# Patient Record
Sex: FEMALE | Race: WHITE
[De-identification: names, ages, dates, MRNs, and addresses within clinical notes are randomized per-mention and may not be internally consistent; named-entity substitution may affect disease eponyms.]

---

## 2017-01-23 ENCOUNTER — OTHER (OUTPATIENT)
Age: 57
End: 2017-01-23

## 2017-01-25 ENCOUNTER — OTHER (OUTPATIENT)
Age: 57
End: 2017-01-25

## 2017-01-26 ENCOUNTER — APPOINTMENT (OUTPATIENT)
Dept: ORTHOPEDIC SURGERY | Facility: CLINIC | Age: 57
End: 2017-01-26

## 2017-01-26 VITALS
BODY MASS INDEX: 27 KG/M2 | HEART RATE: 70 BPM | SYSTOLIC BLOOD PRESSURE: 132 MMHG | WEIGHT: 168 LBS | HEIGHT: 66 IN | DIASTOLIC BLOOD PRESSURE: 78 MMHG

## 2017-01-26 VITALS
HEIGHT: 66 IN | HEART RATE: 70 BPM | WEIGHT: 168 LBS | BODY MASS INDEX: 27 KG/M2 | DIASTOLIC BLOOD PRESSURE: 78 MMHG | SYSTOLIC BLOOD PRESSURE: 132 MMHG

## 2017-01-30 ENCOUNTER — OTHER (OUTPATIENT)
Age: 57
End: 2017-01-30

## 2017-01-31 ENCOUNTER — APPOINTMENT (OUTPATIENT)
Dept: FAMILY MEDICINE | Facility: CLINIC | Age: 57
End: 2017-01-31

## 2017-01-31 VITALS
RESPIRATION RATE: 10 BRPM | HEIGHT: 66 IN | BODY MASS INDEX: 28.45 KG/M2 | WEIGHT: 177 LBS | SYSTOLIC BLOOD PRESSURE: 148 MMHG | OXYGEN SATURATION: 98 % | DIASTOLIC BLOOD PRESSURE: 88 MMHG | TEMPERATURE: 98.6 F | HEART RATE: 67 BPM

## 2017-02-06 ENCOUNTER — APPOINTMENT (OUTPATIENT)
Dept: ORTHOPEDIC SURGERY | Facility: CLINIC | Age: 57
End: 2017-02-06

## 2017-02-14 ENCOUNTER — RX RENEWAL (OUTPATIENT)
Age: 57
End: 2017-02-14

## 2017-02-21 ENCOUNTER — RX RENEWAL (OUTPATIENT)
Age: 57
End: 2017-02-21

## 2017-03-02 ENCOUNTER — APPOINTMENT (OUTPATIENT)
Dept: FAMILY MEDICINE | Facility: CLINIC | Age: 57
End: 2017-03-02

## 2017-03-02 VITALS
TEMPERATURE: 98.2 F | HEIGHT: 66 IN | BODY MASS INDEX: 28.45 KG/M2 | DIASTOLIC BLOOD PRESSURE: 78 MMHG | SYSTOLIC BLOOD PRESSURE: 120 MMHG | OXYGEN SATURATION: 99 % | WEIGHT: 177 LBS | RESPIRATION RATE: 12 BRPM | HEART RATE: 60 BPM

## 2017-03-02 DIAGNOSIS — M19.90 UNSPECIFIED OSTEOARTHRITIS, UNSPECIFIED SITE: ICD-10-CM

## 2017-03-21 ENCOUNTER — RX RENEWAL (OUTPATIENT)
Age: 57
End: 2017-03-21

## 2017-04-04 ENCOUNTER — APPOINTMENT (OUTPATIENT)
Dept: FAMILY MEDICINE | Facility: CLINIC | Age: 57
End: 2017-04-04

## 2017-04-04 VITALS
WEIGHT: 177 LBS | OXYGEN SATURATION: 98 % | BODY MASS INDEX: 28.45 KG/M2 | DIASTOLIC BLOOD PRESSURE: 74 MMHG | SYSTOLIC BLOOD PRESSURE: 118 MMHG | RESPIRATION RATE: 12 BRPM | HEART RATE: 86 BPM | TEMPERATURE: 98.8 F | HEIGHT: 66 IN

## 2017-04-04 DIAGNOSIS — R60.9 EDEMA, UNSPECIFIED: ICD-10-CM

## 2017-04-04 DIAGNOSIS — M70.71 OTHER BURSITIS OF HIP, RIGHT HIP: ICD-10-CM

## 2017-04-06 ENCOUNTER — EMERGENCY (EMERGENCY)
Facility: HOSPITAL | Age: 57
LOS: 1 days | Discharge: HOME | End: 2017-04-06
Attending: EMERGENCY MEDICINE
Payer: COMMERCIAL

## 2017-04-06 VITALS
TEMPERATURE: 99 F | RESPIRATION RATE: 18 BRPM | DIASTOLIC BLOOD PRESSURE: 81 MMHG | HEART RATE: 84 BPM | SYSTOLIC BLOOD PRESSURE: 144 MMHG | OXYGEN SATURATION: 98 %

## 2017-04-06 VITALS
HEIGHT: 66 IN | DIASTOLIC BLOOD PRESSURE: 118 MMHG | SYSTOLIC BLOOD PRESSURE: 180 MMHG | RESPIRATION RATE: 22 BRPM | OXYGEN SATURATION: 97 % | WEIGHT: 164.91 LBS | HEART RATE: 87 BPM

## 2017-04-06 DIAGNOSIS — J45.909 UNSPECIFIED ASTHMA, UNCOMPLICATED: ICD-10-CM

## 2017-04-06 DIAGNOSIS — T40.601A POISONING BY UNSPECIFIED NARCOTICS, ACCIDENTAL (UNINTENTIONAL), INITIAL ENCOUNTER: ICD-10-CM

## 2017-04-06 DIAGNOSIS — Z98.51 TUBAL LIGATION STATUS: ICD-10-CM

## 2017-04-06 DIAGNOSIS — Z98.51 TUBAL LIGATION STATUS: Chronic | ICD-10-CM

## 2017-04-06 DIAGNOSIS — Z79.82 LONG TERM (CURRENT) USE OF ASPIRIN: ICD-10-CM

## 2017-04-06 DIAGNOSIS — F41.8 OTHER SPECIFIED ANXIETY DISORDERS: ICD-10-CM

## 2017-04-06 DIAGNOSIS — Z96.60 PRESENCE OF UNSPECIFIED ORTHOPEDIC JOINT IMPLANT: Chronic | ICD-10-CM

## 2017-04-06 DIAGNOSIS — Z96.642 PRESENCE OF LEFT ARTIFICIAL HIP JOINT: ICD-10-CM

## 2017-04-06 DIAGNOSIS — Z91.09 OTHER ALLERGY STATUS, OTHER THAN TO DRUGS AND BIOLOGICAL SUBSTANCES: ICD-10-CM

## 2017-04-06 DIAGNOSIS — Z90.49 ACQUIRED ABSENCE OF OTHER SPECIFIED PARTS OF DIGESTIVE TRACT: Chronic | ICD-10-CM

## 2017-04-06 DIAGNOSIS — Z90.49 ACQUIRED ABSENCE OF OTHER SPECIFIED PARTS OF DIGESTIVE TRACT: ICD-10-CM

## 2017-04-06 DIAGNOSIS — R07.89 OTHER CHEST PAIN: ICD-10-CM

## 2017-04-06 LAB
ALBUMIN SERPL ELPH-MCNC: 4 G/DL — SIGNIFICANT CHANGE UP (ref 3.3–5.2)
ALP SERPL-CCNC: 67 U/L — SIGNIFICANT CHANGE UP (ref 40–120)
ALT FLD-CCNC: 32 U/L — SIGNIFICANT CHANGE UP
ANION GAP SERPL CALC-SCNC: 11 MMOL/L — SIGNIFICANT CHANGE UP (ref 5–17)
APTT BLD: 29.7 SEC — SIGNIFICANT CHANGE UP (ref 27.5–37.4)
AST SERPL-CCNC: 36 U/L — HIGH
BILIRUB SERPL-MCNC: 0.1 MG/DL — LOW (ref 0.4–2)
BUN SERPL-MCNC: 17 MG/DL — SIGNIFICANT CHANGE UP (ref 8–20)
CALCIUM SERPL-MCNC: 9.2 MG/DL — SIGNIFICANT CHANGE UP (ref 8.6–10.2)
CHLORIDE SERPL-SCNC: 99 MMOL/L — SIGNIFICANT CHANGE UP (ref 98–107)
CK SERPL-CCNC: 73 U/L — SIGNIFICANT CHANGE UP (ref 25–170)
CO2 SERPL-SCNC: 27 MMOL/L — SIGNIFICANT CHANGE UP (ref 22–29)
CREAT SERPL-MCNC: 0.86 MG/DL — SIGNIFICANT CHANGE UP (ref 0.5–1.3)
GLUCOSE SERPL-MCNC: 146 MG/DL — HIGH (ref 70–115)
HCT VFR BLD CALC: 35.2 % — LOW (ref 37–47)
HGB BLD-MCNC: 11.6 G/DL — LOW (ref 12–16)
INR BLD: 0.96 RATIO — SIGNIFICANT CHANGE UP (ref 0.88–1.16)
MCHC RBC-ENTMCNC: 29.4 PG — SIGNIFICANT CHANGE UP (ref 27–31)
MCHC RBC-ENTMCNC: 33 G/DL — SIGNIFICANT CHANGE UP (ref 32–36)
MCV RBC AUTO: 89.3 FL — SIGNIFICANT CHANGE UP (ref 81–99)
PLATELET # BLD AUTO: 266 K/UL — SIGNIFICANT CHANGE UP (ref 150–400)
POTASSIUM SERPL-MCNC: 4.2 MMOL/L — SIGNIFICANT CHANGE UP (ref 3.5–5.3)
POTASSIUM SERPL-SCNC: 4.2 MMOL/L — SIGNIFICANT CHANGE UP (ref 3.5–5.3)
PROT SERPL-MCNC: 7.2 G/DL — SIGNIFICANT CHANGE UP (ref 6.6–8.7)
PROTHROM AB SERPL-ACNC: 10.6 SEC — SIGNIFICANT CHANGE UP (ref 9.8–12.7)
RBC # BLD: 3.94 M/UL — LOW (ref 4.4–5.2)
RBC # FLD: 14 % — SIGNIFICANT CHANGE UP (ref 11–15.6)
SODIUM SERPL-SCNC: 137 MMOL/L — SIGNIFICANT CHANGE UP (ref 135–145)
TROPONIN T SERPL-MCNC: <0.01 NG/ML — SIGNIFICANT CHANGE UP (ref 0–0.06)
WBC # BLD: 10.4 K/UL — SIGNIFICANT CHANGE UP (ref 4.8–10.8)
WBC # FLD AUTO: 10.4 K/UL — SIGNIFICANT CHANGE UP (ref 4.8–10.8)

## 2017-04-06 PROCEDURE — 99284 EMERGENCY DEPT VISIT MOD MDM: CPT | Mod: 25

## 2017-04-06 PROCEDURE — 84484 ASSAY OF TROPONIN QUANT: CPT

## 2017-04-06 PROCEDURE — 71020: CPT | Mod: 26

## 2017-04-06 PROCEDURE — 85610 PROTHROMBIN TIME: CPT

## 2017-04-06 PROCEDURE — 93010 ELECTROCARDIOGRAM REPORT: CPT

## 2017-04-06 PROCEDURE — 71046 X-RAY EXAM CHEST 2 VIEWS: CPT

## 2017-04-06 PROCEDURE — 85730 THROMBOPLASTIN TIME PARTIAL: CPT

## 2017-04-06 PROCEDURE — 93005 ELECTROCARDIOGRAM TRACING: CPT

## 2017-04-06 PROCEDURE — 85027 COMPLETE CBC AUTOMATED: CPT

## 2017-04-06 PROCEDURE — 99285 EMERGENCY DEPT VISIT HI MDM: CPT

## 2017-04-06 PROCEDURE — 96374 THER/PROPH/DIAG INJ IV PUSH: CPT

## 2017-04-06 PROCEDURE — 80053 COMPREHEN METABOLIC PANEL: CPT

## 2017-04-06 PROCEDURE — 84443 ASSAY THYROID STIM HORMONE: CPT

## 2017-04-06 PROCEDURE — 82550 ASSAY OF CK (CPK): CPT

## 2017-04-06 RX ORDER — KETOROLAC TROMETHAMINE 30 MG/ML
15 SYRINGE (ML) INJECTION ONCE
Qty: 0 | Refills: 0 | Status: DISCONTINUED | OUTPATIENT
Start: 2017-04-06 | End: 2017-04-06

## 2017-04-06 RX ADMIN — Medication 15 MILLIGRAM(S): at 15:25

## 2017-04-06 NOTE — ED ADULT TRIAGE NOTE - CHIEF COMPLAINT QUOTE
pt alert and awake x3, BIBA s/p unresponsive, as per ems, pt just recently had hip surgery, took 5mg of oxy, ems states that patient took another 5mg of oxy, and also took 0.5 xanax and became unresponsive, ems states that MCAT started CPR for 4-5 minutes and administered 2mg narcan IV, pt woke up, pt denies pain, denies SI/HI

## 2017-04-06 NOTE — ED PROVIDER NOTE - NS ED ROS FT
CONST: no fevers, no chills, no trauma  EYES: no pain, no visual disturbances  ENT: no sore throat, no epistaxis, no rhinorhea, no hearing changes  CV: no chest pain, no palpitations, no orthopnea, no extremity pain or swelling  RESP: no shortness of breath, no cough, no sputum, no pleurisy, no wheezing  ABD: no abdominal pain, no nausea, no vomiting, no diarrhea, no black or bloody stool  : no dysuria, no hematuria, no frequency, no urgency  MSK: see HPI, R-lateral back pain that is her usual pain not different from usual, no midline pain, no radiation, no neck pain, no extremity pain  NEURO: no headache, no sensory disturbances, no focal weakness, no dizziness  HEME: no easy bleeding or bruising  SKIN: no diaphoresis, no rash  ENDO: known goiter, was scheduled for outpatient thyroid function

## 2017-04-06 NOTE — ED PROVIDER NOTE - PROGRESS NOTE DETAILS
Pt seen by social work and they do not think she is a risk. Pt was provided a number for outpatient counselling. Pt stable, all workup reassuring. Do not suspect true cardiac arrest. Now >4h s/p narcan without signs of mental status depression. Will DC for PCP f/u.

## 2017-04-06 NOTE — ED PROVIDER NOTE - MEDICAL DECISION MAKING DETAILS
Unresponsive episode likely from mixing opiates and benzos. Pt well appaering with minimal chest wall pain, her usual sciatica, but no other complaints. Will get labs, imaging, give torradol for mild pain.

## 2017-04-06 NOTE — ED PROVIDER NOTE - OBJECTIVE STATEMENT
This is a 56F w/Hx of anxiety, sciatica who presents for unresponsiveness. Pt has hx of anxiety and took her usual xanax today. Her sciatica was acting up and she took an old oxycodone she had in the house. She has never mixed it with xanax before. She took both at the same time. Around an hour later she was feeling woozy. Next thing she knew she awoke on the ground with EMS. Per report she was seen to collapse. Bystander CPR was started, unclear if she had a pulse. EMS arrived and found a pulse, administered narcan with immediate revival. The patient denies cardiac hx, CP or SOB or palpitations before or after the episode. She has a mild ache in her R ribs that she thinks may be from CPR. She does not abuse any drugs and denies prior episodes. She denies johan depression, SI or HI although she has been under stress lately. She has an appointment with a psychiatrist coming up next week.

## 2017-04-06 NOTE — ED ADULT NURSE NOTE - OBJECTIVE STATEMENT
Patient BIBA, s/p cardiac arrest and given narcan, patient states had hip surgery last april, states when weather is bad she takes an extra pain pill and also has hx of siatica, today took 10mg of oxycotin in total, was at work and found unresponsive. about 5 min of CPR done on patient unknown cardiac rhythm, was given 2mg IV narcan and patient became responsive.  At this time patient awake, alert, orientedx3, in no apparent distress, appears upset, denies any suicidal ideations, denies intentional overdose. even and unlabored respirations, lung sounds clear bilateral, color good, skin warm and dry, moving all extremities well.

## 2017-04-06 NOTE — ED PROVIDER NOTE - CARE PLAN
Principal Discharge DX:	Opiate overdose, accidental or unintentional, initial encounter  Instructions for follow-up, activity and diet:	You were seen and evaluated in the emergency department for an unresponsive episode. This was likely from mixing oxycodone with your xanax. Do not take these medications within 4 hours of each other. You may take acetaminophen and ibuprofen as needed for pain according to the 's instructions. Please return for any chest pain, trouble breathing, weakness, if you pass our or faint, or have other concerning new symptoms. Please follow up with your psychiatrist as scheduled.

## 2017-04-06 NOTE — ED PROVIDER NOTE - PHYSICAL EXAMINATION
VITALS: reviewed  GEN: NAD, A & O x 4  HEAD/EYES: NCAT, PERRL, EOMI, anicteric sclerae, no conjunctival pallor  ENT: mucus membranes moist, oropharynx WNL, trachea midline, no JVD, mild goiter, negative warren sign  CHEST: lungs CTA with equal breath sounds bilaterally, chest wall with minimal R-chest wall tenderness, no crepitance or stepoff, able to breathe against AP and lateral resistance  CV: heart with reg rhythm S1, S2, no murmur; distal pulses intact and symmetric bilaterally  ABDOMEN: normoactive bowel sounds, soft, ND, nontender, no masses  : no CVAT, no suprapubic tenderness or fullness  MSK: extremities atraumatic and nontender, no edema. back is without midline tenderness, deformity or stepoff and is ranged painlessly. mild R-lateral muscular back pain, negative SLR and XLR. the neck has no midline tenderness, deformity, or stepoff, and is ranged painlessly.  SKIN: no rash, no bruising, no cyanosis. color appropriate for ethnicity  NEURO: alert, mentating appropriately, no facial asymmetry. gross sensation, motor, coordination are intact  PSYCH: Affect appropriate

## 2017-04-06 NOTE — ED PROVIDER NOTE - PLAN OF CARE
You were seen and evaluated in the emergency department for an unresponsive episode. This was likely from mixing oxycodone with your xanax. Do not take these medications within 4 hours of each other. You may take acetaminophen and ibuprofen as needed for pain according to the 's instructions. Please return for any chest pain, trouble breathing, weakness, if you pass our or faint, or have other concerning new symptoms. Please follow up with your psychiatrist as scheduled.

## 2017-04-18 ENCOUNTER — RX RENEWAL (OUTPATIENT)
Age: 57
End: 2017-04-18

## 2017-05-02 ENCOUNTER — APPOINTMENT (OUTPATIENT)
Dept: FAMILY MEDICINE | Facility: CLINIC | Age: 57
End: 2017-05-02

## 2017-05-02 VITALS
HEIGHT: 66 IN | HEART RATE: 63 BPM | SYSTOLIC BLOOD PRESSURE: 120 MMHG | TEMPERATURE: 98.8 F | RESPIRATION RATE: 12 BRPM | DIASTOLIC BLOOD PRESSURE: 72 MMHG | BODY MASS INDEX: 28.45 KG/M2 | WEIGHT: 177 LBS | OXYGEN SATURATION: 98 %

## 2017-05-02 DIAGNOSIS — R13.10 DYSPHAGIA, UNSPECIFIED: ICD-10-CM

## 2017-05-02 DIAGNOSIS — Z86.19 PERSONAL HISTORY OF OTHER INFECTIOUS AND PARASITIC DISEASES: ICD-10-CM

## 2017-05-02 DIAGNOSIS — R53.83 OTHER FATIGUE: ICD-10-CM

## 2017-05-02 DIAGNOSIS — Z79.899 OTHER LONG TERM (CURRENT) DRUG THERAPY: ICD-10-CM

## 2017-05-02 DIAGNOSIS — L60.9 NAIL DISORDER, UNSPECIFIED: ICD-10-CM

## 2017-05-03 LAB
ALBUMIN SERPL ELPH-MCNC: 4.3 G/DL
ALP BLD-CCNC: 70 U/L
ALT SERPL-CCNC: 39 U/L
ANION GAP SERPL CALC-SCNC: 11 MMOL/L
AST SERPL-CCNC: 21 U/L
BASOPHILS # BLD AUTO: 0.02 K/UL
BASOPHILS NFR BLD AUTO: 0.3 %
BILIRUB SERPL-MCNC: <0.2 MG/DL
BUN SERPL-MCNC: 14 MG/DL
CALCIUM SERPL-MCNC: 9.3 MG/DL
CHLORIDE SERPL-SCNC: 104 MMOL/L
CO2 SERPL-SCNC: 27 MMOL/L
CREAT SERPL-MCNC: 0.84 MG/DL
EOSINOPHIL # BLD AUTO: 0.12 K/UL
EOSINOPHIL NFR BLD AUTO: 2 %
GLUCOSE SERPL-MCNC: 107 MG/DL
HCT VFR BLD CALC: 33.6 %
HGB BLD-MCNC: 10.4 G/DL
IMM GRANULOCYTES NFR BLD AUTO: 0 %
LYMPHOCYTES # BLD AUTO: 1.35 K/UL
LYMPHOCYTES NFR BLD AUTO: 22.8 %
MAN DIFF?: NORMAL
MCHC RBC-ENTMCNC: 28.6 PG
MCHC RBC-ENTMCNC: 31 GM/DL
MCV RBC AUTO: 92.3 FL
MONOCYTES # BLD AUTO: 0.3 K/UL
MONOCYTES NFR BLD AUTO: 5.1 %
NEUTROPHILS # BLD AUTO: 4.14 K/UL
NEUTROPHILS NFR BLD AUTO: 69.8 %
PLATELET # BLD AUTO: 331 K/UL
POTASSIUM SERPL-SCNC: 4.6 MMOL/L
PROT SERPL-MCNC: 6.5 G/DL
RBC # BLD: 3.64 M/UL
RBC # FLD: 14 %
SODIUM SERPL-SCNC: 142 MMOL/L
TSH SERPL-ACNC: 1.13 UIU/ML
WBC # FLD AUTO: 5.93 K/UL

## 2017-05-09 LAB
CHOLEST SERPL-MCNC: 226 MG/DL
CHOLEST/HDLC SERPL: 2.7 RATIO
HDLC SERPL-MCNC: 83 MG/DL
LDLC SERPL CALC-MCNC: 133 MG/DL
TRIGL SERPL-MCNC: 49 MG/DL

## 2017-05-18 ENCOUNTER — RX RENEWAL (OUTPATIENT)
Age: 57
End: 2017-05-18

## 2017-06-22 ENCOUNTER — OTHER (OUTPATIENT)
Age: 57
End: 2017-06-22

## 2017-07-25 ENCOUNTER — APPOINTMENT (OUTPATIENT)
Dept: ORTHOPEDIC SURGERY | Facility: CLINIC | Age: 57
End: 2017-07-25

## 2017-07-25 VITALS
WEIGHT: 175 LBS | SYSTOLIC BLOOD PRESSURE: 176 MMHG | DIASTOLIC BLOOD PRESSURE: 96 MMHG | HEART RATE: 69 BPM | HEIGHT: 66 IN | BODY MASS INDEX: 28.12 KG/M2

## 2017-07-25 DIAGNOSIS — M43.16 SPONDYLOLISTHESIS, LUMBAR REGION: ICD-10-CM

## 2017-08-13 ENCOUNTER — FORM ENCOUNTER (OUTPATIENT)
Age: 57
End: 2017-08-13

## 2017-08-14 ENCOUNTER — OUTPATIENT (OUTPATIENT)
Dept: OUTPATIENT SERVICES | Facility: HOSPITAL | Age: 57
LOS: 1 days | End: 2017-08-14

## 2017-08-14 ENCOUNTER — APPOINTMENT (OUTPATIENT)
Dept: MRI IMAGING | Facility: CLINIC | Age: 57
End: 2017-08-14
Payer: COMMERCIAL

## 2017-08-14 DIAGNOSIS — Z90.49 ACQUIRED ABSENCE OF OTHER SPECIFIED PARTS OF DIGESTIVE TRACT: Chronic | ICD-10-CM

## 2017-08-14 DIAGNOSIS — Z96.60 PRESENCE OF UNSPECIFIED ORTHOPEDIC JOINT IMPLANT: Chronic | ICD-10-CM

## 2017-08-14 DIAGNOSIS — Z98.51 TUBAL LIGATION STATUS: Chronic | ICD-10-CM

## 2017-08-14 PROCEDURE — 72148 MRI LUMBAR SPINE W/O DYE: CPT | Mod: 26

## 2017-08-16 ENCOUNTER — RX RENEWAL (OUTPATIENT)
Age: 57
End: 2017-08-16

## 2017-08-18 ENCOUNTER — EMERGENCY (EMERGENCY)
Facility: HOSPITAL | Age: 57
LOS: 1 days | Discharge: HOME | End: 2017-08-18
Attending: EMERGENCY MEDICINE
Payer: COMMERCIAL

## 2017-08-18 VITALS
RESPIRATION RATE: 20 BRPM | WEIGHT: 175.05 LBS | HEART RATE: 126 BPM | HEIGHT: 66 IN | DIASTOLIC BLOOD PRESSURE: 121 MMHG | TEMPERATURE: 99 F | OXYGEN SATURATION: 97 % | SYSTOLIC BLOOD PRESSURE: 150 MMHG

## 2017-08-18 VITALS
SYSTOLIC BLOOD PRESSURE: 135 MMHG | RESPIRATION RATE: 20 BRPM | HEART RATE: 98 BPM | OXYGEN SATURATION: 98 % | DIASTOLIC BLOOD PRESSURE: 90 MMHG

## 2017-08-18 DIAGNOSIS — Z90.49 ACQUIRED ABSENCE OF OTHER SPECIFIED PARTS OF DIGESTIVE TRACT: Chronic | ICD-10-CM

## 2017-08-18 DIAGNOSIS — Z98.51 TUBAL LIGATION STATUS: Chronic | ICD-10-CM

## 2017-08-18 DIAGNOSIS — Z96.60 PRESENCE OF UNSPECIFIED ORTHOPEDIC JOINT IMPLANT: Chronic | ICD-10-CM

## 2017-08-18 LAB
ALBUMIN SERPL ELPH-MCNC: 4.7 G/DL — SIGNIFICANT CHANGE UP (ref 3.3–5.2)
ALP SERPL-CCNC: 95 U/L — SIGNIFICANT CHANGE UP (ref 40–120)
ALT FLD-CCNC: 61 U/L — HIGH
ANION GAP SERPL CALC-SCNC: 21 MMOL/L — HIGH (ref 5–17)
AST SERPL-CCNC: 31 U/L — SIGNIFICANT CHANGE UP
BASOPHILS # BLD AUTO: 0 K/UL — SIGNIFICANT CHANGE UP (ref 0–0.2)
BASOPHILS NFR BLD AUTO: 0.1 % — SIGNIFICANT CHANGE UP (ref 0–2)
BILIRUB SERPL-MCNC: 0.3 MG/DL — LOW (ref 0.4–2)
BUN SERPL-MCNC: 13 MG/DL — SIGNIFICANT CHANGE UP (ref 8–20)
CALCIUM SERPL-MCNC: 10 MG/DL — SIGNIFICANT CHANGE UP (ref 8.6–10.2)
CHLORIDE SERPL-SCNC: 101 MMOL/L — SIGNIFICANT CHANGE UP (ref 98–107)
CO2 SERPL-SCNC: 22 MMOL/L — SIGNIFICANT CHANGE UP (ref 22–29)
CREAT SERPL-MCNC: 0.98 MG/DL — SIGNIFICANT CHANGE UP (ref 0.5–1.3)
EOSINOPHIL # BLD AUTO: 0 K/UL — SIGNIFICANT CHANGE UP (ref 0–0.5)
EOSINOPHIL NFR BLD AUTO: 0.5 % — SIGNIFICANT CHANGE UP (ref 0–6)
GLUCOSE SERPL-MCNC: 144 MG/DL — HIGH (ref 70–115)
HCT VFR BLD CALC: 38.2 % — SIGNIFICANT CHANGE UP (ref 37–47)
HGB BLD-MCNC: 12.5 G/DL — SIGNIFICANT CHANGE UP (ref 12–16)
INR BLD: 1.12 RATIO — SIGNIFICANT CHANGE UP (ref 0.88–1.16)
LIDOCAIN IGE QN: 48 U/L — SIGNIFICANT CHANGE UP (ref 22–51)
LYMPHOCYTES # BLD AUTO: 1.2 K/UL — SIGNIFICANT CHANGE UP (ref 1–4.8)
LYMPHOCYTES # BLD AUTO: 14.4 % — LOW (ref 20–55)
MCHC RBC-ENTMCNC: 26.7 PG — LOW (ref 27–31)
MCHC RBC-ENTMCNC: 32.7 G/DL — SIGNIFICANT CHANGE UP (ref 32–36)
MCV RBC AUTO: 81.6 FL — SIGNIFICANT CHANGE UP (ref 81–99)
MONOCYTES # BLD AUTO: 0.6 K/UL — SIGNIFICANT CHANGE UP (ref 0–0.8)
MONOCYTES NFR BLD AUTO: 7.3 % — SIGNIFICANT CHANGE UP (ref 3–10)
NEUTROPHILS # BLD AUTO: 6.4 K/UL — SIGNIFICANT CHANGE UP (ref 1.8–8)
NEUTROPHILS NFR BLD AUTO: 77.6 % — HIGH (ref 37–73)
PLATELET # BLD AUTO: 377 K/UL — SIGNIFICANT CHANGE UP (ref 150–400)
POTASSIUM SERPL-MCNC: 3.7 MMOL/L — SIGNIFICANT CHANGE UP (ref 3.5–5.3)
POTASSIUM SERPL-SCNC: 3.7 MMOL/L — SIGNIFICANT CHANGE UP (ref 3.5–5.3)
PROT SERPL-MCNC: 8.3 G/DL — SIGNIFICANT CHANGE UP (ref 6.6–8.7)
PROTHROM AB SERPL-ACNC: 12.3 SEC — SIGNIFICANT CHANGE UP (ref 9.8–12.7)
RBC # BLD: 4.68 M/UL — SIGNIFICANT CHANGE UP (ref 4.4–5.2)
RBC # FLD: 14.6 % — SIGNIFICANT CHANGE UP (ref 11–15.6)
SODIUM SERPL-SCNC: 144 MMOL/L — SIGNIFICANT CHANGE UP (ref 135–145)
TROPONIN T SERPL-MCNC: <0.01 NG/ML — SIGNIFICANT CHANGE UP (ref 0–0.06)
WBC # BLD: 8.2 K/UL — SIGNIFICANT CHANGE UP (ref 4.8–10.8)
WBC # FLD AUTO: 8.2 K/UL — SIGNIFICANT CHANGE UP (ref 4.8–10.8)

## 2017-08-18 PROCEDURE — 99283 EMERGENCY DEPT VISIT LOW MDM: CPT | Mod: 25

## 2017-08-18 PROCEDURE — 36415 COLL VENOUS BLD VENIPUNCTURE: CPT

## 2017-08-18 PROCEDURE — 80053 COMPREHEN METABOLIC PANEL: CPT

## 2017-08-18 PROCEDURE — 85027 COMPLETE CBC AUTOMATED: CPT

## 2017-08-18 PROCEDURE — 93010 ELECTROCARDIOGRAM REPORT: CPT

## 2017-08-18 PROCEDURE — 71046 X-RAY EXAM CHEST 2 VIEWS: CPT

## 2017-08-18 PROCEDURE — 99285 EMERGENCY DEPT VISIT HI MDM: CPT | Mod: 25

## 2017-08-18 PROCEDURE — 85610 PROTHROMBIN TIME: CPT

## 2017-08-18 PROCEDURE — 93005 ELECTROCARDIOGRAM TRACING: CPT

## 2017-08-18 PROCEDURE — 83690 ASSAY OF LIPASE: CPT

## 2017-08-18 PROCEDURE — 84484 ASSAY OF TROPONIN QUANT: CPT

## 2017-08-18 PROCEDURE — 71020: CPT | Mod: 26

## 2017-08-18 RX ORDER — BUPRENORPHINE AND NALOXONE 2; .5 MG/1; MG/1
1 TABLET SUBLINGUAL ONCE
Qty: 0 | Refills: 0 | Status: DISCONTINUED | OUTPATIENT
Start: 2017-08-18 | End: 2017-08-18

## 2017-08-18 RX ORDER — SODIUM CHLORIDE 9 MG/ML
3 INJECTION INTRAMUSCULAR; INTRAVENOUS; SUBCUTANEOUS ONCE
Qty: 0 | Refills: 0 | Status: COMPLETED | OUTPATIENT
Start: 2017-08-18 | End: 2017-08-18

## 2017-08-18 RX ORDER — ASPIRIN/CALCIUM CARB/MAGNESIUM 324 MG
162 TABLET ORAL DAILY
Qty: 0 | Refills: 0 | Status: DISCONTINUED | OUTPATIENT
Start: 2017-08-18 | End: 2017-08-22

## 2017-08-18 RX ORDER — NITROGLYCERIN 6.5 MG
0.4 CAPSULE, EXTENDED RELEASE ORAL ONCE
Qty: 0 | Refills: 0 | Status: COMPLETED | OUTPATIENT
Start: 2017-08-18 | End: 2017-08-18

## 2017-08-18 RX ADMIN — BUPRENORPHINE AND NALOXONE 1 TABLET(S): 2; .5 TABLET SUBLINGUAL at 06:45

## 2017-08-18 RX ADMIN — Medication 162 MILLIGRAM(S): at 06:45

## 2017-08-18 RX ADMIN — Medication 0.4 MILLIGRAM(S): at 06:51

## 2017-08-18 RX ADMIN — SODIUM CHLORIDE 3 MILLILITER(S): 9 INJECTION INTRAMUSCULAR; INTRAVENOUS; SUBCUTANEOUS at 07:09

## 2017-08-18 RX ADMIN — Medication 50 MILLIGRAM(S): at 08:28

## 2017-08-18 NOTE — ED PROVIDER NOTE - CARE PLAN
Principal Discharge DX:	Chest pain, unspecified type  Secondary Diagnosis:	Adverse effect of opioid antagonist, initial encounter

## 2017-08-18 NOTE — ED PROVIDER NOTE - MEDICAL DECISION MAKING DETAILS
labs, cxr, Ekg, trop, sl nitro, asa, suboxone, reassess pt in nad hr 95 c/o withdrawal symptoms greater than 56 hrs chest pain started 4 pm yesterday no chest pain at dispo librium etoh resources provided no si or hi DDX considered: stable angina, aortic dissection, pericarditis, pneumonia, pe, pts, chest wall pain, esophageal spasm and abdominal emergencies. I have discussed with patient with negative troponin and normal ekg their 28 day cardiac risk and they have agreed to outpt cardio f/ut his week without fail. asa per day until then. return to the ed immediately for any intractable chest pain or sob. pt agrees to plan of care   return to ed for intractable HA, persistent vomiting, or new onset motor/sensory deficits

## 2017-08-18 NOTE — ED PROVIDER NOTE - OBJECTIVE STATEMENT
chest pain. pt has been stopped taking percocet 2 days ago and now p/w with substernal cp. worse with movement and palpation, + nausea, increased anxiety

## 2017-08-18 NOTE — ED PROVIDER NOTE - PHYSICAL EXAMINATION
VS: reviewed in triage note...  HEENT: NC/AT   CV:s1,s2 no m/r/g  Lungs: cta b/l, no r/r/w  Abd: soft, nt/nd, +bs  EXT: no c/c/e  Neuro:no focal defecits  skin: no rash  Pulses: dpp, pt 2+ b/l

## 2017-08-18 NOTE — ED ADULT NURSE REASSESSMENT NOTE - NS ED NURSE REASSESS COMMENT FT1
pt. received from night RN. pt. awake, alert, oriented x3. pt. states she came in because she stopped taking perocets x2 days ago without any help. pt. states she is very anxious. pt. denies chest pain at this time. pt. on cm. vs. as charted. will continue to monitor.

## 2017-08-18 NOTE — ED ADULT NURSE NOTE - OBJECTIVE STATEMENT
patient states that she has had CP started yesterday with N/V shaking/chills back pain and mild SOB, patient also admits to using percocet and requesting help to stop, patient has been off medication for 2 days, she has HX of back pain, yasmine hip replacement was here 1 month ago for same

## 2017-11-14 ENCOUNTER — RX RENEWAL (OUTPATIENT)
Age: 57
End: 2017-11-14

## 2017-12-26 RX ORDER — MELOXICAM 7.5 MG/1
7.5 TABLET ORAL DAILY
Qty: 60 | Refills: 0 | Status: COMPLETED | COMMUNITY
Start: 2017-07-25 | End: 2017-12-26

## 2018-02-05 ENCOUNTER — EMERGENCY (EMERGENCY)
Facility: HOSPITAL | Age: 58
LOS: 1 days | Discharge: OTHER INSTITUTION | End: 2018-02-05
Attending: EMERGENCY MEDICINE
Payer: SELF-PAY

## 2018-02-05 DIAGNOSIS — Z90.49 ACQUIRED ABSENCE OF OTHER SPECIFIED PARTS OF DIGESTIVE TRACT: Chronic | ICD-10-CM

## 2018-02-05 DIAGNOSIS — Z96.60 PRESENCE OF UNSPECIFIED ORTHOPEDIC JOINT IMPLANT: Chronic | ICD-10-CM

## 2018-02-05 DIAGNOSIS — Z98.51 TUBAL LIGATION STATUS: Chronic | ICD-10-CM

## 2018-02-05 PROCEDURE — 99285 EMERGENCY DEPT VISIT HI MDM: CPT | Mod: 25

## 2018-02-05 PROCEDURE — 99053 MED SERV 10PM-8AM 24 HR FAC: CPT

## 2018-02-06 VITALS
TEMPERATURE: 98 F | DIASTOLIC BLOOD PRESSURE: 67 MMHG | WEIGHT: 175.05 LBS | HEIGHT: 66 IN | SYSTOLIC BLOOD PRESSURE: 99 MMHG | RESPIRATION RATE: 20 BRPM | HEART RATE: 65 BPM | OXYGEN SATURATION: 96 %

## 2018-02-06 VITALS
DIASTOLIC BLOOD PRESSURE: 82 MMHG | SYSTOLIC BLOOD PRESSURE: 153 MMHG | RESPIRATION RATE: 18 BRPM | TEMPERATURE: 99 F | HEART RATE: 69 BPM | OXYGEN SATURATION: 98 %

## 2018-02-06 DIAGNOSIS — R69 ILLNESS, UNSPECIFIED: ICD-10-CM

## 2018-02-06 DIAGNOSIS — F19.90 OTHER PSYCHOACTIVE SUBSTANCE USE, UNSPECIFIED, UNCOMPLICATED: ICD-10-CM

## 2018-02-06 DIAGNOSIS — F39 UNSPECIFIED MOOD [AFFECTIVE] DISORDER: ICD-10-CM

## 2018-02-06 LAB
ALBUMIN SERPL ELPH-MCNC: 3.8 G/DL — SIGNIFICANT CHANGE UP (ref 3.3–5.2)
ALP SERPL-CCNC: 73 U/L — SIGNIFICANT CHANGE UP (ref 40–120)
ALT FLD-CCNC: 19 U/L — SIGNIFICANT CHANGE UP
AMPHET UR-MCNC: NEGATIVE — SIGNIFICANT CHANGE UP
ANION GAP SERPL CALC-SCNC: 8 MMOL/L — SIGNIFICANT CHANGE UP (ref 5–17)
APAP SERPL-MCNC: <7.5 UG/ML — LOW (ref 10–26)
APPEARANCE UR: CLEAR — SIGNIFICANT CHANGE UP
AST SERPL-CCNC: 35 U/L — HIGH
BARBITURATES UR SCN-MCNC: NEGATIVE — SIGNIFICANT CHANGE UP
BENZODIAZ UR-MCNC: POSITIVE
BILIRUB SERPL-MCNC: <0.2 MG/DL — LOW (ref 0.4–2)
BILIRUB UR-MCNC: NEGATIVE — SIGNIFICANT CHANGE UP
BUN SERPL-MCNC: 12 MG/DL — SIGNIFICANT CHANGE UP (ref 8–20)
CALCIUM SERPL-MCNC: 9.2 MG/DL — SIGNIFICANT CHANGE UP (ref 8.6–10.2)
CHLORIDE SERPL-SCNC: 99 MMOL/L — SIGNIFICANT CHANGE UP (ref 98–107)
CO2 SERPL-SCNC: 34 MMOL/L — HIGH (ref 22–29)
COCAINE METAB.OTHER UR-MCNC: NEGATIVE — SIGNIFICANT CHANGE UP
COLOR SPEC: YELLOW — SIGNIFICANT CHANGE UP
CREAT SERPL-MCNC: 0.82 MG/DL — SIGNIFICANT CHANGE UP (ref 0.5–1.3)
DIFF PNL FLD: NEGATIVE — SIGNIFICANT CHANGE UP
ETHANOL SERPL-MCNC: <10 MG/DL — SIGNIFICANT CHANGE UP
GLUCOSE SERPL-MCNC: 118 MG/DL — HIGH (ref 70–115)
GLUCOSE UR QL: NEGATIVE MG/DL — SIGNIFICANT CHANGE UP
HCT VFR BLD CALC: 31.2 % — LOW (ref 37–47)
HGB BLD-MCNC: 9.6 G/DL — LOW (ref 12–16)
KETONES UR-MCNC: NEGATIVE — SIGNIFICANT CHANGE UP
LEUKOCYTE ESTERASE UR-ACNC: NEGATIVE — SIGNIFICANT CHANGE UP
MCHC RBC-ENTMCNC: 26.5 PG — LOW (ref 27–31)
MCHC RBC-ENTMCNC: 30.8 G/DL — LOW (ref 32–36)
MCV RBC AUTO: 86.2 FL — SIGNIFICANT CHANGE UP (ref 81–99)
METHADONE UR-MCNC: NEGATIVE — SIGNIFICANT CHANGE UP
NITRITE UR-MCNC: NEGATIVE — SIGNIFICANT CHANGE UP
OPIATES UR-MCNC: NEGATIVE — SIGNIFICANT CHANGE UP
PCP SPEC-MCNC: SIGNIFICANT CHANGE UP
PCP UR-MCNC: NEGATIVE — SIGNIFICANT CHANGE UP
PH UR: 6 — SIGNIFICANT CHANGE UP (ref 5–8)
PLATELET # BLD AUTO: 295 K/UL — SIGNIFICANT CHANGE UP (ref 150–400)
POTASSIUM SERPL-MCNC: 4.1 MMOL/L — SIGNIFICANT CHANGE UP (ref 3.5–5.3)
POTASSIUM SERPL-SCNC: 4.1 MMOL/L — SIGNIFICANT CHANGE UP (ref 3.5–5.3)
PROT SERPL-MCNC: 6.4 G/DL — LOW (ref 6.6–8.7)
PROT UR-MCNC: NEGATIVE MG/DL — SIGNIFICANT CHANGE UP
RBC # BLD: 3.62 M/UL — LOW (ref 4.4–5.2)
RBC # FLD: 14.5 % — SIGNIFICANT CHANGE UP (ref 11–15.6)
SALICYLATES SERPL-MCNC: <0.6 MG/DL — LOW (ref 10–20)
SODIUM SERPL-SCNC: 141 MMOL/L — SIGNIFICANT CHANGE UP (ref 135–145)
SP GR SPEC: 1 — LOW (ref 1.01–1.02)
THC UR QL: NEGATIVE — SIGNIFICANT CHANGE UP
UROBILINOGEN FLD QL: NEGATIVE MG/DL — SIGNIFICANT CHANGE UP
WBC # BLD: 8.7 K/UL — SIGNIFICANT CHANGE UP (ref 4.8–10.8)
WBC # FLD AUTO: 8.7 K/UL — SIGNIFICANT CHANGE UP (ref 4.8–10.8)

## 2018-02-06 PROCEDURE — 99285 EMERGENCY DEPT VISIT HI MDM: CPT | Mod: 25

## 2018-02-06 PROCEDURE — 93005 ELECTROCARDIOGRAM TRACING: CPT

## 2018-02-06 PROCEDURE — 93010 ELECTROCARDIOGRAM REPORT: CPT

## 2018-02-06 PROCEDURE — 99285 EMERGENCY DEPT VISIT HI MDM: CPT

## 2018-02-06 RX ORDER — SODIUM CHLORIDE 9 MG/ML
1000 INJECTION INTRAMUSCULAR; INTRAVENOUS; SUBCUTANEOUS ONCE
Qty: 0 | Refills: 0 | Status: COMPLETED | OUTPATIENT
Start: 2018-02-06 | End: 2018-02-06

## 2018-02-06 RX ADMIN — SODIUM CHLORIDE 1000 MILLILITER(S): 9 INJECTION INTRAMUSCULAR; INTRAVENOUS; SUBCUTANEOUS at 00:26
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
## 2018-02-06 NOTE — ED ADULT NURSE REASSESSMENT NOTE - NS ED NURSE REASSESS COMMENT FT1
received pt cleared by ED medically by attending, pt was initially lethargic and unable to stay awake to bi interviewed, pt now more alert and eating breakfast, pt states the last thing she remembers is the super bowl being on television, pt denies suicidal ideations but states she can't go on this way. pt has flat affect, and is a limited historian, pt states she last used alcohol 5months ago and takes suboxone for opioid addiction, pt denies any benzo use prior to coming to the hospital. pt denies AVH, and HI.

## 2018-02-06 NOTE — ED BEHAVIORAL HEALTH ASSESSMENT NOTE - SUICIDE RISK FACTORS
Mood episode/History of abuse/trauma/Substance abuse/dependence/Access to means (pills, firearms, etc.)

## 2018-02-06 NOTE — ED BEHAVIORAL HEALTH ASSESSMENT NOTE - CURRENT MEDICATION
Currently on Paxil (unsure of dose)  Suboxone 8-2mg SL tablet  clonidine- HTN Paxil 20 mg  Suboxone 8-2mg SL tablet daily  clonidine 0.1 mg PO TID  verified with [phone]

## 2018-02-06 NOTE — ED BEHAVIORAL HEALTH ASSESSMENT NOTE - SUMMARY
Pt is a 56 yo female with PMHx of substance use disorder (Xanax and alcohol), anxiety, and depression with history of suicide attempt by overdose 8 years ago (Ambien). BIBA for concern for overdose on Xanax and Ambien by daughter who found her lethargic and confused. Pt told daughter she tried to kill herself after she relapsed on Xanax after being clean for 4 months, however pt is now denying that it was a SA. Pt is lethargic and confused, unable to give clear information, most of the collateral was obtained from son. Ran I STOP on patient and saw that patient has long history of Librium taper since August 2017 and was recently started on Suboxone on 1/9/18. Last prescribed Ambien and Xanax in May 2017. Pt is a 56 yo female with PMHx of substance use disorder (Xanax and alcohol), anxiety, and depression with history of suicide attempt by overdose 8 years ago (Ambien). BIBA for concern for overdose on Xanax and Ambien by daughter who found her lethargic and confused. Pt told daughter she tried to kill herself after she relapsed on Xanax after being clean for 4 months, however pt is now admitting that it was a SA wanting to end her pain.. Pt is lethargic and confused, unable to give clear information, most of the collateral was obtained from son. Ran I STOP on patient and saw that patient has long history of Librium taper since August 2017 and was recently started on Suboxone on 1/9/18. Last prescribed Ambien and Xanax in May 2017.

## 2018-02-06 NOTE — ED PROVIDER NOTE - OBJECTIVE STATEMENT
58yo F hx of anxiety depression osteoarthritis p/w 58yo F hx of anxiety depression osteoarthritis p/w feeling sleepy sp taking xanax and advil pm. dneies si/hi notes that was worried about her  and was just trying to sleep. Denies f/c/n/v/cp/sob/palpitations/ cough/rash/headache/dizziness/abd.pain/d/c/dysuria/hematuria. no sick contacts no recent travel. daughter came to pts house had diffiult time arousing the pt so called 911. notes that took 2 pills of xanax and 2 pills of advil pm

## 2018-02-06 NOTE — ED PROVIDER NOTE - CARE PLAN
Principal Discharge DX:	Drug overdose, undetermined intent, initial encounter Principal Discharge DX:	Drug overdose, undetermined intent, initial encounter  Secondary Diagnosis:	Anxiety and depression

## 2018-02-06 NOTE — ED ADULT NURSE REASSESSMENT NOTE - NS ED NURSE REASSESS COMMENT FT1
Pt resting peacefully, resp even unlabored, no complaints at this time. yellow gown in place for safety.

## 2018-02-06 NOTE — ED BEHAVIORAL HEALTH ASSESSMENT NOTE - DESCRIPTION
HTN Lives with  and son. Unemployed. Lethargic. Cooperative. Calm.  T(C): 36.6 (06 Feb 2018 07:55), Max: 37.1 (06 Feb 2018 07:36)  T(F): 97.9 (06 Feb 2018 07:55), Max: 98.8 (06 Feb 2018 07:36)  HR: 76 (06 Feb 2018 07:55) (65 - 76)  BP: 137/81 (06 Feb 2018 07:55) (99/67 - 148/71)  RR: 18 (06 Feb 2018 07:55) (18 - 20)  SpO2: 97% (06 Feb 2018 07:55) (96% - 98%)

## 2018-02-06 NOTE — ED ADULT TRIAGE NOTE - CHIEF COMPLAINT QUOTE
pt BIBA with noted decrease responsiveness as by ems. pt noted with empty sleeping pills also reports taking xanax and symboxin. as per family she took the pills to not wake up. pt BIBA with noted decrease responsiveness as by ems. pt noted with empty sleeping pills also reports taking xanax and symboxin. as per family she took the pills to not wake up. MD called to bedside. pt BIBA with noted decrease responsiveness as by ems. pt noted with empty sleeping pills also reports taking xanax and symboxin. as per family she took the pills to not wake up. pt placed in yellow gown. MD called to bedside.

## 2018-02-06 NOTE — ED ADULT NURSE REASSESSMENT NOTE - NS ED NURSE REASSESS COMMENT FT1
Pt received in the stretcher resting comfortably, no distress noted, pt still appears lethargic , opt assisted to the bathroom , awaiting psych eval in am , will continue to monitor

## 2018-02-06 NOTE — ED PROVIDER NOTE - PROGRESS NOTE DETAILS
pt medical stable , awaiting psych recs PT WITH MILD ANEMIA. NOT REQUIRING TRANSFUSION. HAS HAD CHANGES IN H/H IN PAST. CLEARED MEDICALLY

## 2018-02-06 NOTE — ED BEHAVIORAL HEALTH NOTE - BEHAVIORAL HEALTH NOTE
SWNote: worker called pt's insurance [employer] [id number] spoke with  (Rosana MUNOZ) who informed worker that pt's policy  has been terminated since Oct.25, 2017. Pt already transferred, unable to speak with her about insurance status. Email will be sent.

## 2018-02-06 NOTE — ED BEHAVIORAL HEALTH ASSESSMENT NOTE - RISK ASSESSMENT
High risk: cannot contract for safety, possible recent SA, history of SA by overdose, family is concerned about wellbeing, abuses substances

## 2018-02-06 NOTE — ED ADULT NURSE NOTE - OBJECTIVE STATEMENT
56y/o female drowsy, responds to verbal commands. As per family at bedside pt states she felt lonely and hopeless. Pt denies SI/HI at this time. Pt placed in yellow gown for safety, O2Sat 97% on room air.

## 2018-02-06 NOTE — ED ADULT NURSE NOTE - CHIEF COMPLAINT QUOTE
pt BIBA with noted decrease responsiveness as by ems. pt noted with empty sleeping pills also reports taking xanax and symboxin. as per family she took the pills to not wake up. pt placed in yellow gown. MD called to bedside.

## 2018-02-06 NOTE — ED BEHAVIORAL HEALTH NOTE - BEHAVIORAL HEALTH NOTE
SWNote: pt seen by psych MD(Dr Moses) found to benefit from inpatient psych . [facility] called([facility]) bed available, case will be reviewed,worker will fax legal docs(9.14). Worker will be called for review outcome. SW to follow.

## 2018-02-06 NOTE — ED ADULT NURSE REASSESSMENT NOTE - COMFORT CARE
warm blanket provided/po fluids offered/repositioned/wait time explained/plan of care explained/side rails up

## 2018-02-06 NOTE — ED PROVIDER NOTE - MEDICAL DECISION MAKING DETAILS
?si as per lashay althought pt denies will fu labs; ua urine tox; when medically cleared BH--will need to speak to daughter to collaborate story

## 2018-03-16 ENCOUNTER — APPOINTMENT (OUTPATIENT)
Dept: FAMILY MEDICINE | Facility: CLINIC | Age: 58
End: 2018-03-16

## 2018-06-07 ENCOUNTER — RESULT REVIEW (OUTPATIENT)
Age: 58
End: 2018-06-07

## 2018-07-18 ENCOUNTER — RESULT REVIEW (OUTPATIENT)
Age: 58
End: 2018-07-18

## 2018-10-14 NOTE — ED ADULT NURSE NOTE - THOUGHTS OF SUICIDE/SELF-HARM YN, MLM
[address] [facility] October 14, 2018       RE: Akash Romano      To Whom It May Concern,    This is to certify that Akash Romano was hospitalized from 10/12/2018-10/14/2018. Please excuse from work until 10/22/2018 or until patient's symptoms improve, whichever comes first.     Thank you for your assistance in this matter.       Sincerely,  Denia Estrella, DO No

## 2018-11-05 NOTE — ED ADULT TRIAGE NOTE - CHIEF COMPLAINT QUOTE
ANNUAL PHYSICAL EXAMINATION    History of Present Illness  Rhoda Chahal is a 25 y.o. female who presents today for management of    Chief Complaint   Patient presents with    Physical       Patient here for routine exam.       Gynecologic History  Patient's last menstrual period was Patient's last menstrual period was 2018. Mickeal Rink Contraception: condoms  Last Pap: none  Results: n/a    Obstetric History  : 0  Para: 0  AB: 0    Health Maintenance  Colon cancer: due at age 50  Dyslipidemia: due  Diabetes mellitus: due  Influenza vaccine: due  Pneumococcal vaccine: not indicated  Tdap: uptodate  Herpes Zoster vaccine: due at age 63  Hep B vaccine: not indicated    Weight:  Body mass index is Estimated body mass index is Body mass index is 28.46 kg/m². .   Diet: no  Exercise: yes  Seatbelt: yes  Sunscreen: no  Dentist: yes      Past Medical History  History reviewed. No pertinent past medical history. Surgical History  History reviewed. No pertinent surgical history. Current Medications  Current Outpatient Medications   Medication Sig    ibuprofen (MOTRIN) 800 mg tablet Take 1 Tab by mouth every eight (8) hours as needed for Pain. No current facility-administered medications for this visit.         Allergies/Drug Reactions  No Known Allergies     Family History  Family History   Problem Relation Age of Onset    No Known Problems Mother     Stroke Father 36    Hypertension Father     Diabetes Father     No Known Problems Sister     No Known Problems Maternal Grandmother     No Known Problems Paternal Grandmother     Stroke Paternal Grandfather         Social History  Social History     Socioeconomic History    Marital status: SINGLE     Spouse name: Not on file    Number of children: Not on file    Years of education: Not on file    Highest education level: Not on file   Social Needs    Financial resource strain: Not on file    Food insecurity - worry: Not on file    Food insecurity - inability: Not on file    Transportation needs - medical: Not on file   [place] needs - non-medical: Not on file   Occupational History    Not on file   Tobacco Use    Smoking status: Never Smoker    Smokeless tobacco: Never Used   Substance and Sexual Activity    Alcohol use: Yes     Comment: socially    Drug use: No    Sexual activity: Yes     Partners: Male     Birth control/protection: Condom   Other Topics Concern    Not on file   Social History Narrative    Not on file       Health Maintenance   Topic Date Due    DTaP/Tdap/Td series (1 - Tdap) 04/27/2006    HPV Age 9Y-34Y (3 - Female 3-dose series) 04/27/2010    Influenza Age 5 to Adult  04/19/2019 (Originally 8/1/2018)    Hepatitis A Peds Age 1-18  Aged Out     Immunization History   Administered Date(s) Administered    Influenza Vaccine (Quad) PF 11/28/2017       Review of Systems  General ROS: negative for - chills, fatigue, fever, malaise, night sweats or weight loss  Psychological ROS: negative for - anxiety or depression  Ophthalmic ROS: negative for - blurry vision, decreased vision, double vision or dry eyes  ENT ROS: negative for - epistaxis or headaches  Allergy and Immunology ROS: negative  Hematological and Lymphatic ROS: negative  Endocrine ROS: negative for - galactorrhea, hair pattern changes, hot flashes or temperature intolerance  Breast ROS: negative for breast lumps  Respiratory ROS: no cough, shortness of breath, or wheezing  Cardiovascular ROS: no chest pain or dyspnea on exertion  Gastrointestinal ROS: no abdominal pain, change in bowel habits, or black or bloody stools  Genito-Urinary ROS: positive for menstrual cramps, no dysuria, trouble voiding, or hematuria  Musculoskeletal ROS: negative  Neurological ROS: negative for - confusion, dizziness or headaches  Dermatological ROS: negative       Hearing Screening    125Hz 250Hz 500Hz 1000Hz 2000Hz 3000Hz 4000Hz 6000Hz 8000Hz   Right ear:   Pass Pass Pass  Pass Left ear:   Pass Pass Pass  Pass        Visual Acuity Screening    Right eye Left eye Both eyes   Without correction: 20/13 20/13 20/10   With correction:            Physical Exam  Vital signs:   Vitals:    11/05/18 0807   BP: 103/73   Pulse: 77   Resp: 20   Temp: 98.4 °F (36.9 °C)   TempSrc: Oral   SpO2: 98%   Weight: 150 lb 9.6 oz (68.3 kg)   Height: 5' 1\" (1.549 m)       General: alert, oriented, not in distress  Head: scalp normal, atraumatic  Eyes: pupils are equal and reactive, full and intact EOM's  Ears: patent ear canal, intact tympanic membrane  Nose: normal turbinates, no congestion or discharge  Lips/Mouth: moist lips and buccal mucosa, non-enlarged tonsils, pink throat  Neck: supple, no JVD, no lymphadenopathy, non-palpable thyroid  Chest/Lungs: clear breath sounds, no wheezing or crackles  Breasts: right breast normal without mass, skin or nipple changes or axillary nodes, left breast normal without mass, skin or nipple changes or axillary nodes  Heart: normal rate, regular rhythm, no murmur  Abdomen: soft, non-distended, non-tender, normal bowel sounds, no organomegaly, no masses  Extremities: no focal deformities, no edema  Skin: no active skin lesions    Laboratory/Tests:    Component      Latest Ref Rng & Units 6/5/2018 6/5/2018 6/5/2018           8:56 AM  8:56 AM  8:56 AM   Testosterone      ng/dL 61     Free testosterone (Direct)      Not Estab. pg/mL 1.3     Prolactin      ng/mL  11.4    TSH      0.36 - 3.74 uIU/mL   1.20     Component      Latest Ref Rng & Units 6/5/2018 6/5/2018           8:56 AM  8:56 AM   Estradiol      pg/mL  345.0   FSH      mIU/mL 1.7      Component      Latest Ref Rng & Units 3/16/2018 3/16/2018 3/16/2018           8:30 PM  8:30 PM  8:16 PM   WBC      4.6 - 13.2 K/uL  7.2    RBC      4.20 - 5.30 M/uL  5.05    HGB      12.0 - 16.0 g/dL  10.7 (L)    HCT      35.0 - 45.0 %  33.7 (L)    MCV      74.0 - 97.0 FL  66.7 (L)    MCH      24.0 - 34.0 PG  21.2 (L)    MCHC 31.0 - 37.0 g/dL  31.8    RDW      11.6 - 14.5 %  15.7 (H)    PLATELET      193 - 860 K/uL  269    MPV      9.2 - 11.8 FL  9.4    NEUTROPHILS      40 - 73 %  48    LYMPHOCYTES      21 - 52 %  44    MONOCYTES      3 - 10 %  7    EOSINOPHILS      0 - 5 %  1    BASOPHILS      0 - 2 %  0    ABS. NEUTROPHILS      1.8 - 8.0 K/UL  3.4    ABS. LYMPHOCYTES      0.9 - 3.6 K/UL  3.2    ABS. MONOCYTES      0.05 - 1.2 K/UL  0.5    ABS. EOSINOPHILS      0.0 - 0.4 K/UL  0.1    ABS. BASOPHILS      0.0 - 0.06 K/UL  0.0    DF        AUTOMATED    Sodium      136 - 145 mmol/L 140     Potassium      3.5 - 5.5 mmol/L 3.9     Chloride      100 - 108 mmol/L 104     CO2      21 - 32 mmol/L 27     Anion gap      3.0 - 18 mmol/L 9     Glucose      74 - 99 mg/dL 93     BUN      7.0 - 18 MG/DL 12     Creatinine      0.6 - 1.3 MG/DL 0.96     BUN/Creatinine ratio      12 - 20   13     GFR est AA      >60 ml/min/1.73m2 >60     GFR est non-AA      >60 ml/min/1.73m2 >60     Calcium      8.5 - 10.1 MG/DL 9.0     Bilirubin, total      0.2 - 1.0 MG/DL 0.4     ALT (SGPT)      13 - 56 U/L 26     AST      15 - 37 U/L 20     Alk. phosphatase      45 - 117 U/L 60     Protein, total      6.4 - 8.2 g/dL 8.1     Albumin      3.4 - 5.0 g/dL 4.2     Globulin      2.0 - 4.0 g/dL 3.9     A-G Ratio      0.8 - 1.7   1.1     Color         YELLOW   Appearance         CLEAR   Specific gravity      1.005 - 1.030     1.022   pH (UA)      5.0 - 8.0     7.5   Protein      NEG mg/dL   NEGATIVE   Glucose      NEG mg/dL   NEGATIVE   Ketone      NEG mg/dL   NEGATIVE   Bilirubin      NEG     NEGATIVE   Blood      NEG     NEGATIVE   Urobilinogen      0.2 - 1.0 EU/dL   1.0   Nitrites      NEG     NEGATIVE   Leukocyte Esterase      NEG     NEGATIVE       Assessment/Plan:      ICD-10-CM ICD-9-CM    1.  Routine general medical examination at a health care facility Z00.00 V70.0 CBC WITH AUTOMATED DIFF      HEMOGLOBIN A1C WITH EAG      LIPID PANEL      METABOLIC PANEL, COMPREHENSIVE URINALYSIS W/ RFLX MICROSCOPIC   2. Encounter for screening for HIV Z11.4 V73.89 HIV 1/2 AG/AB, 4TH GENERATION,W RFLX CONFIRM   3. Screening examination for STD (sexually transmitted disease) Z11.3 V74.5 HEPATITIS B SURFACE ANTIGEN REFLEX TO CONFIRMATION      HEPATITIS B CORE AB, IGM      HEPATITIS C AB      HSV 1 AND 2 ABS, IGM      T PALLIDUM SCREEN W/REFLEX      CT/NG/T.VAGINALIS AMPLIFICATION   4. Dysmenorrhea N94.6 625.3 ibuprofen (MOTRIN) 800 mg tablet       counseled on breast self exam and STD prevention    Follow-up Disposition:  Return in about 1 year (around 11/5/2019), or as needed, for CPE. I have discussed the diagnosis with the patient and the intended plan as seen in the above orders. The patient has received an after-visit summary and questions were answered concerning future plans. I have discussed medication side effects and warnings with the patient as well. I have reviewed the plan of care with the patient, accepted their input and they are in agreement with the treatment goals.        Janis Cruz MD  November 5, 2018 pt c/o CP for 2 days, "trying to get off opioids and alcohol"

## 2018-12-19 ENCOUNTER — APPOINTMENT (OUTPATIENT)
Dept: FAMILY MEDICINE | Facility: CLINIC | Age: 58
End: 2018-12-19

## 2018-12-31 ENCOUNTER — INPATIENT (INPATIENT)
Facility: HOSPITAL | Age: 58
LOS: 6 days | Discharge: HOME | DRG: 444 | End: 2019-01-07
Attending: HOSPITALIST | Admitting: HOSPITALIST
Payer: MEDICAID

## 2018-12-31 VITALS
DIASTOLIC BLOOD PRESSURE: 125 MMHG | OXYGEN SATURATION: 94 % | TEMPERATURE: 99 F | HEIGHT: 66 IN | HEART RATE: 99 BPM | RESPIRATION RATE: 18 BRPM | SYSTOLIC BLOOD PRESSURE: 199 MMHG | WEIGHT: 175.05 LBS

## 2018-12-31 DIAGNOSIS — Z98.51 TUBAL LIGATION STATUS: Chronic | ICD-10-CM

## 2018-12-31 DIAGNOSIS — Z96.60 PRESENCE OF UNSPECIFIED ORTHOPEDIC JOINT IMPLANT: Chronic | ICD-10-CM

## 2018-12-31 DIAGNOSIS — Z90.49 ACQUIRED ABSENCE OF OTHER SPECIFIED PARTS OF DIGESTIVE TRACT: Chronic | ICD-10-CM

## 2018-12-31 DIAGNOSIS — K80.50 CALCULUS OF BILE DUCT WITHOUT CHOLANGITIS OR CHOLECYSTITIS WITHOUT OBSTRUCTION: ICD-10-CM

## 2018-12-31 LAB
ALBUMIN SERPL ELPH-MCNC: 5.5 G/DL — HIGH (ref 3.3–5.2)
ALP SERPL-CCNC: 118 U/L — SIGNIFICANT CHANGE UP (ref 40–120)
ALT FLD-CCNC: 15 U/L — SIGNIFICANT CHANGE UP
ANION GAP SERPL CALC-SCNC: 22 MMOL/L — HIGH (ref 5–17)
AST SERPL-CCNC: 20 U/L — SIGNIFICANT CHANGE UP
BASOPHILS # BLD AUTO: 0 K/UL — SIGNIFICANT CHANGE UP (ref 0–0.2)
BASOPHILS NFR BLD AUTO: 0.1 % — SIGNIFICANT CHANGE UP (ref 0–2)
BILIRUB SERPL-MCNC: 0.3 MG/DL — LOW (ref 0.4–2)
BUN SERPL-MCNC: 27 MG/DL — HIGH (ref 8–20)
CALCIUM SERPL-MCNC: 10.7 MG/DL — HIGH (ref 8.6–10.2)
CHLORIDE SERPL-SCNC: 99 MMOL/L — SIGNIFICANT CHANGE UP (ref 98–107)
CO2 SERPL-SCNC: 22 MMOL/L — SIGNIFICANT CHANGE UP (ref 22–29)
CREAT SERPL-MCNC: 1.31 MG/DL — HIGH (ref 0.5–1.3)
EOSINOPHIL # BLD AUTO: 0 K/UL — SIGNIFICANT CHANGE UP (ref 0–0.5)
EOSINOPHIL NFR BLD AUTO: 0 % — SIGNIFICANT CHANGE UP (ref 0–6)
GLUCOSE SERPL-MCNC: 181 MG/DL — HIGH (ref 70–115)
HCT VFR BLD CALC: 39.7 % — SIGNIFICANT CHANGE UP (ref 37–47)
HGB BLD-MCNC: 13.4 G/DL — SIGNIFICANT CHANGE UP (ref 12–16)
INR BLD: 1.13 RATIO — SIGNIFICANT CHANGE UP (ref 0.88–1.16)
LIDOCAIN IGE QN: 42 U/L — SIGNIFICANT CHANGE UP (ref 22–51)
LYMPHOCYTES # BLD AUTO: 1.6 K/UL — SIGNIFICANT CHANGE UP (ref 1–4.8)
LYMPHOCYTES # BLD AUTO: 10.7 % — LOW (ref 20–55)
MCHC RBC-ENTMCNC: 28.8 PG — SIGNIFICANT CHANGE UP (ref 27–31)
MCHC RBC-ENTMCNC: 33.8 G/DL — SIGNIFICANT CHANGE UP (ref 32–36)
MCV RBC AUTO: 85.4 FL — SIGNIFICANT CHANGE UP (ref 81–99)
MONOCYTES # BLD AUTO: 0.5 K/UL — SIGNIFICANT CHANGE UP (ref 0–0.8)
MONOCYTES NFR BLD AUTO: 3.7 % — SIGNIFICANT CHANGE UP (ref 3–10)
NEUTROPHILS # BLD AUTO: 12.5 K/UL — HIGH (ref 1.8–8)
NEUTROPHILS NFR BLD AUTO: 85.2 % — HIGH (ref 37–73)
PLATELET # BLD AUTO: 398 K/UL — SIGNIFICANT CHANGE UP (ref 150–400)
POTASSIUM SERPL-MCNC: 4 MMOL/L — SIGNIFICANT CHANGE UP (ref 3.5–5.3)
POTASSIUM SERPL-SCNC: 4 MMOL/L — SIGNIFICANT CHANGE UP (ref 3.5–5.3)
PROT SERPL-MCNC: 9.5 G/DL — HIGH (ref 6.6–8.7)
PROTHROM AB SERPL-ACNC: 13.1 SEC — HIGH (ref 10–12.9)
RBC # BLD: 4.65 M/UL — SIGNIFICANT CHANGE UP (ref 4.4–5.2)
RBC # FLD: 14.5 % — SIGNIFICANT CHANGE UP (ref 11–15.6)
SODIUM SERPL-SCNC: 143 MMOL/L — SIGNIFICANT CHANGE UP (ref 135–145)
TROPONIN T SERPL-MCNC: <0.01 NG/ML — SIGNIFICANT CHANGE UP (ref 0–0.06)
WBC # BLD: 14.6 K/UL — HIGH (ref 4.8–10.8)
WBC # FLD AUTO: 14.6 K/UL — HIGH (ref 4.8–10.8)

## 2018-12-31 PROCEDURE — 43277 ERCP EA DUCT/AMPULLA DILATE: CPT

## 2018-12-31 PROCEDURE — 99222 1ST HOSP IP/OBS MODERATE 55: CPT

## 2018-12-31 PROCEDURE — 99223 1ST HOSP IP/OBS HIGH 75: CPT | Mod: 25

## 2018-12-31 PROCEDURE — 99285 EMERGENCY DEPT VISIT HI MDM: CPT

## 2018-12-31 PROCEDURE — 76705 ECHO EXAM OF ABDOMEN: CPT | Mod: 26

## 2018-12-31 RX ORDER — HYDRALAZINE HCL 50 MG
10 TABLET ORAL ONCE
Qty: 0 | Refills: 0 | Status: COMPLETED | OUTPATIENT
Start: 2018-12-31 | End: 2018-12-31

## 2018-12-31 RX ORDER — ALPRAZOLAM 0.25 MG
1 TABLET ORAL
Qty: 0 | Refills: 0 | COMMUNITY

## 2018-12-31 RX ORDER — PIPERACILLIN AND TAZOBACTAM 4; .5 G/20ML; G/20ML
3.38 INJECTION, POWDER, LYOPHILIZED, FOR SOLUTION INTRAVENOUS ONCE
Qty: 0 | Refills: 0 | Status: DISCONTINUED | OUTPATIENT
Start: 2018-12-31 | End: 2018-12-31

## 2018-12-31 RX ORDER — ZOLPIDEM TARTRATE 10 MG/1
1 TABLET ORAL
Qty: 0 | Refills: 0 | COMMUNITY

## 2018-12-31 RX ORDER — MORPHINE SULFATE 50 MG/1
4 CAPSULE, EXTENDED RELEASE ORAL ONCE
Qty: 0 | Refills: 0 | Status: DISCONTINUED | OUTPATIENT
Start: 2018-12-31 | End: 2018-12-31

## 2018-12-31 RX ORDER — ACETAMINOPHEN 500 MG
1000 TABLET ORAL ONCE
Qty: 0 | Refills: 0 | Status: COMPLETED | OUTPATIENT
Start: 2018-12-31 | End: 2018-12-31

## 2018-12-31 RX ORDER — METOCLOPRAMIDE HCL 10 MG
10 TABLET ORAL ONCE
Qty: 0 | Refills: 0 | Status: COMPLETED | OUTPATIENT
Start: 2018-12-31 | End: 2018-12-31

## 2018-12-31 RX ORDER — ONDANSETRON 8 MG/1
4 TABLET, FILM COATED ORAL ONCE
Qty: 0 | Refills: 0 | Status: COMPLETED | OUTPATIENT
Start: 2018-12-31 | End: 2018-12-31

## 2018-12-31 RX ORDER — HYDRALAZINE HCL 50 MG
10 TABLET ORAL EVERY 6 HOURS
Qty: 0 | Refills: 0 | Status: DISCONTINUED | OUTPATIENT
Start: 2018-12-31 | End: 2019-01-07

## 2018-12-31 RX ORDER — INDOMETHACIN 50 MG
100 CAPSULE ORAL ONCE
Qty: 0 | Refills: 0 | Status: COMPLETED | OUTPATIENT
Start: 2018-12-31 | End: 2018-12-31

## 2018-12-31 RX ORDER — HYDROMORPHONE HYDROCHLORIDE 2 MG/ML
0.5 INJECTION INTRAMUSCULAR; INTRAVENOUS; SUBCUTANEOUS
Qty: 0 | Refills: 0 | Status: DISCONTINUED | OUTPATIENT
Start: 2018-12-31 | End: 2019-01-02

## 2018-12-31 RX ORDER — HYDRALAZINE HCL 50 MG
5 TABLET ORAL ONCE
Qty: 0 | Refills: 0 | Status: COMPLETED | OUTPATIENT
Start: 2018-12-31 | End: 2018-12-31

## 2018-12-31 RX ORDER — ONDANSETRON 8 MG/1
4 TABLET, FILM COATED ORAL EVERY 6 HOURS
Qty: 0 | Refills: 0 | Status: DISCONTINUED | OUTPATIENT
Start: 2018-12-31 | End: 2019-01-05

## 2018-12-31 RX ORDER — ALPRAZOLAM 0.25 MG
0.5 TABLET ORAL EVERY 8 HOURS
Qty: 0 | Refills: 0 | Status: DISCONTINUED | OUTPATIENT
Start: 2018-12-31 | End: 2019-01-01

## 2018-12-31 RX ORDER — ASPIRIN/CALCIUM CARB/MAGNESIUM 324 MG
1 TABLET ORAL
Qty: 0 | Refills: 0 | COMMUNITY

## 2018-12-31 RX ADMIN — MORPHINE SULFATE 4 MILLIGRAM(S): 50 CAPSULE, EXTENDED RELEASE ORAL at 10:10

## 2018-12-31 RX ADMIN — Medication 0.5 MILLIGRAM(S): at 18:53

## 2018-12-31 RX ADMIN — MORPHINE SULFATE 4 MILLIGRAM(S): 50 CAPSULE, EXTENDED RELEASE ORAL at 09:55

## 2018-12-31 RX ADMIN — HYDROMORPHONE HYDROCHLORIDE 0.5 MILLIGRAM(S): 2 INJECTION INTRAMUSCULAR; INTRAVENOUS; SUBCUTANEOUS at 23:00

## 2018-12-31 RX ADMIN — Medication 1000 MILLIGRAM(S): at 18:43

## 2018-12-31 RX ADMIN — Medication 400 MILLIGRAM(S): at 17:19

## 2018-12-31 RX ADMIN — MORPHINE SULFATE 4 MILLIGRAM(S): 50 CAPSULE, EXTENDED RELEASE ORAL at 13:52

## 2018-12-31 RX ADMIN — MORPHINE SULFATE 4 MILLIGRAM(S): 50 CAPSULE, EXTENDED RELEASE ORAL at 14:25

## 2018-12-31 RX ADMIN — HYDROMORPHONE HYDROCHLORIDE 0.5 MILLIGRAM(S): 2 INJECTION INTRAMUSCULAR; INTRAVENOUS; SUBCUTANEOUS at 22:23

## 2018-12-31 RX ADMIN — Medication 5 MILLIGRAM(S): at 22:18

## 2018-12-31 RX ADMIN — Medication 10 MILLIGRAM(S): at 13:52

## 2018-12-31 RX ADMIN — ONDANSETRON 4 MILLIGRAM(S): 8 TABLET, FILM COATED ORAL at 10:32

## 2018-12-31 RX ADMIN — ONDANSETRON 4 MILLIGRAM(S): 8 TABLET, FILM COATED ORAL at 09:56

## 2018-12-31 RX ADMIN — Medication 10 MILLIGRAM(S): at 18:53

## 2018-12-31 RX ADMIN — ONDANSETRON 4 MILLIGRAM(S): 8 TABLET, FILM COATED ORAL at 23:42

## 2018-12-31 RX ADMIN — Medication 100 MILLIGRAM(S): at 17:24

## 2018-12-31 RX ADMIN — ONDANSETRON 4 MILLIGRAM(S): 8 TABLET, FILM COATED ORAL at 17:21

## 2018-12-31 RX ADMIN — HYDROMORPHONE HYDROCHLORIDE 0.5 MILLIGRAM(S): 2 INJECTION INTRAMUSCULAR; INTRAVENOUS; SUBCUTANEOUS at 17:21

## 2018-12-31 RX ADMIN — Medication 100 MILLIGRAM(S): at 15:30

## 2018-12-31 RX ADMIN — Medication 10 MILLIGRAM(S): at 10:32

## 2018-12-31 RX ADMIN — HYDROMORPHONE HYDROCHLORIDE 0.5 MILLIGRAM(S): 2 INJECTION INTRAMUSCULAR; INTRAVENOUS; SUBCUTANEOUS at 18:43

## 2018-12-31 NOTE — ED ADULT TRIAGE NOTE - CHIEF COMPLAINT QUOTE
Patient BIBA, vomiting since thursday, states last BM last night, not diarrhea however with mucous. States pain to under right breast, right upper quadrant

## 2018-12-31 NOTE — H&P ADULT - FAMILY HISTORY
Father  Still living? Unknown  Family history of CVA, Age at diagnosis: Age Unknown  Family history of COPD (chronic obstructive pulmonary disease), Age at diagnosis: Age Unknown

## 2018-12-31 NOTE — ED PROVIDER NOTE - OBJECTIVE STATEMENT
58 year old female with PMH HTN, cholecystectomy with subsequent retained stone and ERCP presents with RUQ abd pain and vomiting. Pt states Sx started 3 days ago as a cramping abd pain, constant, no alleviating/exacerbating factors. She states that the pain is consistent with her prior retained stone. She denies chest pain, SOB, fever, chills, but reports severe nausea and multiple episodes of non-bloody emesis.

## 2018-12-31 NOTE — CONSULT NOTE ADULT - ASSESSMENT
57 y/o woman with hx of cholecystectomy and prior ERCP pw abdominal pain and found to have choledocholithiasis.    - NPO  - ERCP today  - monitor LFTs      Thanks

## 2018-12-31 NOTE — H&P ADULT - NSHPPHYSICALEXAM_GEN_ALL_CORE
PHYSICAL EXAM:    GENERAL: anxious  HEAD:  Atraumatic, Normocephalic  EYES: EOMI, PERRLA, conjunctiva and sclera clear  ENMT: No tonsillar erythema, exudates, or enlargement; Moist mucous membranes, Good dentition, No lesions  NECK: Supple, No JVD, Normal thyroid  NERVOUS SYSTEM:  Alert & Oriented X3, Good concentration; Motor Strength 5/5 B/L upper and lower extremities; DTRs 2+ intact and symmetric  CHEST/LUNG: Clear to percussion bilaterally; No rales, rhonchi, wheezing, or rubs  HEART: Regular rate and rhythm; No murmurs, rubs, or gallops  ABDOMEN: tender  EXTREMITIES:  2+ Peripheral Pulses, No clubbing, cyanosis, or edema  LYMPH: No lymphadenopathy noted  SKIN: No rashes or lesions

## 2018-12-31 NOTE — H&P ADULT - NSHPREVIEWOFSYSTEMS_GEN_ALL_CORE
REVIEW OF SYSTEMS:    CONSTITUTIONAL: No fever, weight loss, or fatigue  EYES: No eye pain, visual disturbances, or discharge  ENMT:  No difficulty hearing, tinnitus, vertigo; No sinus or throat pain  NECK: No pain or stiffness  BREASTS: No pain, masses, or nipple discharge  RESPIRATORY: No cough, wheezing, chills or hemoptysis; No shortness of breath  CARDIOVASCULAR: No chest pain, palpitations, dizziness, or leg swelling  GASTROINTESTINAL: pain, vomiting   GENITOURINARY: No dysuria, frequency, hematuria, or incontinence  NEUROLOGICAL: No headaches, memory loss, loss of strength, numbness, or tremors  SKIN: No itching, burning, rashes, or lesions   LYMPH NODES: No enlarged glands  ENDOCRINE: No heat or cold intolerance; No hair loss  MUSCULOSKELETAL: No joint pain or swelling; No muscle, back, or extremity pain  PSYCHIATRIC: No depression, anxiety, mood swings, or difficulty sleeping  HEME/LYMPH: No easy bruising, or bleeding gums  ALLERY AND IMMUNOLOGIC: No hives or eczema

## 2018-12-31 NOTE — H&P ADULT - HISTORY OF PRESENT ILLNESS
Patient is a 59 yo female with PMHx of substance use disorder (Xanax and alcohol), anxiety, and depression with history of suicide attempt in 2/2018 and cholecystectomy secondary to stones presents to the er with 2-3 day history of 10 out of 10 ruq abd pain similar to the pain in the past. Patient states the RUQ abd pain is associated with vomiting. Pt was worked up in the er and found to have choledocholithiasis and is planned for urgent ercp. Patient denies any fever or chills.

## 2018-12-31 NOTE — ED ADULT NURSE NOTE - OBJECTIVE STATEMENT
assumed care of pt @1030. pt a&ox3. pt states that vomiting began thursday and abdominal pain began yesterday. pt c/o abdominal pain to R side radiating to epigastric and L side. pain with palpation. abdomen soft, nondistended.  pt states pain is worse on R side. pt denies diarrhea.

## 2018-12-31 NOTE — H&P ADULT - NSHPLABSRESULTS_GEN_ALL_CORE
13.4   14.6   )----------(  398       ( 31 Dec 2018 10:15 )               39.7      143    |  99     |  27.0   ----------------------------<  181        ( 31 Dec 2018 10:15 )  4.0     |  22.0   |  1.31     Ca    10.7       ( 31 Dec 2018 10:15 )    TPro  9.5    /  Alb  5.5    /  TBili  0.3    /  DBili  x      /  AST  20     /  ALT  15     /  AlkPhos  118    ( 31 Dec 2018 10:15 )    LIVER FUNCTIONS - ( 31 Dec 2018 10:15 )  Alb: 5.5 g/dL / Pro: 9.5 g/dL / ALK PHOS: 118 U/L / ALT: 15 U/L / AST: 20 U/L / GGT: x           PT/INR -  13.1 sec / 1.13 ratio   ( 31 Dec 2018 13:32 )         CAPILLARY BLOOD GLUCOSE    CARDIAC MARKERS ( 31 Dec 2018 10:15 )  x     / <0.01 ng/mL / x     / x     / x            us abd - stones

## 2018-12-31 NOTE — BRIEF OPERATIVE NOTE - OPERATION/FINDINGS
dilated cbd  small filling defect   s/p balloon sweeps 9-12 mm balloon   s/p sphincteroplasty 10 -11 mm CRE   removal of sludge  sprayed diluted epi

## 2018-12-31 NOTE — ED ADULT NURSE REASSESSMENT NOTE - NS ED NURSE REASSESS COMMENT FT1
Pt to be admitted for ERCP tonight, advised to remain NPO at this time, pt verbalized understanding and agreeable. Pt medicated as directed by MD. Pending admission orders, will continue to monitor and reassess.

## 2018-12-31 NOTE — ED PROVIDER NOTE - TEMPLATE, MLM
[Initial Consultation] : an initial consultation for [Mother] : mother Abdominal Pain, N/V/D [Nose Bleeds] : nose bleeds [FreeTextEntry2] : referred by Dr. Cecilia Madden, pediatrician, for epistaxis

## 2018-12-31 NOTE — ED ADULT NURSE NOTE - NSIMPLEMENTINTERV_GEN_ALL_ED
Implemented All Universal Safety Interventions:  [place] to call system. Call bell, personal items and telephone within reach. Instruct patient to call for assistance. Room bathroom lighting operational. Non-slip footwear when patient is off stretcher. Physically safe environment: no spills, clutter or unnecessary equipment. Stretcher in lowest position, wheels locked, appropriate side rails in place.

## 2018-12-31 NOTE — ED PROVIDER NOTE - FAMILY HISTORY
Father  Still living? Yes, Estimated age: Age Unknown  Family history of CVA, Age at diagnosis: Age Unknown     Mother  Still living? Yes, Estimated age: Age Unknown  Family history of COPD (chronic obstructive pulmonary disease), Age at diagnosis: Age Unknown

## 2018-12-31 NOTE — H&P ADULT - ASSESSMENT
1) Choledocholithiasis. - for urgent ercp   --> GI following  --> iv pain meds prn   --> fluids    2) radha --> seocndary to dehydration   --> fluids  --> avoid nephrotoxic agents    3) pain --> iv dialudid prn     4) depression /anxiety --> history of suicide attempt in the past  --> hold oral home meds    5) dvt prop--> amnutlation    6) nausea --> zofran prn

## 2019-01-01 ENCOUNTER — TRANSCRIPTION ENCOUNTER (OUTPATIENT)
Age: 59
End: 2019-01-01

## 2019-01-01 LAB
ALBUMIN SERPL ELPH-MCNC: 4.7 G/DL — SIGNIFICANT CHANGE UP (ref 3.3–5.2)
ALP SERPL-CCNC: 95 U/L — SIGNIFICANT CHANGE UP (ref 40–120)
ALT FLD-CCNC: 13 U/L — SIGNIFICANT CHANGE UP
ANION GAP SERPL CALC-SCNC: 15 MMOL/L — SIGNIFICANT CHANGE UP (ref 5–17)
AST SERPL-CCNC: 20 U/L — SIGNIFICANT CHANGE UP
BILIRUB SERPL-MCNC: 0.3 MG/DL — LOW (ref 0.4–2)
BUN SERPL-MCNC: 19 MG/DL — SIGNIFICANT CHANGE UP (ref 8–20)
CALCIUM SERPL-MCNC: 9.4 MG/DL — SIGNIFICANT CHANGE UP (ref 8.6–10.2)
CHLORIDE SERPL-SCNC: 102 MMOL/L — SIGNIFICANT CHANGE UP (ref 98–107)
CO2 SERPL-SCNC: 26 MMOL/L — SIGNIFICANT CHANGE UP (ref 22–29)
CREAT SERPL-MCNC: 0.71 MG/DL — SIGNIFICANT CHANGE UP (ref 0.5–1.3)
GLUCOSE SERPL-MCNC: 124 MG/DL — HIGH (ref 70–115)
HCT VFR BLD CALC: 38.9 % — SIGNIFICANT CHANGE UP (ref 37–47)
HGB BLD-MCNC: 12.8 G/DL — SIGNIFICANT CHANGE UP (ref 12–16)
LIDOCAIN IGE QN: >3000 U/L — HIGH (ref 22–51)
MAGNESIUM SERPL-MCNC: 2.3 MG/DL — SIGNIFICANT CHANGE UP (ref 1.6–2.6)
MCHC RBC-ENTMCNC: 28.8 PG — SIGNIFICANT CHANGE UP (ref 27–31)
MCHC RBC-ENTMCNC: 32.9 G/DL — SIGNIFICANT CHANGE UP (ref 32–36)
MCV RBC AUTO: 87.6 FL — SIGNIFICANT CHANGE UP (ref 81–99)
PLATELET # BLD AUTO: 357 K/UL — SIGNIFICANT CHANGE UP (ref 150–400)
POTASSIUM SERPL-MCNC: 3.6 MMOL/L — SIGNIFICANT CHANGE UP (ref 3.5–5.3)
POTASSIUM SERPL-SCNC: 3.6 MMOL/L — SIGNIFICANT CHANGE UP (ref 3.5–5.3)
PROT SERPL-MCNC: 7.9 G/DL — SIGNIFICANT CHANGE UP (ref 6.6–8.7)
RBC # BLD: 4.44 M/UL — SIGNIFICANT CHANGE UP (ref 4.4–5.2)
RBC # FLD: 14.8 % — SIGNIFICANT CHANGE UP (ref 11–15.6)
SODIUM SERPL-SCNC: 143 MMOL/L — SIGNIFICANT CHANGE UP (ref 135–145)
WBC # BLD: 20.2 K/UL — HIGH (ref 4.8–10.8)
WBC # FLD AUTO: 20.2 K/UL — HIGH (ref 4.8–10.8)

## 2019-01-01 PROCEDURE — 99232 SBSQ HOSP IP/OBS MODERATE 35: CPT

## 2019-01-01 RX ORDER — ACETAMINOPHEN 500 MG
650 TABLET ORAL EVERY 6 HOURS
Qty: 0 | Refills: 0 | Status: DISCONTINUED | OUTPATIENT
Start: 2019-01-01 | End: 2019-01-07

## 2019-01-01 RX ORDER — HYDROMORPHONE HYDROCHLORIDE 2 MG/ML
1 INJECTION INTRAMUSCULAR; INTRAVENOUS; SUBCUTANEOUS
Qty: 0 | Refills: 0 | Status: DISCONTINUED | OUTPATIENT
Start: 2019-01-01 | End: 2019-01-02

## 2019-01-01 RX ORDER — LABETALOL HCL 100 MG
10 TABLET ORAL EVERY 6 HOURS
Qty: 0 | Refills: 0 | Status: DISCONTINUED | OUTPATIENT
Start: 2019-01-01 | End: 2019-01-06

## 2019-01-01 RX ORDER — SODIUM CHLORIDE 9 MG/ML
1000 INJECTION, SOLUTION INTRAVENOUS
Qty: 0 | Refills: 0 | Status: DISCONTINUED | OUTPATIENT
Start: 2019-01-01 | End: 2019-01-04

## 2019-01-01 RX ORDER — SODIUM CHLORIDE 9 MG/ML
1000 INJECTION INTRAMUSCULAR; INTRAVENOUS; SUBCUTANEOUS
Qty: 0 | Refills: 0 | Status: DISCONTINUED | OUTPATIENT
Start: 2019-01-01 | End: 2019-01-01

## 2019-01-01 RX ADMIN — Medication 0.1 MILLIGRAM(S): at 02:55

## 2019-01-01 RX ADMIN — HYDROMORPHONE HYDROCHLORIDE 1 MILLIGRAM(S): 2 INJECTION INTRAMUSCULAR; INTRAVENOUS; SUBCUTANEOUS at 13:13

## 2019-01-01 RX ADMIN — Medication 0.5 MILLIGRAM(S): at 02:55

## 2019-01-01 RX ADMIN — HYDROMORPHONE HYDROCHLORIDE 0.5 MILLIGRAM(S): 2 INJECTION INTRAMUSCULAR; INTRAVENOUS; SUBCUTANEOUS at 06:00

## 2019-01-01 RX ADMIN — HYDROMORPHONE HYDROCHLORIDE 1 MILLIGRAM(S): 2 INJECTION INTRAMUSCULAR; INTRAVENOUS; SUBCUTANEOUS at 15:06

## 2019-01-01 RX ADMIN — SODIUM CHLORIDE 200 MILLILITER(S): 9 INJECTION, SOLUTION INTRAVENOUS at 12:06

## 2019-01-01 RX ADMIN — HYDROMORPHONE HYDROCHLORIDE 1 MILLIGRAM(S): 2 INJECTION INTRAMUSCULAR; INTRAVENOUS; SUBCUTANEOUS at 21:38

## 2019-01-01 RX ADMIN — HYDROMORPHONE HYDROCHLORIDE 0.5 MILLIGRAM(S): 2 INJECTION INTRAMUSCULAR; INTRAVENOUS; SUBCUTANEOUS at 09:55

## 2019-01-01 RX ADMIN — Medication 0.5 MILLIGRAM(S): at 08:53

## 2019-01-01 RX ADMIN — HYDROMORPHONE HYDROCHLORIDE 1 MILLIGRAM(S): 2 INJECTION INTRAMUSCULAR; INTRAVENOUS; SUBCUTANEOUS at 16:37

## 2019-01-01 RX ADMIN — ONDANSETRON 4 MILLIGRAM(S): 8 TABLET, FILM COATED ORAL at 08:52

## 2019-01-01 RX ADMIN — Medication 10 MILLIGRAM(S): at 06:00

## 2019-01-01 RX ADMIN — HYDROMORPHONE HYDROCHLORIDE 1 MILLIGRAM(S): 2 INJECTION INTRAMUSCULAR; INTRAVENOUS; SUBCUTANEOUS at 12:06

## 2019-01-01 RX ADMIN — SODIUM CHLORIDE 200 MILLILITER(S): 9 INJECTION, SOLUTION INTRAVENOUS at 18:01

## 2019-01-01 RX ADMIN — Medication 1 MILLIGRAM(S): at 15:06

## 2019-01-01 RX ADMIN — HYDROMORPHONE HYDROCHLORIDE 0.5 MILLIGRAM(S): 2 INJECTION INTRAMUSCULAR; INTRAVENOUS; SUBCUTANEOUS at 07:56

## 2019-01-01 RX ADMIN — Medication 10 MILLIGRAM(S): at 12:11

## 2019-01-01 RX ADMIN — HYDROMORPHONE HYDROCHLORIDE 1 MILLIGRAM(S): 2 INJECTION INTRAMUSCULAR; INTRAVENOUS; SUBCUTANEOUS at 18:01

## 2019-01-01 RX ADMIN — ONDANSETRON 4 MILLIGRAM(S): 8 TABLET, FILM COATED ORAL at 15:06

## 2019-01-01 RX ADMIN — HYDROMORPHONE HYDROCHLORIDE 1 MILLIGRAM(S): 2 INJECTION INTRAMUSCULAR; INTRAVENOUS; SUBCUTANEOUS at 21:50

## 2019-01-01 RX ADMIN — Medication 0.1 MILLIGRAM(S): at 07:58

## 2019-01-01 RX ADMIN — HYDROMORPHONE HYDROCHLORIDE 0.5 MILLIGRAM(S): 2 INJECTION INTRAMUSCULAR; INTRAVENOUS; SUBCUTANEOUS at 08:52

## 2019-01-01 RX ADMIN — HYDROMORPHONE HYDROCHLORIDE 1 MILLIGRAM(S): 2 INJECTION INTRAMUSCULAR; INTRAVENOUS; SUBCUTANEOUS at 18:46

## 2019-01-01 NOTE — DISCHARGE NOTE ADULT - MEDICATION SUMMARY - MEDICATIONS TO TAKE
I will START or STAY ON the medications listed below when I get home from the hospital:    cloNIDine 0.1 mg oral tablet  -- 1 tab(s) by mouth 3 times a day  -- Indication: For Htn    gabapentin 100 mg oral capsule  -- 1 cap(s) by mouth 3 times a day  -- Indication: For pain    Paxil 20 mg oral tablet  -- 1 tab(s) by mouth once a day  -- Indication: For depresion    Norvasc 5 mg oral tablet  -- 1 tab(s) by mouth once a day  -- Indication: For Htn    Prevacid  -- Indication: For gerd I will START or STAY ON the medications listed below when I get home from the hospital:    cloNIDine 0.1 mg oral tablet  -- 1 tab(s) by mouth 3 times a day  -- Indication: For Htn    gabapentin 100 mg oral capsule  -- 1 cap(s) by mouth 3 times a day  -- Indication: For pain    Paxil 20 mg oral tablet  -- 1 tab(s) by mouth once a day  -- Indication: For depresion    metoclopramide 5 mg oral tablet  -- 1 tab(s) by mouth 3 times a day (before meals) as needed for nausea   -- Indication: For nausea    amLODIPine 5 mg oral tablet  -- 1 tab(s) by mouth once a day  -- Indication: For Hypertension     pantoprazole 40 mg oral delayed release tablet  -- 1 tab(s) by mouth once a day  -- Indication: For GERD

## 2019-01-01 NOTE — DISCHARGE NOTE ADULT - MEDICATION SUMMARY - MEDICATIONS TO STOP TAKING
I will STOP taking the medications listed below when I get home from the hospital:    aspirin 325 mg oral tablet  -- 1 tab(s) by mouth once a day    Xanax 0.5 mg oral tablet  -- 1 tab(s) by mouth 3 times a day, As Needed    Ambien 10 mg oral tablet  -- 1 tab(s) by mouth once a day (at bedtime), As Needed I will STOP taking the medications listed below when I get home from the hospital:  None

## 2019-01-01 NOTE — DISCHARGE NOTE ADULT - PLAN OF CARE
s/p ercp follow up with GI  low fat diet home meds follow up with pcp and p follow up with gastroenterologist cont home meds , follow up with psychiatrist cont amlodipine and clonidine , monitor BP at home

## 2019-01-01 NOTE — DISCHARGE NOTE ADULT - PATIENT PORTAL LINK FT
You can access the [facility] Portal, offered by [facility], by registering with the following website: [email or web address]

## 2019-01-01 NOTE — DISCHARGE NOTE ADULT - CARE PLAN
Principal Discharge DX:	Choledocholithiasis  Goal:	s/p ercp  Assessment and plan of treatment:	follow up with GI  low fat diet  Secondary Diagnosis:	Anxiety and depression  Goal:	home meds  Assessment and plan of treatment:	follow up with pcp and p  Secondary Diagnosis:	Hypertension  Goal:	home meds Principal Discharge DX:	Choledocholithiasis  Goal:	s/p ercp  Assessment and plan of treatment:	follow up with GI  low fat diet  Secondary Diagnosis:	Acute biliary pancreatitis without infection or necrosis  Goal:	home meds  Assessment and plan of treatment:	follow up with gastroenterologist  Secondary Diagnosis:	Anxiety and depression  Goal:	home meds  Assessment and plan of treatment:	cont home meds , follow up with psychiatrist  Secondary Diagnosis:	Essential hypertension  Assessment and plan of treatment:	cont amlodipine and clonidine , monitor BP at home

## 2019-01-01 NOTE — DISCHARGE NOTE ADULT - SECONDARY DIAGNOSIS.
Anxiety and depression Hypertension Acute biliary pancreatitis without infection or necrosis Essential hypertension

## 2019-01-01 NOTE — DISCHARGE NOTE ADULT - HOSPITAL COURSE
Patient is a 59 yo female with PMHx of substance use disorder (Xanax and alcohol), anxiety, and depression with history of suicide attempt in 2/2018 and cholecystectomy secondary to stones presents to the er with 2-3 day history of 10 out of 10 ruq abd pain similar to the pain in the past. Patient states the RUQ abd pain is associated with vomiting. Pt was worked up in the er and found to have choledocholithiasis and is planned for urgent ercp.    patient had ercp on 12/31:    · Operative Findings	dilated cbd small filling defect  s/p balloon sweeps 9-12 mm balloon  s/p sphincteroplasty 10 -11 mm CRE  removal of sludge sprayed diluted epi	      patient started on clear liquid diet and observed overnight. patient is stable for dc home    time spent on dc 34 minutes Patient is a 59 yo female with PMHx of substance use disorder (Xanax and alcohol), anxiety, and depression with history of suicide attempt in 2/2018 and cholecystectomy secondary to stones presents to the er with 2-3 day history of 10 out of 10 ruq abd pain similar to the pain in the past. Patient states the RUQ abd pain is associated with vomiting. Pt was worked up in the er and found to have choledocholithiasis and is planned for urgent ercp.    patient had ercp on 12/31:    · Operative Findings	dilated cbd small filling defect  s/p balloon sweeps 9-12 mm balloon  s/p sphincteroplasty 10 -11 mm CRE  removal of sludge sprayed diluted epi	      patient started on clear liquid diet , cont to have pain post ERCP dx ed with pancreatitis NPO, pain meds given , slowly improving , diet advanced , tolerated . Pt is with atypical chest pain < CTA of chest performed negative for PE , resolved,. blood pressure elevated at times, home meds restarted  controlled now, hypokalemia replaced     time spent on dc 34 minutes

## 2019-01-01 NOTE — DISCHARGE NOTE ADULT - CARE PROVIDER_API CALL
Collin Bennett), Gastroenterology; Internal Medicine  [address] [place] [address]  Phone: [phone]  Fax: [phone]    primary care,   Phone: (   )    -  Fax: (   )    -

## 2019-01-02 LAB
ALBUMIN SERPL ELPH-MCNC: 3.8 G/DL — SIGNIFICANT CHANGE UP (ref 3.3–5.2)
ALBUMIN SERPL ELPH-MCNC: 3.9 G/DL — SIGNIFICANT CHANGE UP (ref 3.3–5.2)
ALBUMIN SERPL ELPH-MCNC: 4.2 G/DL — SIGNIFICANT CHANGE UP (ref 3.3–5.2)
ALP SERPL-CCNC: 84 U/L — SIGNIFICANT CHANGE UP (ref 40–120)
ALP SERPL-CCNC: 90 U/L — SIGNIFICANT CHANGE UP (ref 40–120)
ALP SERPL-CCNC: 93 U/L — SIGNIFICANT CHANGE UP (ref 40–120)
ALT FLD-CCNC: 12 U/L — SIGNIFICANT CHANGE UP
ALT FLD-CCNC: 13 U/L — SIGNIFICANT CHANGE UP
ALT FLD-CCNC: 25 U/L — SIGNIFICANT CHANGE UP
AMYLASE P1 CFR SERPL: 641 U/L — HIGH (ref 36–128)
ANION GAP SERPL CALC-SCNC: 10 MMOL/L — SIGNIFICANT CHANGE UP (ref 5–17)
ANION GAP SERPL CALC-SCNC: 14 MMOL/L — SIGNIFICANT CHANGE UP (ref 5–17)
ANISOCYTOSIS BLD QL: SLIGHT — SIGNIFICANT CHANGE UP
APTT BLD: 27 SEC — LOW (ref 27.5–36.3)
AST SERPL-CCNC: 17 U/L — SIGNIFICANT CHANGE UP
AST SERPL-CCNC: 17 U/L — SIGNIFICANT CHANGE UP
AST SERPL-CCNC: 31 U/L — SIGNIFICANT CHANGE UP
BILIRUB DIRECT SERPL-MCNC: 0.1 MG/DL — SIGNIFICANT CHANGE UP (ref 0–0.3)
BILIRUB INDIRECT FLD-MCNC: 0.2 MG/DL — SIGNIFICANT CHANGE UP (ref 0.2–1)
BILIRUB SERPL-MCNC: 0.3 MG/DL — LOW (ref 0.4–2)
BILIRUB SERPL-MCNC: 0.3 MG/DL — LOW (ref 0.4–2)
BILIRUB SERPL-MCNC: 0.5 MG/DL — SIGNIFICANT CHANGE UP (ref 0.4–2)
BUN SERPL-MCNC: 13 MG/DL — SIGNIFICANT CHANGE UP (ref 8–20)
BUN SERPL-MCNC: 14 MG/DL — SIGNIFICANT CHANGE UP (ref 8–20)
CALCIUM SERPL-MCNC: 8.9 MG/DL — SIGNIFICANT CHANGE UP (ref 8.6–10.2)
CALCIUM SERPL-MCNC: 9.1 MG/DL — SIGNIFICANT CHANGE UP (ref 8.6–10.2)
CHLORIDE SERPL-SCNC: 101 MMOL/L — SIGNIFICANT CHANGE UP (ref 98–107)
CHLORIDE SERPL-SCNC: 102 MMOL/L — SIGNIFICANT CHANGE UP (ref 98–107)
CK SERPL-CCNC: 111 U/L — SIGNIFICANT CHANGE UP (ref 25–170)
CO2 SERPL-SCNC: 28 MMOL/L — SIGNIFICANT CHANGE UP (ref 22–29)
CO2 SERPL-SCNC: 31 MMOL/L — HIGH (ref 22–29)
CREAT SERPL-MCNC: 0.47 MG/DL — LOW (ref 0.5–1.3)
CREAT SERPL-MCNC: 0.56 MG/DL — SIGNIFICANT CHANGE UP (ref 0.5–1.3)
DACRYOCYTES BLD QL SMEAR: SLIGHT — SIGNIFICANT CHANGE UP
EOSINOPHIL # BLD AUTO: 0 K/UL — SIGNIFICANT CHANGE UP (ref 0–0.5)
EOSINOPHIL NFR BLD AUTO: 0.1 % — SIGNIFICANT CHANGE UP (ref 0–6)
GLUCOSE BLDC GLUCOMTR-MCNC: 96 MG/DL — SIGNIFICANT CHANGE UP (ref 70–99)
GLUCOSE SERPL-MCNC: 111 MG/DL — SIGNIFICANT CHANGE UP (ref 70–115)
GLUCOSE SERPL-MCNC: 98 MG/DL — SIGNIFICANT CHANGE UP (ref 70–115)
HCT VFR BLD CALC: 36.8 % — LOW (ref 37–47)
HCT VFR BLD CALC: 36.8 % — LOW (ref 37–47)
HGB BLD-MCNC: 11.7 G/DL — LOW (ref 12–16)
HGB BLD-MCNC: 11.8 G/DL — LOW (ref 12–16)
INR BLD: 1.22 RATIO — HIGH (ref 0.88–1.16)
LACTATE BLDV-MCNC: 1.3 MMOL/L — SIGNIFICANT CHANGE UP (ref 0.5–2)
LIDOCAIN IGE QN: 1229 U/L — HIGH (ref 22–51)
LYMPHOCYTES # BLD AUTO: 1.1 K/UL — SIGNIFICANT CHANGE UP (ref 1–4.8)
LYMPHOCYTES # BLD AUTO: 5 % — LOW (ref 20–55)
LYMPHOCYTES # BLD AUTO: 7.2 % — LOW (ref 20–55)
MACROCYTES BLD QL: SLIGHT — SIGNIFICANT CHANGE UP
MCHC RBC-ENTMCNC: 28.3 PG — SIGNIFICANT CHANGE UP (ref 27–31)
MCHC RBC-ENTMCNC: 28.7 PG — SIGNIFICANT CHANGE UP (ref 27–31)
MCHC RBC-ENTMCNC: 31.8 G/DL — LOW (ref 32–36)
MCHC RBC-ENTMCNC: 32.1 G/DL — SIGNIFICANT CHANGE UP (ref 32–36)
MCV RBC AUTO: 88.9 FL — SIGNIFICANT CHANGE UP (ref 81–99)
MCV RBC AUTO: 89.5 FL — SIGNIFICANT CHANGE UP (ref 81–99)
MONOCYTES # BLD AUTO: 1 K/UL — HIGH (ref 0–0.8)
MONOCYTES NFR BLD AUTO: 10 % — SIGNIFICANT CHANGE UP (ref 3–10)
MONOCYTES NFR BLD AUTO: 6.7 % — SIGNIFICANT CHANGE UP (ref 3–10)
NEUTROPHILS # BLD AUTO: 13.5 K/UL — HIGH (ref 1.8–8)
NEUTROPHILS NFR BLD AUTO: 85 % — HIGH (ref 37–73)
NEUTROPHILS NFR BLD AUTO: 85.4 % — HIGH (ref 37–73)
PLAT MORPH BLD: NORMAL — SIGNIFICANT CHANGE UP
PLATELET # BLD AUTO: 273 K/UL — SIGNIFICANT CHANGE UP (ref 150–400)
PLATELET # BLD AUTO: 304 K/UL — SIGNIFICANT CHANGE UP (ref 150–400)
POTASSIUM SERPL-MCNC: 3.5 MMOL/L — SIGNIFICANT CHANGE UP (ref 3.5–5.3)
POTASSIUM SERPL-MCNC: 3.7 MMOL/L — SIGNIFICANT CHANGE UP (ref 3.5–5.3)
POTASSIUM SERPL-SCNC: 3.5 MMOL/L — SIGNIFICANT CHANGE UP (ref 3.5–5.3)
POTASSIUM SERPL-SCNC: 3.7 MMOL/L — SIGNIFICANT CHANGE UP (ref 3.5–5.3)
PROCALCITONIN SERPL-MCNC: 0.11 NG/ML — HIGH (ref 0–0.04)
PROT SERPL-MCNC: 6.6 G/DL — SIGNIFICANT CHANGE UP (ref 6.6–8.7)
PROT SERPL-MCNC: 6.8 G/DL — SIGNIFICANT CHANGE UP (ref 6.6–8.7)
PROT SERPL-MCNC: 7 G/DL — SIGNIFICANT CHANGE UP (ref 6.6–8.7)
PROTHROM AB SERPL-ACNC: 14.1 SEC — HIGH (ref 10–12.9)
RBC # BLD: 4.11 M/UL — LOW (ref 4.4–5.2)
RBC # BLD: 4.14 M/UL — LOW (ref 4.4–5.2)
RBC # FLD: 14.8 % — SIGNIFICANT CHANGE UP (ref 11–15.6)
RBC # FLD: 15 % — SIGNIFICANT CHANGE UP (ref 11–15.6)
RBC BLD AUTO: ABNORMAL
SODIUM SERPL-SCNC: 143 MMOL/L — SIGNIFICANT CHANGE UP (ref 135–145)
SODIUM SERPL-SCNC: 143 MMOL/L — SIGNIFICANT CHANGE UP (ref 135–145)
TROPONIN T SERPL-MCNC: <0.01 NG/ML — SIGNIFICANT CHANGE UP (ref 0–0.06)
WBC # BLD: 15.8 K/UL — HIGH (ref 4.8–10.8)
WBC # BLD: 18.6 K/UL — HIGH (ref 4.8–10.8)
WBC # FLD AUTO: 15.8 K/UL — HIGH (ref 4.8–10.8)
WBC # FLD AUTO: 18.6 K/UL — HIGH (ref 4.8–10.8)

## 2019-01-02 PROCEDURE — 93010 ELECTROCARDIOGRAM REPORT: CPT

## 2019-01-02 PROCEDURE — 99233 SBSQ HOSP IP/OBS HIGH 50: CPT | Mod: GC

## 2019-01-02 PROCEDURE — 99233 SBSQ HOSP IP/OBS HIGH 50: CPT

## 2019-01-02 PROCEDURE — 36000 PLACE NEEDLE IN VEIN: CPT

## 2019-01-02 PROCEDURE — 71045 X-RAY EXAM CHEST 1 VIEW: CPT | Mod: 26

## 2019-01-02 RX ORDER — PIPERACILLIN AND TAZOBACTAM 4; .5 G/20ML; G/20ML
3.38 INJECTION, POWDER, LYOPHILIZED, FOR SOLUTION INTRAVENOUS EVERY 8 HOURS
Qty: 0 | Refills: 0 | Status: DISCONTINUED | OUTPATIENT
Start: 2019-01-02 | End: 2019-01-02

## 2019-01-02 RX ORDER — HYDROMORPHONE HYDROCHLORIDE 2 MG/ML
2 INJECTION INTRAMUSCULAR; INTRAVENOUS; SUBCUTANEOUS
Qty: 0 | Refills: 0 | Status: DISCONTINUED | OUTPATIENT
Start: 2019-01-02 | End: 2019-01-04

## 2019-01-02 RX ORDER — HYDROMORPHONE HYDROCHLORIDE 2 MG/ML
1 INJECTION INTRAMUSCULAR; INTRAVENOUS; SUBCUTANEOUS
Qty: 0 | Refills: 0 | Status: DISCONTINUED | OUTPATIENT
Start: 2019-01-02 | End: 2019-01-06

## 2019-01-02 RX ORDER — PIPERACILLIN AND TAZOBACTAM 4; .5 G/20ML; G/20ML
3.38 INJECTION, POWDER, LYOPHILIZED, FOR SOLUTION INTRAVENOUS EVERY 8 HOURS
Qty: 0 | Refills: 0 | Status: DISCONTINUED | OUTPATIENT
Start: 2019-01-02 | End: 2019-01-04

## 2019-01-02 RX ADMIN — HYDROMORPHONE HYDROCHLORIDE 1 MILLIGRAM(S): 2 INJECTION INTRAMUSCULAR; INTRAVENOUS; SUBCUTANEOUS at 03:52

## 2019-01-02 RX ADMIN — PIPERACILLIN AND TAZOBACTAM 25 GRAM(S): 4; .5 INJECTION, POWDER, LYOPHILIZED, FOR SOLUTION INTRAVENOUS at 16:50

## 2019-01-02 RX ADMIN — Medication 1 MILLIGRAM(S): at 00:39

## 2019-01-02 RX ADMIN — SODIUM CHLORIDE 200 MILLILITER(S): 9 INJECTION, SOLUTION INTRAVENOUS at 21:40

## 2019-01-02 RX ADMIN — SODIUM CHLORIDE 200 MILLILITER(S): 9 INJECTION, SOLUTION INTRAVENOUS at 00:32

## 2019-01-02 RX ADMIN — HYDROMORPHONE HYDROCHLORIDE 2 MILLIGRAM(S): 2 INJECTION INTRAMUSCULAR; INTRAVENOUS; SUBCUTANEOUS at 20:04

## 2019-01-02 RX ADMIN — HYDROMORPHONE HYDROCHLORIDE 2 MILLIGRAM(S): 2 INJECTION INTRAMUSCULAR; INTRAVENOUS; SUBCUTANEOUS at 21:05

## 2019-01-02 RX ADMIN — HYDROMORPHONE HYDROCHLORIDE 2 MILLIGRAM(S): 2 INJECTION INTRAMUSCULAR; INTRAVENOUS; SUBCUTANEOUS at 16:24

## 2019-01-02 RX ADMIN — HYDROMORPHONE HYDROCHLORIDE 2 MILLIGRAM(S): 2 INJECTION INTRAMUSCULAR; INTRAVENOUS; SUBCUTANEOUS at 16:55

## 2019-01-02 RX ADMIN — HYDROMORPHONE HYDROCHLORIDE 1 MILLIGRAM(S): 2 INJECTION INTRAMUSCULAR; INTRAVENOUS; SUBCUTANEOUS at 07:14

## 2019-01-02 RX ADMIN — HYDROMORPHONE HYDROCHLORIDE 2 MILLIGRAM(S): 2 INJECTION INTRAMUSCULAR; INTRAVENOUS; SUBCUTANEOUS at 09:35

## 2019-01-02 RX ADMIN — HYDROMORPHONE HYDROCHLORIDE 2 MILLIGRAM(S): 2 INJECTION INTRAMUSCULAR; INTRAVENOUS; SUBCUTANEOUS at 10:00

## 2019-01-02 RX ADMIN — PIPERACILLIN AND TAZOBACTAM 25 GRAM(S): 4; .5 INJECTION, POWDER, LYOPHILIZED, FOR SOLUTION INTRAVENOUS at 21:39

## 2019-01-02 RX ADMIN — ONDANSETRON 4 MILLIGRAM(S): 8 TABLET, FILM COATED ORAL at 03:59

## 2019-01-02 RX ADMIN — HYDROMORPHONE HYDROCHLORIDE 1 MILLIGRAM(S): 2 INJECTION INTRAMUSCULAR; INTRAVENOUS; SUBCUTANEOUS at 04:15

## 2019-01-02 RX ADMIN — SODIUM CHLORIDE 200 MILLILITER(S): 9 INJECTION, SOLUTION INTRAVENOUS at 09:36

## 2019-01-02 RX ADMIN — HYDROMORPHONE HYDROCHLORIDE 1 MILLIGRAM(S): 2 INJECTION INTRAMUSCULAR; INTRAVENOUS; SUBCUTANEOUS at 07:01

## 2019-01-02 RX ADMIN — Medication 10 MILLIGRAM(S): at 01:16

## 2019-01-02 NOTE — CHART NOTE - NSCHARTNOTEFT_GEN_A_CORE
Rapid Response PGY 1 Note  Patient is a 58y old  Female PMHx of substance use disorder (Xanax and alcohol), Anxiety, Depression with history of suicide attempt in 2/2018 and cholelithiasis s/p cholecystectomy.   Pt presented with RUQ pain on admission and underwent ERCP resulting in acute pancreatitis.  Code sepsis called because was found to have a WBC of 15.8 and a HR of 110.    Patient was seen and examined at the bedside by the rapid response team.    Vitals at beginning of rapid (3:58PM)165/99, Hr 110, Temp 99.8, RR 18, SPO2 94% on room air.    Repeat vitals /88, , RR18, Temp 99.6, O2 sat 92% on room air.    Allergies    No Known Allergies    Intolerances        PAST MEDICAL & SURGICAL HISTORY:  Hypertension  Anxiety and depression  Seasonal allergies  Asthma  Osteoarthritis  S/P hip replacement: left  S/P tubal ligation  S/P cholecystectomy          GENERAL: The patient is awake and alert in no apparent distress.   HEENT: Head is normocephalic and atraumatic. Extraocular muscles are intact. Mucous membranes are moist. No throat erythema/exudates no lymphadenopathy, no JVD,   NECK: Supple.  LUNGS: Clear to auscultation BL without wheezing, rales or rhonchi; respirations unlabored  HEART: Regular rate and rhythm ,+S1/+S2, no murmurs, rubs, gallops  ABDOMEN: Soft, nontender, and nondistended, no rebound, guarding rigidity, bowel sounds in all 4 quadrants  EXTREMITIES: Without any cyanosis, clubbing, rash, lesions or edema.  SKIN: No new rashes or lesions.  MSK: strength equal BL  VASCULAR: Radial and Dorsal pedal pulses palpable BL  NEUROLOGIC: Grossly intact.  PSYCH: No new changes.                            11.8   15.8  )-----------( 273      ( 02 Jan 2019 08:05 )             36.8     01-02    143  |  102  |  14.0  ----------------------------<  111  3.7   |  31.0<H>  |  0.56    Ca    8.9      02 Jan 2019 08:05  Mg     2.3     01-01    TPro  6.6  /  Alb  3.8  /  TBili  0.3<L>  /  DBili  0.1  /  AST  17  /  ALT  13  /  AlkPhos  90  01-02         LIVER FUNCTIONS - ( 02 Jan 2019 08:05 )  Alb: 3.8 g/dL / Pro: 6.6 g/dL / ALK PHOS: 90 U/L / ALT: 13 U/L / AST: 17 U/L / GGT: x                  MEDICATIONS  (STANDING):  lactated ringers. 1000 milliLiter(s) (200 mL/Hr) IV Continuous <Continuous>  piperacillin/tazobactam IVPB. 3.375 Gram(s) IV Intermittent every 8 hours    MEDICATIONS  (PRN):  acetaminophen   Tablet .. 650 milliGRAM(s) Oral every 6 hours PRN Mild Pain (1 - 3)  hydrALAZINE Injectable 10 milliGRAM(s) IV Push every 6 hours PRN SBP > 170  HYDROmorphone  Injectable 1 milliGRAM(s) IV Push every 2 hours PRN Moderate Pain (4 - 6)  HYDROmorphone  Injectable 2 milliGRAM(s) IV Push every 3 hours PRN Severe Pain (7 - 10)  labetalol Injectable 10 milliGRAM(s) IV Push every 6 hours PRN for sbp above 160 mmhg  LORazepam   Injectable 1 milliGRAM(s) IV Push every 6 hours PRN Anxiety  ondansetron Injectable 4 milliGRAM(s) IV Push every 6 hours PRN Nausea and/or Vomiting      Assessment- Rapid Response called for 58y year old Female with a past medical history of substance use disorder (Xanax and alcohol), Anxiety, Depression with history of suicide attempt in 2/2018 and cholelithiasis s/p cholecystectomy.   Pt presented with RUQ pain on admission and underwent ERCP resulting in acute pancreatitis. Dr. Hughes instructed to call code sepsis after being notified by nurse of elevated HR and WBC count. Senior resident Dr. Alvarez present at bedside.    1. Sepsis (Unknown Source)    -WBC 15.8 and   -Attending, Dr. Hughes notified by nurse.  recommended calling code sepsis to expedite labs such as blood cultures as well as starting antibiotics.  -F/u CBC, CMP, Blood cultures x2, Lactate, Procalcitonin, PT/PTT/INR, UA/Urine culture.   -CXR and EKG ordered stat.  -STAT 1L Bolus of lactate ringer.   -2mg of Dilaudid for RUQ pain.  -Stat Zosyn IVPB 3.375 grams (As per Dr. Hughes)  -F/u Troponins and CK (pt was unsure if RUQ pain was radiating to chest.) Rapid Response PGY 1 Note  Patient is a 58y old  Female PMHx of substance use disorder (Xanax and alcohol), Anxiety, Depression with history of suicide attempt in 2/2018 and cholelithiasis s/p cholecystectomy.   Pt presented with RUQ pain on admission and underwent ERCP resulting in acute pancreatitis. Pt says she has RUQ pain since last Thursday, sharp 8/10, dilaudid helps relieve radiates "from" chest to RUQ.  Code sepsis called because was found to have a WBC of 15.8 and a HR of 110.    Patient was seen and examined at the bedside by the rapid response team.    Vitals at beginning of rapid (3:58PM)165/99, Hr 110, Temp 99.8, RR 18, SPO2 94% on room air.    Repeat vitals /88, , RR18, Temp 99.6, O2 sat 92% on room air.    Allergies    No Known Allergies    Intolerances    PAST MEDICAL & SURGICAL HISTORY:  Hypertension  Anxiety and depression  Seasonal allergies  Asthma  Osteoarthritis  S/P hip replacement: left  S/P tubal ligation  S/P cholecystectomy    GENERAL: The patient is awake and alert in distress from pain.   HEENT: Head is normocephalic and atraumatic. Extraocular muscles are intact. Mucous membranes are moist. No throat erythema/exudates no lymphadenopathy, no JVD,   NECK: Supple.  LUNGS: Clear to auscultation BL without wheezing, rales or rhonchi; respirations unlabored  HEART: Chest pain reproducible upon palpation of L chest at level of 3rd rib. Regular rate and rhythm ,+S1/+S2, no murmurs, rubs, gallops  ABDOMEN: Tender throughout greatest in the URQ. Soft, and nondistended, no rebound, guarding rigidity, bowel sounds in all 4 quadrants  EXTREMITIES: Without any cyanosis, clubbing, rash, lesions or edema.  SKIN: No new rashes or lesions.  MSK: strength equal BL  VASCULAR: Radial and Dorsal pedal pulses palpable BL  NEUROLOGIC: Grossly intact.  PSYCH: Pt at baseline, speaks about "going to the air."                            11.8   15.8  )-----------( 273      ( 02 Jan 2019 08:05 )             36.8     01-02    143  |  102  |  14.0  ----------------------------<  111  3.7   |  31.0<H>  |  0.56    Ca    8.9      02 Jan 2019 08:05  Mg     2.3     01-01    TPro  6.6  /  Alb  3.8  /  TBili  0.3<L>  /  DBili  0.1  /  AST  17  /  ALT  13  /  AlkPhos  90  01-02         LIVER FUNCTIONS - ( 02 Jan 2019 08:05 )  Alb: 3.8 g/dL / Pro: 6.6 g/dL / ALK PHOS: 90 U/L / ALT: 13 U/L / AST: 17 U/L / GGT: x            MEDICATIONS  (STANDING):  lactated ringers. 1000 milliLiter(s) (200 mL/Hr) IV Continuous <Continuous>  piperacillin/tazobactam IVPB. 3.375 Gram(s) IV Intermittent every 8 hours    MEDICATIONS  (PRN):  acetaminophen   Tablet .. 650 milliGRAM(s) Oral every 6 hours PRN Mild Pain (1 - 3)  hydrALAZINE Injectable 10 milliGRAM(s) IV Push every 6 hours PRN SBP > 170  HYDROmorphone  Injectable 1 milliGRAM(s) IV Push every 2 hours PRN Moderate Pain (4 - 6)  HYDROmorphone  Injectable 2 milliGRAM(s) IV Push every 3 hours PRN Severe Pain (7 - 10)  labetalol Injectable 10 milliGRAM(s) IV Push every 6 hours PRN for sbp above 160 mmhg  LORazepam   Injectable 1 milliGRAM(s) IV Push every 6 hours PRN Anxiety  ondansetron Injectable 4 milliGRAM(s) IV Push every 6 hours PRN Nausea and/or Vomiting      Assessment- Rapid Response called for 58y year old Female with a past medical history of substance use disorder (Xanax and alcohol), Anxiety, Depression with history of suicide attempt in 2/2018 and cholelithiasis s/p cholecystectomy.   Pt presented with RUQ pain on admission and underwent ERCP resulting in acute pancreatitis. Dr. Hughes instructed nursing staff to call code sepsis after being notified by nurse of elevated HR and WBC count. Senior resident Dr. Alvarez present at bedside.    1. Sepsis (Unknown Source)    -WBC 15.8 and   -Attending, Dr. Hughes notified by nurse.  recommended calling code sepsis to expedite labs such as blood cultures as well as starting antibiotics.  -F/u CBC, CMP, Blood cultures x2, Lactate, Procalcitonin, PT/PTT/INR, UA/Urine culture.   -CXR and EKG ordered stat.  -STAT 1L Bolus of lactate ringer.   -2mg of Dilaudid for RUQ pain.  -Stat Zosyn IVPB 3.375 grams (As per Dr. Hughes)  -F/u Troponins and CK (pt was unsure if RUQ pain was radiating to chest.)  - Called Dr Hughes and left message on answering system Rapid Response PGY 1 Note  Patient is a 58y old  Female PMHx of substance use disorder (Xanax and alcohol), Anxiety, Depression with history of suicide attempt in 2/2018 and cholelithiasis s/p cholecystectomy.   Pt presented with RUQ pain on admission and underwent ERCP resulting in acute pancreatitis. Pt says she has RUQ pain since last Thursday, sharp 8/10, dilaudid helps relieve radiates "from" chest to RUQ.  Code sepsis called because was found to have a WBC of 15.8 and a HR of 110.    Patient was seen and examined at the bedside by the rapid response team.    Vitals at beginning of rapid (3:58PM)165/99, Hr 110, Temp 99.8, RR 18, SPO2 94% on room air.    Repeat vitals /88, , RR18, Temp 99.6, O2 sat 92% on room air.    Allergies    No Known Allergies    Intolerances    PAST MEDICAL & SURGICAL HISTORY:  Hypertension  Anxiety and depression  Seasonal allergies  Asthma  Osteoarthritis  S/P hip replacement: left  S/P tubal ligation  S/P cholecystectomy    GENERAL: The patient is awake and alert in distress from pain.   HEENT: Head is normocephalic and atraumatic. Extraocular muscles are intact. Mucous membranes are moist. No throat erythema/exudates no lymphadenopathy, no JVD,   NECK: Supple.  LUNGS: Clear to auscultation BL without wheezing, rales or rhonchi; respirations unlabored  HEART: Chest pain reproducible upon palpation of L chest at level of 3rd rib. Regular rate and rhythm ,+S1/+S2, no murmurs, rubs, gallops  ABDOMEN: Tender throughout greatest in the URQ. Soft, and nondistended, no rebound, guarding rigidity, bowel sounds in all 4 quadrants  EXTREMITIES: Without any cyanosis, clubbing, rash, lesions or edema.  SKIN: No new rashes or lesions.  MSK: strength equal BL  VASCULAR: Radial and Dorsal pedal pulses palpable BL  NEUROLOGIC: Grossly intact.  PSYCH: Pt at baseline, speaks about "going to the air."                            11.8   15.8  )-----------( 273      ( 02 Jan 2019 08:05 )             36.8     01-02    143  |  102  |  14.0  ----------------------------<  111  3.7   |  31.0<H>  |  0.56    Ca    8.9      02 Jan 2019 08:05  Mg     2.3     01-01    TPro  6.6  /  Alb  3.8  /  TBili  0.3<L>  /  DBili  0.1  /  AST  17  /  ALT  13  /  AlkPhos  90  01-02         LIVER FUNCTIONS - ( 02 Jan 2019 08:05 )  Alb: 3.8 g/dL / Pro: 6.6 g/dL / ALK PHOS: 90 U/L / ALT: 13 U/L / AST: 17 U/L / GGT: x            MEDICATIONS  (STANDING):  lactated ringers. 1000 milliLiter(s) (200 mL/Hr) IV Continuous <Continuous>  piperacillin/tazobactam IVPB. 3.375 Gram(s) IV Intermittent every 8 hours    MEDICATIONS  (PRN):  acetaminophen   Tablet .. 650 milliGRAM(s) Oral every 6 hours PRN Mild Pain (1 - 3)  hydrALAZINE Injectable 10 milliGRAM(s) IV Push every 6 hours PRN SBP > 170  HYDROmorphone  Injectable 1 milliGRAM(s) IV Push every 2 hours PRN Moderate Pain (4 - 6)  HYDROmorphone  Injectable 2 milliGRAM(s) IV Push every 3 hours PRN Severe Pain (7 - 10)  labetalol Injectable 10 milliGRAM(s) IV Push every 6 hours PRN for sbp above 160 mmhg  LORazepam   Injectable 1 milliGRAM(s) IV Push every 6 hours PRN Anxiety  ondansetron Injectable 4 milliGRAM(s) IV Push every 6 hours PRN Nausea and/or Vomiting      Assessment- Rapid Response called for 58y year old Female with a past medical history of substance use disorder (Xanax and alcohol), Anxiety, Depression with history of suicide attempt in 2/2018 and cholelithiasis s/p cholecystectomy.   Pt presented with RUQ pain on admission and underwent ERCP resulting in acute pancreatitis. Dr. Hughes instructed nursing staff to call code sepsis after being notified by nurse of elevated HR and WBC count. Senior resident Dr. Alvarez present at bedside.    1. Sepsis (Unknown Source)    -WBC 15.8 and   -Attending, Dr. Hughes notified by nurse.  recommended calling code sepsis to expedite labs such as blood cultures as well as starting antibiotics.  -F/u CBC, CMP, Blood cultures x2, Lactate, Procalcitonin, PT/PTT/INR, UA/Urine culture.   -CXR and EKG ordered stat.  -STAT 1L Bolus of lactate ringer.   -2mg of Dilaudid for RUQ pain.  -Stat Zosyn IVPB 3.375 grams (As per Dr. Hughes)  -F/u Troponins and CK (pt was unsure if RUQ pain was radiating to chest.)  - Called Dr Hughes and left message on answering system    Pt seen and examined - possible infectious etiology, but less likely - will f/u BCx and infectious w/u - if neg will stop abx.

## 2019-01-03 DIAGNOSIS — K80.50 CALCULUS OF BILE DUCT WITHOUT CHOLANGITIS OR CHOLECYSTITIS WITHOUT OBSTRUCTION: ICD-10-CM

## 2019-01-03 LAB
ALBUMIN SERPL ELPH-MCNC: 3.6 G/DL — SIGNIFICANT CHANGE UP (ref 3.3–5.2)
ALP SERPL-CCNC: 98 U/L — SIGNIFICANT CHANGE UP (ref 40–120)
ALT FLD-CCNC: 29 U/L — SIGNIFICANT CHANGE UP
AMPHET UR-MCNC: NEGATIVE — SIGNIFICANT CHANGE UP
AMYLASE P1 CFR SERPL: 110 U/L — SIGNIFICANT CHANGE UP (ref 36–128)
ANION GAP SERPL CALC-SCNC: 11 MMOL/L — SIGNIFICANT CHANGE UP (ref 5–17)
APPEARANCE UR: CLEAR — SIGNIFICANT CHANGE UP
AST SERPL-CCNC: 29 U/L — SIGNIFICANT CHANGE UP
BACTERIA # UR AUTO: ABNORMAL
BARBITURATES UR SCN-MCNC: NEGATIVE — SIGNIFICANT CHANGE UP
BENZODIAZ UR-MCNC: POSITIVE
BILIRUB DIRECT SERPL-MCNC: 0.2 MG/DL — SIGNIFICANT CHANGE UP (ref 0–0.3)
BILIRUB INDIRECT FLD-MCNC: 0.3 MG/DL — SIGNIFICANT CHANGE UP (ref 0.2–1)
BILIRUB SERPL-MCNC: 0.5 MG/DL — SIGNIFICANT CHANGE UP (ref 0.4–2)
BILIRUB UR-MCNC: NEGATIVE — SIGNIFICANT CHANGE UP
BUN SERPL-MCNC: 8 MG/DL — SIGNIFICANT CHANGE UP (ref 8–20)
CALCIUM SERPL-MCNC: 8.9 MG/DL — SIGNIFICANT CHANGE UP (ref 8.6–10.2)
CHLORIDE SERPL-SCNC: 100 MMOL/L — SIGNIFICANT CHANGE UP (ref 98–107)
CO2 SERPL-SCNC: 32 MMOL/L — HIGH (ref 22–29)
COCAINE METAB.OTHER UR-MCNC: NEGATIVE — SIGNIFICANT CHANGE UP
COLOR SPEC: YELLOW — SIGNIFICANT CHANGE UP
CREAT SERPL-MCNC: 0.5 MG/DL — SIGNIFICANT CHANGE UP (ref 0.5–1.3)
CRP SERPL-MCNC: 14.4 MG/DL — HIGH (ref 0–0.4)
DIFF PNL FLD: ABNORMAL
EPI CELLS # UR: SIGNIFICANT CHANGE UP
GLUCOSE SERPL-MCNC: 103 MG/DL — SIGNIFICANT CHANGE UP (ref 70–115)
GLUCOSE UR QL: NEGATIVE MG/DL — SIGNIFICANT CHANGE UP
HCT VFR BLD CALC: 35.3 % — LOW (ref 37–47)
HGB BLD-MCNC: 10.9 G/DL — LOW (ref 12–16)
KETONES UR-MCNC: NEGATIVE — SIGNIFICANT CHANGE UP
LEUKOCYTE ESTERASE UR-ACNC: NEGATIVE — SIGNIFICANT CHANGE UP
LIDOCAIN IGE QN: 87 U/L — HIGH (ref 22–51)
MCHC RBC-ENTMCNC: 27.7 PG — SIGNIFICANT CHANGE UP (ref 27–31)
MCHC RBC-ENTMCNC: 30.9 G/DL — LOW (ref 32–36)
MCV RBC AUTO: 89.8 FL — SIGNIFICANT CHANGE UP (ref 81–99)
METHADONE UR-MCNC: NEGATIVE — SIGNIFICANT CHANGE UP
MRSA PCR RESULT.: SIGNIFICANT CHANGE UP
NITRITE UR-MCNC: NEGATIVE — SIGNIFICANT CHANGE UP
OPIATES UR-MCNC: POSITIVE
PCP SPEC-MCNC: SIGNIFICANT CHANGE UP
PCP UR-MCNC: NEGATIVE — SIGNIFICANT CHANGE UP
PH UR: 7 — SIGNIFICANT CHANGE UP (ref 5–8)
PLATELET # BLD AUTO: 271 K/UL — SIGNIFICANT CHANGE UP (ref 150–400)
POTASSIUM SERPL-MCNC: 3.3 MMOL/L — LOW (ref 3.5–5.3)
POTASSIUM SERPL-SCNC: 3.3 MMOL/L — LOW (ref 3.5–5.3)
PROT SERPL-MCNC: 6.4 G/DL — LOW (ref 6.6–8.7)
PROT UR-MCNC: 15 MG/DL
RBC # BLD: 3.93 M/UL — LOW (ref 4.4–5.2)
RBC # FLD: 14.7 % — SIGNIFICANT CHANGE UP (ref 11–15.6)
RBC CASTS # UR COMP ASSIST: ABNORMAL /HPF (ref 0–4)
S AUREUS DNA NOSE QL NAA+PROBE: SIGNIFICANT CHANGE UP
SODIUM SERPL-SCNC: 143 MMOL/L — SIGNIFICANT CHANGE UP (ref 135–145)
SP GR SPEC: 1.01 — SIGNIFICANT CHANGE UP (ref 1.01–1.02)
THC UR QL: NEGATIVE — SIGNIFICANT CHANGE UP
UROBILINOGEN FLD QL: NEGATIVE MG/DL — SIGNIFICANT CHANGE UP
WBC # BLD: 15.3 K/UL — HIGH (ref 4.8–10.8)
WBC # FLD AUTO: 15.3 K/UL — HIGH (ref 4.8–10.8)
WBC UR QL: SIGNIFICANT CHANGE UP

## 2019-01-03 PROCEDURE — 99233 SBSQ HOSP IP/OBS HIGH 50: CPT

## 2019-01-03 PROCEDURE — 99233 SBSQ HOSP IP/OBS HIGH 50: CPT | Mod: GC

## 2019-01-03 PROCEDURE — 74177 CT ABD & PELVIS W/CONTRAST: CPT | Mod: 26

## 2019-01-03 RX ADMIN — HYDROMORPHONE HYDROCHLORIDE 2 MILLIGRAM(S): 2 INJECTION INTRAMUSCULAR; INTRAVENOUS; SUBCUTANEOUS at 13:04

## 2019-01-03 RX ADMIN — HYDROMORPHONE HYDROCHLORIDE 2 MILLIGRAM(S): 2 INJECTION INTRAMUSCULAR; INTRAVENOUS; SUBCUTANEOUS at 04:46

## 2019-01-03 RX ADMIN — SODIUM CHLORIDE 200 MILLILITER(S): 9 INJECTION, SOLUTION INTRAVENOUS at 08:26

## 2019-01-03 RX ADMIN — HYDROMORPHONE HYDROCHLORIDE 2 MILLIGRAM(S): 2 INJECTION INTRAMUSCULAR; INTRAVENOUS; SUBCUTANEOUS at 12:34

## 2019-01-03 RX ADMIN — HYDROMORPHONE HYDROCHLORIDE 2 MILLIGRAM(S): 2 INJECTION INTRAMUSCULAR; INTRAVENOUS; SUBCUTANEOUS at 03:16

## 2019-01-03 RX ADMIN — ONDANSETRON 4 MILLIGRAM(S): 8 TABLET, FILM COATED ORAL at 03:14

## 2019-01-03 RX ADMIN — ONDANSETRON 4 MILLIGRAM(S): 8 TABLET, FILM COATED ORAL at 08:07

## 2019-01-03 RX ADMIN — Medication 10 MILLIGRAM(S): at 12:25

## 2019-01-03 RX ADMIN — SODIUM CHLORIDE 200 MILLILITER(S): 9 INJECTION, SOLUTION INTRAVENOUS at 12:59

## 2019-01-03 RX ADMIN — Medication 1 MILLIGRAM(S): at 16:21

## 2019-01-03 RX ADMIN — HYDROMORPHONE HYDROCHLORIDE 2 MILLIGRAM(S): 2 INJECTION INTRAMUSCULAR; INTRAVENOUS; SUBCUTANEOUS at 18:20

## 2019-01-03 RX ADMIN — PIPERACILLIN AND TAZOBACTAM 25 GRAM(S): 4; .5 INJECTION, POWDER, LYOPHILIZED, FOR SOLUTION INTRAVENOUS at 05:21

## 2019-01-03 RX ADMIN — PIPERACILLIN AND TAZOBACTAM 25 GRAM(S): 4; .5 INJECTION, POWDER, LYOPHILIZED, FOR SOLUTION INTRAVENOUS at 15:51

## 2019-01-03 RX ADMIN — HYDROMORPHONE HYDROCHLORIDE 2 MILLIGRAM(S): 2 INJECTION INTRAMUSCULAR; INTRAVENOUS; SUBCUTANEOUS at 08:26

## 2019-01-03 RX ADMIN — SODIUM CHLORIDE 200 MILLILITER(S): 9 INJECTION, SOLUTION INTRAVENOUS at 03:18

## 2019-01-03 RX ADMIN — PIPERACILLIN AND TAZOBACTAM 25 GRAM(S): 4; .5 INJECTION, POWDER, LYOPHILIZED, FOR SOLUTION INTRAVENOUS at 21:58

## 2019-01-03 RX ADMIN — ONDANSETRON 4 MILLIGRAM(S): 8 TABLET, FILM COATED ORAL at 18:17

## 2019-01-03 RX ADMIN — HYDROMORPHONE HYDROCHLORIDE 2 MILLIGRAM(S): 2 INJECTION INTRAMUSCULAR; INTRAVENOUS; SUBCUTANEOUS at 19:00

## 2019-01-03 RX ADMIN — HYDROMORPHONE HYDROCHLORIDE 2 MILLIGRAM(S): 2 INJECTION INTRAMUSCULAR; INTRAVENOUS; SUBCUTANEOUS at 09:00

## 2019-01-04 DIAGNOSIS — F19.921 OTHER PSYCHOACTIVE SUBSTANCE USE, UNSPECIFIED WITH INTOXICATION WITH DELIRIUM: ICD-10-CM

## 2019-01-04 DIAGNOSIS — K85.90 ACUTE PANCREATITIS WITHOUT NECROSIS OR INFECTION, UNSPECIFIED: ICD-10-CM

## 2019-01-04 DIAGNOSIS — F41.9 ANXIETY DISORDER, UNSPECIFIED: ICD-10-CM

## 2019-01-04 DIAGNOSIS — F33.9 MAJOR DEPRESSIVE DISORDER, RECURRENT, UNSPECIFIED: ICD-10-CM

## 2019-01-04 LAB
ALBUMIN SERPL ELPH-MCNC: 3.4 G/DL — SIGNIFICANT CHANGE UP (ref 3.3–5.2)
ALP SERPL-CCNC: 110 U/L — SIGNIFICANT CHANGE UP (ref 40–120)
ALT FLD-CCNC: 19 U/L — SIGNIFICANT CHANGE UP
AMYLASE P1 CFR SERPL: 31 U/L — LOW (ref 36–128)
ANION GAP SERPL CALC-SCNC: 11 MMOL/L — SIGNIFICANT CHANGE UP (ref 5–17)
AST SERPL-CCNC: 16 U/L — SIGNIFICANT CHANGE UP
BILIRUB DIRECT SERPL-MCNC: 0.1 MG/DL — SIGNIFICANT CHANGE UP (ref 0–0.3)
BILIRUB INDIRECT FLD-MCNC: 0.2 MG/DL — SIGNIFICANT CHANGE UP (ref 0.2–1)
BILIRUB SERPL-MCNC: 0.3 MG/DL — LOW (ref 0.4–2)
BUN SERPL-MCNC: 5 MG/DL — LOW (ref 8–20)
CALCIUM SERPL-MCNC: 8.4 MG/DL — LOW (ref 8.6–10.2)
CHLORIDE SERPL-SCNC: 97 MMOL/L — LOW (ref 98–107)
CO2 SERPL-SCNC: 31 MMOL/L — HIGH (ref 22–29)
CREAT SERPL-MCNC: 0.55 MG/DL — SIGNIFICANT CHANGE UP (ref 0.5–1.3)
CULTURE RESULTS: SIGNIFICANT CHANGE UP
GLUCOSE SERPL-MCNC: 167 MG/DL — HIGH (ref 70–115)
HCT VFR BLD CALC: 30.5 % — LOW (ref 37–47)
HGB BLD-MCNC: 9.5 G/DL — LOW (ref 12–16)
LIDOCAIN IGE QN: 40 U/L — SIGNIFICANT CHANGE UP (ref 22–51)
MCHC RBC-ENTMCNC: 27.7 PG — SIGNIFICANT CHANGE UP (ref 27–31)
MCHC RBC-ENTMCNC: 31.1 G/DL — LOW (ref 32–36)
MCV RBC AUTO: 88.9 FL — SIGNIFICANT CHANGE UP (ref 81–99)
PLATELET # BLD AUTO: 263 K/UL — SIGNIFICANT CHANGE UP (ref 150–400)
POTASSIUM SERPL-MCNC: 2.6 MMOL/L — CRITICAL LOW (ref 3.5–5.3)
POTASSIUM SERPL-SCNC: 2.6 MMOL/L — CRITICAL LOW (ref 3.5–5.3)
PROT SERPL-MCNC: 6 G/DL — LOW (ref 6.6–8.7)
RBC # BLD: 3.43 M/UL — LOW (ref 4.4–5.2)
RBC # FLD: 14.4 % — SIGNIFICANT CHANGE UP (ref 11–15.6)
SODIUM SERPL-SCNC: 139 MMOL/L — SIGNIFICANT CHANGE UP (ref 135–145)
SPECIMEN SOURCE: SIGNIFICANT CHANGE UP
WBC # BLD: 11.8 K/UL — HIGH (ref 4.8–10.8)
WBC # FLD AUTO: 11.8 K/UL — HIGH (ref 4.8–10.8)

## 2019-01-04 PROCEDURE — 99233 SBSQ HOSP IP/OBS HIGH 50: CPT

## 2019-01-04 PROCEDURE — 99223 1ST HOSP IP/OBS HIGH 75: CPT

## 2019-01-04 RX ORDER — LAMOTRIGINE 25 MG/1
25 TABLET, ORALLY DISINTEGRATING ORAL DAILY
Qty: 0 | Refills: 0 | Status: DISCONTINUED | OUTPATIENT
Start: 2019-01-04 | End: 2019-01-07

## 2019-01-04 RX ORDER — SODIUM CHLORIDE 9 MG/ML
500 INJECTION, SOLUTION INTRAVENOUS
Qty: 0 | Refills: 0 | Status: DISCONTINUED | OUTPATIENT
Start: 2019-01-04 | End: 2019-01-07

## 2019-01-04 RX ORDER — POTASSIUM CHLORIDE 20 MEQ
10 PACKET (EA) ORAL
Qty: 0 | Refills: 0 | Status: COMPLETED | OUTPATIENT
Start: 2019-01-04 | End: 2019-01-04

## 2019-01-04 RX ORDER — ACETAMINOPHEN 500 MG
1000 TABLET ORAL ONCE
Qty: 0 | Refills: 0 | Status: COMPLETED | OUTPATIENT
Start: 2019-01-04 | End: 2019-01-04

## 2019-01-04 RX ORDER — POTASSIUM CHLORIDE 20 MEQ
40 PACKET (EA) ORAL ONCE
Qty: 0 | Refills: 0 | Status: COMPLETED | OUTPATIENT
Start: 2019-01-04 | End: 2019-01-04

## 2019-01-04 RX ORDER — BUPROPION HYDROCHLORIDE 150 MG/1
150 TABLET, EXTENDED RELEASE ORAL DAILY
Qty: 0 | Refills: 0 | Status: DISCONTINUED | OUTPATIENT
Start: 2019-01-04 | End: 2019-01-07

## 2019-01-04 RX ADMIN — Medication 10 MILLIGRAM(S): at 00:32

## 2019-01-04 RX ADMIN — Medication 400 MILLIGRAM(S): at 22:24

## 2019-01-04 RX ADMIN — HYDROMORPHONE HYDROCHLORIDE 1 MILLIGRAM(S): 2 INJECTION INTRAMUSCULAR; INTRAVENOUS; SUBCUTANEOUS at 20:02

## 2019-01-04 RX ADMIN — Medication 100 MILLIEQUIVALENT(S): at 17:19

## 2019-01-04 RX ADMIN — HYDROMORPHONE HYDROCHLORIDE 1 MILLIGRAM(S): 2 INJECTION INTRAMUSCULAR; INTRAVENOUS; SUBCUTANEOUS at 19:25

## 2019-01-04 RX ADMIN — HYDROMORPHONE HYDROCHLORIDE 1 MILLIGRAM(S): 2 INJECTION INTRAMUSCULAR; INTRAVENOUS; SUBCUTANEOUS at 17:17

## 2019-01-04 RX ADMIN — HYDROMORPHONE HYDROCHLORIDE 1 MILLIGRAM(S): 2 INJECTION INTRAMUSCULAR; INTRAVENOUS; SUBCUTANEOUS at 08:24

## 2019-01-04 RX ADMIN — Medication 40 MILLIEQUIVALENT(S): at 17:18

## 2019-01-04 RX ADMIN — SODIUM CHLORIDE 200 MILLILITER(S): 9 INJECTION, SOLUTION INTRAVENOUS at 05:05

## 2019-01-04 RX ADMIN — PIPERACILLIN AND TAZOBACTAM 25 GRAM(S): 4; .5 INJECTION, POWDER, LYOPHILIZED, FOR SOLUTION INTRAVENOUS at 05:24

## 2019-01-04 RX ADMIN — HYDROMORPHONE HYDROCHLORIDE 1 MILLIGRAM(S): 2 INJECTION INTRAMUSCULAR; INTRAVENOUS; SUBCUTANEOUS at 21:59

## 2019-01-04 RX ADMIN — BUPROPION HYDROCHLORIDE 150 MILLIGRAM(S): 150 TABLET, EXTENDED RELEASE ORAL at 17:20

## 2019-01-04 RX ADMIN — HYDROMORPHONE HYDROCHLORIDE 2 MILLIGRAM(S): 2 INJECTION INTRAMUSCULAR; INTRAVENOUS; SUBCUTANEOUS at 00:23

## 2019-01-04 RX ADMIN — Medication 1 MILLIGRAM(S): at 22:24

## 2019-01-04 RX ADMIN — HYDROMORPHONE HYDROCHLORIDE 1 MILLIGRAM(S): 2 INJECTION INTRAMUSCULAR; INTRAVENOUS; SUBCUTANEOUS at 15:55

## 2019-01-04 RX ADMIN — HYDROMORPHONE HYDROCHLORIDE 1 MILLIGRAM(S): 2 INJECTION INTRAMUSCULAR; INTRAVENOUS; SUBCUTANEOUS at 15:16

## 2019-01-04 RX ADMIN — HYDROMORPHONE HYDROCHLORIDE 2 MILLIGRAM(S): 2 INJECTION INTRAMUSCULAR; INTRAVENOUS; SUBCUTANEOUS at 01:05

## 2019-01-04 RX ADMIN — Medication 100 MILLIEQUIVALENT(S): at 18:28

## 2019-01-04 RX ADMIN — HYDROMORPHONE HYDROCHLORIDE 2 MILLIGRAM(S): 2 INJECTION INTRAMUSCULAR; INTRAVENOUS; SUBCUTANEOUS at 06:44

## 2019-01-04 RX ADMIN — Medication 100 MILLIEQUIVALENT(S): at 20:02

## 2019-01-04 RX ADMIN — HYDROMORPHONE HYDROCHLORIDE 1 MILLIGRAM(S): 2 INJECTION INTRAMUSCULAR; INTRAVENOUS; SUBCUTANEOUS at 13:04

## 2019-01-04 RX ADMIN — HYDROMORPHONE HYDROCHLORIDE 1 MILLIGRAM(S): 2 INJECTION INTRAMUSCULAR; INTRAVENOUS; SUBCUTANEOUS at 12:26

## 2019-01-04 RX ADMIN — Medication 30 MILLIGRAM(S): at 17:19

## 2019-01-04 RX ADMIN — Medication 1000 MILLIGRAM(S): at 23:24

## 2019-01-04 RX ADMIN — HYDROMORPHONE HYDROCHLORIDE 2 MILLIGRAM(S): 2 INJECTION INTRAMUSCULAR; INTRAVENOUS; SUBCUTANEOUS at 05:25

## 2019-01-04 RX ADMIN — HYDROMORPHONE HYDROCHLORIDE 1 MILLIGRAM(S): 2 INJECTION INTRAMUSCULAR; INTRAVENOUS; SUBCUTANEOUS at 08:52

## 2019-01-04 NOTE — BEHAVIORAL HEALTH ASSESSMENT NOTE - RISK ASSESSMENT
moderate - delirium appears to be clearing, no si/hi/avh, mood stable, but still has lingering confusion

## 2019-01-04 NOTE — BEHAVIORAL HEALTH ASSESSMENT NOTE - HPI (INCLUDE ILLNESS QUALITY, SEVERITY, DURATION, TIMING, CONTEXT, MODIFYING FACTORS, ASSOCIATED SIGNS AND SYMPTOMS)
58 y.o. female, domiciled with  with PPHx of substance use disorder (Xanax and alcohol), anxiety, and depression with history of 2 past suicide attempts, last in 2/2018 - sent to inpatient psych, now follows up outpatient with Lisa Maynard, pmhx of cholecystectomy secondary to stones presents to the er with 2-3 day history of 10 out of 10 ruq abd pain similar to the pain in the past. Pt was worked up in the er and found to have choledocholithiasis and is planned for urgent ercp. Pt was given IV dilaudid and lorazepam 1mg, then had a period of confusion, hallucinations and made statement about killing self if she was given something to eat/drink. Also concern for benzo withdrawal given hx of abuse.  Pt now a&ox3, mood "ok", speech slowed, responses latent; with noted difficulty in recollection. She doesn't remember making the suicidal statement. She reports that her mood in recent weeks has been well. Appetite and sleep are good. She denies benzo abuse; however, she is prescribed valium 10mg tid. She denies anhedonia, guilt, feelings of helplessness or guilt, current active S/H I/I/P, excess worry, A/V hallucinations, tremor, sweating, or N/V.     COLLATERAL: Spoke with pts , who is present. He reports that he monitors her benzo administration due to hx of abuse. He denies concern for abuse. He reports that she has been well in recent weeks, He reports that her mentation has improved greatly over the past 24hrs. He has no longer witnesses AVH or confused speech; he was present when she called her sister earlier and reports that conversation was appropriate. No other bizarre behavior noted.

## 2019-01-05 LAB
ANION GAP SERPL CALC-SCNC: 11 MMOL/L — SIGNIFICANT CHANGE UP (ref 5–17)
BUN SERPL-MCNC: <3 MG/DL — LOW (ref 8–20)
CALCIUM SERPL-MCNC: 8.4 MG/DL — LOW (ref 8.6–10.2)
CHLORIDE SERPL-SCNC: 102 MMOL/L — SIGNIFICANT CHANGE UP (ref 98–107)
CO2 SERPL-SCNC: 32 MMOL/L — HIGH (ref 22–29)
CREAT SERPL-MCNC: 0.41 MG/DL — LOW (ref 0.5–1.3)
GLUCOSE SERPL-MCNC: 100 MG/DL — SIGNIFICANT CHANGE UP (ref 70–115)
HCT VFR BLD CALC: 30.7 % — LOW (ref 37–47)
HGB BLD-MCNC: 9.6 G/DL — LOW (ref 12–16)
MCHC RBC-ENTMCNC: 28 PG — SIGNIFICANT CHANGE UP (ref 27–31)
MCHC RBC-ENTMCNC: 31.3 G/DL — LOW (ref 32–36)
MCV RBC AUTO: 89.5 FL — SIGNIFICANT CHANGE UP (ref 81–99)
PLATELET # BLD AUTO: 245 K/UL — SIGNIFICANT CHANGE UP (ref 150–400)
POTASSIUM SERPL-MCNC: 3.1 MMOL/L — LOW (ref 3.5–5.3)
POTASSIUM SERPL-SCNC: 3.1 MMOL/L — LOW (ref 3.5–5.3)
RBC # BLD: 3.43 M/UL — LOW (ref 4.4–5.2)
RBC # FLD: 14.2 % — SIGNIFICANT CHANGE UP (ref 11–15.6)
SODIUM SERPL-SCNC: 145 MMOL/L — SIGNIFICANT CHANGE UP (ref 135–145)
WBC # BLD: 7.7 K/UL — SIGNIFICANT CHANGE UP (ref 4.8–10.8)
WBC # FLD AUTO: 7.7 K/UL — SIGNIFICANT CHANGE UP (ref 4.8–10.8)

## 2019-01-05 PROCEDURE — 99233 SBSQ HOSP IP/OBS HIGH 50: CPT

## 2019-01-05 RX ORDER — PANTOPRAZOLE SODIUM 20 MG/1
40 TABLET, DELAYED RELEASE ORAL
Qty: 0 | Refills: 0 | Status: DISCONTINUED | OUTPATIENT
Start: 2019-01-05 | End: 2019-01-07

## 2019-01-05 RX ORDER — METOCLOPRAMIDE HCL 10 MG
5 TABLET ORAL ONCE
Qty: 0 | Refills: 0 | Status: COMPLETED | OUTPATIENT
Start: 2019-01-05 | End: 2019-01-05

## 2019-01-05 RX ORDER — ONDANSETRON 8 MG/1
4 TABLET, FILM COATED ORAL EVERY 6 HOURS
Qty: 0 | Refills: 0 | Status: DISCONTINUED | OUTPATIENT
Start: 2019-01-05 | End: 2019-01-06

## 2019-01-05 RX ORDER — ACETAMINOPHEN 500 MG
1000 TABLET ORAL ONCE
Qty: 0 | Refills: 0 | Status: COMPLETED | OUTPATIENT
Start: 2019-01-05 | End: 2019-01-05

## 2019-01-05 RX ADMIN — HYDROMORPHONE HYDROCHLORIDE 1 MILLIGRAM(S): 2 INJECTION INTRAMUSCULAR; INTRAVENOUS; SUBCUTANEOUS at 01:12

## 2019-01-05 RX ADMIN — HYDROMORPHONE HYDROCHLORIDE 1 MILLIGRAM(S): 2 INJECTION INTRAMUSCULAR; INTRAVENOUS; SUBCUTANEOUS at 18:54

## 2019-01-05 RX ADMIN — ONDANSETRON 4 MILLIGRAM(S): 8 TABLET, FILM COATED ORAL at 16:50

## 2019-01-05 RX ADMIN — ONDANSETRON 4 MILLIGRAM(S): 8 TABLET, FILM COATED ORAL at 08:23

## 2019-01-05 RX ADMIN — HYDROMORPHONE HYDROCHLORIDE 1 MILLIGRAM(S): 2 INJECTION INTRAMUSCULAR; INTRAVENOUS; SUBCUTANEOUS at 17:15

## 2019-01-05 RX ADMIN — Medication 30 MILLIGRAM(S): at 11:10

## 2019-01-05 RX ADMIN — HYDROMORPHONE HYDROCHLORIDE 1 MILLIGRAM(S): 2 INJECTION INTRAMUSCULAR; INTRAVENOUS; SUBCUTANEOUS at 08:23

## 2019-01-05 RX ADMIN — HYDROMORPHONE HYDROCHLORIDE 1 MILLIGRAM(S): 2 INJECTION INTRAMUSCULAR; INTRAVENOUS; SUBCUTANEOUS at 21:54

## 2019-01-05 RX ADMIN — HYDROMORPHONE HYDROCHLORIDE 1 MILLIGRAM(S): 2 INJECTION INTRAMUSCULAR; INTRAVENOUS; SUBCUTANEOUS at 15:32

## 2019-01-05 RX ADMIN — HYDROMORPHONE HYDROCHLORIDE 1 MILLIGRAM(S): 2 INJECTION INTRAMUSCULAR; INTRAVENOUS; SUBCUTANEOUS at 23:06

## 2019-01-05 RX ADMIN — Medication 1000 MILLIGRAM(S): at 17:15

## 2019-01-05 RX ADMIN — HYDROMORPHONE HYDROCHLORIDE 1 MILLIGRAM(S): 2 INJECTION INTRAMUSCULAR; INTRAVENOUS; SUBCUTANEOUS at 12:23

## 2019-01-05 RX ADMIN — HYDROMORPHONE HYDROCHLORIDE 1 MILLIGRAM(S): 2 INJECTION INTRAMUSCULAR; INTRAVENOUS; SUBCUTANEOUS at 00:05

## 2019-01-05 RX ADMIN — HYDROMORPHONE HYDROCHLORIDE 1 MILLIGRAM(S): 2 INJECTION INTRAMUSCULAR; INTRAVENOUS; SUBCUTANEOUS at 02:55

## 2019-01-05 RX ADMIN — PANTOPRAZOLE SODIUM 40 MILLIGRAM(S): 20 TABLET, DELAYED RELEASE ORAL at 16:49

## 2019-01-05 RX ADMIN — HYDROMORPHONE HYDROCHLORIDE 1 MILLIGRAM(S): 2 INJECTION INTRAMUSCULAR; INTRAVENOUS; SUBCUTANEOUS at 20:54

## 2019-01-05 RX ADMIN — Medication 5 MILLIGRAM(S): at 11:11

## 2019-01-05 RX ADMIN — HYDROMORPHONE HYDROCHLORIDE 1 MILLIGRAM(S): 2 INJECTION INTRAMUSCULAR; INTRAVENOUS; SUBCUTANEOUS at 19:30

## 2019-01-05 RX ADMIN — HYDROMORPHONE HYDROCHLORIDE 1 MILLIGRAM(S): 2 INJECTION INTRAMUSCULAR; INTRAVENOUS; SUBCUTANEOUS at 16:50

## 2019-01-05 RX ADMIN — HYDROMORPHONE HYDROCHLORIDE 1 MILLIGRAM(S): 2 INJECTION INTRAMUSCULAR; INTRAVENOUS; SUBCUTANEOUS at 11:10

## 2019-01-05 RX ADMIN — HYDROMORPHONE HYDROCHLORIDE 1 MILLIGRAM(S): 2 INJECTION INTRAMUSCULAR; INTRAVENOUS; SUBCUTANEOUS at 14:18

## 2019-01-05 RX ADMIN — BUPROPION HYDROCHLORIDE 150 MILLIGRAM(S): 150 TABLET, EXTENDED RELEASE ORAL at 11:10

## 2019-01-05 RX ADMIN — Medication 400 MILLIGRAM(S): at 16:50

## 2019-01-05 RX ADMIN — HYDROMORPHONE HYDROCHLORIDE 1 MILLIGRAM(S): 2 INJECTION INTRAMUSCULAR; INTRAVENOUS; SUBCUTANEOUS at 23:36

## 2019-01-05 RX ADMIN — HYDROMORPHONE HYDROCHLORIDE 1 MILLIGRAM(S): 2 INJECTION INTRAMUSCULAR; INTRAVENOUS; SUBCUTANEOUS at 09:21

## 2019-01-05 RX ADMIN — SODIUM CHLORIDE 100 MILLILITER(S): 9 INJECTION, SOLUTION INTRAVENOUS at 02:29

## 2019-01-05 RX ADMIN — LAMOTRIGINE 25 MILLIGRAM(S): 25 TABLET, ORALLY DISINTEGRATING ORAL at 11:02

## 2019-01-05 RX ADMIN — HYDROMORPHONE HYDROCHLORIDE 1 MILLIGRAM(S): 2 INJECTION INTRAMUSCULAR; INTRAVENOUS; SUBCUTANEOUS at 03:57

## 2019-01-05 NOTE — DIETITIAN INITIAL EVALUATION ADULT. - SIGNS/SYMPTOMS
as evidenced by NPO/clear diet meeting <50% of estimated needs x 5 days as evidenced by NPO/clear diet meeting <50% of estimated needs x 5 days, mild temple wasting

## 2019-01-05 NOTE — DIETITIAN INITIAL EVALUATION ADULT. - OTHER INFO
Pt admit with acute pancreatitis, s/p cholecystectomy, delirium.  Pt states feeling hungry, and requesting diet advancement but states that she has been vomiting while on clear liquid diet. Pt states UBW 175lbs, question accuracy of this statement as Pt weight appears more consistent with bedscale weight 211lbs. Pt reported that she has gained weight in the last few years due to inactivity.

## 2019-01-05 NOTE — CHART NOTE - NSCHARTNOTEFT_GEN_A_CORE
Upon Nutritional Assessment by the Registered Dietitian your patient was determined to meet criteria / has evidence of the following diagnosis/diagnoses:          [X]  Mild Acute Protein Calorie Malnutrition    Findings as based on:  •  Comprehensive nutrition assessment and consultation  •  Calorie counts (nutrient intake analysis)  •  Food acceptance and intake status from observations by staff  •  Follow up  •  Patient education  •  Intervention secondary to interdisciplinary rounds  •   concerns    Pt at high nutrition risk secondary to Malnutrition (mild acute) related to inadequate protein-energy intake in setting of acute pancreatitis with poor tolerance of diet as evidenced by NPO/clear diet meeting <50% of estimated needs x 5 days.       Treatment:    The following diet has been recommended:  1) Advance diet as tolerated to low fat  2) MVI daily     PROVIDER Section:     By signing this assessment you are acknowledging and agree with the diagnosis/diagnoses assigned by the Registered Dietitian    Comments: Upon Nutritional Assessment by the Registered Dietitian your patient was determined to meet criteria / has evidence of the following diagnosis/diagnoses:          [X]  Mild Acute Protein Calorie Malnutrition    Findings as based on:  •  Comprehensive nutrition assessment and consultation  •  Calorie counts (nutrient intake analysis)  •  Food acceptance and intake status from observations by staff  •  Follow up  •  Patient education  •  Intervention secondary to interdisciplinary rounds  •   concerns    Pt at high nutrition risk secondary to Malnutrition (mild acute) related to inadequate protein-energy intake in setting of acute pancreatitis with poor tolerance of diet as evidenced by NPO/clear diet meeting <50% of estimated needs x 5 days, mild temple wasting observed.      Treatment:    The following diet has been recommended:  1) Advance diet as tolerated to low fat  2) MVI daily     PROVIDER Section:     By signing this assessment you are acknowledging and agree with the diagnosis/diagnoses assigned by the Registered Dietitian    Comments:

## 2019-01-05 NOTE — DIETITIAN INITIAL EVALUATION ADULT. - PERTINENT LABORATORY DATA
01-05 Na145 mmol/L Glu 100 mg/dL K+ 3.1 mmol/L<L> Cr  0.41 mg/dL<L> BUN <3.0 mg/dL<L> Phos n/a   Alb n/a   PAB n/a

## 2019-01-06 DIAGNOSIS — F41.9 ANXIETY DISORDER, UNSPECIFIED: ICD-10-CM

## 2019-01-06 DIAGNOSIS — R07.89 OTHER CHEST PAIN: ICD-10-CM

## 2019-01-06 DIAGNOSIS — K85.10 BILIARY ACUTE PANCREATITIS WITHOUT NECROSIS OR INFECTION: ICD-10-CM

## 2019-01-06 DIAGNOSIS — I10 ESSENTIAL (PRIMARY) HYPERTENSION: ICD-10-CM

## 2019-01-06 LAB
GLUCOSE BLDC GLUCOMTR-MCNC: 137 MG/DL — HIGH (ref 70–99)
TROPONIN T SERPL-MCNC: <0.01 NG/ML — SIGNIFICANT CHANGE UP (ref 0–0.06)

## 2019-01-06 PROCEDURE — 71275 CT ANGIOGRAPHY CHEST: CPT | Mod: 26

## 2019-01-06 PROCEDURE — 99232 SBSQ HOSP IP/OBS MODERATE 35: CPT

## 2019-01-06 PROCEDURE — 99233 SBSQ HOSP IP/OBS HIGH 50: CPT

## 2019-01-06 RX ORDER — ENOXAPARIN SODIUM 100 MG/ML
80 INJECTION SUBCUTANEOUS ONCE
Qty: 0 | Refills: 0 | Status: COMPLETED | OUTPATIENT
Start: 2019-01-06 | End: 2019-01-06

## 2019-01-06 RX ORDER — ACETAMINOPHEN 500 MG
1000 TABLET ORAL ONCE
Qty: 0 | Refills: 0 | Status: COMPLETED | OUTPATIENT
Start: 2019-01-06 | End: 2019-01-06

## 2019-01-06 RX ORDER — AMLODIPINE BESYLATE 2.5 MG/1
5 TABLET ORAL DAILY
Qty: 0 | Refills: 0 | Status: DISCONTINUED | OUTPATIENT
Start: 2019-01-06 | End: 2019-01-07

## 2019-01-06 RX ORDER — ENOXAPARIN SODIUM 100 MG/ML
40 INJECTION SUBCUTANEOUS DAILY
Qty: 0 | Refills: 0 | Status: DISCONTINUED | OUTPATIENT
Start: 2019-01-06 | End: 2019-01-06

## 2019-01-06 RX ORDER — DIAZEPAM 5 MG
5 TABLET ORAL THREE TIMES A DAY
Qty: 0 | Refills: 0 | Status: DISCONTINUED | OUTPATIENT
Start: 2019-01-06 | End: 2019-01-07

## 2019-01-06 RX ORDER — ASPIRIN/CALCIUM CARB/MAGNESIUM 324 MG
81 TABLET ORAL DAILY
Qty: 0 | Refills: 0 | Status: DISCONTINUED | OUTPATIENT
Start: 2019-01-06 | End: 2019-01-07

## 2019-01-06 RX ORDER — KETOROLAC TROMETHAMINE 30 MG/ML
15 SYRINGE (ML) INJECTION ONCE
Qty: 0 | Refills: 0 | Status: DISCONTINUED | OUTPATIENT
Start: 2019-01-06 | End: 2019-01-06

## 2019-01-06 RX ORDER — MORPHINE SULFATE 50 MG/1
2 CAPSULE, EXTENDED RELEASE ORAL ONCE
Qty: 0 | Refills: 0 | Status: DISCONTINUED | OUTPATIENT
Start: 2019-01-06 | End: 2019-01-06

## 2019-01-06 RX ORDER — ONDANSETRON 8 MG/1
4 TABLET, FILM COATED ORAL ONCE
Qty: 0 | Refills: 0 | Status: COMPLETED | OUTPATIENT
Start: 2019-01-06 | End: 2019-01-06

## 2019-01-06 RX ORDER — NITROGLYCERIN 6.5 MG
0.4 CAPSULE, EXTENDED RELEASE ORAL ONCE
Qty: 0 | Refills: 0 | Status: COMPLETED | OUTPATIENT
Start: 2019-01-06 | End: 2019-01-06

## 2019-01-06 RX ORDER — METOCLOPRAMIDE HCL 10 MG
5 TABLET ORAL EVERY 6 HOURS
Qty: 0 | Refills: 0 | Status: DISCONTINUED | OUTPATIENT
Start: 2019-01-06 | End: 2019-01-07

## 2019-01-06 RX ADMIN — Medication 30 MILLIGRAM(S): at 11:48

## 2019-01-06 RX ADMIN — HYDROMORPHONE HYDROCHLORIDE 1 MILLIGRAM(S): 2 INJECTION INTRAMUSCULAR; INTRAVENOUS; SUBCUTANEOUS at 01:06

## 2019-01-06 RX ADMIN — Medication 1 MILLIGRAM(S): at 03:56

## 2019-01-06 RX ADMIN — Medication 1 MILLIGRAM(S): at 23:43

## 2019-01-06 RX ADMIN — Medication 650 MILLIGRAM(S): at 02:50

## 2019-01-06 RX ADMIN — PANTOPRAZOLE SODIUM 40 MILLIGRAM(S): 20 TABLET, DELAYED RELEASE ORAL at 05:59

## 2019-01-06 RX ADMIN — Medication 30 MILLILITER(S): at 20:14

## 2019-01-06 RX ADMIN — HYDROMORPHONE HYDROCHLORIDE 1 MILLIGRAM(S): 2 INJECTION INTRAMUSCULAR; INTRAVENOUS; SUBCUTANEOUS at 08:05

## 2019-01-06 RX ADMIN — ENOXAPARIN SODIUM 80 MILLIGRAM(S): 100 INJECTION SUBCUTANEOUS at 15:12

## 2019-01-06 RX ADMIN — Medication 0.1 MILLIGRAM(S): at 17:17

## 2019-01-06 RX ADMIN — HYDROMORPHONE HYDROCHLORIDE 1 MILLIGRAM(S): 2 INJECTION INTRAMUSCULAR; INTRAVENOUS; SUBCUTANEOUS at 13:10

## 2019-01-06 RX ADMIN — Medication 400 MILLIGRAM(S): at 13:53

## 2019-01-06 RX ADMIN — MORPHINE SULFATE 2 MILLIGRAM(S): 50 CAPSULE, EXTENDED RELEASE ORAL at 17:45

## 2019-01-06 RX ADMIN — MORPHINE SULFATE 2 MILLIGRAM(S): 50 CAPSULE, EXTENDED RELEASE ORAL at 17:11

## 2019-01-06 RX ADMIN — ONDANSETRON 4 MILLIGRAM(S): 8 TABLET, FILM COATED ORAL at 08:12

## 2019-01-06 RX ADMIN — Medication 15 MILLIGRAM(S): at 10:40

## 2019-01-06 RX ADMIN — Medication 0.4 MILLIGRAM(S): at 13:10

## 2019-01-06 RX ADMIN — AMLODIPINE BESYLATE 5 MILLIGRAM(S): 2.5 TABLET ORAL at 17:17

## 2019-01-06 RX ADMIN — PANTOPRAZOLE SODIUM 40 MILLIGRAM(S): 20 TABLET, DELAYED RELEASE ORAL at 18:15

## 2019-01-06 RX ADMIN — HYDROMORPHONE HYDROCHLORIDE 1 MILLIGRAM(S): 2 INJECTION INTRAMUSCULAR; INTRAVENOUS; SUBCUTANEOUS at 12:50

## 2019-01-06 RX ADMIN — HYDROMORPHONE HYDROCHLORIDE 1 MILLIGRAM(S): 2 INJECTION INTRAMUSCULAR; INTRAVENOUS; SUBCUTANEOUS at 08:30

## 2019-01-06 RX ADMIN — LAMOTRIGINE 25 MILLIGRAM(S): 25 TABLET, ORALLY DISINTEGRATING ORAL at 11:48

## 2019-01-06 RX ADMIN — Medication 10 MILLIGRAM(S): at 22:59

## 2019-01-06 RX ADMIN — SODIUM CHLORIDE 100 MILLILITER(S): 9 INJECTION, SOLUTION INTRAVENOUS at 23:47

## 2019-01-06 RX ADMIN — Medication 10 MILLIGRAM(S): at 13:02

## 2019-01-06 RX ADMIN — ONDANSETRON 4 MILLIGRAM(S): 8 TABLET, FILM COATED ORAL at 13:52

## 2019-01-06 RX ADMIN — Medication 30 MILLILITER(S): at 11:48

## 2019-01-06 RX ADMIN — Medication 15 MILLIGRAM(S): at 11:15

## 2019-01-06 RX ADMIN — SODIUM CHLORIDE 100 MILLILITER(S): 9 INJECTION, SOLUTION INTRAVENOUS at 10:44

## 2019-01-06 RX ADMIN — Medication 1000 MILLIGRAM(S): at 14:20

## 2019-01-06 RX ADMIN — HYDROMORPHONE HYDROCHLORIDE 1 MILLIGRAM(S): 2 INJECTION INTRAMUSCULAR; INTRAVENOUS; SUBCUTANEOUS at 04:27

## 2019-01-06 RX ADMIN — BUPROPION HYDROCHLORIDE 150 MILLIGRAM(S): 150 TABLET, EXTENDED RELEASE ORAL at 12:53

## 2019-01-06 RX ADMIN — Medication 650 MILLIGRAM(S): at 20:39

## 2019-01-06 RX ADMIN — HYDROMORPHONE HYDROCHLORIDE 1 MILLIGRAM(S): 2 INJECTION INTRAMUSCULAR; INTRAVENOUS; SUBCUTANEOUS at 03:57

## 2019-01-06 RX ADMIN — HYDROMORPHONE HYDROCHLORIDE 1 MILLIGRAM(S): 2 INJECTION INTRAMUSCULAR; INTRAVENOUS; SUBCUTANEOUS at 01:36

## 2019-01-06 RX ADMIN — Medication 5 MILLIGRAM(S): at 17:11

## 2019-01-06 RX ADMIN — Medication 650 MILLIGRAM(S): at 02:20

## 2019-01-06 RX ADMIN — Medication 5 MILLIGRAM(S): at 18:15

## 2019-01-06 RX ADMIN — Medication 650 MILLIGRAM(S): at 20:09

## 2019-01-06 NOTE — CHART NOTE - NSCHARTNOTEFT_GEN_A_CORE
Rapid response called on 57 yo Female. Rapid response team including Dr. Victoria, Dr. Germain, Dr. Foster and Dr. Rincon arrived and were dismissed by Dr. Bae.   -management per primary team.     Lou Rincon PGY2

## 2019-01-06 NOTE — CHART NOTE - NSCHARTNOTEFT_GEN_A_CORE
Pt is with new onset chest discomfort on the left side , sitting up waiting for food to eat started with stomach pain going up to her chest , associated with dyspnea , given iv dilaudid per nurse at 1250 ,   rapid response called I am at the bedside   ECG performed , pt is anxious   when rapid called /101 HR 96   manual requested 1754/98 iv hydralizine 10 mg administered     /88 after iv hydralizine given , still with pain , headache reported  and feels very anxiety at present   General : mild distress due to discomfort   lungs : clear to auscultation , reproducible chest wall tenderness on palpation   Heart : regular rate rhythm S1 /S2   Gastro : soft + BS , RUQ , mid epigastric tenderness   Ext : no pretibial edema     ECG : NSR no acute ST t changes     1- Chest pain atypical , will order morphine iv , BP control  troponin x3   anxiety add valium   2- Uncontrolled HTN ; iv hydralazine given   monitor closely resume home meds amlodipine and clonidinea s needed   3- Abdominal pain s/p ERCP for pancreatitis   pain meds as needed , cont protonix   advance diet as tolerate     will follow closely

## 2019-01-07 VITALS
SYSTOLIC BLOOD PRESSURE: 140 MMHG | TEMPERATURE: 98 F | RESPIRATION RATE: 16 BRPM | HEART RATE: 100 BPM | DIASTOLIC BLOOD PRESSURE: 87 MMHG

## 2019-01-07 LAB
ANION GAP SERPL CALC-SCNC: 13 MMOL/L — SIGNIFICANT CHANGE UP (ref 5–17)
ANION GAP SERPL CALC-SCNC: 13 MMOL/L — SIGNIFICANT CHANGE UP (ref 5–17)
BUN SERPL-MCNC: 3 MG/DL — LOW (ref 8–20)
BUN SERPL-MCNC: 6 MG/DL — LOW (ref 8–20)
CALCIUM SERPL-MCNC: 9.1 MG/DL — SIGNIFICANT CHANGE UP (ref 8.6–10.2)
CALCIUM SERPL-MCNC: 9.7 MG/DL — SIGNIFICANT CHANGE UP (ref 8.6–10.2)
CHLORIDE SERPL-SCNC: 92 MMOL/L — LOW (ref 98–107)
CHLORIDE SERPL-SCNC: 96 MMOL/L — LOW (ref 98–107)
CO2 SERPL-SCNC: 30 MMOL/L — HIGH (ref 22–29)
CO2 SERPL-SCNC: 30 MMOL/L — HIGH (ref 22–29)
CREAT SERPL-MCNC: 0.42 MG/DL — LOW (ref 0.5–1.3)
CREAT SERPL-MCNC: 0.58 MG/DL — SIGNIFICANT CHANGE UP (ref 0.5–1.3)
CRP SERPL-MCNC: 5.9 MG/DL — HIGH (ref 0–0.4)
CULTURE RESULTS: SIGNIFICANT CHANGE UP
CULTURE RESULTS: SIGNIFICANT CHANGE UP
GLUCOSE SERPL-MCNC: 135 MG/DL — HIGH (ref 70–115)
GLUCOSE SERPL-MCNC: 136 MG/DL — HIGH (ref 70–115)
HCT VFR BLD CALC: 36.4 % — LOW (ref 37–47)
HGB BLD-MCNC: 12.2 G/DL — SIGNIFICANT CHANGE UP (ref 12–16)
MCHC RBC-ENTMCNC: 28.5 PG — SIGNIFICANT CHANGE UP (ref 27–31)
MCHC RBC-ENTMCNC: 33.5 G/DL — SIGNIFICANT CHANGE UP (ref 32–36)
MCV RBC AUTO: 85 FL — SIGNIFICANT CHANGE UP (ref 81–99)
PLATELET # BLD AUTO: 326 K/UL — SIGNIFICANT CHANGE UP (ref 150–400)
POTASSIUM SERPL-MCNC: 2.8 MMOL/L — CRITICAL LOW (ref 3.5–5.3)
POTASSIUM SERPL-MCNC: 3.4 MMOL/L — LOW (ref 3.5–5.3)
POTASSIUM SERPL-SCNC: 2.8 MMOL/L — CRITICAL LOW (ref 3.5–5.3)
POTASSIUM SERPL-SCNC: 3.4 MMOL/L — LOW (ref 3.5–5.3)
RBC # BLD: 4.28 M/UL — LOW (ref 4.4–5.2)
RBC # FLD: 14.2 % — SIGNIFICANT CHANGE UP (ref 11–15.6)
SODIUM SERPL-SCNC: 135 MMOL/L — SIGNIFICANT CHANGE UP (ref 135–145)
SODIUM SERPL-SCNC: 139 MMOL/L — SIGNIFICANT CHANGE UP (ref 135–145)
SPECIMEN SOURCE: SIGNIFICANT CHANGE UP
SPECIMEN SOURCE: SIGNIFICANT CHANGE UP
TROPONIN T SERPL-MCNC: <0.01 NG/ML — SIGNIFICANT CHANGE UP (ref 0–0.06)
WBC # BLD: 10.4 K/UL — SIGNIFICANT CHANGE UP (ref 4.8–10.8)
WBC # FLD AUTO: 10.4 K/UL — SIGNIFICANT CHANGE UP (ref 4.8–10.8)

## 2019-01-07 PROCEDURE — 80076 HEPATIC FUNCTION PANEL: CPT

## 2019-01-07 PROCEDURE — 83605 ASSAY OF LACTIC ACID: CPT

## 2019-01-07 PROCEDURE — 87086 URINE CULTURE/COLONY COUNT: CPT

## 2019-01-07 PROCEDURE — 99285 EMERGENCY DEPT VISIT HI MDM: CPT | Mod: 25

## 2019-01-07 PROCEDURE — 87640 STAPH A DNA AMP PROBE: CPT

## 2019-01-07 PROCEDURE — 82962 GLUCOSE BLOOD TEST: CPT

## 2019-01-07 PROCEDURE — 83690 ASSAY OF LIPASE: CPT

## 2019-01-07 PROCEDURE — 83735 ASSAY OF MAGNESIUM: CPT

## 2019-01-07 PROCEDURE — 74330 X-RAY BILE/PANC ENDOSCOPY: CPT

## 2019-01-07 PROCEDURE — 80307 DRUG TEST PRSMV CHEM ANLYZR: CPT

## 2019-01-07 PROCEDURE — 94760 N-INVAS EAR/PLS OXIMETRY 1: CPT

## 2019-01-07 PROCEDURE — 85610 PROTHROMBIN TIME: CPT

## 2019-01-07 PROCEDURE — 93005 ELECTROCARDIOGRAM TRACING: CPT

## 2019-01-07 PROCEDURE — 96375 TX/PRO/DX INJ NEW DRUG ADDON: CPT

## 2019-01-07 PROCEDURE — 74177 CT ABD & PELVIS W/CONTRAST: CPT

## 2019-01-07 PROCEDURE — 99239 HOSP IP/OBS DSCHRG MGMT >30: CPT

## 2019-01-07 PROCEDURE — 80048 BASIC METABOLIC PNL TOTAL CA: CPT

## 2019-01-07 PROCEDURE — 36415 COLL VENOUS BLD VENIPUNCTURE: CPT

## 2019-01-07 PROCEDURE — 84484 ASSAY OF TROPONIN QUANT: CPT

## 2019-01-07 PROCEDURE — C1889: CPT

## 2019-01-07 PROCEDURE — 87641 MR-STAPH DNA AMP PROBE: CPT

## 2019-01-07 PROCEDURE — C1726: CPT

## 2019-01-07 PROCEDURE — 71045 X-RAY EXAM CHEST 1 VIEW: CPT

## 2019-01-07 PROCEDURE — 99232 SBSQ HOSP IP/OBS MODERATE 35: CPT

## 2019-01-07 PROCEDURE — 81001 URINALYSIS AUTO W/SCOPE: CPT

## 2019-01-07 PROCEDURE — 82550 ASSAY OF CK (CPK): CPT

## 2019-01-07 PROCEDURE — 86140 C-REACTIVE PROTEIN: CPT

## 2019-01-07 PROCEDURE — C1769: CPT

## 2019-01-07 PROCEDURE — 76705 ECHO EXAM OF ABDOMEN: CPT

## 2019-01-07 PROCEDURE — 96374 THER/PROPH/DIAG INJ IV PUSH: CPT

## 2019-01-07 PROCEDURE — 82150 ASSAY OF AMYLASE: CPT

## 2019-01-07 PROCEDURE — 80053 COMPREHEN METABOLIC PANEL: CPT

## 2019-01-07 PROCEDURE — 96376 TX/PRO/DX INJ SAME DRUG ADON: CPT

## 2019-01-07 PROCEDURE — 71275 CT ANGIOGRAPHY CHEST: CPT

## 2019-01-07 PROCEDURE — 87040 BLOOD CULTURE FOR BACTERIA: CPT

## 2019-01-07 PROCEDURE — 84145 PROCALCITONIN (PCT): CPT

## 2019-01-07 PROCEDURE — 85027 COMPLETE CBC AUTOMATED: CPT

## 2019-01-07 PROCEDURE — 84702 CHORIONIC GONADOTROPIN TEST: CPT

## 2019-01-07 PROCEDURE — 85730 THROMBOPLASTIN TIME PARTIAL: CPT

## 2019-01-07 RX ORDER — AMLODIPINE BESYLATE 2.5 MG/1
1 TABLET ORAL
Qty: 0 | Refills: 0 | COMMUNITY
Start: 2019-01-07

## 2019-01-07 RX ORDER — POTASSIUM CHLORIDE 20 MEQ
20 PACKET (EA) ORAL
Qty: 0 | Refills: 0 | Status: COMPLETED | OUTPATIENT
Start: 2019-01-07 | End: 2019-01-07

## 2019-01-07 RX ORDER — POTASSIUM CHLORIDE 20 MEQ
20 PACKET (EA) ORAL ONCE
Qty: 0 | Refills: 0 | Status: COMPLETED | OUTPATIENT
Start: 2019-01-07 | End: 2019-01-07

## 2019-01-07 RX ORDER — LANSOPRAZOLE 15 MG/1
0 CAPSULE, DELAYED RELEASE ORAL
Qty: 0 | Refills: 0 | COMMUNITY

## 2019-01-07 RX ORDER — AMLODIPINE BESYLATE 2.5 MG/1
1 TABLET ORAL
Qty: 0 | Refills: 0 | COMMUNITY

## 2019-01-07 RX ORDER — ENOXAPARIN SODIUM 100 MG/ML
40 INJECTION SUBCUTANEOUS DAILY
Qty: 0 | Refills: 0 | Status: DISCONTINUED | OUTPATIENT
Start: 2019-01-07 | End: 2019-01-07

## 2019-01-07 RX ORDER — PANTOPRAZOLE SODIUM 20 MG/1
1 TABLET, DELAYED RELEASE ORAL
Qty: 30 | Refills: 0
Start: 2019-01-07 | End: 2019-02-05

## 2019-01-07 RX ORDER — AMLODIPINE BESYLATE 2.5 MG/1
5 TABLET ORAL ONCE
Qty: 0 | Refills: 0 | Status: COMPLETED | OUTPATIENT
Start: 2019-01-07 | End: 2019-01-07

## 2019-01-07 RX ORDER — POTASSIUM CHLORIDE 20 MEQ
10 PACKET (EA) ORAL
Qty: 0 | Refills: 0 | Status: COMPLETED | OUTPATIENT
Start: 2019-01-07 | End: 2019-01-07

## 2019-01-07 RX ORDER — METOPROLOL TARTRATE 50 MG
5 TABLET ORAL ONCE
Qty: 0 | Refills: 0 | Status: COMPLETED | OUTPATIENT
Start: 2019-01-07 | End: 2019-01-07

## 2019-01-07 RX ORDER — METOCLOPRAMIDE HCL 10 MG
1 TABLET ORAL
Qty: 15 | Refills: 0 | OUTPATIENT
Start: 2019-01-07 | End: 2019-01-11

## 2019-01-07 RX ADMIN — Medication 5 MILLIGRAM(S): at 03:10

## 2019-01-07 RX ADMIN — Medication 100 MILLIEQUIVALENT(S): at 11:28

## 2019-01-07 RX ADMIN — Medication 5 MILLIGRAM(S): at 12:31

## 2019-01-07 RX ADMIN — Medication 0.1 MILLIGRAM(S): at 05:23

## 2019-01-07 RX ADMIN — ENOXAPARIN SODIUM 40 MILLIGRAM(S): 100 INJECTION SUBCUTANEOUS at 12:32

## 2019-01-07 RX ADMIN — Medication 20 MILLIEQUIVALENT(S): at 11:31

## 2019-01-07 RX ADMIN — PANTOPRAZOLE SODIUM 40 MILLIGRAM(S): 20 TABLET, DELAYED RELEASE ORAL at 05:27

## 2019-01-07 RX ADMIN — LAMOTRIGINE 25 MILLIGRAM(S): 25 TABLET, ORALLY DISINTEGRATING ORAL at 11:32

## 2019-01-07 RX ADMIN — Medication 100 MILLIEQUIVALENT(S): at 10:22

## 2019-01-07 RX ADMIN — Medication 81 MILLIGRAM(S): at 11:34

## 2019-01-07 RX ADMIN — AMLODIPINE BESYLATE 5 MILLIGRAM(S): 2.5 TABLET ORAL at 05:23

## 2019-01-07 RX ADMIN — AMLODIPINE BESYLATE 5 MILLIGRAM(S): 2.5 TABLET ORAL at 11:32

## 2019-01-07 RX ADMIN — Medication 100 MILLIEQUIVALENT(S): at 12:45

## 2019-01-07 RX ADMIN — Medication 20 MILLIEQUIVALENT(S): at 10:17

## 2019-01-07 RX ADMIN — Medication 5 MILLIGRAM(S): at 05:19

## 2019-01-07 RX ADMIN — BUPROPION HYDROCHLORIDE 150 MILLIGRAM(S): 150 TABLET, EXTENDED RELEASE ORAL at 11:32

## 2019-01-07 RX ADMIN — Medication 30 MILLIGRAM(S): at 11:32

## 2019-01-07 RX ADMIN — Medication 10 MILLIGRAM(S): at 03:03

## 2019-01-07 NOTE — PROGRESS NOTE ADULT - PROBLEM SELECTOR PLAN 5
buspar , ativan as needed  psychiatry follow up still on 1:1   if no indication for psychiatry inpatient admission discharge home

## 2019-01-07 NOTE — PROGRESS NOTE ADULT - ASSESSMENT
Patient presenting with choledocholithiasis, underwent ERCP, now with post-ERCP pancreatitis.  CRP and lipase better. LFTs normal.      - Continue regular diet   - continue   - decrease analgesia  - Continue IVF if not taking enough PO    Will continue to follow. Thanks    Collin Bennett MD  [facility]  Gastroenterology at [place]  [phone]
1) Pancreatitis --> secondary to ercp from yesterday  --> make npo  --> fluids  --> iv pain meds    2) htn urgency --> iv hydralazine prn  --> iv labetalol prn     3) anxiety --> iv ativan prn     4) dvt prop --> ambulation
58 y.o. female with PMHx of substance use disorder (Xanax and alcohol), Anxiety, Depression with history of suicide attempt in 2/2018 and cholelithiasis s/p cholecystectomy p/w RUQ pain - underwent ERCP resulting in acute pancreatitis
58 y.o. female with PMHx of substance use disorder (Xanax and alcohol), Anxiety, Depression with history of suicide attempt in 2/2018 and cholelithiasis s/p cholecystectomy p/w RUQ pain - underwent ERCP resulting in acute pancreatitis
58 y.o. female with PMHx of substance use disorder (Xanax and alcohol), Anxiety, Depression with history of suicide attempt in 2/2018 and cholelithiasis s/p cholecystectomy p/w RUQ pain - underwent ERCP resulting in acute pancreatitis    1. Acute post ERCP pancreatitis -  improved based on numbers - cont IVF, NPO, pain control optimized   + leukocytosis, fever, pain out of proportion - CT abd to reevaluate and r/o cholangitis - if evidence of improvement will stop abx    2. HTN - due to uncontrolled pain - will reevaluate with better pain control    3. Anxiety - ativan PRN    4. AMS - ? due to opioids + benzos - psych eval, no evidence of ETOH withdrawal
58 y.o. female with PMHx of substance use disorder (Xanax and alcohol), Anxiety, Depression with history of suicide attempt in 2/2018 and cholelithiasis s/p cholecystectomy p/w RUQ pain - underwent ERCP resulting in acute pancreatitis    1. Acute post ERCP pancreatitis -  improved based on numbers - cont IVF, NPO, pain control optimized   + leukocytosis, fever, pain out of proportion - CT abd to reevaluate and r/o cholangitis - pos for pancreatitis with edema and minimal ascites - no evidence of infection - stop abx    2. HTN - due to uncontrolled pain - will reevaluate with better pain control    3. Anxiety - ativan PRN    4. AMS - ? due to opioids + benzos - psych eval, no evidence of ETOH withdrawal
58 y.o. female with PMHx of substance use disorder (Xanax and alcohol), Anxiety, Depression with history of suicide attempt in 2/2018 and cholelithiasis s/p cholecystectomy p/w RUQ pain - underwent ERCP resulting in acute pancreatitis    1. Acute post ERCP pancreatitis - slowly improving - cont IVF, NPO, pain control optimized    2. HTN - due to uncontrolled pain - will reevaluate with better pain control    3. Anxiety - ativan PRN
58 y.o. female with PMHx of substance use disorder (Xanax and alcohol), Anxiety, Depression with history of suicide attempt in 2/2018 and cholelithiasis s/p cholecystectomy p/w RUQ pain - underwent ERCP resulting in acute pancreatitis, pt is with persistent pain , diet advanced slowly pain improving , BP is high home meds restarted with iv prn hydralazine given, Pt developed chest pain and shortness of breath CTA negative for PE
Patient with abdominal pain thought to be secondary to choledocholithiasis s/p ERCP now with Post ERCP pancreatitis. Continue IV fluids and pain control.     - CRP  - NPO  - cont LR   - pain control  - labs pending     Thanks
Patient with abdominal pain thought to be secondary to choledocholithiasis s/p ERCP now with Post ERCP pancreatitis. Continue IV fluids and pain control.   CT a/p with edema around the head of the pancreas c/w pancreatitis       - trial of clear liquids   - cont IVF   - pain control  - labs pending     Thanks
acute delirium with perceptual disturbance- resolved  anxiety state- stable  No acute psychiatric issues
Patient presenting with choledocholithiasis, underwent ERCP, now with post-ERCP pancreatitis.  Ongoing abdominal pain despite fairly benign appearing CT (showing HOP edema) and normalization of her labs.      - Continue clear liquid diet, time ondansetron to be given 30 minutes prior to meal  - Analgesia  - Continue IVF if not taking enough PO    Will continue to follow.  Thank you for the consult.  Please do not hesitate to call with any questions or concerns.    BILL Ruffin MD  [facility]  Gastroenterology at [place]  [phone]
Patient presenting with choledocholithiasis, underwent ERCP, now with post-ERCP pancreatitis.  Ongoing abdominal pain despite fairly benign appearing CT (showing HOP edema) and normalization of her labs.      - Continue clear liquid diet, time ondansetron to be given 30 minutes prior to meal  - Analgesia  - Continue IVF if not taking enough PO    Will continue to follow.  Thank you for the consult.  Please do not hesitate to call with any questions or concerns.    BILL Ruffin MD  [facility]  Division of Gastroenterology  Tel [phone]  Fax [phone]  01-05-19 @ 21:36
Patient with abdominal pain and now with elevated Lipase-Post ERCP pancreatitis. Continue IV fluids and pain control.   If abdominal pain is persisted, can consider CT abdomen with contrast tomorrow

## 2019-01-07 NOTE — PROGRESS NOTE ADULT - PROBLEM SELECTOR PLAN 1
- S/P ERCP- post ERCP pancreatitis  - IV fluids, npo, pain meds  - check amylase , lipase, cbc in am  - will get Ct today
doubt cardiac , ECG performed   CTA of chest r/o PE   pain meds as needed   BP control, valium for anxiety ordered   NTG given
non cardiac , resolved   probable GI in origin
resolved
s/p ERCP , still with pain GI follow up appreciated   cont dilaudid for pain

## 2019-01-07 NOTE — PROGRESS NOTE ADULT - PROBLEM SELECTOR PROBLEM 2
Pancreatitis
Acute biliary pancreatitis without infection or necrosis
Acute biliary pancreatitis without infection or necrosis
Anxiety and depression
Choledocholithiasis

## 2019-01-07 NOTE — CHART NOTE - NSCHARTNOTEFT_GEN_A_CORE
Medicine PA-Cd. @ 8150 for pt. continue w/ elevated BP- 165/123, tachy 132, pt. was anxious and got valium, no c/o CP. Lopressor 5mg IVP given, repeat BP-168/113- HR 83-18 94% RA Gave order to give 6 a.m BP meds., continue to monitor closely.

## 2019-01-07 NOTE — PROGRESS NOTE ADULT - PROBLEM SELECTOR PROBLEM 1
Choledocholithiasis
Acute biliary pancreatitis without infection or necrosis
Chest pain, atypical
Chest pain, atypical
Choledocholithiasis
Delirium due to substance or medication

## 2019-01-07 NOTE — CHART NOTE - NSCHARTNOTEFT_GEN_A_CORE
Medicine PA- Dagoberto. @ 0779 for pt. w/ elevated BP- 170/112. Pt. hx. pancreatitis, suicidal ideations and on 1:1. Pt. has had hypertensive episodes and has PRN meds. ordered. Currently on full liquid diet and tolerating well, no new c/o, sinus tachy on monitor. D/Cd IVF, BMP in a.m. and ordered stat hydralazine 10mg IVP, recheck BP and continue to closely monitor.

## 2019-01-07 NOTE — PROGRESS NOTE ADULT - SUBJECTIVE AND OBJECTIVE BOX
CC : chest pain , epigastric pain   elevated blood pressure see rapid response note done  by me earlier     Vital Signs Last 24 Hrs  T(C): 36.9 (06 Jan 2019 12:58), Max: 37.1 (05 Jan 2019 20:39)  T(F): 98.4 (06 Jan 2019 12:58), Max: 98.8 (05 Jan 2019 20:39)  HR: 96 (06 Jan 2019 12:58) (74 - 96)  BP: 148/90 (06 Jan 2019 13:20) (126/71 - 174/65)  BP(mean): --  RR: 14 (06 Jan 2019 12:58) (14 - 18)  SpO2: 95% (06 Jan 2019 12:58) (95% - 95%)PHYSICAL EXAM:  General : anxious , mild pain distress   Lungs CTA bilateral   Heart : regular rate S1/S2   Gastro : soft RUQ and epigastric tenderness , BS positive   Ext : no pretibial edema                           9.6    7.7   )-----------( 245      ( 05 Jan 2019 08:14 )             30.7   01-05    145  |  102  |  <3.0<L>  ----------------------------<  100  3.1<L>   |  32.0<H>  |  0.41<L>    Ca    8.4<L>      05 Jan 2019 08:14
CC : epigastric RUq pain   s/p ERCP pancreatitis   pt is seen in am 1/05   + nausea and pain requesting pain meds frequently     /93   HR : 86   RR : 18       PHYSICAL EXAM:  General : anxious , no distress   Lungs CTA bilateral   Heart : regular rate S1/S2   Gastro : soft RUQ and epigastric tenderness   Ext : no pretibial edema                           9.6    7.7   )-----------( 245      ( 05 Jan 2019 08:14 )             30.7   01-05    145  |  102  |  <3.0<L>  ----------------------------<  100  3.1<L>   |  32.0<H>  |  0.41<L>    Ca    8.4<L>      05 Jan 2019 08:14
CC: abd pain (02 Jan 2019 07:40)    HPI: 58 y.o. female with PMHx of substance use disorder (Xanax and alcohol), anxiety, and depression with history of suicide attempt in 2/2018 and cholecystectomy secondary to stones presents to the er with 2-3 day history of 10 out of 10 ruq abd pain similar to the pain in the past. Patient states the RUQ abd pain is associated with vomiting. Pt was worked up in the er and found to have choledocholithiasis and is planned for urgent ercp. Patient denies any fever or chills. (31 Dec 2018 14:50)    INTERVAL HPI/OVERNIGHT EVENTS: c/o severe diffuse abdominal pain, crying in pain, tachycardic, denies flatus, anxious, wants to drink fluids  Other ROS reviewed and neg     Vital Signs Last 24 Hrs  T(C): 37 (02 Jan 2019 11:29), Max: 37 (02 Jan 2019 11:29)  T(F): 98.6 (02 Jan 2019 11:29), Max: 98.6 (02 Jan 2019 11:29)  HR: 102 (02 Jan 2019 11:29) (90 - 108)  BP: 141/97 (02 Jan 2019 11:29) (140/80 - 174/119)  RR: 20 (02 Jan 2019 11:29) (15 - 20)  SpO2: 92% (02 Jan 2019 04:00) (92% - 96%)                          11.8   15.8  )-----------( 273      ( 02 Jan 2019 08:05 )             36.8     02 Jan 2019 08:05    143    |  102    |  14.0   ----------------------------<  111    3.7     |  31.0   |  0.56     Ca    8.9        02 Jan 2019 08:05  Mg     2.3       01 Jan 2019 07:55    TPro  6.6    /  Alb  3.8    /  TBili  0.3    /  DBili  0.1    /  AST  17     /  ALT  13     /  AlkPhos  90     02 Jan 2019 08:05    PT/INR - ( 31 Dec 2018 13:32 )   PT: 13.1 sec;   INR: 1.13 ratio      LIVER FUNCTIONS - ( 02 Jan 2019 08:05 )  Alb: 3.8 g/dL / Pro: 6.6 g/dL / ALK PHOS: 90 U/L / ALT: 13 U/L / AST: 17 U/L / GGT: x           MEDICATIONS  (STANDING):  lactated ringers. 1000 milliLiter(s) (200 mL/Hr) IV Continuous <Continuous>    MEDICATIONS  (PRN):  acetaminophen   Tablet .. 650 milliGRAM(s) Oral every 6 hours PRN Mild Pain (1 - 3)  hydrALAZINE Injectable 10 milliGRAM(s) IV Push every 6 hours PRN SBP > 170  HYDROmorphone  Injectable 1 milliGRAM(s) IV Push every 2 hours PRN Moderate Pain (4 - 6)  HYDROmorphone  Injectable 2 milliGRAM(s) IV Push every 3 hours PRN Severe Pain (7 - 10)  labetalol Injectable 10 milliGRAM(s) IV Push every 6 hours PRN for sbp above 160 mmhg  LORazepam   Injectable 1 milliGRAM(s) IV Push every 6 hours PRN Anxiety  ondansetron Injectable 4 milliGRAM(s) IV Push every 6 hours PRN Nausea and/or Vomiting    RADIOLOGY & ADDITIONAL TESTS: personally visualized    PHYSICAL EXAM:    General: Well developed; well nourished; in moderate distress due to pain  Eyes: PERRLA, EOMI; conjunctiva and sclera clear  Head: Normocephalic; atraumatic  ENMT: No nasal discharge; airway clear  Neck: Supple; non tender; no masses  Respiratory: No wheezes, rales or rhonchi  Cardiovascular: Regular rate S1 and S2, tachycardic  Gastrointestinal: Soft distended abdomen, tender to palpation, decreased BS diffusely  Genitourinary: No costovertebral angle tenderness  Extremities: Normal range of motion, No clubbing, cyanosis or edema  Vascular: Peripheral pulses palpable 2+ bilaterally  Neurological: Alert and oriented x4  Skin: Warm and dry.   Musculoskeletal: Normal gait, tone, without deformities  Psychiatric: Anxious, tearful
CC: abd pain (2019 07:40)    HPI: 58 y.o. female with PMHx of substance use disorder (Xanax and alcohol), anxiety, and depression with history of suicide attempt in 2018 and cholecystectomy secondary to stones presents to the er with 2-3 day history of 10 out of 10 ruq abd pain similar to the pain in the past. Patient states the RUQ abd pain is associated with vomiting. Pt was worked up in the er and found to have choledocholithiasis and is planned for urgent ercp. Patient denies any fever or chills. (31 Dec 2018 14:50)    INTERVAL HPI/OVERNIGHT EVENTS: pain is better controlled, noted overnight events, pt is alert and oriented now, denies SI or hallucinations, denies flatus, anxious, wants to drink fluids, noted fever last night and abx started  Other ROS reviewed and neg     Vital Signs Last 24 Hrs  T(C): 37.3 (2019 05:17), Max: 37.7 (2019 15:44)  T(F): 99.2 (2019 05:17), Max: 99.8 (2019 15:44)  HR: 111 (2019 13:23) (80 - 111)  BP: 147/95 (2019 13:23) (147/95 - 177/101)  RR: 18 (2019 05:17) (18 - 20)  SpO2: 92% (2019 05:17) (92% - 92%)    CARDIAC MARKERS ( 2019 16:40 )  x     / <0.01 ng/mL / 111 U/L / x     / x                      10.9   15.3  )-----------( 271      ( 2019 08:14 )             35.3     2019 08:14    143    |  100    |  8.0    ----------------------------<  103    3.3     |  32.0   |  0.50     Ca    8.9        2019 08:14    TPro  6.4    /  Alb  3.6    /  TBili  0.5    /  DBili  0.2    /  AST  29     /  ALT  29     /  AlkPhos  98     2019 08:14    PT/INR - ( 2019 16:40 )   PT: 14.1 sec;   INR: 1.22 ratio       PTT - ( 2019 16:40 )  PTT:27.0 sec    POCT Blood Glucose.: 96 mg/dL (2019 16:03)    LIVER FUNCTIONS - ( 2019 08:14 )  Alb: 3.6 g/dL / Pro: 6.4 g/dL / ALK PHOS: 98 U/L / ALT: 29 U/L / AST: 29 U/L / GGT: x           Urinalysis Basic - ( 2019 07:32 )    Color: Yellow / Appearance: Clear / S.010 / pH: x  Gluc: x / Ketone: Negative  / Bili: Negative / Urobili: Negative mg/dL   Blood: x / Protein: 15 mg/dL / Nitrite: Negative   Leuk Esterase: Negative / RBC: 6-10 /HPF / WBC 3-5   Sq Epi: x / Non Sq Epi: Occasional / Bacteria: Occasional    MEDICATIONS  (STANDING):  lactated ringers. 1000 milliLiter(s) (200 mL/Hr) IV Continuous <Continuous>  piperacillin/tazobactam IVPB. 3.375 Gram(s) IV Intermittent every 8 hours    MEDICATIONS  (PRN):  acetaminophen   Tablet .. 650 milliGRAM(s) Oral every 6 hours PRN Mild Pain (1 - 3)  hydrALAZINE Injectable 10 milliGRAM(s) IV Push every 6 hours PRN SBP > 170  HYDROmorphone  Injectable 1 milliGRAM(s) IV Push every 2 hours PRN Moderate Pain (4 - 6)  HYDROmorphone  Injectable 2 milliGRAM(s) IV Push every 3 hours PRN Severe Pain (7 - 10)  labetalol Injectable 10 milliGRAM(s) IV Push every 6 hours PRN for sbp above 160 mmhg  LORazepam   Injectable 1 milliGRAM(s) IV Push every 6 hours PRN Anxiety  ondansetron Injectable 4 milliGRAM(s) IV Push every 6 hours PRN Nausea and/or Vomiting    RADIOLOGY & ADDITIONAL TESTS: personally visualized    PHYSICAL EXAM:    General: Well developed; well nourished; in no acute distress  Eyes: PERRLA, EOMI; conjunctiva and sclera clear  Head: Normocephalic; atraumatic  ENMT: No nasal discharge; airway clear  Neck: Supple; non tender; no masses  Respiratory: No wheezes, rales or rhonchi  Cardiovascular: Regular rate S1 and S2, tachycardic  Gastrointestinal: Soft distended abdomen, mildly tender to palpation, decreased BS diffusely  Genitourinary: No costovertebral angle tenderness  Extremities: Normal range of motion, No clubbing, cyanosis or edema  Vascular: Peripheral pulses palpable 2+ bilaterally  Neurological: Alert and oriented x4  Skin: Warm and dry.   Musculoskeletal: Normal gait, tone, without deformities  Psychiatric: Anxious
CC: abd pain (2019 07:40)    HPI: 58 y.o. female with PMHx of substance use disorder (Xanax and alcohol), anxiety, and depression with history of suicide attempt in 2018 and cholecystectomy secondary to stones presents to the er with 2-3 day history of 10 out of 10 ruq abd pain similar to the pain in the past. Patient states the RUQ abd pain is associated with vomiting. Pt was worked up in the er and found to have choledocholithiasis and is planned for urgent ercp. Patient denies any fever or chills. (31 Dec 2018 14:50)    INTERVAL HPI/OVERNIGHT EVENTS: pain is better controlled, pt is alert and oriented now, denies SI or hallucinations, but as per RN was confused overnight  Other ROS reviewed and neg     Vital Signs Last 24 Hrs  T(C): 37.2 (2019 08:15), Max: 37.7 (2019 18:45)  T(F): 99 (2019 08:15), Max: 99.8 (2019 18:45)  HR: 86 (2019 08:15) (80 - 111)  BP: 145/93 (2019 08:15) (141/88 - 177/101)  RR: 18 (2019 08:15) (18 - 20)  SpO2: 94% (2019 08:15) (93% - 98%)    CARDIAC MARKERS ( 2019 16:40 )  x     / <0.01 ng/mL / 111 U/L / x     / x                           10.9   15.3  )-----------( 271      ( 2019 08:14 )             35.3     2019 08:14    143    |  100    |  8.0    ----------------------------<  103    3.3     |  32.0   |  0.50     Ca    8.9        2019 08:14    TPro  6.4    /  Alb  3.6    /  TBili  0.5    /  DBili  0.2    /  AST  29     /  ALT  29     /  AlkPhos  98     2019 08:14    PT/INR - ( 2019 16:40 )   PT: 14.1 sec;   INR: 1.22 ratio         PTT - ( 2019 16:40 )  PTT:27.0 sec    LIVER FUNCTIONS - ( 2019 08:14 )  Alb: 3.6 g/dL / Pro: 6.4 g/dL / ALK PHOS: 98 U/L / ALT: 29 U/L / AST: 29 U/L / GGT: x           Urinalysis Basic - ( 2019 07:32 )    Color: Yellow / Appearance: Clear / S.010 / pH: x  Gluc: x / Ketone: Negative  / Bili: Negative / Urobili: Negative mg/dL   Blood: x / Protein: 15 mg/dL / Nitrite: Negative   Leuk Esterase: Negative / RBC: 6-10 /HPF / WBC 3-5   Sq Epi: x / Non Sq Epi: Occasional / Bacteria: Occasional      AM LABS Pending    MEDICATIONS  (STANDING):  lactated ringers. 500 milliLiter(s) (100 mL/Hr) IV Continuous <Continuous>    MEDICATIONS  (PRN):  acetaminophen   Tablet .. 650 milliGRAM(s) Oral every 6 hours PRN Mild Pain (1 - 3)  hydrALAZINE Injectable 10 milliGRAM(s) IV Push every 6 hours PRN SBP > 170  HYDROmorphone  Injectable 1 milliGRAM(s) IV Push every 2 hours PRN Moderate Pain (4 - 6)  HYDROmorphone  Injectable 2 milliGRAM(s) IV Push every 3 hours PRN Severe Pain (7 - 10)  labetalol Injectable 10 milliGRAM(s) IV Push every 6 hours PRN for sbp above 160 mmhg  LORazepam   Injectable 1 milliGRAM(s) IV Push every 6 hours PRN Anxiety  ondansetron Injectable 4 milliGRAM(s) IV Push every 6 hours PRN Nausea and/or Vomiting    RADIOLOGY & ADDITIONAL TESTS: personally visualized    PHYSICAL EXAM:    General: Well developed; well nourished; in no acute distress  Eyes: PERRLA, EOMI; conjunctiva and sclera clear  Head: Normocephalic; atraumatic  ENMT: No nasal discharge; airway clear  Neck: Supple; non tender; no masses  Respiratory: No wheezes, rales or rhonchi  Cardiovascular: Regular rate S1 and S2, tachycardic  Gastrointestinal: Soft distended abdomen, mildly tender to palpation, normal BS  Genitourinary: No costovertebral angle tenderness  Extremities: Normal range of motion, No clubbing, cyanosis or edema  Vascular: Peripheral pulses palpable 2+ bilaterally  Neurological: Alert and oriented x4  Skin: Warm and dry.   Musculoskeletal: Normal gait, tone, without deformities  Psychiatric: Anxious
CHERYL MIR    [id number]    58y      Female    INTERVAL HPI/OVERNIGHT EVENTS:    patient being seen for choledocholithiasis s.p ercp yesterday with removal of stones. Patient now has pancreatitis. Patient seen at bedside with family and is in 8 out of 10 abd pain.       REVIEW OF SYSTEMS:    CONSTITUTIONAL: pain   RESPIRATORY: No cough, wheezing, hemoptysis; No shortness of breath  CARDIOVASCULAR: No chest pain, palpitations  GASTROINTESTINAL: No abdominal or epigastric pain. No nausea, vomiting  NEUROLOGICAL: No headaches, memory loss, loss of strength.  MISCELLANEOUS:      Vital Signs Last 24 Hrs  T(C): 36.5 (01 Jan 2019 07:58), Max: 37.1 (01 Jan 2019 04:01)  T(F): 97.7 (01 Jan 2019 07:58), Max: 98.8 (01 Jan 2019 04:01)  HR: 107 (01 Jan 2019 07:58) (53 - 108)  BP: 161/105 (01 Jan 2019 07:58) (161/105 - 179/93)  BP(mean): --  RR: 18 (01 Jan 2019 07:58) (18 - 18)  SpO2: 96% (01 Jan 2019 07:58) (92% - 98%)    PHYSICAL EXAM:    GENERAL: anxious   HEENT: PERRL, +EOMI  NECK: soft, Supple, No JVD,   CHEST/LUNG: Clear to auscultation bilaterally; No wheezing  HEART: S1S2+, Regular rate and rhythm; No murmurs, rubs, or gallops  ABDOMEN: tender  EXTREMITIES:  2+ Peripheral Pulses, No clubbing, cyanosis, or edema  SKIN: No rashes or lesions  NEURO: AAOX3, no focal deficits, no motor r sensory loss  PSYCH: normal mood      LABS:                        12.8   20.2  )-----------( 357      ( 01 Jan 2019 07:55 )             38.9     01-01    143  |  102  |  19.0  ----------------------------<  124<H>  3.6   |  26.0  |  0.71    Ca    9.4      01 Jan 2019 07:55  Mg     2.3     01-01    TPro  7.9  /  Alb  4.7  /  TBili  0.3<L>  /  DBili  x   /  AST  20  /  ALT  13  /  AlkPhos  95  01-01    PT/INR - ( 31 Dec 2018 13:32 )   PT: 13.1 sec;   INR: 1.13 ratio                 MEDICATIONS  (STANDING):  lactated ringers. 1000 milliLiter(s) (200 mL/Hr) IV Continuous <Continuous>    MEDICATIONS  (PRN):  acetaminophen   Tablet .. 650 milliGRAM(s) Oral every 6 hours PRN Mild Pain (1 - 3)  hydrALAZINE Injectable 10 milliGRAM(s) IV Push every 6 hours PRN SBP > 170  HYDROmorphone  Injectable 0.5 milliGRAM(s) IV Push every 3 hours PRN Moderate Pain (4 - 6)  HYDROmorphone  Injectable 1 milliGRAM(s) IV Push every 3 hours PRN Severe Pain (7 - 10)  ondansetron Injectable 4 milliGRAM(s) IV Push every 6 hours PRN Nausea and/or Vomiting      RADIOLOGY & ADDITIONAL TESTS:
INTERVAL HPI/OVERNIGHT EVENTS: Pain is a 4/10 today but she vomited yesterday. CT a/p shows edema around the head of the pancreas. She feels hungry and would like to try clear liquids again.     ROS wnl     PAST MEDICAL & SURGICAL HISTORY:  Hypertension  Anxiety and depression  Seasonal allergies  Asthma  Osteoarthritis  S/P hip replacement: left  S/P tubal ligation  S/P cholecystectomy      Home Medications:  cloNIDine 0.1 mg oral tablet: 1 tab(s) orally 3 times a day (31 Dec 2018 14:41)  gabapentin 100 mg oral capsule: 1 cap(s) orally 3 times a day (31 Dec 2018 14:54)  Norvasc 5 mg oral tablet: 1 tab(s) orally once a day (31 Dec 2018 14:54)  Paxil 20 mg oral tablet: 1 tab(s) orally once a day (31 Dec 2018 14:53)  Prevacid:  (31 Dec 2018 14:41)      MEDICATIONS  (STANDING):  lactated ringers. 1000 milliLiter(s) (200 mL/Hr) IV Continuous <Continuous>  piperacillin/tazobactam IVPB. 3.375 Gram(s) IV Intermittent every 8 hours    MEDICATIONS  (PRN):  acetaminophen   Tablet .. 650 milliGRAM(s) Oral every 6 hours PRN Mild Pain (1 - 3)  hydrALAZINE Injectable 10 milliGRAM(s) IV Push every 6 hours PRN SBP > 170  HYDROmorphone  Injectable 1 milliGRAM(s) IV Push every 2 hours PRN Moderate Pain (4 - 6)  HYDROmorphone  Injectable 2 milliGRAM(s) IV Push every 3 hours PRN Severe Pain (7 - 10)  labetalol Injectable 10 milliGRAM(s) IV Push every 6 hours PRN for sbp above 160 mmhg  LORazepam   Injectable 1 milliGRAM(s) IV Push every 6 hours PRN Anxiety  ondansetron Injectable 4 milliGRAM(s) IV Push every 6 hours PRN Nausea and/or Vomiting      Allergies    No Known Allergies    Intolerances      PHYSICAL EXAM:   Vital Signs:  Vital Signs Last 24 Hrs  T(C): 37.1 (2019 05:30), Max: 37.7 (2019 18:45)  T(F): 98.8 (2019 05:30), Max: 99.8 (2019 18:45)  HR: 92 (2019 05:30) (80 - 111)  BP: 154/85 (2019 05:30) (141/88 - 177/101)  BP(mean): --  RR: 20 (2019 05:30) (18 - 20)  SpO2: 94% (2019 05:30) (93% - 98%)  Daily     Daily     GENERAL:  no distress  HEENT:  NC/AT,  anicteric  CHEST:   no increased effort, breath sounds clear  HEART:  Regular rhythm  ABDOMEN:  Soft, generalized tenderness no rebound or guarding non-distended, normoactive bowel sounds,  no masses ,no hepato-splenomegaly, no signs of chronic liver disease  EXTEREMITIES:  no cyanosis      LABS:                        10.9   15.3  )-----------( 271      ( 2019 08:14 )             35.3       Hemoglobin: 10.9 g/dL (19 @ 08:14)  Hemoglobin: 11.7 g/dL (19 @ 16:40)  Hemoglobin: 11.8 g/dL (19 @ 08:05)          143  |  100  |  8.0  ----------------------------<  103  3.3<L>   |  32.0<H>  |  0.50    Ca    8.9      2019 08:14    TPro  6.4<L>  /  Alb  3.6  /  TBili  0.5  /  DBili  0.2  /  AST  29  /  ALT  29  /  AlkPhos  98        INR: 1.22 ratio (19 @ 16:40)          Alanine Aminotransferase (ALT/SGPT): 29 U/L (19 @ 08:14)  Alkaline Phosphatase, Serum: 98 U/L (19 @ 08:14)  Aspartate Aminotransferase (AST/SGOT): 29 U/L (19 @ 08:14)  Bilirubin Direct, Serum: 0.2 mg/dL (19 @ 08:14)  Alkaline Phosphatase, Serum: 93 U/L (19 @ 16:40)  Alanine Aminotransferase (ALT/SGPT): 25 U/L (19 @ 16:40)  Aspartate Aminotransferase (AST/SGOT): 31 U/L (19 @ 16:40)  Aspartate Aminotransferase (AST/SGOT): 17 U/L (19 @ 08:05)  Alkaline Phosphatase, Serum: 90 U/L (19 @ 08:05)  Alanine Aminotransferase (ALT/SGPT): 13 U/L (19 @ 08:05)  Alanine Aminotransferase (ALT/SGPT): 12 U/L (19 @ 08:05)  Alkaline Phosphatase, Serum: 84 U/L (19 @ 08:05)  Aspartate Aminotransferase (AST/SGOT): 17 U/L (19 @ 08:05)  Bilirubin Direct, Serum: 0.1 mg/dL (19 @ 08:05)      Urinalysis Basic - ( 2019 07:32 )    Color: Yellow / Appearance: Clear / S.010 / pH: x  Gluc: x / Ketone: Negative  / Bili: Negative / Urobili: Negative mg/dL   Blood: x / Protein: 15 mg/dL / Nitrite: Negative   Leuk Esterase: Negative / RBC: 6-10 /HPF / WBC 3-5   Sq Epi: x / Non Sq Epi: Occasional / Bacteria: Occasional          RADIOLOGY & ADDITIONAL TESTS:      < from: CT Abdomen and Pelvis w/ IV Cont (19 @ 17:00) >  FINDINGS:    Artifacts:  Hip hardware produces artifact that obscures some detail.    Lower thorax:  Tiny amount of pleural fluid.. .     ABDOMEN:    Liver: Unremarkable.    Gallbladder and bile ducts:  Cholecystectomy. No biliary dilatation.    Pancreas:  No pancreatic duct dilatation. No pancreatic mass. Edema   predominating around head of pancreas may indicate pancreatitis or be   secondary   to primary duodenal inflammation.    Spleen:  Incidental small splenule..    Adrenals: No mass.    Kidneys and ureters: No hydronephrosis or significant abnormalities.    Stomach and bowel:  Abdominal, mesenteric and retroperitoneal edema   predominating around head of pancreas, pylorus and duodenum. Duodenal   mucosal   enhancement suggesting inflammation.. Differential includes peptic ulcer  disease. No extraluminal gas to indicate perforation. No colon   inflammation. No   bowel obstruction..    Appendix: No appendicitis.     PELVIS:    Bladder: Unremarkable.     Reproductive:  Unremarkable uterus and ovaries.     ABDOMEN and PELVIS:    Intraperitoneal space:  About 100 cc abdominal and pelvic ascites.. .    Bones/joints: No acute fractures.    Soft tissues: Unremarkable.    Vasculature: No aortic aneurysm.    Lymph nodes:  Increased number of nodes in celiac axis region. No   pathologic   enlargement. A few cardiophrenic angle lymph nodes very.     IMPRESSION:   1. Abdominal, mesenteric and retroperitoneal edema predominating around   head of   pancreas, pylorus and duodenum.  2. Duodenal mucosal enhancement indicating inflammation. Differential   includes   peptic ulcer disease and pancreatitis..    3. Tiny amount of pleural fluid.. .   4. About 100 cc abdominal and pelvic ascites.. .    < end of copied text >
INTERVAL HPI/OVERNIGHT EVENTS: Still has abdominal pain this am but feels hungry and wants to eat.     ROS wnl     PAST MEDICAL & SURGICAL HISTORY:  Hypertension  Anxiety and depression  Seasonal allergies  Asthma  Osteoarthritis  S/P hip replacement: left  S/P tubal ligation  S/P cholecystectomy      Home Medications:  cloNIDine 0.1 mg oral tablet: 1 tab(s) orally 3 times a day (31 Dec 2018 14:41)  gabapentin 100 mg oral capsule: 1 cap(s) orally 3 times a day (31 Dec 2018 14:54)  Norvasc 5 mg oral tablet: 1 tab(s) orally once a day (31 Dec 2018 14:54)  Paxil 20 mg oral tablet: 1 tab(s) orally once a day (31 Dec 2018 14:53)  Prevacid:  (31 Dec 2018 14:41)      MEDICATIONS  (STANDING):  lactated ringers. 1000 milliLiter(s) (200 mL/Hr) IV Continuous <Continuous>    MEDICATIONS  (PRN):  acetaminophen   Tablet .. 650 milliGRAM(s) Oral every 6 hours PRN Mild Pain (1 - 3)  hydrALAZINE Injectable 10 milliGRAM(s) IV Push every 6 hours PRN SBP > 170  HYDROmorphone  Injectable 0.5 milliGRAM(s) IV Push every 3 hours PRN Moderate Pain (4 - 6)  HYDROmorphone  Injectable 1 milliGRAM(s) IV Push every 3 hours PRN Severe Pain (7 - 10)  labetalol Injectable 10 milliGRAM(s) IV Push every 6 hours PRN for sbp above 160 mmhg  LORazepam   Injectable 1 milliGRAM(s) IV Push every 6 hours PRN Anxiety  ondansetron Injectable 4 milliGRAM(s) IV Push every 6 hours PRN Nausea and/or Vomiting      Allergies    No Known Allergies    Intolerances          PHYSICAL EXAM:   Vital Signs:  Vital Signs Last 24 Hrs  T(C): 36.8 (2019 04:45), Max: 36.9 (2019 12:12)  T(F): 98.3 (2019 04:45), Max: 98.4 (2019 12:12)  HR: 108 (2019 04:45) (90 - 108)  BP: 174/119 (2019 04:45) (146/89 - 174/119)  BP(mean): --  RR: 20 (2019 04:45) (15 - 20)  SpO2: 92% (2019 04:00) (92% - 96%)  Daily     Daily Weight in k.7 (2019 04:45)    GENERAL:  no distress  HEENT:  NC/AT,  anicteric  CHEST:   no increased effort, breath sounds clear  HEART:  Regular rhythm  ABDOMEN:  Soft, non-tender, non-distended, normoactive bowel sounds,  no masses ,no hepato-splenomegaly, no signs of chronic liver disease  EXTEREMITIES:  no cyanosis no edema      LABS:                        12.8   20.2  )-----------( 357      ( 2019 07:55 )             38.9       Hemoglobin: 12.8 g/dL (19 @ 07:55)  Hemoglobin: 13.4 g/dL (18 @ 10:15)          143  |  102  |  19.0  ----------------------------<  124<H>  3.6   |  26.0  |  0.71    Ca    9.4      2019 07:55  Mg     2.3         TPro  7.9  /  Alb  4.7  /  TBili  0.3<L>  /  DBili  x   /  AST  20  /  ALT  13  /  AlkPhos  95        INR: 1.13 ratio (18 @ 13:32)      Aspartate Aminotransferase (AST/SGOT): 20 U/L (19 @ 07:55)  Alanine Aminotransferase (ALT/SGPT): 13 U/L (19 @ 07:55)  Alkaline Phosphatase, Serum: 95 U/L (19 @ 07:55)  Alanine Aminotransferase (ALT/SGPT): 15 U/L (18 @ 10:15)  Aspartate Aminotransferase (AST/SGOT): 20 U/L (18 @ 10:15)  Alkaline Phosphatase, Serum: 118 U/L (18 @ 10:15)      RADIOLOGY & ADDITIONAL TESTS:  < from: US Abdomen Limited (18 @ 13:16) >  FINDINGS:     The liver shows normal parenchymal echogenicity.    There are no liver masses.    There is no intrahepatic biliary ductal dilatation.  The portal vein shows hepatopedal flow.    Status post cholecystectomy..   The common duct measures 9.7 mm in maximum diameter.Choledocholithiasis   seen within the distal common bile duct,two small stones identified.      The pancreatic head, neck and body are within normal limits. Pancreatic   tail is obscured by bowel gas. .     The right kidney measures 10.1 cm in lengthand shows normal parenchymal   echogenicity, without stones or hydronephrosis.    Visualized segments of abdominal aorta are within normal limits by   sonographic criteria. IVC is unremarkable. No retroperitoneal mass seen.    IMPRESSION:     Status post cholecystectomy. Choledocholithiasis. .    < end of copied text >
INTERVAL HPI/OVERNIGHT EVENTS:Patient FU after ERCP. Complains of abdominal pain. Lipase elevated. No pancreatic duct manipulation was performed during ERCP. ERCP with sphincteroplasty was performed. Post procedure indomethacin was given. Start IV fluids-LR at 200 mL by Dr Mcneil.     MEDICATIONS  (STANDING):  lactated ringers. 1000 milliLiter(s) (200 mL/Hr) IV Continuous <Continuous>    MEDICATIONS  (PRN):  acetaminophen   Tablet .. 650 milliGRAM(s) Oral every 6 hours PRN Mild Pain (1 - 3)  hydrALAZINE Injectable 10 milliGRAM(s) IV Push every 6 hours PRN SBP > 170  HYDROmorphone  Injectable 0.5 milliGRAM(s) IV Push every 3 hours PRN Moderate Pain (4 - 6)  HYDROmorphone  Injectable 1 milliGRAM(s) IV Push every 3 hours PRN Severe Pain (7 - 10)  labetalol Injectable 10 milliGRAM(s) IV Push every 6 hours PRN for sbp above 160 mmhg  LORazepam   Injectable 1 milliGRAM(s) IV Push every 6 hours PRN Anxiety  ondansetron Injectable 4 milliGRAM(s) IV Push every 6 hours PRN Nausea and/or Vomiting      Allergies    No Known Allergies    Intolerances        Vital Signs Last 24 Hrs  T(C): 36.9 (01 Jan 2019 12:12), Max: 37.1 (01 Jan 2019 04:01)  T(F): 98.4 (01 Jan 2019 12:12), Max: 98.8 (01 Jan 2019 04:01)  HR: 102 (01 Jan 2019 13:41) (53 - 108)  BP: 146/89 (01 Jan 2019 13:41) (146/89 - 179/93)  BP(mean): --  RR: 18 (01 Jan 2019 12:12) (18 - 18)  SpO2: 95% (01 Jan 2019 12:12) (92% - 98%)    LABS:                        12.8   20.2  )-----------( 357      ( 01 Jan 2019 07:55 )             38.9     01-01    143  |  102  |  19.0  ----------------------------<  124<H>  3.6   |  26.0  |  0.71    Ca    9.4      01 Jan 2019 07:55  Mg     2.3     01-01    TPro  7.9  /  Alb  4.7  /  TBili  0.3<L>  /  DBili  x   /  AST  20  /  ALT  13  /  AlkPhos  95  01-01    PT/INR - ( 31 Dec 2018 13:32 )   PT: 13.1 sec;   INR: 1.13 ratio               RADIOLOGY & ADDITIONAL TESTS:
Patient is a 58y old  Female who presents with a chief complaint of abd pain (02 Jan 2019 12:02)      HPI:  Patient is a 59 yo female with PMHx of substance use disorder (Xanax and alcohol), anxiety, and depression with history of suicide attempt in 2/2018 and cholecystectomy secondary to stones presents to the er with 2-3 day history of 10 out of 10 ruq abd pain similar to the pain in the past.     Patient post ERCp pancreatitis.   Had sphincteroplasty with removal of sludge and stones. Lipase normalizing today. Patient had wbc of 18 and tachycardia yesterday. Prolactin level and lactate were normal. CXR normal. Patient with abdominal pain 8/10 yesterday and again this am. No [place] this am, but had nausea yesterday. NO fevers. Continues NPO and IV fluids        REVIEW OF SYSTEMS:  Constitutional: No fever, weight loss or fatigue  ENMT:  No difficulty hearing, tinnitus, vertigo; No sinus or throat pain  Respiratory: No cough, wheezing, chills or hemoptysis  Cardiovascular: No chest pain, palpitations, dizziness or leg swelling  Gastrointestinal: +epigastric pain. No nausea, vomiting or hematemesis; No diarrhea or constipation. No melena or hematochezia.  Skin: No itching, burning, rashes or lesions   Musculoskeletal: No joint pain or swelling; No muscle, back or extremity pain    PAST MEDICAL & SURGICAL HISTORY:  Hypertension  Anxiety and depression  Seasonal allergies  Asthma  Osteoarthritis  S/P hip replacement: left  S/P tubal ligation  S/P cholecystectomy      FAMILY HISTORY:  Family history of COPD (chronic obstructive pulmonary disease) (Father)  Family history of CVA (Father)      SOCIAL HISTORY:  Smoking Status: [ ] Current, [ ] Former, [ ] Never  Pack Years:  [  ] EtOH-hx of ETOH- stopped  [  ] IVDA    MEDICATIONS:  MEDICATIONS  (STANDING):  lactated ringers. 1000 milliLiter(s) (200 mL/Hr) IV Continuous <Continuous>  piperacillin/tazobactam IVPB. 3.375 Gram(s) IV Intermittent every 8 hours    MEDICATIONS  (PRN):  acetaminophen   Tablet .. 650 milliGRAM(s) Oral every 6 hours PRN Mild Pain (1 - 3)  hydrALAZINE Injectable 10 milliGRAM(s) IV Push every 6 hours PRN SBP > 170  HYDROmorphone  Injectable 1 milliGRAM(s) IV Push every 2 hours PRN Moderate Pain (4 - 6)  HYDROmorphone  Injectable 2 milliGRAM(s) IV Push every 3 hours PRN Severe Pain (7 - 10)  labetalol Injectable 10 milliGRAM(s) IV Push every 6 hours PRN for sbp above 160 mmhg  LORazepam   Injectable 1 milliGRAM(s) IV Push every 6 hours PRN Anxiety  ondansetron Injectable 4 milliGRAM(s) IV Push every 6 hours PRN Nausea and/or Vomiting      Allergies    No Known Allergies    Intolerances        Vital Signs Last 24 Hrs  T(C): 37.3 (03 Jan 2019 05:17), Max: 37.7 (02 Jan 2019 15:44)  T(F): 99.2 (03 Jan 2019 05:17), Max: 99.8 (02 Jan 2019 15:44)  HR: 98 (03 Jan 2019 05:17) (98 - 110)  BP: 158/92 (03 Jan 2019 05:17) (140/80 - 165/99)  BP(mean): --  RR: 18 (03 Jan 2019 05:17) (18 - 20)  SpO2: 92% (03 Jan 2019 05:17) (92% - 92%)    01-02 @ 07:01 - 01-03 @ 07:00  --------------------------------------------------------  IN: 6000 mL / OUT: 0 mL / NET: 6000 mL    01-03 @ 07:01 - 01-03 @ 09:50  --------------------------------------------------------  IN: 0 mL / OUT: 1 mL / NET: -1 mL          PHYSICAL EXAM:    General: Well developed; well nourished; in no acute distress  HEENT: MMM, conjunctiva and sclera clear  H- RRR  l- CTA  Gastrointestinal: Soft, + epigastric tenderness non-distended; Normal bowel sounds; No rebound or guarding  Extremities: Normal range of motion, No clubbing, cyanosis or edema  Neurological: Alert and oriented x3  Skin: Warm and dry. No obvious rash      LABS:                        10.9   15.3  )-----------( 271      ( 03 Jan 2019 08:14 )             35.3     03 Jan 2019 08:14    143    |  100    |  8.0    ----------------------------<  103    3.3     |  32.0   |  0.50     Ca    8.9        03 Jan 2019 08:14    TPro  6.4    /  Alb  3.6    /  TBili  0.5    /  DBili  0.2    /  AST  29     /  ALT  29     /  AlkPhos  98     / Amylase 110    /Lipase 87     03 Jan 2019 08:14          C-Reactive Protein, Serum: 14.40 mg/dL (01-03-19 @ 08:14)      RADIOLOGY & ADDITIONAL STUDIES:     < from: Xray Chest 1 View-PORTABLE IMMEDIATE (01.02.19 @ 17:06) >  MPRESSION:   No evidence of active chest disease.            < end of copied text >
follow up visit for abdominal pain , pancreatitis , high BP   seen in am , pain better today, no recurrent chest pain  , diet advanced , overnight events reported high BP       Vital Signs Last 24 Hrs  T(C): 36.9 (06 Jan 2019 12:58), Max: 37.1 (05 Jan 2019 20:39)  T(F): 98.4 (06 Jan 2019 12:58), Max: 98.8 (05 Jan 2019 20:39)  HR: 96 (06 Jan 2019 12:58) (74 - 96)  BP: 148/90 (06 Jan 2019 13:20) (126/71 - 174/65)  BP(mean): --  RR: 14 (06 Jan 2019 12:58) (14 - 18)  SpO2: 95% (06 Jan 2019 12:58) (95% - 95%  )PHYSICAL EXAM:  General : anxious , mild pain distress   Lungs CTA bilateral   Heart : regular rate S1/S2   Gastro : soft, no tenderness  , BS positive   Ext : no pretibial edema                                               12.2   10.4  )-----------( 326      ( 07 Jan 2019 05:52 )             36.4   01-07    135  |  92<L>  |  3.0<L>  ----------------------------<  135<H>  2.8<LL>   |  30.0<H>  |  0.42<L>    Ca    9.1      07 Jan 2019 05:52    < from: CT Angio Chest w/ IV Cont (01.06.19 @ 17:51) >      The bones are normal.    IMPRESSION:    No evidence of pulmonary embolism.  Small bilateral pleural effusions.  Findings concerning for acute pancreatitis .  Pneumobilia.                JOSE VARNER M.D., ATTENDING RADIOLOGIST  This document has been electronically signed. Jan 6 2019  6:03PM              < end of copied text >
patient seen  at bedside Both report return to baseline mental state No confusion or perceptual disturbances  ROS : negative  discussed her treatment with benzodiazepines and desire to discontinue that medication  hospital course and past medical history reviewed  PAST MEDICAL & SURGICAL HISTORY:  Hypertension  Anxiety and depression  Seasonal allergies  Asthma  Osteoarthritis  S/P hip replacement: left  S/P tubal ligation  S/P cholecystectomy  Home Medications:  clonidine 0.1 mg oral tablet: 1 tab(s) orally 3 times a day (31 Dec 2018 14:41)  gabapentin 100 mg oral capsule: 1 cap(s) orally 3 times a day (31 Dec 2018 14:54)  Norvasc 5 mg oral tablet: 1 tab(s) orally once a day (31 Dec 2018 14:54)  Paxil 20 mg oral tablet: 1 tab(s) orally once a day (31 Dec 2018 14:53)  Prevacid:  (31 Dec 2018 14:41)  Vital Signs Last 24 Hrs  T(C): 36.7 (07 Jan 2019 07:33), Max: 37.4 (06 Jan 2019 17:18)  T(F): 98.1 (07 Jan 2019 07:33), Max: 99.3 (06 Jan 2019 17:18)  HR: 100 (07 Jan 2019 11:24) (70 - 132)  BP: 127/73 (07 Jan 2019 11:24) (127/73 - 174/114)  RR: 18 (07 Jan 2019 11:24) (16 - 19)  SpO2: 96% (07 Jan 2019 06:45) (94% - 96%)  Opiate, Urine: Positive (01.03.19 @ 13:17)  Benzodiazepine, Urine: Positive (01.03.19 @ 13:17)
Chief Complaint: This is a 58y old Female patient being seen for follow-up consultation for post-ERCP pancreatitis.    HPI:  Patient reports that her pain is worse today.  She reports that she has been unable to eat anything due to the timing of her antiemetic.    ROS: A 14-point review of systems was reviewed and was otherwise negative save what was reported in the HPI.    PAST MEDICAL/SURGICAL HISTORY:  Hypertension  Anxiety and depression  Seasonal allergies  Asthma  Osteoarthritis  S/P hip replacement: left  S/P tubal ligation  S/P cholecystectomy    MEDICATIONS  (STANDING):  buPROPion XL . 150 milliGRAM(s) Oral daily  lactated ringers. 500 milliLiter(s) (100 mL/Hr) IV Continuous <Continuous>  lamoTRIgine 25 milliGRAM(s) Oral daily  pantoprazole  Injectable 40 milliGRAM(s) IV Push two times a day  PARoxetine 30 milliGRAM(s) Oral daily    MEDICATIONS  (PRN):  acetaminophen   Tablet .. 650 milliGRAM(s) Oral every 6 hours PRN Mild Pain (1 - 3)  aluminum hydroxide/magnesium hydroxide/simethicone Suspension 30 milliLiter(s) Oral every 4 hours PRN Dyspepsia  hydrALAZINE Injectable 10 milliGRAM(s) IV Push every 6 hours PRN SBP > 170  HYDROmorphone  Injectable 1 milliGRAM(s) IV Push every 2 hours PRN Moderate Pain (4 - 6)  labetalol Injectable 10 milliGRAM(s) IV Push every 6 hours PRN for sbp above 160 mmhg  LORazepam   Injectable 1 milliGRAM(s) IV Push every 6 hours PRN Anxiety  ondansetron Injectable 4 milliGRAM(s) IV Push every 6 hours PRN Nausea and/or Vomiting    VITAL SIGNS LAST 24 HOURS:  T(C): 37.1 (05 Jan 2019 20:39), Max: 37.1 (05 Jan 2019 20:39)  T(F): 98.8 (05 Jan 2019 20:39), Max: 98.8 (05 Jan 2019 20:39)  HR: 91 (05 Jan 2019 20:39) (76 - 91)  BP: 147/94 (05 Jan 2019 20:39) (147/94 - 157/90)  BP(mean): --  RR: 18 (05 Jan 2019 20:39) (18 - 20)  SpO2: 95% (05 Jan 2019 20:39) (90% - 95%)      01-04-19 @ 07:01  -  01-05-19 @ 07:00  --------------------------------------------------------  IN: 3740 mL / OUT: 0 mL / NET: 3740 mL    01-05-19 @ 07:01  -  01-05-19 @ 21:30  --------------------------------------------------------  IN: 1100 mL / OUT: 0 mL / NET: 1100 mL      PHYSICAL EXAM:  Constitutional: Well-developed, well-nourished, in mild distress  Eyes: Sclerae anicteric, conjunctivae normal  ENMT: Mucus membranes moist, no oropharyngeal thrush noted  Neck: No thyroid nodules appreciated, no significant cervical or supraclavicular lymphadenopathy  Respiratory: Breathing nonlabored; clear to auscultation  Cardiovascular: Regular rate and rhythm  Gastrointestinal: Soft, epigastric tenderness to light palpation, nondistended, normoactive bowel sounds; no hepatosplenomegaly appreciated; no rebound tenderness or involuntary guarding  Extremities: No clubbing, cyanosis or edema  Neurological: Alert and oriented to person, place and time; no asterixis  Skin: No jaundice  Lymph Nodes: No significant lymphadenopathy  Musculoskeletal: No significant peripheral atrophy  Psychiatric: Affect and mood appropriate                          9.6    7.7   )-----------( 245      ( 05 Jan 2019 08:14 )             30.7     01-05    145  |  102  |  <3.0<L>  ----------------------------<  100  3.1<L>   |  32.0<H>  |  0.41<L>    Ca    8.4<L>      05 Jan 2019 08:14    TPro  6.0<L>  /  Alb  3.4  /  TBili  0.3<L>  /  DBili  0.1  /  AST  16  /  ALT  19  /  AlkPhos  110  01-04    LIVER FUNCTIONS - ( 04 Jan 2019 11:04 )  Alb: 3.4 g/dL / Pro: 6.0 g/dL / ALK PHOS: 110 U/L / ALT: 19 U/L / AST: 16 U/L / GGT: x               Culture - Blood (collected 03 Jan 2019 08:15)  Source: .Blood  Preliminary Report (05 Jan 2019 09:01):    No growth at 48 hours    Culture - Urine (collected 03 Jan 2019 07:31)  Source: .Urine  Final Report (04 Jan 2019 09:31):    10,000 - 49,000 CFU/mL Corynebacterium species    <10,000 CFU/ml Coag Negative Staphylococcus      IMAGING: I personally reviewed the CT A/P, and I agree with the radiologist's interpretation.
Chief Complaint: This is a 58y old Female patient being seen for follow-up consultation for post-ERCP pancreatitis.    HPI:  Two rapid responses called today for chest pain.  Underwent CTPA that was notable only for acute pancreatitis.  Patient reports ongoing abdominal pain, nausea and emesis.      ROS: A 14-point review of systems was reviewed and was otherwise negative save what was reported in the HPI.    PAST MEDICAL/SURGICAL HISTORY:  Hypertension  Anxiety and depression  Seasonal allergies  Asthma  Osteoarthritis  S/P hip replacement: left  S/P tubal ligation  S/P cholecystectomy    MEDICATIONS  (STANDING):  amLODIPine   Tablet 5 milliGRAM(s) Oral daily  aspirin enteric coated 81 milliGRAM(s) Oral daily  buPROPion XL . 150 milliGRAM(s) Oral daily  cloNIDine 0.1 milliGRAM(s) Oral two times a day  lactated ringers. 500 milliLiter(s) (100 mL/Hr) IV Continuous <Continuous>  lamoTRIgine 25 milliGRAM(s) Oral daily  pantoprazole  Injectable 40 milliGRAM(s) IV Push two times a day  PARoxetine 30 milliGRAM(s) Oral daily    MEDICATIONS  (PRN):  acetaminophen   Tablet .. 650 milliGRAM(s) Oral every 6 hours PRN Mild Pain (1 - 3)  aluminum hydroxide/magnesium hydroxide/simethicone Suspension 30 milliLiter(s) Oral every 4 hours PRN Dyspepsia  diazepam    Tablet 5 milliGRAM(s) Oral three times a day PRN severe anxiety  hydrALAZINE Injectable 10 milliGRAM(s) IV Push every 6 hours PRN SBP > 170  LORazepam   Injectable 1 milliGRAM(s) IV Push every 6 hours PRN Anxiety  metoclopramide Injectable 5 milliGRAM(s) IV Push every 6 hours PRN before meals for nausea    VITAL SIGNS LAST 24 HOURS:  T(C): 37.4 (06 Jan 2019 17:18), Max: 37.4 (06 Jan 2019 17:18)  T(F): 99.3 (06 Jan 2019 17:18), Max: 99.3 (06 Jan 2019 17:18)  HR: 76 (06 Jan 2019 17:18) (70 - 96)  BP: 160/98 (06 Jan 2019 17:15) (126/71 - 174/65)  BP(mean): --  RR: 16 (06 Jan 2019 17:18) (14 - 18)  SpO2: 94% (06 Jan 2019 17:18) (94% - 95%)      01-05-19 @ 07:01  -  01-06-19 @ 07:00  --------------------------------------------------------  IN: 2200 mL / OUT: 0 mL / NET: 2200 mL    01-06-19 @ 07:01  - 01-06-19 @ 19:32  --------------------------------------------------------  IN: 1300 mL / OUT: 0 mL / NET: 1300 mL      PHYSICAL EXAM:  Constitutional: Well-developed, well-nourished, in no distress  Eyes: Sclerae anicteric, conjunctivae normal  ENMT: Mucus membranes moist, no oropharyngeal thrush noted  Neck: No thyroid nodules appreciated, no significant cervical or supraclavicular lymphadenopathy  Respiratory: Breathing nonlabored; clear to auscultation  Cardiovascular: Regular rate and rhythm  Gastrointestinal: Soft, epigastric tenderness to light palpation, nondistended, normoactive bowel sounds; no hepatosplenomegaly appreciated; no rebound tenderness or involuntary guarding  Extremities: No clubbing, cyanosis or edema  Neurological: Alert and oriented to person, place and time; no asterixis  Skin: No jaundice  Lymph Nodes: No significant lymphadenopathy  Musculoskeletal: No significant peripheral atrophy  Psychiatric: Affect and mood appropriate                            9.6    7.7   )-----------( 245      ( 05 Jan 2019 08:14 )             30.7     01-05    145  |  102  |  <3.0<L>  ----------------------------<  100  3.1<L>   |  32.0<H>  |  0.41<L>    Ca    8.4<L>      05 Jan 2019 08:14    IMAGING: I personally reviewed the CTPA, and I agree with the radiologist's interpretation.
INTERVAL HPI/OVERNIGHT EVENTS: Patient had chest pain. CTA yesterday was unremarkable except for pancreatitis. Abdominal pain is better now 3/10. Tolerated regular diet today.  ROS wnl     PAST MEDICAL & SURGICAL HISTORY:  Hypertension  Anxiety and depression  Seasonal allergies  Asthma  Osteoarthritis  S/P hip replacement: left  S/P tubal ligation  S/P cholecystectomy      Home Medications:  cloNIDine 0.1 mg oral tablet: 1 tab(s) orally 3 times a day (31 Dec 2018 14:41)  gabapentin 100 mg oral capsule: 1 cap(s) orally 3 times a day (31 Dec 2018 14:54)  Norvasc 5 mg oral tablet: 1 tab(s) orally once a day (31 Dec 2018 14:54)  Paxil 20 mg oral tablet: 1 tab(s) orally once a day (31 Dec 2018 14:53)  Prevacid:  (31 Dec 2018 14:41)      MEDICATIONS  (STANDING):  amLODIPine   Tablet 5 milliGRAM(s) Oral daily  aspirin enteric coated 81 milliGRAM(s) Oral daily  buPROPion XL . 150 milliGRAM(s) Oral daily  cloNIDine 0.1 milliGRAM(s) Oral two times a day  enoxaparin Injectable 40 milliGRAM(s) SubCutaneous daily  lamoTRIgine 25 milliGRAM(s) Oral daily  pantoprazole  Injectable 40 milliGRAM(s) IV Push two times a day  PARoxetine 30 milliGRAM(s) Oral daily  potassium chloride  10 mEq/100 mL IVPB 10 milliEquivalent(s) IV Intermittent every 1 hour    MEDICATIONS  (PRN):  acetaminophen   Tablet .. 650 milliGRAM(s) Oral every 6 hours PRN Mild Pain (1 - 3)  aluminum hydroxide/magnesium hydroxide/simethicone Suspension 30 milliLiter(s) Oral every 4 hours PRN Dyspepsia  diazepam    Tablet 5 milliGRAM(s) Oral three times a day PRN severe anxiety  hydrALAZINE Injectable 10 milliGRAM(s) IV Push every 6 hours PRN SBP > 170  LORazepam   Injectable 1 milliGRAM(s) IV Push every 6 hours PRN Anxiety  metoclopramide Injectable 5 milliGRAM(s) IV Push every 6 hours PRN before meals for nausea      Allergies    No Known Allergies    Intolerances          PHYSICAL EXAM:   Vital Signs:  Vital Signs Last 24 Hrs  T(C): 36.7 (07 Jan 2019 07:33), Max: 37.4 (06 Jan 2019 17:18)  T(F): 98.1 (07 Jan 2019 07:33), Max: 99.3 (06 Jan 2019 17:18)  HR: 100 (07 Jan 2019 11:24) (70 - 132)  BP: 127/73 (07 Jan 2019 11:24) (127/73 - 174/114)  BP(mean): --  RR: 18 (07 Jan 2019 11:24) (14 - 19)  SpO2: 96% (07 Jan 2019 06:45) (94% - 96%)  Daily     Daily     GENERAL:  no distress  HEENT:  NC/AT,  anicteric  CHEST:   no increased effort, breath sounds clear  HEART:  Regular rhythm  ABDOMEN:  Soft, minimal epigastric tenderness, non-distended, normoactive bowel sounds,  no masses ,no hepato-splenomegaly, no signs of chronic liver disease  EXTEREMITIES:  no cyanosis      LABS:                        12.2   10.4  )-----------( 326      ( 07 Jan 2019 05:52 )             36.4       Hemoglobin: 12.2 g/dL (01-07-19 @ 05:52)  Hemoglobin: 9.6 g/dL (01-05-19 @ 08:14)      01-07    135  |  92<L>  |  3.0<L>  ----------------------------<  135<H>  2.8<LL>   |  30.0<H>  |  0.42<L>    Ca    9.1      07 Jan 2019 05:52                      RADIOLOGY & ADDITIONAL TESTS:

## 2019-01-07 NOTE — PROVIDER CONTACT NOTE (CRITICAL VALUE NOTIFICATION) - ACTION/TREATMENT ORDERED:
MD notified. MD advised this writer that she will come and evaluate pt. and orders will be followed.

## 2019-01-07 NOTE — PROGRESS NOTE ADULT - PROVIDER SPECIALTY LIST ADULT
Gastroenterology
Hospitalist
Internal Medicine
Psychiatry
Gastroenterology

## 2019-01-07 NOTE — PROGRESS NOTE ADULT - REASON FOR ADMISSION
abd pain

## 2019-01-08 LAB
CULTURE RESULTS: SIGNIFICANT CHANGE UP
SPECIMEN SOURCE: SIGNIFICANT CHANGE UP

## 2019-01-15 ENCOUNTER — APPOINTMENT (OUTPATIENT)
Dept: FAMILY MEDICINE | Facility: CLINIC | Age: 59
End: 2019-01-15
Payer: MEDICAID

## 2019-01-15 VITALS
SYSTOLIC BLOOD PRESSURE: 160 MMHG | BODY MASS INDEX: 30.37 KG/M2 | HEART RATE: 88 BPM | DIASTOLIC BLOOD PRESSURE: 100 MMHG | HEIGHT: 66 IN | WEIGHT: 189 LBS

## 2019-01-15 PROCEDURE — 99204 OFFICE O/P NEW MOD 45 MIN: CPT

## 2019-01-18 ENCOUNTER — APPOINTMENT (OUTPATIENT)
Dept: FAMILY MEDICINE | Facility: CLINIC | Age: 59
End: 2019-01-18

## 2019-01-22 NOTE — PLAN
[FreeTextEntry1] : uncontrolled HTN- renew meds\par anxious - need to see psych\par advise to have low salt diet /\par return for Physical

## 2019-01-22 NOTE — HISTORY OF PRESENT ILLNESS
[FreeTextEntry8] : as per ortho- 57 year old female presents with lower back and right leg pain x 6 months, getting worse. She complains of pain across her lower back radiating to the posterior thigh and right heel with occasional numbness and tingling of both legs. She experiences "shock waves" of pain radiating to both legs. She has difficulty turning in bed. She has tried stretching exercises, Motrin, and Tylenol with no improvement. She reports history of left THR April 2014 and right THR in October 2015 with Dr. Lyon.\par chart reviewed - looking for new MD\par needs BP meds and need to find new Psych as her insurance has changed \par Came late to office as her car broke down

## 2019-01-25 ENCOUNTER — APPOINTMENT (OUTPATIENT)
Dept: FAMILY MEDICINE | Facility: CLINIC | Age: 59
End: 2019-01-25
Payer: MEDICAID

## 2019-01-25 VITALS
HEART RATE: 86 BPM | WEIGHT: 189 LBS | HEIGHT: 66 IN | BODY MASS INDEX: 30.37 KG/M2 | DIASTOLIC BLOOD PRESSURE: 78 MMHG | SYSTOLIC BLOOD PRESSURE: 148 MMHG

## 2019-01-25 DIAGNOSIS — Z87.09 PERSONAL HISTORY OF OTHER DISEASES OF THE RESPIRATORY SYSTEM: ICD-10-CM

## 2019-01-25 PROCEDURE — 99214 OFFICE O/P EST MOD 30 MIN: CPT

## 2019-01-25 RX ORDER — CLONAZEPAM 1 MG/1
1 TABLET ORAL
Qty: 60 | Refills: 0 | Status: DISCONTINUED | COMMUNITY
Start: 2018-10-16

## 2019-01-25 RX ORDER — PAROXETINE HYDROCHLORIDE 20 MG/1
20 TABLET, FILM COATED ORAL
Qty: 30 | Refills: 0 | Status: DISCONTINUED | COMMUNITY
Start: 2018-09-14

## 2019-01-25 RX ORDER — DIAZEPAM 10 MG/1
10 TABLET ORAL
Qty: 90 | Refills: 0 | Status: DISCONTINUED | COMMUNITY
Start: 2018-12-04

## 2019-01-25 RX ORDER — ALPRAZOLAM 1 MG/1
1 TABLET ORAL
Qty: 90 | Refills: 0 | Status: DISCONTINUED | COMMUNITY
Start: 2018-11-13

## 2019-01-25 RX ORDER — LAMOTRIGINE 25 MG/1
25 TABLET ORAL
Qty: 30 | Refills: 0 | Status: DISCONTINUED | COMMUNITY
Start: 2018-12-04

## 2019-01-25 RX ORDER — CLONAZEPAM 0.5 MG/1
0.5 TABLET ORAL
Qty: 90 | Refills: 0 | Status: DISCONTINUED | COMMUNITY
Start: 2018-09-30

## 2019-01-25 RX ORDER — GABAPENTIN 100 MG/1
100 CAPSULE ORAL
Qty: 90 | Refills: 0 | Status: DISCONTINUED | COMMUNITY
Start: 2018-08-18

## 2019-01-25 RX ORDER — METOCLOPRAMIDE 5 MG/1
5 TABLET ORAL
Qty: 15 | Refills: 0 | Status: DISCONTINUED | COMMUNITY
Start: 2019-01-07

## 2019-01-25 RX ORDER — PAROXETINE HYDROCHLORIDE 40 MG/1
40 TABLET, FILM COATED ORAL
Qty: 30 | Refills: 0 | Status: DISCONTINUED | COMMUNITY
Start: 2017-12-09

## 2019-01-25 RX ORDER — BUPROPION HYDROCHLORIDE 150 MG/1
150 TABLET, EXTENDED RELEASE ORAL
Qty: 30 | Refills: 0 | Status: DISCONTINUED | COMMUNITY
Start: 2018-10-16

## 2019-01-25 RX ORDER — PAROXETINE HYDROCHLORIDE 30 MG/1
30 TABLET, FILM COATED ORAL
Qty: 30 | Refills: 0 | Status: ACTIVE | COMMUNITY
Start: 2018-12-04

## 2019-01-25 RX ORDER — MELOXICAM 7.5 MG/1
7.5 TABLET ORAL TWICE DAILY
Qty: 60 | Refills: 0 | Status: DISCONTINUED | COMMUNITY
Start: 2017-01-26 | End: 2019-01-25

## 2019-01-27 NOTE — HISTORY OF PRESENT ILLNESS
[Moderate] : moderate [___ Weeks ago] :  [unfilled] weeks ago [Paroxysmal] : paroxysmal [Congestion] : congestion [Cough] : cough [Fatigue] : fatigue [Rest] : rest [Activity] : with activity [At Night] : at night [Stable] : stable [Sore Throat] : no sore throat [Wheezing] : no wheezing [Chills] : no chills [Anorexia] : no anorexia [Shortness Of Breath] : no shortness of breath [Earache] : no earache [Headache] : no headache [Fever] : no fever [FreeTextEntry8] : NEEDS MEDS RENEWED FOR htn/ INSOMNIA AND DEPRESSION

## 2019-01-27 NOTE — PHYSICAL EXAM

## 2019-01-27 NOTE — PLAN
[FreeTextEntry1] : sinusitis- ceftin/ Nasacort\par .PND- Supportive and conservative therapies reviewed and advised: to consider:\par Increase fluids  and rest.\par Consider saline nasal spray/ irrigation  3-4 times daily\par Salt water gargles 3-4 times daily\par Cool mist humidifier. \par HAND WASHING/PURELL\par Acetaminophen/Advil for fever,  headache, myalgias\par \par

## 2019-01-29 ENCOUNTER — NON-APPOINTMENT (OUTPATIENT)
Age: 59
End: 2019-01-29

## 2019-01-29 ENCOUNTER — APPOINTMENT (OUTPATIENT)
Dept: FAMILY MEDICINE | Facility: CLINIC | Age: 59
End: 2019-01-29
Payer: MEDICAID

## 2019-01-29 VITALS
HEART RATE: 82 BPM | DIASTOLIC BLOOD PRESSURE: 78 MMHG | BODY MASS INDEX: 29.41 KG/M2 | SYSTOLIC BLOOD PRESSURE: 122 MMHG | WEIGHT: 183 LBS | HEIGHT: 66 IN

## 2019-01-29 PROCEDURE — 93000 ELECTROCARDIOGRAM COMPLETE: CPT

## 2019-01-29 PROCEDURE — 36415 COLL VENOUS BLD VENIPUNCTURE: CPT

## 2019-01-29 PROCEDURE — 99396 PREV VISIT EST AGE 40-64: CPT | Mod: 25

## 2019-01-29 PROCEDURE — 99214 OFFICE O/P EST MOD 30 MIN: CPT | Mod: 25

## 2019-01-30 NOTE — HISTORY OF PRESENT ILLNESS
[FreeTextEntry1] : CPE/ cough follow up [FreeTextEntry8] : as per ortho- 57 year old female presents with lower back and right leg pain x 6 months, getting worse. She complains of pain across her lower back radiating to the posterior thigh and right heel with occasional numbness and tingling of both legs. She experiences "shock waves" of pain radiating to both legs. She has difficulty turning in bed. She has tried stretching exercises, Motrin, and Tylenol with no improvement. She reports history of left THR April 2014 and right THR in October 2015 with Dr. Lyon.\par chart reviewed - looking for new MD\par needs BP meds and need to find new Psych as her insurance has changed \par Came late to office as her car broke down\par 01/2019- doing well with cough and congestion.\par lot of social and economic issues .\par needs meds ,need to see psych. wants to stay on clonidine 0.1 mg felt better overall mood wise.\par has dome mammo and colonoscopy but doesnot remember name of the hospital

## 2019-01-30 NOTE — COUNSELING
[Weight management counseling provided] : Weight management [Healthy eating counseling provided] : healthy eating [Activity counseling provided] : activity home

## 2019-01-30 NOTE — HEALTH RISK ASSESSMENT
[Fair] :  ~his/her~ mood as fair [] : No [No falls in past year] : Patient reported no falls in the past year [1] : 2) Feeling down, depressed, or hopeless for several days (1) [HIV test declined] : HIV test declined [Hepatitis C test offered] : Hepatitis C test offered [Change in mental status noted] : No change in mental status noted [Language] : denies difficulty with language [Handling Complex Tasks] : denies difficulty handling complex tasks [None] : None [With Family] : lives with family [Unemployed] : unemployed [] :  [Sexually Active] : sexually active [Feels Safe at Home] : Feels safe at home [Fully functional (bathing, dressing, toileting, transferring, walking, feeding)] : Fully functional (bathing, dressing, toileting, transferring, walking, feeding) [Fully functional (using the telephone, shopping, preparing meals, housekeeping, doing laundry, using] : Fully functional and needs no help or supervision to perform IADLs (using the telephone, shopping, preparing meals, housekeeping, doing laundry, using transportation, managing medications and managing finances) [Reports changes in hearing] : Reports no changes in hearing [Reports changes in vision] : Reports no changes in vision [Reports changes in dental health] : Reports no changes in dental health [Smoke Detector] : smoke detector [Safety elements used in home] : safety elements used in home [Seat Belt] :  uses seat belt [TB Exposure] : is not being exposed to tuberculosis [MammogramDate] : need new [ColonoscopyDate] : has done donot remember name of [facility] [Discussed at today's visit] : Advance Directives Discussed at today's visit

## 2019-01-30 NOTE — PLAN
[FreeTextEntry1] : HM- lab/mammo/ get colonoscopy report once patient tells us name of the hospital\par FIT testing\par controlled HTN- renew meds/ restart clonidine 0.1 mg TID and hold amlodipine/ she will let me know how she feels\par anxious - need to see psych/ advise trying at [facility] unable to find psych since her insurance has changed\par advise to have low salt diet /\par \par Follow up 1 month

## 2019-02-11 ENCOUNTER — RX RENEWAL (OUTPATIENT)
Age: 59
End: 2019-02-11

## 2019-02-26 ENCOUNTER — APPOINTMENT (OUTPATIENT)
Dept: FAMILY MEDICINE | Facility: CLINIC | Age: 59
End: 2019-02-26
Payer: MEDICAID

## 2019-02-26 VITALS
BODY MASS INDEX: 29.41 KG/M2 | DIASTOLIC BLOOD PRESSURE: 76 MMHG | SYSTOLIC BLOOD PRESSURE: 108 MMHG | HEART RATE: 76 BPM | WEIGHT: 183 LBS | HEIGHT: 66 IN

## 2019-02-26 LAB
25(OH)D3 SERPL-MCNC: 36.6 NG/ML
ALBUMIN SERPL ELPH-MCNC: 4.3 G/DL
ALP BLD-CCNC: 86 U/L
ALT SERPL-CCNC: 16 U/L
ANION GAP SERPL CALC-SCNC: 14 MMOL/L
AST SERPL-CCNC: 18 U/L
BASOPHILS # BLD AUTO: 0.01 K/UL
BASOPHILS NFR BLD AUTO: 0.2 %
BILIRUB SERPL-MCNC: <0.2 MG/DL
BUN SERPL-MCNC: 14 MG/DL
CALCIUM SERPL-MCNC: 10 MG/DL
CHLORIDE SERPL-SCNC: 99 MMOL/L
CHOLEST SERPL-MCNC: 237 MG/DL
CHOLEST/HDLC SERPL: 5.2 RATIO
CO2 SERPL-SCNC: 28 MMOL/L
CREAT SERPL-MCNC: 1.02 MG/DL
EOSINOPHIL # BLD AUTO: 0.1 K/UL
EOSINOPHIL NFR BLD AUTO: 1.9 %
FERRITIN SERPL-MCNC: 45 NG/ML
FOLATE SERPL-MCNC: 16.9 NG/ML
GLUCOSE SERPL-MCNC: 106 MG/DL
HBA1C MFR BLD HPLC: 5.7 %
HCT VFR BLD CALC: 36.9 %
HCV AB SER QL: NONREACTIVE
HCV S/CO RATIO: 0.12 S/CO
HDLC SERPL-MCNC: 46 MG/DL
HGB BLD-MCNC: 11.2 G/DL
IMM GRANULOCYTES NFR BLD AUTO: 0.2 %
IRON SATN MFR SERPL: 17 %
IRON SERPL-MCNC: 56 UG/DL
LDLC SERPL CALC-MCNC: 168 MG/DL
LYMPHOCYTES # BLD AUTO: 2.14 K/UL
LYMPHOCYTES NFR BLD AUTO: 41 %
MAN DIFF?: NORMAL
MCHC RBC-ENTMCNC: 28.4 PG
MCHC RBC-ENTMCNC: 30.4 GM/DL
MCV RBC AUTO: 93.4 FL
MONOCYTES # BLD AUTO: 0.42 K/UL
MONOCYTES NFR BLD AUTO: 8 %
NEUTROPHILS # BLD AUTO: 2.54 K/UL
NEUTROPHILS NFR BLD AUTO: 48.7 %
PLATELET # BLD AUTO: 314 K/UL
POTASSIUM SERPL-SCNC: 4.6 MMOL/L
PROT SERPL-MCNC: 6.9 G/DL
RBC # BLD: 3.95 M/UL
RBC # FLD: 15.4 %
SODIUM SERPL-SCNC: 141 MMOL/L
T4 FREE SERPL-MCNC: 1.1 NG/DL
TIBC SERPL-MCNC: 339 UG/DL
TRIGL SERPL-MCNC: 113 MG/DL
TSH SERPL-ACNC: 2.48 UIU/ML
UIBC SERPL-MCNC: 283 UG/DL
VIT B12 SERPL-MCNC: 1794 PG/ML
WBC # FLD AUTO: 5.22 K/UL

## 2019-02-26 PROCEDURE — 99214 OFFICE O/P EST MOD 30 MIN: CPT

## 2019-02-26 NOTE — HISTORY OF PRESENT ILLNESS
[FreeTextEntry1] : CPE/ cough follow up [FreeTextEntry8] : as per ortho- 57 year old female presents with lower back and right leg pain x 6 months, getting worse. She complains of pain across her lower back radiating to the posterior thigh and right heel with occasional numbness and tingling of both legs. She experiences "shock waves" of pain radiating to both legs. She has difficulty turning in bed. She has tried stretching exercises, Motrin, and Tylenol with no improvement. She reports history of left THR April 2014 and right THR in October 2015 with Dr. Lyon.\par chart reviewed - looking for new MD\par needs BP meds and need to find new Psych as her insurance has changed \par Came late to office as her car broke down\par 01/2019- doing well with cough and congestion.\par lot of social and economic issues .\par needs meds ,need to see psych. wants to stay on clonidine 0.1 mg felt better overall mood wise.\par has dome mammo and colonoscopy but doesnot remember name of the hospital\par 02/2019- patient is requesting Clonodine and gabapentin along with diazepam states she gets panic attacks few times per day she is  packing sec to Foreclosure and has no place to go.\par she has found a psychiatrist has appt next week at [facility].\par states she has dome mammo at [facility] recently but there is no report.\par also states has done colonoscopy in [facility]- unable to give exact address\par not working as has lot of Body ache

## 2019-02-26 NOTE — PLAN
[FreeTextEntry1] : Anxious/ drug seeking behavior- I STOP shows patient has gotten her Diazepam iN 02/2019- and not suppose to get it until 03/2019/ also last month got 1 week early. Patient advised to follow up with psych.\par Mammo now here to be found suspect did not do it.\par renew clonodine and gabapentin.\par follow up as needed/ labs are normal\par \par HM- lab/mammo/ get colonoscopy report once patient tells us name of the hospital\par FIT testing\par controlled HTN- renew meds/ restart clonidine 0.1 mg TID and hold amlodipine/ she will let me know how she feels\par anxious - need to see psych/ advise trying at [facility] unable to find psych since her insurance has changed\par advise to have low salt diet /\par \par Follow up 1 month

## 2019-02-26 NOTE — HEALTH RISK ASSESSMENT
[] : No [No falls in past year] : Patient reported no falls in the past year [1] : 2) Feeling down, depressed, or hopeless for several days (1) [Fair] :  ~his/her~ mood as fair [HIV test declined] : HIV test declined [Hepatitis C test offered] : Hepatitis C test offered [Change in mental status noted] : No change in mental status noted [Language] : denies difficulty with language [Handling Complex Tasks] : denies difficulty handling complex tasks [None] : None [With Family] : lives with family [Unemployed] : unemployed [] :  [Sexually Active] : sexually active [Feels Safe at Home] : Feels safe at home [Fully functional (bathing, dressing, toileting, transferring, walking, feeding)] : Fully functional (bathing, dressing, toileting, transferring, walking, feeding) [Fully functional (using the telephone, shopping, preparing meals, housekeeping, doing laundry, using] : Fully functional and needs no help or supervision to perform IADLs (using the telephone, shopping, preparing meals, housekeeping, doing laundry, using transportation, managing medications and managing finances) [Reports changes in hearing] : Reports no changes in hearing [Reports changes in vision] : Reports no changes in vision [Reports changes in dental health] : Reports no changes in dental health [Smoke Detector] : smoke detector [Safety elements used in home] : safety elements used in home [Seat Belt] :  uses seat belt [TB Exposure] : is not being exposed to tuberculosis [MammogramDate] : need new [ColonoscopyDate] : has done donot remember name of [facility] [Discussed at today's visit] : Advance Directives Discussed at today's visit

## 2019-03-02 NOTE — BEHAVIORAL HEALTH ASSESSMENT NOTE - GROOMING
SHAE MACIEL  : 1941 09:53:19  ACCOUNT:  [id number]  HOME PHONE:  [phone]  WORK PHONE:  [phone]  fax received from chemo RN with vitals  temp 97.8  HR 71  RR 18  /76    pt called and advised to continue with current meds and home bp monitoring.lc      
Good

## 2019-04-03 ENCOUNTER — APPOINTMENT (OUTPATIENT)
Dept: FAMILY MEDICINE | Facility: CLINIC | Age: 59
End: 2019-04-03
Payer: MEDICAID

## 2019-04-03 VITALS
HEIGHT: 66 IN | WEIGHT: 183 LBS | SYSTOLIC BLOOD PRESSURE: 110 MMHG | HEART RATE: 74 BPM | BODY MASS INDEX: 29.41 KG/M2 | DIASTOLIC BLOOD PRESSURE: 72 MMHG

## 2019-04-03 PROCEDURE — 99213 OFFICE O/P EST LOW 20 MIN: CPT

## 2019-04-05 ENCOUNTER — APPOINTMENT (OUTPATIENT)
Dept: DERMATOLOGY | Facility: CLINIC | Age: 59
End: 2019-04-05
Payer: MEDICAID

## 2019-04-05 PROCEDURE — 99202 OFFICE O/P NEW SF 15 MIN: CPT

## 2019-04-21 NOTE — PLAN
[FreeTextEntry1] : HTN- continue meds/ follow up 3 months\par Anxious/ drug seeking behavior- I STOP shows patient has gotten her Diazepam iN 02/2019- and not suppose to get it until 03/2019/ also last month got 1 week early. Patient advised to follow up with psych.\par Mammo now here to be found suspect did not do it.\par renew clonodine and gabapentin.\par follow up as needed/ labs are normal\par \par HM- lab/mammo/ get colonoscopy report once patient tells us name of the hospital\par FIT testing\par controlled HTN- renew meds/ restart clonidine 0.1 mg TID and hold amlodipine/ she will let me know how she feels\par anxious - need to see psych/ advise trying at [facility] unable to find psych since her insurance has changed\par advise to have low salt diet /\par \par Follow up 1 month

## 2019-04-21 NOTE — HEALTH RISK ASSESSMENT
[No falls in past year] : Patient reported no falls in the past year [1] : 2) Feeling down, depressed, or hopeless for several days (1) [Fair] :  ~his/her~ mood as fair [HIV test declined] : HIV test declined [Hepatitis C test offered] : Hepatitis C test offered [None] : None [With Family] : lives with family [Unemployed] : unemployed [] :  [Sexually Active] : sexually active [Feels Safe at Home] : Feels safe at home [Fully functional (bathing, dressing, toileting, transferring, walking, feeding)] : Fully functional (bathing, dressing, toileting, transferring, walking, feeding) [Fully functional (using the telephone, shopping, preparing meals, housekeeping, doing laundry, using] : Fully functional and needs no help or supervision to perform IADLs (using the telephone, shopping, preparing meals, housekeeping, doing laundry, using transportation, managing medications and managing finances) [Smoke Detector] : smoke detector [Safety elements used in home] : safety elements used in home [Seat Belt] :  uses seat belt [Discussed at today's visit] : Advance Directives Discussed at today's visit [] : No [Language] : denies difficulty with language [Change in mental status noted] : No change in mental status noted [Handling Complex Tasks] : denies difficulty handling complex tasks [Reports changes in hearing] : Reports no changes in hearing [Reports changes in dental health] : Reports no changes in dental health [Reports changes in vision] : Reports no changes in vision [TB Exposure] : is not being exposed to tuberculosis [MammogramDate] : need new [ColonoscopyDate] : has done donot remember name of [facility]

## 2019-04-21 NOTE — HISTORY OF PRESENT ILLNESS
[FreeTextEntry1] : HTN/ fatigue [FreeTextEntry8] : as per ortho- 57 year old female presents with lower back and right leg pain x 6 months, getting worse. She complains of pain across her lower back radiating to the posterior thigh and right heel with occasional numbness and tingling of both legs. She experiences "shock waves" of pain radiating to both legs. She has difficulty turning in bed. She has tried stretching exercises, Motrin, and Tylenol with no improvement. She reports history of left THR April 2014 and right THR in October 2015 with Dr. Lyon.\par chart reviewed - looking for new MD\par needs BP meds and need to find new Psych as her insurance has changed \par Came late to office as her car broke down\par 01/2019- doing well with cough and congestion.\par lot of social and economic issues .\par needs meds ,need to see psych. wants to stay on clonidine 0.1 mg felt better overall mood wise.\par has dome mammo and colonoscopy but doesnot remember name of the hospital\par 02/2019- patient is requesting Clonodine and gabapentin along with diazepam states she gets panic attacks few times per day she is  packing sec to Foreclosure and has no place to go.\par she has found a psychiatrist has appt next week at [facility].\par states she has dome mammo at [facility] recently but there is no report.\par also states has done colonoscopy in [facility]- unable to give exact address\par not working as has lot of Body ache\par \par 03/2019- fatigue/ taking meds\par - Hospital Course	 \par Patient is a 57 yo female with PMHx of substance use disorder (Xanax and \par alcohol), anxiety, and depression with history of suicide attempt in 2/2018 and \par cholecystectomy secondary to stones presents to the er with 2-3 day history of \par 10 out of 10 ruq abd pain similar to the pain in the past. Patient states the \par RUQ abd pain is associated with vomiting. Pt was worked up in the er and found \par to have choledocholithiasis and is planned for urgent ercp. \par \par patient had ercp on 12/31: \par \par - Operative Findings	dilated cbd \par small filling defect \par s/p balloon sweeps 9-12 mm balloon \par s/p sphincteroplasty 10 -11 mm CRE \par removal of sludge \par sprayed diluted epi \par \par \par patient started on clear liquid diet , cont to have pain post ERCP dx ed with \par pancreatitis NPO, pain meds given , slowly improving , diet advanced , \par tolerated . Pt is with atypical chest pain < CTA of chest performed negative \par for PE , resolved,. blood pressure elevated at times, home meds restarted \par controlled now, hypokalemia replaced \par \par 04/2019- Needs meds renewal\par feels about same\par

## 2019-05-01 ENCOUNTER — OUTPATIENT (OUTPATIENT)
Dept: OUTPATIENT SERVICES | Facility: HOSPITAL | Age: 59
LOS: 1 days | End: 2019-05-01
Payer: MEDICAID

## 2019-05-01 DIAGNOSIS — Z96.60 PRESENCE OF UNSPECIFIED ORTHOPEDIC JOINT IMPLANT: Chronic | ICD-10-CM

## 2019-05-01 DIAGNOSIS — Z98.51 TUBAL LIGATION STATUS: Chronic | ICD-10-CM

## 2019-05-01 DIAGNOSIS — Z90.49 ACQUIRED ABSENCE OF OTHER SPECIFIED PARTS OF DIGESTIVE TRACT: Chronic | ICD-10-CM

## 2019-05-05 ENCOUNTER — INPATIENT (INPATIENT)
Facility: HOSPITAL | Age: 59
LOS: 3 days | Discharge: HOME | DRG: 440 | End: 2019-05-09
Attending: INTERNAL MEDICINE | Admitting: STUDENT IN AN ORGANIZED HEALTH CARE EDUCATION/TRAINING PROGRAM
Payer: MEDICAID

## 2019-05-05 VITALS
DIASTOLIC BLOOD PRESSURE: 117 MMHG | SYSTOLIC BLOOD PRESSURE: 167 MMHG | HEART RATE: 94 BPM | OXYGEN SATURATION: 99 % | RESPIRATION RATE: 18 BRPM | HEIGHT: 66 IN | WEIGHT: 175.05 LBS | TEMPERATURE: 99 F

## 2019-05-05 DIAGNOSIS — Z96.60 PRESENCE OF UNSPECIFIED ORTHOPEDIC JOINT IMPLANT: Chronic | ICD-10-CM

## 2019-05-05 DIAGNOSIS — K86.1 OTHER CHRONIC PANCREATITIS: ICD-10-CM

## 2019-05-05 DIAGNOSIS — Z90.49 ACQUIRED ABSENCE OF OTHER SPECIFIED PARTS OF DIGESTIVE TRACT: Chronic | ICD-10-CM

## 2019-05-05 DIAGNOSIS — Z98.51 TUBAL LIGATION STATUS: Chronic | ICD-10-CM

## 2019-05-05 LAB
ALBUMIN SERPL ELPH-MCNC: 4.9 G/DL — SIGNIFICANT CHANGE UP (ref 3.3–5.2)
ALP SERPL-CCNC: 104 U/L — SIGNIFICANT CHANGE UP (ref 40–120)
ALT FLD-CCNC: 18 U/L — SIGNIFICANT CHANGE UP
ANION GAP SERPL CALC-SCNC: 14 MMOL/L — SIGNIFICANT CHANGE UP (ref 5–17)
ANION GAP SERPL CALC-SCNC: 18 MMOL/L — HIGH (ref 5–17)
APPEARANCE UR: CLEAR — SIGNIFICANT CHANGE UP
AST SERPL-CCNC: 22 U/L — SIGNIFICANT CHANGE UP
BACTERIA # UR AUTO: ABNORMAL
BASE EXCESS BLDV CALC-SCNC: 2.1 MMOL/L — HIGH (ref -2–2)
BASOPHILS # BLD AUTO: 0 K/UL — SIGNIFICANT CHANGE UP (ref 0–0.2)
BASOPHILS NFR BLD AUTO: 0.1 % — SIGNIFICANT CHANGE UP (ref 0–2)
BILIRUB SERPL-MCNC: 0.3 MG/DL — LOW (ref 0.4–2)
BILIRUB UR-MCNC: NEGATIVE — SIGNIFICANT CHANGE UP
BUN SERPL-MCNC: 19 MG/DL — SIGNIFICANT CHANGE UP (ref 8–20)
BUN SERPL-MCNC: 26 MG/DL — HIGH (ref 8–20)
CA-I SERPL-SCNC: 1.24 MMOL/L — SIGNIFICANT CHANGE UP (ref 1.15–1.33)
CALCIUM SERPL-MCNC: 10.8 MG/DL — HIGH (ref 8.6–10.2)
CALCIUM SERPL-MCNC: 9.9 MG/DL — SIGNIFICANT CHANGE UP (ref 8.6–10.2)
CHLORIDE BLDV-SCNC: 106 MMOL/L — SIGNIFICANT CHANGE UP (ref 98–107)
CHLORIDE SERPL-SCNC: 102 MMOL/L — SIGNIFICANT CHANGE UP (ref 98–107)
CHLORIDE SERPL-SCNC: 102 MMOL/L — SIGNIFICANT CHANGE UP (ref 98–107)
CO2 SERPL-SCNC: 21 MMOL/L — LOW (ref 22–29)
CO2 SERPL-SCNC: 27 MMOL/L — SIGNIFICANT CHANGE UP (ref 22–29)
COLOR SPEC: YELLOW — SIGNIFICANT CHANGE UP
CREAT SERPL-MCNC: 0.88 MG/DL — SIGNIFICANT CHANGE UP (ref 0.5–1.3)
CREAT SERPL-MCNC: 1.21 MG/DL — SIGNIFICANT CHANGE UP (ref 0.5–1.3)
DIFF PNL FLD: ABNORMAL
EOSINOPHIL # BLD AUTO: 0 K/UL — SIGNIFICANT CHANGE UP (ref 0–0.5)
EOSINOPHIL NFR BLD AUTO: 0 % — SIGNIFICANT CHANGE UP (ref 0–6)
EPI CELLS # UR: SIGNIFICANT CHANGE UP
GAS PNL BLDV: 147 MMOL/L — HIGH (ref 135–145)
GAS PNL BLDV: SIGNIFICANT CHANGE UP
GAS PNL BLDV: SIGNIFICANT CHANGE UP
GLUCOSE BLDV-MCNC: 161 MG/DL — HIGH (ref 70–99)
GLUCOSE SERPL-MCNC: 124 MG/DL — HIGH (ref 70–115)
GLUCOSE SERPL-MCNC: 164 MG/DL — HIGH (ref 70–115)
GLUCOSE UR QL: 50 MG/DL
HCO3 BLDV-SCNC: 26 MMOL/L — SIGNIFICANT CHANGE UP (ref 21–29)
HCT VFR BLD CALC: 39.4 % — SIGNIFICANT CHANGE UP (ref 37–47)
HCT VFR BLDA CALC: 40 — SIGNIFICANT CHANGE UP (ref 39–50)
HGB BLD CALC-MCNC: 13.2 G/DL — SIGNIFICANT CHANGE UP (ref 11.5–15.5)
HGB BLD-MCNC: 12.7 G/DL — SIGNIFICANT CHANGE UP (ref 12–16)
KETONES UR-MCNC: NEGATIVE — SIGNIFICANT CHANGE UP
LACTATE BLDV-MCNC: 1.7 MMOL/L — SIGNIFICANT CHANGE UP (ref 0.5–2)
LEUKOCYTE ESTERASE UR-ACNC: NEGATIVE — SIGNIFICANT CHANGE UP
LIDOCAIN IGE QN: 59 U/L — HIGH (ref 22–51)
LYMPHOCYTES # BLD AUTO: 1.5 K/UL — SIGNIFICANT CHANGE UP (ref 1–4.8)
LYMPHOCYTES # BLD AUTO: 13.4 % — LOW (ref 20–55)
MCHC RBC-ENTMCNC: 28.5 PG — SIGNIFICANT CHANGE UP (ref 27–31)
MCHC RBC-ENTMCNC: 32.2 G/DL — SIGNIFICANT CHANGE UP (ref 32–36)
MCV RBC AUTO: 88.3 FL — SIGNIFICANT CHANGE UP (ref 81–99)
MONOCYTES # BLD AUTO: 0.7 K/UL — SIGNIFICANT CHANGE UP (ref 0–0.8)
MONOCYTES NFR BLD AUTO: 6 % — SIGNIFICANT CHANGE UP (ref 3–10)
NEUTROPHILS # BLD AUTO: 9.1 K/UL — HIGH (ref 1.8–8)
NEUTROPHILS NFR BLD AUTO: 80.3 % — HIGH (ref 37–73)
NITRITE UR-MCNC: NEGATIVE — SIGNIFICANT CHANGE UP
OTHER CELLS CSF MANUAL: 16 ML/DL — LOW (ref 18–22)
PCO2 BLDV: 39 MMHG — SIGNIFICANT CHANGE UP (ref 35–50)
PH BLDV: 7.44 — HIGH (ref 7.32–7.43)
PH UR: 6.5 — SIGNIFICANT CHANGE UP (ref 5–8)
PLATELET # BLD AUTO: 374 K/UL — SIGNIFICANT CHANGE UP (ref 150–400)
PO2 BLDV: 56 MMHG — HIGH (ref 25–45)
POTASSIUM BLDV-SCNC: 4.3 MMOL/L — SIGNIFICANT CHANGE UP (ref 3.4–4.5)
POTASSIUM SERPL-MCNC: 4 MMOL/L — SIGNIFICANT CHANGE UP (ref 3.5–5.3)
POTASSIUM SERPL-MCNC: 4.4 MMOL/L — SIGNIFICANT CHANGE UP (ref 3.5–5.3)
POTASSIUM SERPL-SCNC: 4 MMOL/L — SIGNIFICANT CHANGE UP (ref 3.5–5.3)
POTASSIUM SERPL-SCNC: 4.4 MMOL/L — SIGNIFICANT CHANGE UP (ref 3.5–5.3)
PROT SERPL-MCNC: 8.8 G/DL — HIGH (ref 6.6–8.7)
PROT UR-MCNC: 100 MG/DL
RBC # BLD: 4.46 M/UL — SIGNIFICANT CHANGE UP (ref 4.4–5.2)
RBC # FLD: 15.4 % — SIGNIFICANT CHANGE UP (ref 11–15.6)
RBC CASTS # UR COMP ASSIST: ABNORMAL /HPF (ref 0–4)
SAO2 % BLDV: 90 % — SIGNIFICANT CHANGE UP
SODIUM SERPL-SCNC: 141 MMOL/L — SIGNIFICANT CHANGE UP (ref 135–145)
SODIUM SERPL-SCNC: 143 MMOL/L — SIGNIFICANT CHANGE UP (ref 135–145)
SP GR SPEC: 1 — LOW (ref 1.01–1.02)
UROBILINOGEN FLD QL: NEGATIVE MG/DL — SIGNIFICANT CHANGE UP
WBC # BLD: 11.3 K/UL — HIGH (ref 4.8–10.8)
WBC # FLD AUTO: 11.3 K/UL — HIGH (ref 4.8–10.8)
WBC UR QL: SIGNIFICANT CHANGE UP

## 2019-05-05 PROCEDURE — 99223 1ST HOSP IP/OBS HIGH 75: CPT

## 2019-05-05 PROCEDURE — 99234 HOSP IP/OBS SM DT SF/LOW 45: CPT

## 2019-05-05 PROCEDURE — 74177 CT ABD & PELVIS W/CONTRAST: CPT | Mod: 26

## 2019-05-05 RX ORDER — SODIUM CHLORIDE 9 MG/ML
1000 INJECTION, SOLUTION INTRAVENOUS
Qty: 0 | Refills: 0 | Status: DISCONTINUED | OUTPATIENT
Start: 2019-05-05 | End: 2019-05-08

## 2019-05-05 RX ORDER — ONDANSETRON 8 MG/1
4 TABLET, FILM COATED ORAL ONCE
Qty: 0 | Refills: 0 | Status: COMPLETED | OUTPATIENT
Start: 2019-05-05 | End: 2019-05-05

## 2019-05-05 RX ORDER — DIAZEPAM 5 MG
5 TABLET ORAL
Qty: 0 | Refills: 0 | Status: DISCONTINUED | OUTPATIENT
Start: 2019-05-05 | End: 2019-05-09

## 2019-05-05 RX ORDER — HYDROMORPHONE HYDROCHLORIDE 2 MG/ML
1 INJECTION INTRAMUSCULAR; INTRAVENOUS; SUBCUTANEOUS ONCE
Qty: 0 | Refills: 0 | Status: DISCONTINUED | OUTPATIENT
Start: 2019-05-05 | End: 2019-05-05

## 2019-05-05 RX ORDER — HYDROMORPHONE HYDROCHLORIDE 2 MG/ML
0.5 INJECTION INTRAMUSCULAR; INTRAVENOUS; SUBCUTANEOUS ONCE
Qty: 0 | Refills: 0 | Status: DISCONTINUED | OUTPATIENT
Start: 2019-05-05 | End: 2019-05-05

## 2019-05-05 RX ORDER — BUPROPION HYDROCHLORIDE 150 MG/1
1 TABLET, EXTENDED RELEASE ORAL
Qty: 7 | Refills: 0
Start: 2019-05-05 | End: 2019-05-11

## 2019-05-05 RX ORDER — BUPROPION HYDROCHLORIDE 150 MG/1
150 TABLET, EXTENDED RELEASE ORAL DAILY
Qty: 0 | Refills: 0 | Status: DISCONTINUED | OUTPATIENT
Start: 2019-05-05 | End: 2019-05-09

## 2019-05-05 RX ORDER — VENLAFAXINE HCL 75 MG
150 CAPSULE, EXT RELEASE 24 HR ORAL DAILY
Qty: 0 | Refills: 0 | Status: DISCONTINUED | OUTPATIENT
Start: 2019-05-05 | End: 2019-05-09

## 2019-05-05 RX ORDER — GABAPENTIN 400 MG/1
300 CAPSULE ORAL DAILY
Qty: 0 | Refills: 0 | Status: DISCONTINUED | OUTPATIENT
Start: 2019-05-05 | End: 2019-05-09

## 2019-05-05 RX ORDER — TRAZODONE HCL 50 MG
50 TABLET ORAL AT BEDTIME
Qty: 0 | Refills: 0 | Status: DISCONTINUED | OUTPATIENT
Start: 2019-05-05 | End: 2019-05-09

## 2019-05-05 RX ORDER — SODIUM CHLORIDE 9 MG/ML
1000 INJECTION, SOLUTION INTRAVENOUS ONCE
Qty: 0 | Refills: 0 | Status: COMPLETED | OUTPATIENT
Start: 2019-05-05 | End: 2019-05-05

## 2019-05-05 RX ORDER — MORPHINE SULFATE 50 MG/1
4 CAPSULE, EXTENDED RELEASE ORAL ONCE
Qty: 0 | Refills: 0 | Status: DISCONTINUED | OUTPATIENT
Start: 2019-05-05 | End: 2019-05-05

## 2019-05-05 RX ORDER — METOCLOPRAMIDE HCL 10 MG
10 TABLET ORAL ONCE
Qty: 0 | Refills: 0 | Status: COMPLETED | OUTPATIENT
Start: 2019-05-05 | End: 2019-05-05

## 2019-05-05 RX ADMIN — GABAPENTIN 300 MILLIGRAM(S): 400 CAPSULE ORAL at 17:20

## 2019-05-05 RX ADMIN — Medication 5 MILLIGRAM(S): at 17:20

## 2019-05-05 RX ADMIN — MORPHINE SULFATE 4 MILLIGRAM(S): 50 CAPSULE, EXTENDED RELEASE ORAL at 12:00

## 2019-05-05 RX ADMIN — Medication 10 MILLIGRAM(S): at 11:44

## 2019-05-05 RX ADMIN — ONDANSETRON 4 MILLIGRAM(S): 8 TABLET, FILM COATED ORAL at 22:30

## 2019-05-05 RX ADMIN — HYDROMORPHONE HYDROCHLORIDE 1 MILLIGRAM(S): 2 INJECTION INTRAMUSCULAR; INTRAVENOUS; SUBCUTANEOUS at 19:08

## 2019-05-05 RX ADMIN — ONDANSETRON 4 MILLIGRAM(S): 8 TABLET, FILM COATED ORAL at 09:13

## 2019-05-05 RX ADMIN — MORPHINE SULFATE 4 MILLIGRAM(S): 50 CAPSULE, EXTENDED RELEASE ORAL at 11:43

## 2019-05-05 RX ADMIN — Medication 10 MILLIGRAM(S): at 17:20

## 2019-05-05 RX ADMIN — HYDROMORPHONE HYDROCHLORIDE 0.5 MILLIGRAM(S): 2 INJECTION INTRAMUSCULAR; INTRAVENOUS; SUBCUTANEOUS at 23:31

## 2019-05-05 RX ADMIN — ONDANSETRON 4 MILLIGRAM(S): 8 TABLET, FILM COATED ORAL at 19:04

## 2019-05-05 RX ADMIN — Medication 0.1 MILLIGRAM(S): at 23:34

## 2019-05-05 RX ADMIN — SODIUM CHLORIDE 3000 MILLILITER(S): 9 INJECTION, SOLUTION INTRAVENOUS at 09:25

## 2019-05-05 RX ADMIN — SODIUM CHLORIDE 125 MILLILITER(S): 9 INJECTION, SOLUTION INTRAVENOUS at 23:31

## 2019-05-05 RX ADMIN — ONDANSETRON 4 MILLIGRAM(S): 8 TABLET, FILM COATED ORAL at 16:16

## 2019-05-05 RX ADMIN — SODIUM CHLORIDE 125 MILLILITER(S): 9 INJECTION, SOLUTION INTRAVENOUS at 18:12

## 2019-05-05 RX ADMIN — HYDROMORPHONE HYDROCHLORIDE 1 MILLIGRAM(S): 2 INJECTION INTRAMUSCULAR; INTRAVENOUS; SUBCUTANEOUS at 17:02

## 2019-05-05 RX ADMIN — SODIUM CHLORIDE 1000 MILLILITER(S): 9 INJECTION, SOLUTION INTRAVENOUS at 17:02

## 2019-05-05 RX ADMIN — HYDROMORPHONE HYDROCHLORIDE 1 MILLIGRAM(S): 2 INJECTION INTRAMUSCULAR; INTRAVENOUS; SUBCUTANEOUS at 19:03

## 2019-05-05 RX ADMIN — HYDROMORPHONE HYDROCHLORIDE 1 MILLIGRAM(S): 2 INJECTION INTRAMUSCULAR; INTRAVENOUS; SUBCUTANEOUS at 16:16

## 2019-05-05 NOTE — ED ADULT NURSE REASSESSMENT NOTE - NS ED NURSE REASSESS COMMENT FT1
Pt presents to ED with multiple episodes of nausea and vomiting worsening since Thursday, last vomited approximately two hours ago, pt reports partial pain relief now 5/10 constant pressure especially right side of abdomen. Pt verbalized improvement in nausea and vomiting since arrival in ED. Pt stated history of cholecystectomy at [facility] 2-4 years ago, stated history of HTN. Pt denies CP/SOB. Breathing spontaneous, unlabored. Breath sounds clear and equal B/L. Skin warm, dry, intact, color WNL. Pt afebrile, VSS, in NAD, plan of care explained, pt verbalized understanding. Will continue to monitor. Pt presents to ED with multiple episodes of nausea and vomiting worsening since Thursday, last vomited approximately two hours ago, pt reports partial pain relief now 5/10 constant pressure especially right side of abdomen, soft, nondistended, tender. Pt stated she has solid bowel movements on Friday and Saturday. Pt verbalized some improvement in nausea and vomiting since arrival in ED. Pt stated history of cholecystectomy at [facility] 2-4 years ago, stated history of HTN. Pt denies CP/SOB. Breathing spontaneous, unlabored. Breath sounds clear and equal B/L. Skin warm, dry, intact, color WNL. Pt afebrile, VSS, in NAD, plan of care explained, pt verbalized understanding. Will continue to monitor.

## 2019-05-05 NOTE — ED CDU PROVIDER DISPOSITION NOTE - CLINICAL COURSE
Patient 57 y/o female placed in observation for pancreatitis, despite anti-emetics, pain medication unable to tolerate PO, intractable vomiting, anorexia admit to medicine

## 2019-05-05 NOTE — ED ADULT NURSE NOTE - NSIMPLEMENTINTERV_GEN_ALL_ED
Implemented All Universal Safety Interventions:  [place] to call system. Call bell, personal items and telephone within reach. Instruct patient to call for assistance. Room bathroom lighting operational. Non-slip footwear when patient is off stretcher. Physically safe environment: no spills, clutter or unnecessary equipment. Stretcher in lowest position, wheels locked, appropriate side rails in place.

## 2019-05-05 NOTE — ED ADULT NURSE REASSESSMENT NOTE - NS ED NURSE REASSESS COMMENT FT1
patient rec'd at 1930 patient alert and cooertaive iv to RAC intact and infusing well. no redness or swelling noted v/s taken and charted patient resting nad noted will monitor

## 2019-05-05 NOTE — ED ADULT NURSE REASSESSMENT NOTE - NS ED NURSE REASSESS COMMENT FT1
patient failed po challenge patient medciated with Zofran and c/o of more pain PA made aware and will monitor for further orders

## 2019-05-05 NOTE — ED CDU PROVIDER INITIAL DAY NOTE - OBJECTIVE STATEMENT
59 y/o F with PMH of chronic pancreatitis - (pt had cholecystectomy 2 years ago at [facility] and 2 subsequent procedures for removal of retained stones) c/o abdominal pain, nausea/vomiting x 1 day.  Denies chest pain, difficulty breathing, fever or any other complaints.

## 2019-05-05 NOTE — ED CDU PROVIDER INITIAL DAY NOTE - NS ED ROS FT
General: no fever/chills  HEENT: no difficulty swallowing  Respiratory: no shortness of breath or cough  Cardiac: no chest pain or palpitations  Abdomen: + abdominal pain + vomiting  Musculoskeletal: no neck pain or back pain  Neuro: no LOC, no seizures, no weaknesses  Skin: no lesions or rashes

## 2019-05-05 NOTE — ED ADULT NURSE NOTE - OBJECTIVE STATEMENT
Patient is a 58 year old female, A&Ox4, c/o nausea and vomiting. Patient states she has been vomiting since Friday. Complaining of abdominal pain that is on the right side of her abdomen that radiates to the left side. Patient describes the pain as a cramping. Pain is 9/10. Patient states, "It feels like I have a stone again or an ulcer." Patient has had a cholecystectomy 2 years ago. Patient states, "they removed a gallstone in January". Patient denies diarrhea and constipation.

## 2019-05-05 NOTE — ED ADULT NURSE REASSESSMENT NOTE - NS ED NURSE REASSESS COMMENT FT1
Pt c/o increased abd pain, nausea; pt vomited x 1; pt hypertensive. PAKO Davis notified and acknowledge. Will administer medications as ordered.

## 2019-05-05 NOTE — ED ADULT NURSE NOTE - CHIEF COMPLAINT QUOTE
Pt brought in by ambulance for eval of persistent nausea and vomiting since Friday evening. Pt c/o intermittent waves of  abd pain, c/o po intolerance. Pt unable to take BP meds secondary to vomiting. +vomiting at triage.

## 2019-05-05 NOTE — ED PROVIDER NOTE - PROGRESS NOTE DETAILS
pancreatitis on ct spoke to pt she is still in pain will give analgesia zofran; obs for pain control; pneumobilia on ct? suggestive of sphincterectomy/procedure--possibly from stone retreval in the past --spke to cdu PAKO arellano will reach out to surgery to see if anything to be done about it--cdu for fluids. pain control repeat bmp po challenge

## 2019-05-05 NOTE — CONSULT NOTE ADULT - ASSESSMENT
58 year old female presenting with 3 day history of abdominal pain associated with nausea/vomiting. surgery consulted for CT scan read of mild pneumobilia. In setting of recent ERCP 12/2018 for retained stone, pneumobilia is likely due to instrumentation in recent period of time. No peritoneal signs or hemodynamica instability. Abdominal pain is likely not related to biliary system.

## 2019-05-05 NOTE — ED CDU PROVIDER INITIAL DAY NOTE - ATTENDING CONTRIBUTION TO CARE
I, Dolly Almonte, performed the initial face to face bedside interview with this patient regarding history of present illness, review of symptoms and relevant past medical, social and family history.  I completed an independent physical examination.  I was the initial provider who evaluated this patient. I have signed out the follow up of any pending tests (i.e. labs, radiological studies) to the ACP.  I have communicated the patient’s plan of care and disposition with the ACP.

## 2019-05-05 NOTE — ED CDU PROVIDER DISPOSITION NOTE - ATTENDING CONTRIBUTION TO CARE
57 yo female with pmh pancreatitis; with continued abdominal pain and vomitting after multiple doses of pain medcations  pe abd mod tenderness epigastric region; ; pt not tolerating po intake; will admit to medicine for intractable vomiting  pancreatitis; and abdominal pain

## 2019-05-05 NOTE — ED PROVIDER NOTE - OBJECTIVE STATEMENT
57yo F asthma, htn, pancreatitis hx retained cbd stone requiring retreval pw epigastric pain n/v since friday. notes feels similar to pancreatitis in the past. Denies f/c/cp/sob/palpitations/ cough/rash/headache/dizziness/d/c/dysuria/hematuria. no sick contacts /recent travel. notes that unable to tolerate po since friday v>10.

## 2019-05-05 NOTE — ED ADULT NURSE REASSESSMENT NOTE - NS ED NURSE REASSESS COMMENT FT1
Care endorse to Shauna NICOLE nightshift. VSS, BP improved. Patient states pain and nausea improved after dilaudid IV and Zofran IV.

## 2019-05-05 NOTE — ED CDU PROVIDER INITIAL DAY NOTE - PHYSICAL EXAMINATION
General: no fever, A&O x 3  HEENT: airway patent  Respiratory: lungs CTA bilaterally  Cardiac: S1S2, regular rate and rhythm  Abdomen: + abdominal tenderness  Musculoskeletal: no midline back tenderness, no C-spine tenderness, full ROM, no deformity  Neuro: 5/5 motor strength in all extremities, CN II-XII intact  Skin: no lesions or rashes

## 2019-05-05 NOTE — ED ADULT TRIAGE NOTE - CHIEF COMPLAINT QUOTE
Pt brought in by ambulance for eval of persistent nausea and vomiting since Friday evening. Pt denies abd pain, c/o po intolerance. Pt unable to take BP meds secondary to vomiting. +vomiting at triage. Pt brought in by ambulance for eval of persistent nausea and vomiting since Friday evening. Pt c/o intermittent waves of  abd pain, c/o po intolerance. Pt unable to take BP meds secondary to vomiting. +vomiting at triage.

## 2019-05-06 DIAGNOSIS — K85.80 OTHER ACUTE PANCREATITIS WITHOUT NECROSIS OR INFECTION: ICD-10-CM

## 2019-05-06 DIAGNOSIS — K85.90 ACUTE PANCREATITIS WITHOUT NECROSIS OR INFECTION, UNSPECIFIED: ICD-10-CM

## 2019-05-06 DIAGNOSIS — F41.9 ANXIETY DISORDER, UNSPECIFIED: ICD-10-CM

## 2019-05-06 DIAGNOSIS — I10 ESSENTIAL (PRIMARY) HYPERTENSION: ICD-10-CM

## 2019-05-06 LAB
CHOLEST SERPL-MCNC: 199 MG/DL — SIGNIFICANT CHANGE UP (ref 110–199)
CULTURE RESULTS: SIGNIFICANT CHANGE UP
HDLC SERPL-MCNC: 57 MG/DL — SIGNIFICANT CHANGE UP
LIPID PNL WITH DIRECT LDL SERPL: 128 MG/DL — SIGNIFICANT CHANGE UP
SPECIMEN SOURCE: SIGNIFICANT CHANGE UP
TOTAL CHOLESTEROL/HDL RATIO MEASUREMENT: 3 RATIO — LOW (ref 3.3–7.1)
TRIGL SERPL-MCNC: 70 MG/DL — SIGNIFICANT CHANGE UP (ref 10–200)

## 2019-05-06 PROCEDURE — 12345: CPT | Mod: NC

## 2019-05-06 PROCEDURE — 99223 1ST HOSP IP/OBS HIGH 75: CPT

## 2019-05-06 PROCEDURE — 99222 1ST HOSP IP/OBS MODERATE 55: CPT

## 2019-05-06 RX ORDER — METOCLOPRAMIDE HCL 10 MG
5 TABLET ORAL EVERY 6 HOURS
Qty: 0 | Refills: 0 | Status: DISCONTINUED | OUTPATIENT
Start: 2019-05-06 | End: 2019-05-06

## 2019-05-06 RX ORDER — HYDROMORPHONE HYDROCHLORIDE 2 MG/ML
0.5 INJECTION INTRAMUSCULAR; INTRAVENOUS; SUBCUTANEOUS EVERY 4 HOURS
Qty: 0 | Refills: 0 | Status: DISCONTINUED | OUTPATIENT
Start: 2019-05-06 | End: 2019-05-09

## 2019-05-06 RX ORDER — KETOROLAC TROMETHAMINE 30 MG/ML
15 SYRINGE (ML) INJECTION EVERY 6 HOURS
Qty: 0 | Refills: 0 | Status: DISCONTINUED | OUTPATIENT
Start: 2019-05-06 | End: 2019-05-09

## 2019-05-06 RX ORDER — ONDANSETRON 8 MG/1
4 TABLET, FILM COATED ORAL EVERY 4 HOURS
Qty: 0 | Refills: 0 | Status: DISCONTINUED | OUTPATIENT
Start: 2019-05-06 | End: 2019-05-06

## 2019-05-06 RX ORDER — ONDANSETRON 8 MG/1
4 TABLET, FILM COATED ORAL EVERY 6 HOURS
Qty: 0 | Refills: 0 | Status: DISCONTINUED | OUTPATIENT
Start: 2019-05-06 | End: 2019-05-06

## 2019-05-06 RX ORDER — SODIUM CHLORIDE 9 MG/ML
3 INJECTION INTRAMUSCULAR; INTRAVENOUS; SUBCUTANEOUS EVERY 8 HOURS
Qty: 0 | Refills: 0 | Status: DISCONTINUED | OUTPATIENT
Start: 2019-05-06 | End: 2019-05-09

## 2019-05-06 RX ORDER — METOCLOPRAMIDE HCL 10 MG
5 TABLET ORAL EVERY 6 HOURS
Qty: 0 | Refills: 0 | Status: DISCONTINUED | OUTPATIENT
Start: 2019-05-06 | End: 2019-05-09

## 2019-05-06 RX ORDER — INFLUENZA VIRUS VACCINE 15; 15; 15; 15 UG/.5ML; UG/.5ML; UG/.5ML; UG/.5ML
0.5 SUSPENSION INTRAMUSCULAR ONCE
Qty: 0 | Refills: 0 | Status: DISCONTINUED | OUTPATIENT
Start: 2019-05-06 | End: 2019-05-09

## 2019-05-06 RX ADMIN — ONDANSETRON 4 MILLIGRAM(S): 8 TABLET, FILM COATED ORAL at 07:41

## 2019-05-06 RX ADMIN — HYDROMORPHONE HYDROCHLORIDE 0.5 MILLIGRAM(S): 2 INJECTION INTRAMUSCULAR; INTRAVENOUS; SUBCUTANEOUS at 11:37

## 2019-05-06 RX ADMIN — Medication 15 MILLIGRAM(S): at 22:40

## 2019-05-06 RX ADMIN — Medication 50 MILLIGRAM(S): at 23:44

## 2019-05-06 RX ADMIN — HYDROMORPHONE HYDROCHLORIDE 0.5 MILLIGRAM(S): 2 INJECTION INTRAMUSCULAR; INTRAVENOUS; SUBCUTANEOUS at 03:02

## 2019-05-06 RX ADMIN — HYDROMORPHONE HYDROCHLORIDE 0.5 MILLIGRAM(S): 2 INJECTION INTRAMUSCULAR; INTRAVENOUS; SUBCUTANEOUS at 13:18

## 2019-05-06 RX ADMIN — Medication 10 MILLIGRAM(S): at 05:21

## 2019-05-06 RX ADMIN — Medication 5 MILLIGRAM(S): at 05:21

## 2019-05-06 RX ADMIN — Medication 150 MILLIGRAM(S): at 07:40

## 2019-05-06 RX ADMIN — Medication 1 TABLET(S): at 07:40

## 2019-05-06 RX ADMIN — HYDROMORPHONE HYDROCHLORIDE 0.5 MILLIGRAM(S): 2 INJECTION INTRAMUSCULAR; INTRAVENOUS; SUBCUTANEOUS at 07:42

## 2019-05-06 RX ADMIN — Medication 0.1 MILLIGRAM(S): at 05:21

## 2019-05-06 RX ADMIN — SODIUM CHLORIDE 3 MILLILITER(S): 9 INJECTION INTRAMUSCULAR; INTRAVENOUS; SUBCUTANEOUS at 11:39

## 2019-05-06 RX ADMIN — Medication 0.1 MILLIGRAM(S): at 11:37

## 2019-05-06 RX ADMIN — SODIUM CHLORIDE 150 MILLILITER(S): 9 INJECTION, SOLUTION INTRAVENOUS at 20:02

## 2019-05-06 RX ADMIN — HYDROMORPHONE HYDROCHLORIDE 0.5 MILLIGRAM(S): 2 INJECTION INTRAMUSCULAR; INTRAVENOUS; SUBCUTANEOUS at 03:00

## 2019-05-06 RX ADMIN — Medication 5 MILLIGRAM(S): at 15:40

## 2019-05-06 RX ADMIN — Medication 5 MILLIGRAM(S): at 09:07

## 2019-05-06 RX ADMIN — GABAPENTIN 300 MILLIGRAM(S): 400 CAPSULE ORAL at 07:40

## 2019-05-06 RX ADMIN — Medication 5 MILLIGRAM(S): at 22:28

## 2019-05-06 RX ADMIN — SODIUM CHLORIDE 3 MILLILITER(S): 9 INJECTION INTRAMUSCULAR; INTRAVENOUS; SUBCUTANEOUS at 20:57

## 2019-05-06 RX ADMIN — Medication 5 MILLIGRAM(S): at 18:07

## 2019-05-06 RX ADMIN — HYDROMORPHONE HYDROCHLORIDE 0.5 MILLIGRAM(S): 2 INJECTION INTRAMUSCULAR; INTRAVENOUS; SUBCUTANEOUS at 08:00

## 2019-05-06 RX ADMIN — Medication 30 MILLIGRAM(S): at 07:40

## 2019-05-06 RX ADMIN — HYDROMORPHONE HYDROCHLORIDE 0.5 MILLIGRAM(S): 2 INJECTION INTRAMUSCULAR; INTRAVENOUS; SUBCUTANEOUS at 20:01

## 2019-05-06 RX ADMIN — Medication 15 MILLIGRAM(S): at 22:25

## 2019-05-06 RX ADMIN — Medication 0.1 MILLIGRAM(S): at 22:28

## 2019-05-06 RX ADMIN — BUPROPION HYDROCHLORIDE 150 MILLIGRAM(S): 150 TABLET, EXTENDED RELEASE ORAL at 09:07

## 2019-05-06 NOTE — CONSULT NOTE ADULT - PROBLEM SELECTOR RECOMMENDATION 9
no
-recommend medicine evaluation for chronic pancreatitis, gastroenteritisi  -advance diet as tolerated  -no acute surgical intervention at this time  -please reconsult as needed
Rule out possible choledocholithiasis vs. pancreaticobiliary malignancy. MRCP and CA 19-9 and  ordered. Clear liquids as tolerated. Aggressive IVF hydration. Repeat labs ordered for the AM.

## 2019-05-06 NOTE — H&P ADULT - PROBLEM SELECTOR PLAN 2
likely from not tolerating PO and missing doses of BP meds causing rebound HTN. Cont. o/p regimen now that tolerating PO.

## 2019-05-06 NOTE — H&P ADULT - NSICDXPASTMEDICALHX_GEN_ALL_CORE_FT
PAST MEDICAL HISTORY:  Anxiety and depression     Asthma     Hypertension     Osteoarthritis     Seasonal allergies

## 2019-05-06 NOTE — PROGRESS NOTE ADULT - SUBJECTIVE AND OBJECTIVE BOX
CHERYL MIR    [id number]    58y      Female    CC: abdominal pain, acute pancreatitis    INTERVAL HPI/OVERNIGHT EVENTS:  57 y/o female with history of depression/anxiety, post ercp pancreatitis in  that was done for choledocholithiasis. She was doing well and in her usual state of health until 3 days ago when she started having increasing epigastric pain radiating to the back. Associated with nausea and non bilious non bloody vomitus. No diarrhea. No fever, chills. No recent changes in mediations, no etoh use in last year. 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  In the ED with bandlike epigastric pain, mildly elevated lipase and CT showing evidence of pancreatitis and prior sphincterotomy. Seen by Surgery with CT findings and no recommendations for any intervention at this time.     this morning pt still has abd pain, with nausea no vomiting at this time. no fevers. states zofran does ot work for her    REVIEW OF SYSTEMS:    CONSTITUTIONAL: No fever, weight loss, or fatigue  RESPIRATORY: No cough, wheezing, hemoptysis; No shortness of breath  CARDIOVASCULAR: No chest pain, palpitations  GASTROINTESTINAL: + abdominal pain, + nausea, no vomiting        Vital Signs Last 24 Hrs  T(C): 36.7 (06 May 2019 11:12), Max: 37.2 (05 May 2019 16:26)  T(F): 98 (06 May 2019 11:12), Max: 99 (05 May 2019 18:18)  HR: 82 (06 May 2019 11:12) (69 - 88)  BP: 115/68 (06 May 2019 11:12) (100/64 - 171/109)  BP(mean): 120 (06 May 2019 02:31) (120 - 120)  RR: 18 (06 May 2019 11:12) (16 - 20)  SpO2: 97% (06 May 2019 11:12) (95% - 99%)    PHYSICAL EXAM:    GENERAL: NAD, well-groomed  HEENT: PERRL, +EOMI  CHEST/LUNG: Clear to auscultation bilaterally; No wheezing  HEART: S1S2+, Regular rate and rhythm; No murmurs  ABDOMEN: Soft, +tender, Nondistended; Bowel sounds present  EXTREMITIES:  2+ Peripheral Pulses, No clubbing, cyanosis, or edema  SKIN: No rashes or lesions    LABS:                        12.7   11.3  )-----------( 374      ( 05 May 2019 08:46 )             39.4         143  |  102  |  19.0  ----------------------------<  124<H>  4.0   |  27.0  |  0.88    Ca    9.9      05 May 2019 18:02    TPro  8.8<H>  /  Alb  4.9  /  TBili  0.3<L>  /  DBili  x   /  AST  22  /  ALT  18  /  AlkPhos  104        Urinalysis Basic - ( 05 May 2019 13:45 )    Color: Yellow / Appearance: Clear / S.005 / pH: x  Gluc: x / Ketone: Negative  / Bili: Negative / Urobili: Negative mg/dL   Blood: x / Protein: 100 mg/dL / Nitrite: Negative   Leuk Esterase: Negative / RBC: 6-10 /HPF / WBC 3-5   Sq Epi: x / Non Sq Epi: Occasional / Bacteria: Occasional          MEDICATIONS  (STANDING):  buPROPion XL . 150 milliGRAM(s) Oral daily  cloNIDine 0.1 milliGRAM(s) Oral three times a day  diazepam    Tablet 5 milliGRAM(s) Oral two times a day  enalapril 10 milliGRAM(s) Oral daily  gabapentin 300 milliGRAM(s) Oral daily  influenza   Vaccine 0.5 milliLiter(s) IntraMuscular once  lactated ringers. 1000 milliLiter(s) (150 mL/Hr) IV Continuous <Continuous>  multivitamin 1 Tablet(s) Oral daily  PARoxetine 30 milliGRAM(s) Oral daily  sodium chloride 0.9% lock flush 3 milliLiter(s) IV Push every 8 hours  traZODone 50 milliGRAM(s) Oral at bedtime  venlafaxine  milliGRAM(s) Oral daily    MEDICATIONS  (PRN):  HYDROmorphone  Injectable 0.5 milliGRAM(s) IV Push every 4 hours PRN Severe Pain (7 - 10)  ketorolac   Injectable 15 milliGRAM(s) IV Push every 6 hours PRN Moderate Pain (4 - 6)  metoclopramide Injectable 5 milliGRAM(s) IV Push every 6 hours PRN nausea      RADIOLOGY & ADDITIONAL TESTS:  IMPRESSION:     Nonspecific prominence of the extrahepatic bile ducts and evidence of   pneumobilia suggesting prior sphincterotomy.    Mild peripancreatic haziness suggesting pancreatitis.      ADI GOMEZ M.D., ATTENDING RADIOLOGIST  This document has been electronically signed. May  5 2019  1:36PM

## 2019-05-06 NOTE — H&P ADULT - NSICDXFAMILYHX_GEN_ALL_CORE_FT
FAMILY HISTORY:  Family history of COPD (chronic obstructive pulmonary disease)  Family history of CVA

## 2019-05-06 NOTE — H&P ADULT - PROBLEM SELECTOR PLAN 1
Previous episode in 1/19 due to post ercp pancreatitis, currently no biliary pathology to explain. Likely viral or medication related. Improving with supportive care and currently tolerating CLD. Cont. IVF, prn pain regimen, monitor lytes, spirometer, OOB, DVT-P. Surgery consult noted.

## 2019-05-06 NOTE — H&P ADULT - HISTORY OF PRESENT ILLNESS
57 y/o female with history of depression/anxiety, post ercp pancreatitis in 1/19 that was done for choledocholithiasis. She was doing well and in her usual state of health until 3 days ago when she started having increasing epigastric pain radiating to the back. Associated with nausea and non bilious non bloody vomitus. No diarrhea. No fever, chills. No recent changes in mediations, no etoh use in last year. 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  In the ED with bandlike epigastric pain, mildly elevated lipase and CT showing evidence of pancreatitis and prior sphincterotomy. Seen by Surgery with CT findings and no recommendations for any intervention at this time.

## 2019-05-06 NOTE — PATIENT PROFILE ADULT - NSPROEDAREADYLEARN_GEN_A_NUR
Lang states the blood had not been sent out to the lab yet. He states he will take the CBC and \"route it\" and run that panel here but is unable to run the BMP here. He states he will call a carrier and they will run the sample at [facility] and should have the results within two hours.    none

## 2019-05-06 NOTE — CONSULT NOTE ADULT - SUBJECTIVE AND OBJECTIVE BOX
58 year old female presents to ED with 3 day history of diffuse abdominal pain that is crampy and sharp in nature. Not associated with food intake. Does not radiate anywhere. Associated with nausea and vomiting for the past 2 days. No change in urination or bowel habits. Exposed to contact at work with diarrhea a few days ago. No recent travel or changes to diet.   ROS: denies fevers, chills, chest pain, shortness of breath, palpitations, dysuria, myalgias, diarrhea    PMH: depression, HTN  PSH: lap scot, ERCP for retained stones x3  Medications: gabapentin 300mg daily, paroxetine 30mg daily, trazadone 50mg daily, venlafaxine 150mg daily, bupropion 150mg daily, valium 5md BID, clonidine .01mg TID, enalapril 10mg daily  Allergies: NKDA  FH: noncontributory  SH: denies toxic habits x 3      MEDICATIONS  (STANDING):  buPROPion XL . 150 milliGRAM(s) Oral daily  cloNIDine 0.1 milliGRAM(s) Oral three times a day  diazepam    Tablet 5 milliGRAM(s) Oral two times a day  enalapril 10 milliGRAM(s) Oral daily  gabapentin 300 milliGRAM(s) Oral daily  lactated ringers. 1000 milliLiter(s) (125 mL/Hr) IV Continuous <Continuous>  PARoxetine 30 milliGRAM(s) Oral daily  traZODone 50 milliGRAM(s) Oral at bedtime  venlafaxine  milliGRAM(s) Oral daily    MEDICATIONS  (PRN):      Vital Signs Last 24 Hrs  T(C): 36.8 (05 May 2019 19:28), Max: 37.6 (05 May 2019 11:41)  T(F): 98.2 (05 May 2019 19:28), Max: 99.6 (05 May 2019 11:41)  HR: 82 (05 May 2019 19:28) (78 - 98)  BP: 148/74 (05 May 2019 19:28) (148/74 - 171/109)  BP(mean): --  RR: 16 (05 May 2019 19:28) (16 - 20)  SpO2: 99% (05 May 2019 19:28) (98% - 100%)    Physical Exam:    General: no acute distress, AOx3  Respiratory: Breath Sounds equal & clear to auscultation, no accessory muscle use  Cardiovascular: Regular rate & rhythm, normal S1, S2; no murmurs, gallops or rubs  Gastrointestinal: Soft,nondistended, mildly tender diffusely. no rebound tenderness, guarding or rigidity  Extremities: No peripheral edema, No cyanosis, clubbing     Vascular: Equal and normal pulses: 2+ peripheral pulses throughout    Musculoskeletal: No joint pain, swelling or deformity; no limitation of movement    Skin: No rashes      I&O's Detail      LABS:                        12.7   11.3  )-----------( 374      ( 05 May 2019 08:46 )             39.4         143  |  102  |  19.0  ----------------------------<  124<H>  4.0   |  27.0  |  0.88    Ca    9.9      05 May 2019 18:02    TPro  8.8<H>  /  Alb  4.9  /  TBili  0.3<L>  /  DBili  x   /  AST  22  /  ALT  18  /  AlkPhos  104        Urinalysis Basic - ( 05 May 2019 13:45 )    Color: Yellow / Appearance: Clear / S.005 / pH: x  Gluc: x / Ketone: Negative  / Bili: Negative / Urobili: Negative mg/dL   Blood: x / Protein: 100 mg/dL / Nitrite: Negative   Leuk Esterase: Negative / RBC: 6-10 /HPF / WBC 3-5   Sq Epi: x / Non Sq Epi: Occasional / Bacteria: Occasional        RADIOLOGY & ADDITIONAL STUDIES:  CT abd/pelvis: Nonspecific prominence of the extrahepatic bile ducts and evidence of   pneumobilia suggesting prior sphincterotomy.    Mild peripancreatic haziness suggesting pancreatitis.
Patient is a 58y old  Female who presents with a chief complaint of Abdominal pain, Pancreatitis (06 May 2019 12:26)      HPI:  59 y/o female with history of depression/anxiety, post ercp pancreatitis in 1/19 that was done for choledocholithiasis. She was doing well and in her usual state of health until 3 days ago when she started having increasing epigastric pain radiating to the back. Associated with nausea and non bilious non bloody vomitus. No diarrhea. No fever, chills. No recent changes in mediations, no etoh use in last year. 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  In the ED with bandlike epigastric pain, mildly elevated lipase and CT showing evidence of pancreatitis and prior sphincterotomy. Seen by Surgery with CT findings and no recommendations for any intervention at this time. (06 May 2019 02:31)      REVIEW OF SYSTEMS:  Constitutional: No fever, weight loss or fatigue  ENMT:  No difficulty hearing, tinnitus, vertigo; No sinus or throat pain  Respiratory: No cough, wheezing, chills or hemoptysis  Cardiovascular: No chest pain, palpitations, dizziness or leg swelling  Gastrointestinal: As per HPI  Skin: No itching, burning, rashes or lesions   Musculoskeletal: No joint pain or swelling; No muscle, back or extremity pain  Patient has no cardiopulmonary, peripheral vascular, musculoskeletal, dermatological, neurological, gynecological or psychological symptoms or complaints at this time    PAST MEDICAL & SURGICAL HISTORY:  Hypertension  Anxiety and depression  Seasonal allergies  Asthma  Osteoarthritis  S/P hip replacement: left  S/P tubal ligation  S/P cholecystectomy      FAMILY HISTORY:  Family history of COPD (chronic obstructive pulmonary disease)  Family history of CVA      SOCIAL HISTORY:  Smoking Status: [ ] Current, [ ] Former, [ x] Never  Pack Years: N/A. No ETOH or drug abuse history    MEDICATIONS:  MEDICATIONS  (STANDING):  buPROPion XL . 150 milliGRAM(s) Oral daily  cloNIDine 0.1 milliGRAM(s) Oral three times a day  diazepam    Tablet 5 milliGRAM(s) Oral two times a day  enalapril 10 milliGRAM(s) Oral daily  gabapentin 300 milliGRAM(s) Oral daily  influenza   Vaccine 0.5 milliLiter(s) IntraMuscular once  lactated ringers. 1000 milliLiter(s) (150 mL/Hr) IV Continuous <Continuous>  multivitamin 1 Tablet(s) Oral daily  PARoxetine 30 milliGRAM(s) Oral daily  sodium chloride 0.9% lock flush 3 milliLiter(s) IV Push every 8 hours  traZODone 50 milliGRAM(s) Oral at bedtime  venlafaxine  milliGRAM(s) Oral daily    MEDICATIONS  (PRN):  HYDROmorphone  Injectable 0.5 milliGRAM(s) IV Push every 4 hours PRN Severe Pain (7 - 10)  ketorolac   Injectable 15 milliGRAM(s) IV Push every 6 hours PRN Moderate Pain (4 - 6)  metoclopramide Injectable 5 milliGRAM(s) IV Push every 6 hours PRN nausea      Allergies    No Known Allergies    Intolerances        Vital Signs Last 24 Hrs  T(C): 36.7 (06 May 2019 11:12), Max: 37.2 (05 May 2019 16:26)  T(F): 98 (06 May 2019 11:12), Max: 99 (05 May 2019 18:18)  HR: 82 (06 May 2019 11:12) (69 - 88)  BP: 115/68 (06 May 2019 11:12) (100/64 - 171/109)  BP(mean): 120 (06 May 2019 02:31) (120 - 120)  RR: 18 (06 May 2019 11:12) (16 - 20)  SpO2: 97% (06 May 2019 11:12) (95% - 99%)        PHYSICAL EXAM:    General: Well developed; obese, in no acute distress  HEENT: MMM, conjunctiva pink and sclera anicteric.  Lungs: Clear bilaterally  Cor: RRR S1, S2 only  Gastrointestinal: Abdomen: Soft, obese, + mid abdominal tenderness, non-distended; Normal bowel sounds; No rebound or guarding or HSM.  JANY: Pt. refused.  Extremities: Normal range of motion, No clubbing, cyanosis or edema  Neurological: Alert and oriented x3  Skin: Warm and dry. No obvious rash      LABS:                        12.7   11.3  )-----------( 374      ( 05 May 2019 08:46 )             39.4     05-05    143  |  102  |  19.0  ----------------------------<  124<H>  4.0   |  27.0  |  0.88    Ca    9.9      05 May 2019 18:02    TPro  8.8<H>  /  Alb  4.9  /  TBili  0.3<L>  /  DBili  x   /  AST  22  /  ALT  18  /  AlkPhos  104  05-05      Culture Results:   Culture grew 3 or more types of organisms which indicate  collection contamination; consider recollection only if clinically  indicated. (05-05 @ 13:45)      RADIOLOGY & ADDITIONAL STUDIES:   CT results noted.
No

## 2019-05-07 DIAGNOSIS — Z71.89 OTHER SPECIFIED COUNSELING: ICD-10-CM

## 2019-05-07 LAB
ALBUMIN SERPL ELPH-MCNC: 3.2 G/DL — LOW (ref 3.3–5.2)
ALP SERPL-CCNC: 73 U/L — SIGNIFICANT CHANGE UP (ref 40–120)
ALT FLD-CCNC: 46 U/L — HIGH
ANION GAP SERPL CALC-SCNC: 7 MMOL/L — SIGNIFICANT CHANGE UP (ref 5–17)
AST SERPL-CCNC: 36 U/L — HIGH
BILIRUB SERPL-MCNC: 0.2 MG/DL — LOW (ref 0.4–2)
BUN SERPL-MCNC: 14 MG/DL — SIGNIFICANT CHANGE UP (ref 8–20)
CALCIUM SERPL-MCNC: 8.7 MG/DL — SIGNIFICANT CHANGE UP (ref 8.6–10.2)
CHLORIDE SERPL-SCNC: 105 MMOL/L — SIGNIFICANT CHANGE UP (ref 98–107)
CO2 SERPL-SCNC: 30 MMOL/L — HIGH (ref 22–29)
CREAT SERPL-MCNC: 0.77 MG/DL — SIGNIFICANT CHANGE UP (ref 0.5–1.3)
GLUCOSE SERPL-MCNC: 89 MG/DL — SIGNIFICANT CHANGE UP (ref 70–115)
HCT VFR BLD CALC: 29.9 % — LOW (ref 37–47)
HCV AB S/CO SERPL IA: 0.08 S/CO — SIGNIFICANT CHANGE UP (ref 0–0.99)
HCV AB SERPL-IMP: SIGNIFICANT CHANGE UP
HGB BLD-MCNC: 9.5 G/DL — LOW (ref 12–16)
LIDOCAIN IGE QN: 33 U/L — SIGNIFICANT CHANGE UP (ref 22–51)
MAGNESIUM SERPL-MCNC: 1.8 MG/DL — SIGNIFICANT CHANGE UP (ref 1.6–2.6)
MCHC RBC-ENTMCNC: 28.7 PG — SIGNIFICANT CHANGE UP (ref 27–31)
MCHC RBC-ENTMCNC: 31.8 G/DL — LOW (ref 32–36)
MCV RBC AUTO: 90.3 FL — SIGNIFICANT CHANGE UP (ref 81–99)
PHOSPHATE SERPL-MCNC: 2.9 MG/DL — SIGNIFICANT CHANGE UP (ref 2.4–4.7)
PLATELET # BLD AUTO: 191 K/UL — SIGNIFICANT CHANGE UP (ref 150–400)
POTASSIUM SERPL-MCNC: 4 MMOL/L — SIGNIFICANT CHANGE UP (ref 3.5–5.3)
POTASSIUM SERPL-SCNC: 4 MMOL/L — SIGNIFICANT CHANGE UP (ref 3.5–5.3)
PROT SERPL-MCNC: 5.5 G/DL — LOW (ref 6.6–8.7)
RBC # BLD: 3.31 M/UL — LOW (ref 4.4–5.2)
RBC # FLD: 14.8 % — SIGNIFICANT CHANGE UP (ref 11–15.6)
SODIUM SERPL-SCNC: 142 MMOL/L — SIGNIFICANT CHANGE UP (ref 135–145)
WBC # BLD: 4.9 K/UL — SIGNIFICANT CHANGE UP (ref 4.8–10.8)
WBC # FLD AUTO: 4.9 K/UL — SIGNIFICANT CHANGE UP (ref 4.8–10.8)

## 2019-05-07 PROCEDURE — 99232 SBSQ HOSP IP/OBS MODERATE 35: CPT

## 2019-05-07 RX ADMIN — SODIUM CHLORIDE 150 MILLILITER(S): 9 INJECTION, SOLUTION INTRAVENOUS at 05:21

## 2019-05-07 RX ADMIN — SODIUM CHLORIDE 3 MILLILITER(S): 9 INJECTION INTRAMUSCULAR; INTRAVENOUS; SUBCUTANEOUS at 14:50

## 2019-05-07 RX ADMIN — HYDROMORPHONE HYDROCHLORIDE 0.5 MILLIGRAM(S): 2 INJECTION INTRAMUSCULAR; INTRAVENOUS; SUBCUTANEOUS at 03:41

## 2019-05-07 RX ADMIN — Medication 0.1 MILLIGRAM(S): at 15:49

## 2019-05-07 RX ADMIN — Medication 5 MILLIGRAM(S): at 17:54

## 2019-05-07 RX ADMIN — HYDROMORPHONE HYDROCHLORIDE 0.5 MILLIGRAM(S): 2 INJECTION INTRAMUSCULAR; INTRAVENOUS; SUBCUTANEOUS at 18:15

## 2019-05-07 RX ADMIN — GABAPENTIN 300 MILLIGRAM(S): 400 CAPSULE ORAL at 15:47

## 2019-05-07 RX ADMIN — BUPROPION HYDROCHLORIDE 150 MILLIGRAM(S): 150 TABLET, EXTENDED RELEASE ORAL at 15:48

## 2019-05-07 RX ADMIN — HYDROMORPHONE HYDROCHLORIDE 0.5 MILLIGRAM(S): 2 INJECTION INTRAMUSCULAR; INTRAVENOUS; SUBCUTANEOUS at 17:47

## 2019-05-07 RX ADMIN — HYDROMORPHONE HYDROCHLORIDE 0.5 MILLIGRAM(S): 2 INJECTION INTRAMUSCULAR; INTRAVENOUS; SUBCUTANEOUS at 12:48

## 2019-05-07 RX ADMIN — Medication 10 MILLIGRAM(S): at 05:21

## 2019-05-07 RX ADMIN — SODIUM CHLORIDE 3 MILLILITER(S): 9 INJECTION INTRAMUSCULAR; INTRAVENOUS; SUBCUTANEOUS at 05:22

## 2019-05-07 RX ADMIN — HYDROMORPHONE HYDROCHLORIDE 0.5 MILLIGRAM(S): 2 INJECTION INTRAMUSCULAR; INTRAVENOUS; SUBCUTANEOUS at 21:51

## 2019-05-07 RX ADMIN — HYDROMORPHONE HYDROCHLORIDE 0.5 MILLIGRAM(S): 2 INJECTION INTRAMUSCULAR; INTRAVENOUS; SUBCUTANEOUS at 22:51

## 2019-05-07 RX ADMIN — HYDROMORPHONE HYDROCHLORIDE 0.5 MILLIGRAM(S): 2 INJECTION INTRAMUSCULAR; INTRAVENOUS; SUBCUTANEOUS at 13:10

## 2019-05-07 RX ADMIN — HYDROMORPHONE HYDROCHLORIDE 0.5 MILLIGRAM(S): 2 INJECTION INTRAMUSCULAR; INTRAVENOUS; SUBCUTANEOUS at 09:10

## 2019-05-07 RX ADMIN — Medication 150 MILLIGRAM(S): at 15:48

## 2019-05-07 RX ADMIN — HYDROMORPHONE HYDROCHLORIDE 0.5 MILLIGRAM(S): 2 INJECTION INTRAMUSCULAR; INTRAVENOUS; SUBCUTANEOUS at 03:26

## 2019-05-07 RX ADMIN — Medication 50 MILLIGRAM(S): at 21:51

## 2019-05-07 RX ADMIN — Medication 1 TABLET(S): at 15:50

## 2019-05-07 RX ADMIN — Medication 5 MILLIGRAM(S): at 05:21

## 2019-05-07 RX ADMIN — Medication 0.1 MILLIGRAM(S): at 05:21

## 2019-05-07 RX ADMIN — HYDROMORPHONE HYDROCHLORIDE 0.5 MILLIGRAM(S): 2 INJECTION INTRAMUSCULAR; INTRAVENOUS; SUBCUTANEOUS at 08:40

## 2019-05-07 RX ADMIN — Medication 30 MILLIGRAM(S): at 15:48

## 2019-05-07 RX ADMIN — SODIUM CHLORIDE 3 MILLILITER(S): 9 INJECTION INTRAMUSCULAR; INTRAVENOUS; SUBCUTANEOUS at 21:52

## 2019-05-07 RX ADMIN — SODIUM CHLORIDE 150 MILLILITER(S): 9 INJECTION, SOLUTION INTRAVENOUS at 15:49

## 2019-05-07 RX ADMIN — Medication 0.1 MILLIGRAM(S): at 21:51

## 2019-05-07 NOTE — PROGRESS NOTE ADULT - SUBJECTIVE AND OBJECTIVE BOX
CHERYL MIR    [id number]    58y      Female    CC: abdominal pain, acute pancreatitis    INTERVAL HPI/OVERNIGHT EVENTS:  59 y/o female with history of depression/anxiety, post ercp pancreatitis in  that was done for choledocholithiasis. She was doing well and in her usual state of health until 3 days ago when she started having increasing epigastric pain radiating to the back. Associated with nausea and non bilious non bloody vomitus. No diarrhea. No fever, chills. No recent changes in mediations, no etoh use in last year. 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  In the ED with bandlike epigastric pain, mildly elevated lipase and CT showing evidence of pancreatitis and prior sphincterotomy. Seen by Surgery with CT findings and no recommendations for any intervention at this time.     this morning pt still has abd pain, with nausea no vomiting at this time. no fevers.    REVIEW OF SYSTEMS:    CONSTITUTIONAL: No fever, weight loss, or fatigue  RESPIRATORY: No cough, wheezing, hemoptysis; No shortness of breath  CARDIOVASCULAR: No chest pain, palpitations  GASTROINTESTINAL: + abdominal pain, + nausea, no vomiting        Vital Signs Last 24 Hrs  T(C): 36.8 (07 May 2019 08:10), Max: 37.1 (06 May 2019 16:51)  T(F): 98.3 (07 May 2019 08:10), Max: 98.7 (06 May 2019 16:51)  HR: 61 (07 May 2019 08:10) (53 - 82)  BP: 115/72 (07 May 2019 08:10) (88/56 - 120/74)  BP(mean): --  RR: 18 (07 May 2019 08:10) (18 - 18)  SpO2: 97% (07 May 2019 08:10) (94% - 98%)    PHYSICAL EXAM:    GENERAL: NAD, well-groomed  HEENT: PERRL, +EOMI  CHEST/LUNG: Clear to auscultation bilaterally; No wheezing  HEART: S1S2+, Regular rate and rhythm; No murmurs  ABDOMEN: Soft, +tender, Nondistended; Bowel sounds present  EXTREMITIES:  2+ Peripheral Pulses, No clubbing, cyanosis, or edema  SKIN: No rashes or lesions      LABS:                        9.5    4.9   )-----------( 191      ( 07 May 2019 07:07 )             29.9     05-07    142  |  105  |  14.0  ----------------------------<  89  4.0   |  30.0<H>  |  0.77    Ca    8.7      07 May 2019 07:07  Phos  2.9     05-07  Mg     1.8     05-07    TPro  5.5<L>  /  Alb  3.2<L>  /  TBili  0.2<L>  /  DBili  x   /  AST  36<H>  /  ALT  46<H>  /  AlkPhos  73  05-07      Urinalysis Basic - ( 05 May 2019 13:45 )    Color: Yellow / Appearance: Clear / S.005 / pH: x  Gluc: x / Ketone: Negative  / Bili: Negative / Urobili: Negative mg/dL   Blood: x / Protein: 100 mg/dL / Nitrite: Negative   Leuk Esterase: Negative / RBC: 6-10 /HPF / WBC 3-5   Sq Epi: x / Non Sq Epi: Occasional / Bacteria: Occasional          MEDICATIONS  (STANDING):  buPROPion XL . 150 milliGRAM(s) Oral daily  cloNIDine 0.1 milliGRAM(s) Oral three times a day  diazepam    Tablet 5 milliGRAM(s) Oral two times a day  enalapril 10 milliGRAM(s) Oral daily  gabapentin 300 milliGRAM(s) Oral daily  influenza   Vaccine 0.5 milliLiter(s) IntraMuscular once  lactated ringers. 1000 milliLiter(s) (150 mL/Hr) IV Continuous <Continuous>  LORazepam   Injectable 1 milliGRAM(s) IV Push once  multivitamin 1 Tablet(s) Oral daily  PARoxetine 30 milliGRAM(s) Oral daily  sodium chloride 0.9% lock flush 3 milliLiter(s) IV Push every 8 hours  traZODone 50 milliGRAM(s) Oral at bedtime  venlafaxine  milliGRAM(s) Oral daily    MEDICATIONS  (PRN):  HYDROmorphone  Injectable 0.5 milliGRAM(s) IV Push every 4 hours PRN Severe Pain (7 - 10)  ketorolac   Injectable 15 milliGRAM(s) IV Push every 6 hours PRN Moderate Pain (4 - 6)  metoclopramide Injectable 5 milliGRAM(s) IV Push every 6 hours PRN nausea

## 2019-05-07 NOTE — PROGRESS NOTE ADULT - SUBJECTIVE AND OBJECTIVE BOX
INTERVAL HPI/OVERNIGHT EVENTS:FU for pancreatitis. Her nausea is better. Still having abdominal pain. On IV fluids. TG normal. Awaiting MRI abdomen. No alcohol or smoking.     MEDICATIONS  (STANDING):  buPROPion XL . 150 milliGRAM(s) Oral daily  cloNIDine 0.1 milliGRAM(s) Oral three times a day  diazepam    Tablet 5 milliGRAM(s) Oral two times a day  enalapril 10 milliGRAM(s) Oral daily  gabapentin 300 milliGRAM(s) Oral daily  influenza   Vaccine 0.5 milliLiter(s) IntraMuscular once  lactated ringers. 1000 milliLiter(s) (150 mL/Hr) IV Continuous <Continuous>  LORazepam   Injectable 2 milliGRAM(s) IntraMuscular once  multivitamin 1 Tablet(s) Oral daily  PARoxetine 30 milliGRAM(s) Oral daily  sodium chloride 0.9% lock flush 3 milliLiter(s) IV Push every 8 hours  traZODone 50 milliGRAM(s) Oral at bedtime  venlafaxine  milliGRAM(s) Oral daily    MEDICATIONS  (PRN):  HYDROmorphone  Injectable 0.5 milliGRAM(s) IV Push every 4 hours PRN Severe Pain (7 - 10)  ketorolac   Injectable 15 milliGRAM(s) IV Push every 6 hours PRN Moderate Pain (4 - 6)  metoclopramide Injectable 5 milliGRAM(s) IV Push every 6 hours PRN nausea      Allergies    No Known Allergies    Intolerances        Vital Signs Last 24 Hrs  T(C): 36.9 (06 May 2019 23:08), Max: 37.1 (06 May 2019 16:51)  T(F): 98.4 (06 May 2019 23:08), Max: 98.7 (06 May 2019 16:51)  HR: 53 (06 May 2019 23:08) (53 - 82)  BP: 109/69 (06 May 2019 23:08) (88/56 - 120/74)  BP(mean): --  RR: 18 (06 May 2019 23:08) (18 - 18)  SpO2: 95% (06 May 2019 23:08) (94% - 98%)    LABS:                        9.5    4.9   )-----------( 191      ( 07 May 2019 07:07 )             29.9     05-05    143  |  102  |  19.0  ----------------------------<  124<H>  4.0   |  27.0  |  0.88    Ca    9.9      05 May 2019 18:02    TPro  8.8<H>  /  Alb  4.9  /  TBili  0.3<L>  /  DBili  x   /  AST  22  /  ALT  18  /  AlkPhos  104  05-05      Urinalysis Basic - ( 05 May 2019 13:45 )    Color: Yellow / Appearance: Clear / S.005 / pH: x  Gluc: x / Ketone: Negative  / Bili: Negative / Urobili: Negative mg/dL   Blood: x / Protein: 100 mg/dL / Nitrite: Negative   Leuk Esterase: Negative / RBC: 6-10 /HPF / WBC 3-5   Sq Epi: x / Non Sq Epi: Occasional / Bacteria: Occasional    Lipase, Serum (19 @ 08:46)    Lipase, Serum: 59 U/L    Lipid Profile (19 @ 02:23)    Triglycerides, Serum: 70 mg/dL        RADIOLOGY & ADDITIONAL TESTS:  < from: CT Abdomen and Pelvis w/ IV Cont (19 @ 13:07) >     EXAM:  CT ABDOMEN AND PELVIS IC                          PROCEDURE DATE:  2019          INTERPRETATION:  EXAM: CT SCAN ABDOMEN AND PELVIS WITH CONTRAST.     CLINICAL INDICATION: Diffuse abdominal pain. Concern for small bowel   obstruction or retained common duct stone.     TECHNIQUE: Imaging was performed following the administration of   intravenous contrast. 95 mL of Visipaque 320 was utilized for intravenous   contrast enhancement and 10 mL was discarded. Axial, sagittal and coronal   scans are reviewed.     PRIOR EXAM: 2019.     FINDINGS:         LOWER CHEST:  Within normal limits aside from possibility of a small   hernia.    LIVER: Mildly fatty liver. No focal hepatic lesion. Patent hepatic and   portal veins. Pneumobilia.    GALL BLADDER AND BILE DUCTS:  Postcholecystectomy clips. Pneumobilia.   Nonspecific prominence of the common bile duct measuring 11 mm in maximum   diameter. No CT finding to suggest a common duct stone. The presence of   pneumobilia indicates lack of any complete common duct obstruction,   however, if there is clinical concern for a common duct stone, then MRCP   can be obtained.    PANCREAS:  Mild peripancreatic haziness indicating pancreatitis. No focal   abnormal pancreatic tissue enhancement to suggest focal pancreatic tissue   necrosis. No peripancreatic collection. No abnormality involving the   adjacent splenic vasculature.    SPLEEN:  Within normal limits.    ADRENALS:  Normal.    KIDNEYS:  Within normal limits.    PERITONEUM AND RETROPERITONEUM:  No evidence of pneumoperitoneum or   ascites. No drainable collection.    GI:  No abnormal bowel distention. Normal-appearing appendix.    :  Limited evaluation due to artifacts from bilateral hip joint   prostheses. The uterus is retroflexed. Rest of the visualized uterus and   urinary bladder are unremarkable. No adnexal mass.    LYMPHATIC SYSTEM:  No abdominal or pelvic lymphadenopathy.    VASCULAR:  Within normal limits.    SKELETAL:  No evidence of any aggressive osseouslesion. Bilateral hip   joint prostheses. Facet arthrosis lumbosacral junction.    ABDOMINAL WALL: Within normal limits.      IMPRESSION:     Nonspecific prominence of the extrahepatic bile ducts and evidence of   pneumobilia suggesting prior sphincterotomy.    Mild peripancreatic haziness suggesting pancreatitis.                  ADI GOMEZ M.D., ATTENDING RADIOLOGIST  This document has been electronically signed. May  5 2019  1:36PM                  < end of copied text >

## 2019-05-08 LAB
ALBUMIN SERPL ELPH-MCNC: 3.3 G/DL — SIGNIFICANT CHANGE UP (ref 3.3–5.2)
ALP SERPL-CCNC: 81 U/L — SIGNIFICANT CHANGE UP (ref 40–120)
ALT FLD-CCNC: 40 U/L — HIGH
ANION GAP SERPL CALC-SCNC: 15 MMOL/L — SIGNIFICANT CHANGE UP (ref 5–17)
AST SERPL-CCNC: 29 U/L — SIGNIFICANT CHANGE UP
BILIRUB SERPL-MCNC: 0.3 MG/DL — LOW (ref 0.4–2)
BUN SERPL-MCNC: 13 MG/DL — SIGNIFICANT CHANGE UP (ref 8–20)
CALCIUM SERPL-MCNC: 8.8 MG/DL — SIGNIFICANT CHANGE UP (ref 8.6–10.2)
CHLORIDE SERPL-SCNC: 103 MMOL/L — SIGNIFICANT CHANGE UP (ref 98–107)
CO2 SERPL-SCNC: 24 MMOL/L — SIGNIFICANT CHANGE UP (ref 22–29)
CREAT SERPL-MCNC: 0.64 MG/DL — SIGNIFICANT CHANGE UP (ref 0.5–1.3)
GLUCOSE SERPL-MCNC: 72 MG/DL — SIGNIFICANT CHANGE UP (ref 70–115)
MAGNESIUM SERPL-MCNC: 1.8 MG/DL — SIGNIFICANT CHANGE UP (ref 1.6–2.6)
POTASSIUM SERPL-MCNC: 3.8 MMOL/L — SIGNIFICANT CHANGE UP (ref 3.5–5.3)
POTASSIUM SERPL-SCNC: 3.8 MMOL/L — SIGNIFICANT CHANGE UP (ref 3.5–5.3)
PROT SERPL-MCNC: 5.9 G/DL — LOW (ref 6.6–8.7)
SODIUM SERPL-SCNC: 142 MMOL/L — SIGNIFICANT CHANGE UP (ref 135–145)

## 2019-05-08 PROCEDURE — 99232 SBSQ HOSP IP/OBS MODERATE 35: CPT

## 2019-05-08 PROCEDURE — 74183 MRI ABD W/O CNTR FLWD CNTR: CPT | Mod: 26

## 2019-05-08 RX ORDER — SODIUM CHLORIDE 9 MG/ML
1000 INJECTION, SOLUTION INTRAVENOUS
Refills: 0 | Status: DISCONTINUED | OUTPATIENT
Start: 2019-05-08 | End: 2019-05-09

## 2019-05-08 RX ADMIN — Medication 30 MILLIGRAM(S): at 12:46

## 2019-05-08 RX ADMIN — SODIUM CHLORIDE 3 MILLILITER(S): 9 INJECTION INTRAMUSCULAR; INTRAVENOUS; SUBCUTANEOUS at 17:02

## 2019-05-08 RX ADMIN — HYDROMORPHONE HYDROCHLORIDE 0.5 MILLIGRAM(S): 2 INJECTION INTRAMUSCULAR; INTRAVENOUS; SUBCUTANEOUS at 11:10

## 2019-05-08 RX ADMIN — HYDROMORPHONE HYDROCHLORIDE 0.5 MILLIGRAM(S): 2 INJECTION INTRAMUSCULAR; INTRAVENOUS; SUBCUTANEOUS at 15:30

## 2019-05-08 RX ADMIN — HYDROMORPHONE HYDROCHLORIDE 0.5 MILLIGRAM(S): 2 INJECTION INTRAMUSCULAR; INTRAVENOUS; SUBCUTANEOUS at 10:49

## 2019-05-08 RX ADMIN — BUPROPION HYDROCHLORIDE 150 MILLIGRAM(S): 150 TABLET, EXTENDED RELEASE ORAL at 12:46

## 2019-05-08 RX ADMIN — HYDROMORPHONE HYDROCHLORIDE 0.5 MILLIGRAM(S): 2 INJECTION INTRAMUSCULAR; INTRAVENOUS; SUBCUTANEOUS at 06:15

## 2019-05-08 RX ADMIN — SODIUM CHLORIDE 100 MILLILITER(S): 9 INJECTION, SOLUTION INTRAVENOUS at 17:03

## 2019-05-08 RX ADMIN — HYDROMORPHONE HYDROCHLORIDE 0.5 MILLIGRAM(S): 2 INJECTION INTRAMUSCULAR; INTRAVENOUS; SUBCUTANEOUS at 15:05

## 2019-05-08 RX ADMIN — Medication 150 MILLIGRAM(S): at 12:46

## 2019-05-08 RX ADMIN — Medication 0.1 MILLIGRAM(S): at 22:22

## 2019-05-08 RX ADMIN — Medication 1 MILLIGRAM(S): at 06:55

## 2019-05-08 RX ADMIN — Medication 0.1 MILLIGRAM(S): at 14:04

## 2019-05-08 RX ADMIN — SODIUM CHLORIDE 3 MILLILITER(S): 9 INJECTION INTRAMUSCULAR; INTRAVENOUS; SUBCUTANEOUS at 22:22

## 2019-05-08 RX ADMIN — HYDROMORPHONE HYDROCHLORIDE 0.5 MILLIGRAM(S): 2 INJECTION INTRAMUSCULAR; INTRAVENOUS; SUBCUTANEOUS at 21:08

## 2019-05-08 RX ADMIN — Medication 1 TABLET(S): at 12:46

## 2019-05-08 RX ADMIN — SODIUM CHLORIDE 3 MILLILITER(S): 9 INJECTION INTRAMUSCULAR; INTRAVENOUS; SUBCUTANEOUS at 06:10

## 2019-05-08 RX ADMIN — Medication 0.1 MILLIGRAM(S): at 06:15

## 2019-05-08 RX ADMIN — Medication 50 MILLIGRAM(S): at 22:22

## 2019-05-08 RX ADMIN — Medication 10 MILLIGRAM(S): at 06:15

## 2019-05-08 RX ADMIN — SODIUM CHLORIDE 150 MILLILITER(S): 9 INJECTION, SOLUTION INTRAVENOUS at 10:58

## 2019-05-08 RX ADMIN — GABAPENTIN 300 MILLIGRAM(S): 400 CAPSULE ORAL at 12:05

## 2019-05-08 RX ADMIN — Medication 5 MILLIGRAM(S): at 17:10

## 2019-05-08 RX ADMIN — HYDROMORPHONE HYDROCHLORIDE 0.5 MILLIGRAM(S): 2 INJECTION INTRAMUSCULAR; INTRAVENOUS; SUBCUTANEOUS at 20:53

## 2019-05-08 NOTE — PROGRESS NOTE ADULT - SUBJECTIVE AND OBJECTIVE BOX
Pt seen and examined f/u pancreatitis    She has just returned from MRI/MRCP and results pending. Less pain than yesterday, 7/10, with no nausea or vomiting. Passing flatus. Lipase normal    REVIEW OF SYSTEMS:    CONSTITUTIONAL: No fever, weight loss, or fatigue  EYES: No eye pain, visual disturbances, or discharge  ENMT:  No difficulty hearing, tinnitus, vertigo; No sinus or throat pain  RESPIRATORY: No cough, wheezing, chills or hemoptysis; No shortness of breath  CARDIOVASCULAR: No chest pain, palpitations, dizziness, or leg swelling  GASTROINTESTINAL: as above    MEDICATIONS:  MEDICATIONS  (STANDING):  buPROPion XL . 150 milliGRAM(s) Oral daily  cloNIDine 0.1 milliGRAM(s) Oral three times a day  diazepam    Tablet 5 milliGRAM(s) Oral two times a day  enalapril 10 milliGRAM(s) Oral daily  gabapentin 300 milliGRAM(s) Oral daily  influenza   Vaccine 0.5 milliLiter(s) IntraMuscular once  lactated ringers. 1000 milliLiter(s) (150 mL/Hr) IV Continuous <Continuous>  multivitamin 1 Tablet(s) Oral daily  PARoxetine 30 milliGRAM(s) Oral daily  sodium chloride 0.9% lock flush 3 milliLiter(s) IV Push every 8 hours  traZODone 50 milliGRAM(s) Oral at bedtime  venlafaxine  milliGRAM(s) Oral daily    MEDICATIONS  (PRN):  HYDROmorphone  Injectable 0.5 milliGRAM(s) IV Push every 4 hours PRN Severe Pain (7 - 10)  ketorolac   Injectable 15 milliGRAM(s) IV Push every 6 hours PRN Moderate Pain (4 - 6)  metoclopramide Injectable 5 milliGRAM(s) IV Push every 6 hours PRN nausea      Allergies    No Known Allergies    Intolerances        Vital Signs Last 24 Hrs  T(C): 36.7 (08 May 2019 09:04), Max: 36.7 (08 May 2019 09:04)  T(F): 98 (08 May 2019 09:04), Max: 98 (08 May 2019 09:04)  HR: 67 (08 May 2019 09:04) (57 - 67)  BP: 168/96 (08 May 2019 09:04) (128/79 - 168/96)  BP(mean): --  RR: 18 (08 May 2019 09:04) (18 - 18)  SpO2: 96% (08 May 2019 09:04) (93% - 99%)    05-07 @ 07:01  -  05-08 @ 07:00  --------------------------------------------------------  IN: 1200 mL / OUT: 0 mL / NET: 1200 mL        PHYSICAL EXAM:    General: Well developed; well nourished; in no acute distress  HEENT: MMM, conjunctiva and sclera clear  Gastrointestinal:Abdomen: Soft with moderate upper abdominal tendnerness with guarding but no rebound. non-distended; Normal bowel sounds; No hepatosplenomegaly  Extremities: no cyanosis, clubbing or edema.  Skin: Warm and dry. No obvious rash    LABS:      CBC Full  -  ( 07 May 2019 07:07 )  WBC Count : 4.9 K/uL  RBC Count : 3.31 M/uL  Hemoglobin : 9.5 g/dL  Hematocrit : 29.9 %  Platelet Count - Automated : 191 K/uL  Mean Cell Volume : 90.3 fl  Mean Cell Hemoglobin : 28.7 pg  Mean Cell Hemoglobin Concentration : 31.8 g/dL  Auto Neutrophil # : x  Auto Lymphocyte # : x  Auto Monocyte # : x  Auto Eosinophil # : x  Auto Basophil # : x  Auto Neutrophil % : x  Auto Lymphocyte % : x  Auto Monocyte % : x  Auto Eosinophil % : x  Auto Basophil % : x    05-08    142  |  103  |  13.0  ----------------------------<  72  3.8   |  24.0  |  0.64    Ca    8.8      08 May 2019 06:47  Phos  2.9     05-07  Mg     1.8     05-08    TPro  5.9<L>  /  Alb  3.3  /  TBili  0.3<L>  /  DBili  x   /  AST  29  /  ALT  40<H>  /  AlkPhos  81  05-08    Lipase, Serum: 33 U/L (05.07.19 @ 07:07) Pt seen and examined f/u pancreatitis    She has just returned from MRI/MRCP and results pending. Less pain than yesterday, 7/10, with no nausea or vomiting. Passing flatus. Lipase normal. H/H down probably down due to IV fluids.    REVIEW OF SYSTEMS:    CONSTITUTIONAL: No fever, weight loss, or fatigue  EYES: No eye pain, visual disturbances, or discharge  ENMT:  No difficulty hearing, tinnitus, vertigo; No sinus or throat pain  RESPIRATORY: No cough, wheezing, chills or hemoptysis; No shortness of breath  CARDIOVASCULAR: No chest pain, palpitations, dizziness, or leg swelling  GASTROINTESTINAL: as above    MEDICATIONS:  MEDICATIONS  (STANDING):  buPROPion XL . 150 milliGRAM(s) Oral daily  cloNIDine 0.1 milliGRAM(s) Oral three times a day  diazepam    Tablet 5 milliGRAM(s) Oral two times a day  enalapril 10 milliGRAM(s) Oral daily  gabapentin 300 milliGRAM(s) Oral daily  influenza   Vaccine 0.5 milliLiter(s) IntraMuscular once  lactated ringers. 1000 milliLiter(s) (150 mL/Hr) IV Continuous <Continuous>  multivitamin 1 Tablet(s) Oral daily  PARoxetine 30 milliGRAM(s) Oral daily  sodium chloride 0.9% lock flush 3 milliLiter(s) IV Push every 8 hours  traZODone 50 milliGRAM(s) Oral at bedtime  venlafaxine  milliGRAM(s) Oral daily    MEDICATIONS  (PRN):  HYDROmorphone  Injectable 0.5 milliGRAM(s) IV Push every 4 hours PRN Severe Pain (7 - 10)  ketorolac   Injectable 15 milliGRAM(s) IV Push every 6 hours PRN Moderate Pain (4 - 6)  metoclopramide Injectable 5 milliGRAM(s) IV Push every 6 hours PRN nausea      Allergies    No Known Allergies    Intolerances        Vital Signs Last 24 Hrs  T(C): 36.7 (08 May 2019 09:04), Max: 36.7 (08 May 2019 09:04)  T(F): 98 (08 May 2019 09:04), Max: 98 (08 May 2019 09:04)  HR: 67 (08 May 2019 09:04) (57 - 67)  BP: 168/96 (08 May 2019 09:04) (128/79 - 168/96)  BP(mean): --  RR: 18 (08 May 2019 09:04) (18 - 18)  SpO2: 96% (08 May 2019 09:04) (93% - 99%)    05-07 @ 07:01  -  05-08 @ 07:00  --------------------------------------------------------  IN: 1200 mL / OUT: 0 mL / NET: 1200 mL        PHYSICAL EXAM:    General: Well developed; well nourished; in no acute distress  HEENT: MMM, conjunctiva and sclera clear  Gastrointestinal:Abdomen: Soft with moderate upper abdominal tendnerness with guarding but no rebound. non-distended; Normal bowel sounds; No hepatosplenomegaly  Extremities: no cyanosis, clubbing or edema.  Skin: Warm and dry. No obvious rash    LABS:      CBC Full  -  ( 07 May 2019 07:07 )  WBC Count : 4.9 K/uL  RBC Count : 3.31 M/uL  Hemoglobin : 9.5 g/dL  Hematocrit : 29.9 %  Platelet Count - Automated : 191 K/uL  Mean Cell Volume : 90.3 fl  Mean Cell Hemoglobin : 28.7 pg  Mean Cell Hemoglobin Concentration : 31.8 g/dL  Auto Neutrophil # : x  Auto Lymphocyte # : x  Auto Monocyte # : x  Auto Eosinophil # : x  Auto Basophil # : x  Auto Neutrophil % : x  Auto Lymphocyte % : x  Auto Monocyte % : x  Auto Eosinophil % : x  Auto Basophil % : x    05-08    142  |  103  |  13.0  ----------------------------<  72  3.8   |  24.0  |  0.64    Ca    8.8      08 May 2019 06:47  Phos  2.9     05-07  Mg     1.8     05-08    TPro  5.9<L>  /  Alb  3.3  /  TBili  0.3<L>  /  DBili  x   /  AST  29  /  ALT  40<H>  /  AlkPhos  81  05-08    Lipase, Serum: 33 U/L (05.07.19 @ 07:07)

## 2019-05-08 NOTE — PROGRESS NOTE ADULT - SUBJECTIVE AND OBJECTIVE BOX
CHERYL MIR    [id number]    58y      Female    CC: abdominal pain, acute pancreatitis    INTERVAL HPI/OVERNIGHT EVENTS:  59 y/o female with history of depression/anxiety, post ercp pancreatitis in 1/19 that was done for choledocholithiasis. She was doing well and in her usual state of health until 3 days ago when she started having increasing epigastric pain radiating to the back. Associated with nausea and non bilious non bloody vomitus. No diarrhea. No fever, chills. No recent changes in mediations, no etoh use in last year. 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  In the ED with bandlike epigastric pain, mildly elevated lipase and CT showing evidence of pancreatitis and prior sphincterotomy. Seen by Surgery with CT findings and no recommendations for any intervention at this time.     this morning pt some abd pain, states pain is much better today, no nausea no vomiting at this time. no fevers.    REVIEW OF SYSTEMS:    CONSTITUTIONAL: No fever, weight loss, or fatigue  RESPIRATORY: No cough, wheezing, hemoptysis; No shortness of breath  CARDIOVASCULAR: No chest pain, palpitations  GASTROINTESTINAL: + abdominal pain, + nausea, no vomiting        Vital Signs Last 24 Hrs  T(C): 36.7 (08 May 2019 09:04), Max: 36.7 (08 May 2019 09:04)  T(F): 98 (08 May 2019 09:04), Max: 98 (08 May 2019 09:04)  HR: 67 (08 May 2019 09:04) (57 - 67)  BP: 168/96 (08 May 2019 09:04) (128/79 - 168/96)  BP(mean): --  RR: 18 (08 May 2019 09:04) (18 - 18)  SpO2: 96% (08 May 2019 09:04) (93% - 99%)    PHYSICAL EXAM:    GENERAL: NAD, well-groomed  HEENT: PERRL, +EOMI  CHEST/LUNG: Clear to auscultation bilaterally; No wheezing  HEART: S1S2+, Regular rate and rhythm; No murmurs  ABDOMEN: Soft, min tender, Nondistended; Bowel sounds present  EXTREMITIES:  2+ Peripheral Pulses, No clubbing, cyanosis, or edema  SKIN: No rashes or lesions      LABS:                        9.5    4.9   )-----------( 191      ( 07 May 2019 07:07 )             29.9     05-08    142  |  103  |  13.0  ----------------------------<  72  3.8   |  24.0  |  0.64    Ca    8.8      08 May 2019 06:47  Phos  2.9     05-07  Mg     1.8     05-08    TPro  5.9<L>  /  Alb  3.3  /  TBili  0.3<L>  /  DBili  x   /  AST  29  /  ALT  40<H>  /  AlkPhos  81  05-08            MEDICATIONS  (STANDING):  buPROPion XL . 150 milliGRAM(s) Oral daily  cloNIDine 0.1 milliGRAM(s) Oral three times a day  diazepam    Tablet 5 milliGRAM(s) Oral two times a day  enalapril 10 milliGRAM(s) Oral daily  gabapentin 300 milliGRAM(s) Oral daily  influenza   Vaccine 0.5 milliLiter(s) IntraMuscular once  lactated ringers. 1000 milliLiter(s) (100 mL/Hr) IV Continuous <Continuous>  multivitamin 1 Tablet(s) Oral daily  PARoxetine 30 milliGRAM(s) Oral daily  sodium chloride 0.9% lock flush 3 milliLiter(s) IV Push every 8 hours  traZODone 50 milliGRAM(s) Oral at bedtime  venlafaxine  milliGRAM(s) Oral daily    MEDICATIONS  (PRN):  HYDROmorphone  Injectable 0.5 milliGRAM(s) IV Push every 4 hours PRN Severe Pain (7 - 10)  ketorolac   Injectable 15 milliGRAM(s) IV Push every 6 hours PRN Moderate Pain (4 - 6)  metoclopramide Injectable 5 milliGRAM(s) IV Push every 6 hours PRN nausea      RADIOLOGY & ADDITIONAL TESTS:  MRCP:  IMPRESSION:     Status post cholecystectomy with a mildly dilated common bile duct   measuring 9 mm which can be related to the patient's postcholecystectomy   state.    No common bile duct stone.    Mildly dilated pancreatic duct measuring 4 mm, previously normal caliber,   which may be related to the history of pancreatitis; continued follow-up   is recommended.    ASIA MELTON   This document has been electronically signed. May  8 2019 10:36AM

## 2019-05-08 NOTE — PROGRESS NOTE ADULT - ASSESSMENT
57 y/o female with history of depression/anxiety, post ercp pancreatitis in 1/19 that was done for choledocholithiasis. She was doing well and in her usual state of health until 3 days ago when she started having increasing epigastric pain radiating to the back. Associated with nausea and non bilious non bloody vomitus. No diarrhea. No fever, chills. No recent changes in mediations, no etoh use in last year. 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  In the ED with bandlike epigastric pain, mildly elevated lipase and CT showing evidence of pancreatitis and prior sphincterotomy. Seen by Surgery with CT findings and no recommendations for any intervention at this time.     1) acute pancreatitis without infection or necrosis  - Previous episode in 1/19 due to post ercp pancreatitis, currently no biliary pathology to explain. Likely viral or medication related. Improving with supportive care  - Cont. IVF, and advacne to clear liquid diet today  - prn pain regimen  - Surgery consult noted  - GI recs appreciated, MRCP negative  - antemetic prn    2) Anemia likely due to dilutional affect from IVF  - monitor  - no evidence of any active bleeding    3) Essential hypertension  - c/w clonidine and Vasotec  - monitor BP    4) Anxiety and depression  - c/w buPROPion XL, diazepam, PARoxetine, traZODone and venlafaxine XR    Disposition: home once medically cleared eric in the next 24 hours
59 y/o female with history of depression/anxiety, post ercp pancreatitis in 1/19 that was done for choledocholithiasis. She was doing well and in her usual state of health until 3 days ago when she started having increasing epigastric pain radiating to the back. Associated with nausea and non bilious non bloody vomitus. No diarrhea. No fever, chills. No recent changes in mediations, no etoh use in last year. 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  In the ED with bandlike epigastric pain, mildly elevated lipase and CT showing evidence of pancreatitis and prior sphincterotomy. Seen by Surgery with CT findings and no recommendations for any intervention at this time.     1) acute pancreatitis without infection or necrosis  - Previous episode in 1/19 due to post ercp pancreatitis, currently no biliary pathology to explain. Likely viral or medication related. Improving with supportive care  - Cont. IVF, npo for now  - prn pain regimen  - monitor lytes  - Surgery consult noted  - GI eval pending  - change to reglan since zofran not working for nausea  - repeat labs in am    2) Essential hypertension  - c/w clonidine and Vasotec  - monitor BP    3) Anxiety and depression  - c/w buPROPion XL, diazepam, PARoxetine, traZODone and venlafaxine XR    Disposition: home once medically cleared likely in th next 48 hours
59 y/o female with history of depression/anxiety, post ercp pancreatitis in 1/19 that was done for choledocholithiasis. She was doing well and in her usual state of health until 3 days ago when she started having increasing epigastric pain radiating to the back. Associated with nausea and non bilious non bloody vomitus. No diarrhea. No fever, chills. No recent changes in mediations, no etoh use in last year. 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  In the ED with bandlike epigastric pain, mildly elevated lipase and CT showing evidence of pancreatitis and prior sphincterotomy. Seen by Surgery with CT findings and no recommendations for any intervention at this time.     1) acute pancreatitis without infection or necrosis  - Previous episode in 1/19 due to post ercp pancreatitis, currently no biliary pathology to explain. Likely viral or medication related. Improving with supportive care  - Cont. IVF, npo for now, advance to clear liquid diet once abd pain improves  - prn pain regimen  - monitor lytes  - Surgery consult noted  - GI recs appreciated, MRCP ordered  - antemetic prn  - repeat labs in am    2) Anemia likely due to dilutional affect from IVF  - monitor  - no evidence of any active bleeding    3) Essential hypertension  - c/w clonidine and Vasotec  - monitor BP    4) Anxiety and depression  - c/w buPROPion XL, diazepam, PARoxetine, traZODone and venlafaxine XR    Disposition: home once medically cleared >2 days
Patient with recurrent pancreatitis. No clear etiology so far. MRI ordered but pending. Continue IV fluids. Advance diet once abdominal pain has improved. Will also order ALMA DELIA and IgG4 level.

## 2019-05-08 NOTE — PROGRESS NOTE ADULT - PROBLEM SELECTOR PLAN 1
recurrant and improved. Etiology unclear. ALMA DELIA and IgG4 pending. Continue clear liquids and check MRI. Continue IV fluids.

## 2019-05-09 ENCOUNTER — TRANSCRIPTION ENCOUNTER (OUTPATIENT)
Age: 59
End: 2019-05-09

## 2019-05-09 VITALS
TEMPERATURE: 98 F | RESPIRATION RATE: 20 BRPM | SYSTOLIC BLOOD PRESSURE: 142 MMHG | DIASTOLIC BLOOD PRESSURE: 88 MMHG | HEART RATE: 72 BPM

## 2019-05-09 LAB
ANA TITR SER: NEGATIVE — SIGNIFICANT CHANGE UP
HCT VFR BLD CALC: 29.9 % — LOW (ref 37–47)
HGB BLD-MCNC: 10.1 G/DL — LOW (ref 12–16)
IGG SERPL-MCNC: 666 MG/DL — LOW (ref 700–1600)
IGG1 SER-MCNC: 425 MG/DL — SIGNIFICANT CHANGE UP (ref 248–810)
IGG2 SER-MCNC: 222 MG/DL — SIGNIFICANT CHANGE UP (ref 130–555)
IGG3 SER-MCNC: 47 MG/DL — SIGNIFICANT CHANGE UP (ref 15–102)
IGG4 SER-MCNC: 6 MG/DL — SIGNIFICANT CHANGE UP (ref 2–96)
MCHC RBC-ENTMCNC: 29 PG — SIGNIFICANT CHANGE UP (ref 27–31)
MCHC RBC-ENTMCNC: 33.8 G/DL — SIGNIFICANT CHANGE UP (ref 32–36)
MCV RBC AUTO: 85.9 FL — SIGNIFICANT CHANGE UP (ref 81–99)
PLATELET # BLD AUTO: 236 K/UL — SIGNIFICANT CHANGE UP (ref 150–400)
RBC # BLD: 3.48 M/UL — LOW (ref 4.4–5.2)
RBC # FLD: 14.1 % — SIGNIFICANT CHANGE UP (ref 11–15.6)
WBC # BLD: 5 K/UL — SIGNIFICANT CHANGE UP (ref 4.8–10.8)
WBC # FLD AUTO: 5 K/UL — SIGNIFICANT CHANGE UP (ref 4.8–10.8)

## 2019-05-09 PROCEDURE — 99232 SBSQ HOSP IP/OBS MODERATE 35: CPT

## 2019-05-09 PROCEDURE — 99239 HOSP IP/OBS DSCHRG MGMT >30: CPT

## 2019-05-09 RX ADMIN — HYDROMORPHONE HYDROCHLORIDE 0.5 MILLIGRAM(S): 2 INJECTION INTRAMUSCULAR; INTRAVENOUS; SUBCUTANEOUS at 06:31

## 2019-05-09 RX ADMIN — SODIUM CHLORIDE 3 MILLILITER(S): 9 INJECTION INTRAMUSCULAR; INTRAVENOUS; SUBCUTANEOUS at 06:28

## 2019-05-09 RX ADMIN — Medication 0.1 MILLIGRAM(S): at 06:28

## 2019-05-09 RX ADMIN — HYDROMORPHONE HYDROCHLORIDE 0.5 MILLIGRAM(S): 2 INJECTION INTRAMUSCULAR; INTRAVENOUS; SUBCUTANEOUS at 06:46

## 2019-05-09 RX ADMIN — Medication 10 MILLIGRAM(S): at 06:28

## 2019-05-09 RX ADMIN — HYDROMORPHONE HYDROCHLORIDE 0.5 MILLIGRAM(S): 2 INJECTION INTRAMUSCULAR; INTRAVENOUS; SUBCUTANEOUS at 01:29

## 2019-05-09 RX ADMIN — HYDROMORPHONE HYDROCHLORIDE 0.5 MILLIGRAM(S): 2 INJECTION INTRAMUSCULAR; INTRAVENOUS; SUBCUTANEOUS at 01:14

## 2019-05-09 RX ADMIN — Medication 5 MILLIGRAM(S): at 06:28

## 2019-05-09 NOTE — DISCHARGE NOTE NURSING/CASE MANAGEMENT/SOCIAL WORK - NSDCDPATPORTLINK_GEN_ALL_CORE
You can access the [email or web address] Portal, offered by [facility], by registering with the following website: [email or web address]

## 2019-05-09 NOTE — DISCHARGE NOTE PROVIDER - NSDCCPCAREPLAN_GEN_ALL_CORE_FT
PRINCIPAL DISCHARGE DIAGNOSIS  Diagnosis: Pancreatitis  Assessment and Plan of Treatment: resolved. follow up with GI Dr. Bennett  Advance diet slowly over the next few days      SECONDARY DISCHARGE DIAGNOSES  Diagnosis: Intractable vomiting  Assessment and Plan of Treatment: resolved

## 2019-05-09 NOTE — PROGRESS NOTE ADULT - REASON FOR ADMISSION
Abdominal pain, Pancreatitis

## 2019-05-09 NOTE — DISCHARGE NOTE PROVIDER - CARE PROVIDER_API CALL
Collin Bennett)  Gastroenterology; Internal Medicine  [address] [place] [id number]  Phone: [phone]  Fax: [phone]  Follow Up Time:

## 2019-05-09 NOTE — DISCHARGE NOTE PROVIDER - HOSPITAL COURSE
59 y/o female with history of depression/anxiety, post ercp pancreatitis in 1/19 that was done for choledocholithiasis. She was doing well and in her usual state of health until 3 days ago when she started having increasing epigastric pain radiating to the back. Associated with nausea and non bilious non bloody vomitus. No diarrhea. No fever, chills. No recent changes in mediations, no etoh use in last year. 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  In the ED with bandlike epigastric pain, mildly elevated lipase and CT showing evidence of pancreatitis and prior sphincterotomy. Seen by Surgery with CT findings and no recommendations for any intervention at this time.         1) acute pancreatitis without infection or necrosis    - Previous episode in 1/19 due to post ercp pancreatitis, currently no biliary pathology to explain. Likely viral or medication related. Improved tolerating po  intake    - Surgery consult noted    - GI recs appreciated, MRCP negative    - antemetic prn    - f/u with Dr Donald GRAJEDA in 1 week        2) Anemia likely due to dilutional affect from IVF    - stable    - no evidence of any active bleeding        3) Essential hypertension    - c/w clonidine and Vasotec        4) Anxiety and depression    - c/w buPROPion XL, diazepam, PARoxetine, traZODone and venlafaxine XR        PHYSICAL EXAM:    Vital Signs Last 24 Hrs    T(C): 36.7 (09 May 2019 07:27), Max: 36.9 (08 May 2019 17:15)    T(F): 98 (09 May 2019 07:27), Max: 98.4 (08 May 2019 17:15)    HR: 69 (09 May 2019 07:27) (62 - 69)    BP: 156/100 (09 May 2019 07:27) (139/87 - 156/100)    BP(mean): --    RR: 19 (09 May 2019 07:27) (18 - 19)    SpO2: 97% (08 May 2019 17:15) (97% - 97%)        GENERAL: NAD, well-groomed    HEENT: PERRL, +EOMI    CHEST/LUNG: Clear to auscultation bilaterally; No wheezing    HEART: S1S2+, Regular rate and rhythm; No murmurs    ABDOMEN: Soft, nontender, Nondistended; Bowel sounds present    EXTREMITIES:  2+ Peripheral Pulses, No clubbing, cyanosis, or edema    SKIN: No rashes or lesions        pt stable for discharge         total time spent for discharge 34 minutes

## 2019-05-14 DIAGNOSIS — Z76.89 PERSONS ENCOUNTERING HEALTH SERVICES IN OTHER SPECIFIED CIRCUMSTANCES: ICD-10-CM

## 2019-05-18 PROCEDURE — 36415 COLL VENOUS BLD VENIPUNCTURE: CPT

## 2019-05-18 PROCEDURE — 84100 ASSAY OF PHOSPHORUS: CPT

## 2019-05-18 PROCEDURE — 99285 EMERGENCY DEPT VISIT HI MDM: CPT | Mod: 25

## 2019-05-18 PROCEDURE — 85027 COMPLETE CBC AUTOMATED: CPT

## 2019-05-18 PROCEDURE — 85014 HEMATOCRIT: CPT

## 2019-05-18 PROCEDURE — 96361 HYDRATE IV INFUSION ADD-ON: CPT

## 2019-05-18 PROCEDURE — 80053 COMPREHEN METABOLIC PANEL: CPT

## 2019-05-18 PROCEDURE — 87086 URINE CULTURE/COLONY COUNT: CPT

## 2019-05-18 PROCEDURE — 80048 BASIC METABOLIC PNL TOTAL CA: CPT

## 2019-05-18 PROCEDURE — G0378: CPT | Mod: 25

## 2019-05-18 PROCEDURE — 81001 URINALYSIS AUTO W/SCOPE: CPT

## 2019-05-18 PROCEDURE — 74177 CT ABD & PELVIS W/CONTRAST: CPT

## 2019-05-18 PROCEDURE — 74183 MRI ABD W/O CNTR FLWD CNTR: CPT

## 2019-05-18 PROCEDURE — 83690 ASSAY OF LIPASE: CPT

## 2019-05-18 PROCEDURE — 83735 ASSAY OF MAGNESIUM: CPT

## 2019-05-18 PROCEDURE — 96376 TX/PRO/DX INJ SAME DRUG ADON: CPT

## 2019-05-18 PROCEDURE — 86803 HEPATITIS C AB TEST: CPT

## 2019-05-18 PROCEDURE — 82787 IGG 1 2 3 OR 4 EACH: CPT

## 2019-05-18 PROCEDURE — 82803 BLOOD GASES ANY COMBINATION: CPT

## 2019-05-18 PROCEDURE — 96375 TX/PRO/DX INJ NEW DRUG ADDON: CPT

## 2019-05-18 PROCEDURE — 82947 ASSAY GLUCOSE BLOOD QUANT: CPT

## 2019-05-18 PROCEDURE — 80061 LIPID PANEL: CPT

## 2019-05-18 PROCEDURE — 84295 ASSAY OF SERUM SODIUM: CPT

## 2019-05-18 PROCEDURE — 84132 ASSAY OF SERUM POTASSIUM: CPT

## 2019-05-18 PROCEDURE — 82330 ASSAY OF CALCIUM: CPT

## 2019-05-18 PROCEDURE — 93005 ELECTROCARDIOGRAM TRACING: CPT

## 2019-05-18 PROCEDURE — 82435 ASSAY OF BLOOD CHLORIDE: CPT

## 2019-05-18 PROCEDURE — 86038 ANTINUCLEAR ANTIBODIES: CPT

## 2019-05-18 PROCEDURE — 96374 THER/PROPH/DIAG INJ IV PUSH: CPT

## 2019-05-18 PROCEDURE — 83605 ASSAY OF LACTIC ACID: CPT

## 2019-06-13 ENCOUNTER — APPOINTMENT (OUTPATIENT)
Dept: FAMILY MEDICINE | Facility: CLINIC | Age: 59
End: 2019-06-13

## 2019-06-26 ENCOUNTER — APPOINTMENT (OUTPATIENT)
Dept: FAMILY MEDICINE | Facility: CLINIC | Age: 59
End: 2019-06-26

## 2019-06-27 ENCOUNTER — APPOINTMENT (OUTPATIENT)
Dept: FAMILY MEDICINE | Facility: CLINIC | Age: 59
End: 2019-06-27
Payer: MEDICAID

## 2019-06-27 VITALS
HEART RATE: 78 BPM | HEIGHT: 66 IN | WEIGHT: 179 LBS | BODY MASS INDEX: 28.77 KG/M2 | DIASTOLIC BLOOD PRESSURE: 72 MMHG | SYSTOLIC BLOOD PRESSURE: 128 MMHG

## 2019-06-27 PROCEDURE — 99214 OFFICE O/P EST MOD 30 MIN: CPT

## 2019-06-27 RX ORDER — CEFUROXIME AXETIL 250 MG/1
250 TABLET ORAL
Qty: 14 | Refills: 0 | Status: DISCONTINUED | COMMUNITY
Start: 2019-01-25 | End: 2019-06-27

## 2019-06-27 RX ORDER — AMLODIPINE BESYLATE 5 MG/1
5 TABLET ORAL
Qty: 30 | Refills: 0 | Status: DISCONTINUED | COMMUNITY
Start: 2018-04-20 | End: 2019-06-27

## 2019-06-27 NOTE — HEALTH RISK ASSESSMENT
[No falls in past year] : Patient reported no falls in the past year [1] : 2) Feeling down, depressed, or hopeless for several days (1) [Fair] :  ~his/her~ mood as fair [HIV test declined] : HIV test declined [Hepatitis C test offered] : Hepatitis C test offered [None] : None [With Family] : lives with family [Unemployed] : unemployed [Sexually Active] : sexually active [] :  [Feels Safe at Home] : Feels safe at home [Fully functional (using the telephone, shopping, preparing meals, housekeeping, doing laundry, using] : Fully functional and needs no help or supervision to perform IADLs (using the telephone, shopping, preparing meals, housekeeping, doing laundry, using transportation, managing medications and managing finances) [Fully functional (bathing, dressing, toileting, transferring, walking, feeding)] : Fully functional (bathing, dressing, toileting, transferring, walking, feeding) [Smoke Detector] : smoke detector [Seat Belt] :  uses seat belt [Safety elements used in home] : safety elements used in home [Discussed at today's visit] : Advance Directives Discussed at today's visit [] : No [Change in mental status noted] : No change in mental status noted [Language] : denies difficulty with language [Handling Complex Tasks] : denies difficulty handling complex tasks [Reports changes in hearing] : Reports no changes in hearing [Reports changes in vision] : Reports no changes in vision [Reports changes in dental health] : Reports no changes in dental health [TB Exposure] : is not being exposed to tuberculosis [MammogramDate] : need new [ColonoscopyDate] : has done donot remember name of [facility]

## 2019-06-27 NOTE — PLAN
[FreeTextEntry1] : ALLERGIC RHINITIS- FLONASE\par HTN- continue meds and renewed\par Anxious/ drug seeking behavior- did not ask for any meds/ seeing Psych\par  I STOP shows patient has gotten her Diazepam iN 02/2019- and not suppose to get it until 03/2019/ also last month got 1 week early. Patient advised to follow up with psych.\par Mammo now here to be found suspect did not do it.\par renew clonidine and gabapentin.\par follow up in 3 months labs are normal\par \par HM- lab/mammo/ get colonoscopy report once patient tells us name of the hospital\par FIT testing\par controlled HTN- renew meds/ restart clonidine 0.1 mg TID and hold amlodipine/ she will let me know how she feels\par anxious - need to see psych/ advise trying at [facility] unable to find psych since her insurance has changed\par advise to have low salt diet /\par \par Follow up 1 month

## 2019-06-27 NOTE — PHYSICAL EXAM
[No Acute Distress] : no acute distress [Well Nourished] : well nourished [Well Developed] : well developed [Well-Appearing] : well-appearing [Normal Sclera/Conjunctiva] : normal sclera/conjunctiva [EOMI] : extraocular movements intact [PERRL] : pupils equal round and reactive to light [Normal Outer Ear/Nose] : the outer ears and nose were normal in appearance [Normal Oropharynx] : the oropharynx was normal [No JVD] : no jugular venous distention [Supple] : supple [Thyroid Normal, No Nodules] : the thyroid was normal and there were no nodules present [No Lymphadenopathy] : no lymphadenopathy [Clear to Auscultation] : lungs were clear to auscultation bilaterally [No Respiratory Distress] : no respiratory distress  [Normal Rate] : normal rate  [No Accessory Muscle Use] : no accessory muscle use [Regular Rhythm] : with a regular rhythm [No Murmur] : no murmur heard [Normal S1, S2] : normal S1 and S2 [No Carotid Bruits] : no carotid bruits [No Abdominal Bruit] : a ~M bruit was not heard ~T in the abdomen [No Varicosities] : no varicosities [Pedal Pulses Present] : the pedal pulses are present [No Edema] : there was no peripheral edema [No Extremity Clubbing/Cyanosis] : no extremity clubbing/cyanosis [Soft] : abdomen soft [No Palpable Aorta] : no palpable aorta [Non-distended] : non-distended [Non Tender] : non-tender [No Masses] : no abdominal mass palpated [No HSM] : no HSM [Normal Bowel Sounds] : normal bowel sounds [Normal Posterior Cervical Nodes] : no posterior cervical lymphadenopathy [Normal Anterior Cervical Nodes] : no anterior cervical lymphadenopathy [No CVA Tenderness] : no CVA  tenderness [No Joint Swelling] : no joint swelling [No Spinal Tenderness] : no spinal tenderness [Grossly Normal Strength/Tone] : grossly normal strength/tone [No Rash] : no rash [Normal Gait] : normal gait [No Focal Deficits] : no focal deficits [Coordination Grossly Intact] : coordination grossly intact [Normal Affect] : the affect was normal [Deep Tendon Reflexes (DTR)] : deep tendon reflexes were 2+ and symmetric [Normal Insight/Judgement] : insight and judgment were intact

## 2019-06-27 NOTE — HISTORY OF PRESENT ILLNESS
[FreeTextEntry1] : HTN/ fatigue [FreeTextEntry8] : as per ortho- 57 year old female presents with lower back and right leg pain x 6 months, getting worse. She complains of pain across her lower back radiating to the posterior thigh and right heel with occasional numbness and tingling of both legs. She experiences "shock waves" of pain radiating to both legs. She has difficulty turning in bed. She has tried stretching exercises, Motrin, and Tylenol with no improvement. She reports history of left THR April 2014 and right THR in October 2015 with Dr. Lyon.\par chart reviewed - looking for new MD\par needs BP meds and need to find new Psych as her insurance has changed \par Came late to office as her car broke down\par 01/2019- doing well with cough and congestion.\par lot of social and economic issues .\par needs meds ,need to see psych. wants to stay on clonidine 0.1 mg felt better overall mood wise.\par has dome mammo and colonoscopy but doesnot remember name of the hospital\par 02/2019- patient is requesting Clonodine and gabapentin along with diazepam states she gets panic attacks few times per day she is  packing sec to Foreclosure and has no place to go.\par she has found a psychiatrist has appt next week at [facility].\par states she has dome mammo at [facility] recently but there is no report.\par also states has done colonoscopy in [facility]- unable to give exact address\par not working as has lot of Body ache\par \par 03/2019- fatigue/ taking meds\par - Hospital Course	 \par Patient is a 59 yo female with PMHx of substance use disorder (Xanax and \par alcohol), anxiety, and depression with history of suicide attempt in 2/2018 and \par cholecystectomy secondary to stones presents to the er with 2-3 day history of \par 10 out of 10 ruq abd pain similar to the pain in the past. Patient states the \par RUQ abd pain is associated with vomiting. Pt was worked up in the er and found \par to have choledocholithiasis and is planned for urgent ercp. \par \par patient had ercp on 12/31: \par \par - Operative Findings	dilated cbd \par small filling defect \par s/p balloon sweeps 9-12 mm balloon \par s/p sphincteroplasty 10 -11 mm CRE \par removal of sludge \par sprayed diluted epi \par \par \par patient started on clear liquid diet , cont to have pain post ERCP dx ed with \par pancreatitis NPO, pain meds given , slowly improving , diet advanced , \par tolerated . Pt is with atypical chest pain < CTA of chest performed negative \par for PE , resolved,. blood pressure elevated at times, home meds restarted \par controlled now, hypokalemia replaced \par \par 04/2019- Needs meds renewal\par feels about same\par \par 06/2019- MEDS RENEWALS AND HAS SEVERE ALLERGY.\par DEPRESSION NOT IMPROVING AND WAS TOLD BY [facility] TO START ON SOMETHING OTHER THEN WELL BUTERIN

## 2019-08-01 PROCEDURE — G9005: CPT

## 2019-08-26 ENCOUNTER — MEDICATION RENEWAL (OUTPATIENT)
Age: 59
End: 2019-08-26

## 2019-09-01 ENCOUNTER — OUTPATIENT (OUTPATIENT)
Dept: OUTPATIENT SERVICES | Facility: HOSPITAL | Age: 59
LOS: 1 days | End: 2019-09-01
Payer: MEDICAID

## 2019-09-01 DIAGNOSIS — Z90.49 ACQUIRED ABSENCE OF OTHER SPECIFIED PARTS OF DIGESTIVE TRACT: Chronic | ICD-10-CM

## 2019-09-01 DIAGNOSIS — Z96.60 PRESENCE OF UNSPECIFIED ORTHOPEDIC JOINT IMPLANT: Chronic | ICD-10-CM

## 2019-09-01 DIAGNOSIS — Z98.51 TUBAL LIGATION STATUS: Chronic | ICD-10-CM

## 2019-09-11 ENCOUNTER — APPOINTMENT (OUTPATIENT)
Dept: FAMILY MEDICINE | Facility: CLINIC | Age: 59
End: 2019-09-11
Payer: MEDICAID

## 2019-09-11 VITALS
SYSTOLIC BLOOD PRESSURE: 118 MMHG | DIASTOLIC BLOOD PRESSURE: 76 MMHG | WEIGHT: 200 LBS | BODY MASS INDEX: 32.14 KG/M2 | HEIGHT: 66 IN | HEART RATE: 72 BPM

## 2019-09-11 DIAGNOSIS — G47.00 INSOMNIA, UNSPECIFIED: ICD-10-CM

## 2019-09-11 PROCEDURE — 99214 OFFICE O/P EST MOD 30 MIN: CPT

## 2019-09-11 RX ORDER — BUPROPION HYDROCHLORIDE 150 MG/1
150 TABLET, FILM COATED ORAL
Refills: 0 | Status: DISCONTINUED | COMMUNITY
End: 2019-09-11

## 2019-09-11 NOTE — PHYSICAL EXAM
[No Acute Distress] : no acute distress [Well Nourished] : well nourished [Well Developed] : well developed [Well-Appearing] : well-appearing [Normal Sclera/Conjunctiva] : normal sclera/conjunctiva [PERRL] : pupils equal round and reactive to light [EOMI] : extraocular movements intact [Normal Outer Ear/Nose] : the outer ears and nose were normal in appearance [Normal Oropharynx] : the oropharynx was normal [No JVD] : no jugular venous distention [Supple] : supple [No Lymphadenopathy] : no lymphadenopathy [Thyroid Normal, No Nodules] : the thyroid was normal and there were no nodules present [No Respiratory Distress] : no respiratory distress  [Clear to Auscultation] : lungs were clear to auscultation bilaterally [No Accessory Muscle Use] : no accessory muscle use [Normal Rate] : normal rate  [Regular Rhythm] : with a regular rhythm [Normal S1, S2] : normal S1 and S2 [No Carotid Bruits] : no carotid bruits [No Murmur] : no murmur heard [No Abdominal Bruit] : a ~M bruit was not heard ~T in the abdomen [No Varicosities] : no varicosities [Pedal Pulses Present] : the pedal pulses are present [No Extremity Clubbing/Cyanosis] : no extremity clubbing/cyanosis [No Edema] : there was no peripheral edema [No Palpable Aorta] : no palpable aorta [Soft] : abdomen soft [Non-distended] : non-distended [Non Tender] : non-tender [No Masses] : no abdominal mass palpated [No HSM] : no HSM [Normal Bowel Sounds] : normal bowel sounds [Normal Posterior Cervical Nodes] : no posterior cervical lymphadenopathy [No CVA Tenderness] : no CVA  tenderness [Normal Anterior Cervical Nodes] : no anterior cervical lymphadenopathy [No Spinal Tenderness] : no spinal tenderness [No Joint Swelling] : no joint swelling [No Rash] : no rash [Grossly Normal Strength/Tone] : grossly normal strength/tone [Normal Gait] : normal gait [No Focal Deficits] : no focal deficits [Coordination Grossly Intact] : coordination grossly intact [Normal Affect] : the affect was normal [Deep Tendon Reflexes (DTR)] : deep tendon reflexes were 2+ and symmetric [Normal Insight/Judgement] : insight and judgment were intact

## 2019-09-12 NOTE — PLAN
[FreeTextEntry1] : # renew meds\par # insomnia- ran out of trazodone - given 2 week supply as she has Psych supplygoing to Mail order.\par Reassurence\par ALLERGIC RHINITIS- FLONASE\par HTN- continue meds and renewed\par Anxious/ drug seeking behavior- did not ask for any meds/ seeing Psych\par  I STOP shows patient has gotten her Diazepam iN 02/2019- and not suppose to get it until 03/2019/ also last month got 1 week early. Patient advised to follow up with psych.\par Mammo now here to be found suspect did not do it.\par renew clonidine and gabapentin.\par follow up in 3 months labs are normal\par \par HM- lab/mammo/ get colonoscopy report once patient tells us name of the hospital\par FIT testing\par controlled HTN- renew meds/ restart clonidine 0.1 mg TID and hold amlodipine/ she will let me know how she feels\par anxious - need to see psych/ advise trying at [facility] unable to find psych since her insurance has changed\par advise to have low salt diet /\par \par Follow up 1 month

## 2019-09-12 NOTE — HEALTH RISK ASSESSMENT
[No falls in past year] : Patient reported no falls in the past year [1] : 2) Feeling down, depressed, or hopeless for several days (1) [Fair] : ~his/her~ current health as fair  [HIV test declined] : HIV test declined [Hepatitis C test offered] : Hepatitis C test offered [None] : None [With Family] : lives with family [] :  [Unemployed] : unemployed [Sexually Active] : sexually active [Feels Safe at Home] : Feels safe at home [Fully functional (bathing, dressing, toileting, transferring, walking, feeding)] : Fully functional (bathing, dressing, toileting, transferring, walking, feeding) [Fully functional (using the telephone, shopping, preparing meals, housekeeping, doing laundry, using] : Fully functional and needs no help or supervision to perform IADLs (using the telephone, shopping, preparing meals, housekeeping, doing laundry, using transportation, managing medications and managing finances) [Safety elements used in home] : safety elements used in home [Smoke Detector] : smoke detector [Seat Belt] :  uses seat belt [Discussed at today's visit] : Advance Directives Discussed at today's visit [] : No [Change in mental status noted] : No change in mental status noted [Handling Complex Tasks] : denies difficulty handling complex tasks [Language] : denies difficulty with language [Reports changes in vision] : Reports no changes in vision [Reports changes in hearing] : Reports no changes in hearing [Reports changes in dental health] : Reports no changes in dental health [TB Exposure] : is not being exposed to tuberculosis [MammogramDate] : need new [ColonoscopyDate] : has done donot remember name of [facility]

## 2019-09-12 NOTE — HISTORY OF PRESENT ILLNESS
[FreeTextEntry1] : HTN/ fatigue [FreeTextEntry8] : as per ortho- 57 year old female presents with lower back and right leg pain x 6 months, getting worse. She complains of pain across her lower back radiating to the posterior thigh and right heel with occasional numbness and tingling of both legs. She experiences "shock waves" of pain radiating to both legs. She has difficulty turning in bed. She has tried stretching exercises, Motrin, and Tylenol with no improvement. She reports history of left THR April 2014 and right THR in October 2015 with Dr. Lyon.\par chart reviewed - looking for new MD\par needs BP meds and need to find new Psych as her insurance has changed \par Came late to office as her car broke down\par 01/2019- doing well with cough and congestion.\par lot of social and economic issues .\par needs meds ,need to see psych. wants to stay on clonidine 0.1 mg felt better overall mood wise.\par has dome mammo and colonoscopy but doesnot remember name of the hospital\par 02/2019- patient is requesting Clonodine and gabapentin along with diazepam states she gets panic attacks few times per day she is  packing sec to Foreclosure and has no place to go.\par she has found a psychiatrist has appt next week at [facility].\par states she has dome mammo at [facility] recently but there is no report.\par also states has done colonoscopy in [facility]- unable to give exact address\par not working as has lot of Body ache\par \par 03/2019- fatigue/ taking meds\par - Hospital Course	 \par Patient is a 57 yo female with PMHx of substance use disorder (Xanax and \par alcohol), anxiety, and depression with history of suicide attempt in 2/2018 and \par cholecystectomy secondary to stones presents to the er with 2-3 day history of \par 10 out of 10 ruq abd pain similar to the pain in the past. Patient states the \par RUQ abd pain is associated with vomiting. Pt was worked up in the er and found \par to have choledocholithiasis and is planned for urgent ercp. \par \par patient had ercp on 12/31: \par \par - Operative Findings	dilated cbd \par small filling defect \par s/p balloon sweeps 9-12 mm balloon \par s/p sphincteroplasty 10 -11 mm CRE \par removal of sludge \par sprayed diluted epi \par \par \par patient started on clear liquid diet , cont to have pain post ERCP dx ed with \par pancreatitis NPO, pain meds given , slowly improving , diet advanced , \par tolerated . Pt is with atypical chest pain < CTA of chest performed negative \par for PE , resolved,. blood pressure elevated at times, home meds restarted \par controlled now, hypokalemia replaced \par \par 04/2019- Needs meds renewal\par feels about same\par \par 06/2019- MEDS RENEWALS AND HAS SEVERE ALLERGY.\par DEPRESSION NOT IMPROVING AND WAS TOLD BY pSYCH TO START ON SOMETHING OTHER THEN WELL BUTERIN\par \par 09/2019- here for follow up\par feels stressed as she has not found a new place yet. her house has been foreclosed

## 2019-09-19 ENCOUNTER — EMERGENCY (EMERGENCY)
Facility: HOSPITAL | Age: 59
LOS: 1 days | Discharge: HOME | End: 2019-09-19
Attending: EMERGENCY MEDICINE
Payer: MEDICAID

## 2019-09-19 ENCOUNTER — APPOINTMENT (OUTPATIENT)
Dept: ORTHOPEDIC SURGERY | Facility: CLINIC | Age: 59
End: 2019-09-19

## 2019-09-19 VITALS
SYSTOLIC BLOOD PRESSURE: 114 MMHG | RESPIRATION RATE: 18 BRPM | OXYGEN SATURATION: 97 % | WEIGHT: 179.9 LBS | HEART RATE: 79 BPM | HEIGHT: 67 IN | DIASTOLIC BLOOD PRESSURE: 74 MMHG | TEMPERATURE: 99 F

## 2019-09-19 DIAGNOSIS — Z90.49 ACQUIRED ABSENCE OF OTHER SPECIFIED PARTS OF DIGESTIVE TRACT: Chronic | ICD-10-CM

## 2019-09-19 DIAGNOSIS — Z98.51 TUBAL LIGATION STATUS: Chronic | ICD-10-CM

## 2019-09-19 DIAGNOSIS — R42 DIZZINESS AND GIDDINESS: ICD-10-CM

## 2019-09-19 DIAGNOSIS — Z96.60 PRESENCE OF UNSPECIFIED ORTHOPEDIC JOINT IMPLANT: Chronic | ICD-10-CM

## 2019-09-19 LAB
ALBUMIN SERPL ELPH-MCNC: 4.1 G/DL — SIGNIFICANT CHANGE UP (ref 3.3–5.2)
ALP SERPL-CCNC: 77 U/L — SIGNIFICANT CHANGE UP (ref 40–120)
ALT FLD-CCNC: 19 U/L — SIGNIFICANT CHANGE UP
AMPHET UR-MCNC: NEGATIVE — SIGNIFICANT CHANGE UP
ANION GAP SERPL CALC-SCNC: 11 MMOL/L — SIGNIFICANT CHANGE UP (ref 5–17)
APPEARANCE UR: CLEAR — SIGNIFICANT CHANGE UP
AST SERPL-CCNC: 27 U/L — SIGNIFICANT CHANGE UP
BARBITURATES UR SCN-MCNC: NEGATIVE — SIGNIFICANT CHANGE UP
BASOPHILS # BLD AUTO: 0.02 K/UL — SIGNIFICANT CHANGE UP (ref 0–0.2)
BASOPHILS NFR BLD AUTO: 0.2 % — SIGNIFICANT CHANGE UP (ref 0–2)
BENZODIAZ UR-MCNC: POSITIVE
BILIRUB SERPL-MCNC: <0.2 MG/DL — LOW (ref 0.4–2)
BILIRUB UR-MCNC: NEGATIVE — SIGNIFICANT CHANGE UP
BUN SERPL-MCNC: 10 MG/DL — SIGNIFICANT CHANGE UP (ref 8–20)
CALCIUM SERPL-MCNC: 9.2 MG/DL — SIGNIFICANT CHANGE UP (ref 8.6–10.2)
CHLORIDE SERPL-SCNC: 102 MMOL/L — SIGNIFICANT CHANGE UP (ref 98–107)
CO2 SERPL-SCNC: 30 MMOL/L — HIGH (ref 22–29)
COCAINE METAB.OTHER UR-MCNC: NEGATIVE — SIGNIFICANT CHANGE UP
COLOR SPEC: YELLOW — SIGNIFICANT CHANGE UP
CREAT SERPL-MCNC: 0.97 MG/DL — SIGNIFICANT CHANGE UP (ref 0.5–1.3)
DIFF PNL FLD: ABNORMAL
EOSINOPHIL # BLD AUTO: 0.18 K/UL — SIGNIFICANT CHANGE UP (ref 0–0.5)
EOSINOPHIL NFR BLD AUTO: 1.8 % — SIGNIFICANT CHANGE UP (ref 0–6)
EPI CELLS # UR: SIGNIFICANT CHANGE UP
ETHANOL SERPL-MCNC: <10 MG/DL — SIGNIFICANT CHANGE UP
GLUCOSE SERPL-MCNC: 102 MG/DL — SIGNIFICANT CHANGE UP (ref 70–115)
GLUCOSE UR QL: NEGATIVE MG/DL — SIGNIFICANT CHANGE UP
HCT VFR BLD CALC: 31.6 % — LOW (ref 34.5–45)
HGB BLD-MCNC: 10.1 G/DL — LOW (ref 11.5–15.5)
IMM GRANULOCYTES NFR BLD AUTO: 0.3 % — SIGNIFICANT CHANGE UP (ref 0–1.5)
KETONES UR-MCNC: NEGATIVE — SIGNIFICANT CHANGE UP
LEUKOCYTE ESTERASE UR-ACNC: NEGATIVE — SIGNIFICANT CHANGE UP
LYMPHOCYTES # BLD AUTO: 1.8 K/UL — SIGNIFICANT CHANGE UP (ref 1–3.3)
LYMPHOCYTES # BLD AUTO: 17.9 % — SIGNIFICANT CHANGE UP (ref 13–44)
MAGNESIUM SERPL-MCNC: 2 MG/DL — SIGNIFICANT CHANGE UP (ref 1.6–2.6)
MCHC RBC-ENTMCNC: 29.7 PG — SIGNIFICANT CHANGE UP (ref 27–34)
MCHC RBC-ENTMCNC: 32 GM/DL — SIGNIFICANT CHANGE UP (ref 32–36)
MCV RBC AUTO: 92.9 FL — SIGNIFICANT CHANGE UP (ref 80–100)
METHADONE UR-MCNC: NEGATIVE — SIGNIFICANT CHANGE UP
MONOCYTES # BLD AUTO: 0.57 K/UL — SIGNIFICANT CHANGE UP (ref 0–0.9)
MONOCYTES NFR BLD AUTO: 5.7 % — SIGNIFICANT CHANGE UP (ref 2–14)
NEUTROPHILS # BLD AUTO: 7.45 K/UL — HIGH (ref 1.8–7.4)
NEUTROPHILS NFR BLD AUTO: 74.1 % — SIGNIFICANT CHANGE UP (ref 43–77)
NITRITE UR-MCNC: NEGATIVE — SIGNIFICANT CHANGE UP
NT-PROBNP SERPL-SCNC: 284 PG/ML — SIGNIFICANT CHANGE UP (ref 0–300)
OPIATES UR-MCNC: NEGATIVE — SIGNIFICANT CHANGE UP
PCP SPEC-MCNC: SIGNIFICANT CHANGE UP
PCP UR-MCNC: NEGATIVE — SIGNIFICANT CHANGE UP
PH UR: 5 — SIGNIFICANT CHANGE UP (ref 5–8)
PLATELET # BLD AUTO: 224 K/UL — SIGNIFICANT CHANGE UP (ref 150–400)
POTASSIUM SERPL-MCNC: 4.1 MMOL/L — SIGNIFICANT CHANGE UP (ref 3.5–5.3)
POTASSIUM SERPL-SCNC: 4.1 MMOL/L — SIGNIFICANT CHANGE UP (ref 3.5–5.3)
PROT SERPL-MCNC: 6.8 G/DL — SIGNIFICANT CHANGE UP (ref 6.6–8.7)
PROT UR-MCNC: 30 MG/DL
RBC # BLD: 3.4 M/UL — LOW (ref 3.8–5.2)
RBC # FLD: 13.7 % — SIGNIFICANT CHANGE UP (ref 10.3–14.5)
RBC CASTS # UR COMP ASSIST: ABNORMAL /HPF (ref 0–4)
SODIUM SERPL-SCNC: 143 MMOL/L — SIGNIFICANT CHANGE UP (ref 135–145)
SP GR SPEC: 1 — LOW (ref 1.01–1.02)
THC UR QL: NEGATIVE — SIGNIFICANT CHANGE UP
TROPONIN T SERPL-MCNC: 0.02 NG/ML — SIGNIFICANT CHANGE UP (ref 0–0.06)
TROPONIN T SERPL-MCNC: <0.01 NG/ML — SIGNIFICANT CHANGE UP (ref 0–0.06)
TSH SERPL-MCNC: 1.53 UIU/ML — SIGNIFICANT CHANGE UP (ref 0.27–4.2)
UROBILINOGEN FLD QL: NEGATIVE MG/DL — SIGNIFICANT CHANGE UP
WBC # BLD: 10.05 K/UL — SIGNIFICANT CHANGE UP (ref 3.8–10.5)
WBC # FLD AUTO: 10.05 K/UL — SIGNIFICANT CHANGE UP (ref 3.8–10.5)
WBC UR QL: SIGNIFICANT CHANGE UP

## 2019-09-19 PROCEDURE — 70450 CT HEAD/BRAIN W/O DYE: CPT | Mod: 26,59

## 2019-09-19 PROCEDURE — 71046 X-RAY EXAM CHEST 2 VIEWS: CPT | Mod: 26

## 2019-09-19 PROCEDURE — 93010 ELECTROCARDIOGRAM REPORT: CPT

## 2019-09-19 PROCEDURE — 99218: CPT

## 2019-09-19 PROCEDURE — 70498 CT ANGIOGRAPHY NECK: CPT | Mod: 26

## 2019-09-19 PROCEDURE — 93306 TTE W/DOPPLER COMPLETE: CPT | Mod: 26

## 2019-09-19 PROCEDURE — 70496 CT ANGIOGRAPHY HEAD: CPT | Mod: 26

## 2019-09-19 PROCEDURE — 72131 CT LUMBAR SPINE W/O DYE: CPT | Mod: 26

## 2019-09-19 RX ORDER — OXYCODONE HYDROCHLORIDE 5 MG/1
5 TABLET ORAL EVERY 8 HOURS
Refills: 0 | Status: DISCONTINUED | OUTPATIENT
Start: 2019-09-19 | End: 2019-09-20

## 2019-09-19 RX ORDER — AZITHROMYCIN 500 MG/1
500 TABLET, FILM COATED ORAL ONCE
Refills: 0 | Status: COMPLETED | OUTPATIENT
Start: 2019-09-19 | End: 2019-09-19

## 2019-09-19 RX ORDER — ACETAMINOPHEN 500 MG
975 TABLET ORAL ONCE
Refills: 0 | Status: COMPLETED | OUTPATIENT
Start: 2019-09-19 | End: 2019-09-19

## 2019-09-19 RX ORDER — ASPIRIN/CALCIUM CARB/MAGNESIUM 324 MG
81 TABLET ORAL DAILY
Refills: 0 | Status: DISCONTINUED | OUTPATIENT
Start: 2019-09-19 | End: 2019-10-05

## 2019-09-19 RX ORDER — ACETAMINOPHEN 500 MG
650 TABLET ORAL EVERY 6 HOURS
Refills: 0 | Status: DISCONTINUED | OUTPATIENT
Start: 2019-09-19 | End: 2019-10-05

## 2019-09-19 RX ORDER — LIDOCAINE 4 G/100G
1 CREAM TOPICAL DAILY
Refills: 0 | Status: DISCONTINUED | OUTPATIENT
Start: 2019-09-19 | End: 2019-10-05

## 2019-09-19 RX ORDER — GABAPENTIN 400 MG/1
100 CAPSULE ORAL THREE TIMES A DAY
Refills: 0 | Status: DISCONTINUED | OUTPATIENT
Start: 2019-09-19 | End: 2019-10-05

## 2019-09-19 RX ORDER — THIAMINE MONONITRATE (VIT B1) 100 MG
100 TABLET ORAL ONCE
Refills: 0 | Status: COMPLETED | OUTPATIENT
Start: 2019-09-19 | End: 2019-09-19

## 2019-09-19 RX ORDER — KETOROLAC TROMETHAMINE 30 MG/ML
15 SYRINGE (ML) INJECTION ONCE
Refills: 0 | Status: DISCONTINUED | OUTPATIENT
Start: 2019-09-19 | End: 2019-09-19

## 2019-09-19 RX ORDER — KETOROLAC TROMETHAMINE 30 MG/ML
30 SYRINGE (ML) INJECTION ONCE
Refills: 0 | Status: DISCONTINUED | OUTPATIENT
Start: 2019-09-19 | End: 2019-09-19

## 2019-09-19 RX ORDER — ENOXAPARIN SODIUM 100 MG/ML
40 INJECTION SUBCUTANEOUS
Refills: 0 | Status: DISCONTINUED | OUTPATIENT
Start: 2019-09-19 | End: 2019-10-05

## 2019-09-19 RX ADMIN — GABAPENTIN 100 MILLIGRAM(S): 400 CAPSULE ORAL at 23:06

## 2019-09-19 RX ADMIN — LIDOCAINE 1 PATCH: 4 CREAM TOPICAL at 19:09

## 2019-09-19 RX ADMIN — Medication 10 MILLIGRAM(S): at 23:06

## 2019-09-19 RX ADMIN — Medication 100 MILLIGRAM(S): at 18:59

## 2019-09-19 RX ADMIN — Medication 15 MILLIGRAM(S): at 17:34

## 2019-09-19 RX ADMIN — Medication 30 MILLIGRAM(S): at 23:07

## 2019-09-19 RX ADMIN — ENOXAPARIN SODIUM 40 MILLIGRAM(S): 100 INJECTION SUBCUTANEOUS at 18:58

## 2019-09-19 RX ADMIN — Medication 81 MILLIGRAM(S): at 18:58

## 2019-09-19 RX ADMIN — Medication 15 MILLIGRAM(S): at 15:24

## 2019-09-19 RX ADMIN — OXYCODONE HYDROCHLORIDE 5 MILLIGRAM(S): 5 TABLET ORAL at 22:18

## 2019-09-19 RX ADMIN — AZITHROMYCIN 500 MILLIGRAM(S): 500 TABLET, FILM COATED ORAL at 19:11

## 2019-09-19 RX ADMIN — Medication 975 MILLIGRAM(S): at 15:24

## 2019-09-19 RX ADMIN — OXYCODONE HYDROCHLORIDE 5 MILLIGRAM(S): 5 TABLET ORAL at 21:40

## 2019-09-19 RX ADMIN — Medication 975 MILLIGRAM(S): at 13:36

## 2019-09-19 NOTE — ED CDU PROVIDER INITIAL DAY NOTE - NS ED ROS FT
ROS: no CP/SOB. no cough. no fever. no n/v/d/c. no abd pain. no rash. no bleeding. no urinary complaints. +collapse. no vision changes. no HA. no neck pain +back pain. no extremity swelling/deformity. No change in mental status.

## 2019-09-19 NOTE — ED CDU PROVIDER INITIAL DAY NOTE - PROGRESS NOTE DETAILS
son advises that she might have started drinking recently son states that she might have started drinking recently?

## 2019-09-19 NOTE — CONSULT NOTE ADULT - ASSESSMENT
Impression:  Dizziness of undetermined etiology.  Assess for cva/cervical cord dysfunction.    Plan:    MRI Brain without antoinette asap to assess for cva.  MRI C spine without antoinette to assess c myelopathy.  Pneumonia treatment per ER team.  TTE to assess for cardiac dysfunction.  Start baby asa qd.  DVT prophylaxis recommended.  D/w ER PA and Dr Zhang in great detail.  D/w pt//son in detail.  Will follow.

## 2019-09-19 NOTE — ED ADULT NURSE NOTE - PRO INTERPRETER NEED 2
Patient alert and oriented x4  Denies pain  Discussed need for IV change with PA  Patient requested we hold off changing IV pending possible discharge  PA consulting with MEHUL to determine if DC will occur today  Will pass on to next nurse status of IV 
Pt ambulated with daughter off the floor
English
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
## 2019-09-19 NOTE — ED PROVIDER NOTE - CLINICAL SUMMARY MEDICAL DECISION MAKING FREE TEXT BOX
patient called as CODE stroke, canceled last known well 8pm last night, complaint of aphasia and ataxia. had mild injury 1 week ago with back pain does not explain aphasia/UE sx. no fevers. no paresthesias/pain. 5/5 LE strength with sensation intact no concern for cord syndrome. no CP/SOB normal BP no risk factor for dissection/AAA. will get head ct. CTA. will need neuro and MRI/admission. does not fit with MS or Transverse myelitis

## 2019-09-19 NOTE — ED PROVIDER NOTE - PROGRESS NOTE DETAILS
patient with concerning neuro findigns, benefits outweight risks, will proceed with CTA prior to Cr results. physician consent given -Vicente HOWARD

## 2019-09-19 NOTE — ED CDU PROVIDER INITIAL DAY NOTE - ATTENDING CONTRIBUTION TO CARE
Miles ARMSTRONG- 58 Y/O F with h/o depression, etoh abuse , hip replacement ( mri safe)p/w feeling very tired and weak and fell 4 time son her knees and no loc. Pt also had rt facial droop starting 8 pm last night, no headache, nausea, vomiting. Pt was seen by neurology and recommend mri brain and c spine, tte , dvt prophylaxis    pt is alert, well appearing female, s1s2 normal reg, b/l clear breath sounds, abd soft, nt, nd, normal bowel sounds, neuro exam aox3, rt facail droop and left abnormal finegr to nose abnormal, peerl eomi, 5/5 strength in all 4 ext. skin warm, dry, good turgor

## 2019-09-19 NOTE — ED CDU PROVIDER INITIAL DAY NOTE - PHYSICAL EXAMINATION
Gen: NAD, AOx3, no dysarthria, some expressive aphasia, able to form some sentences appropriately  Head: NCAT  HEENT: PERRL, EOMI, oral mucosa moist, normal conjunctiva, neck supple  Lung: CTAB, no respiratory distress  CV: rrr, no murmur, Normal perfusion  Abd: soft, NTND  MSK: No edema, no visible deformities, +mildine upper lumbar ttp without step off  Neuro: CN II-XII intact, 5/5 global strength, sensation intact, +dysmetria/ataxia b/l UE/LE, no saddle anesthesia, +tremor UE. left sided finger to nose wit coordination issue  Skin: No rash   Psych: normal affect

## 2019-09-19 NOTE — ED PROVIDER NOTE - CARE PLAN
Principal Discharge DX:	Ataxia  Secondary Diagnosis:	Expressive aphasia  Secondary Diagnosis:	Low back pain

## 2019-09-19 NOTE — ED CDU PROVIDER INITIAL DAY NOTE - MEDICAL DECISION MAKING DETAILS
Female with stroke like symptoms neurology consult. MR head and cervical spine TTE baby aspirin. re-eval

## 2019-09-19 NOTE — ED ADULT TRIAGE NOTE - CHIEF COMPLAINT QUOTE
Patient arrived to ED today after having about 4 syncopal episodes this morning and unable to  things with her right and left hand intermittently since.  Patient c/o lower back pain which is chronic and right arm pain due to the syncopal episodes.

## 2019-09-19 NOTE — ED PROVIDER NOTE - PHYSICAL EXAMINATION
Gen: NAD, AOx3, no dysarthria, some expressive aphasia, able to form some sentences appropriately  Head: NCAT  HEENT: PERRL, EOMI, oral mucosa moist, normal conjunctiva, neck supple  Lung: CTAB, no respiratory distress  CV: rrr, no murmur, Normal perfusion  Abd: soft, NTND  MSK: No edema, no visible deformities, +mildine upper lumbar ttp without step off  Neuro: CN II-XII intact, 5/5 global strength, sensation intact, +dysmetria/ataxia b/l UE/LE, no saddle anesthesia, +tremor UE  Skin: No rash   Psych: normal affect
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
## 2019-09-20 VITALS
OXYGEN SATURATION: 97 % | DIASTOLIC BLOOD PRESSURE: 90 MMHG | RESPIRATION RATE: 16 BRPM | SYSTOLIC BLOOD PRESSURE: 155 MMHG | HEART RATE: 77 BPM | TEMPERATURE: 99 F

## 2019-09-20 DIAGNOSIS — G93.40 ENCEPHALOPATHY, UNSPECIFIED: ICD-10-CM

## 2019-09-20 PROCEDURE — 84484 ASSAY OF TROPONIN QUANT: CPT

## 2019-09-20 PROCEDURE — 72141 MRI NECK SPINE W/O DYE: CPT

## 2019-09-20 PROCEDURE — 96372 THER/PROPH/DIAG INJ SC/IM: CPT | Mod: XU

## 2019-09-20 PROCEDURE — 84443 ASSAY THYROID STIM HORMONE: CPT

## 2019-09-20 PROCEDURE — 71046 X-RAY EXAM CHEST 2 VIEWS: CPT

## 2019-09-20 PROCEDURE — 83735 ASSAY OF MAGNESIUM: CPT

## 2019-09-20 PROCEDURE — 80307 DRUG TEST PRSMV CHEM ANLYZR: CPT

## 2019-09-20 PROCEDURE — 99217: CPT

## 2019-09-20 PROCEDURE — 70496 CT ANGIOGRAPHY HEAD: CPT

## 2019-09-20 PROCEDURE — 70498 CT ANGIOGRAPHY NECK: CPT

## 2019-09-20 PROCEDURE — 99284 EMERGENCY DEPT VISIT MOD MDM: CPT | Mod: 25

## 2019-09-20 PROCEDURE — 71250 CT THORAX DX C-: CPT | Mod: 26

## 2019-09-20 PROCEDURE — G0378: CPT

## 2019-09-20 PROCEDURE — 72141 MRI NECK SPINE W/O DYE: CPT | Mod: 26

## 2019-09-20 PROCEDURE — 96375 TX/PRO/DX INJ NEW DRUG ADDON: CPT | Mod: XU

## 2019-09-20 PROCEDURE — 70551 MRI BRAIN STEM W/O DYE: CPT | Mod: 26

## 2019-09-20 PROCEDURE — 72131 CT LUMBAR SPINE W/O DYE: CPT

## 2019-09-20 PROCEDURE — 96374 THER/PROPH/DIAG INJ IV PUSH: CPT | Mod: XU

## 2019-09-20 PROCEDURE — 85027 COMPLETE CBC AUTOMATED: CPT

## 2019-09-20 PROCEDURE — 81001 URINALYSIS AUTO W/SCOPE: CPT

## 2019-09-20 PROCEDURE — 70551 MRI BRAIN STEM W/O DYE: CPT

## 2019-09-20 PROCEDURE — 36415 COLL VENOUS BLD VENIPUNCTURE: CPT

## 2019-09-20 PROCEDURE — 80053 COMPREHEN METABOLIC PANEL: CPT

## 2019-09-20 PROCEDURE — 86140 C-REACTIVE PROTEIN: CPT

## 2019-09-20 PROCEDURE — 96376 TX/PRO/DX INJ SAME DRUG ADON: CPT

## 2019-09-20 PROCEDURE — 70450 CT HEAD/BRAIN W/O DYE: CPT

## 2019-09-20 PROCEDURE — 93005 ELECTROCARDIOGRAM TRACING: CPT

## 2019-09-20 PROCEDURE — 85652 RBC SED RATE AUTOMATED: CPT

## 2019-09-20 PROCEDURE — 83880 ASSAY OF NATRIURETIC PEPTIDE: CPT

## 2019-09-20 PROCEDURE — 82962 GLUCOSE BLOOD TEST: CPT

## 2019-09-20 PROCEDURE — 93306 TTE W/DOPPLER COMPLETE: CPT

## 2019-09-20 PROCEDURE — 71250 CT THORAX DX C-: CPT

## 2019-09-20 RX ORDER — AZITHROMYCIN 500 MG/1
1 TABLET, FILM COATED ORAL
Qty: 4 | Refills: 0
Start: 2019-09-20 | End: 2019-09-23

## 2019-09-20 RX ORDER — CEFDINIR 250 MG/5ML
1 POWDER, FOR SUSPENSION ORAL
Qty: 20 | Refills: 0
Start: 2019-09-20 | End: 2019-09-29

## 2019-09-20 RX ORDER — ASPIRIN/CALCIUM CARB/MAGNESIUM 324 MG
1 TABLET ORAL
Qty: 30 | Refills: 0
Start: 2019-09-20 | End: 2019-10-19

## 2019-09-20 RX ORDER — CYCLOBENZAPRINE HYDROCHLORIDE 10 MG/1
10 TABLET, FILM COATED ORAL THREE TIMES A DAY
Refills: 0 | Status: DISCONTINUED | OUTPATIENT
Start: 2019-09-20 | End: 2019-10-05

## 2019-09-20 RX ADMIN — Medication 10 MILLIGRAM(S): at 05:29

## 2019-09-20 RX ADMIN — OXYCODONE HYDROCHLORIDE 5 MILLIGRAM(S): 5 TABLET ORAL at 14:41

## 2019-09-20 RX ADMIN — GABAPENTIN 100 MILLIGRAM(S): 400 CAPSULE ORAL at 14:36

## 2019-09-20 RX ADMIN — OXYCODONE HYDROCHLORIDE 5 MILLIGRAM(S): 5 TABLET ORAL at 07:56

## 2019-09-20 RX ADMIN — CYCLOBENZAPRINE HYDROCHLORIDE 10 MILLIGRAM(S): 10 TABLET, FILM COATED ORAL at 08:03

## 2019-09-20 RX ADMIN — Medication 81 MILLIGRAM(S): at 14:36

## 2019-09-20 RX ADMIN — Medication 650 MILLIGRAM(S): at 02:18

## 2019-09-20 RX ADMIN — GABAPENTIN 100 MILLIGRAM(S): 400 CAPSULE ORAL at 05:29

## 2019-09-20 RX ADMIN — Medication 1 MILLIGRAM(S): at 10:37

## 2019-09-20 RX ADMIN — LIDOCAINE 1 PATCH: 4 CREAM TOPICAL at 15:03

## 2019-09-20 RX ADMIN — Medication 1 MILLIGRAM(S): at 09:55

## 2019-09-20 RX ADMIN — ENOXAPARIN SODIUM 40 MILLIGRAM(S): 100 INJECTION SUBCUTANEOUS at 05:29

## 2019-09-20 RX ADMIN — OXYCODONE HYDROCHLORIDE 5 MILLIGRAM(S): 5 TABLET ORAL at 05:36

## 2019-09-20 RX ADMIN — Medication 30 MILLIGRAM(S): at 00:00

## 2019-09-20 RX ADMIN — LIDOCAINE 1 PATCH: 4 CREAM TOPICAL at 05:31

## 2019-09-20 RX ADMIN — Medication 650 MILLIGRAM(S): at 01:05

## 2019-09-20 RX ADMIN — CYCLOBENZAPRINE HYDROCHLORIDE 10 MILLIGRAM(S): 10 TABLET, FILM COATED ORAL at 01:05

## 2019-09-20 NOTE — ED CDU PROVIDER SUBSEQUENT DAY NOTE - HISTORY
No pertinent interval history. Vital signs remained stable overnight. Received no calls by RN overnight.

## 2019-09-20 NOTE — ED CDU PROVIDER SUBSEQUENT DAY NOTE - ATTENDING CONTRIBUTION TO CARE
I, Dolly Almonte MD have personally performed a face to face diagnostic evaluation on this patient.  I have reviewed the ACP note and agree with the history, exam, and plan of care, except as noted.     Exam: Head: atraumatic, normacephalic  eyes: perrla eomi  heart: rrr s1s2  lungs: ctab  abd: soft, nt nd +bs no rebound/guarding no cva ttp  skin: warm  LE: no swelling, no calf ttp  back: no midline cervical/thoracic/lumbar ttp  NEURO: aao x 3 [id number] intact normal finger to nose     --pt has no complaints at this time; pending mri/ tte; in obs for expressive apahsia; pending neuro eval; cenrival mri ? pna will fu ct non con pt denies any symptos at this time

## 2019-09-20 NOTE — PROGRESS NOTE ADULT - SUBJECTIVE AND OBJECTIVE BOX
INTERVAL HISTORY:  Seen at MRI suite.  Feels better today.  No new distress.    No Known Allergies      VITAL SIGNS:  Vital Signs Last 24 Hrs  T(C): 36.6 (20 Sep 2019 07:59), Max: 37 (19 Sep 2019 12:06)  T(F): 97.9 (20 Sep 2019 07:59), Max: 98.6 (19 Sep 2019 12:06)  HR: 74 (20 Sep 2019 07:59) (67 - 80)  BP: 148/92 (20 Sep 2019 07:59) (114/74 - 155/91)  BP(mean): 3 (19 Sep 2019 21:37) (3 - 3)  RR: 16 (20 Sep 2019 07:59) (16 - 18)  SpO2: 99% (20 Sep 2019 07:59) (96% - 99%)    PHYSICAL EXAMINATION:  General: Well-developed, well nourished, in no acute distress.  Eyes: Conjunctiva and sclera clear.  Neurologic:  - Mental Status:  Alert, awake, oriented to person, place, and time; Speech is normal; Affect is normal.  - Cranial Nerves: II-XI intact. except slight dec R NLF.  - Motor:  Strength is 5/5 throughout.  There is no pronator drift.  Normal muscle bulk and tone throughout.  - Reflexes:  2+ throughout.  Plantars downwards b/l.  - Sensory:  Intact to light touch, pin prick, vibration, and joint-position sense throughout.  - Coordination:  No dysmetria/dysdiadochokinesis.    MEDS:  MEDICATIONS  (STANDING):  aspirin  chewable 81 milliGRAM(s) Oral daily  enalapril 10 milliGRAM(s) Oral daily  enoxaparin Injectable 40 milliGRAM(s) SubCutaneous two times a day  gabapentin 100 milliGRAM(s) Oral three times a day  lidocaine   Patch 1 Patch Transdermal daily    MEDICATIONS  (PRN):  acetaminophen   Tablet .. 650 milliGRAM(s) Oral every 6 hours PRN Moderate Pain (4 - 6)  cyclobenzaprine 10 milliGRAM(s) Oral three times a day PRN Muscle Spasm  oxyCODONE    IR 5 milliGRAM(s) Oral every 8 hours PRN Severe Pain (7 - 10)      LABS:                          10.1   10.05 )-----------( 224      ( 19 Sep 2019 13:34 )             31.6     09-19    143  |  102  |  10.0  ----------------------------<  102  4.1   |  30.0<H>  |  0.97    Ca    9.2      19 Sep 2019 13:34  Mg     2.0     09-19    TPro  6.8  /  Alb  4.1  /  TBili  <0.2<L>  /  DBili  x   /  AST  27  /  ALT  19  /  AlkPhos  77  09-19    LIVER FUNCTIONS - ( 19 Sep 2019 13:34 )  Alb: 4.1 g/dL / Pro: 6.8 g/dL / ALK PHOS: 77 U/L / ALT: 19 U/L / AST: 27 U/L / GGT: x               RADIOLOGY & ADDITIONAL STUDIES:      CT Angio Head/Neck w/ IV Cont (09.19.19 @ 14:42) >  Unremarkable CTA of the brain and neck. Right upper lobe airspace disease   suggesting pneumonia. Chest x-ray correlation with dedicated chest CT are   recommended for further evaluation of the lung fields.    CT Brain Stroke Protocol (09.19.19 @ 12:24) >  Mild chronic microvascular changes without evidence of an   acute transcortical infarction or hemorrhage. MR is a more sensitive   imaging modality for the evaluation of an acute infarction. Findings   discussed with Dr. Zhang at 12:26 PM    CT Lumbar Spine No Cont (09.19.19 @ 14:40) >  No evidence for acute displaced fracture or malalignment.    TTE Echo Complete w/Doppler (09.19.19 @ 20:27) >  Summary:   1. Left ventricular ejection fraction, by visual estimation, is 60 to   65%.   2. Normal global left ventricular systolic function.   3. Normal left ventricular internal cavitysize.   4. Normal right ventricular size and function.   5. There is no evidence of pericardial effusion.

## 2019-09-20 NOTE — PROGRESS NOTE ADULT - ASSESSMENT
Impression:  Dizziness improved.  Assess for cva/cervical cord dysfunction.    Plan:    MRI Brain without antoinette asap to assess for cva.  MRI C spine without antoinette to assess c myelopathy.  Start baby asa qd.  DVT prophylaxis recommended.  D/w pt/ in detail.  Outpatient follow up at [facility] at [phone] or [phone] if above MRI testing is normal.

## 2019-09-20 NOTE — ED CDU PROVIDER DISPOSITION NOTE - ATTENDING CONTRIBUTION TO CARE
I, Dolly Almonte MD have personally performed a face to face diagnostic evaluation on this patient.  I have reviewed the ACP note and agree with the history, exam, and plan of care, except as noted.     pt remeained asymptomatic, cleared by neuro; mri wnl; ct chest + pna will tx with abx dc with pmd follow up

## 2019-09-20 NOTE — ED ADULT NURSE REASSESSMENT NOTE - NIH STROKE SCALE: 5A. MOTOR ARM, LEFT, QM
(0) No drift; limb holds 90 (or 45) degrees for full 10 secs/c/o arm heaviness
(0) No drift; limb holds 90 (or 45) degrees for full 10 secs/c/o left arm heaviness

## 2019-09-20 NOTE — ED CDU PROVIDER SUBSEQUENT DAY NOTE - MEDICAL DECISION MAKING DETAILS
58 yo female with expressive aphasia and ataxia. Pending TTE results and MRI Head. Further management plan pending results. If negative, patient to be discharged with neurology follow up and four days of Azithromycin.

## 2019-09-20 NOTE — ED ADULT NURSE REASSESSMENT NOTE - NS ED NURSE REASSESS COMMENT FT1
Pt alert and oriented, no apparent distress noted at this time. Pt handed off to Aneta NICOLE in stable condition.
VSS. Patient medicated for lower back pain, will re-assess. MD Gunderson at the bedside. Patient with no further questions for RN.
assumed care of pt@ 2140, report received from Diallo JACINTO RN, charting as noted. pt AOx4 in NAD, Vital Signs Stable. HR is NSR on cardiac montior, lung sounds are clear b/l, abd is soft and nontender with positive bowel sounds in all four quadrants, skin is warm, dry and appropriate for age and race. mildly asymmetrical face noted, pt c/o left arm heaviness. no loss in sensation in all extremities. pt educated on plan of care and observation stay. Plan of care taught back to RN. Proficiency determined from successful pt teach back. Pt oriented to unit, staff, and room. Pt reeducated on call bell use. Bed locked in lowest position, call bell within reach. All questions and concerns addressed.    awaiting MRI in AM.
Assumed care of the patient at 0730. Patient A&Ox4. no s/s of distress. states lower back pain persists, given Flexiril as per orders. Will reassess. No neuro deficits noted. Denies any numbness or tingling. VSS. NSR on CM. PIV patent. Patient awaiting MRI testing. Patient in understanding of plan of care. Patient with no further questions for the nurse. Call bell within reach and encouraged to use when assistance needed. Will continue to monitor.

## 2019-09-23 ENCOUNTER — APPOINTMENT (OUTPATIENT)
Dept: FAMILY MEDICINE | Facility: CLINIC | Age: 59
End: 2019-09-23
Payer: MEDICAID

## 2019-09-23 VITALS
HEIGHT: 66 IN | DIASTOLIC BLOOD PRESSURE: 80 MMHG | WEIGHT: 200 LBS | SYSTOLIC BLOOD PRESSURE: 142 MMHG | BODY MASS INDEX: 32.14 KG/M2 | HEART RATE: 76 BPM

## 2019-09-23 DIAGNOSIS — K85.10 BILIARY ACUTE PANCREATITIS WITHOUT NECROSIS OR INFECTION: ICD-10-CM

## 2019-09-23 DIAGNOSIS — R42 DIZZINESS AND GIDDINESS: ICD-10-CM

## 2019-09-23 PROCEDURE — 99215 OFFICE O/P EST HI 40 MIN: CPT

## 2019-09-23 NOTE — PLAN
[FreeTextEntry1] : Dizziness- No CVA / reoslved\par \par Pneumonia- on antibiotics\par \par HTn- continue\par Sciatica continue gabapentin\par Other psych meds advise to see psych and get meds adjusted

## 2019-09-23 NOTE — PHYSICAL EXAM
[No Acute Distress] : no acute distress [Well Nourished] : well nourished [Well Developed] : well developed [Well-Appearing] : well-appearing [Normal Sclera/Conjunctiva] : normal sclera/conjunctiva [PERRL] : pupils equal round and reactive to light [EOMI] : extraocular movements intact [Normal Outer Ear/Nose] : the outer ears and nose were normal in appearance [Normal Oropharynx] : the oropharynx was normal [No Lymphadenopathy] : no lymphadenopathy [No JVD] : no jugular venous distention [Thyroid Normal, No Nodules] : the thyroid was normal and there were no nodules present [Supple] : supple [No Respiratory Distress] : no respiratory distress  [No Accessory Muscle Use] : no accessory muscle use [Clear to Auscultation] : lungs were clear to auscultation bilaterally [Normal Rate] : normal rate  [Normal S1, S2] : normal S1 and S2 [Regular Rhythm] : with a regular rhythm [No Murmur] : no murmur heard [No Carotid Bruits] : no carotid bruits [No Abdominal Bruit] : a ~M bruit was not heard ~T in the abdomen [No Varicosities] : no varicosities [Pedal Pulses Present] : the pedal pulses are present [No Edema] : there was no peripheral edema [No Palpable Aorta] : no palpable aorta [No Extremity Clubbing/Cyanosis] : no extremity clubbing/cyanosis [Soft] : abdomen soft [Non Tender] : non-tender [Non-distended] : non-distended [No Masses] : no abdominal mass palpated [No HSM] : no HSM [Normal Bowel Sounds] : normal bowel sounds [Normal Posterior Cervical Nodes] : no posterior cervical lymphadenopathy [Normal Anterior Cervical Nodes] : no anterior cervical lymphadenopathy [No CVA Tenderness] : no CVA  tenderness [No Spinal Tenderness] : no spinal tenderness [No Joint Swelling] : no joint swelling [Grossly Normal Strength/Tone] : grossly normal strength/tone [No Rash] : no rash [Coordination Grossly Intact] : coordination grossly intact [No Focal Deficits] : no focal deficits [Normal Gait] : normal gait [Deep Tendon Reflexes (DTR)] : deep tendon reflexes were 2+ and symmetric [Normal Affect] : the affect was normal [Normal Insight/Judgement] : insight and judgment were intact
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
## 2019-09-24 DIAGNOSIS — Z71.89 OTHER SPECIFIED COUNSELING: ICD-10-CM

## 2019-10-23 ENCOUNTER — APPOINTMENT (OUTPATIENT)
Dept: FAMILY MEDICINE | Facility: CLINIC | Age: 59
End: 2019-10-23
Payer: MEDICAID

## 2019-10-23 ENCOUNTER — APPOINTMENT (OUTPATIENT)
Dept: FAMILY MEDICINE | Facility: CLINIC | Age: 59
End: 2019-10-23

## 2019-10-23 VITALS
DIASTOLIC BLOOD PRESSURE: 60 MMHG | BODY MASS INDEX: 31.18 KG/M2 | HEIGHT: 66 IN | HEART RATE: 74 BPM | SYSTOLIC BLOOD PRESSURE: 90 MMHG | WEIGHT: 194 LBS

## 2019-10-23 DIAGNOSIS — S76.912A STRAIN OF UNSPECIFIED MUSCLES, FASCIA AND TENDONS AT THIGH LEVEL, LEFT THIGH, INITIAL ENCOUNTER: ICD-10-CM

## 2019-10-23 PROCEDURE — 99213 OFFICE O/P EST LOW 20 MIN: CPT

## 2019-10-23 NOTE — PHYSICAL EXAM
[No Acute Distress] : no acute distress [Well Nourished] : well nourished [Well Developed] : well developed [Well-Appearing] : well-appearing [Normal Sclera/Conjunctiva] : normal sclera/conjunctiva [EOMI] : extraocular movements intact [PERRL] : pupils equal round and reactive to light [Normal Oropharynx] : the oropharynx was normal [Normal Outer Ear/Nose] : the outer ears and nose were normal in appearance [No JVD] : no jugular venous distention [Supple] : supple [No Lymphadenopathy] : no lymphadenopathy [Thyroid Normal, No Nodules] : the thyroid was normal and there were no nodules present [No Respiratory Distress] : no respiratory distress  [Normal Rate] : normal rate  [Clear to Auscultation] : lungs were clear to auscultation bilaterally [No Accessory Muscle Use] : no accessory muscle use [No Murmur] : no murmur heard [Normal S1, S2] : normal S1 and S2 [Regular Rhythm] : with a regular rhythm [No Carotid Bruits] : no carotid bruits [No Abdominal Bruit] : a ~M bruit was not heard ~T in the abdomen [No Varicosities] : no varicosities [No Edema] : there was no peripheral edema [Pedal Pulses Present] : the pedal pulses are present [No Palpable Aorta] : no palpable aorta [No Extremity Clubbing/Cyanosis] : no extremity clubbing/cyanosis [Soft] : abdomen soft [Non Tender] : non-tender [Non-distended] : non-distended [Normal Bowel Sounds] : normal bowel sounds [No HSM] : no HSM [No Masses] : no abdominal mass palpated [Normal Anterior Cervical Nodes] : no anterior cervical lymphadenopathy [Normal Posterior Cervical Nodes] : no posterior cervical lymphadenopathy [No CVA Tenderness] : no CVA  tenderness [No Spinal Tenderness] : no spinal tenderness [Grossly Normal Strength/Tone] : grossly normal strength/tone [No Focal Deficits] : no focal deficits [Coordination Grossly Intact] : coordination grossly intact [No Rash] : no rash [Normal Affect] : the affect was normal [Normal Gait] : normal gait [Deep Tendon Reflexes (DTR)] : deep tendon reflexes were 2+ and symmetric [Normal Insight/Judgement] : insight and judgment were intact [de-identified] : left Hip and buttocks- no hematoma or bruise
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
## 2019-10-28 ENCOUNTER — MEDICATION RENEWAL (OUTPATIENT)
Age: 59
End: 2019-10-28

## 2019-10-29 ENCOUNTER — INPATIENT (INPATIENT)
Facility: HOSPITAL | Age: 59
LOS: 3 days | Discharge: HOME | DRG: 948 | End: 2019-11-02
Attending: HOSPITALIST | Admitting: INTERNAL MEDICINE
Payer: MEDICAID

## 2019-10-29 VITALS
OXYGEN SATURATION: 98 % | DIASTOLIC BLOOD PRESSURE: 60 MMHG | TEMPERATURE: 98 F | HEART RATE: 66 BPM | RESPIRATION RATE: 20 BRPM | HEIGHT: 67 IN | SYSTOLIC BLOOD PRESSURE: 90 MMHG | WEIGHT: 179.9 LBS

## 2019-10-29 DIAGNOSIS — Z96.60 PRESENCE OF UNSPECIFIED ORTHOPEDIC JOINT IMPLANT: Chronic | ICD-10-CM

## 2019-10-29 DIAGNOSIS — Z90.49 ACQUIRED ABSENCE OF OTHER SPECIFIED PARTS OF DIGESTIVE TRACT: Chronic | ICD-10-CM

## 2019-10-29 DIAGNOSIS — R53.1 WEAKNESS: ICD-10-CM

## 2019-10-29 DIAGNOSIS — Z98.51 TUBAL LIGATION STATUS: Chronic | ICD-10-CM

## 2019-10-29 LAB
AMMONIA BLD-MCNC: 28 UMOL/L — SIGNIFICANT CHANGE UP (ref 11–55)
AMPHET UR-MCNC: NEGATIVE — SIGNIFICANT CHANGE UP
ANION GAP SERPL CALC-SCNC: 11 MMOL/L — SIGNIFICANT CHANGE UP (ref 5–17)
APTT BLD: 31.4 SEC — SIGNIFICANT CHANGE UP (ref 27.5–36.3)
BARBITURATES UR SCN-MCNC: NEGATIVE — SIGNIFICANT CHANGE UP
BENZODIAZ UR-MCNC: POSITIVE
BLD GP AB SCN SERPL QL: SIGNIFICANT CHANGE UP
BUN SERPL-MCNC: 16 MG/DL — SIGNIFICANT CHANGE UP (ref 8–20)
CALCIUM SERPL-MCNC: 9.2 MG/DL — SIGNIFICANT CHANGE UP (ref 8.6–10.2)
CHLORIDE SERPL-SCNC: 102 MMOL/L — SIGNIFICANT CHANGE UP (ref 98–107)
CO2 SERPL-SCNC: 29 MMOL/L — SIGNIFICANT CHANGE UP (ref 22–29)
COCAINE METAB.OTHER UR-MCNC: NEGATIVE — SIGNIFICANT CHANGE UP
CREAT SERPL-MCNC: 0.97 MG/DL — SIGNIFICANT CHANGE UP (ref 0.5–1.3)
GLUCOSE SERPL-MCNC: 111 MG/DL — SIGNIFICANT CHANGE UP (ref 70–115)
HCT VFR BLD CALC: 34.3 % — LOW (ref 34.5–45)
HGB BLD-MCNC: 11 G/DL — LOW (ref 11.5–15.5)
INR BLD: 0.99 RATIO — SIGNIFICANT CHANGE UP (ref 0.88–1.16)
MCHC RBC-ENTMCNC: 30.6 PG — SIGNIFICANT CHANGE UP (ref 27–34)
MCHC RBC-ENTMCNC: 32.1 GM/DL — SIGNIFICANT CHANGE UP (ref 32–36)
MCV RBC AUTO: 95.3 FL — SIGNIFICANT CHANGE UP (ref 80–100)
METHADONE UR-MCNC: NEGATIVE — SIGNIFICANT CHANGE UP
OPIATES UR-MCNC: NEGATIVE — SIGNIFICANT CHANGE UP
PCP SPEC-MCNC: SIGNIFICANT CHANGE UP
PCP UR-MCNC: NEGATIVE — SIGNIFICANT CHANGE UP
PLATELET # BLD AUTO: 225 K/UL — SIGNIFICANT CHANGE UP (ref 150–400)
POTASSIUM SERPL-MCNC: 5 MMOL/L — SIGNIFICANT CHANGE UP (ref 3.5–5.3)
POTASSIUM SERPL-SCNC: 5 MMOL/L — SIGNIFICANT CHANGE UP (ref 3.5–5.3)
PROTHROM AB SERPL-ACNC: 11.4 SEC — SIGNIFICANT CHANGE UP (ref 10–12.9)
RBC # BLD: 3.6 M/UL — LOW (ref 3.8–5.2)
RBC # FLD: 13.2 % — SIGNIFICANT CHANGE UP (ref 10.3–14.5)
SODIUM SERPL-SCNC: 142 MMOL/L — SIGNIFICANT CHANGE UP (ref 135–145)
THC UR QL: NEGATIVE — SIGNIFICANT CHANGE UP
TSH SERPL-MCNC: 1.34 UIU/ML — SIGNIFICANT CHANGE UP (ref 0.27–4.2)
VIT B12 SERPL-MCNC: 904 PG/ML — SIGNIFICANT CHANGE UP (ref 232–1245)
WBC # BLD: 7.06 K/UL — SIGNIFICANT CHANGE UP (ref 3.8–10.5)
WBC # FLD AUTO: 7.06 K/UL — SIGNIFICANT CHANGE UP (ref 3.8–10.5)

## 2019-10-29 PROCEDURE — 95819 EEG AWAKE AND ASLEEP: CPT | Mod: 26

## 2019-10-29 PROCEDURE — 99223 1ST HOSP IP/OBS HIGH 75: CPT

## 2019-10-29 PROCEDURE — 93010 ELECTROCARDIOGRAM REPORT: CPT

## 2019-10-29 PROCEDURE — 70450 CT HEAD/BRAIN W/O DYE: CPT | Mod: 26

## 2019-10-29 PROCEDURE — 99285 EMERGENCY DEPT VISIT HI MDM: CPT

## 2019-10-29 RX ORDER — ACETAMINOPHEN 500 MG
650 TABLET ORAL EVERY 6 HOURS
Refills: 0 | Status: DISCONTINUED | OUTPATIENT
Start: 2019-10-29 | End: 2019-11-02

## 2019-10-29 RX ORDER — LAMOTRIGINE 25 MG/1
100 TABLET, ORALLY DISINTEGRATING ORAL DAILY
Refills: 0 | Status: DISCONTINUED | OUTPATIENT
Start: 2019-10-29 | End: 2019-11-02

## 2019-10-29 RX ORDER — DIAZEPAM 5 MG
1 TABLET ORAL
Qty: 0 | Refills: 0 | DISCHARGE

## 2019-10-29 RX ORDER — GABAPENTIN 400 MG/1
1 CAPSULE ORAL
Qty: 0 | Refills: 0 | DISCHARGE

## 2019-10-29 RX ORDER — DIAZEPAM 5 MG
5 TABLET ORAL
Refills: 0 | Status: DISCONTINUED | OUTPATIENT
Start: 2019-10-29 | End: 2019-10-30

## 2019-10-29 RX ORDER — VENLAFAXINE HCL 75 MG
1 CAPSULE, EXT RELEASE 24 HR ORAL
Qty: 0 | Refills: 0 | DISCHARGE

## 2019-10-29 RX ORDER — SODIUM CHLORIDE 9 MG/ML
1000 INJECTION INTRAMUSCULAR; INTRAVENOUS; SUBCUTANEOUS ONCE
Refills: 0 | Status: COMPLETED | OUTPATIENT
Start: 2019-10-29 | End: 2019-10-29

## 2019-10-29 RX ORDER — TRAZODONE HCL 50 MG
100 TABLET ORAL DAILY
Refills: 0 | Status: DISCONTINUED | OUTPATIENT
Start: 2019-10-29 | End: 2019-11-02

## 2019-10-29 RX ORDER — GABAPENTIN 400 MG/1
300 CAPSULE ORAL AT BEDTIME
Refills: 0 | Status: DISCONTINUED | OUTPATIENT
Start: 2019-10-29 | End: 2019-11-02

## 2019-10-29 RX ORDER — LANOLIN ALCOHOL/MO/W.PET/CERES
3 CREAM (GRAM) TOPICAL AT BEDTIME
Refills: 0 | Status: DISCONTINUED | OUTPATIENT
Start: 2019-10-29 | End: 2019-11-02

## 2019-10-29 RX ORDER — INFLUENZA VIRUS VACCINE 15; 15; 15; 15 UG/.5ML; UG/.5ML; UG/.5ML; UG/.5ML
0.5 SUSPENSION INTRAMUSCULAR ONCE
Refills: 0 | Status: DISCONTINUED | OUTPATIENT
Start: 2019-10-29 | End: 2019-11-02

## 2019-10-29 RX ADMIN — SODIUM CHLORIDE 1000 MILLILITER(S): 9 INJECTION INTRAMUSCULAR; INTRAVENOUS; SUBCUTANEOUS at 16:12

## 2019-10-29 RX ADMIN — Medication 650 MILLIGRAM(S): at 20:50

## 2019-10-29 RX ADMIN — Medication 3 MILLIGRAM(S): at 21:32

## 2019-10-29 RX ADMIN — GABAPENTIN 300 MILLIGRAM(S): 400 CAPSULE ORAL at 21:32
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
## 2019-10-29 NOTE — CONSULT NOTE ADULT - ATTENDING COMMENTS
VTE SCD pending MRI, MRA  med rec          Follow up  neuro  dysphagia screen  ? repeat echo? VTE SCD pending MRI, MRA  med rec  MRI brain  MRA H+N  labs      Follow up  neuro  dysphagia screen  ? repeat echo? VTE SCD pending MRI, MRA  med rec  MRI brain  MRA H+N  labs  [facility] called [phone] at 16:16 to do med rec    Follow up  neuro  dysphagia screen  ? repeat echo?
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
## 2019-10-29 NOTE — ED STATDOCS - NS_ ATTENDINGSCRIBEDETAILS _ED_A_ED_FT
I, Mariah Zhang, performed the initial face to face bedside interview with this patient regarding history of present illness, review of symptoms and relevant past medical, social and family history.  I completed an independent physical examination.  The history, relevant review of systems, past medical and surgical history, medical decision making, and physical examination was documented by the scribe in my presence and I attest to the accuracy of the documentation.
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
## 2019-10-29 NOTE — H&P ADULT - NSHPPHYSICALEXAM_GEN_ALL_CORE
Vital Signs Last 24 Hrs  T(C): 36.4 (29 Oct 2019 10:45), Max: 36.4 (29 Oct 2019 10:45)  T(F): 97.5 (29 Oct 2019 10:45), Max: 97.5 (29 Oct 2019 10:45)  HR: 66 (29 Oct 2019 10:45) (66 - 66)  BP: 90/60 (29 Oct 2019 10:45) (90/60 - 90/60)  BP(mean): --  RR: 20 (29 Oct 2019 10:45) (20 - 20)  SpO2: 98% (29 Oct 2019 10:45) (98% - 98%)    GENERAL: Not in distress. Alert    HEENT:  Normocephalic and atraumatic. PEARLA,EOMI  NECK: Supple.  No JVD.    CARDIOVASCULAR: RRR S1, S2. No murmur/rubs/gallop  LUNGS: BLAE+, no rales, no wheezing, no rhonchi.    ABDOMEN: ND. Soft,  NT, no guarding / rebound / rigidity. BS normoactive. No CVA tenderness.    BACK: No spine tenderness.  EXTREMITIES: no cyanosis, no clubbing, no edema.   SKIN: no rash. No skin break or ulcer. No cellulitis.  NEUROLOGIC: confused. strength is symmetric, sensation intact, speech fluent.    PSYCHIATRIC: Calm.  No agitation. Vital Signs Last 24 Hrs  T(C): 36.4 (29 Oct 2019 10:45), Max: 36.4 (29 Oct 2019 10:45)  T(F): 97.5 (29 Oct 2019 10:45), Max: 97.5 (29 Oct 2019 10:45)  HR: 66 (29 Oct 2019 10:45) (66 - 66)  BP: 90/60 (29 Oct 2019 10:45) (90/60 - 90/60)  BP(mean): --  RR: 20 (29 Oct 2019 10:45) (20 - 20)  SpO2: 98% (29 Oct 2019 10:45) (98% - 98%)    GENERAL: Not in distress. Alert    HEENT:  Normocephalic and atraumatic. PEARLA,EOMI  NECK: Supple.  No JVD.    CARDIOVASCULAR: RRR S1, S2. No murmur/rubs/gallop  LUNGS: BLAE+, no rales, no wheezing, no rhonchi.    ABDOMEN: ND. Soft,  NT, no guarding / rebound / rigidity. BS normoactive. No CVA tenderness.    BACK: No spine tenderness.  EXTREMITIES: no cyanosis, no clubbing, no edema.   SKIN: no rash. No skin break or ulcer. No cellulitis.  NEUROLOGIC: confused. strength 5/5 RUE and RLE, 4/5 LUE, LLE [ some give away weakness], sensation: unable to assess as patient said she felt the touch but does not remember which side was better but not both side was same, speech fluent.    PSYCHIATRIC: Calm.  No agitation.

## 2019-10-29 NOTE — ED ADULT NURSE REASSESSMENT NOTE - NS ED NURSE REASSESS COMMENT FT1
Received pt from [facility]. PT resting in stretcher c/o pain to buttock. PT in NAD at this time. Pending admission to floor. CONG

## 2019-10-29 NOTE — H&P ADULT - NSHPLABSRESULTS_GEN_ALL_CORE
11.0   7.06  )-----------( 225      ( 29 Oct 2019 12:46 )             34.3       10-29    142  |  102  |  16.0  ----------------------------<  111  5.0   |  29.0  |  0.97    Ca    9.2      29 Oct 2019 12:46    PT/INR - ( 29 Oct 2019 12:46 )   PT: 11.4 sec;   INR: 0.99 ratio         PTT - ( 29 Oct 2019 12:46 )  PTT:31.4 sec    < from: CT Head No Cont (10.29.19 @ 11:37) >    IMPRESSION: No evidence of an acute transcortical infarction or   hemorrhage. MR is a more sensitive imaging modality for the evaluation of   an acute infarction.     < end of copied text >    < from: MR Cervical Spine No Cont (09.20.19 @ 11:45) >    IMPRESSION:   No significant spinal canal narrowing. No cord signal abnormality. Mild   left C3-C4 and C4-C5 neuroforaminal narrowing.    < end of copied text >    < from: MR Head No Cont (09.20.19 @ 10:49) >    IMPRESSION: No acute infarct, hemorrhage, or mass effect.     < end of copied text >

## 2019-10-29 NOTE — ED PROVIDER NOTE - PHYSICAL EXAMINATION
General:   drowsy, but arousable and appropriately answers questions  Head:     NC/AT, EOMI, oral mucosa moist  Neck:     trachea midline  Lungs:     CTA b/l, no w/r/r  CVS:     S1S2, RRR, no m/g/r  Abd:     +BS, s/nt/nd, no organomegaly  Ext:    2+ radial and pedal pulses, no c/c/e  Neuro: see stroke assessment below

## 2019-10-29 NOTE — ED PROVIDER NOTE - CLINICAL SUMMARY MEDICAL DECISION MAKING FREE TEXT BOX
patient with unsteady gait, frequent falls, right sided facial droop, left sided weakness. ct with no acute findings, will admit for further evaluation.
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
## 2019-10-29 NOTE — ED ADULT TRIAGE NOTE - CHIEF COMPLAINT QUOTE
fell recently, saw MD. Hit rt side face and forehead. original fall was work related injury. Has been dizzy and unsteady. Ambulates. No blood thinners

## 2019-10-29 NOTE — CONSULT NOTE ADULT - ASSESSMENT
59 yr old female w anxiety/depression, HTN who came to ED w dizziness and headache, confusion,  L sided weakness w multiple falls 59 yr old female w anxiety/depression, HTN who came to ED w dizziness and headache, confusion,  L sided weakness w multiple falls      #altered mentation, L sided weakness w falls  TIA vs post concussion despite no LOC, inflammatory disorder, viral, med effect  1. admit to stroke unit  2. neuro check q 4 hrs  3. MRI brain  4. MRA H+N  5. Hb A1c, lipid panel, sed rate, CRP, B12, TSH  6. will add ammonia  7. monitor BP, rhythm  8. OT/PT evaluation  9. needs dysphagia screen    #HTN  1. clonidine 0.1 mg BID  2. enalapril 10 mg      #anxiety /depression  1.trazadone 100 mg  2. paxil 30 mg 2 tabs daily  3. gabapentin 300 mg TID  4. lamotrigine 100 mg daily  5. no unisom      IMPROVE VTE Individual Risk Assessment    RISK                                                                Points    [  ] Previous VTE                                                  3    [  ] Thrombophilia                                               2    [  ] Lower limb paralysis                                      2        (unable to hold up >15 seconds)      [  ] Current Cancer                                              2         (within 6 months)    [  ] Immobilization > 24 hrs                                1    [  ] ICU/CCU stay > 24 hours                              1    [  ] Age > 60                                                      1    IMPROVE VTE Score ___0______    IMPROVE Score 0-1: Low Risk, No VTE prophylaxis required for most patients, encourage ambulation.   IMPROVE Score 2-3: At risk, pharmacologic VTE prophylaxis is indicated for most patients (in the absence of a contraindication)  IMPROVE Score > or = 4: High Risk, pharmacologic VTE prophylaxis is indicated for most patients (in the absence of a contraindication) 59 yr old female w anxiety/depression, HTN who came to ED w dizziness and headache, confusion,  L sided weakness w multiple falls      #altered mentation, L sided weakness w falls  TIA vs post concussion despite no LOC, inflammatory disorder, viral, med effect  1. admit to stroke unit  2. neuro check q 4 hrs  3. MRI brain  4. MRA H+N  5. Hb A1c, lipid panel, sed rate, CRP, B12, TSH  6. will add ammonia  7. monitor BP, rhythm  8. OT/PT evaluation  9. needs dysphagia screen    #HTN  1. clonidine 0.1 mg TID  2. enalapril 10 mg      #anxiety /depression  1.trazadone 100 mg  2. paxil 30 mg 2 tabs daily  3. gabapentin 300 mg TID was initial med rec but [facility] has script as 300 mg q HS  4. lamotrigine 100 mg daily  5. no unisom      IMPROVE VTE Individual Risk Assessment    RISK                                                                Points    [  ] Previous VTE                                                  3    [  ] Thrombophilia                                               2    [  ] Lower limb paralysis                                      2        (unable to hold up >15 seconds)      [  ] Current Cancer                                              2         (within 6 months)    [  ] Immobilization > 24 hrs                                1    [  ] ICU/CCU stay > 24 hours                              1    [  ] Age > 60                                                      1    IMPROVE VTE Score ___0______    IMPROVE Score 0-1: Low Risk, No VTE prophylaxis required for most patients, encourage ambulation.   IMPROVE Score 2-3: At risk, pharmacologic VTE prophylaxis is indicated for most patients (in the absence of a contraindication)  IMPROVE Score > or = 4: High Risk, pharmacologic VTE prophylaxis is indicated for most patients (in the absence of a contraindication)

## 2019-10-30 DIAGNOSIS — F13.10 SEDATIVE, HYPNOTIC OR ANXIOLYTIC ABUSE, UNCOMPLICATED: ICD-10-CM

## 2019-10-30 DIAGNOSIS — F19.94 OTHER PSYCHOACTIVE SUBSTANCE USE, UNSPECIFIED WITH PSYCHOACTIVE SUBSTANCE-INDUCED MOOD DISORDER: ICD-10-CM

## 2019-10-30 LAB
ANION GAP SERPL CALC-SCNC: 11 MMOL/L — SIGNIFICANT CHANGE UP (ref 5–17)
BUN SERPL-MCNC: 15 MG/DL — SIGNIFICANT CHANGE UP (ref 8–20)
CALCIUM SERPL-MCNC: 8.8 MG/DL — SIGNIFICANT CHANGE UP (ref 8.6–10.2)
CHLORIDE SERPL-SCNC: 106 MMOL/L — SIGNIFICANT CHANGE UP (ref 98–107)
CHOLEST SERPL-MCNC: 179 MG/DL — SIGNIFICANT CHANGE UP (ref 110–199)
CO2 SERPL-SCNC: 28 MMOL/L — SIGNIFICANT CHANGE UP (ref 22–29)
CREAT SERPL-MCNC: 0.79 MG/DL — SIGNIFICANT CHANGE UP (ref 0.5–1.3)
CRP SERPL-MCNC: <0.4 MG/DL — SIGNIFICANT CHANGE UP (ref 0–0.4)
ERYTHROCYTE [SEDIMENTATION RATE] IN BLOOD: 18 MM/HR — SIGNIFICANT CHANGE UP (ref 0–20)
GLUCOSE SERPL-MCNC: 98 MG/DL — SIGNIFICANT CHANGE UP (ref 70–115)
HBA1C BLD-MCNC: 5.4 % — SIGNIFICANT CHANGE UP (ref 4–5.6)
HDLC SERPL-MCNC: 52 MG/DL — SIGNIFICANT CHANGE UP
LIPID PNL WITH DIRECT LDL SERPL: 110 MG/DL — SIGNIFICANT CHANGE UP
POTASSIUM SERPL-MCNC: 3.8 MMOL/L — SIGNIFICANT CHANGE UP (ref 3.5–5.3)
POTASSIUM SERPL-SCNC: 3.8 MMOL/L — SIGNIFICANT CHANGE UP (ref 3.5–5.3)
SODIUM SERPL-SCNC: 145 MMOL/L — SIGNIFICANT CHANGE UP (ref 135–145)
T PALLIDUM AB TITR SER: NEGATIVE — SIGNIFICANT CHANGE UP
TOTAL CHOLESTEROL/HDL RATIO MEASUREMENT: 3 RATIO — LOW (ref 3.3–7.1)
TRIGL SERPL-MCNC: 83 MG/DL — SIGNIFICANT CHANGE UP (ref 10–200)
VIT B12 SERPL-MCNC: 858 PG/ML — SIGNIFICANT CHANGE UP (ref 232–1245)

## 2019-10-30 PROCEDURE — 70544 MR ANGIOGRAPHY HEAD W/O DYE: CPT | Mod: 26,59

## 2019-10-30 PROCEDURE — 70551 MRI BRAIN STEM W/O DYE: CPT | Mod: 26

## 2019-10-30 PROCEDURE — 99233 SBSQ HOSP IP/OBS HIGH 50: CPT

## 2019-10-30 RX ORDER — DIAZEPAM 5 MG
5 TABLET ORAL
Refills: 0 | Status: DISCONTINUED | OUTPATIENT
Start: 2019-10-30 | End: 2019-11-02

## 2019-10-30 RX ORDER — POTASSIUM CHLORIDE 20 MEQ
40 PACKET (EA) ORAL ONCE
Refills: 0 | Status: COMPLETED | OUTPATIENT
Start: 2019-10-30 | End: 2019-10-30

## 2019-10-30 RX ORDER — ENOXAPARIN SODIUM 100 MG/ML
40 INJECTION SUBCUTANEOUS DAILY
Refills: 0 | Status: DISCONTINUED | OUTPATIENT
Start: 2019-10-30 | End: 2019-11-02

## 2019-10-30 RX ADMIN — Medication 650 MILLIGRAM(S): at 06:00

## 2019-10-30 RX ADMIN — Medication 0.1 MILLIGRAM(S): at 22:47

## 2019-10-30 RX ADMIN — Medication 10 MILLIGRAM(S): at 05:27

## 2019-10-30 RX ADMIN — Medication 0.1 MILLIGRAM(S): at 05:27

## 2019-10-30 RX ADMIN — Medication 100 MILLIGRAM(S): at 22:47

## 2019-10-30 RX ADMIN — Medication 5 MILLIGRAM(S): at 05:27

## 2019-10-30 RX ADMIN — Medication 650 MILLIGRAM(S): at 14:49

## 2019-10-30 RX ADMIN — Medication 60 MILLIGRAM(S): at 13:49

## 2019-10-30 RX ADMIN — Medication 2 MILLIGRAM(S): at 12:10

## 2019-10-30 RX ADMIN — Medication 1 MILLIGRAM(S): at 11:22

## 2019-10-30 RX ADMIN — Medication 650 MILLIGRAM(S): at 13:49

## 2019-10-30 RX ADMIN — Medication 0.1 MILLIGRAM(S): at 13:49

## 2019-10-30 RX ADMIN — Medication 650 MILLIGRAM(S): at 05:27

## 2019-10-30 RX ADMIN — GABAPENTIN 300 MILLIGRAM(S): 400 CAPSULE ORAL at 22:47

## 2019-10-30 RX ADMIN — Medication 40 MILLIEQUIVALENT(S): at 13:48

## 2019-10-30 RX ADMIN — LAMOTRIGINE 100 MILLIGRAM(S): 25 TABLET, ORALLY DISINTEGRATING ORAL at 13:48

## 2019-10-30 RX ADMIN — Medication 650 MILLIGRAM(S): at 20:26

## 2019-10-30 RX ADMIN — Medication 3 MILLIGRAM(S): at 22:47

## 2019-10-30 NOTE — PHYSICAL THERAPY INITIAL EVALUATION ADULT - RANGE OF MOTION EXAMINATION, REHAB EVAL
no ROM deficits were identified/except pt had trouble performing straight leg raise against gravity while supine, could bring knee up in marching position while standing.

## 2019-10-30 NOTE — OCCUPATIONAL THERAPY INITIAL EVALUATION ADULT - ADDITIONAL COMMENTS
Pt lives in a house with about 7 JADE and 6 stairs inside to bedroom. There are 2 bathrooms, one with a shower with curtains the other with a tub with curtains. Pt owns RW, shower chair and commode. Pt is right handed. Pt drives. Pt reports her  is retired and able to assist her at home. Pt reports her son works during the day but can assist after work.

## 2019-10-30 NOTE — PHYSICAL THERAPY INITIAL EVALUATION ADULT - ASSISTIVE DEVICE:SUPINE/SIT, REHAB EVAL
Lactation Services has met with mother and infant.    Consult time 12 minutes for breastfeeding assessment.    The following teaching and resources were discussed and provided to patient:   Proper positioning, supporting of infant's head and wide-mouth latch   Frequent feedings / offer breast often with cues   Offer clean finger with soft side up instead of pacifier   Massage and express breast milk prior to nursing   Try different feeding positions   How to know if breastfeeding is going well at home   Use of skin-to-skin   Encouraged to read through Breast feeding section of [employer].  Community Resources List   Encouraged to maintain a feeding and diaper change log until infant is seen by pediatrician.    Mother has visible colostrum with manual expression. Instruct to express and attempt to latch infant every 2 hours.  Infant has gunky clear mucus but did latch shortly. Mother will continue to work with infant and offer.  She will call for assistance as needed.    Lactation to follow up tomorrow.  Mother to call with concerns and questions.    Fei VALLE IBCLC  
bed rails

## 2019-10-30 NOTE — BEHAVIORAL HEALTH ASSESSMENT NOTE - SUICIDE PROTECTIVE FACTORS
Engaged in work or school/Identifies reasons for living/Responsibility to family and others/Supportive social network of family or friends/Has future plans

## 2019-10-30 NOTE — PROGRESS NOTE ADULT - SUBJECTIVE AND OBJECTIVE BOX
INTERVAL HISTORY:  much improved mentation.  Fully awake and lucid. Good recall. answers quickly and correctly  C/o inability to lift left LE      VITAL SIGNS:  Vital Signs Last 24 Hrs  T(C): 36.7 (30 Oct 2019 08:01), Max: 36.7 (29 Oct 2019 20:33)  T(F): 98 (30 Oct 2019 08:01), Max: 98 (29 Oct 2019 20:33)  HR: 52 (30 Oct 2019 08:00) (52 - 66)  BP: 129/72 (30 Oct 2019 08:00) (90/60 - 129/72)  BP(mean): 81 (30 Oct 2019 08:00) (81 - 95)  RR: 16 (30 Oct 2019 08:00) (12 - 24)  SpO2: 99% (30 Oct 2019 08:00) (90% - 99%)    PHYSICAL EXAMINATION:    Mentation:  nl  Language/Speech: nl  CN: nl  Visual Fields: full  Motor: 5/5 x 4  Sensory: Dec LT LLE, prop intact.  Tone nl  DTR: 2+ AJ, KJ, 1+ Bcps  Babinski: plantar      MEDS:  MEDICATIONS  (STANDING):  cloNIDine 0.1 milliGRAM(s) Oral three times a day  diazepam    Tablet 5 milliGRAM(s) Oral two times a day  enalapril 10 milliGRAM(s) Oral daily  gabapentin 300 milliGRAM(s) Oral at bedtime  influenza   Vaccine 0.5 milliLiter(s) IntraMuscular once  lamoTRIgine 100 milliGRAM(s) Oral daily  LORazepam     Tablet 1 milliGRAM(s) Oral once  melatonin 3 milliGRAM(s) Oral at bedtime  PARoxetine 60 milliGRAM(s) Oral daily  potassium chloride    Tablet ER 40 milliEquivalent(s) Oral once  traZODone 100 milliGRAM(s) Oral daily    MEDICATIONS  (PRN):  acetaminophen   Tablet .. 650 milliGRAM(s) Oral every 6 hours PRN Temp greater or equal to 38C (100.4F), Mild Pain (1 - 3)      LABS:                          11.0   7.06  )-----------( 225      ( 29 Oct 2019 12:46 )             34.3     10-30    145  |  106  |  15.0  ----------------------------<  98  3.8   |  28.0  |  0.79    Ca    8.8      30 Oct 2019 04:48            RADIOLOGY & ADDITIONAL STUDIES:    EEG- mod diffuse slow    IMPRESSION & PLAN:    encephalopathy- resovled-  likley due to polypharmacy  LLE sugjective weakness and numbness    Plan:  Address polypharm meds  MRI brain MRA brain and neck  MRI T spine, L/S sppine maybe C spine  PT eval
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
## 2019-10-30 NOTE — OCCUPATIONAL THERAPY INITIAL EVALUATION ADULT - SENSORY TESTS
Pt reports tingling in bilateral digits. Pt reports to neurologist that she is unable to feel LE light touch, however when formally assessed by OT, pt able to feel and accurately identify location of touch. Pt with +bilateral pedal pulses, +bilateral radial pulses, +capillary refill in bilateral toes. Pt reports tingling in bilateral digits tips. Pt reports to neurologist at beginning of evaluation that she is unable to feel left LE light touch, however when formally assessed by OT, pt able to accurately identify location of touch. Pt with +bilateral pedal pulses, pt with +bilateral radial pulses, pt with +capillary refill in bilateral toes.

## 2019-10-30 NOTE — PROGRESS NOTE ADULT - SUBJECTIVE AND OBJECTIVE BOX
CHERYL MIR Female 59y [id number]    Patient is a 59y old  Female who presents with a chief complaint of unsteady gait, fall (30 Oct 2019 10:02)      Subjective/objective:  Pt seen and examined at bedside, pt went down to MRI earlier and despite receiving IVP Ativan was unable to stay still. Will likely need to be sedated by anesthesia prior to attempting imaging again. Pt is in NAD at time of exam but states she is very frustrated by difficulty moving LLE.      Review of system:  No fever, chills, nausea, vomiting, headache, dizziness, chest pain, SOB or palpitation.      PHYSICAL EXAM:    Vital Signs Last 24 Hrs  T(C): 36.7 (30 Oct 2019 08:01), Max: 36.7 (29 Oct 2019 20:33)  T(F): 98 (30 Oct 2019 08:01), Max: 98 (29 Oct 2019 20:33)  HR: 67 (30 Oct 2019 16:00) (50 - 67)  BP: 122/80 (30 Oct 2019 16:00) (107/72 - 147/92)  BP(mean): 92 (30 Oct 2019 16:00) (81 - 110)  RR: 14 (30 Oct 2019 16:00) (12 - 24)  SpO2: 97% (30 Oct 2019 16:00) (90% - 99%)    GENERAL: Obese female, pt lying comfortably, NAD.  ENMT: PERRL, +EOMI.  NECK: soft, Supple, No JVD,   CHEST/LUNG: Clear to auscultation bilaterally; No wheezing.  HEART: S1S2+, Regular rate and rhythm; No murmurs.  ABDOMEN: Soft, Nontender, Nondistended; Bowel sounds present.  MUSCULOSKELETAL: Normal range of motion.  SKIN: No rashes or lesions.  NEURO: AAOX3, no focal deficits, no motor r sensory loss.  PSYCH: tangential/anxious.      MEDICATIONS  (STANDING):  cloNIDine 0.1 milliGRAM(s) Oral three times a day  diazepam    Tablet 5 milliGRAM(s) Oral two times a day  enalapril 10 milliGRAM(s) Oral daily  gabapentin 300 milliGRAM(s) Oral at bedtime  influenza   Vaccine 0.5 milliLiter(s) IntraMuscular once  lamoTRIgine 100 milliGRAM(s) Oral daily  melatonin 3 milliGRAM(s) Oral at bedtime  PARoxetine 60 milliGRAM(s) Oral daily  traZODone 100 milliGRAM(s) Oral daily    MEDICATIONS  (PRN):  acetaminophen   Tablet .. 650 milliGRAM(s) Oral every 6 hours PRN Temp greater or equal to 38C (100.4F), Mild Pain (1 - 3)      LABS:                        11.0   7.06  )-----------( 225      ( 29 Oct 2019 12:46 )             34.3     10-30    145  |  106  |  15.0  ----------------------------<  98  3.8   |  28.0  |  0.79    Ca    8.8      30 Oct 2019 04:48      PT/INR - ( 29 Oct 2019 12:46 )   PT: 11.4 sec;   INR: 0.99 ratio         PTT - ( 29 Oct 2019 12:46 )  PTT:31.4 sec      CARDIAC MARKERS ( 29 Oct 2019 12:46 )  x     / <0.01 ng/mL / x     / x     / x          MR head 10/30/19   IMPRESSION: No acute infarction.    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 No large   vessel occlusion.        EEG 10/30/19:   Abnormal EEG study.  1. Moderate nonspecific diffuse or multifocal cerebral dysfunction.   2. No epileptiform pattern or seizure seen.

## 2019-10-30 NOTE — PROGRESS NOTE ADULT - ATTENDING COMMENTS
seen and examined on 10/30. patient is improving. speech fluent today, cannot complete MRI due to movement even after high diose sedation. will try tomorrow. rest as per PA

## 2019-10-30 NOTE — PHYSICAL THERAPY INITIAL EVALUATION ADULT - GENERAL OBSERVATIONS, REHAB EVAL
Patient received lying in bed, NAD, breathing RA, +monitor. Pt agreeable to Physical Therapy evaluation.

## 2019-10-30 NOTE — BEHAVIORAL HEALTH ASSESSMENT NOTE - DESCRIPTION (FIRST USE, LAST USE, QUANTITY, FREQUENCY, DURATION)
remission 2 yrs remissions 2 years Pt reports has been d/cd by psychiatrist 1 month ago which is inconsistent with  tox on admission and Istop registry
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
## 2019-10-30 NOTE — PHYSICAL THERAPY INITIAL EVALUATION ADULT - ADDITIONAL COMMENTS
Patient lives in a split level house with 5 JADE with railing and 5 stairs up or down inside, both with railings. She lives with her  who is not available to provide 24/7 assist if necessary. She was independent prior to admission without use of AD, owns a shower chair.

## 2019-10-30 NOTE — EEG REPORT - NS EEG TEXT BOX
[facility]   COMPREHENSIVE EPILEPSY CENTER   REPORT OF ROUTINE VIDEO EEG     [facility]: [address], [place] [address], Ph#: [phone]  [facility]: [address], [place] [address], Ph#: [phone]  [facility]: [address] [place] [address], Ph#: [phone]    Patient Name: CHERYL MIR  Age and : 59y (64)  MRN #: [id number]  Location: [place] [address]  Referring Physician: Yajaira Herron    Study Date: 10-29-19    _____________________________________________________________  TECHNICAL INFORMATION    Placement and Labeling of Electrodes:  The EEG was performed utilizing 20 channels referential EEG connections (coronal over temporal over parasagittal montage) using all standard 10-20 electrode placements with EKG.  Recording was at a sampling rate of 256 samples per second per channel.  Time synchronized digital video recording was done simultaneously with EEG recording.  A low light infrared camera was used for low light recording.  Gene and seizure detection algorithms were utilized.    _____________________________________________________________  HISTORY    Patient is a 59y old  Female who presents with a chief complaint of unsteady gait, fall (29 Oct 2019 16:09)      PERTINENT MEDICATION:  diazepam    Tablet 5 milliGRAM(s) Oral two times a day  gabapentin 300 milliGRAM(s) Oral at bedtime  lamoTRIgine 100 milliGRAM(s) Oral daily    _____________________________________________________________  STUDY INTERPRETATION    Findings: The background was continuous, spontaneously variable and reactive. No posterior dominant rhythm seen.    Background Slowing:  Background predominantly consisted of theta, delta and faster activities.    Focal Slowing:   None were present.    Sleep Background:  Drowsiness was characterized by fragmentation, attenuation, and slowing of the background activity.    Sleep was characterized by the presence of vertex waves, symmetric sleep spindles and K-complexes.    Other Non-Epileptiform Findings:  None were present.    Interictal Epileptiform Activity:   None were present.    Events:  Clinical events: None recorded.  Seizures: None recorded.    Activation Procedures:   Hyperventilation was performed and did not elicit any abnormality.  Photic stimulation was performed and did not elicit any abnormality.     Artifacts:  Intermittent myogenic and movement artifacts were noted.    ECG:  The heart rate on single channel ECG was predominantly between 60-70 BPM.    _____________________________________________________________  EEG SUMMARY/CLASSIFICATION    Abnormal EEG in the awake, drowsy and asleep states.  - Moderate generalized slowing.    _____________________________________________________________  EEG IMPRESSION/CLINICAL CORRELATE    Abnormal EEG study.  1. Moderate nonspecific diffuse or multifocal cerebral dysfunction.   2. No epileptiform pattern or seizure seen.    _____________________________________________________________    Hina Guzman MD  Attending Physician, [facility]
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
## 2019-10-30 NOTE — OCCUPATIONAL THERAPY INITIAL EVALUATION ADULT - COORDINATION ASSESSED, REHAB EVAL
when formally assessed, pt showed difficulty bringing heel to shin however when not asked pt was able to bring her heel up without difficulty/heel to shin/finger to nose finger to nose/heel to shin/when formally assessed, pt showed difficulty bringing heel to shin however when not asked pt was able to bring her heel up without difficulty heel to shin/when formally assessed, pt showed difficulty bringing heel to shin however during conversation and observation, pt able to bring her heel up without difficulty into figure four position to don socks/finger to nose

## 2019-10-30 NOTE — BEHAVIORAL HEALTH ASSESSMENT NOTE - NSBHCHARTREVIEWVS_PSY_A_CORE FT
ICU Vital Signs Last 24 Hrs  T(C): 36.7 (30 Oct 2019 08:01), Max: 36.7 (29 Oct 2019 20:33)  T(F): 98 (30 Oct 2019 08:01), Max: 98 (29 Oct 2019 20:33)  HR: 52 (30 Oct 2019 08:00) (52 - 58)  BP: 129/72 (30 Oct 2019 08:00) (107/72 - 129/72)  BP(mean): 81 (30 Oct 2019 08:00) (81 - 95)  ABP: --  ABP(mean): --  RR: 16 (30 Oct 2019 08:00) (12 - 24)  SpO2: 99% (30 Oct 2019 08:00) (90% - 99%)
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
## 2019-10-30 NOTE — BEHAVIORAL HEALTH ASSESSMENT NOTE - NSBHCHARTREVIEWINVESTIGATE_PSY_A_CORE FT
History of appendectomy    History of lobectomy of lung Ventricular Rate 55 BPM    Atrial Rate 55 BPM    P-R Interval 176 ms    QRS Duration 84 ms    Q-T Interval 446 ms    QTC Calculation(Bezet) 426 ms    P Axis 36 degrees    R Axis 21 degrees    T Axis 54 degrees    Diagnosis Line Sinus bradycardia  Otherwise normal ECG    Confirmed by HANG RUIZ ([id number]) on 10/29/2019 3:41:57 PM

## 2019-10-30 NOTE — OCCUPATIONAL THERAPY INITIAL EVALUATION ADULT - PRECAUTIONS/LIMITATIONS, REHAB EVAL
supervision with meals; head of bed 30 degrees/fall precautions/aspiration precautions/seizure precautions

## 2019-10-30 NOTE — BEHAVIORAL HEALTH ASSESSMENT NOTE - NSBHCHARTREVIEWLAB_PSY_A_CORE FT
Basic Metabolic Panel (10.30.19 @ 04:48)    Sodium, Serum: 145 mmol/L    Potassium, Serum: 3.8 mmol/L    Chloride, Serum: 106 mmol/L    Carbon Dioxide, Serum: 28.0 mmol/L    Anion Gap, Serum: 11 mmol/L    Blood Urea Nitrogen, Serum: 15.0 mg/dL    Creatinine, Serum: 0.79 mg/dL    Glucose, Serum: 98 mg/dL    Calcium, Total Serum: 8.8 mg/dL    eGFR if Non : 83: Interpretative comment  The units for eGFR are mL/min/1.73M2 (normalized body surface area). The  eGFR is calculated from a serum creatinine using the CKD-EPI equation.  Other variables required for calculation are race, age and sex. Among  patients with chronic kidney disease (CKD), the eGFR is useful in  determining the stage of disease according to KDOQI CKD classification.  All eGFR results are reported numerically with the following  interpretation.          GFR                    With                 Without     (ml/min/1.73 m2)    Kidney Damage       Kidney Damage        >= 90                    Stage 1                     Normal        60-89                    Stage 2                     Decreased GFR        30-59     Stage 3                     Stage 3        15-29                    Stage 4                     Stage 4        < 15                      Stage 5                     Stage 5  Each stage of CKD assumes that the associated GFR level has been in  effect for at least 3 months. Determination of stages one and two (with  eGFR > 59 ml/min/m2) requires estimation of kidney damage for at least 3  months as defined by structural or functional abnormalities.  Limitations: All estimates of GFR will be less accurate for patients at  extremes of muscle mass (including but not limited to frail elderly,  critically ill, or cancer patients), those with unusual diets, and those  with conditions associated with reduced secretion or extrarenal  elimination of creatinine. The eGFR equation is not recommended for use  in patients with unstable creatinine levels. mL/min/1.73M2    eGFR if African American: 96 mL/min/1.73M2

## 2019-10-30 NOTE — BEHAVIORAL HEALTH ASSESSMENT NOTE - NSBHCHARTREVIEWIMAGING_PSY_A_CORE FT
Abnormal EEG study.  1. Moderate nonspecific diffuse or multifocal cerebral dysfunction.   2. No epileptiform pattern or seizure seen.

## 2019-10-31 LAB
ANA TITR SER: NEGATIVE — SIGNIFICANT CHANGE UP
ANION GAP SERPL CALC-SCNC: 11 MMOL/L — SIGNIFICANT CHANGE UP (ref 5–17)
BUN SERPL-MCNC: 16 MG/DL — SIGNIFICANT CHANGE UP (ref 8–20)
CALCIUM SERPL-MCNC: 8.8 MG/DL — SIGNIFICANT CHANGE UP (ref 8.6–10.2)
CHLORIDE SERPL-SCNC: 106 MMOL/L — SIGNIFICANT CHANGE UP (ref 98–107)
CO2 SERPL-SCNC: 27 MMOL/L — SIGNIFICANT CHANGE UP (ref 22–29)
CREAT SERPL-MCNC: 0.84 MG/DL — SIGNIFICANT CHANGE UP (ref 0.5–1.3)
FOLATE RBC-MCNC: 1205 NG/ML — SIGNIFICANT CHANGE UP (ref 499–1504)
GLUCOSE SERPL-MCNC: 103 MG/DL — SIGNIFICANT CHANGE UP (ref 70–115)
HCT VFR BLD CALC: 31.7 % — LOW (ref 34.5–45)
HCT VFR BLD CALC: 32.2 % — LOW (ref 34.5–45)
HGB BLD-MCNC: 10.2 G/DL — LOW (ref 11.5–15.5)
MAGNESIUM SERPL-MCNC: 2.1 MG/DL — SIGNIFICANT CHANGE UP (ref 1.6–2.6)
MCHC RBC-ENTMCNC: 29.8 PG — SIGNIFICANT CHANGE UP (ref 27–34)
MCHC RBC-ENTMCNC: 32.2 GM/DL — SIGNIFICANT CHANGE UP (ref 32–36)
MCV RBC AUTO: 92.7 FL — SIGNIFICANT CHANGE UP (ref 80–100)
PHOSPHATE SERPL-MCNC: 3.6 MG/DL — SIGNIFICANT CHANGE UP (ref 2.4–4.7)
PLATELET # BLD AUTO: 226 K/UL — SIGNIFICANT CHANGE UP (ref 150–400)
POTASSIUM SERPL-MCNC: 4.1 MMOL/L — SIGNIFICANT CHANGE UP (ref 3.5–5.3)
POTASSIUM SERPL-SCNC: 4.1 MMOL/L — SIGNIFICANT CHANGE UP (ref 3.5–5.3)
RBC # BLD: 3.42 M/UL — LOW (ref 3.8–5.2)
RBC # FLD: 13.2 % — SIGNIFICANT CHANGE UP (ref 10.3–14.5)
SODIUM SERPL-SCNC: 144 MMOL/L — SIGNIFICANT CHANGE UP (ref 135–145)
WBC # BLD: 6.28 K/UL — SIGNIFICANT CHANGE UP (ref 3.8–10.5)
WBC # FLD AUTO: 6.28 K/UL — SIGNIFICANT CHANGE UP (ref 3.8–10.5)

## 2019-10-31 PROCEDURE — 99232 SBSQ HOSP IP/OBS MODERATE 35: CPT

## 2019-10-31 RX ADMIN — ENOXAPARIN SODIUM 40 MILLIGRAM(S): 100 INJECTION SUBCUTANEOUS at 11:08

## 2019-10-31 RX ADMIN — Medication 100 MILLIGRAM(S): at 21:05

## 2019-10-31 RX ADMIN — Medication 0.1 MILLIGRAM(S): at 21:05

## 2019-10-31 RX ADMIN — Medication 650 MILLIGRAM(S): at 11:14

## 2019-10-31 RX ADMIN — GABAPENTIN 300 MILLIGRAM(S): 400 CAPSULE ORAL at 21:05

## 2019-10-31 RX ADMIN — Medication 60 MILLIGRAM(S): at 11:07

## 2019-10-31 RX ADMIN — LAMOTRIGINE 100 MILLIGRAM(S): 25 TABLET, ORALLY DISINTEGRATING ORAL at 11:07

## 2019-10-31 RX ADMIN — Medication 3 MILLIGRAM(S): at 21:05

## 2019-10-31 RX ADMIN — Medication 650 MILLIGRAM(S): at 11:50

## 2019-10-31 RX ADMIN — Medication 650 MILLIGRAM(S): at 21:04

## 2019-10-31 RX ADMIN — Medication 650 MILLIGRAM(S): at 19:09
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
## 2019-10-31 NOTE — PROGRESS NOTE ADULT - SUBJECTIVE AND OBJECTIVE BOX
INTERVAL HISTORY:  still having troible with LLE. Fingers tingle.  No bladder bowel complaint. No parathesias      VITAL SIGNS:  Vital Signs Last 24 Hrs  T(C): 36.6 (31 Oct 2019 08:34), Max: 36.8 (30 Oct 2019 16:21)  T(F): 97.9 (31 Oct 2019 08:34), Max: 98.3 (30 Oct 2019 16:21)  HR: 58 (31 Oct 2019 08:34) (50 - 67)  BP: 101/66 (31 Oct 2019 08:34) (98/67 - 147/92)  BP(mean): 89 (30 Oct 2019 20:00) (89 - 110)  RR: 18 (31 Oct 2019 08:34) (14 - 20)  SpO2: 96% (31 Oct 2019 08:34) (95% - 99%)    PHYSICAL EXAMINATION:    Mentation:  nl  Language/Speech:nl  CN: nl  Visual Fields: full  Motor: 5/5 BUE,  5/5 RLE trace movement LLE-    Sensory:  DTR: 3+ BLE  Babinski: plantar  sens: dec prop LLE    MEDS:  MEDICATIONS  (STANDING):  cloNIDine 0.1 milliGRAM(s) Oral three times a day  enalapril 10 milliGRAM(s) Oral daily  enoxaparin Injectable 40 milliGRAM(s) SubCutaneous daily  gabapentin 300 milliGRAM(s) Oral at bedtime  influenza   Vaccine 0.5 milliLiter(s) IntraMuscular once  lamoTRIgine 100 milliGRAM(s) Oral daily  LORazepam   Injectable 2 milliGRAM(s) IV Push once  melatonin 3 milliGRAM(s) Oral at bedtime  PARoxetine 60 milliGRAM(s) Oral daily  traZODone 100 milliGRAM(s) Oral daily    MEDICATIONS  (PRN):  acetaminophen   Tablet .. 650 milliGRAM(s) Oral every 6 hours PRN Temp greater or equal to 38C (100.4F), Mild Pain (1 - 3)  diazepam    Tablet 5 milliGRAM(s) Oral two times a day PRN anxiety or agitation      LABS:                          10.2   6.28  )-----------( 226      ( 31 Oct 2019 07:36 )             31.7     10-31    144  |  106  |  16.0  ----------------------------<  103  4.1   |  27.0  |  0.84    Ca    8.8      31 Oct 2019 07:36  Phos  3.6     10-31  Mg     2.1     10-31            RADIOLOGY & ADDITIONAL STUDIES:      IMPRESSION & PLAN:    LLE weakness with parasthesias of the hands  Hyperreflexia -mild    Needs anaeathesia sedaton for 3 MR studies ( spine)

## 2019-10-31 NOTE — SPEECH LANGUAGE PATHOLOGY EVALUATION - SLP DIAGNOSIS
Receptive & expressive language skills WNL.  Cognition appears grossly WFL for simple information: formal assessment warranted & to be completed
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
## 2019-10-31 NOTE — PROVIDER CONTACT NOTE (MEDICATION) - BACKGROUND
PMHx of HTN, Anxiety, Depression, Osteoarthritis, Asthma and Seasonal Allergies. PSHx s/p hip replacement, tubal ligation, and cholecystectomy.

## 2019-10-31 NOTE — PROGRESS NOTE ADULT - SUBJECTIVE AND OBJECTIVE BOX
CHERYL MIR Female 59y [id number]    Patient is a 59y old  Female who presents with a chief complaint of unsteady gait, fall (30 Oct 2019 10:02)      Subjective/objective:  Pt seen and examined at bedside, still has LLE weakness and finger tips paresthesias. back pain, no radaition.  no other symptoms. awaiting MR spines under anesthesias .      Review of system:  dened fever, chills, nausea, vomiting, headache, dizziness, chest pain, SOB or palpitation, saddle anesthesias, bowel/bladder dysfunction. all other ROS neg      PHYSICAL EXAM:    Vital Signs Last 24 Hrs  T(C): 36.4 (31 Oct 2019 15:18), Max: 36.8 (30 Oct 2019 16:21)  T(F): 97.6 (31 Oct 2019 15:18), Max: 98.3 (30 Oct 2019 16:21)  HR: 58 (31 Oct 2019 15:18) (55 - 65)  BP: 120/73 (31 Oct 2019 15:18) (98/67 - 133/88)  BP(mean): 89 (30 Oct 2019 20:00) (89 - 101)  RR: 18 (31 Oct 2019 15:18) (16 - 20)  SpO2: 92% (31 Oct 2019 15:18) (92% - 99%)    GENERAL: Not in distress. Alert    HEENT:  Normocephalic and atraumatic. PEARLA,EOMI  NECK: Supple.  No JVD.    CARDIOVASCULAR: RRR S1, S2. No murmur/rubs/gallop  LUNGS: BLAE+, no rales, no wheezing, no rhonchi.    ABDOMEN: ND. Soft,  NT, no guarding / rebound / rigidity. BS normoactive. No CVA tenderness.    BACK: No spine tenderness.  EXTREMITIES: no cyanosis, no clubbing, no edema.   SKIN: no rash. No skin break or ulcer. No cellulitis.  NEUROLOGIC: AAO*3.strength: today 0/5 LLE, however moving legs. 5/5 all other. sensation reduced over lLE, speech fluent.  CN intact.   PSYCHIATRIC: Calm.  No agitation.        MEDICATIONS  (STANDING):  cloNIDine 0.1 milliGRAM(s) Oral three times a day  diazepam    Tablet 5 milliGRAM(s) Oral two times a day  enalapril 10 milliGRAM(s) Oral daily  gabapentin 300 milliGRAM(s) Oral at bedtime  influenza   Vaccine 0.5 milliLiter(s) IntraMuscular once  lamoTRIgine 100 milliGRAM(s) Oral daily  melatonin 3 milliGRAM(s) Oral at bedtime  PARoxetine 60 milliGRAM(s) Oral daily  traZODone 100 milliGRAM(s) Oral daily    MEDICATIONS  (PRN):  acetaminophen   Tablet .. 650 milliGRAM(s) Oral every 6 hours PRN Temp greater or equal to 38C (100.4F), Mild Pain (1 - 3)      LABS:                          10.2   6.28  )-----------( 226      ( 31 Oct 2019 07:36 )             31.7       10-31    144  |  106  |  16.0  ----------------------------<  103  4.1   |  27.0  |  0.84    Ca    8.8      31 Oct 2019 07:36  Phos  3.6     10-31  Mg     2.1     10-31        CARDIAC MARKERS ( 29 Oct 2019 12:46 )  x     / <0.01 ng/mL / x     / x     / x          MR head 10/30/19   IMPRESSION: No acute infarction.    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 No large   vessel occlusion.        EEG 10/30/19:   Abnormal EEG study.  1. Moderate nonspecific diffuse or multifocal cerebral dysfunction.   2. No epileptiform pattern or seizure seen.

## 2019-11-01 PROCEDURE — 99233 SBSQ HOSP IP/OBS HIGH 50: CPT

## 2019-11-01 PROCEDURE — 72156 MRI NECK SPINE W/O & W/DYE: CPT | Mod: 26

## 2019-11-01 PROCEDURE — 72158 MRI LUMBAR SPINE W/O & W/DYE: CPT | Mod: 26

## 2019-11-01 PROCEDURE — G9005: CPT

## 2019-11-01 PROCEDURE — 72157 MRI CHEST SPINE W/O & W/DYE: CPT | Mod: 26

## 2019-11-01 RX ORDER — SODIUM CHLORIDE 9 MG/ML
1000 INJECTION, SOLUTION INTRAVENOUS
Refills: 0 | Status: DISCONTINUED | OUTPATIENT
Start: 2019-11-01 | End: 2019-11-01

## 2019-11-01 RX ORDER — FENTANYL CITRATE 50 UG/ML
50 INJECTION INTRAVENOUS
Refills: 0 | Status: DISCONTINUED | OUTPATIENT
Start: 2019-11-01 | End: 2019-11-01

## 2019-11-01 RX ORDER — ONDANSETRON 8 MG/1
4 TABLET, FILM COATED ORAL ONCE
Refills: 0 | Status: DISCONTINUED | OUTPATIENT
Start: 2019-11-01 | End: 2019-11-01

## 2019-11-01 RX ADMIN — Medication 100 MILLIGRAM(S): at 21:05

## 2019-11-01 RX ADMIN — Medication 650 MILLIGRAM(S): at 19:15

## 2019-11-01 RX ADMIN — Medication 650 MILLIGRAM(S): at 02:54

## 2019-11-01 RX ADMIN — LAMOTRIGINE 100 MILLIGRAM(S): 25 TABLET, ORALLY DISINTEGRATING ORAL at 11:05

## 2019-11-01 RX ADMIN — Medication 5 MILLIGRAM(S): at 11:05

## 2019-11-01 RX ADMIN — GABAPENTIN 300 MILLIGRAM(S): 400 CAPSULE ORAL at 21:05

## 2019-11-01 RX ADMIN — Medication 650 MILLIGRAM(S): at 02:24

## 2019-11-01 RX ADMIN — Medication 10 MILLIGRAM(S): at 05:05

## 2019-11-01 RX ADMIN — Medication 60 MILLIGRAM(S): at 11:05

## 2019-11-01 RX ADMIN — ENOXAPARIN SODIUM 40 MILLIGRAM(S): 100 INJECTION SUBCUTANEOUS at 11:05

## 2019-11-01 RX ADMIN — Medication 650 MILLIGRAM(S): at 19:02

## 2019-11-01 RX ADMIN — Medication 0.1 MILLIGRAM(S): at 21:05

## 2019-11-01 RX ADMIN — Medication 0.1 MILLIGRAM(S): at 05:05

## 2019-11-01 RX ADMIN — Medication 3 MILLIGRAM(S): at 21:05
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
## 2019-11-01 NOTE — PROGRESS NOTE ADULT - SUBJECTIVE AND OBJECTIVE BOX
INTERVAL HISTORY:  stable, no interval changes       VITAL SIGNS:  Vital Signs Last 24 Hrs  T(C): 36.6 (01 Nov 2019 08:03), Max: 37 (31 Oct 2019 20:18)  T(F): 97.9 (01 Nov 2019 08:03), Max: 98.6 (31 Oct 2019 20:18)  HR: 69 (01 Nov 2019 08:03) (56 - 93)  BP: 102/67 (01 Nov 2019 08:03) (102/67 - 141/78)  BP(mean): --  RR: 18 (01 Nov 2019 08:03) (18 - 18)  SpO2: 95% (01 Nov 2019 08:03) (92% - 95%)    PHYSICAL EXAMINATION:    Mentation:    Language/Speech:  CN:  Visual Fields:  Motor:  Sensory:  DTR:  Babinski:      MEDS:  MEDICATIONS  (STANDING):  cloNIDine 0.1 milliGRAM(s) Oral three times a day  enalapril 10 milliGRAM(s) Oral daily  enoxaparin Injectable 40 milliGRAM(s) SubCutaneous daily  gabapentin 300 milliGRAM(s) Oral at bedtime  influenza   Vaccine 0.5 milliLiter(s) IntraMuscular once  lamoTRIgine 100 milliGRAM(s) Oral daily  LORazepam   Injectable 2 milliGRAM(s) IV Push once  melatonin 3 milliGRAM(s) Oral at bedtime  PARoxetine 60 milliGRAM(s) Oral daily  traZODone 100 milliGRAM(s) Oral daily    MEDICATIONS  (PRN):  acetaminophen   Tablet .. 650 milliGRAM(s) Oral every 6 hours PRN Temp greater or equal to 38C (100.4F), Mild Pain (1 - 3)  diazepam    Tablet 5 milliGRAM(s) Oral two times a day PRN anxiety or agitation      LABS:                          x      x     )-----------( x        ( 31 Oct 2019 16:29 )             32.2     10-31    144  |  106  |  16.0  ----------------------------<  103  4.1   |  27.0  |  0.84    Ca    8.8      31 Oct 2019 07:36  Phos  3.6     10-31  Mg     2.1     10-31            RADIOLOGY & ADDITIONAL STUDIES:      IMPRESSION & PLAN:    Awaiting for MRI spine (3) under sedation.   Further recommendations to follow INTERVAL HISTORY:  improved       VITAL SIGNS:  Vital Signs Last 24 Hrs  T(C): 36.6 (01 Nov 2019 08:03), Max: 37 (31 Oct 2019 20:18)  T(F): 97.9 (01 Nov 2019 08:03), Max: 98.6 (31 Oct 2019 20:18)  HR: 69 (01 Nov 2019 08:03) (56 - 93)  BP: 102/67 (01 Nov 2019 08:03) (102/67 - 141/78)  BP(mean): --  RR: 18 (01 Nov 2019 08:03) (18 - 18)  SpO2: 95% (01 Nov 2019 08:03) (92% - 95%)    PHYSICAL EXAMINATION:    Mentation:  nl  Language/Speech: nl  CN: nl  Visual Fields: nl  Motor: 5/5 x 3.  LLE- 5/5 all muscle groups except hams/quads (?effort?)  Sensory: nl  DTR: 3+ normal tone  Babinski:      MEDS:  MEDICATIONS  (STANDING):  cloNIDine 0.1 milliGRAM(s) Oral three times a day  enalapril 10 milliGRAM(s) Oral daily  enoxaparin Injectable 40 milliGRAM(s) SubCutaneous daily  gabapentin 300 milliGRAM(s) Oral at bedtime  influenza   Vaccine 0.5 milliLiter(s) IntraMuscular once  lamoTRIgine 100 milliGRAM(s) Oral daily  LORazepam   Injectable 2 milliGRAM(s) IV Push once  melatonin 3 milliGRAM(s) Oral at bedtime  PARoxetine 60 milliGRAM(s) Oral daily  traZODone 100 milliGRAM(s) Oral daily    MEDICATIONS  (PRN):  acetaminophen   Tablet .. 650 milliGRAM(s) Oral every 6 hours PRN Temp greater or equal to 38C (100.4F), Mild Pain (1 - 3)  diazepam    Tablet 5 milliGRAM(s) Oral two times a day PRN anxiety or agitation      LABS:                          x      x     )-----------( x        ( 31 Oct 2019 16:29 )             32.2     10-31    144  |  106  |  16.0  ----------------------------<  103  4.1   |  27.0  |  0.84    Ca    8.8      31 Oct 2019 07:36  Phos  3.6     10-31  Mg     2.1     10-31            RADIOLOGY & ADDITIONAL STUDIES:    Spine MR studies pending    IMPRESSION & PLAN:   Much improved   Awaiting for MRI spine (3) under sedation.   Bry recommendations to follow

## 2019-11-01 NOTE — PROGRESS NOTE ADULT - SUBJECTIVE AND OBJECTIVE BOX
CHERYL MIR Female 59y [id number]    Patient is a 59y old  Female who presents with a chief complaint of unsteady gait, fall (30 Oct 2019 10:02)      Subjective/objective:  Pt seen and examined at bedside, still has LLE weakness. + back pain, no radiation  no other symptoms. MR spine under anesthesia. showed severe lumbar spinal stenosis. no other abnormality    Review of system:  dened fever, chills, nausea, vomiting, headache, dizziness, chest pain, SOB or palpitation, saddle anesthesias, bowel/bladder dysfunction. all other ROS neg      PHYSICAL EXAM:    Vital Signs Last 24 Hrs  T(C): 36.5 (01 Nov 2019 20:40), Max: 36.7 (01 Nov 2019 19:27)  T(F): 97.7 (01 Nov 2019 20:40), Max: 98.1 (01 Nov 2019 19:27)  HR: 82 (01 Nov 2019 20:40) (50 - 93)  BP: 127/75 (01 Nov 2019 20:40) (102/67 - 127/75)  BP(mean): --  RR: 18 (01 Nov 2019 20:40) (14 - 18)  SpO2: 96% (01 Nov 2019 20:40) (93% - 100%)    GENERAL: Not in distress. Alert    HEENT:  Normocephalic and atraumatic. PEARLA,EOMI  NECK: Supple.  No JVD.    CARDIOVASCULAR: RRR S1, S2. No murmur/rubs/gallop  LUNGS: BLAE+, no rales, no wheezing, no rhonchi.    ABDOMEN: ND. Soft,  NT, no guarding / rebound / rigidity. BS normoactive. No CVA tenderness.    BACK: No spine tenderness.  EXTREMITIES: no cyanosis, no clubbing, no edema.   SKIN: no rash. No skin break or ulcer. No cellulitis.  NEUROLOGIC: AAO*3.strength: today 0/5 LLE, however moving legs. 5/5 all other. sensation reduced over lLE, speech fluent.  CN intact.   PSYCHIATRIC: Calm.  No agitation.        MEDICATIONS  (STANDING):  cloNIDine 0.1 milliGRAM(s) Oral three times a day  enalapril 10 milliGRAM(s) Oral daily  enoxaparin Injectable 40 milliGRAM(s) SubCutaneous daily  gabapentin 300 milliGRAM(s) Oral at bedtime  influenza   Vaccine 0.5 milliLiter(s) IntraMuscular once  lamoTRIgine 100 milliGRAM(s) Oral daily  LORazepam   Injectable 2 milliGRAM(s) IV Push once  melatonin 3 milliGRAM(s) Oral at bedtime  PARoxetine 60 milliGRAM(s) Oral daily  traZODone 100 milliGRAM(s) Oral daily    MEDICATIONS  (PRN):  acetaminophen   Tablet .. 650 milliGRAM(s) Oral every 6 hours PRN Temp greater or equal to 38C (100.4F), Mild Pain (1 - 3)  diazepam    Tablet 5 milliGRAM(s) Oral two times a day PRN anxiety or agitation        LABS:                          10.2   6.28  )-----------( 226      ( 31 Oct 2019 07:36 )             31.7       10-31    144  |  106  |  16.0  ----------------------------<  103  4.1   |  27.0  |  0.84    Ca    8.8      31 Oct 2019 07:36  Phos  3.6     10-31  Mg     2.1     10-31        CARDIAC MARKERS ( 29 Oct 2019 12:46 )  x     / <0.01 ng/mL / x     / x     / x          MR head 10/30/19   IMPRESSION: No acute infarction.    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 No large   vessel occlusion.        EEG 10/30/19:   Abnormal EEG study.  1. Moderate nonspecific diffuse or multifocal cerebral dysfunction.   2. No epileptiform pattern or seizure seen.

## 2019-11-02 ENCOUNTER — TRANSCRIPTION ENCOUNTER (OUTPATIENT)
Age: 59
End: 2019-11-02

## 2019-11-02 VITALS
RESPIRATION RATE: 17 BRPM | DIASTOLIC BLOOD PRESSURE: 81 MMHG | TEMPERATURE: 98 F | HEART RATE: 54 BPM | OXYGEN SATURATION: 94 % | SYSTOLIC BLOOD PRESSURE: 154 MMHG

## 2019-11-02 DIAGNOSIS — G93.40 ENCEPHALOPATHY, UNSPECIFIED: ICD-10-CM

## 2019-11-02 PROCEDURE — 86900 BLOOD TYPING SEROLOGIC ABO: CPT

## 2019-11-02 PROCEDURE — 93005 ELECTROCARDIOGRAM TRACING: CPT

## 2019-11-02 PROCEDURE — 70544 MR ANGIOGRAPHY HEAD W/O DYE: CPT

## 2019-11-02 PROCEDURE — 97116 GAIT TRAINING THERAPY: CPT

## 2019-11-02 PROCEDURE — 72156 MRI NECK SPINE W/O & W/DYE: CPT

## 2019-11-02 PROCEDURE — 86850 RBC ANTIBODY SCREEN: CPT

## 2019-11-02 PROCEDURE — 80061 LIPID PANEL: CPT

## 2019-11-02 PROCEDURE — 95819 EEG AWAKE AND ASLEEP: CPT

## 2019-11-02 PROCEDURE — 86140 C-REACTIVE PROTEIN: CPT

## 2019-11-02 PROCEDURE — 83036 HEMOGLOBIN GLYCOSYLATED A1C: CPT

## 2019-11-02 PROCEDURE — 85027 COMPLETE CBC AUTOMATED: CPT

## 2019-11-02 PROCEDURE — 86901 BLOOD TYPING SEROLOGIC RH(D): CPT

## 2019-11-02 PROCEDURE — 84484 ASSAY OF TROPONIN QUANT: CPT

## 2019-11-02 PROCEDURE — 83735 ASSAY OF MAGNESIUM: CPT

## 2019-11-02 PROCEDURE — 97167 OT EVAL HIGH COMPLEX 60 MIN: CPT

## 2019-11-02 PROCEDURE — 99239 HOSP IP/OBS DSCHRG MGMT >30: CPT

## 2019-11-02 PROCEDURE — 72158 MRI LUMBAR SPINE W/O & W/DYE: CPT

## 2019-11-02 PROCEDURE — 85652 RBC SED RATE AUTOMATED: CPT

## 2019-11-02 PROCEDURE — 92523 SPEECH SOUND LANG COMPREHEN: CPT

## 2019-11-02 PROCEDURE — 36415 COLL VENOUS BLD VENIPUNCTURE: CPT

## 2019-11-02 PROCEDURE — 85610 PROTHROMBIN TIME: CPT

## 2019-11-02 PROCEDURE — 82140 ASSAY OF AMMONIA: CPT

## 2019-11-02 PROCEDURE — 80307 DRUG TEST PRSMV CHEM ANLYZR: CPT

## 2019-11-02 PROCEDURE — 82607 VITAMIN B-12: CPT

## 2019-11-02 PROCEDURE — 86780 TREPONEMA PALLIDUM: CPT

## 2019-11-02 PROCEDURE — 72157 MRI CHEST SPINE W/O & W/DYE: CPT

## 2019-11-02 PROCEDURE — 70450 CT HEAD/BRAIN W/O DYE: CPT

## 2019-11-02 PROCEDURE — 84443 ASSAY THYROID STIM HORMONE: CPT

## 2019-11-02 PROCEDURE — 85730 THROMBOPLASTIN TIME PARTIAL: CPT

## 2019-11-02 PROCEDURE — 86038 ANTINUCLEAR ANTIBODIES: CPT

## 2019-11-02 PROCEDURE — 97163 PT EVAL HIGH COMPLEX 45 MIN: CPT

## 2019-11-02 PROCEDURE — 84100 ASSAY OF PHOSPHORUS: CPT

## 2019-11-02 PROCEDURE — 82747 ASSAY OF FOLIC ACID RBC: CPT

## 2019-11-02 PROCEDURE — 70551 MRI BRAIN STEM W/O DYE: CPT

## 2019-11-02 PROCEDURE — 80048 BASIC METABOLIC PNL TOTAL CA: CPT

## 2019-11-02 PROCEDURE — 99285 EMERGENCY DEPT VISIT HI MDM: CPT | Mod: 25

## 2019-11-02 RX ORDER — GABAPENTIN 400 MG/1
1 CAPSULE ORAL
Qty: 0 | Refills: 0 | DISCHARGE

## 2019-11-02 RX ORDER — IBUPROFEN 200 MG
600 TABLET ORAL ONCE
Refills: 0 | Status: COMPLETED | OUTPATIENT
Start: 2019-11-02 | End: 2019-11-02

## 2019-11-02 RX ORDER — LAMOTRIGINE 25 MG/1
1 TABLET, ORALLY DISINTEGRATING ORAL
Qty: 0 | Refills: 0 | DISCHARGE

## 2019-11-02 RX ORDER — LANOLIN ALCOHOL/MO/W.PET/CERES
1 CREAM (GRAM) TOPICAL
Qty: 0 | Refills: 0 | DISCHARGE
Start: 2019-11-02

## 2019-11-02 RX ORDER — LAMOTRIGINE 25 MG/1
1 TABLET, ORALLY DISINTEGRATING ORAL
Qty: 0 | Refills: 0 | DISCHARGE
Start: 2019-11-02

## 2019-11-02 RX ORDER — TRAZODONE HCL 50 MG
1 TABLET ORAL
Qty: 0 | Refills: 0 | DISCHARGE
Start: 2019-11-02

## 2019-11-02 RX ORDER — GABAPENTIN 400 MG/1
1 CAPSULE ORAL
Qty: 0 | Refills: 0 | DISCHARGE
Start: 2019-11-02

## 2019-11-02 RX ORDER — TRAZODONE HCL 50 MG
0 TABLET ORAL
Qty: 0 | Refills: 0 | DISCHARGE

## 2019-11-02 RX ORDER — DIAZEPAM 5 MG
1 TABLET ORAL
Qty: 0 | Refills: 0 | DISCHARGE

## 2019-11-02 RX ORDER — DIAZEPAM 5 MG
1 TABLET ORAL
Qty: 0 | Refills: 0 | DISCHARGE
Start: 2019-11-02

## 2019-11-02 RX ORDER — LANOLIN ALCOHOL/MO/W.PET/CERES
0 CREAM (GRAM) TOPICAL
Qty: 0 | Refills: 0 | DISCHARGE

## 2019-11-02 RX ADMIN — Medication 0.1 MILLIGRAM(S): at 14:27

## 2019-11-02 RX ADMIN — Medication 600 MILLIGRAM(S): at 14:47

## 2019-11-02 RX ADMIN — Medication 650 MILLIGRAM(S): at 05:05

## 2019-11-02 RX ADMIN — Medication 10 MILLIGRAM(S): at 05:06

## 2019-11-02 RX ADMIN — Medication 60 MILLIGRAM(S): at 11:39

## 2019-11-02 RX ADMIN — Medication 0.1 MILLIGRAM(S): at 05:06

## 2019-11-02 RX ADMIN — Medication 650 MILLIGRAM(S): at 06:01

## 2019-11-02 RX ADMIN — Medication 650 MILLIGRAM(S): at 12:31

## 2019-11-02 RX ADMIN — Medication 650 MILLIGRAM(S): at 11:33

## 2019-11-02 RX ADMIN — Medication 600 MILLIGRAM(S): at 15:47

## 2019-11-02 RX ADMIN — LAMOTRIGINE 100 MILLIGRAM(S): 25 TABLET, ORALLY DISINTEGRATING ORAL at 11:30

## 2019-11-02 RX ADMIN — ENOXAPARIN SODIUM 40 MILLIGRAM(S): 100 INJECTION SUBCUTANEOUS at 11:29

## 2019-11-02 NOTE — DISCHARGE NOTE PROVIDER - NSDCCPCAREPLAN_GEN_ALL_CORE_FT
PRINCIPAL DISCHARGE DIAGNOSIS  Diagnosis: Weakness of left side of body  Assessment and Plan of Treatment: improving. outpt physical therapy. neuro f/u 2-4 weeks      SECONDARY DISCHARGE DIAGNOSES  Diagnosis: Unsteady gait  Assessment and Plan of Treatment: outpt PT. neuro f/u. spine surgery outpt.

## 2019-11-02 NOTE — DISCHARGE NOTE PROVIDER - CARE PROVIDERS DIRECT ADDRESSES
,DirectAddress_Unknown,[email or web address],DirectAddress_Unknown,DirectAddress_Unknown,[email or web address]
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
## 2019-11-02 NOTE — DISCHARGE NOTE NURSING/CASE MANAGEMENT/SOCIAL WORK - PATIENT PORTAL LINK FT
You can access the FollowMyHealth Patient Portal offered by [facility] by registering at the following website: [email or web address]. By joining [facility]’s FollowMyHealth portal, you will also be able to view your health information using other applications (apps) compatible with our system.

## 2019-11-02 NOTE — DISCHARGE NOTE PROVIDER - CARE PROVIDER_API CALL
Dhaval Dooley)  Neurology  [address] [place] [address]  Phone: [phone]  Fax: [phone]  Follow Up Time: 2 weeks    Navneet Jean)  [facility]  [address], [address]  [place] [address]  Phone: [phone]  Fax: [phone]  Follow Up Time:     Karthikeyan Kessler)  Orthopaedic Surgery  [address] [place] [address]  Phone: [phone]  Fax: [phone]  Follow Up Time:     Psychiatry,   Phone: (   )    -  Fax: (   )    -  Follow Up Time: 2 weeks    Lawson Jin)  Pain Medicine; PhysicalRehab Medicine  [address] [place] [address]  Phone: [phone]  Fax: [phone]  Follow Up Time:

## 2019-11-02 NOTE — DISCHARGE NOTE PROVIDER - NSDCFUSCHEDAPPT_GEN_ALL_CORE_FT
CHERYL MIR ; 12/03/2019 ; [facility] [address] [facility] ; 12/03/2019 ; [facility] [address] [facility]L MIR ; 12/03/2019 ; [facility] [address] CHERYL MIR ; 12/03/2019 ; [facility] [address]

## 2019-11-02 NOTE — DISCHARGE NOTE PROVIDER - PROVIDER TOKENS
PROVIDER:[TOKEN:[[id number]],FOLLOWUP:[2 weeks]],PROVIDER:[TOKEN:[[id number]]],PROVIDER:[TOKEN:[[id number]]],FREE:[LAST:[Psychiatry],PHONE:[(   )    -],FAX:[(   )    -],FOLLOWUP:[2 weeks]],PROVIDER:[TOKEN:[[id number]]]

## 2019-11-02 NOTE — PROGRESS NOTE ADULT - SUBJECTIVE AND OBJECTIVE BOX
INTERVAL HISTORY:  feels well and ready to go      VITAL SIGNS:  Vital Signs Last 24 Hrs  T(C): 36.8 (02 Nov 2019 09:23), Max: 36.8 (02 Nov 2019 00:01)  T(F): 98.2 (02 Nov 2019 09:23), Max: 98.3 (02 Nov 2019 00:01)  HR: 63 (02 Nov 2019 09:23) (50 - 91)  BP: 122/77 (02 Nov 2019 09:23) (99/64 - 127/75)  BP(mean): --  RR: 18 (02 Nov 2019 09:23) (14 - 18)  SpO2: 95% (02 Nov 2019 09:23) (93% - 100%)    PHYSICAL EXAMINATION:    Mentation:  nl  Language/Speech:nl  CN: nl  Visual Fields: full  Motor: nl  Sensory: nl  DTR:  Babinski:      MEDS:  MEDICATIONS  (STANDING):  cloNIDine 0.1 milliGRAM(s) Oral three times a day  enalapril 10 milliGRAM(s) Oral daily  enoxaparin Injectable 40 milliGRAM(s) SubCutaneous daily  gabapentin 300 milliGRAM(s) Oral at bedtime  influenza   Vaccine 0.5 milliLiter(s) IntraMuscular once  lamoTRIgine 100 milliGRAM(s) Oral daily  LORazepam   Injectable 2 milliGRAM(s) IV Push once  melatonin 3 milliGRAM(s) Oral at bedtime  PARoxetine 60 milliGRAM(s) Oral daily  traZODone 100 milliGRAM(s) Oral daily    MEDICATIONS  (PRN):  acetaminophen   Tablet .. 650 milliGRAM(s) Oral every 6 hours PRN Temp greater or equal to 38C (100.4F), Mild Pain (1 - 3)  diazepam    Tablet 5 milliGRAM(s) Oral two times a day PRN anxiety or agitation      LABS:                          x      x     )-----------( x        ( 31 Oct 2019 16:29 )             32.2                 RADIOLOGY & ADDITIONAL STUDIES:    MR I C and T spine- neg  MRI L/S -severe L4-5 spinal stenosis as seen on CT from September      IMPRESSION & PLAN:    falls and pain from spinal stenosis    REC;  PT/pain manamgent/ spin surgeon eval- as outpatient  Will not actively follow.   Neurologically cleared for discharge/disposition.  Please recontact as needed.  Follow up in office in 2-4 weeks as needed

## 2019-11-02 NOTE — PROGRESS NOTE ADULT - REASON FOR ADMISSION
unsteady gait, fall

## 2019-11-02 NOTE — DISCHARGE NOTE PROVIDER - NSDCMRMEDTOKEN_GEN_ALL_CORE_FT
cloNIDine 0.1 mg oral tablet: orally 3 times a day  diazePAM 5 mg oral tablet: 1  orally 2 times a day  enalapril 10 mg oral tablet: 1 tab(s) orally once a day  gabapentin 300 mg oral capsule: 1 cap(s) orally once a day (at bedtime)  lamoTRIgine 100 mg oral tablet: 1 tab(s) orally once a day  Melatonin:   meloxicam 15 mg oral tablet: 1 tab(s) orally once a day  PARoxetine 30 mg oral tablet: 2 tab(s) orally once a day  traZODone 100 mg oral tablet: orally once a day  Unisom: cloNIDine 0.1 mg oral tablet: 1 tab(s) orally 3 times a day  diazePAM 5 mg oral tablet: 1 tab(s) orally 2 times a day, As needed, anxiety or agitation  enalapril 10 mg oral tablet: 1 tab(s) orally once a day  gabapentin 300 mg oral capsule: 1 cap(s) orally once a day (at bedtime)  lamoTRIgine 100 mg oral tablet: 1 tab(s) orally once a day  melatonin 3 mg oral tablet: 1 tab(s) orally once a day (at bedtime)  meloxicam 15 mg oral tablet: 1 tab(s) orally once a day  PARoxetine 30 mg oral tablet: 2 tab(s) orally once a day  traZODone 100 mg oral tablet: 1 tab(s) orally once a day  Unisom:

## 2019-11-04 ENCOUNTER — INBOUND DOCUMENT (OUTPATIENT)
Age: 59
End: 2019-11-04

## 2019-11-04 ENCOUNTER — APPOINTMENT (OUTPATIENT)
Dept: FAMILY MEDICINE | Facility: CLINIC | Age: 59
End: 2019-11-04

## 2019-11-07 ENCOUNTER — APPOINTMENT (OUTPATIENT)
Dept: FAMILY MEDICINE | Facility: CLINIC | Age: 59
End: 2019-11-07
Payer: MEDICAID

## 2019-11-07 VITALS
WEIGHT: 194 LBS | HEART RATE: 80 BPM | BODY MASS INDEX: 31.18 KG/M2 | SYSTOLIC BLOOD PRESSURE: 112 MMHG | DIASTOLIC BLOOD PRESSURE: 78 MMHG | HEIGHT: 66 IN

## 2019-11-07 DIAGNOSIS — M54.5 LOW BACK PAIN: ICD-10-CM

## 2019-11-07 DIAGNOSIS — M48.07 SPINAL STENOSIS, LUMBOSACRAL REGION: ICD-10-CM

## 2019-11-07 PROCEDURE — 99215 OFFICE O/P EST HI 40 MIN: CPT

## 2019-11-07 RX ORDER — TRAZODONE HYDROCHLORIDE 50 MG/1
50 TABLET ORAL
Qty: 30 | Refills: 0 | Status: DISCONTINUED | COMMUNITY
Start: 2019-06-18 | End: 2019-11-07

## 2019-11-07 RX ORDER — TRAZODONE HYDROCHLORIDE 150 MG/1
150 TABLET ORAL
Qty: 30 | Refills: 0 | Status: DISCONTINUED | COMMUNITY
Start: 2019-10-16 | End: 2019-11-07

## 2019-11-07 RX ORDER — CEFDINIR 300 MG/1
300 CAPSULE ORAL
Qty: 20 | Refills: 0 | Status: DISCONTINUED | COMMUNITY
Start: 2019-09-20 | End: 2019-11-07

## 2019-11-07 RX ORDER — VENLAFAXINE HYDROCHLORIDE 150 MG/1
150 CAPSULE, EXTENDED RELEASE ORAL
Qty: 30 | Refills: 0 | Status: DISCONTINUED | COMMUNITY
Start: 2019-05-21 | End: 2019-11-07

## 2019-11-07 RX ORDER — AZITHROMYCIN 250 MG/1
250 TABLET, FILM COATED ORAL
Qty: 5 | Refills: 0 | Status: DISCONTINUED | COMMUNITY
Start: 2019-09-20 | End: 2019-11-07

## 2019-11-07 NOTE — PHYSICAL EXAM
[No Acute Distress] : no acute distress [Well Developed] : well developed [Well Nourished] : well nourished [PERRL] : pupils equal round and reactive to light [Normal Sclera/Conjunctiva] : normal sclera/conjunctiva [Well-Appearing] : well-appearing [EOMI] : extraocular movements intact [Normal Outer Ear/Nose] : the outer ears and nose were normal in appearance [Normal Oropharynx] : the oropharynx was normal [No Lymphadenopathy] : no lymphadenopathy [No JVD] : no jugular venous distention [Supple] : supple [No Respiratory Distress] : no respiratory distress  [Thyroid Normal, No Nodules] : the thyroid was normal and there were no nodules present [Clear to Auscultation] : lungs were clear to auscultation bilaterally [No Accessory Muscle Use] : no accessory muscle use [Regular Rhythm] : with a regular rhythm [Normal Rate] : normal rate  [Normal S1, S2] : normal S1 and S2 [No Murmur] : no murmur heard [No Carotid Bruits] : no carotid bruits [No Varicosities] : no varicosities [Pedal Pulses Present] : the pedal pulses are present [No Abdominal Bruit] : a ~M bruit was not heard ~T in the abdomen [No Palpable Aorta] : no palpable aorta [No Edema] : there was no peripheral edema [No Extremity Clubbing/Cyanosis] : no extremity clubbing/cyanosis [Non Tender] : non-tender [Soft] : abdomen soft [Non-distended] : non-distended [No Masses] : no abdominal mass palpated [Normal Bowel Sounds] : normal bowel sounds [No HSM] : no HSM [Normal Anterior Cervical Nodes] : no anterior cervical lymphadenopathy [Normal Posterior Cervical Nodes] : no posterior cervical lymphadenopathy [No CVA Tenderness] : no CVA  tenderness [No Joint Swelling] : no joint swelling [Grossly Normal Strength/Tone] : grossly normal strength/tone [No Spinal Tenderness] : no spinal tenderness [Coordination Grossly Intact] : coordination grossly intact [No Rash] : no rash [Normal Gait] : normal gait [No Focal Deficits] : no focal deficits [Normal Affect] : the affect was normal [Deep Tendon Reflexes (DTR)] : deep tendon reflexes were 2+ and symmetric [Normal Insight/Judgement] : insight and judgment were intact

## 2019-11-07 NOTE — PLAN
[FreeTextEntry1] : BAck pain/ LUmber stenosis- ER discharge- meloxicam 15 mg for 7 days \par has pain management \par need to get letter for 1 week off as she did not go to work.

## 2019-11-07 NOTE — PHYSICAL EXAM
[No Acute Distress] : no acute distress [Well Developed] : well developed [Well Nourished] : well nourished [Normal Sclera/Conjunctiva] : normal sclera/conjunctiva [PERRL] : pupils equal round and reactive to light [Well-Appearing] : well-appearing [Normal Oropharynx] : the oropharynx was normal [EOMI] : extraocular movements intact [Normal Outer Ear/Nose] : the outer ears and nose were normal in appearance [Supple] : supple [No JVD] : no jugular venous distention [No Lymphadenopathy] : no lymphadenopathy [Thyroid Normal, No Nodules] : the thyroid was normal and there were no nodules present [No Respiratory Distress] : no respiratory distress  [No Accessory Muscle Use] : no accessory muscle use [Clear to Auscultation] : lungs were clear to auscultation bilaterally [Regular Rhythm] : with a regular rhythm [Normal Rate] : normal rate  [Normal S1, S2] : normal S1 and S2 [No Murmur] : no murmur heard [No Carotid Bruits] : no carotid bruits [Pedal Pulses Present] : the pedal pulses are present [No Varicosities] : no varicosities [No Abdominal Bruit] : a ~M bruit was not heard ~T in the abdomen [No Edema] : there was no peripheral edema [No Palpable Aorta] : no palpable aorta [Non Tender] : non-tender [Soft] : abdomen soft [No Extremity Clubbing/Cyanosis] : no extremity clubbing/cyanosis [Non-distended] : non-distended [No Masses] : no abdominal mass palpated [No HSM] : no HSM [Normal Bowel Sounds] : normal bowel sounds [No CVA Tenderness] : no CVA  tenderness [Normal Anterior Cervical Nodes] : no anterior cervical lymphadenopathy [Normal Posterior Cervical Nodes] : no posterior cervical lymphadenopathy [Grossly Normal Strength/Tone] : grossly normal strength/tone [No Spinal Tenderness] : no spinal tenderness [No Joint Swelling] : no joint swelling [No Rash] : no rash [Coordination Grossly Intact] : coordination grossly intact [No Focal Deficits] : no focal deficits [Normal Gait] : normal gait [Deep Tendon Reflexes (DTR)] : deep tendon reflexes were 2+ and symmetric [Normal Affect] : the affect was normal [Normal Insight/Judgement] : insight and judgment were intact

## 2019-11-07 NOTE — HISTORY OF PRESENT ILLNESS
[FreeTextEntry1] : patient here to follow up er visit [de-identified] : Hospital discharge for falls at home was in the hospital was found to have spinal stenosis and now going to see pain management and spine specialist on Monday\par she did not go to work as she did not feel good this week now needs letter .

## 2019-11-07 NOTE — HISTORY OF PRESENT ILLNESS
[FreeTextEntry1] : patient here to follow up er visit [de-identified] : Hospital discharge for falls at home was in the hospital was found to have spinal stenosis and now going to see pain management and spine specialist on Monday\par she did not go to work as she did not feel good this week now needs letter .

## 2019-11-19 NOTE — ED ADULT NURSE NOTE - NSHOSCREENINGQ1_ED_ALL_ED
Quality 130: Documentation Of Current Medications In The Medical Record: Current Medications Documented No Detail Level: Detailed Quality 402: Tobacco Use And Help With Quitting Among Adolescents: Patient screened for tobacco and never smoked Quality 110: Preventive Care And Screening: Influenza Immunization: Influenza Immunization previously received during influenza season

## 2019-11-23 NOTE — ED CDU PROVIDER DISPOSITION NOTE - PATIENT PORTAL LINK FT
No pertinent past medical history
You can access the FollowMyHealth Patient Portal offered by [facility] by registering at the following website: [email or web address]. By joining [employer]’s FollowMyHealth portal, you will also be able to view your health information using other applications (apps) compatible with our system.

## 2019-12-11 ENCOUNTER — APPOINTMENT (OUTPATIENT)
Dept: FAMILY MEDICINE | Facility: CLINIC | Age: 59
End: 2019-12-11
Payer: MEDICAID

## 2019-12-11 VITALS
HEIGHT: 66 IN | WEIGHT: 193 LBS | HEART RATE: 92 BPM | DIASTOLIC BLOOD PRESSURE: 80 MMHG | BODY MASS INDEX: 31.02 KG/M2 | SYSTOLIC BLOOD PRESSURE: 130 MMHG

## 2019-12-11 PROCEDURE — 99213 OFFICE O/P EST LOW 20 MIN: CPT

## 2019-12-17 NOTE — HISTORY OF PRESENT ILLNESS
[FreeTextEntry1] : med renewal [de-identified] : Hospital discharge for falls at home was in the hospital was found to have spinal stenosis and now going to see pain management and spine specialist on Monday\par she did not go to work as she did not feel good this week now needs letter .\par 12/2019- suspended from [employer] nd now wants to quit\par just moved to [facility] and now can close on foreclosure

## 2019-12-17 NOTE — PLAN
[FreeTextEntry1] : # med renewal\par BAck pain/ LUmber stenosis- ER discharge- meloxicam 15 mg for 7 days \par has pain management \par need to get letter for 1 week off as she did not go to work.

## 2020-01-10 ENCOUNTER — APPOINTMENT (OUTPATIENT)
Dept: FAMILY MEDICINE | Facility: CLINIC | Age: 60
End: 2020-01-10

## 2020-02-01 ENCOUNTER — LABORATORY RESULT (OUTPATIENT)
Age: 60
End: 2020-02-01

## 2020-02-13 ENCOUNTER — EMERGENCY (EMERGENCY)
Facility: HOSPITAL | Age: 60
LOS: 1 days | Discharge: HOME | End: 2020-02-13
Attending: EMERGENCY MEDICINE
Payer: COMMERCIAL

## 2020-02-13 VITALS
DIASTOLIC BLOOD PRESSURE: 52 MMHG | RESPIRATION RATE: 20 BRPM | OXYGEN SATURATION: 96 % | HEART RATE: 76 BPM | SYSTOLIC BLOOD PRESSURE: 96 MMHG

## 2020-02-13 VITALS
HEIGHT: 66 IN | OXYGEN SATURATION: 98 % | TEMPERATURE: 98 F | WEIGHT: 160.06 LBS | SYSTOLIC BLOOD PRESSURE: 90 MMHG | DIASTOLIC BLOOD PRESSURE: 60 MMHG | RESPIRATION RATE: 18 BRPM | HEART RATE: 64 BPM

## 2020-02-13 DIAGNOSIS — Z96.60 PRESENCE OF UNSPECIFIED ORTHOPEDIC JOINT IMPLANT: Chronic | ICD-10-CM

## 2020-02-13 DIAGNOSIS — Z98.51 TUBAL LIGATION STATUS: Chronic | ICD-10-CM

## 2020-02-13 DIAGNOSIS — Z90.49 ACQUIRED ABSENCE OF OTHER SPECIFIED PARTS OF DIGESTIVE TRACT: Chronic | ICD-10-CM

## 2020-02-13 PROCEDURE — 73600 X-RAY EXAM OF ANKLE: CPT | Mod: 26,RT,59

## 2020-02-13 PROCEDURE — 73610 X-RAY EXAM OF ANKLE: CPT

## 2020-02-13 PROCEDURE — 29515 APPLICATION SHORT LEG SPLINT: CPT

## 2020-02-13 PROCEDURE — 27818 TREATMENT OF ANKLE FRACTURE: CPT | Mod: RT

## 2020-02-13 PROCEDURE — 73600 X-RAY EXAM OF ANKLE: CPT

## 2020-02-13 PROCEDURE — 99284 EMERGENCY DEPT VISIT MOD MDM: CPT | Mod: 25

## 2020-02-13 PROCEDURE — 99156 MOD SED OTH PHYS/QHP 5/>YRS: CPT

## 2020-02-13 PROCEDURE — 99285 EMERGENCY DEPT VISIT HI MDM: CPT | Mod: 25

## 2020-02-13 PROCEDURE — 73610 X-RAY EXAM OF ANKLE: CPT | Mod: 26,RT

## 2020-02-13 RX ORDER — FENTANYL CITRATE 50 UG/ML
50 INJECTION INTRAVENOUS ONCE
Refills: 0 | Status: DISCONTINUED | OUTPATIENT
Start: 2020-02-13 | End: 2020-02-13

## 2020-02-13 RX ORDER — MIDAZOLAM HYDROCHLORIDE 1 MG/ML
2 INJECTION, SOLUTION INTRAMUSCULAR; INTRAVENOUS ONCE
Refills: 0 | Status: DISCONTINUED | OUTPATIENT
Start: 2020-02-13 | End: 2020-02-13

## 2020-02-13 RX ORDER — MORPHINE SULFATE 50 MG/1
4 CAPSULE, EXTENDED RELEASE ORAL ONCE
Refills: 0 | Status: DISCONTINUED | OUTPATIENT
Start: 2020-02-13 | End: 2020-02-13

## 2020-02-13 RX ORDER — PROPOFOL 10 MG/ML
30 INJECTION, EMULSION INTRAVENOUS ONCE
Refills: 0 | Status: COMPLETED | OUTPATIENT
Start: 2020-02-13 | End: 2020-02-13

## 2020-02-13 RX ORDER — PROPOFOL 10 MG/ML
60 INJECTION, EMULSION INTRAVENOUS ONCE
Refills: 0 | Status: COMPLETED | OUTPATIENT
Start: 2020-02-13 | End: 2020-02-13

## 2020-02-13 RX ORDER — KETAMINE HYDROCHLORIDE 100 MG/ML
70 INJECTION INTRAMUSCULAR; INTRAVENOUS ONCE
Refills: 0 | Status: DISCONTINUED | OUTPATIENT
Start: 2020-02-13 | End: 2020-02-13

## 2020-02-13 RX ADMIN — PROPOFOL 30 MILLIGRAM(S): 10 INJECTION, EMULSION INTRAVENOUS at 14:19

## 2020-02-13 RX ADMIN — FENTANYL CITRATE 50 MICROGRAM(S): 50 INJECTION INTRAVENOUS at 13:21

## 2020-02-13 RX ADMIN — FENTANYL CITRATE 50 MICROGRAM(S): 50 INJECTION INTRAVENOUS at 13:51

## 2020-02-13 RX ADMIN — FENTANYL CITRATE 50 MICROGRAM(S): 50 INJECTION INTRAVENOUS at 14:40

## 2020-02-13 RX ADMIN — PROPOFOL 30 MILLIGRAM(S): 10 INJECTION, EMULSION INTRAVENOUS at 14:24

## 2020-02-13 RX ADMIN — PROPOFOL 60 MILLIGRAM(S): 10 INJECTION, EMULSION INTRAVENOUS at 14:14

## 2020-02-13 RX ADMIN — FENTANYL CITRATE 50 MICROGRAM(S): 50 INJECTION INTRAVENOUS at 14:10

## 2020-02-13 NOTE — ED PROVIDER NOTE - PATIENT PORTAL LINK FT
You can access the FollowMyHealth Patient Portal offered by [facility] by registering at the following website: [email or web address]. By joining [email or web address]’s FollowMyHealth portal, you will also be able to view your health information using other applications (apps) compatible with our system.

## 2020-02-13 NOTE — ED ADULT TRIAGE NOTE - CHIEF COMPLAINT QUOTE
Trip and fall at home, rolling right ankle.  Deformity noted.  #20 to left AC placed by EMS.  Given 100 mcg fentanyl by  EMS. PTA.Denies blood thinners.

## 2020-02-13 NOTE — ED PROVIDER NOTE - ATTENDING CONTRIBUTION TO CARE
59 year old s/p fall c/o right ankle pain 10/10 in severity.  Patient with obvious deformity to right ankle, decrease range of motion on exam.  Foot warm to touch, 2+ DP pulses with sensation intact. Fracture noted on exam; Ortho consulted and reduction and splinting done by Ortho under procedural sedation done by me.  No complications patient tolerated procedure and instructed to follow-up with Ortho as outpatient.

## 2020-02-13 NOTE — ED ADULT NURSE REASSESSMENT NOTE - NS ED NURSE REASSESS COMMENT FT1
con. sedation started at 1414 [address] and Ortho team at bedside, pt on cardiac monitor, code cart outside of room, BVM at head of bed, pt and  explain procedure prior to sedation and consent was signed, pt tolerated procedure well, repeat xrays were done to confirm ankle placement and ortho team explain plan of care to  and pt, RN to remain at bedside

## 2020-02-13 NOTE — ED PROVIDER NOTE - OBJECTIVE STATEMENT
58 y/o F c/o right ankle pain after trip and fall at home earlier today.  Patient denies head trauma, neck pain, new onset back pain, LOC or any other complaints.

## 2020-02-13 NOTE — ED ADULT NURSE NOTE - OBJECTIVE STATEMENT
pt A&ox4, c/o right ankle pain pt tripped and fell rolled ankle pt has deformity and swelling to right ankle, positive pulses

## 2020-02-13 NOTE — ED PROVIDER NOTE - CARE PROVIDER_API CALL
Avinash Lara (DO)  [facility] [place] 2[place]  [address]  [place] [address]  Phone: [phone]  Fax: [phone]  Follow Up Time:

## 2020-02-13 NOTE — CONSULT NOTE ADULT - SUBJECTIVE AND OBJECTIVE BOX
Pt Name: CHERYL MIR    MRN: [id number]      Patient is a 59y Female presenting to the emergency department with a chief complaint of right ankle pain x today. Patient states she tripped over a hair clip when she fell, injuring her ankle. Patient denies other orthopedic complaints. Denies numbness/tingling.      REVIEW OF SYSTEMS      General: denies fever/chills	    Respiratory and Thorax: denies SOB  	  Cardiovascular:	denies CP    Musculoskeletal:	 see HPI    Neurological:	denies numbness/tingling        ROS is otherwise negative.    PAST MEDICAL & SURGICAL HISTORY:  PAST MEDICAL & SURGICAL HISTORY:  Hypertension  Anxiety and depression  Seasonal allergies  Asthma  Osteoarthritis  S/P hip replacement: left  S/P tubal ligation  S/P cholecystectomy      Allergies: No Known Allergies      Medications: ketamine Injectable 70 milliGRAM(s) IV Push Once  midazolam Injectable 2 milliGRAM(s) IV Push Once      FAMILY HISTORY:  Family history of COPD (chronic obstructive pulmonary disease)  Family history of CVA  : non-contributory    Social History:     Ambulation: Walking independently [X ] With Cane [ ] With Walker [ ]  Bedbound [ ]               PHYSICAL EXAM:    Vital Signs Last 24 Hrs  T(C): 36.7 (13 Feb 2020 12:39), Max: 36.7 (13 Feb 2020 12:39)  T(F): 98 (13 Feb 2020 12:39), Max: 98 (13 Feb 2020 12:39)  HR: 64 (13 Feb 2020 12:39) (64 - 64)  BP: 90/60 (13 Feb 2020 12:39) (90/60 - 90/60)  BP(mean): --  RR: 18 (13 Feb 2020 12:39) (18 - 18)  SpO2: 98% (13 Feb 2020 12:39) (98% - 98%)  Daily Height in cm: 167.64 (13 Feb 2020 12:39)    Daily     Appearance: Alert, responsive, in no acute distress.    Neurological: Sensation is grossly intact to light touch.     Skin: no rash on visible skin. Skin is clean, dry and intact. No bleeding. No abrasions. No ulcerations.    Vascular: 2+ distal pulses. Cap refill < 2 sec. No signs of venous insuffiency or stasis. No extremity ulcerations. No cyanosis.    Musculoskeletal:         Left Upper Extremity: can move freely without pain       Right Upper Extremity: can move freely without pain       Left Lower Extremity: can move freely without pain       Right Lower Extremity: skin intact. no erythema. DP 2+. ext warm, cap refill brisk. SILT. calf soft NT B/L. + EHL/FHL    Imaging Studies:    xray right ankle:   + tri mal fx    A/P:  Pt is a  59y Female with right tri mal fx    PLAN:   D/W Dr. Lara Pt Name: CHERYL MIR    MRN: [id number]      Patient is a 59y Female presenting to the emergency department with a chief complaint of right ankle pain x today. Patient states she tripped over a hair clip when she fell, injuring her ankle. Patient denies other orthopedic complaints. Denies numbness/tingling.      REVIEW OF SYSTEMS      General: denies fever/chills	    Respiratory and Thorax: denies SOB  	  Cardiovascular:	denies CP    Musculoskeletal:	 see HPI    Neurological:	denies numbness/tingling        ROS is otherwise negative.    PAST MEDICAL & SURGICAL HISTORY:  PAST MEDICAL & SURGICAL HISTORY:  Hypertension  Anxiety and depression  Seasonal allergies  Asthma  Osteoarthritis  S/P hip replacement: left  S/P tubal ligation  S/P cholecystectomy      Allergies: No Known Allergies      Medications: ketamine Injectable 70 milliGRAM(s) IV Push Once  midazolam Injectable 2 milliGRAM(s) IV Push Once      FAMILY HISTORY:  Family history of COPD (chronic obstructive pulmonary disease)  Family history of CVA  : non-contributory    Social History:     Ambulation: Walking independently [X ] With Cane [ ] With Walker [ ]  Bedbound [ ]               PHYSICAL EXAM:    Vital Signs Last 24 Hrs  T(C): 36.7 (13 Feb 2020 12:39), Max: 36.7 (13 Feb 2020 12:39)  T(F): 98 (13 Feb 2020 12:39), Max: 98 (13 Feb 2020 12:39)  HR: 64 (13 Feb 2020 12:39) (64 - 64)  BP: 90/60 (13 Feb 2020 12:39) (90/60 - 90/60)  BP(mean): --  RR: 18 (13 Feb 2020 12:39) (18 - 18)  SpO2: 98% (13 Feb 2020 12:39) (98% - 98%)  Daily Height in cm: 167.64 (13 Feb 2020 12:39)    Daily     Appearance: Alert, responsive, in no acute distress.    Neurological: Sensation is grossly intact to light touch.     Skin: no rash on visible skin.     Vascular: 2+ distal pulses. Cap refill < 2 sec. No signs of venous insuffiency or stasis. No extremity ulcerations. No cyanosis.    Musculoskeletal:         Left Upper Extremity: can move freely without pain       Right Upper Extremity: can move freely without pain       Left Lower Extremity: can move freely without pain       Right Lower Extremity: + superficial abrasion noted to medial aspect of ankle. no bleeding or drainage. no erythema. DP 2+. ext warm, cap refill brisk. SILT. calf soft NT B/L. + EHL/FHL    Imaging Studies:    xray right ankle:   + tri mal fx    A/P:  Pt is a  59y Female with right tri mal fx    PLAN:   D/W Dr. Lara  Fx Reduction  PROCEDURE NOTE: right ankle fracture reduction     Performed by: Florina Evans    Indication: right tri mal fx reduction    Consent: The risks and benefits of the procedure including incomplete reduction, secondary fracture, nerve damage and bleeding were explained and the patient verbalized their understanding and wished to proceed with the procedure. Written consent was obtained following the discussion.    Universal Protocol: a time out was performed and the correct patient and site were verified     Procedure: Neurovascular exam intact prior to joint reduction.  Skin exam: no bleeding or lacerations at the fracture site. Conscious sedation performed by emergency department attending physician.  Reduction of the right ankle was accomplished via [  traction ].  The joint was immobilized with well padded posterior U splint.  Distally, the extremity was neurovascular intact following the procedure.  The patient tolerated the procedure well.    Post reduction films obtained and demonstrated an adequate reduction.    Complications: None    NWB RLE  splint care discussed with patient and family member  f/u outpatient with Dr. Lara within 1 week  patient and family member verbalized to understanding of plan and agree

## 2020-02-14 ENCOUNTER — INPATIENT (INPATIENT)
Facility: HOSPITAL | Age: 60
LOS: 13 days | Discharge: NURSING, REHAB OR LONG-TERM CARE | DRG: 313 | End: 2020-02-28
Attending: FAMILY MEDICINE | Admitting: FAMILY MEDICINE
Payer: COMMERCIAL

## 2020-02-14 ENCOUNTER — APPOINTMENT (OUTPATIENT)
Dept: ORTHOPEDIC SURGERY | Facility: CLINIC | Age: 60
End: 2020-02-14
Payer: COMMERCIAL

## 2020-02-14 VITALS — HEIGHT: 66 IN | WEIGHT: 179.9 LBS

## 2020-02-14 DIAGNOSIS — Z96.60 PRESENCE OF UNSPECIFIED ORTHOPEDIC JOINT IMPLANT: Chronic | ICD-10-CM

## 2020-02-14 DIAGNOSIS — Z98.51 TUBAL LIGATION STATUS: Chronic | ICD-10-CM

## 2020-02-14 DIAGNOSIS — N17.9 ACUTE KIDNEY FAILURE, UNSPECIFIED: ICD-10-CM

## 2020-02-14 DIAGNOSIS — Z90.49 ACQUIRED ABSENCE OF OTHER SPECIFIED PARTS OF DIGESTIVE TRACT: Chronic | ICD-10-CM

## 2020-02-14 LAB
ADD ON TEST-SPECIMEN IN LAB: SIGNIFICANT CHANGE UP
ADD ON TEST-SPECIMEN IN LAB: SIGNIFICANT CHANGE UP
ALBUMIN SERPL ELPH-MCNC: 3.1 G/DL — LOW (ref 3.3–5)
ALP SERPL-CCNC: 84 U/L — SIGNIFICANT CHANGE UP (ref 40–120)
ALT FLD-CCNC: 23 U/L — SIGNIFICANT CHANGE UP (ref 12–78)
ANION GAP SERPL CALC-SCNC: 5 MMOL/L — SIGNIFICANT CHANGE UP (ref 5–17)
APPEARANCE UR: CLEAR — SIGNIFICANT CHANGE UP
APTT BLD: 31.1 SEC — SIGNIFICANT CHANGE UP (ref 27.5–36.3)
AST SERPL-CCNC: 29 U/L — SIGNIFICANT CHANGE UP (ref 15–37)
BASOPHILS # BLD AUTO: 0.01 K/UL — SIGNIFICANT CHANGE UP (ref 0–0.2)
BASOPHILS NFR BLD AUTO: 0.2 % — SIGNIFICANT CHANGE UP (ref 0–2)
BILIRUB SERPL-MCNC: 0.2 MG/DL — SIGNIFICANT CHANGE UP (ref 0.2–1.2)
BILIRUB UR-MCNC: NEGATIVE — SIGNIFICANT CHANGE UP
BUN SERPL-MCNC: 47 MG/DL — HIGH (ref 7–23)
CALCIUM SERPL-MCNC: 9.3 MG/DL — SIGNIFICANT CHANGE UP (ref 8.5–10.1)
CHLORIDE SERPL-SCNC: 105 MMOL/L — SIGNIFICANT CHANGE UP (ref 96–108)
CO2 SERPL-SCNC: 26 MMOL/L — SIGNIFICANT CHANGE UP (ref 22–31)
COLOR SPEC: YELLOW — SIGNIFICANT CHANGE UP
CREAT SERPL-MCNC: 2.62 MG/DL — HIGH (ref 0.5–1.3)
DIFF PNL FLD: NEGATIVE — SIGNIFICANT CHANGE UP
EOSINOPHIL # BLD AUTO: 0.26 K/UL — SIGNIFICANT CHANGE UP (ref 0–0.5)
EOSINOPHIL NFR BLD AUTO: 4 % — SIGNIFICANT CHANGE UP (ref 0–6)
GLUCOSE SERPL-MCNC: 107 MG/DL — HIGH (ref 70–99)
GLUCOSE UR QL: NEGATIVE MG/DL — SIGNIFICANT CHANGE UP
HCT VFR BLD CALC: 23.8 % — LOW (ref 34.5–45)
HCT VFR BLD CALC: 27.4 % — LOW (ref 34.5–45)
HGB BLD-MCNC: 7.8 G/DL — LOW (ref 11.5–15.5)
HGB BLD-MCNC: 8.8 G/DL — LOW (ref 11.5–15.5)
IMM GRANULOCYTES NFR BLD AUTO: 0.2 % — SIGNIFICANT CHANGE UP (ref 0–1.5)
INR BLD: 1 RATIO — SIGNIFICANT CHANGE UP (ref 0.88–1.16)
KETONES UR-MCNC: NEGATIVE — SIGNIFICANT CHANGE UP
LACTATE SERPL-SCNC: 0.7 MMOL/L — SIGNIFICANT CHANGE UP (ref 0.7–2)
LEUKOCYTE ESTERASE UR-ACNC: NEGATIVE — SIGNIFICANT CHANGE UP
LYMPHOCYTES # BLD AUTO: 1.44 K/UL — SIGNIFICANT CHANGE UP (ref 1–3.3)
LYMPHOCYTES # BLD AUTO: 22.1 % — SIGNIFICANT CHANGE UP (ref 13–44)
MCHC RBC-ENTMCNC: 30.9 PG — SIGNIFICANT CHANGE UP (ref 27–34)
MCHC RBC-ENTMCNC: 32.1 GM/DL — SIGNIFICANT CHANGE UP (ref 32–36)
MCV RBC AUTO: 96.1 FL — SIGNIFICANT CHANGE UP (ref 80–100)
MONOCYTES # BLD AUTO: 0.69 K/UL — SIGNIFICANT CHANGE UP (ref 0–0.9)
MONOCYTES NFR BLD AUTO: 10.6 % — SIGNIFICANT CHANGE UP (ref 2–14)
NEUTROPHILS # BLD AUTO: 4.12 K/UL — SIGNIFICANT CHANGE UP (ref 1.8–7.4)
NEUTROPHILS NFR BLD AUTO: 62.9 % — SIGNIFICANT CHANGE UP (ref 43–77)
NITRITE UR-MCNC: NEGATIVE — SIGNIFICANT CHANGE UP
PH UR: 5 — SIGNIFICANT CHANGE UP (ref 5–8)
PLATELET # BLD AUTO: 224 K/UL — SIGNIFICANT CHANGE UP (ref 150–400)
POTASSIUM SERPL-MCNC: 6.4 MMOL/L — CRITICAL HIGH (ref 3.5–5.3)
POTASSIUM SERPL-SCNC: 6.4 MMOL/L — CRITICAL HIGH (ref 3.5–5.3)
PROT SERPL-MCNC: 6.6 GM/DL — SIGNIFICANT CHANGE UP (ref 6–8.3)
PROT UR-MCNC: NEGATIVE MG/DL — SIGNIFICANT CHANGE UP
PROTHROM AB SERPL-ACNC: 11.1 SEC — SIGNIFICANT CHANGE UP (ref 10–12.9)
RBC # BLD: 2.85 M/UL — LOW (ref 3.8–5.2)
RBC # FLD: 13.3 % — SIGNIFICANT CHANGE UP (ref 10.3–14.5)
SODIUM SERPL-SCNC: 136 MMOL/L — SIGNIFICANT CHANGE UP (ref 135–145)
SP GR SPEC: 1.01 — SIGNIFICANT CHANGE UP (ref 1.01–1.02)
UROBILINOGEN FLD QL: NEGATIVE MG/DL — SIGNIFICANT CHANGE UP
WBC # BLD: 6.53 K/UL — SIGNIFICANT CHANGE UP (ref 3.8–10.5)
WBC # FLD AUTO: 6.53 K/UL — SIGNIFICANT CHANGE UP (ref 3.8–10.5)

## 2020-02-14 PROCEDURE — 82607 VITAMIN B-12: CPT

## 2020-02-14 PROCEDURE — 85730 THROMBOPLASTIN TIME PARTIAL: CPT

## 2020-02-14 PROCEDURE — 93005 ELECTROCARDIOGRAM TRACING: CPT

## 2020-02-14 PROCEDURE — 84484 ASSAY OF TROPONIN QUANT: CPT

## 2020-02-14 PROCEDURE — 76770 US EXAM ABDO BACK WALL COMP: CPT

## 2020-02-14 PROCEDURE — 71250 CT THORAX DX C-: CPT

## 2020-02-14 PROCEDURE — 80069 RENAL FUNCTION PANEL: CPT

## 2020-02-14 PROCEDURE — 83880 ASSAY OF NATRIURETIC PEPTIDE: CPT

## 2020-02-14 PROCEDURE — 86850 RBC ANTIBODY SCREEN: CPT

## 2020-02-14 PROCEDURE — 71275 CT ANGIOGRAPHY CHEST: CPT

## 2020-02-14 PROCEDURE — C1887: CPT

## 2020-02-14 PROCEDURE — 87086 URINE CULTURE/COLONY COUNT: CPT

## 2020-02-14 PROCEDURE — C1769: CPT

## 2020-02-14 PROCEDURE — 94640 AIRWAY INHALATION TREATMENT: CPT

## 2020-02-14 PROCEDURE — 82803 BLOOD GASES ANY COMBINATION: CPT

## 2020-02-14 PROCEDURE — 82962 GLUCOSE BLOOD TEST: CPT

## 2020-02-14 PROCEDURE — 86900 BLOOD TYPING SEROLOGIC ABO: CPT

## 2020-02-14 PROCEDURE — 97162 PT EVAL MOD COMPLEX 30 MIN: CPT | Mod: GP

## 2020-02-14 PROCEDURE — 88312 SPECIAL STAINS GROUP 1: CPT

## 2020-02-14 PROCEDURE — 82728 ASSAY OF FERRITIN: CPT

## 2020-02-14 PROCEDURE — C1889: CPT

## 2020-02-14 PROCEDURE — 85014 HEMATOCRIT: CPT

## 2020-02-14 PROCEDURE — 81025 URINE PREGNANCY TEST: CPT

## 2020-02-14 PROCEDURE — 97116 GAIT TRAINING THERAPY: CPT | Mod: GP

## 2020-02-14 PROCEDURE — 74176 CT ABD & PELVIS W/O CONTRAST: CPT | Mod: 26

## 2020-02-14 PROCEDURE — 74176 CT ABD & PELVIS W/O CONTRAST: CPT

## 2020-02-14 PROCEDURE — 88305 TISSUE EXAM BY PATHOLOGIST: CPT

## 2020-02-14 PROCEDURE — 76376 3D RENDER W/INTRP POSTPROCES: CPT

## 2020-02-14 PROCEDURE — 99223 1ST HOSP IP/OBS HIGH 75: CPT

## 2020-02-14 PROCEDURE — 71045 X-RAY EXAM CHEST 1 VIEW: CPT

## 2020-02-14 PROCEDURE — 86901 BLOOD TYPING SEROLOGIC RH(D): CPT

## 2020-02-14 PROCEDURE — 99214 OFFICE O/P EST MOD 30 MIN: CPT

## 2020-02-14 PROCEDURE — 36600 WITHDRAWAL OF ARTERIAL BLOOD: CPT

## 2020-02-14 PROCEDURE — C1760: CPT

## 2020-02-14 PROCEDURE — 36430 TRANSFUSION BLD/BLD COMPNT: CPT

## 2020-02-14 PROCEDURE — 87040 BLOOD CULTURE FOR BACTERIA: CPT

## 2020-02-14 PROCEDURE — 83605 ASSAY OF LACTIC ACID: CPT

## 2020-02-14 PROCEDURE — 85027 COMPLETE CBC AUTOMATED: CPT

## 2020-02-14 PROCEDURE — 76000 FLUOROSCOPY <1 HR PHYS/QHP: CPT

## 2020-02-14 PROCEDURE — P9016: CPT

## 2020-02-14 PROCEDURE — 83690 ASSAY OF LIPASE: CPT

## 2020-02-14 PROCEDURE — 84100 ASSAY OF PHOSPHORUS: CPT

## 2020-02-14 PROCEDURE — 85610 PROTHROMBIN TIME: CPT

## 2020-02-14 PROCEDURE — 73700 CT LOWER EXTREMITY W/O DYE: CPT | Mod: RT

## 2020-02-14 PROCEDURE — 36415 COLL VENOUS BLD VENIPUNCTURE: CPT

## 2020-02-14 PROCEDURE — 93306 TTE W/DOPPLER COMPLETE: CPT

## 2020-02-14 PROCEDURE — 85018 HEMOGLOBIN: CPT

## 2020-02-14 PROCEDURE — 81003 URINALYSIS AUTO W/O SCOPE: CPT

## 2020-02-14 PROCEDURE — C9113: CPT

## 2020-02-14 PROCEDURE — C1713: CPT

## 2020-02-14 PROCEDURE — 83540 ASSAY OF IRON: CPT

## 2020-02-14 PROCEDURE — 80048 BASIC METABOLIC PNL TOTAL CA: CPT

## 2020-02-14 PROCEDURE — 82746 ASSAY OF FOLIC ACID SERUM: CPT

## 2020-02-14 PROCEDURE — 84443 ASSAY THYROID STIM HORMONE: CPT

## 2020-02-14 PROCEDURE — 70450 CT HEAD/BRAIN W/O DYE: CPT

## 2020-02-14 PROCEDURE — 80053 COMPREHEN METABOLIC PANEL: CPT

## 2020-02-14 PROCEDURE — 83550 IRON BINDING TEST: CPT

## 2020-02-14 PROCEDURE — 73610 X-RAY EXAM OF ANKLE: CPT | Mod: TC,RT

## 2020-02-14 PROCEDURE — 86923 COMPATIBILITY TEST ELECTRIC: CPT

## 2020-02-14 PROCEDURE — 85025 COMPLETE CBC W/AUTO DIFF WBC: CPT

## 2020-02-14 PROCEDURE — 93010 ELECTROCARDIOGRAM REPORT: CPT

## 2020-02-14 PROCEDURE — C1894: CPT

## 2020-02-14 PROCEDURE — 71045 X-RAY EXAM CHEST 1 VIEW: CPT | Mod: 26

## 2020-02-14 RX ORDER — ACETAMINOPHEN 500 MG
650 TABLET ORAL EVERY 6 HOURS
Refills: 0 | Status: DISCONTINUED | OUTPATIENT
Start: 2020-02-14 | End: 2020-02-15

## 2020-02-14 RX ORDER — DOXYLAMINE SUCCINATE 25 MG
0 TABLET ORAL
Qty: 0 | Refills: 0 | DISCHARGE

## 2020-02-14 RX ORDER — ALBUTEROL 90 UG/1
2.5 AEROSOL, METERED ORAL
Refills: 0 | Status: COMPLETED | OUTPATIENT
Start: 2020-02-14 | End: 2020-02-14

## 2020-02-14 RX ORDER — INSULIN HUMAN 100 [IU]/ML
5 INJECTION, SOLUTION SUBCUTANEOUS ONCE
Refills: 0 | Status: COMPLETED | OUTPATIENT
Start: 2020-02-14 | End: 2020-02-14

## 2020-02-14 RX ORDER — ONDANSETRON 8 MG/1
4 TABLET, FILM COATED ORAL ONCE
Refills: 0 | Status: COMPLETED | OUTPATIENT
Start: 2020-02-14 | End: 2020-02-14

## 2020-02-14 RX ORDER — SODIUM CHLORIDE 9 MG/ML
1000 INJECTION, SOLUTION INTRAVENOUS
Refills: 0 | Status: DISCONTINUED | OUTPATIENT
Start: 2020-02-14 | End: 2020-02-15

## 2020-02-14 RX ORDER — GABAPENTIN 400 MG/1
300 CAPSULE ORAL THREE TIMES A DAY
Refills: 0 | Status: DISCONTINUED | OUTPATIENT
Start: 2020-02-14 | End: 2020-02-15

## 2020-02-14 RX ORDER — LIDOCAINE 4 G/100G
1 CREAM TOPICAL DAILY
Refills: 0 | Status: DISCONTINUED | OUTPATIENT
Start: 2020-02-14 | End: 2020-02-15

## 2020-02-14 RX ORDER — SODIUM CHLORIDE 9 MG/ML
1000 INJECTION INTRAMUSCULAR; INTRAVENOUS; SUBCUTANEOUS
Refills: 0 | Status: COMPLETED | OUTPATIENT
Start: 2020-02-14 | End: 2020-02-14

## 2020-02-14 RX ORDER — OXYCODONE AND ACETAMINOPHEN 5; 325 MG/1; MG/1
1 TABLET ORAL EVERY 4 HOURS
Refills: 0 | Status: DISCONTINUED | OUTPATIENT
Start: 2020-02-14 | End: 2020-02-15

## 2020-02-14 RX ORDER — SODIUM ZIRCONIUM CYCLOSILICATE 10 G/10G
5 POWDER, FOR SUSPENSION ORAL ONCE
Refills: 0 | Status: COMPLETED | OUTPATIENT
Start: 2020-02-14 | End: 2020-02-14

## 2020-02-14 RX ORDER — HYDROMORPHONE HYDROCHLORIDE 2 MG/ML
0.5 INJECTION INTRAMUSCULAR; INTRAVENOUS; SUBCUTANEOUS ONCE
Refills: 0 | Status: DISCONTINUED | OUTPATIENT
Start: 2020-02-14 | End: 2020-02-14

## 2020-02-14 RX ORDER — MORPHINE SULFATE 50 MG/1
2 CAPSULE, EXTENDED RELEASE ORAL ONCE
Refills: 0 | Status: DISCONTINUED | OUTPATIENT
Start: 2020-02-14 | End: 2020-02-14

## 2020-02-14 RX ORDER — SODIUM CHLORIDE 9 MG/ML
1000 INJECTION INTRAMUSCULAR; INTRAVENOUS; SUBCUTANEOUS
Refills: 0 | Status: DISCONTINUED | OUTPATIENT
Start: 2020-02-14 | End: 2020-02-15

## 2020-02-14 RX ORDER — OXYCODONE AND ACETAMINOPHEN 5; 325 MG/1; MG/1
2 TABLET ORAL EVERY 6 HOURS
Refills: 0 | Status: DISCONTINUED | OUTPATIENT
Start: 2020-02-14 | End: 2020-02-15

## 2020-02-14 RX ORDER — INFLUENZA VIRUS VACCINE 15; 15; 15; 15 UG/.5ML; UG/.5ML; UG/.5ML; UG/.5ML
0.5 SUSPENSION INTRAMUSCULAR ONCE
Refills: 0 | Status: COMPLETED | OUTPATIENT
Start: 2020-02-14 | End: 2020-02-14

## 2020-02-14 RX ORDER — SODIUM CHLORIDE 9 MG/ML
500 INJECTION INTRAMUSCULAR; INTRAVENOUS; SUBCUTANEOUS ONCE
Refills: 0 | Status: COMPLETED | OUTPATIENT
Start: 2020-02-14 | End: 2020-02-14

## 2020-02-14 RX ORDER — FENTANYL CITRATE 50 UG/ML
25 INJECTION INTRAVENOUS ONCE
Refills: 0 | Status: DISCONTINUED | OUTPATIENT
Start: 2020-02-14 | End: 2020-02-14

## 2020-02-14 RX ORDER — MULTIVIT-MIN/FERROUS GLUCONATE 9 MG/15 ML
15 LIQUID (ML) ORAL DAILY
Refills: 0 | Status: DISCONTINUED | OUTPATIENT
Start: 2020-02-14 | End: 2020-02-15

## 2020-02-14 RX ORDER — METRONIDAZOLE 500 MG
500 TABLET ORAL ONCE
Refills: 0 | Status: COMPLETED | OUTPATIENT
Start: 2020-02-14 | End: 2020-02-14

## 2020-02-14 RX ORDER — CALCIUM GLUCONATE 100 MG/ML
1 VIAL (ML) INTRAVENOUS ONCE
Refills: 0 | Status: COMPLETED | OUTPATIENT
Start: 2020-02-14 | End: 2020-02-14

## 2020-02-14 RX ORDER — SODIUM CHLORIDE 9 MG/ML
1000 INJECTION INTRAMUSCULAR; INTRAVENOUS; SUBCUTANEOUS ONCE
Refills: 0 | Status: COMPLETED | OUTPATIENT
Start: 2020-02-14 | End: 2020-02-14

## 2020-02-14 RX ORDER — CIPROFLOXACIN LACTATE 400MG/40ML
400 VIAL (ML) INTRAVENOUS ONCE
Refills: 0 | Status: COMPLETED | OUTPATIENT
Start: 2020-02-14 | End: 2020-02-14

## 2020-02-14 RX ORDER — MELOXICAM 15 MG/1
1 TABLET ORAL
Qty: 0 | Refills: 0 | DISCHARGE

## 2020-02-14 RX ORDER — ALPRAZOLAM 0.25 MG
0.25 TABLET ORAL ONCE
Refills: 0 | Status: DISCONTINUED | OUTPATIENT
Start: 2020-02-14 | End: 2020-02-14

## 2020-02-14 RX ORDER — TRAZODONE HCL 50 MG
150 TABLET ORAL AT BEDTIME
Refills: 0 | Status: DISCONTINUED | OUTPATIENT
Start: 2020-02-14 | End: 2020-02-15

## 2020-02-14 RX ORDER — DEXTROSE 50 % IN WATER 50 %
50 SYRINGE (ML) INTRAVENOUS ONCE
Refills: 0 | Status: COMPLETED | OUTPATIENT
Start: 2020-02-14 | End: 2020-02-14

## 2020-02-14 RX ADMIN — MORPHINE SULFATE 2 MILLIGRAM(S): 50 CAPSULE, EXTENDED RELEASE ORAL at 17:47

## 2020-02-14 RX ADMIN — Medication 100 MILLIGRAM(S): at 20:51

## 2020-02-14 RX ADMIN — SODIUM CHLORIDE 1000 MILLILITER(S): 9 INJECTION INTRAMUSCULAR; INTRAVENOUS; SUBCUTANEOUS at 16:22

## 2020-02-14 RX ADMIN — SODIUM CHLORIDE 500 MILLILITER(S): 9 INJECTION INTRAMUSCULAR; INTRAVENOUS; SUBCUTANEOUS at 17:05

## 2020-02-14 RX ADMIN — FENTANYL CITRATE 25 MICROGRAM(S): 50 INJECTION INTRAVENOUS at 15:45

## 2020-02-14 RX ADMIN — Medication 150 MILLIGRAM(S): at 23:59

## 2020-02-14 RX ADMIN — SODIUM CHLORIDE 125 MILLILITER(S): 9 INJECTION INTRAMUSCULAR; INTRAVENOUS; SUBCUTANEOUS at 20:57

## 2020-02-14 RX ADMIN — HYDROMORPHONE HYDROCHLORIDE 0.5 MILLIGRAM(S): 2 INJECTION INTRAMUSCULAR; INTRAVENOUS; SUBCUTANEOUS at 20:57

## 2020-02-14 RX ADMIN — SODIUM CHLORIDE 1000 MILLILITER(S): 9 INJECTION INTRAMUSCULAR; INTRAVENOUS; SUBCUTANEOUS at 15:21

## 2020-02-14 RX ADMIN — ONDANSETRON 4 MILLIGRAM(S): 8 TABLET, FILM COATED ORAL at 17:35

## 2020-02-14 RX ADMIN — MORPHINE SULFATE 2 MILLIGRAM(S): 50 CAPSULE, EXTENDED RELEASE ORAL at 18:59

## 2020-02-14 RX ADMIN — Medication 0.25 MILLIGRAM(S): at 18:04

## 2020-02-14 RX ADMIN — ALBUTEROL 2.5 MILLIGRAM(S): 90 AEROSOL, METERED ORAL at 17:40

## 2020-02-14 RX ADMIN — Medication 200 MILLIGRAM(S): at 22:10

## 2020-02-14 RX ADMIN — Medication 50 MILLILITER(S): at 17:46

## 2020-02-14 RX ADMIN — SODIUM ZIRCONIUM CYCLOSILICATE 5 GRAM(S): 10 POWDER, FOR SUSPENSION ORAL at 17:44

## 2020-02-14 RX ADMIN — MORPHINE SULFATE 2 MILLIGRAM(S): 50 CAPSULE, EXTENDED RELEASE ORAL at 18:05

## 2020-02-14 RX ADMIN — Medication 50 GRAM(S): at 18:14

## 2020-02-14 RX ADMIN — FENTANYL CITRATE 25 MICROGRAM(S): 50 INJECTION INTRAVENOUS at 15:21

## 2020-02-14 RX ADMIN — ALBUTEROL 2.5 MILLIGRAM(S): 90 AEROSOL, METERED ORAL at 17:45

## 2020-02-14 RX ADMIN — ALBUTEROL 2.5 MILLIGRAM(S): 90 AEROSOL, METERED ORAL at 18:30

## 2020-02-14 RX ADMIN — HYDROMORPHONE HYDROCHLORIDE 0.5 MILLIGRAM(S): 2 INJECTION INTRAMUSCULAR; INTRAVENOUS; SUBCUTANEOUS at 21:12

## 2020-02-14 RX ADMIN — INSULIN HUMAN 5 UNIT(S): 100 INJECTION, SOLUTION SUBCUTANEOUS at 17:46

## 2020-02-14 RX ADMIN — SODIUM CHLORIDE 1000 MILLILITER(S): 9 INJECTION INTRAMUSCULAR; INTRAVENOUS; SUBCUTANEOUS at 15:53

## 2020-02-14 RX ADMIN — LIDOCAINE 1 PATCH: 4 CREAM TOPICAL at 23:59

## 2020-02-14 NOTE — H&P ADULT - REASON FOR ADMISSION
ARF  concern for ATN  Hyperkalemia  NSAID use  Chronic back pain  Increasing anemia  Left sided Colitis  ankle fx after fall yesterday ARF  concern for ATN  Hyperkalemia  Chronic back pain  Increasing anemia  Melena/GI bleed  Left sided Colitis with diarrhea, dehydration  ankle fx after syncope and fall yesterday

## 2020-02-14 NOTE — H&P ADULT - HISTORY OF PRESENT ILLNESS
58 y/o female with a PMHx of anxiety, asthma, depression, HTN, osteoarthritis, seasonal allergies presents to the ED c/o right ankle pain. Pt states she was getting ready yesterday and fell injuring her right ankle. Pt was seen in [place] ED yesterday and was told to follow up with orthopedics. Pt saw Dr. Knight today and sent pt to ED for surgery. Pt states she may have accidentally took an extra dose of her Clonidine today. No other complaints at this time. Pt is a 60 y/o female with a PMHx of anxiety/ depression, asthma,  HTN on enalapril, spinal stenosis, osteoarthritis, seasonal allergies who was sent to the ED by Dr. Knight today for ankle injury requiring surgery.  Pt  c/o right ankle pain after a fall yesterday, for which she was splinted at [facility] ED.   Pt also had 5 episodes of diarrhea 2 days ago with LLQ pain which improved yesterday.   Yesterday morning she was NPO for planned epidural procedure for pain control .  At 11 am she was getting dressed yesterday and while putting on earrings she had unwitnessed LOC and  fell from standing , injuring her right ankle.  She denies preceding cpain/SOB/palpitations/dizziness.  She does not recall tripping over anything.        Pt reports she stopped taking motrin 600mg BID , approximately 5 days  ago for the planned epidural procedure for pain control.  She was on mobic until 2020.  She has noticed increasingly dark stools over the last 1-2 weeks.    She also reports some difficulty swallowing solids for 2-3 weeks.        In the ED , pt is found to have ARF, hyperkalemia, anemia and hypotension to 85/38.  Pt reported she may have accidentally taken an extra dose of her Clonidine today.     Fam HX:   Father DM, had CVa  Mother COPD  Brother  at 65 of alcoholism  Sister has lung ca

## 2020-02-14 NOTE — H&P ADULT - NSHPOUTPATIENTPROVIDERS_GEN_ALL_CORE
Dr. Kaila Rogers Dr. [facility] [phone]  Dr. Knight at [facility] yesterday  Dr. Rogers  Psychiatry at [facility]

## 2020-02-14 NOTE — ED ADULT NURSE NOTE - OBJECTIVE STATEMENT
Patient comes to ED for right ankle pain. Pt states she was getting ready yesterday and fell injuring her right ankle. Pt was seen in [place] ED yesterday and was told to follow up with orthopedics. Pt saw Dr. Knight today and sent pt to ED for surgery. Pt states she may have accidentally took an extra dose of her Clonidine today. No other complaints at this time.

## 2020-02-14 NOTE — ED STATDOCS - CONSTITUTIONAL, MLM
normal... well appearing and in no apparent distress. well appearing and in no apparent distress. Nontoxic appearing.

## 2020-02-14 NOTE — ED STATDOCS - CARE PLAN
Principal Discharge DX:	Kidney failure, acute  Secondary Diagnosis:	Hyperkalemia  Secondary Diagnosis:	Ankle fracture  Secondary Diagnosis:	Anemia

## 2020-02-14 NOTE — DISCUSSION/SUMMARY
[de-identified] : Today I had a lengthy discussion with the patient regarding her right ankle pain. I have addressed all the patient's concerns surrounding the pathology of her condition. The patient's splint was taken down and replaced in the office today. I advised the patient to utilize 325 mg of Aspirin as instructed for DVT prophylaxis. The prescription for the Aspirin was provided for the patient in the office today. I advised the patient to elevate her right leg above the level of her heart. I recommend that the patient utilize Percocet PRN for pain relief. The prescription for the Percocet was given in the office today. At this time I would like to obtain advanced imaging of the patient's right ankle. A CT scan was ordered so I can find out more about the etiology of the patient's condition. A discussion was had about an ORIF surgery for the right ankle. A lengthy discussion was had about the surgery. All risks, benefits and alternatives to the recommended surgical procedure were discussed which include but are not limited to bleeding, infection, nerve damage, vascular damage, failure of the wound to heal, the need for further surgery, loss of limb, DVT, PE, loss of life as well as the risks associated with general anesthesia. The patient verbalized understanding and provided informed consent to move forward with surgery. I advised the patient to go to the emergency room immediately if there are concerns that her condition is becoming worse. The patient understood and verbally agreed to the treatment plan. All of her questions were answered and she was satisfied with the visit. The patient should call the office if she has any questions or experience worsening symptoms.

## 2020-02-14 NOTE — H&P ADULT - NSICDXPASTSURGICALHX_GEN_ALL_CORE_FT
Medical Necessity Information: It is in your best interest to select a reason for this procedure from the list below. All of these items fulfill various CMS LCD requirements except the new and changing color options. Detail Level: Detailed Consent: The patient's consent was obtained including but not limited to risks of crusting, scabbing, blistering, scarring, darker or lighter pigmentary change, recurrence, incomplete removal and infection. Render Post-Care Instructions In Note?: yes Post-Care Instructions: I reviewed with the patient in detail post-care instructions. Patient is to wear sunprotection, and avoid picking at any of the treated lesions. Pt may apply Vaseline to crusted or scabbing areas. Add 52 Modifier (Optional): no Medical Necessity Clause: This procedure was medically necessary because the lesions that were treated were: PAST SURGICAL HISTORY:  S/P cholecystectomy     S/P hip replacement left    S/P tubal ligation

## 2020-02-14 NOTE — CONSULT NOTE ADULT - SUBJECTIVE AND OBJECTIVE BOX
58 yo Female presents to [facility] ED s/p a mechanical fall yesterday. She was in her bedroom when she slipped and fell to the ground. No headstrike/LOC. Patient reported severe right ankle pain. She went to [facility] where she was found to have a trimall ankle fracture dislocation. she was splinted and sent to follow up with Dr. Knight today. Xrays today showed loss of reduction of the ankle and she was sent to the ED for possible ex-fix.P atient reports pain localized to the right ankle with significant swelling.     PAST MEDICAL & SURGICAL HISTORY:  Hypertension  Anxiety and depression  Seasonal allergies  Asthma  Osteoarthritis  S/P hip replacement: left  S/P tubal ligation  S/P cholecystectomy    MEDICATIONS  (STANDING):  ALBUTerol    0.083%.. 2.5 milliGRAM(s) Nebulizer every 20 minutes  dextrose 50% Injectable 50 milliLiter(s) IV Push Once  insulin regular  human recombinant. 5 Unit(s) SubCutaneous once  sodium zirconium cyclosilicate 5 Gram(s) Oral Once    MEDICATIONS  (PRN):\  Allergies    No Known Allergies    Intolerances    T(C): 36.8 (02-14-20 @ 16:02), Max: 36.8 (02-14-20 @ 13:41)  HR: 75 (02-14-20 @ 16:02) (72 - 75)  BP: 85/38 (02-14-20 @ 16:02) (85/38 - 87/55)  RR: 17 (02-14-20 @ 16:02) (17 - 18)  SpO2: 99% (02-14-20 @ 16:02) (99% - 99%)  Wt(kg): --    PE   RLE:  Trilam splint in place c/d/i  splint windowed and skin swollen and unable to wrinkle, xeroform noted wih small superficial abrasion anteriorly  Grossly able to wiggle toes  sensation intact to toes grossly  caprefilly < 2 seconds    SECONDARY EXAM: Benign, Skin intact, SILT throughout, motor grossly intact throughout, no other orthopedic injuries at this time, compartments soft and compressible    SPINE: Skin intact, no bony tenderness or step-offs appreciated throughout cervical/thoracic/lumbar/sacral spine    B/L UE: Skin intact, no erythema, ecchymosis, edema, gross deformity, NTTP over the bony prominences of the shoulder/elbow/wrist/hand, painless passive/active ROM of the shoulder/elbow/wrist/hand, C5-T1 SILT, motor grossly intact throughout axillary/musculocutaenous/radial/median/ulnar nerves, + radial pulse    L LE: Skin intact, no erythema, ecchymosis, edema, gross deformity, NTTP over the bony prominences of the hip/knee/ankle/foot, painless passive/active ROM of the hip/knee/ankle/foot, L2-S1 SILT, motor grossly intact throughout hip flexors/quads/hams/TA/EHL/FHL/GSC, + DP/PT pulses, no pain with log roll, no pain on axial loading, compartments soft and compressible, calves nontender      Imaging:  XR reviewed form Dr. Knight's office which demonstrate loss of reduction with lateral subluxation of the fibula and tibiotalar joint    59y Female with trimalleolar ankle fracture dislocation  - Pain control  - NPO/IVF  - CBC/BMP/Coags/UA/T+S x2  - EKG/CXR  - Medical clearance for OR likely tonight  - Plan for OR for posisble Ex fix     Case discussed with attendigns Dr. Rogers and Dr. Knight who agree with plan

## 2020-02-14 NOTE — H&P ADULT - NSHPPHYSICALEXAM_GEN_ALL_CORE
ICU Vital Signs Last 24 Hrs    T(F): 98.3 (14 Feb 2020 16:02), Max: 98.3 (14 Feb 2020 13:41)  HR: 89 (14 Feb 2020 18:50) (64 - 89)  BP: 107/61 (14 Feb 2020 18:50) (82/46 - 129/72)  BP(mean): 83 (14 Feb 2020 17:15) (66 - 88)    RR: 18 (14 Feb 2020 18:00) (14 - 18)  SpO2: 99% (14 Feb 2020 18:00) (97% - 99%)

## 2020-02-14 NOTE — ED ADULT NURSE REASSESSMENT NOTE - NS ED NURSE REASSESS COMMENT FT1
Pt sent from [facility] to  09 in the main , V/S remain hypotensive despite 2L NS infusing , pt will be admitted for BRIGITTE and anemia , awaiting Ct scan results

## 2020-02-14 NOTE — H&P ADULT - ASSESSMENT
Pt is admitted w/    ARF  concern for ATN  Hyperkalemia  NSAID use  Chronic back pain  Increasing anemia  Left sided Colitis  ankle fx after fall yesterday  - s/p Calcium gluconate in the ED  - IVF  - Nephrology consult  - d/c diclofenac , motrin  - stool guiaic  - repeat H/H  - DVT prophylaxis :  - IMPROVE VTE Individual Risk Assessment    RISK                                                                Points    [  ] Previous VTE                                                  3    [  ] Thrombophilia                                               2    [ ? ] Lower limb paralysis                                      2        (unable to hold up >15 seconds)      [  ] Current Cancer                                              2         (within 6 months)    [X] Immobilization > 24 hrs                                1    [  ] ICU/CCU stay > 24 hours                              1    [  ] Age > 60                                                      1    IMPROVE VTE Score ____1-2 has fx of ankle____    IMPROVE Score 0-1: Low Risk, No VTE prophylaxis required for most patients, encourage ambulation.   IMPROVE Score 2-3: At risk, pharmacologic VTE prophylaxis is indicated for most patients (in the absence of a contraindication)  IMPROVE Score > or = 4: High Risk, pharmacologic VTE prophylaxis is indicated for most patients (in the absence of a contraindication) Pt is admitted w/  Hypotension 85/38, improving after IVF  ARF  concern for ATN due to NSAIDS, and pre-renal Azotemia   Hyperkalemia potassium of 6.4  NSAID use and suspected GI bleed, w/melena. Suspect peptic ulcer or duodenal ulcer.   Chronic back pain, planned for epidural as an outpt  Increasing anemia  Left sided Descending Colitis on CT abd, recent diarrhea and dysphagia with solids  Ankle fx after fall yesterday  Unwitnessed syncope and fall yesterday AM after being NPO, likely vasovagal due to dehydration and anemia  - s/p Calcium gluconate in the ED  - IVF s/p NS 2.5 L   - adm to telemetry, due to hyperkalemia, eval for arrythmia  - s/p 1 dose of Cipro and flagyl,  will not give further antibiotics as pt has no leukocytosis, no fever, no diarrhea, neg lactate  - follow blood cx  - Nephrology and GI consults  - d/c diclofenac , motrin and enalapril, pt and family counseled regarding NSAID use and ATN/GI bleed  - Renal US  - stool guaiac  - repeat H/H  - orthopedic consult for surgical repair of ankle  - DVT prophylaxis : SCDS, no heparin for now due to blood loss anemia  - IMPROVE VTE Individual Risk Assessment    RISK                                                                Points    [  ] Previous VTE                                                  3    [  ] Thrombophilia                                               2    [ ? ] Lower limb paralysis                                      2        (unable to hold up >15 seconds)      [  ] Current Cancer                                              2         (within 6 months)    [X] Immobilization > 24 hrs                                1    [  ] ICU/CCU stay > 24 hours                              1    [  ] Age > 60                                                      1    IMPROVE VTE Score ____1-2 has fx of ankle____    IMPROVE Score 0-1: Low Risk, No VTE prophylaxis required for most patients, encourage ambulation.   IMPROVE Score 2-3: At risk, pharmacologic VTE prophylaxis is indicated for most patients (in the absence of a contraindication)  IMPROVE Score > or = 4: High Risk, pharmacologic VTE prophylaxis is indicated for most patients (in the absence of a contraindication)

## 2020-02-14 NOTE — ED STATDOCS - PROGRESS NOTE DETAILS
Patient initially seen and evaluated with ED attending at intake.  Was sent in by Dr. Knight to be [id number] by orthopedic on call as he felt her fracture needed management ASAP.  Screening bloodwork performed.  She was found to be anemic, hyperkalemic with an elevated creatinine.  Patient does admit to overuse of ibuprofen for her chronic back pain which could be causing stomach ulceration and possibly renal failure.  She also reports her urinary habits are different, has more frequent urination with less amount of urine each time.  Also reports chronic constipation.  Stool guiac checked, lot [id number], exp 8/31/2020.  No stool in rectal vault, unable to properly assess.  Case d/w orthopedic team.  She is to be admitted to medicine for further management of her kidney failure, anemia and hyperkalemia.  Patient has been updated on plan.  CTAP and urinalysis pending.  -Magui Morgan PA-C Linda ROSA for ED attending Dr. Caruso: Spoke with PAKO Morgan, introduced myself to pt, pt is already admitted. Agree with Dr. Samson and PAKO Morgan's plan. Scribana Felipe attesting to progress note. Scribe Kimberly Felipe attesting to progress note.  Mushtaq Caruso MD: I Mushtaq Caruso MD, supervised the scribe and reviewed her documentation for me. I have added to the physical exam finding reflecting my own physical examination of the patient at the time of the sign out to me.

## 2020-02-14 NOTE — H&P ADULT - NSHPPOADEEPVENOUSTHROMB_GEN_A_CORE
[de-identified] : I personally reviewed the patient's audiogram, which shows right conductive hearing loss, normal hearing left ear\par 
no

## 2020-02-14 NOTE — H&P ADULT - NSHPSOCIALHISTORY_GEN_ALL_CORE
Pt is lives with her  and son.  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  In the past she also struggled with dependency on xanax and pain medications.  Pt denies current ETOH use/drug abuse. No prior tobacco hx.  She has had exposure to second hand smoke.

## 2020-02-14 NOTE — HISTORY OF PRESENT ILLNESS
[FreeTextEntry1] : 59 year old female presenting with right ankle pain. The patient's pain began on 2/13/2020 when she fell in her bedroom. She went to [facility] ED after the injury and her fracture was reduced in a splint. The patient’s pain is noted to be a 10/10. The patient describes her pain as radiating. The patient presents ambulating with a wheelchair and wearing a splint. The patient is accompanied by her . Her  smokes 1 pack of cigarettes per day at home. She is currently taking Gabapentin. 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 She has not taken NSAIDs or Tylenol in 5 days because she was planned to receive an epidural. No other complaints at this time.

## 2020-02-14 NOTE — ED STATDOCS - OBJECTIVE STATEMENT
58 y/o female with a PMHx of anxiety, asthma, depression, HTN, osteoarthritis, seasonal allergies presents to the ED c/o right ankle pain. Pt states she was getting ready yesterday and fell injuring her right ankle. Pt was seen in [place] ED yesterday and was told to follow up with orthopedics. Pt saw Dr. Knight today and sent pt to ED for surgery. Pt states she may have accidentally took an extra dose of her Clonidine today. No other complaints at this time.

## 2020-02-14 NOTE — ADDENDUM
[FreeTextEntry1] : I, Lawson Van, acted solely as a scribe for Dr. Mohamud Knight on this date 02/14/2020. \par \par All medical record entries made by the Scribe were at my, Dr. Mohamud Knight, direction and personally dictated by me on 02/14/2020 . I have reviewed the chart and agree that the record accurately reflects my personal performance of the history, physical exam, assessment and plan. I have also personally directed, reviewed, and agreed with the chart.

## 2020-02-14 NOTE — PHYSICAL EXAM
[de-identified] : General: Alert and oriented x3. In no acute distress. Pleasant in nature with a normal affect. No apparent respiratory distress. \par \par R Ankle Exam \par Skin: Clean, dry, intact\par Inspection: +medial sided erythema, No obvious malalignment, +swelling, no effusion; no lymphadenopathy\par Pulses: 2+ DP/PT pulses\par ROM: R Ankle limited due to pain\par Tenderness: +diffuse leg/ankle pain\par Stability: Negative anterior/posterior drawer.\par Strength: 5/5 TA/GS/EHL\par Neuro: In tact to light touch throughout\par Additional tests: Negative Mortons test, Negative syndesmosis squeeze test. [de-identified] : X-rays of the right ankle obtained from [facility] on 2/13/2020 and reviewed in the office today, 02/14/2020, revealed: Bimalleolar fracture dislocation of the RIGHT ankle joint.\par \par 3V of the right ankle were ordered obtained and reviewed by me today, 02/14/2020, revealed: unstable fracture

## 2020-02-14 NOTE — ED ADULT NURSE NOTE - NSIMPLEMENTINTERV_GEN_ALL_ED
Implemented All Fall with Harm Risk Interventions:  [place] to call system. Call bell, personal items and telephone within reach. Instruct patient to call for assistance. Room bathroom lighting operational. Non-slip footwear when patient is off stretcher. Physically safe environment: no spills, clutter or unnecessary equipment. Stretcher in lowest position, wheels locked, appropriate side rails in place. Provide visual cue, wrist band, yellow gown, etc. Monitor gait and stability. Monitor for mental status changes and reorient to person, place, and time. Review medications for side effects contributing to fall risk. Reinforce activity limits and safety measures with patient and family. Provide visual clues: red socks.

## 2020-02-15 ENCOUNTER — TRANSCRIPTION ENCOUNTER (OUTPATIENT)
Age: 60
End: 2020-02-15

## 2020-02-15 DIAGNOSIS — R55 SYNCOPE AND COLLAPSE: ICD-10-CM

## 2020-02-15 DIAGNOSIS — S82.851A DISPLACED TRIMALLEOLAR FRACTURE OF RIGHT LOWER LEG, INITIAL ENCOUNTER FOR CLOSED FRACTURE: ICD-10-CM

## 2020-02-15 DIAGNOSIS — D64.9 ANEMIA, UNSPECIFIED: ICD-10-CM

## 2020-02-15 DIAGNOSIS — Z01.810 ENCOUNTER FOR PREPROCEDURAL CARDIOVASCULAR EXAMINATION: ICD-10-CM

## 2020-02-15 DIAGNOSIS — N17.9 ACUTE KIDNEY FAILURE, UNSPECIFIED: ICD-10-CM

## 2020-02-15 LAB
ANION GAP SERPL CALC-SCNC: 3 MMOL/L — LOW (ref 5–17)
APTT BLD: 30.3 SEC — SIGNIFICANT CHANGE UP (ref 27.5–36.3)
BASOPHILS # BLD AUTO: 0.01 K/UL — SIGNIFICANT CHANGE UP (ref 0–0.2)
BASOPHILS NFR BLD AUTO: 0.2 % — SIGNIFICANT CHANGE UP (ref 0–2)
BUN SERPL-MCNC: 34 MG/DL — HIGH (ref 7–23)
CALCIUM SERPL-MCNC: 8.4 MG/DL — LOW (ref 8.5–10.1)
CHLORIDE SERPL-SCNC: 114 MMOL/L — HIGH (ref 96–108)
CO2 SERPL-SCNC: 26 MMOL/L — SIGNIFICANT CHANGE UP (ref 22–31)
CREAT SERPL-MCNC: 1.59 MG/DL — HIGH (ref 0.5–1.3)
EOSINOPHIL # BLD AUTO: 0.2 K/UL — SIGNIFICANT CHANGE UP (ref 0–0.5)
EOSINOPHIL NFR BLD AUTO: 4 % — SIGNIFICANT CHANGE UP (ref 0–6)
GLUCOSE SERPL-MCNC: 113 MG/DL — HIGH (ref 70–99)
HCT VFR BLD CALC: 26.5 % — LOW (ref 34.5–45)
HCT VFR BLD CALC: 29.9 % — LOW (ref 34.5–45)
HCT VFR BLD CALC: 30 % — LOW (ref 34.5–45)
HGB BLD-MCNC: 8.5 G/DL — LOW (ref 11.5–15.5)
HGB BLD-MCNC: 9.5 G/DL — LOW (ref 11.5–15.5)
HGB BLD-MCNC: 9.5 G/DL — LOW (ref 11.5–15.5)
IMM GRANULOCYTES NFR BLD AUTO: 0.2 % — SIGNIFICANT CHANGE UP (ref 0–1.5)
INR BLD: 1.07 RATIO — SIGNIFICANT CHANGE UP (ref 0.88–1.16)
LYMPHOCYTES # BLD AUTO: 1.5 K/UL — SIGNIFICANT CHANGE UP (ref 1–3.3)
LYMPHOCYTES # BLD AUTO: 30 % — SIGNIFICANT CHANGE UP (ref 13–44)
MCHC RBC-ENTMCNC: 31.3 PG — SIGNIFICANT CHANGE UP (ref 27–34)
MCHC RBC-ENTMCNC: 32.1 GM/DL — SIGNIFICANT CHANGE UP (ref 32–36)
MCV RBC AUTO: 97.4 FL — SIGNIFICANT CHANGE UP (ref 80–100)
MONOCYTES # BLD AUTO: 0.55 K/UL — SIGNIFICANT CHANGE UP (ref 0–0.9)
MONOCYTES NFR BLD AUTO: 11 % — SIGNIFICANT CHANGE UP (ref 2–14)
NEUTROPHILS # BLD AUTO: 2.73 K/UL — SIGNIFICANT CHANGE UP (ref 1.8–7.4)
NEUTROPHILS NFR BLD AUTO: 54.6 % — SIGNIFICANT CHANGE UP (ref 43–77)
PLATELET # BLD AUTO: 161 K/UL — SIGNIFICANT CHANGE UP (ref 150–400)
POTASSIUM SERPL-MCNC: 5 MMOL/L — SIGNIFICANT CHANGE UP (ref 3.5–5.3)
POTASSIUM SERPL-SCNC: 5 MMOL/L — SIGNIFICANT CHANGE UP (ref 3.5–5.3)
PROTHROM AB SERPL-ACNC: 11.9 SEC — SIGNIFICANT CHANGE UP (ref 10–12.9)
RBC # BLD: 2.72 M/UL — LOW (ref 3.8–5.2)
RBC # FLD: 13.6 % — SIGNIFICANT CHANGE UP (ref 10.3–14.5)
SODIUM SERPL-SCNC: 143 MMOL/L — SIGNIFICANT CHANGE UP (ref 135–145)
TROPONIN I SERPL-MCNC: <0.015 NG/ML — SIGNIFICANT CHANGE UP (ref 0.01–0.04)
TROPONIN I SERPL-MCNC: <0.015 NG/ML — SIGNIFICANT CHANGE UP (ref 0.01–0.04)
WBC # BLD: 5 K/UL — SIGNIFICANT CHANGE UP (ref 3.8–10.5)
WBC # FLD AUTO: 5 K/UL — SIGNIFICANT CHANGE UP (ref 3.8–10.5)

## 2020-02-15 PROCEDURE — 93306 TTE W/DOPPLER COMPLETE: CPT | Mod: 26

## 2020-02-15 PROCEDURE — 76770 US EXAM ABDO BACK WALL COMP: CPT | Mod: 26

## 2020-02-15 PROCEDURE — 99357: CPT

## 2020-02-15 PROCEDURE — 99356: CPT

## 2020-02-15 PROCEDURE — 93010 ELECTROCARDIOGRAM REPORT: CPT

## 2020-02-15 PROCEDURE — 99233 SBSQ HOSP IP/OBS HIGH 50: CPT

## 2020-02-15 RX ORDER — GABAPENTIN 400 MG/1
300 CAPSULE ORAL THREE TIMES A DAY
Refills: 0 | Status: DISCONTINUED | OUTPATIENT
Start: 2020-02-15 | End: 2020-02-25

## 2020-02-15 RX ORDER — OXYCODONE HYDROCHLORIDE 5 MG/1
5 TABLET ORAL ONCE
Refills: 0 | Status: DISCONTINUED | OUTPATIENT
Start: 2020-02-15 | End: 2020-02-15

## 2020-02-15 RX ORDER — METRONIDAZOLE 500 MG
500 TABLET ORAL EVERY 8 HOURS
Refills: 0 | Status: DISCONTINUED | OUTPATIENT
Start: 2020-02-15 | End: 2020-02-15

## 2020-02-15 RX ORDER — OXYCODONE HYDROCHLORIDE 5 MG/1
5 TABLET ORAL EVERY 4 HOURS
Refills: 0 | Status: DISCONTINUED | OUTPATIENT
Start: 2020-02-15 | End: 2020-02-19

## 2020-02-15 RX ORDER — POLYETHYLENE GLYCOL 3350 17 G/17G
17 POWDER, FOR SOLUTION ORAL DAILY
Refills: 0 | Status: DISCONTINUED | OUTPATIENT
Start: 2020-02-15 | End: 2020-02-25

## 2020-02-15 RX ORDER — ACETAMINOPHEN 500 MG
975 TABLET ORAL EVERY 8 HOURS
Refills: 0 | Status: DISCONTINUED | OUTPATIENT
Start: 2020-02-15 | End: 2020-02-25

## 2020-02-15 RX ORDER — SENNA PLUS 8.6 MG/1
2 TABLET ORAL AT BEDTIME
Refills: 0 | Status: DISCONTINUED | OUTPATIENT
Start: 2020-02-15 | End: 2020-02-26

## 2020-02-15 RX ORDER — ACETAMINOPHEN 500 MG
1000 TABLET ORAL ONCE
Refills: 0 | Status: COMPLETED | OUTPATIENT
Start: 2020-02-15 | End: 2020-02-15

## 2020-02-15 RX ORDER — SODIUM CHLORIDE 9 MG/ML
1000 INJECTION INTRAMUSCULAR; INTRAVENOUS; SUBCUTANEOUS
Refills: 0 | Status: DISCONTINUED | OUTPATIENT
Start: 2020-02-15 | End: 2020-02-15

## 2020-02-15 RX ORDER — CIPROFLOXACIN LACTATE 400MG/40ML
400 VIAL (ML) INTRAVENOUS EVERY 12 HOURS
Refills: 0 | Status: CANCELLED | OUTPATIENT
Start: 2020-02-16 | End: 2020-02-15

## 2020-02-15 RX ORDER — METRONIDAZOLE 500 MG
500 TABLET ORAL EVERY 8 HOURS
Refills: 0 | Status: DISCONTINUED | OUTPATIENT
Start: 2020-02-15 | End: 2020-02-16

## 2020-02-15 RX ORDER — ONDANSETRON 8 MG/1
4 TABLET, FILM COATED ORAL ONCE
Refills: 0 | Status: DISCONTINUED | OUTPATIENT
Start: 2020-02-15 | End: 2020-02-15

## 2020-02-15 RX ORDER — SODIUM CHLORIDE 9 MG/ML
1000 INJECTION INTRAMUSCULAR; INTRAVENOUS; SUBCUTANEOUS
Refills: 0 | Status: DISCONTINUED | OUTPATIENT
Start: 2020-02-15 | End: 2020-02-16

## 2020-02-15 RX ORDER — CIPROFLOXACIN LACTATE 400MG/40ML
400 VIAL (ML) INTRAVENOUS ONCE
Refills: 0 | Status: DISCONTINUED | OUTPATIENT
Start: 2020-02-15 | End: 2020-02-15

## 2020-02-15 RX ORDER — ONDANSETRON 8 MG/1
4 TABLET, FILM COATED ORAL EVERY 6 HOURS
Refills: 0 | Status: DISCONTINUED | OUTPATIENT
Start: 2020-02-15 | End: 2020-02-25

## 2020-02-15 RX ORDER — PANTOPRAZOLE SODIUM 20 MG/1
40 TABLET, DELAYED RELEASE ORAL
Refills: 0 | Status: DISCONTINUED | OUTPATIENT
Start: 2020-02-15 | End: 2020-02-15

## 2020-02-15 RX ORDER — METRONIDAZOLE 500 MG
500 TABLET ORAL ONCE
Refills: 0 | Status: COMPLETED | OUTPATIENT
Start: 2020-02-15 | End: 2020-02-15

## 2020-02-15 RX ORDER — SODIUM CHLORIDE 9 MG/ML
1000 INJECTION INTRAMUSCULAR; INTRAVENOUS; SUBCUTANEOUS ONCE
Refills: 0 | Status: COMPLETED | OUTPATIENT
Start: 2020-02-15 | End: 2020-02-15

## 2020-02-15 RX ORDER — HYDROMORPHONE HYDROCHLORIDE 2 MG/ML
1 INJECTION INTRAMUSCULAR; INTRAVENOUS; SUBCUTANEOUS EVERY 4 HOURS
Refills: 0 | Status: DISCONTINUED | OUTPATIENT
Start: 2020-02-15 | End: 2020-02-15

## 2020-02-15 RX ORDER — ASPIRIN/CALCIUM CARB/MAGNESIUM 324 MG
325 TABLET ORAL
Refills: 0 | Status: DISCONTINUED | OUTPATIENT
Start: 2020-02-16 | End: 2020-02-16

## 2020-02-15 RX ORDER — SODIUM CHLORIDE 9 MG/ML
1000 INJECTION, SOLUTION INTRAVENOUS
Refills: 0 | Status: DISCONTINUED | OUTPATIENT
Start: 2020-02-15 | End: 2020-02-15

## 2020-02-15 RX ORDER — CEFAZOLIN SODIUM 1 G
2000 VIAL (EA) INJECTION EVERY 8 HOURS
Refills: 0 | Status: COMPLETED | OUTPATIENT
Start: 2020-02-15 | End: 2020-02-16

## 2020-02-15 RX ORDER — METRONIDAZOLE 500 MG
TABLET ORAL
Refills: 0 | Status: DISCONTINUED | OUTPATIENT
Start: 2020-02-15 | End: 2020-02-15

## 2020-02-15 RX ORDER — MAGNESIUM HYDROXIDE 400 MG/1
30 TABLET, CHEWABLE ORAL DAILY
Refills: 0 | Status: DISCONTINUED | OUTPATIENT
Start: 2020-02-15 | End: 2020-02-28

## 2020-02-15 RX ORDER — SODIUM CHLORIDE 9 MG/ML
3 INJECTION INTRAMUSCULAR; INTRAVENOUS; SUBCUTANEOUS EVERY 8 HOURS
Refills: 0 | Status: DISCONTINUED | OUTPATIENT
Start: 2020-02-15 | End: 2020-02-28

## 2020-02-15 RX ORDER — HYDROMORPHONE HYDROCHLORIDE 2 MG/ML
0.5 INJECTION INTRAMUSCULAR; INTRAVENOUS; SUBCUTANEOUS
Refills: 0 | Status: DISCONTINUED | OUTPATIENT
Start: 2020-02-15 | End: 2020-02-16

## 2020-02-15 RX ORDER — TRAZODONE HCL 50 MG
150 TABLET ORAL AT BEDTIME
Refills: 0 | Status: DISCONTINUED | OUTPATIENT
Start: 2020-02-15 | End: 2020-02-25

## 2020-02-15 RX ORDER — CIPROFLOXACIN LACTATE 400MG/40ML
VIAL (ML) INTRAVENOUS
Refills: 0 | Status: DISCONTINUED | OUTPATIENT
Start: 2020-02-15 | End: 2020-02-15

## 2020-02-15 RX ORDER — CIPROFLOXACIN LACTATE 400MG/40ML
400 VIAL (ML) INTRAVENOUS EVERY 12 HOURS
Refills: 0 | Status: DISCONTINUED | OUTPATIENT
Start: 2020-02-15 | End: 2020-02-16

## 2020-02-15 RX ORDER — TRAMADOL HYDROCHLORIDE 50 MG/1
50 TABLET ORAL EVERY 6 HOURS
Refills: 0 | Status: DISCONTINUED | OUTPATIENT
Start: 2020-02-15 | End: 2020-02-22

## 2020-02-15 RX ORDER — HYDROMORPHONE HYDROCHLORIDE 2 MG/ML
0.5 INJECTION INTRAMUSCULAR; INTRAVENOUS; SUBCUTANEOUS ONCE
Refills: 0 | Status: DISCONTINUED | OUTPATIENT
Start: 2020-02-15 | End: 2020-02-15

## 2020-02-15 RX ORDER — FENTANYL CITRATE 50 UG/ML
50 INJECTION INTRAVENOUS
Refills: 0 | Status: DISCONTINUED | OUTPATIENT
Start: 2020-02-15 | End: 2020-02-15

## 2020-02-15 RX ADMIN — SODIUM CHLORIDE 1000 MILLILITER(S): 9 INJECTION INTRAMUSCULAR; INTRAVENOUS; SUBCUTANEOUS at 08:44

## 2020-02-15 RX ADMIN — GABAPENTIN 300 MILLIGRAM(S): 400 CAPSULE ORAL at 00:01

## 2020-02-15 RX ADMIN — GABAPENTIN 300 MILLIGRAM(S): 400 CAPSULE ORAL at 23:33

## 2020-02-15 RX ADMIN — TRAMADOL HYDROCHLORIDE 50 MILLIGRAM(S): 50 TABLET ORAL at 21:45

## 2020-02-15 RX ADMIN — SODIUM CHLORIDE 100 MILLILITER(S): 9 INJECTION, SOLUTION INTRAVENOUS at 10:50

## 2020-02-15 RX ADMIN — LIDOCAINE 1 PATCH: 4 CREAM TOPICAL at 14:10

## 2020-02-15 RX ADMIN — FENTANYL CITRATE 50 MICROGRAM(S): 50 INJECTION INTRAVENOUS at 16:20

## 2020-02-15 RX ADMIN — Medication 100 MILLIGRAM(S): at 14:04

## 2020-02-15 RX ADMIN — TRAMADOL HYDROCHLORIDE 50 MILLIGRAM(S): 50 TABLET ORAL at 20:54

## 2020-02-15 RX ADMIN — OXYCODONE AND ACETAMINOPHEN 2 TABLET(S): 5; 325 TABLET ORAL at 01:43

## 2020-02-15 RX ADMIN — Medication 100 MILLIGRAM(S): at 23:34

## 2020-02-15 RX ADMIN — Medication 100 MILLIGRAM(S): at 22:44

## 2020-02-15 RX ADMIN — LIDOCAINE 1 PATCH: 4 CREAM TOPICAL at 14:09

## 2020-02-15 RX ADMIN — HYDROMORPHONE HYDROCHLORIDE 0.5 MILLIGRAM(S): 2 INJECTION INTRAMUSCULAR; INTRAVENOUS; SUBCUTANEOUS at 22:28

## 2020-02-15 RX ADMIN — OXYCODONE HYDROCHLORIDE 5 MILLIGRAM(S): 5 TABLET ORAL at 19:05

## 2020-02-15 RX ADMIN — HYDROMORPHONE HYDROCHLORIDE 0.5 MILLIGRAM(S): 2 INJECTION INTRAMUSCULAR; INTRAVENOUS; SUBCUTANEOUS at 16:40

## 2020-02-15 RX ADMIN — HYDROMORPHONE HYDROCHLORIDE 0.5 MILLIGRAM(S): 2 INJECTION INTRAMUSCULAR; INTRAVENOUS; SUBCUTANEOUS at 17:17

## 2020-02-15 RX ADMIN — FENTANYL CITRATE 50 MICROGRAM(S): 50 INJECTION INTRAVENOUS at 16:42

## 2020-02-15 RX ADMIN — SODIUM CHLORIDE 125 MILLILITER(S): 9 INJECTION INTRAMUSCULAR; INTRAVENOUS; SUBCUTANEOUS at 19:05

## 2020-02-15 RX ADMIN — HYDROMORPHONE HYDROCHLORIDE 0.5 MILLIGRAM(S): 2 INJECTION INTRAMUSCULAR; INTRAVENOUS; SUBCUTANEOUS at 22:46

## 2020-02-15 RX ADMIN — Medication 975 MILLIGRAM(S): at 22:41

## 2020-02-15 RX ADMIN — Medication 1000 MILLIGRAM(S): at 16:40

## 2020-02-15 RX ADMIN — SODIUM CHLORIDE 3 MILLILITER(S): 9 INJECTION INTRAMUSCULAR; INTRAVENOUS; SUBCUTANEOUS at 22:43

## 2020-02-15 RX ADMIN — HYDROMORPHONE HYDROCHLORIDE 1 MILLIGRAM(S): 2 INJECTION INTRAMUSCULAR; INTRAVENOUS; SUBCUTANEOUS at 11:30

## 2020-02-15 RX ADMIN — OXYCODONE AND ACETAMINOPHEN 2 TABLET(S): 5; 325 TABLET ORAL at 00:45

## 2020-02-15 RX ADMIN — GABAPENTIN 300 MILLIGRAM(S): 400 CAPSULE ORAL at 06:31

## 2020-02-15 RX ADMIN — FENTANYL CITRATE 50 MICROGRAM(S): 50 INJECTION INTRAVENOUS at 16:10

## 2020-02-15 RX ADMIN — Medication 400 MILLIGRAM(S): at 16:21

## 2020-02-15 RX ADMIN — SODIUM CHLORIDE 75 MILLILITER(S): 9 INJECTION, SOLUTION INTRAVENOUS at 00:14

## 2020-02-15 RX ADMIN — HYDROMORPHONE HYDROCHLORIDE 1 MILLIGRAM(S): 2 INJECTION INTRAMUSCULAR; INTRAVENOUS; SUBCUTANEOUS at 10:49

## 2020-02-15 RX ADMIN — Medication 975 MILLIGRAM(S): at 07:23

## 2020-02-15 RX ADMIN — OXYCODONE AND ACETAMINOPHEN 2 TABLET(S): 5; 325 TABLET ORAL at 06:32

## 2020-02-15 NOTE — PROGRESS NOTE ADULT - SUBJECTIVE AND OBJECTIVE BOX
Patient seen and examined at bedside. Patient reports that she has ankle pain and otherwise is feeling improved. Denies chest pain, shortness of breath, nausea or vomiting.     PE:  Vital Signs Last 24 Hrs  T(C): 36.4 (02-15-20 @ 11:00), Max: 36.8 (02-14-20 @ 13:41)  T(F): 97.6 (02-15-20 @ 11:00), Max: 98.3 (02-14-20 @ 13:41)  HR: 75 (02-15-20 @ 11:00) (64 - 89)  BP: 115/68 (02-15-20 @ 11:00) (82/46 - 129/72)  BP(mean): 79 (02-15-20 @ 11:00) (66 - 88)  RR: 17 (02-15-20 @ 11:00) (14 - 19)  SpO2: 93% (02-15-20 @ 11:00) (93% - 99%)    General: NAD, resting comfortably in bed  R LE:   trilam splint in place, windowed skin still swollen not wrinling  SCDs present contralaterally  Compartments soft and compressible  No calf tenderness bilaterally  +TA/EHL/FHL/GSC  SILT L3-S1  2+ DP/PT                          8.5    5.00  )-----------( 161      ( 15 Feb 2020 07:56 )             26.5     15 Feb 2020 07:56    143    |  114    |  34     ----------------------------<  113    5.0     |  26     |  1.59     Ca    8.4        15 Feb 2020 07:56    TPro  6.6    /  Alb  3.1    /  TBili  0.2    /  DBili  x      /  AST  29     /  ALT  23     /  AlkPhos  84     14 Feb 2020 14:33    PT/INR - ( 15 Feb 2020 07:56 )   PT: 11.9 sec;   INR: 1.07 ratio         PTT - ( 15 Feb 2020 07:56 )  PTT:30.3 sec    A/P:  59y f w/ right ankle fracture to OR today for exfix with Dr. Rogers  -PT/OT - NWB RLE  -Pain Control  -DVT ppx on hold  -NPO/IVF  -med cleared, please continue to document or clearance  -FU AM Labs  -Rest, ice, compress and elevate the extremity as we needed  -Incentive Spirometry  -Medical management appreciated    Ortho

## 2020-02-15 NOTE — DISCHARGE NOTE PROVIDER - REASON FOR ADMISSION
ARF  concern for ATN  Hyperkalemia  Chronic back pain  Increasing anemia  Melena/GI bleed  Left sided Colitis with diarrhea, dehydration  ankle fx after syncope and fall yesterday

## 2020-02-15 NOTE — DISCHARGE NOTE PROVIDER - NSDCMRMEDTOKEN_GEN_ALL_CORE_FT
Centrum Women&#x27;s oral tablet: 1 tab(s) orally once a day  cloNIDine 0.1 mg oral tablet: 1 tab(s) orally 3 times a day  diclofenac sodium 50 mg oral delayed release tablet: 1 tab(s) orally 2 times a day  enalapril 10 mg oral tablet: 1 tab(s) orally once a day  gabapentin 300 mg oral capsule: 1 cap(s) orally 3 times a day  PARoxetine 30 mg oral tablet: 2 tab(s) orally once a day  Percocet 5/325 oral tablet: 1 tab(s) orally 2 times a day MDD:2  traZODone 150 mg oral tablet: 1 tab(s) orally once a day (at bedtime) ALPRAZolam 0.25 mg oral tablet: 1 tab(s) orally every 6 hours, As needed, anxiety  aluminum hydroxide-magnesium hydroxide 200 mg-200 mg/5 mL oral suspension: 30 milliliter(s) orally 4 times a day, As needed, Indigestion  aspirin 81 mg oral delayed release tablet: 1 tab(s) orally once a day  atorvastatin 40 mg oral tablet: 1 tab(s) orally once a day (at bedtime)  bisacodyl 10 mg rectal suppository: 1 suppository(ies) rectal once a day, As needed, If no bowel movement by postoperative day #2  Centrum Women&#x27;s oral tablet: 1 tab(s) orally once a day  cyclobenzaprine 5 mg oral tablet: 1 tab(s) orally 3 times a day, As needed, Muscle Spasm  enoxaparin: 40 milligram(s) subcutaneous once a day 4 weeks depending on weight bearing status  ferrous sulfate 325 mg (65 mg elemental iron) oral tablet: 1 tab(s) orally once a day  furosemide 20 mg oral tablet: 1 tab(s) orally once a day  gabapentin 300 mg oral capsule: 1 cap(s) orally 3 times a day  HYDROmorphone 2 mg oral tablet: 1 tab(s) orally every 4 hours, As needed, Moderate Pain (4 - 6)  HYDROmorphone 4 mg oral tablet: 1 tab(s) orally every 3 hours, As needed, Severe Pain (7 - 10)  ipratropium-albuterol 0.5 mg-2.5 mg/3 mLinhalation solution: 3 milliliter(s) inhaled every 6 hours, As needed, Wheezing  lisinopril 5 mg oral tablet: 1 tab(s) orally once a day (at bedtime)  magnesium hydroxide 8% oral suspension: 30 milliliter(s) orally once a day, As needed, Constipation  metoprolol succinate 25 mg oral tablet, extended release: 1 tab(s) orally once a day  ondansetron 2 mg/mL injectable solution: 4 milligram(s) injectable every 6 hours, As Needed nausea  pantoprazole 40 mg oral delayed release tablet: 1 tab(s) orally every 12 hours  PARoxetine 30 mg oral tablet: 2 tab(s) orally once a day  polyethylene glycol 3350 oral powder for reconstitution: 17 gram(s) orally every 12 hours  senna oral tablet: 2 tab(s) orally once a day (at bedtime)  traZODone 150 mg oral tablet: 1 tab(s) orally once a day (at bedtime)  Tylenol 8 Hour 650 mg oral tablet, extended release: 1 tab(s) orally every 8 hours, As Needed for mild pain

## 2020-02-15 NOTE — CONSULT NOTE ADULT - ASSESSMENT
Pt is a 58 y/o female with a PMHx of anxiety/ depression, asthma,  HTN on enalapril, spinal stenosis, osteoarthritis, seasonal allergies who was sent to the ED by Dr. Knight today for ankle injury requiring surgery.  Pt  c/o right ankle pain after a fall yesterday, for which she was splinted at [facility] ED.   Pt also had 5 episodes of diarrhea 2 days ago with LLQ pain which improved yesterday.   Yesterday morning she was NPO for planned epidural procedure for pain control .  At 11 am she was getting dressed yesterday and while putting on earrings she had unwitnessed LOC and  fell from standing , injuring her right ankle.  She denies preceding cpain/SOB/palpitations/dizziness.  She does not recall tripping over anything.        Pt reports she stopped taking motrin 600mg BID , approximately 5 days  ago for the planned epidural procedure for pain control.  She was on mobic until January 2020.  She has noticed increasingly dark stools over the last 1-2 weeks.    She also reports some difficulty swallowing solids for 2-3 weeks.      In the ED , pt is found to have ARF, hyperkalemia, anemia and hypotension to 85/38.  Pt reported she may have accidentally taken an extra dose of her Clonidine today.     Pt w/o any renal issue in past admitted w BRIGITTE, in setting of NSAID use, ankle fx resulting from syncope, anemia, GI blood loss, all of which may be explained by NSAID use, Motrin and long term Mobic  Pt is to go to OR today..  Creat is already improving  Labs, meds reviewed  d/w Ortho resident    A/P  BRIGITTE due to short term ibuprofen and long term meloxicam use  exacerbated by hypotension, GI bleed  Will cont with hydration, and follow labs,   No contraindication to Ortho surgery  No NSAIDs post op  Labs AM

## 2020-02-15 NOTE — DISCHARGE NOTE PROVIDER - PROVIDER TOKENS
PROVIDER:[TOKEN:[[id number]],FOLLOWUP:[1-3 days]],PROVIDER:[TOKEN:[[id number]],FOLLOWUP:[1 week]],PROVIDER:[TOKEN:[[id number]],FOLLOWUP:[2 weeks]],PROVIDER:[TOKEN:[[id number]],FOLLOWUP:[1 week]]

## 2020-02-15 NOTE — DISCHARGE NOTE PROVIDER - CARE PROVIDERS DIRECT ADDRESSES
[email or web address],DirectAddress_Unknown,[email or web address]

## 2020-02-15 NOTE — CONSULT NOTE ADULT - ASSESSMENT
Imp:  Melena and anemia, which is presumably NSAID related, but not actively bleeding    Rec:  OK for OR by me  protonix  I think EGD is appropriate but potentially outpt if H/H stable otherwise next week.

## 2020-02-15 NOTE — CONSULT NOTE ADULT - SUBJECTIVE AND OBJECTIVE BOX
HPI:  Pt is a 60 y/o female with a PMHx of anxiety/ depression, asthma,  HTN on enalapril, spinal stenosis, osteoarthritis, seasonal allergies who was sent to the ED by Dr. Knight today for ankle injury requiring surgery.  Pt  c/o right ankle pain after a fall yesterday, for which she was splinted at [facility] ED.   Pt also had 5 episodes of diarrhea 2 days ago with LLQ pain which improved yesterday.   Yesterday morning she was NPO for planned epidural procedure for pain control .  At 11 am she was getting dressed yesterday and while putting on earrings she had unwitnessed LOC and  fell from standing , injuring her right ankle.  She denies preceding cpain/SOB/palpitations/dizziness.  She does not recall tripping over anything.        Pt reports she stopped taking motrin 600mg BID , approximately 5 days  ago for the planned epidural procedure for pain control.  She was on mobic until 2020.  She has noticed increasingly dark stools over the last 1-2 weeks.    She also reports some difficulty swallowing solids for 2-3 weeks.        In the ED , pt is found to have ARF, hyperkalemia, anemia and hypotension to 85/38.  Pt reported she may have accidentally taken an extra dose of her Clonidine today.     Fam HX:   Father DM, had CVa  Mother COPD  Brother  at 65 of alcoholism  Sister has lung ca (2020 18:56)  --------------------  No bleeding or diarrhea for several days  Last colonoscopy about 4 years ago in [place].    PAST MEDICAL & SURGICAL HISTORY:  Hypertension  Anxiety and depression  Seasonal allergies  Asthma  Osteoarthritis  S/P hip replacement: left  S/P tubal ligation  S/P cholecystectomy      Home Medications:  Centrum Women&#x27;s oral tablet: 1 tab(s) orally once a day (2020 22:40)  cloNIDine 0.1 mg oral tablet: 1 tab(s) orally 3 times a day (2020 22:40)  diclofenac sodium 50 mg oral delayed release tablet: 1 tab(s) orally 2 times a day (2020 22:40)  enalapril 10 mg oral tablet: 1 tab(s) orally once a day (2020 22:40)  gabapentin 300 mg oral capsule: 1 cap(s) orally 3 times a day (2020 22:40)  PARoxetine 30 mg oral tablet: 2 tab(s) orally once a day (2020 22:40)  traZODone 150 mg oral tablet: 1 tab(s) orally once a day (at bedtime) (2020 22:40)      MEDICATIONS  (STANDING):  cloNIDine 0.1 milliGRAM(s) Oral three times a day  dextrose 5% + sodium chloride 0.9%. 1000 milliLiter(s) (100 mL/Hr) IV Continuous <Continuous>  gabapentin 300 milliGRAM(s) Oral three times a day  influenza   Vaccine 0.5 milliLiter(s) IntraMuscular once  lactated ringers. 1000 milliLiter(s) (75 mL/Hr) IV Continuous <Continuous>  lactated ringers. 1000 milliLiter(s) (75 mL/Hr) IV Continuous <Continuous>  lidocaine   Patch 1 Patch Transdermal daily  multivitamin/minerals/iron Oral Solution (CENTRUM) 15 milliLiter(s) Oral daily  PARoxetine 30 milliGRAM(s) Oral daily  sodium chloride 0.9%. 1000 milliLiter(s) (100 mL/Hr) IV Continuous <Continuous>  traZODone 150 milliGRAM(s) Oral at bedtime    MEDICATIONS  (PRN):  acetaminophen   Tablet .. 650 milliGRAM(s) Oral every 6 hours PRN Mild Pain (1 - 3)  HYDROmorphone  Injectable 1 milliGRAM(s) IV Push every 4 hours PRN Severe Pain (7 - 10)  oxycodone    5 mG/acetaminophen 325 mG 1 Tablet(s) Oral every 4 hours PRN Moderate Pain (4 - 6)      Allergies    No Known Allergies    Intolerances        SOCIAL HISTORY:    FAMILY HISTORY:  Family history of COPD (chronic obstructive pulmonary disease)  Family history of CVA      ROS  As above  Otherwise unremarkable    Vital Signs Last 24 Hrs  T(C): 36.6 (15 Feb 2020 08:00), Max: 36.8 (2020 13:41)  T(F): 97.9 (15 Feb 2020 08:00), Max: 98.3 (2020 13:41)  HR: 73 (15 Feb 2020 10:00) (64 - 89)  BP: 113/65 (15 Feb 2020 10:00) (82/46 - 129/72)  BP(mean): 83 (2020 17:15) (66 - 88)  RR: 16 (15 Feb 2020 10:00) (14 - 19)  SpO2: 97% (15 Feb 2020 08:00) (95% - 99%)    Constitutional: NAD, well-developed  Respiratory: CTAB  Cardiovascular: S1 and S2, RRR  Gastrointestinal: BS+, soft, NT/ND  Extremities: No peripheral edema  Psychiatric: Normal mood, normal affect  Skin: No rashes    LABS:                        8.5    5.00  )-----------( 161      ( 15 Feb 2020 07:56 )             26.5     02-15    143  |  114<H>  |  34<H>  ----------------------------<  113<H>  5.0   |  26  |  1.59<H>    Ca    8.4<L>      15 Feb 2020 07:56    TPro  6.6  /  Alb  3.1<L>  /  TBili  0.2  /  DBili  x   /  AST  29  /  ALT  23  /  AlkPhos  84  02-14    PT/INR - ( 15 Feb 2020 07:56 )   PT: 11.9 sec;   INR: 1.07 ratio         PTT - ( 15 Feb 2020 07:56 )  PTT:30.3 sec  LIVER FUNCTIONS - ( 2020 14:33 )  Alb: 3.1 g/dL / Pro: 6.6 gm/dL / ALK PHOS: 84 U/L / ALT: 23 U/L / AST: 29 U/L / GGT: x             RADIOLOGY & ADDITIONAL STUDIES:

## 2020-02-15 NOTE — DISCHARGE NOTE PROVIDER - NSDCCPCAREPLAN_GEN_ALL_CORE_FT
PRINCIPAL DISCHARGE DIAGNOSIS  Diagnosis: Ankle fracture  Assessment and Plan of Treatment: rheabilitation at [facility], NWB on RLE , follow up with Dr. Knight within 1 week , dvt prophylaxis for 4 weeks until ambulatory status improves      SECONDARY DISCHARGE DIAGNOSES  Diagnosis: Acute on chronic systolic heart failure  Assessment and Plan of Treatment: 1500 cc fluids restriction a day, take lasix 20 mg, check weight daily, if increased by 2-3 lb a day, adjust the dose as  needed, follow up with cardiologist within 1 week    Diagnosis: Takotsubo cardiomyopathy  Assessment and Plan of Treatment: Takotsubo cardiomyopathy    Diagnosis: Anemia  Assessment and Plan of Treatment: take iron daily, repeat CBC in 1 week , s/p EGD , follow up with GI within 1 week for further care

## 2020-02-15 NOTE — PROGRESS NOTE ADULT - SUBJECTIVE AND OBJECTIVE BOX
HPI: 59/F with PMHx of anxiety/ depression, asthma,  HTN on enalapril, spinal stenosis, osteoarthritis, seasonal allergies who was sent to the ED by Dr. Knight today for ankle injury requiring surgery.  Pt  c/o right ankle pain after a fall on , for which she was splinted at [facility] ED.   She states that  she was NPO for planned epidural procedure for pain control .  At 11 am she was getting dressed  and while putting on earrings she had unwitnessed LOC and  fell from standing , injuring her right ankle.  She does not remember the incident. She states that she was standing and next thing she found was that she was on floor with her ankle was twisted. She denies preceding cpain/SOB/palpitations/dizziness.  She does not recall tripping over anything.        Pt also had 5 episodes of diarrhea 2 days ago with LLQ pain which improved.     Pt reports she stopped taking motrin 600mg BID , approximately 5 days  ago for the planned epidural procedure for pain control.  She was on mobic until 2020.      She has noticed increasingly dark stools over the last 1-2 weeks.    She also reports some difficulty swallowing solids for 2-3 weeks.          2/15: Cr improving  BP was low, gave 1 L NS bolus, better; switched to D5NS at 100 ml/hr  Pt had syncopal episode leading to her right ankle Fx.  Hb better post transfusion      PHYSICAL EXAM:    Daily Height in cm: 167.64 (2020 13:38)    T(C): 36.4 (15 Feb 2020 11:00), Max: 36.8 (2020 13:41)  T(F): 97.6 (15 Feb 2020 11:00), Max: 98.3 (2020 13:41)  HR: 75 (15 Feb 2020 11:00) (64 - 89)  BP: 115/68 (15 Feb 2020 11:00) (82/46 - 129/72)  BP(mean): 79 (15 Feb 2020 11:00) (66 - 88)  RR: 17 (15 Feb 2020 11:00) (14 - 19)  SpO2: 93% (15 Feb 2020 11:00) (93% - 99%)      Constitutional: Well appearing  HEENT: Atraumatic, DEREK, Normal, No congestion  Respiratory: Breath Sounds normal, no rhonchi/wheeze  Cardiovascular: N S1S2;   Gastrointestinal: Abdomen soft, non tender, Bowel Sounds present  Extremities: No edema, peripheral pulses present; right ankle dressed  Neurological: AAO x 3, no gross focal motor deficits  Skin: Non cellulitic, no rash, ulcers  Lymph Nodes: No lymphadenopathy noted  Back: No CVA tenderness   Musculoskeletal: non tender  Breasts: Deferred  Genitourinary: deferred  Rectal: Deferred                          8.5    5.00  )-----------( 161      ( 15 Feb 2020 07:56 )             26.5       CBC Full  -  ( 15 Feb 2020 07:56 )  WBC Count : 5.00 K/uL  RBC Count : 2.72 M/uL  Hemoglobin : 8.5 g/dL  Hematocrit : 26.5 %  Platelet Count - Automated : 161 K/uL  Mean Cell Volume : 97.4 fl  Mean Cell Hemoglobin : 31.3 pg  Mean Cell Hemoglobin Concentration : 32.1 gm/dL  Auto Neutrophil # : 2.73 K/uL  Auto Lymphocyte # : 1.50 K/uL  Auto Monocyte # : 0.55 K/uL  Auto Eosinophil # : 0.20 K/uL  Auto Basophil # : 0.01 K/uL  Auto Neutrophil % : 54.6 %  Auto Lymphocyte % : 30.0 %  Auto Monocyte % : 11.0 %  Auto Eosinophil % : 4.0 %  Auto Basophil % : 0.2 %      02-15    143  |  114<H>  |  34<H>  ----------------------------<  113<H>  5.0   |  26  |  1.59<H>    Ca    8.4<L>      15 Feb 2020 07:56    TPro  6.6  /  Alb  3.1<L>  /  TBili  0.2  /  DBili  x   /  AST  29  /  ALT  23  /  AlkPhos  84  02-14      LIVER FUNCTIONS - ( 2020 14:33 )  Alb: 3.1 g/dL / Pro: 6.6 gm/dL / ALK PHOS: 84 U/L / ALT: 23 U/L / AST: 29 U/L / GGT: x             PT/INR - ( 15 Feb 2020 07:56 )   PT: 11.9 sec;   INR: 1.07 ratio         PTT - ( 15 Feb 2020 07:56 )  PTT:30.3 sec    CARDIAC MARKERS ( 2020 14:33 )  <0.015 ng/mL / x     / 430 U/L / x     / x            Urinalysis Basic - ( 2020 22:21 )    Color: Yellow / Appearance: Clear / S.015 / pH: x  Gluc: x / Ketone: Negative  / Bili: Negative / Urobili: Negative mg/dL   Blood: x / Protein: Negative mg/dL / Nitrite: Negative   Leuk Esterase: Negative / RBC: x / WBC x   Sq Epi: x / Non Sq Epi: x / Bacteria: x            MEDICATIONS  (STANDING):  ciprofloxacin   IVPB 400 milliGRAM(s) IV Intermittent once  ciprofloxacin   IVPB      cloNIDine 0.1 milliGRAM(s) Oral three times a day  dextrose 5% + sodium chloride 0.9%. 1000 milliLiter(s) (100 mL/Hr) IV Continuous <Continuous>  gabapentin 300 milliGRAM(s) Oral three times a day  influenza   Vaccine 0.5 milliLiter(s) IntraMuscular once  lidocaine   Patch 1 Patch Transdermal daily  metroNIDAZOLE  IVPB      metroNIDAZOLE  IVPB 500 milliGRAM(s) IV Intermittent once  metroNIDAZOLE  IVPB 500 milliGRAM(s) IV Intermittent every 8 hours  multivitamin/minerals/iron Oral Solution (CENTRUM) 15 milliLiter(s) Oral daily  pantoprazole  Injectable 40 milliGRAM(s) IV Push two times a day  PARoxetine 30 milliGRAM(s) Oral daily  traZODone 150 milliGRAM(s) Oral at bedtime HPI: 59/F with PMHx of anxiety/ depression, asthma,  HTN on enalapril, spinal stenosis, osteoarthritis, seasonal allergies who was sent to the ED by Dr. Knight for ankle injury requiring surgery.  Pt  c/o right ankle pain after a fall on , for which she was splinted at [facility] ED.   She states that  she was NPO for planned epidural procedure for pain control .  At 11 am she was getting dressed  and while putting on earrings she had unwitnessed LOC and  fell from standing , injuring her right ankle.  She does not remember the incident. She states that she was standing and next thing she found was that she was on floor with her ankle was twisted. She denies preceding cpain/SOB/palpitations/dizziness.  She does not recall tripping over anything.        Pt also had 5 episodes of diarrhea 2 days ago with LLQ pain which improved.     Pt reports she stopped taking motrin 600mg BID , approximately 5 days  ago for the planned epidural procedure for pain control.  She was on mobic until 2020.      She has noticed increasingly dark stools over the last 1-2 weeks.    She also reports some difficulty swallowing solids for 2-3 weeks.          2/15: Cr improving  BP was low, gave 1 L NS bolus, better; switched to D5NS at 100 ml/hr  Pt had syncopal episode leading to her right ankle Fx.  Hb better post transfusion      PHYSICAL EXAM:    Daily Height in cm: 167.64 (2020 13:38)    T(C): 36.4 (15 Feb 2020 11:00), Max: 36.8 (2020 13:41)  T(F): 97.6 (15 Feb 2020 11:00), Max: 98.3 (2020 13:41)  HR: 75 (15 Feb 2020 11:00) (64 - 89)  BP: 115/68 (15 Feb 2020 11:00) (82/46 - 129/72)  BP(mean): 79 (15 Feb 2020 11:00) (66 - 88)  RR: 17 (15 Feb 2020 11:00) (14 - 19)  SpO2: 93% (15 Feb 2020 11:00) (93% - 99%)      Constitutional: Well appearing  HEENT: Atraumatic, DEREK, Normal, No congestion  Respiratory: Breath Sounds normal, no rhonchi/wheeze  Cardiovascular: N S1S2;   Gastrointestinal: Abdomen soft, non tender, Bowel Sounds present  Extremities: No edema, peripheral pulses present; right ankle dressed  Neurological: AAO x 3, no gross focal motor deficits  Skin: Non cellulitic, no rash, ulcers  Lymph Nodes: No lymphadenopathy noted  Back: No CVA tenderness   Musculoskeletal: non tender  Breasts: Deferred  Genitourinary: deferred  Rectal: Deferred                          8.5    5.00  )-----------( 161      ( 15 Feb 2020 07:56 )             26.5       CBC Full  -  ( 15 Feb 2020 07:56 )  WBC Count : 5.00 K/uL  RBC Count : 2.72 M/uL  Hemoglobin : 8.5 g/dL  Hematocrit : 26.5 %  Platelet Count - Automated : 161 K/uL  Mean Cell Volume : 97.4 fl  Mean Cell Hemoglobin : 31.3 pg  Mean Cell Hemoglobin Concentration : 32.1 gm/dL  Auto Neutrophil # : 2.73 K/uL  Auto Lymphocyte # : 1.50 K/uL  Auto Monocyte # : 0.55 K/uL  Auto Eosinophil # : 0.20 K/uL  Auto Basophil # : 0.01 K/uL  Auto Neutrophil % : 54.6 %  Auto Lymphocyte % : 30.0 %  Auto Monocyte % : 11.0 %  Auto Eosinophil % : 4.0 %  Auto Basophil % : 0.2 %      02-15    143  |  114<H>  |  34<H>  ----------------------------<  113<H>  5.0   |  26  |  1.59<H>    Ca    8.4<L>      15 Feb 2020 07:56    TPro  6.6  /  Alb  3.1<L>  /  TBili  0.2  /  DBili  x   /  AST  29  /  ALT  23  /  AlkPhos  84  02-14      LIVER FUNCTIONS - ( 2020 14:33 )  Alb: 3.1 g/dL / Pro: 6.6 gm/dL / ALK PHOS: 84 U/L / ALT: 23 U/L / AST: 29 U/L / GGT: x             PT/INR - ( 15 Feb 2020 07:56 )   PT: 11.9 sec;   INR: 1.07 ratio         PTT - ( 15 Feb 2020 07:56 )  PTT:30.3 sec    CARDIAC MARKERS ( 2020 14:33 )  <0.015 ng/mL / x     / 430 U/L / x     / x            Urinalysis Basic - ( 2020 22:21 )    Color: Yellow / Appearance: Clear / S.015 / pH: x  Gluc: x / Ketone: Negative  / Bili: Negative / Urobili: Negative mg/dL   Blood: x / Protein: Negative mg/dL / Nitrite: Negative   Leuk Esterase: Negative / RBC: x / WBC x   Sq Epi: x / Non Sq Epi: x / Bacteria: x            MEDICATIONS  (STANDING):  ciprofloxacin   IVPB 400 milliGRAM(s) IV Intermittent once  ciprofloxacin   IVPB      cloNIDine 0.1 milliGRAM(s) Oral three times a day  dextrose 5% + sodium chloride 0.9%. 1000 milliLiter(s) (100 mL/Hr) IV Continuous <Continuous>  gabapentin 300 milliGRAM(s) Oral three times a day  influenza   Vaccine 0.5 milliLiter(s) IntraMuscular once  lidocaine   Patch 1 Patch Transdermal daily  metroNIDAZOLE  IVPB      metroNIDAZOLE  IVPB 500 milliGRAM(s) IV Intermittent once  metroNIDAZOLE  IVPB 500 milliGRAM(s) IV Intermittent every 8 hours  multivitamin/minerals/iron Oral Solution (CENTRUM) 15 milliLiter(s) Oral daily  pantoprazole  Injectable 40 milliGRAM(s) IV Push two times a day  PARoxetine 30 milliGRAM(s) Oral daily  traZODone 150 milliGRAM(s) Oral at bedtime

## 2020-02-15 NOTE — PROGRESS NOTE ADULT - ASSESSMENT
59/F with PMHx of anxiety/ depression, asthma,  HTN on enalapril, spinal stenosis, osteoarthritis, seasonal allergies admitted with     1) Syncope: no prior hx of syncope  admit to tele  r/o cardiac vs vasovagal vs orthostatic causes  Echo  tele monitoring  serial cardiac enz; 1st trop neg  cardio consult; spoke with Dr. Palla  tele shows NSR  repeat EKG NSR    2) Right Ankle Fx:   needs sx intervention as per Ortho  pain meds prn  would need cardiac work up and clearance prior to Surgery    3) ARF: likely sec to NSAIDS  d/c them  cont iv fluids  renal consult  Cr trending down  renal panel in am    4) Melena and Dark colored stools + Acute Anemia of acte hemorrhage from GI bleed:  likely from NSAIDS   d/c NSAIDS  cont Protonix  NPO except meds for now  GI consult appreciated  s/p PRBC 2/15  H/H q 8 hrs; transfuse for Hb less than 8  EGD and colonoscopy    5) Left sided Colitis: start cipro + flagyl iv  monitor closely  would need colonoscopy in 4-6 weeks to r/o colon CA  GI f/u    6) Hypotension: Better after fluid bolus  cont D5NS at 100 ml/hr    7) Hyperkalemia 6.4: resolved with Rx  monitor closely    8) Chronic Back Pain: no NSAIDS    9) DVT PPX: venodynes      poc discussed with pt. team, Dr. Palla, Ortho Resident, Dr. Fine. She would need cardiac clearance prior to OR.    Very Prolonged visit 80 min

## 2020-02-15 NOTE — CONSULT NOTE ADULT - SUBJECTIVE AND OBJECTIVE BOX
CHIEF COMPLAINT:    HPI:  Pt is a 58 y/o female with a PMHx of anxiety/ depression, asthma,  HTN on enalapril, spinal stenosis, osteoarthritis, seasonal allergies who was sent to the ED by Dr. Knight today for ankle injury requiring surgery.  Pt  c/o right ankle pain after a fall yesterday, for which she was splinted at [facility] ED.   Pt also had 5 episodes of diarrhea 2 days ago with LLQ pain which improved yesterday.   Yesterday morning she was NPO for planned epidural procedure for pain control .  At 11 am she was getting dressed yesterday and while putting on earrings she had unwitnessed LOC and  fell from standing , injuring her right ankle.  She denies preceding cpain/SOB/palpitations/dizziness.  She does not recall tripping over anything.        Pt reports she stopped taking motrin 600mg BID , approximately 5 days  ago for the planned epidural procedure for pain control.  She was on mobic until January 2020.  She has noticed increasingly dark stools over the last 1-2 weeks.    She also reports some difficulty swallowing solids for 2-3 weeks.        In the ED , pt is found to have ARF, hyperkalemia, anemia and hypotension to 85/38.  Pt reported she may have accidentally taken an extra dose of her Clonidine today.           PAST MEDICAL & SURGICAL HISTORY:  Hypertension  Anxiety and depression  Seasonal allergies  Asthma  Osteoarthritis  S/P hip replacement: left  S/P tubal ligation  S/P cholecystectomy      Allergies    No Known Allergies    Intolerances        SOCIAL HISTORY: no smoker     FAMILY HISTORY:  Family history of COPD (chronic obstructive pulmonary disease)  Family history of CVA      MEDICATIONS:  MEDICATIONS  (STANDING):  ciprofloxacin   IVPB 400 milliGRAM(s) IV Intermittent once  ciprofloxacin   IVPB      cloNIDine 0.1 milliGRAM(s) Oral three times a day  dextrose 5% + sodium chloride 0.9%. 1000 milliLiter(s) (100 mL/Hr) IV Continuous <Continuous>  gabapentin 300 milliGRAM(s) Oral three times a day  influenza   Vaccine 0.5 milliLiter(s) IntraMuscular once  lidocaine   Patch 1 Patch Transdermal daily  metroNIDAZOLE  IVPB      metroNIDAZOLE  IVPB 500 milliGRAM(s) IV Intermittent once  metroNIDAZOLE  IVPB 500 milliGRAM(s) IV Intermittent every 8 hours  multivitamin/minerals/iron Oral Solution (CENTRUM) 15 milliLiter(s) Oral daily  pantoprazole  Injectable 40 milliGRAM(s) IV Push two times a day  PARoxetine 30 milliGRAM(s) Oral daily  traZODone 150 milliGRAM(s) Oral at bedtime    MEDICATIONS  (PRN):  acetaminophen   Tablet .. 650 milliGRAM(s) Oral every 6 hours PRN Mild Pain (1 - 3)  HYDROmorphone  Injectable 1 milliGRAM(s) IV Push every 4 hours PRN Severe Pain (7 - 10)  oxycodone    5 mG/acetaminophen 325 mG 1 Tablet(s) Oral every 4 hours PRN Moderate Pain (4 - 6)      REVIEW OF SYSTEMS:  ankle pain   CONSTITUTIONAL: No weakness, fevers or chills  EYES/ENT: No visual changes;  No vertigo or throat pain   NECK: No pain or stiffness  RESPIRATORY: No cough, wheezing, hemoptysis; No shortness of breath  CARDIOVASCULAR: No chest pain or palpitations  GASTROINTESTINAL: No abdominal or epigastric pain. No nausea, vomiting, or hematemesis; No diarrhea or constipation. No melena or hematochezia.  GENITOURINARY: No dysuria, frequency or hematuria  NEUROLOGICAL: No numbness or weakness  SKIN: No itching, burning, rashes, or lesions   All other review of systems is negative unless indicated above    Vital Signs Last 24 Hrs  T(C): 36.4 (15 Feb 2020 11:00), Max: 36.8 (14 Feb 2020 13:41)  T(F): 97.6 (15 Feb 2020 11:00), Max: 98.3 (14 Feb 2020 13:41)  HR: 75 (15 Feb 2020 11:00) (64 - 89)  BP: 115/68 (15 Feb 2020 11:00) (82/46 - 129/72)  BP(mean): 79 (15 Feb 2020 11:00) (66 - 88)  RR: 17 (15 Feb 2020 11:00) (14 - 19)  SpO2: 93% (15 Feb 2020 11:00) (93% - 99%)    I&O's Summary      PHYSICAL EXAM:    Constitutional: NAD, awake and alert, well-developed  HEENT: PERR, EOMI,  No oral cyananosis.  Neck:  supple,  No JVD  Respiratory: Breath sounds are clear bilaterally, No wheezing, rales or rhonchi  Cardiovascular: S1 and S2, regular rate and rhythm, no Murmurs, gallops or rubs  Gastrointestinal: Bowel Sounds present, soft, nontender.   Extremities: No peripheral edema. No clubbing or cyanosis.  Vascular: 2+ peripheral pulses  Neurological: A/O x 3, no focal deficits  Musculoskeletal: no calf tenderness.  Skin: No rashes.      LABS: All Labs Reviewed:                        8.5    5.00  )-----------( 161      ( 15 Feb 2020 07:56 )             26.5                         7.8    x     )-----------( x        ( 14 Feb 2020 21:59 )             23.8                         8.8    6.53  )-----------( 224      ( 14 Feb 2020 14:33 )             27.4     15 Feb 2020 07:56    143    |  114    |  34     ----------------------------<  113    5.0     |  26     |  1.59   14 Feb 2020 21:59    139    |  109    |  46     ----------------------------<  63     4.4     |  25     |  2.15   14 Feb 2020 14:33    136    |  105    |  47     ----------------------------<  107    6.4     |  26     |  2.62     Ca    8.4        15 Feb 2020 07:56  Ca    8.8        14 Feb 2020 21:59  Ca    9.3        14 Feb 2020 14:33    TPro  6.6    /  Alb  3.1    /  TBili  0.2    /  DBili  x      /  AST  29     /  ALT  23     /  AlkPhos  84     14 Feb 2020 14:33    PT/INR - ( 15 Feb 2020 07:56 )   PT: 11.9 sec;   INR: 1.07 ratio         PTT - ( 15 Feb 2020 07:56 )  PTT:30.3 sec  CARDIAC MARKERS ( 14 Feb 2020 14:33 )  <0.015 ng/mL / x     / 430 U/L / x     / x          Blood Culture:         RADIOLOGY/EKG:  normal sinus rhythm  low qrs voltage     2/15/20  no significant change   monitor sinus rhythm       echo prelminarr normal LVE RVEF

## 2020-02-15 NOTE — DISCHARGE NOTE PROVIDER - CARE PROVIDER_API CALL
Courtney Gibbons)  Family Medicine  [address] [place] [address]  Phone: [phone]  Fax: [phone]  Follow Up Time: 1-3 days    Sixto Hoover)  Cardiology  [address] [place] [address]  Phone: [phone]  Fax: [phone]  Follow Up Time: 1 week    Ken Mcknight)  Gastroenterology; Internal Medicine  [address] [address] [facility] [address], [address]  [place] [address]  Phone: [phone]  Fax: [phone]  Follow Up Time: 2 weeks    Mohamud Knight (DO)  Orthopaedic Surgery  [address] [place] [address]  Phone: [phone]  Fax: [phone]  Follow Up Time: 1 week

## 2020-02-15 NOTE — CONSULT NOTE ADULT - PROBLEM SELECTOR RECOMMENDATION 3
Patient with above hx who had syncope on thursday due to dehydration, with negative troponin  no arrhythmia  normal ventricular systolic function   who appears optimal for surgery cardiac wise , intermediate risk for low risk surgery  will need follow up monitoring

## 2020-02-15 NOTE — PROGRESS NOTE ADULT - SUBJECTIVE AND OBJECTIVE BOX
Ortho Postop Check    Postop Check    Patient tolerated the procedure well. Patient seen and examined at bedside.  No acute complaints at this time. Reports pain, but overall well controlled. Denies chest pain, shortness of breath, nausea or vomiting.     PE:  Vital Signs Last 24 Hrs  T(C): 36.8 (02-15-20 @ 17:38), Max: 37 (02-15-20 @ 16:06)  T(F): 98.3 (02-15-20 @ 17:38), Max: 98.6 (02-15-20 @ 16:06)  HR: 78 (02-15-20 @ 17:38) (67 - 85)  BP: 134/78 (02-15-20 @ 17:38) (90/48 - 154/88)  BP(mean): 93 (02-15-20 @ 17:38) (79 - 93)  RR: 14 (02-15-20 @ 17:00) (14 - 19)  SpO2: 96% (02-15-20 @ 17:38) (93% - 100%)    General: NAD, resting comfortably in bed  R LE:   Exfix in place with surrounding dressing c/d/i  SCD in place contralaterally  No calf tenderness contralterally  Able to grossly wiggle toes  Gross sesnsation to toes intact  Cap refill <2 seconds                          9.5    x     )-----------( x        ( 15 Feb 2020 17:58 )             29.9     15 Feb 2020 17:58    142    |  114    |  23     ----------------------------<  113    5.4     |  26     |  1.30     Ca    8.3        15 Feb 2020 17:58    TPro  6.6    /  Alb  3.1    /  TBili  0.2    /  DBili  x      /  AST  29     /  ALT  23     /  AlkPhos  84     14 Feb 2020 14:33    PT/INR - ( 15 Feb 2020 07:56 )   PT: 11.9 sec;   INR: 1.07 ratio         PTT - ( 15 Feb 2020 07:56 )  PTT:30.3 sec    A/P:  59y f s/p exfix right ankle 2/15  -PT/OT - NWB RLE  -Pain Control  -DVT ppx per AC team  -Continue perioperative abx x 24 hours  -FU AM Labs  -Rest, ice, compress and elevate the extremity as we needed  -Incentive Spirometry  -Medical management appreciated  -Ortho stable for Dc    Ortho

## 2020-02-15 NOTE — DISCHARGE NOTE PROVIDER - NSDCFUADDINST_GEN_ALL_CORE_FT
OR[facility] DC Instructions:    1.	Analgesia  2.	Non-Weight Bearing  right lower Extremity, with assistive device/rolling walker as tolerated.  Do not touch bars or try to remove   3.	Continue DVT/PE Prophylaxis. See Med Rec.  4.	PT daily  5.	Follow up with Orthopedic Surgeon Dr Knight in 7-10 Days after Discharge from the Hospital.  Dr Knight will resume care for ankle moving forward. Call Office For Appointment.  6.	The External fixator will be removed and definitive fixation will be planned once skin and soft tissue swelling decreases, repeat x-rays will be taken in the office.  7.	Ice/Elevate the extremity as much as possible  8.	Keep bandage Clean and dry. Change if soiled or wet using Tegaderm and dry gauze.

## 2020-02-15 NOTE — DISCHARGE NOTE PROVIDER - HOSPITAL COURSE
cc - right ankle pain             59 female /F with PMHx of anxiety/ depression, asthma,  HTN on enalapril, spinal stenosis, osteoarthritis, seasonal allergies who admitted on 2/14/20  by Dr. Knight for ankle injury requiring surgery.  Pt  c/o right ankle pain after a fall on 2/12, for which she was splinted at [facility] ED.   She states that  she was NPO for planned epidural procedure for pain control .  At 11 am she was getting dressed  and while putting on earrings she had unwitnessed LOC and  fell from standing , injuring her right ankle.  She does not remember the incident. She states that she was standing and next thing she found was that she was on floor with her ankle was twisted. She denies preceding cpain/SOB/palpitations/dizziness.  She does not recall tripping over anything.   Pt also had 5 episodes of diarrhea 2 days ago with LLQ pain which improved.     Pt reports she stopped taking motrin 600mg BID , approximately 5 days  ago for the planned epidural procedure for pain control.  She was on mobic until January 2020.      She has noticed increasingly dark stools over the last 1-2 weeks.    She also reports some difficulty swallowing solids for 2-3 weeks.             Hospital course :     2/15 - s/p PRBC 1 unit    s/p right ankle External fixator application    2/17 - s/p LHC - normal coronaries, EF 35%    2/18 - 2 d echo - EF 40%, basal and mid segments hypokinetic    2/24 - s/p EGD - normal , lasix decreased as per cardio, multiple vomiting later today    2/25: Bx shows some lymphoid aggregate; no H pylori , pain uncontrolled asking for dilaudid    s/p ORIF right trimalleolar ankle fracture performed intra-op.         2/26 - pt seen and examined, noted low BP in am, denies cp, dyspnea, cough, reports severe right ankle pain 8/10, better on Dilaudid drip, afebrile, no BM , POC discussed     2/27 - pt seen and examined, low BP ( toprol, lisinopril held in am) , patient drinking minimal fluids, + BM, pain better controlled, POC discussed     2/28 - pt seen and examined, BP improved, afebrile, denies cp, dyspnea , abdominal pain, POC discussed         Review of system- Rest of the review of system are negative except mentioned in HPI    T(C): 36.6 (02-28-20 @ 11:43), Max: 36.8 (02-27-20 @ 16:07)    T(F): 97.9 (02-28-20 @ 11:43), Max: 98.2 (02-27-20 @ 16:07)    HR: 99 (02-28-20 @ 11:43) (75 - 99)    BP: 122/90 (02-28-20 @ 11:43) (108/64 - 124/55)    RR: 20 (02-28-20 @ 11:43) (16 - 20)    SpO2: 100% (02-28-20 @ 11:43) (96% - 100%)    Wt(kg): --        Constitutional: Well appearing     HEENT: Atraumatic, DEREK, Normal, No congestion    Respiratory: B/l CTA    Cardiovascular: N S1S2;     Gastrointestinal: abdominal pain, + tenderness on deep palpation    Extremities: No edema, peripheral pulses present; right ankle dressed/ex fix    Neurological: AAO x 3, no gross focal motor deficits    Skin: Non cellulitic, no rash, ulcers    Lymph Nodes: No lymphadenopathy noted    Back: No CVA tenderness     Musculoskeletal: non tender    * Fall, syncope due to orthostatic hypotension cardiac cause ( NSTEMI, Takotsubo cardiomyopathy)     * Elevated troponin due to NSTEMI    * Acute hypoxic respiratory failure with Bilateral pleural effusions    * Acute systolic heart failure due to Takotsubo cardiomyopathy    - s/p tele monitoring    - cardiology work-up      - c/w ASA 81, statin, BB toprol 75--> 25 due to low BP    -  lisinopril 20--> 10 ( due to low BP in am) --> lisinopril 5     - lasix 20      2/17 - s/p LHC - normal coronaries, EF 35%    * Right ankle  trimalleolar fracture    s/p ORIF  and removal external fixator     as per ortho team    - on PCA pump    - transition to po pain management     - c/w gabapentin     -OOB PT NWB RLE, lovenox 40 sq for 6 weeks    *  Anemia, Nausea, vomiting , iron deficiency     * Melena, resolved    * Acute blood loss anemia suspected upper GI bleed, resolved , NSAIDs induced     -s/p EGD  - small lymphoid aggregate  no H pylori    - GI consult    - c/w IV PPI     - s/p PRBC 2/15, start po iron    - check iron studies, B12, folate      * BRIGITTE  due to ATN ( NSAIDs use, hypotension)     - Cr normalized    - monitor while on diuresis    * Pancreatic stranding on CT     - lipase wnl    - asymptomatic, doubt pancreatitis     * Hyperkalemia, resolved    - lower dose of ACEI    * Depression    - c/w SSRI      - trazadone hs     Disposition - medically optimized to be discharged  to [facility] with close follow up with PCP, cardiologist, GI within 1 week and ortho     complete antibiotics    return to ED if fever, abdominal pain, nausea, vomiting, chest pain, dyspnea    Discharge plan discussed with patient, RN    Patient advised to follow up with PCP within 3-7 days    time spend 44 min    Discharge note faxed to PCP with my contact information to call me back

## 2020-02-15 NOTE — CONSULT NOTE ADULT - SUBJECTIVE AND OBJECTIVE BOX
NEPHROLOGY CONSULT  HPI:  Pt is a 58 y/o female with a PMHx of anxiety/ depression, asthma,  HTN on enalapril, spinal stenosis, osteoarthritis, seasonal allergies who was sent to the ED by Dr. Knight today for ankle injury requiring surgery.  Pt  c/o right ankle pain after a fall yesterday, for which she was splinted at [facility] ED.   Pt also had 5 episodes of diarrhea 2 days ago with LLQ pain which improved yesterday.   Yesterday morning she was NPO for planned epidural procedure for pain control .  At 11 am she was getting dressed yesterday and while putting on earrings she had unwitnessed LOC and  fell from standing , injuring her right ankle.  She denies preceding cpain/SOB/palpitations/dizziness.  She does not recall tripping over anything.        Pt reports she stopped taking motrin 600mg BID , approximately 5 days  ago for the planned epidural procedure for pain control.  She was on mobic until 2020.  She has noticed increasingly dark stools over the last 1-2 weeks.    She also reports some difficulty swallowing solids for 2-3 weeks.        In the ED , pt is found to have ARF, hyperkalemia, anemia and hypotension to 85/38.  Pt reported she may have accidentally taken an extra dose of her Clonidine today.     Above obtained from chart, pt interviewed:  Pt w/o any renal issue in past admitted w BRIGITTE, in setting of NSAID use, ankle fx resulting from syncope, anemia, GI blood loss, all of which may be explained by NSAID use   Pt is to go to OR today..  Creat is already improving  Labs, meds reviewed  d/w Ortho resident      Fam HX:  Father DM, had CVa  Mother COPD  Brother  at 65 of alcoholism  Sister has lung ca (2020 18:56)      PAST MEDICAL & SURGICAL HISTORY:  Hypertension  Anxiety and depression  Seasonal allergies  Asthma  Osteoarthritis  S/P hip replacement: left  S/P tubal ligation  S/P cholecystectomy      FAMILY HISTORY:  Family history of COPD (chronic obstructive pulmonary disease)  Family history of CVA      MEDICATIONS  (STANDING):  cloNIDine 0.1 milliGRAM(s) Oral three times a day  dextrose 5% + sodium chloride 0.9%. 1000 milliLiter(s) (100 mL/Hr) IV Continuous <Continuous>  gabapentin 300 milliGRAM(s) Oral three times a day  influenza   Vaccine 0.5 milliLiter(s) IntraMuscular once  lactated ringers. 1000 milliLiter(s) (75 mL/Hr) IV Continuous <Continuous>  lactated ringers. 1000 milliLiter(s) (75 mL/Hr) IV Continuous <Continuous>  lidocaine   Patch 1 Patch Transdermal daily  multivitamin/minerals/iron Oral Solution (CENTRUM) 15 milliLiter(s) Oral daily  pantoprazole  Injectable 40 milliGRAM(s) IV Push two times a day  PARoxetine 30 milliGRAM(s) Oral daily  sodium chloride 0.9%. 1000 milliLiter(s) (100 mL/Hr) IV Continuous <Continuous>  traZODone 150 milliGRAM(s) Oral at bedtime    MEDICATIONS  (PRN):  acetaminophen   Tablet .. 650 milliGRAM(s) Oral every 6 hours PRN Mild Pain (1 - 3)  HYDROmorphone  Injectable 1 milliGRAM(s) IV Push every 4 hours PRN Severe Pain (7 - 10)  oxycodone    5 mG/acetaminophen 325 mG 1 Tablet(s) Oral every 4 hours PRN Moderate Pain (4 - 6)      Allergies    No Known Allergies    Intolerances        I&O's Summary        REVIEW OF SYSTEMS:    CONSTITUTIONAL:  As per HPI.  CONSTITUTIONAL: No weakness, fevers or chills  EYES/ENT: No visual changes;  No vertigo or throat pain   NECK: No pain or stiffness  CARDIOVASCULAR: No chest pain or palpitations  GASTROINTESTINAL: No abdominal or epigastric pain. No nausea, vomiting, or hematemesis; No diarrhea or constipation. No melena or hematochezia.  GENITOURINARY: No dysuria, frequency or hematuria  NEUROLOGICAL: No numbness or weakness  SKIN: No itching, burning, rashes, or lesions   All other review of systems is negative unless indicated above      Vital Signs Last 24 Hrs  T(C): 36.6 (15 Feb 2020 08:00), Max: 36.8 (2020 13:41)  T(F): 97.9 (15 Feb 2020 08:00), Max: 98.3 (2020 13:41)  HR: 73 (15 Feb 2020 10:00) (64 - 89)  BP: 113/65 (15 Feb 2020 10:00) (82/46 - 129/72)  BP(mean): 83 (2020 17:15) (66 - 88)  RR: 16 (15 Feb 2020 10:00) (14 - 19)  SpO2: 97% (15 Feb 2020 08:00) (95% - 99%)  Daily Height in cm: 167.64 (2020 13:38)    Daily   I&O's Summary      PHYSICAL EXAM:    General:  Alert, well-developed ,No acute distress.    Neuro:  Alert and oriented to person, place, and time. Able to communicate  well. Cranial nerves 2-12 grossly intact. 5/5 strength in all  extremities bilaterally. Sensation intact in all extremities.  Appropriate affect.     HEENT:  No JVD, no masses, Eyes anicteric, No carotid bruits.No lymphadenopathy,    Cardiovascular:  Regular rate and rhythm, with normal S1 and S2. No murmurs, rubs,  or gallops. No JVD.     Lungs:  clear. no rales, no wheezing, .    Abdomen:  Normoactive bowel sounds. Soft, flat, non-tender, and non-distended.  No hepatosplenomegaly, positive bowel sounds    Skin:  Warm, dry, well-perfused. No rashes or other lesions.     Extremities:  R leg bandaged    LABS:                        8.5    5.00  )-----------( 161      ( 15 Feb 2020 07:56 )             26.5     02-15    143  |  114<H>  |  34<H>  ----------------------------<  113<H>  5.0   |  26  |  1.59<H>    Ca    8.4<L>      15 Feb 2020 07:56    TPro  6.6  /  Alb  3.1<L>  /  TBili  0.2  /  DBili  x   /  AST  29  /  ALT  23  /  AlkPhos  84  02-14    PT/INR - ( 15 Feb 2020 07:56 )   PT: 11.9 sec;   INR: 1.07 ratio         PTT - ( 15 Feb 2020 07:56 )  PTT:30.3 sec  Urinalysis Basic - ( 2020 22:21 )    Color: Yellow / Appearance: Clear / S.015 / pH: x  Gluc: x / Ketone: Negative  / Bili: Negative / Urobili: Negative mg/dL   Blood: x / Protein: Negative mg/dL / Nitrite: Negative   Leuk Esterase: Negative / RBC: x / WBC x   Sq Epi: x / Non Sq Epi: x / Bacteria: x

## 2020-02-16 DIAGNOSIS — I10 ESSENTIAL (PRIMARY) HYPERTENSION: ICD-10-CM

## 2020-02-16 LAB
ALBUMIN SERPL ELPH-MCNC: 3 G/DL — LOW (ref 3.3–5)
ANION GAP SERPL CALC-SCNC: 6 MMOL/L — SIGNIFICANT CHANGE UP (ref 5–17)
BUN SERPL-MCNC: 16 MG/DL — SIGNIFICANT CHANGE UP (ref 7–23)
CALCIUM SERPL-MCNC: 9.2 MG/DL — SIGNIFICANT CHANGE UP (ref 8.5–10.1)
CHLORIDE SERPL-SCNC: 112 MMOL/L — HIGH (ref 96–108)
CO2 SERPL-SCNC: 24 MMOL/L — SIGNIFICANT CHANGE UP (ref 22–31)
CREAT SERPL-MCNC: 1.14 MG/DL — SIGNIFICANT CHANGE UP (ref 0.5–1.3)
CULTURE RESULTS: SIGNIFICANT CHANGE UP
GLUCOSE SERPL-MCNC: 102 MG/DL — HIGH (ref 70–99)
HCT VFR BLD CALC: 33.2 % — LOW (ref 34.5–45)
HCT VFR BLD CALC: 37.5 % — SIGNIFICANT CHANGE UP (ref 34.5–45)
HCT VFR BLD CALC: 39.7 % — SIGNIFICANT CHANGE UP (ref 34.5–45)
HGB BLD-MCNC: 10.7 G/DL — LOW (ref 11.5–15.5)
HGB BLD-MCNC: 12.3 G/DL — SIGNIFICANT CHANGE UP (ref 11.5–15.5)
HGB BLD-MCNC: 12.6 G/DL — SIGNIFICANT CHANGE UP (ref 11.5–15.5)
MCHC RBC-ENTMCNC: 30.6 PG — SIGNIFICANT CHANGE UP (ref 27–34)
MCHC RBC-ENTMCNC: 32.2 GM/DL — SIGNIFICANT CHANGE UP (ref 32–36)
MCV RBC AUTO: 94.9 FL — SIGNIFICANT CHANGE UP (ref 80–100)
PHOSPHATE SERPL-MCNC: 1.9 MG/DL — LOW (ref 2.5–4.5)
PLATELET # BLD AUTO: 248 K/UL — SIGNIFICANT CHANGE UP (ref 150–400)
POTASSIUM SERPL-MCNC: 4.7 MMOL/L — SIGNIFICANT CHANGE UP (ref 3.5–5.3)
POTASSIUM SERPL-SCNC: 4.7 MMOL/L — SIGNIFICANT CHANGE UP (ref 3.5–5.3)
RBC # BLD: 3.5 M/UL — LOW (ref 3.8–5.2)
RBC # FLD: 13.1 % — SIGNIFICANT CHANGE UP (ref 10.3–14.5)
SODIUM SERPL-SCNC: 142 MMOL/L — SIGNIFICANT CHANGE UP (ref 135–145)
SPECIMEN SOURCE: SIGNIFICANT CHANGE UP
TROPONIN I SERPL-MCNC: 3.42 NG/ML — HIGH (ref 0.01–0.04)
TROPONIN I SERPL-MCNC: 4.49 NG/ML — HIGH (ref 0.01–0.04)
WBC # BLD: 9.56 K/UL — SIGNIFICANT CHANGE UP (ref 3.8–10.5)
WBC # FLD AUTO: 9.56 K/UL — SIGNIFICANT CHANGE UP (ref 3.8–10.5)

## 2020-02-16 PROCEDURE — 99232 SBSQ HOSP IP/OBS MODERATE 35: CPT

## 2020-02-16 PROCEDURE — 71275 CT ANGIOGRAPHY CHEST: CPT | Mod: 26

## 2020-02-16 PROCEDURE — 99233 SBSQ HOSP IP/OBS HIGH 50: CPT

## 2020-02-16 PROCEDURE — 71250 CT THORAX DX C-: CPT | Mod: 26

## 2020-02-16 PROCEDURE — 93010 ELECTROCARDIOGRAM REPORT: CPT

## 2020-02-16 PROCEDURE — 70450 CT HEAD/BRAIN W/O DYE: CPT | Mod: 26

## 2020-02-16 PROCEDURE — 99221 1ST HOSP IP/OBS SF/LOW 40: CPT

## 2020-02-16 PROCEDURE — 93010 ELECTROCARDIOGRAM REPORT: CPT | Mod: 77

## 2020-02-16 PROCEDURE — 99357: CPT

## 2020-02-16 PROCEDURE — 99356: CPT

## 2020-02-16 PROCEDURE — 71045 X-RAY EXAM CHEST 1 VIEW: CPT | Mod: 26

## 2020-02-16 RX ORDER — METOCLOPRAMIDE HCL 10 MG
5 TABLET ORAL ONCE
Refills: 0 | Status: COMPLETED | OUTPATIENT
Start: 2020-02-16 | End: 2020-02-16

## 2020-02-16 RX ORDER — HYDRALAZINE HCL 50 MG
10 TABLET ORAL EVERY 6 HOURS
Refills: 0 | Status: DISCONTINUED | OUTPATIENT
Start: 2020-02-16 | End: 2020-02-27

## 2020-02-16 RX ORDER — HYDROMORPHONE HYDROCHLORIDE 2 MG/ML
1 INJECTION INTRAMUSCULAR; INTRAVENOUS; SUBCUTANEOUS EVERY 4 HOURS
Refills: 0 | Status: DISCONTINUED | OUTPATIENT
Start: 2020-02-16 | End: 2020-02-19

## 2020-02-16 RX ORDER — PROCHLORPERAZINE MALEATE 5 MG
5 TABLET ORAL ONCE
Refills: 0 | Status: COMPLETED | OUTPATIENT
Start: 2020-02-16 | End: 2020-02-16

## 2020-02-16 RX ORDER — ENOXAPARIN SODIUM 100 MG/ML
40 INJECTION SUBCUTANEOUS DAILY
Refills: 0 | Status: DISCONTINUED | OUTPATIENT
Start: 2020-02-16 | End: 2020-02-16

## 2020-02-16 RX ORDER — PIPERACILLIN AND TAZOBACTAM 4; .5 G/20ML; G/20ML
3.38 INJECTION, POWDER, LYOPHILIZED, FOR SOLUTION INTRAVENOUS EVERY 8 HOURS
Refills: 0 | Status: COMPLETED | OUTPATIENT
Start: 2020-02-16 | End: 2020-02-18

## 2020-02-16 RX ORDER — POTASSIUM PHOSPHATE, MONOBASIC POTASSIUM PHOSPHATE, DIBASIC 236; 224 MG/ML; MG/ML
15 INJECTION, SOLUTION INTRAVENOUS ONCE
Refills: 0 | Status: COMPLETED | OUTPATIENT
Start: 2020-02-16 | End: 2020-02-16

## 2020-02-16 RX ORDER — SODIUM CHLORIDE 9 MG/ML
1000 INJECTION, SOLUTION INTRAVENOUS
Refills: 0 | Status: DISCONTINUED | OUTPATIENT
Start: 2020-02-16 | End: 2020-02-17

## 2020-02-16 RX ORDER — VANCOMYCIN HCL 1 G
1000 VIAL (EA) INTRAVENOUS ONCE
Refills: 0 | Status: COMPLETED | OUTPATIENT
Start: 2020-02-16 | End: 2020-02-16

## 2020-02-16 RX ORDER — PIPERACILLIN AND TAZOBACTAM 4; .5 G/20ML; G/20ML
3.38 INJECTION, POWDER, LYOPHILIZED, FOR SOLUTION INTRAVENOUS ONCE
Refills: 0 | Status: COMPLETED | OUTPATIENT
Start: 2020-02-16 | End: 2020-02-16

## 2020-02-16 RX ORDER — ALPRAZOLAM 0.25 MG
0.25 TABLET ORAL ONCE
Refills: 0 | Status: DISCONTINUED | OUTPATIENT
Start: 2020-02-16 | End: 2020-02-16

## 2020-02-16 RX ORDER — ALPRAZOLAM 0.25 MG
0.25 TABLET ORAL EVERY 8 HOURS
Refills: 0 | Status: DISCONTINUED | OUTPATIENT
Start: 2020-02-16 | End: 2020-02-23

## 2020-02-16 RX ORDER — PANTOPRAZOLE SODIUM 20 MG/1
40 TABLET, DELAYED RELEASE ORAL EVERY 12 HOURS
Refills: 0 | Status: DISCONTINUED | OUTPATIENT
Start: 2020-02-16 | End: 2020-02-25

## 2020-02-16 RX ADMIN — Medication 975 MILLIGRAM(S): at 15:30

## 2020-02-16 RX ADMIN — Medication 100 MILLIGRAM(S): at 14:47

## 2020-02-16 RX ADMIN — Medication 200 MILLIGRAM(S): at 06:54

## 2020-02-16 RX ADMIN — SODIUM CHLORIDE 3 MILLILITER(S): 9 INJECTION INTRAMUSCULAR; INTRAVENOUS; SUBCUTANEOUS at 14:33

## 2020-02-16 RX ADMIN — SODIUM CHLORIDE 3 MILLILITER(S): 9 INJECTION INTRAMUSCULAR; INTRAVENOUS; SUBCUTANEOUS at 06:54

## 2020-02-16 RX ADMIN — Medication 5 MILLIGRAM(S): at 16:16

## 2020-02-16 RX ADMIN — OXYCODONE HYDROCHLORIDE 5 MILLIGRAM(S): 5 TABLET ORAL at 03:45

## 2020-02-16 RX ADMIN — HYDROMORPHONE HYDROCHLORIDE 1 MILLIGRAM(S): 2 INJECTION INTRAMUSCULAR; INTRAVENOUS; SUBCUTANEOUS at 22:40

## 2020-02-16 RX ADMIN — Medication 10 MILLIGRAM(S): at 17:58

## 2020-02-16 RX ADMIN — HYDROMORPHONE HYDROCHLORIDE 0.5 MILLIGRAM(S): 2 INJECTION INTRAMUSCULAR; INTRAVENOUS; SUBCUTANEOUS at 11:50

## 2020-02-16 RX ADMIN — Medication 5 MILLIGRAM(S): at 08:38

## 2020-02-16 RX ADMIN — Medication 150 MILLIGRAM(S): at 22:10

## 2020-02-16 RX ADMIN — Medication 975 MILLIGRAM(S): at 14:46

## 2020-02-16 RX ADMIN — PIPERACILLIN AND TAZOBACTAM 200 GRAM(S): 4; .5 INJECTION, POWDER, LYOPHILIZED, FOR SOLUTION INTRAVENOUS at 20:21

## 2020-02-16 RX ADMIN — Medication 0.25 MILLIGRAM(S): at 11:08

## 2020-02-16 RX ADMIN — GABAPENTIN 300 MILLIGRAM(S): 400 CAPSULE ORAL at 06:54

## 2020-02-16 RX ADMIN — Medication 325 MILLIGRAM(S): at 06:53

## 2020-02-16 RX ADMIN — Medication 60 MILLIGRAM(S): at 20:22

## 2020-02-16 RX ADMIN — ONDANSETRON 4 MILLIGRAM(S): 8 TABLET, FILM COATED ORAL at 07:39

## 2020-02-16 RX ADMIN — HYDROMORPHONE HYDROCHLORIDE 1 MILLIGRAM(S): 2 INJECTION INTRAMUSCULAR; INTRAVENOUS; SUBCUTANEOUS at 18:20

## 2020-02-16 RX ADMIN — Medication 200 MILLIGRAM(S): at 07:43

## 2020-02-16 RX ADMIN — Medication 975 MILLIGRAM(S): at 06:53

## 2020-02-16 RX ADMIN — Medication 975 MILLIGRAM(S): at 23:00

## 2020-02-16 RX ADMIN — Medication 0.25 MILLIGRAM(S): at 20:23

## 2020-02-16 RX ADMIN — Medication 100 MILLIGRAM(S): at 06:55

## 2020-02-16 RX ADMIN — ONDANSETRON 4 MILLIGRAM(S): 8 TABLET, FILM COATED ORAL at 14:46

## 2020-02-16 RX ADMIN — GABAPENTIN 300 MILLIGRAM(S): 400 CAPSULE ORAL at 22:09

## 2020-02-16 RX ADMIN — HYDROMORPHONE HYDROCHLORIDE 1 MILLIGRAM(S): 2 INJECTION INTRAMUSCULAR; INTRAVENOUS; SUBCUTANEOUS at 22:25

## 2020-02-16 RX ADMIN — HYDROMORPHONE HYDROCHLORIDE 1 MILLIGRAM(S): 2 INJECTION INTRAMUSCULAR; INTRAVENOUS; SUBCUTANEOUS at 18:04

## 2020-02-16 RX ADMIN — Medication 975 MILLIGRAM(S): at 22:09

## 2020-02-16 RX ADMIN — Medication 100 MILLIGRAM(S): at 06:53

## 2020-02-16 RX ADMIN — Medication 0.1 MILLIGRAM(S): at 19:22

## 2020-02-16 RX ADMIN — PANTOPRAZOLE SODIUM 40 MILLIGRAM(S): 20 TABLET, DELAYED RELEASE ORAL at 20:22

## 2020-02-16 RX ADMIN — SODIUM CHLORIDE 3 MILLILITER(S): 9 INJECTION INTRAMUSCULAR; INTRAVENOUS; SUBCUTANEOUS at 21:06

## 2020-02-16 RX ADMIN — OXYCODONE HYDROCHLORIDE 5 MILLIGRAM(S): 5 TABLET ORAL at 03:05

## 2020-02-16 RX ADMIN — Medication 150 MILLIGRAM(S): at 00:11

## 2020-02-16 RX ADMIN — Medication 250 MILLIGRAM(S): at 21:00

## 2020-02-16 RX ADMIN — Medication 30 MILLILITER(S): at 20:23

## 2020-02-16 RX ADMIN — SODIUM CHLORIDE 75 MILLILITER(S): 9 INJECTION, SOLUTION INTRAVENOUS at 19:22

## 2020-02-16 RX ADMIN — POTASSIUM PHOSPHATE, MONOBASIC POTASSIUM PHOSPHATE, DIBASIC 63.75 MILLIMOLE(S): 236; 224 INJECTION, SOLUTION INTRAVENOUS at 22:08

## 2020-02-16 RX ADMIN — GABAPENTIN 300 MILLIGRAM(S): 400 CAPSULE ORAL at 14:46

## 2020-02-16 NOTE — PROGRESS NOTE ADULT - SUBJECTIVE AND OBJECTIVE BOX
NEPHROLOGY CONSULT  HPI:  Pt is a 60 y/o female with a PMHx of anxiety/ depression, asthma,  HTN on enalapril, spinal stenosis, osteoarthritis, seasonal allergies who was sent to the ED by Dr. Knight today for ankle injury requiring surgery.  Pt  c/o right ankle pain after a fall yesterday, for which she was splinted at [facility] ED.   Pt also had 5 episodes of diarrhea 2 days ago with LLQ pain which improved yesterday.   Yesterday morning she was NPO for planned epidural procedure for pain control .  At 11 am she was getting dressed yesterday and while putting on earrings she had unwitnessed LOC and  fell from standing , injuring her right ankle.  She denies preceding cpain/SOB/palpitations/dizziness.  She does not recall tripping over anything.        Pt reports she stopped taking motrin 600mg BID , approximately 5 days  ago for the planned epidural procedure for pain control.  She was on mobic until 2020.  She has noticed increasingly dark stools over the last 1-2 weeks.    She also reports some difficulty swallowing solids for 2-3 weeks.        In the ED , pt is found to have ARF, hyperkalemia, anemia and hypotension to 85/38.  Pt reported she may have accidentally taken an extra dose of her Clonidine today.     Above obtained from chart, pt interviewed:  Pt w/o any renal issue in past admitted w BRIGITTE, in setting of NSAID use, ankle fx resulting from syncope, anemia, GI blood loss, all of which may be explained by NSAID use   Pt is to go to OR today..  Creat is already improving  Labs, meds reviewed  d/w Ortho resident    2/15  s/p OR yesterday  creat is down to baseline today  cont IVF since pt is nauseaus      Fam HX:  Father DM, had CVa  Mother COPD  Brother  at 65 of alcoholism  Sister has lung ca (2020 18:56)      PAST MEDICAL & SURGICAL HISTORY:  Hypertension  Anxiety and depression  Seasonal allergies  Asthma  Osteoarthritis  S/P hip replacement: left  S/P tubal ligation  S/P cholecystectomy      FAMILY HISTORY:  Family history of COPD (chronic obstructive pulmonary disease)  Family history of CVA      MEDICATIONS  (STANDING):  acetaminophen   Tablet .. 975 milliGRAM(s) Oral every 8 hours  aspirin enteric coated 325 milliGRAM(s) Oral two times a day  ciprofloxacin   IVPB 400 milliGRAM(s) IV Intermittent every 12 hours  gabapentin 300 milliGRAM(s) Oral three times a day  influenza   Vaccine 0.5 milliLiter(s) IntraMuscular once  metroNIDAZOLE  IVPB 500 milliGRAM(s) IV Intermittent every 8 hours  polyethylene glycol 3350 17 Gram(s) Oral daily  sodium chloride 0.9% lock flush 3 milliLiter(s) IV Push every 8 hours  sodium chloride 0.9%. 1000 milliLiter(s) (125 mL/Hr) IV Continuous <Continuous>  traZODone 150 milliGRAM(s) Oral at bedtime    MEDICATIONS  (PRN):  aluminum hydroxide/magnesium hydroxide/simethicone Suspension 30 milliLiter(s) Oral four times a day PRN Indigestion  magnesium hydroxide Suspension 30 milliLiter(s) Oral daily PRN Constipation  ondansetron Injectable 4 milliGRAM(s) IV Push every 6 hours PRN Nausea and/or Vomiting  oxyCODONE    IR 5 milliGRAM(s) Oral every 4 hours PRN Moderate Pain (4 - 6)  senna 2 Tablet(s) Oral at bedtime PRN Constipation  traMADol 50 milliGRAM(s) Oral every 6 hours PRN Mild Pain (1 - 3)        Allergies    No Known Allergies    Intolerances        I&O's Summary        REVIEW OF SYSTEMS:    ++ nausea    Vital Signs Last 24 Hrs  T(C): 36.6 (15 Feb 2020 08:00), Max: 36.8 (2020 13:41)  T(F): 97.9 (15 Feb 2020 08:00), Max: 98.3 (2020 13:41)  HR: 73 (15 Feb 2020 10:00) (64 - 89)  BP: 113/65 (15 Feb 2020 10:00) (82/46 - 129/72)  BP(mean): 83 (2020 17:15) (66 - 88)  RR: 16 (15 Feb 2020 10:00) (14 - 19)  SpO2: 97% (15 Feb 2020 08:00) (95% - 99%)  Daily Height in cm: 167.64 (2020 13:38)    Daily   I&O's Summary      PHYSICAL EXAM:    General:  Alert, well-developed , + nausea    Neuro:  Alert and oriented to person, place, and time. Able to communicate  well.     HEENT:  No JVD, no masses, Eyes anicteric, No carotid bruits.No lymphadenopathy,    Cardiovascular:  Regular rate and rhythm, with normal S1 and S2. No murmurs, rubs,  or gallops. No JVD.     Lungs:  clear. no rales, no wheezing, .    Abdomen:  Normoactive bowel sounds. Soft, flat, non-tender, and non-distended.  No hepatosplenomegaly, positive bowel sounds    Skin:  Warm, dry, well-perfused. No rashes or other lesions.     Extremities:  R foot w external fixation    LABS:                 142    |  112    |  16     ----------------------------<  102       2020 08:09  4.7     |  24     |  1.14     142    |  114    |  23     ----------------------------<  113       15 Feb 2020 17:58  5.4     |  26     |  1.30     143    |  114    |  34     ----------------------------<  113       15 Feb 2020 07:56  5.0     |  26     |  1.59     Ca    9.2        2020 08:09  Ca    8.3        15 Feb 2020 17:58  Ca    8.4        15 Feb 2020 07:56    Phos  1.9       2020 08:09

## 2020-02-16 NOTE — PROGRESS NOTE ADULT - SUBJECTIVE AND OBJECTIVE BOX
Ortho Progress Note  Patient seen and examined at bedside. No acute events over night. No acute complaints at this time. Pain well controlled. Denies chest pain, shortness of breath, nausea or vomiting.     PE:  Vital Signs Last 24 Hrs  T(C): 36.7 (02-16-20 @ 05:33), Max: 37 (02-15-20 @ 16:06)  T(F): 98 (02-16-20 @ 05:33), Max: 98.6 (02-15-20 @ 16:06)  HR: 80 (02-16-20 @ 05:33) (70 - 87)  BP: 150/88 (02-16-20 @ 05:33) (90/48 - 154/88)  BP(mean): 83 (02-15-20 @ 21:01) (79 - 93)  RR: 14 (02-15-20 @ 17:00) (14 - 18)  SpO2: 96% (02-16-20 @ 05:33) (92% - 100%)    General: NAD, resting comfortably in bed  R LE:  RLe in Ex fix dressing c/d/i  SCDs present contalaterally  Compartments soft and compressible  No calf tenderness bilaterally  EHL/FHL in tact, grossly moving toes, sensation intact  cap refilly< 2 seconds                          9.5    x     )-----------( x        ( 15 Feb 2020 22:54 )             30.0     15 Feb 2020 17:58    142    |  114    |  23     ----------------------------<  113    5.4     |  26     |  1.30     Ca    8.3        15 Feb 2020 17:58    TPro  6.6    /  Alb  3.1    /  TBili  0.2    /  DBili  x      /  AST  29     /  ALT  23     /  AlkPhos  84     14 Feb 2020 14:33    PT/INR - ( 15 Feb 2020 07:56 )   PT: 11.9 sec;   INR: 1.07 ratio         PTT - ( 15 Feb 2020 07:56 )  PTT:30.3 sec    A/P:  59y f  s/p R ankle Ex fix  -PT/OT - NWB RLE in exfix  -Pain Control  -DVT ppx per AC team  -Continue perioperative abx x 24 hours  -FU AM Labs  -Rest, ice, compress and elevate the extremity as we needed  -Incentive Spirometry  -Medical management appreciated  -Dispo: Ortho stable for DC, will follow u with Dr. Knight in office for definitive fixation    Ortho

## 2020-02-16 NOTE — PROVIDER CONTACT NOTE (OTHER) - ACTION/TREATMENT ORDERED:
I spoke with Ronni and the Dr will be notified of this consult in the morning. I spoke with Anna and the Dr will be notified of this consult in the morning.

## 2020-02-16 NOTE — CONSULT NOTE ADULT - ASSESSMENT
A: 58 yo female with syncopal episode falling fraturing right ankle, adm with ARF, hyperkalemia, now resolved, poss Upper GIB from inc intake of NSAIDs, hgb now stable, no further black stools, is vomiting today but she feels its from th epain meds, no blood noted in emesis, consulted by Dr Knight for DVT/PE prophylaxis, risk stratification and Anticoagulation Management      P:d/c EC ASA due to ? recent GIB   Lovenox 40 mg sq daily x 4 weeks for VTE prophylaxis  daily CBC, BMP  BLE venodynes  INC mobility as vikki    Thank you for the consult, will follow

## 2020-02-16 NOTE — PROGRESS NOTE ADULT - SUBJECTIVE AND OBJECTIVE BOX
CHIEF COMPLAINT:    HPI:  Pt is a 60 y/o female with a PMHx of anxiety/ depression, asthma,  HTN on enalapril, spinal stenosis, osteoarthritis, seasonal allergies who was sent to the ED by Dr. Knight today for ankle injury requiring surgery.  Pt  c/o right ankle pain after a fall yesterday, for which she was splinted at [facility] ED.   Pt also had 5 episodes of diarrhea 2 days ago with LLQ pain which improved yesterday.   Yesterday morning she was NPO for planned epidural procedure for pain control .  At 11 am she was getting dressed yesterday and while putting on earrings she had unwitnessed LOC and  fell from standing , injuring her right ankle.  She denies preceding cpain/SOB/palpitations/dizziness.  She does not recall tripping over anything.        Pt reports she stopped taking motrin 600mg BID , approximately 5 days  ago for the planned epidural procedure for pain control.  She was on mobic until January 2020.  She has noticed increasingly dark stools over the last 1-2 weeks.    She also reports some difficulty swallowing solids for 2-3 weeks.        In the ED , pt is found to have ARF, hyperkalemia, anemia and hypotension to 85/38.  Pt reported she may have accidentally taken an extra dose of her Clonidine today.   2/16/20: Sitting up in bed no CP/SOB/Dizziness           PAST MEDICAL & SURGICAL HISTORY:  Hypertension  Anxiety and depression  Seasonal allergies  Asthma  Osteoarthritis  S/P hip replacement: left  S/P tubal ligation  S/P cholecystectomy      Allergies    No Known Allergies    Intolerances        SOCIAL HISTORY: no smoker     FAMILY HISTORY:  Family history of COPD (chronic obstructive pulmonary disease)  Family history of CVA      MEDICATIONS  (STANDING):  acetaminophen   Tablet .. 975 milliGRAM(s) Oral every 8 hours  ALPRAZolam 0.25 milliGRAM(s) Oral once  aspirin enteric coated 325 milliGRAM(s) Oral two times a day  ciprofloxacin   IVPB 400 milliGRAM(s) IV Intermittent every 12 hours  gabapentin 300 milliGRAM(s) Oral three times a day  influenza   Vaccine 0.5 milliLiter(s) IntraMuscular once  metroNIDAZOLE  IVPB 500 milliGRAM(s) IV Intermittent every 8 hours  polyethylene glycol 3350 17 Gram(s) Oral daily  sodium chloride 0.9% lock flush 3 milliLiter(s) IV Push every 8 hours  sodium chloride 0.9%. 1000 milliLiter(s) (125 mL/Hr) IV Continuous <Continuous>  traZODone 150 milliGRAM(s) Oral at bedtime    MEDICATIONS  (PRN):  aluminum hydroxide/magnesium hydroxide/simethicone Suspension 30 milliLiter(s) Oral four times a day PRN Indigestion  HYDROmorphone  Injectable 0.5 milliGRAM(s) IV Push every 5 minutes PRN Severe Pain (7 - 10)  magnesium hydroxide Suspension 30 milliLiter(s) Oral daily PRN Constipation  ondansetron Injectable 4 milliGRAM(s) IV Push every 6 hours PRN Nausea and/or Vomiting  oxyCODONE    IR 5 milliGRAM(s) Oral every 4 hours PRN Moderate Pain (4 - 6)  senna 2 Tablet(s) Oral at bedtime PRN Constipation  traMADol 50 milliGRAM(s) Oral every 6 hours PRN Mild Pain (1 - 3)        Vital Signs Last 24 Hrs  T(C): 36.6 (16 Feb 2020 08:40), Max: 37 (15 Feb 2020 16:06)  T(F): 97.8 (16 Feb 2020 08:40), Max: 98.6 (15 Feb 2020 16:06)  HR: 71 (16 Feb 2020 08:40) (71 - 87)  BP: 154/98 (16 Feb 2020 08:40) (110/88 - 154/98)  BP(mean): 83 (15 Feb 2020 21:01) (83 - 93)  RR: 16 (16 Feb 2020 08:40) (14 - 16)  SpO2: 100% (16 Feb 2020 08:40) (92% - 100%)    I&O's Summary      PHYSICAL EXAM:    Constitutional: NAD, awake and alert, well-developed  HEENT: PERR, EOMI,  No oral cyananosis.  Neck:  supple,  No JVD  Respiratory: Breath sounds are clear bilaterally, No wheezing, rales or rhonchi  Cardiovascular: S1 and S2, regular rate and rhythm  Gastrointestinal: Abd soft, nontender.   Extremities: No peripheral edema. No clubbing or cyanosis. RLE DSG intact,  Ex Fix  Vascular: 2+ peripheral pulses  Neurological: A/O x 3, no focal deficits  Musculoskeletal: no calf tenderness.  Skin: No rashes.      LABS: All Labs Reviewed:                        8.5    5.00  )-----------( 161      ( 15 Feb 2020 07:56 )             26.5                         7.8    x     )-----------( x        ( 14 Feb 2020 21:59 )             23.8                         8.8    6.53  )-----------( 224      ( 14 Feb 2020 14:33 )             27.4     15 Feb 2020 07:56    143    |  114    |  34     ----------------------------<  113    5.0     |  26     |  1.59   14 Feb 2020 21:59    139    |  109    |  46     ----------------------------<  63     4.4     |  25     |  2.15   14 Feb 2020 14:33    136    |  105    |  47     ----------------------------<  107    6.4     |  26     |  2.62     Ca    8.4        15 Feb 2020 07:56  Ca    8.8        14 Feb 2020 21:59  Ca    9.3        14 Feb 2020 14:33    TPro  6.6    /  Alb  3.1    /  TBili  0.2    /  DBili  x      /  AST  29     /  ALT  23     /  AlkPhos  84     14 Feb 2020 14:33    PT/INR - ( 15 Feb 2020 07:56 )   PT: 11.9 sec;   INR: 1.07 ratio         PTT - ( 15 Feb 2020 07:56 )  PTT:30.3 sec  CARDIAC MARKERS ( 14 Feb 2020 14:33 )  <0.015 ng/mL / x     / 430 U/L / x     / x          Blood Culture:         RADIOLOGY/EKG:  normal sinus rhythm  low qrs voltage     2/15/20  no significant change   monitor sinus rhythm       echo prelim normal LV FX CHIEF COMPLAINT:    HPI:  Pt is a 60 y/o female with a PMHx of anxiety/ depression, asthma,  HTN on enalapril, spinal stenosis, osteoarthritis, seasonal allergies who was sent to the ED by Dr. Knight today for ankle injury requiring surgery.  Pt  c/o right ankle pain after a fall yesterday, for which she was splinted at [facility] ED.   Pt also had 5 episodes of diarrhea 2 days ago with LLQ pain which improved yesterday.   Yesterday morning she was NPO for planned epidural procedure for pain control .  At 11 am she was getting dressed yesterday and while putting on earrings she had unwitnessed LOC and  fell from standing , injuring her right ankle.  She denies preceding cpain/SOB/palpitations/dizziness.  She does not recall tripping over anything.        Pt reports she stopped taking motrin 600mg BID , approximately 5 days  ago for the planned epidural procedure for pain control.  She was on mobic until 2020.  She has noticed increasingly dark stools over the last 1-2 weeks.    She also reports some difficulty swallowing solids for 2-3 weeks.        In the ED , pt is found to have ARF, hyperkalemia, anemia and hypotension to 85/38.  Pt reported she may have accidentally taken an extra dose of her Clonidine today.   20: Sitting up in bed no CP/SOB/Dizziness           PAST MEDICAL & SURGICAL HISTORY:  Hypertension  Anxiety and depression  Seasonal allergies  Asthma  Osteoarthritis  S/P hip replacement: left  S/P tubal ligation  S/P cholecystectomy      Allergies    No Known Allergies    Intolerances        SOCIAL HISTORY: no smoker     FAMILY HISTORY:  Family history of COPD (chronic obstructive pulmonary disease)  Family history of CVA      MEDICATIONS  (STANDING):  acetaminophen   Tablet .. 975 milliGRAM(s) Oral every 8 hours  ALPRAZolam 0.25 milliGRAM(s) Oral once  aspirin enteric coated 325 milliGRAM(s) Oral two times a day  ciprofloxacin   IVPB 400 milliGRAM(s) IV Intermittent every 12 hours  gabapentin 300 milliGRAM(s) Oral three times a day  influenza   Vaccine 0.5 milliLiter(s) IntraMuscular once  metroNIDAZOLE  IVPB 500 milliGRAM(s) IV Intermittent every 8 hours  polyethylene glycol 3350 17 Gram(s) Oral daily  sodium chloride 0.9% lock flush 3 milliLiter(s) IV Push every 8 hours  sodium chloride 0.9%. 1000 milliLiter(s) (125 mL/Hr) IV Continuous <Continuous>  traZODone 150 milliGRAM(s) Oral at bedtime    MEDICATIONS  (PRN):  aluminum hydroxide/magnesium hydroxide/simethicone Suspension 30 milliLiter(s) Oral four times a day PRN Indigestion  HYDROmorphone  Injectable 0.5 milliGRAM(s) IV Push every 5 minutes PRN Severe Pain (7 - 10)  magnesium hydroxide Suspension 30 milliLiter(s) Oral daily PRN Constipation  ondansetron Injectable 4 milliGRAM(s) IV Push every 6 hours PRN Nausea and/or Vomiting  oxyCODONE    IR 5 milliGRAM(s) Oral every 4 hours PRN Moderate Pain (4 - 6)  senna 2 Tablet(s) Oral at bedtime PRN Constipation  traMADol 50 milliGRAM(s) Oral every 6 hours PRN Mild Pain (1 - 3)        Vital Signs Last 24 Hrs  T(C): 36.6 (2020 08:40), Max: 37 (15 Feb 2020 16:06)  T(F): 97.8 (2020 08:40), Max: 98.6 (15 Feb 2020 16:06)  HR: 71 (2020 08:40) (71 - 87)  BP: 154/98 (2020 08:40) (110/88 - 154/98)  BP(mean): 83 (15 Feb 2020 21:01) (83 - 93)  RR: 16 (2020 08:40) (14 - 16)  SpO2: 100% (2020 08:40) (92% - 100%)    I&O's Summary      PHYSICAL EXAM:    Constitutional: NAD, awake and alert, well-developed  HEENT: PERR, EOMI,  No oral cyananosis.  Neck:  supple,  No JVD  Respiratory: Breath sounds are clear bilaterally, No wheezing, rales or rhonchi  Cardiovascular: S1 and S2, regular rate and rhythm  Gastrointestinal: Abd soft, nontender.   Extremities: No peripheral edema. No clubbing or cyanosis. RLE DSG intact,  Ex Fix  Vascular: 2+ peripheral pulses  Neurological: A/O x 3, no focal deficits  Musculoskeletal: no calf tenderness.  Skin: No rashes.      LABS: All Labs Reviewed:                          10.7   9.56  )-----------( 248      ( 2020 08:09 )             33.2     02-16    142  |  112<H>  |  16  ----------------------------<  102<H>  4.7   |  24  |  1.14    Ca    9.2      2020 08:09  Phos  1.9     02-16    TPro  x   /  Alb  3.0<L>  /  TBili  x   /  DBili  x   /  AST  x   /  ALT  x   /  AlkPhos  x   02-16    CARDIAC MARKERS ( 15 Feb 2020 17:58 )  <0.015 ng/mL / x     / x     / x     / x      CARDIAC MARKERS ( 15 Feb 2020 13:33 )  <0.015 ng/mL / x     / x     / x     / x      CARDIAC MARKERS ( 2020 14:33 )  <0.015 ng/mL / x     / 430 U/L / x     / x          LIVER FUNCTIONS - ( 2020 08:09 )  Alb: 3.0 g/dL / Pro: x     / ALK PHOS: x     / ALT: x     / AST: x     / GGT: x           PT/INR - ( 15 Feb 2020 07:56 )   PT: 11.9 sec;   INR: 1.07 ratio         PTT - ( 15 Feb 2020 07:56 )  PTT:30.3 sec  Urinalysis Basic - ( 2020 22:21 )    Color: Yellow / Appearance: Clear / S.015 / pH: x  Gluc: x / Ketone: Negative  / Bili: Negative / Urobili: Negative mg/dL   Blood: x / Protein: Negative mg/dL / Nitrite: Negative   Leuk Esterase: Negative / RBC: x / WBC x   Sq Epi: x / Non Sq Epi: x / Bacteria: x        Culture Results:   >=3 organisms. Probable collection contamination. (20 @ 22:21)  Culture Results:   No growth to date. (20 @ 17:48)  Culture Results:   No growth to date. (20 @ 17:48)        RADIOLOGY/EKG:  normal sinus rhythm  low qrs voltage     2/15/20  no significant change   monitor sinus rhythm       echo prelim normal LV FX

## 2020-02-16 NOTE — CONSULT NOTE ADULT - SUBJECTIVE AND OBJECTIVE BOX
HPI:  Pt is a 60 y/o female with a PMHx of anxiety/ depression, asthma,  HTN on enalapril, spinal stenosis, osteoarthritis, seasonal allergies who was sent to the ED by Dr. Knight today for ankle injury requiring surgery.  Pt  c/o right ankle pain after a fall yesterday, for which she was splinted at [facility] ED.   Pt also had 5 episodes of diarrhea 2 days ago with LLQ pain which improved yesterday.  day prior to admission pt was NPO for planned epidural procedure for pain control .  At 11 am she was getting dressed yesterday and while putting on earrings she had unwitnessed LOC and  fell from standing , injuring her right ankle sustaining a left ankle fracture She denies preceding cpain/SOB/palpitations/dizziness.  She does not recall tripping over anything.        Pt reports she stopped taking motrin 600mg BID , approximately 5 days  ago for the planned epidural procedure for pain control.  She was on mobic until 2020.  She has noticed increasingly dark stools over the last 1-2 weeks.    She also reports some difficulty swallowing solids for 2-3 weeks.        In the ED , pt is found to have ARF, hyperkalemia, anemia and hypotension to 85/38.  Pt reported she may have accidentally taken an extra dose of her Clonidine today.     she is now on 3 north s/p Right ankleplacement of ext-fix    Fam HX:   Father DM, had CVa  Mother COPD  Brother  at 65 of alcoholism  Sister has lung ca (2020 18:56)        Consulted by Dr. muller   for VTE prophylaxis, risk stratification, and anticoagulation management.    PAST MEDICAL & SURGICAL HISTORY:  Hypertension  Anxiety and depression  Seasonal allergies  Asthma  Osteoarthritis  S/P hip replacement: left  S/P tubal ligation  S/P cholecystectomy          CrCl: 69.7    Caprini VTE Risk Score: CAPRINI SCORE  AGE RELATED RISK FACTORS                                                       MOBILITY RELATED FACTORS  [x ] Age 41-60 years                                            (1 Point)                  [ ] Bed rest                                                        (1 Point)  [ ] Age: 61-74 years                                           (2 Points)                [ ] Plaster cast                                                   (2 Points)  [ ] Age= 75 years                                              (3 Points)                 [ ] Bed bound for more than 72 hours                   (2 Points)    DISEASE RELATED RISK FACTORS                                               GENDER SPECIFIC FACTORS  [ ] Edema in the lower extremities                       (1 Point)           [ ] Pregnancy                                                            (1 Point)  [ ] Varicose veins                                               (1 Point)                  [ ] Post-partum < 6 weeks                                      (1 Point)             x[ ] BMI > 25 Kg/m2                                            (1 Point)                  [ ] Hormonal therapy or oral contraception       (1 Point)                 [ ] Sepsis (in the previous month)                        (1 Point)             [ ] History of pregnancy complications                (1Point)  [ ] Pneumonia or serious lung disease                                             [ ] Unexplained or recurrent  (>/= 3), premature                                 (In the previous month)                               (1 Point)                birth with toxemia or growth-restricted infant (1 Point)  [ ] Abnormal pulmonary function test            (1 Point)                                   SURGERY RELATED RISK FACTORS  [ ] Acute myocardial infarction                       (1 Point)                  [ ]  Section                                         (1 Point)  [ ] Congestive heart failure (in the previous month) (1 Point)   [ ] Minor surgery   lasting <45 minutes       (1 Point)   [ ] Inflammatory bowel disease                             (1 Point)          [ ] Arthroscopic surgery                                  (2 Points)  [ ] Central venous access                                    (2 Points)            [x ] General surgery lasting >45 minutes      (2 Points)       [ ] Stroke (in the previous month)                  (5 Points)            [ ] Elective major lower extremity arthroplasty (5 Points)                                   [  ] Malignancy (present or past include skin melanoma                                          but exclude  basal skin cell)    (2 points)                                      TRAUMA RELATED RISK FACTORS                HEMATOLOGY RELATED FACTORS                                  [x ] Fracture of the hip, pelvis, or leg                       (5 Points)  [ ] Prior episodes of VTE                                     (3 Points)          [ ] Acute spinal cord injury (in the previous month)  (5 Points)  [ ] Positive family history for VTE                         (3 Points)       [ ] Paralysis (less than 1 month)                          (5 Points)  [ ] Prothrombin 53373 A                                      (3 Points)         [ ] Multiple Trauma (within 1month)                 (5Points)                                                                                                                                                                [ ] Factor V Leiden                                          (3 Points)                                OTHER RISK FACTORS                          [ ] Lupus anticoagulants                                     (3 Points)                       [ ] BMI > 40                          (1 Point)                                                         [ ] Anticardiolipin antibodies                                (3 Points)                   [ ] Smoking                              (1Point)                                                [ ] High homocysteine in the blood                      (3 Points)                [  ] Diabetes requiring insulin (1point)                         [ ] Other congenital or acquired thrombophilia       (3 Points)          [  ] Chemotherapy                   (1 Point)  [ ] Heparin induced thrombocytopenia                  (3 Points)             [  ] Blood Transfusion                (1 point)                                                                                                             Total Score [    9      ]                                                                                                                                                                                                                                                                                                                                                                                                                                       IMPROVE Bleeding Risk Score 1      Time In:   Time Out:     Falls Risk:   High ( x )  Mod (  )  Low (  )      FAMILY HISTORY:  Family history of COPD (chronic obstructive pulmonary disease)  Family history of CVA    Denies any personal or familial history of clotting or bleeding disorders.    Allergies    No Known Allergies    Intolerances    : seen at bedside,  anxious, vomiting, states nausea form pain meds    REVIEW OF SYSTEMS    (  )Fever	     (  )Constipation	(  )SOB				(  )Headache	(  )Dysuria  (  )Chills	     (  )Melena	(  )Dyspnea present on exertion	                    (  )Dizziness                    (  )Polyuria  (  )Nausea	     (  )Hematochezia	(  )Cough			                    (  )Syncope   	(  )Hematuria  (  )Vomiting    (  )Chest Pain	(  )Wheezing			(  )Weakness  (  )Diarrhea     (  )Palpitations	(  )Anorexia			( x )joint pain    All  other review of systems negative: Yes    Vital Signs Last 24 Hrs  T(C): 37 (20 @ 13:05), Max: 37 (02-15-20 @ 16:06)  T(F): 98.6 (20 @ 13:05), Max: 98.6 (02-15-20 @ 16:06)  HR: 97 (20 @ 13:05) (71 - 97)  BP: 165/101 (20 @ 13:05) (110/88 - 181/109)  BP(mean): 83 (02-15-20 @ 21:01) (83 - 93)  RR: 18 (20 @ 13:05) (14 - 18)  SpO2: 94% (20 @ 13:05) (92% - 100%)      PHYSICAL EXAM:    Constitutional: Appears Well    Neurological: A& O x 3    Skin: Warm    Respiratory and Thorax: normal effort; Breath sounds: normal; No rales/wheezing/rhonchi  	  Cardiovascular: S1, S2, regular, NMBR	    Gastrointestinal: BS + x 4Q, nontender	    Genitourinary:  Bladder nondistended, nontender    Musculoskeletal:   General Right:   + muscle/joint tenderness,   normal tone, no joint swelling,   ROM: limited	    General Left:   no muscle/joint tenderness,   normal tone, no joint swelling,   ROM: full    ankle  Rt: Drsing CDI; ext fix in place, tos warm and mobile Cap refill good; +Sensation intact                      Lower extrems:   Right: no calf tenderness              negative melba's sign               + pedal pulses    Left:   no calf tenderness              negative melba's sign               + pedal pulses                          10.7   9.56  )-----------( 248      ( 2020 08:09 )             33.2       02-    142  |  112<H>  |  16  ----------------------------<  102<H>  4.7   |  24  |  1.14    Ca    9.2      2020 08:09  Phos  1.9         TPro  x   /  Alb  3.0<L>  /  TBili  x   /  DBili  x   /  AST  x   /  ALT  x   /  AlkPhos  x         PT/INR - ( 15 Feb 2020 07:56 )   PT: 11.9 sec;   INR: 1.07 ratio         PTT - ( 15 Feb 2020 07:56 )  PTT:30.3 sec				    MEDICATIONS  (STANDING):  acetaminophen   Tablet .. 975 milliGRAM(s) Oral every 8 hours  ciprofloxacin   IVPB 400 milliGRAM(s) IV Intermittent every 12 hours  enoxaparin Injectable 40 milliGRAM(s) SubCutaneous daily  gabapentin 300 milliGRAM(s) Oral three times a day  influenza   Vaccine 0.5 milliLiter(s) IntraMuscular once  metroNIDAZOLE  IVPB 500 milliGRAM(s) IV Intermittent every 8 hours  polyethylene glycol 3350 17 Gram(s) Oral daily  sodium chloride 0.9% lock flush 3 milliLiter(s) IV Push every 8 hours  sodium chloride 0.9%. 1000 milliLiter(s) IV Continuous <Continuous>  traZODone 150 milliGRAM(s) Oral at bedtime          DVT Prophylaxis:  LMWH                   ( x )  Heparin SQ           (  )  Coumadin             (  )  Xarelto                  (  )  Eliquis                   (  )  Venodynes           (x  )  Ambulation          ( x )  UFH                       (  )  Contraindicated  (  )  EC ASPIRIN       (  )

## 2020-02-16 NOTE — PROGRESS NOTE ADULT - SUBJECTIVE AND OBJECTIVE BOX
Called to a RR for hypoxia    Patient has been having nausea and vomiting today.  Upon arrival patient sat was in the 80 on 2L NC.  Changed to VMo2 with improvement.  CXR obtained shows a RLL infiltrate    Fam HX:   Father DM, had CVa  Mother COPD  Brother  at 65 of alcoholism  Sister has lung ca (2020 18:56)       PAST MEDICAL & SURGICAL HISTORY:  Hypertension  Anxiety and depression  Seasonal allergies  Asthma  Osteoarthritis  S/P hip replacement: left  S/P tubal ligation  S/P cholecystectomy      FAMILY HISTORY:  Family history of COPD (chronic obstructive pulmonary disease)  Family history of CVA      Social Hx:    Allergies    No Known Allergies    Intolerances            ICU Vital Signs Last 24 Hrs  T(C): 36.8 (2020 15:48), Max: 37 (2020 13:05)  T(F): 98.3 (2020 15:48), Max: 98.6 (2020 13:05)  HR: 127 (2020 18:00) (71 - 127)  BP: 139/103 (2020 18:00) (139/83 - 181/109)  BP(mean): 112 (2020 18:00) (83 - 128)  ABP: --  ABP(mean): --  RR: 33 (2020 18:00) (16 - 33)  SpO2: 98% (2020 18:00) (76% - 100%)          I&O's Summary    15 Feb 2020 07:01  -  2020 07:00  --------------------------------------------------------  IN: 625 mL / OUT: 50 mL / NET: 575 mL                              10.7   9.56  )-----------( 248      ( 2020 08:09 )             33.2       02-16    142  |  112<H>  |  16  ----------------------------<  102<H>  4.7   |  24  |  1.14    Ca    9.2      2020 08:09  Phos  1.9     02-16    TPro  x   /  Alb  3.0<L>  /  TBili  x   /  DBili  x   /  AST  x   /  ALT  x   /  AlkPhos  x         CARDIAC MARKERS ( 15 Feb 2020 17:58 )  <0.015 ng/mL / x     / x     / x     / x      CARDIAC MARKERS ( 15 Feb 2020 13:33 )  <0.015 ng/mL / x     / x     / x     / x                Urinalysis Basic - ( 2020 22:21 )    Color: Yellow / Appearance: Clear / S.015 / pH: x  Gluc: x / Ketone: Negative  / Bili: Negative / Urobili: Negative mg/dL   Blood: x / Protein: Negative mg/dL / Nitrite: Negative   Leuk Esterase: Negative / RBC: x / WBC x   Sq Epi: x / Non Sq Epi: x / Bacteria: x        MEDICATIONS  (STANDING):  acetaminophen   Tablet .. 975 milliGRAM(s) Oral every 8 hours  cloNIDine 0.1 milliGRAM(s) Oral every 8 hours  dextrose 5% + sodium chloride 0.9%. 1000 milliLiter(s) (75 mL/Hr) IV Continuous <Continuous>  enoxaparin Injectable 40 milliGRAM(s) SubCutaneous daily  gabapentin 300 milliGRAM(s) Oral three times a day  influenza   Vaccine 0.5 milliLiter(s) IntraMuscular once  pantoprazole  Injectable 40 milliGRAM(s) IV Push every 12 hours  PARoxetine 60 milliGRAM(s) Oral daily  piperacillin/tazobactam IVPB. 3.375 Gram(s) IV Intermittent once  piperacillin/tazobactam IVPB.. 3.375 Gram(s) IV Intermittent every 8 hours  polyethylene glycol 3350 17 Gram(s) Oral daily  potassium phosphate IVPB 15 milliMole(s) IV Intermittent once  sodium chloride 0.9% lock flush 3 milliLiter(s) IV Push every 8 hours  traZODone 150 milliGRAM(s) Oral at bedtime  vancomycin  IVPB 1000 milliGRAM(s) IV Intermittent once    MEDICATIONS  (PRN):  ALPRAZolam 0.25 milliGRAM(s) Oral every 8 hours PRN anxiety  aluminum hydroxide/magnesium hydroxide/simethicone Suspension 30 milliLiter(s) Oral four times a day PRN Indigestion  hydrALAZINE Injectable 10 milliGRAM(s) IV Push every 6 hours PRN for SBP more than 160  HYDROmorphone  Injectable 1 milliGRAM(s) IV Push every 4 hours PRN Severe Pain (7 - 10)  magnesium hydroxide Suspension 30 milliLiter(s) Oral daily PRN Constipation  ondansetron Injectable 4 milliGRAM(s) IV Push every 6 hours PRN Nausea and/or Vomiting  oxyCODONE    IR 5 milliGRAM(s) Oral every 4 hours PRN Moderate Pain (4 - 6)  senna 2 Tablet(s) Oral at bedtime PRN Constipation  traMADol 50 milliGRAM(s) Oral every 6 hours PRN Mild Pain (1 - 3)      DVT Prophylaxis: Lovenox    Advanced Directives:  Discussed with:    Visit Information:    ** Time is exclusive of billed procedures and/or teaching and/or routine family updates.

## 2020-02-16 NOTE — PROGRESS NOTE ADULT - ASSESSMENT
Pt is a 58 y/o female with a PMHx of anxiety/ depression, asthma,  HTN on enalapril, spinal stenosis, osteoarthritis, seasonal allergies who was sent to the ED by Dr. Knight today for ankle injury requiring surgery.  Pt  c/o right ankle pain after a fall yesterday, for which she was splinted at [facility] ED.   Pt also had 5 episodes of diarrhea 2 days ago with LLQ pain which improved yesterday.   Yesterday morning she was NPO for planned epidural procedure for pain control .  At 11 am she was getting dressed yesterday and while putting on earrings she had unwitnessed LOC and  fell from standing , injuring her right ankle.  She denies preceding cpain/SOB/palpitations/dizziness.  She does not recall tripping over anything.        Pt reports she stopped taking motrin 600mg BID , approximately 5 days  ago for the planned epidural procedure for pain control.  She was on mobic until January 2020.  She has noticed increasingly dark stools over the last 1-2 weeks.    She also reports some difficulty swallowing solids for 2-3 weeks.      In the ED , pt is found to have ARF, hyperkalemia, anemia and hypotension to 85/38.  Pt reported she may have accidentally taken an extra dose of her Clonidine today.     Pt w/o any renal issue in past admitted w [facility], in setting of NSAID use, ankle fx resulting from syncope, anemia, GI blood loss, all of which may be explained by NSAID use, Motrin and long term Mobic  Pt is to go to OR today..  Creat is already improving  Labs, meds reviewed  d/w Ortho resident    A/P  BRIGITTE due to short term ibuprofen and long term meloxicam use  exacerbated by hypotension, GI bleed  Will cont with hydration, and follow labs,   No contraindication to Ortho surgery  No NSAIDs post op  Labs AM    2/16  creat improved   will sign off  please reconsult prn

## 2020-02-16 NOTE — PROVIDER CONTACT NOTE (CRITICAL VALUE NOTIFICATION) - ACTION/TREATMENT ORDERED:
No new orders, MD Castillo made aware of pt situation. MD Castillo made aware of pt situation.  STAT EKG, repeat troponins ordered

## 2020-02-16 NOTE — CHART NOTE - NSCHARTNOTEFT_GEN_A_CORE
Chart Review: HPI:  Pt is a 58 y/o female with a PMHx of anxiety/ depression, asthma,  HTN on enalapril, spinal stenosis, osteoarthritis, seasonal allergies who was sent to the ED by Dr. Knight today for ankle injury requiring surgery.  Pt  c/o right ankle pain after a fall yesterday, for which she was splinted at [facility] ED.   Pt also had 5 episodes of diarrhea 2 days ago with LLQ pain which improved yesterday.   Yesterday morning she was NPO for planned epidural procedure for pain control .  At 11 am she was getting dressed yesterday and while putting on earrings she had unwitnessed LOC and  fell from standing , injuring her right ankle.  She denies preceding cpain/SOB/palpitations/dizziness.  She does not recall tripping over anything.      Pt reports she stopped taking motrin 600mg BID , approximately 5 days  ago for the planned epidural procedure for pain control.  She was on mobic until January 2020.  She has noticed increasingly dark stools over the last 1-2 weeks.    She also reports some difficulty swallowing solids for 2-3 weeks.      In the ED , pt is found to have ARF, hyperkalemia, anemia and hypotension to 85/38.  Pt reported she may have accidentally taken an extra dose of her Clonidine today.         Subjective: RRT initiated for pt's acute hypoxia, she desaturated to the mid 80's on Non rebreather. Pt is post-op from corrective surgery of her ankle after sustaining then injury during a syncopal episode. She became hypoxic and tachycardic with HR to 115.     Vitals  HR: 114   BP: 163/107  RR: 24  SpO2: 85%     PHYSICAL EXAM:  Respiratory: Breath sounds are clear bilaterally, No wheezing, rales or rhonchi  Cardiovascular: S1 and S2, regular rate and rhythm, no Murmurs, gallops or rubs  Extremities: No peripheral edema  Vascular: 2+ peripheral pulses  Neurological: A/O x 3, no focal deficits      Assessment   59F with a PMH of asthma presented to  s/p syncope sustaining an ankle injury admitted to the floor s/p surgical fixation of the ankle currently being evaluated for hypoxia and tachycardia likely 2/2 Acute .Hypoxic Respiratory failure postoperatively consider PE.     Plan  -F/u STAT EKG, CXR, CT-Head  -Continue to monitor for worsening Sx    Case D/w Dr. Kayserian with Julian Zhang and Hospitalist Asia at bedside      Case discussed with  Chart Review: HPI:  Pt is a 58 y/o female with a PMHx of anxiety/ depression, asthma,  HTN on enalapril, spinal stenosis, osteoarthritis, seasonal allergies who was sent to the ED by Dr. Knight today for ankle injury requiring surgery.  Pt  c/o right ankle pain after a fall yesterday, for which she was splinted at [facility] ED.   Pt also had 5 episodes of diarrhea 2 days ago with LLQ pain which improved yesterday.   Yesterday morning she was NPO for planned epidural procedure for pain control .  At 11 am she was getting dressed yesterday and while putting on earrings she had unwitnessed LOC and  fell from standing , injuring her right ankle.  She denies preceding cpain/SOB/palpitations/dizziness.  She does not recall tripping over anything.      Pt reports she stopped taking motrin 600mg BID , approximately 5 days  ago for the planned epidural procedure for pain control.  She was on mobic until January 2020.  She has noticed increasingly dark stools over the last 1-2 weeks.    She also reports some difficulty swallowing solids for 2-3 weeks.      In the ED , pt is found to have ARF, hyperkalemia, anemia and hypotension to 85/38.  Pt reported she may have accidentally taken an extra dose of her Clonidine today.         Subjective: RRT initiated for pt's acute hypoxia, she desaturated to the mid 80's on Non rebreather. Pt is post-op from corrective surgery of her ankle after sustaining then injury during a syncopal episode. She became hypoxic and tachycardic with HR to 115.     Vitals  HR: 114   BP: 163/107  RR: 24  SpO2: 85%     PHYSICAL EXAM:  Respiratory: Breath sounds are clear bilaterally, No wheezing, rales or rhonchi  Cardiovascular: S1 and S2, regular rate and rhythm, no Murmurs, gallops or rubs  Extremities: No peripheral edema  Vascular: 2+ peripheral pulses  Neurological: A/O x 3, no focal deficits      Assessment   59F with a PMH of asthma presented to  s/p syncope sustaining an ankle injury admitted to the floor s/p surgical fixation of the ankle currently being evaluated for hypoxia and tachycardia likely 2/2 Acute Hypoxic Respiratory failure in the setting of Aspiration PNA    Plan  -STAT EKG: NS Tachy  -CXR: RLL Infiltrates not seen on previous CXR  -F/u CT-Head  -F/u Trops   -Initiating IV Zosyn   -Continue to monitor for worsening Sx    Case D/w Dr. Kayserian with Julian Zhang and Hospitalist Asia at bedside      Case discussed with

## 2020-02-16 NOTE — CHART NOTE - NSCHARTNOTEFT_GEN_A_CORE
called by rn about pt with persistent tachycardia who was previously hypoxic. pt with pleurisy. In the setting of tachycardia, hypoxia and now elevated troponin will get a CTE chest/PE. troponins to be trended along with serial ekgs called by rn about pt with persistent tachycardia who was previously hypoxic. pt with pleurisy. In the setting of tachycardia, hypoxia and now elevated troponin will get a CTE chest/PE. troponins to be trended along with serial ekgs      on reassessment CTA negative for PE but reveals pna. troponin rising. pt with current GI bleed and pending Endoscopy and colonoscopy. currently asymptomatic. will get serial EKGS and troponins. will call cardiologist if any acute changes noted or one more elevated troponin noted. called by rn about pt with persistent tachycardia who was previously hypoxic. pt with pleurisy. In the setting of tachycardia, hypoxia and now elevated troponin will get a CTE chest/PE. troponins to be trended along with serial ekgs      on reassessment CTA negative for PE but reveals pna. troponin rising. pt with current GI bleed and pending Endoscopy and colonoscopy. currently asymptomatic. will get serial EKGS and troponins. will call cardiologist if any acute changes noted or one more elevated troponin noted.      in light of rising troponins Cardiology contacted. Patient to be started on heparin infusion, asa and metroprolol in light of stable Hemoblogin. will continue to trend troponins

## 2020-02-16 NOTE — PROGRESS NOTE ADULT - SUBJECTIVE AND OBJECTIVE BOX
HPI: 59/F with PMHx of anxiety/ depression, asthma,  HTN on enalapril, spinal stenosis, osteoarthritis, seasonal allergies who was sent to the ED by Dr. Knight for ankle injury requiring surgery.  Pt  c/o right ankle pain after a fall on 2/12, for which she was splinted at [facility] ED.   She states that  she was NPO for planned epidural procedure for pain control .  At 11 am she was getting dressed  and while putting on earrings she had unwitnessed LOC and  fell from standing , injuring her right ankle.  She does not remember the incident. She states that she was standing and next thing she found was that she was on floor with her ankle was twisted. She denies preceding cpain/SOB/palpitations/dizziness.  She does not recall tripping over anything.        Pt also had 5 episodes of diarrhea 2 days ago with LLQ pain which improved.     Pt reports she stopped taking motrin 600mg BID , approximately 5 days  ago for the planned epidural procedure for pain control.  She was on mobic until January 2020.      She has noticed increasingly dark stools over the last 1-2 weeks.    She also reports some difficulty swallowing solids for 2-3 weeks.          2/15: Cr improving  BP was low, gave 1 L NS bolus, better; switched to D5NS at 100 ml/hr  Pt had syncopal episode leading to her right ankle Fx.  Hb better post transfusion    2/16: c/o nausea, vomiting, anxiety, right ankle post op pain  Phos 1.9    PHYSICAL EXAM:    Vital Signs Last 24 Hrs  T(C): 37 (16 Feb 2020 13:05), Max: 37 (15 Feb 2020 16:06)  T(F): 98.6 (16 Feb 2020 13:05), Max: 98.6 (15 Feb 2020 16:06)  HR: 97 (16 Feb 2020 13:05) (71 - 97)  BP: 165/101 (16 Feb 2020 13:05) (110/88 - 181/109)  BP(mean): 83 (15 Feb 2020 21:01) (83 - 93)  RR: 18 (16 Feb 2020 13:05) (14 - 18)  SpO2: 94% (16 Feb 2020 13:05) (92% - 100%)      Constitutional: Weak appearing  HEENT: Atraumatic, DEREK, Normal, No congestion  Respiratory: Breath Sounds normal, no rhonchi/wheeze  Cardiovascular: N S1S2;   Gastrointestinal: Abdomen soft, non tender, Bowel Sounds present  Extremities: No edema, peripheral pulses present; right ankle dressed  Neurological: AAO x 3, no gross focal motor deficits  Skin: Non cellulitic, no rash, ulcers  Lymph Nodes: No lymphadenopathy noted  Back: No CVA tenderness   Musculoskeletal: non tender  Breasts: Deferred  Genitourinary: deferred  Rectal: Deferred                   10.7   9.56   )----------(  248       ( 16 Feb 2020 08:09 )               33.2      142    |  112    |  16     ----------------------------<  102        ( 16 Feb 2020 08:09 )  4.7     |  24     |  1.14     Ca    9.2        ( 16 Feb 2020 08:09 )  Phos  1.9       ( 16 Feb 2020 08:09 )    TPro  x      /  Alb  3.0    /  TBili  x      /  DBili  x      /  AST  x      /  ALT  x      /  AlkPhos  x      ( 16 Feb 2020 08:09 )    LIVER FUNCTIONS - ( 16 Feb 2020 08:09 )  Alb: 3.0 g/dL / Pro: x     / ALK PHOS: x     / ALT: x     / AST: x     / GGT: x               CAPILLARY BLOOD GLUCOSE    CARDIAC MARKERS ( 15 Feb 2020 17:58 )  <0.015 ng/mL / x     / x     / x     / x                MEDICATIONS  (STANDING):  acetaminophen   Tablet .. 975 milliGRAM(s) Oral every 8 hours  ciprofloxacin   IVPB 400 milliGRAM(s) IV Intermittent every 12 hours  enoxaparin Injectable 40 milliGRAM(s) SubCutaneous daily  gabapentin 300 milliGRAM(s) Oral three times a day  influenza   Vaccine 0.5 milliLiter(s) IntraMuscular once  metroNIDAZOLE  IVPB 500 milliGRAM(s) IV Intermittent every 8 hours  polyethylene glycol 3350 17 Gram(s) Oral daily  potassium phosphate IVPB 15 milliMole(s) IV Intermittent once  prochlorperazine   Injectable 5 milliGRAM(s) IV Push once  sodium chloride 0.9% lock flush 3 milliLiter(s) IV Push every 8 hours  sodium chloride 0.9%. 1000 milliLiter(s) (125 mL/Hr) IV Continuous <Continuous>  traZODone 150 milliGRAM(s) Oral at bedtime HPI: 59/F with PMHx of anxiety/ depression, asthma,  HTN on enalapril, spinal stenosis, osteoarthritis, seasonal allergies who was sent to the ED by Dr. Knight for ankle injury requiring surgery.  Pt  c/o right ankle pain after a fall on 2/12, for which she was splinted at [facility] ED.   She states that  she was NPO for planned epidural procedure for pain control .  At 11 am she was getting dressed  and while putting on earrings she had unwitnessed LOC and  fell from standing , injuring her right ankle.  She does not remember the incident. She states that she was standing and next thing she found was that she was on floor with her ankle was twisted. She denies preceding cpain/SOB/palpitations/dizziness.  She does not recall tripping over anything.        Pt also had 5 episodes of diarrhea 2 days ago with LLQ pain which improved.     Pt reports she stopped taking motrin 600mg BID , approximately 5 days  ago for the planned epidural procedure for pain control.  She was on mobic until January 2020.      She has noticed increasingly dark stools over the last 1-2 weeks.    She also reports some difficulty swallowing solids for 2-3 weeks.          2/15: Cr improving  BP was low, gave 1 L NS bolus, better; switched to D5NS at 100 ml/hr  Pt had syncopal episode leading to her right ankle Fx.  Hb better post transfusion    2/16: c/o nausea, vomiting, anxiety, right ankle post op pain  Phos 1.9  later on today started having sob  a rapid response was called for hypoxia with pulse ox in low 80s; pt was seen and evaluated stat for hypoxia  stat cxr showed b/l PNA more on right  stat CT head  was done for nausea and vomiting and was neg for ICH/CVA  stat CT chest was also done, showed b/l PNA more on right along with some pulmonary edema and lung nodules  also, new peripancreatic stranding was reported on CT chest r/o pancreatitis  also, uncontrolled BP ( clonidine was not started after Sx )     PHYSICAL EXAM:    Vital Signs Last 24 Hrs  T(C): 36.8 (16 Feb 2020 15:48), Max: 37 (16 Feb 2020 13:05)  T(F): 98.3 (16 Feb 2020 15:48), Max: 98.6 (16 Feb 2020 13:05)  HR: 127 (16 Feb 2020 18:00) (71 - 127)  BP: 139/103 (16 Feb 2020 18:00) (139/83 - 181/109)  BP(mean): 112 (16 Feb 2020 18:00) (83 - 128)  RR: 33 (16 Feb 2020 18:00) (16 - 33)  SpO2: 98% (16 Feb 2020 18:00) (76% - 100%)      Constitutional: Weak appearing  HEENT: Atraumatic, DEREK, Normal, No congestion  Respiratory: Breath Sounds normal, no rhonchi/wheeze  Cardiovascular: N S1S2;   Gastrointestinal: Abdomen soft, non tender, Bowel Sounds present  Extremities: No edema, peripheral pulses present; right ankle dressed  Neurological: AAO x 3, no gross focal motor deficits  Skin: Non cellulitic, no rash, ulcers  Lymph Nodes: No lymphadenopathy noted  Back: No CVA tenderness   Musculoskeletal: non tender  Breasts: Deferred  Genitourinary: deferred  Rectal: Deferred                   10.7   9.56   )----------(  248       ( 16 Feb 2020 08:09 )               33.2      142    |  112    |  16     ----------------------------<  102        ( 16 Feb 2020 08:09 )  4.7     |  24     |  1.14     Ca    9.2        ( 16 Feb 2020 08:09 )  Phos  1.9       ( 16 Feb 2020 08:09 )    TPro  x      /  Alb  3.0    /  TBili  x      /  DBili  x      /  AST  x      /  ALT  x      /  AlkPhos  x      ( 16 Feb 2020 08:09 )    LIVER FUNCTIONS - ( 16 Feb 2020 08:09 )  Alb: 3.0 g/dL / Pro: x     / ALK PHOS: x     / ALT: x     / AST: x     / GGT: x               CAPILLARY BLOOD GLUCOSE    CARDIAC MARKERS ( 15 Feb 2020 17:58 )  <0.015 ng/mL / x     / x     / x     / x        < from: CT Chest No Cont (02.16.20 @ 16:51) >  Impression:    Patchy infiltrates in both lungs with nodular feature, right greater than left, suggestive of pneumonia. Follow-up chest CT may be pursued in 4-6 weeks to ensure resolution. Attention should be directed to the small 6 mm left lower lobe lung nodule on the follow-up chestCT to ensure stability.    No pneumothorax.    Interlobular septal thickening in both lungs, suggestive of interstitial pulmonary edema.    New mild peribronchial cuffing, suggestive of reactive airway disease.    Peripancreatic stranding, new since the previous abdominal CT dated 2/14/2020, suggestive of acute pancreatitis. Clinical correlation with pancreatic enzymes is recommended.    Stable 0.8 cm splenic artery aneurysm with mural calcification.    Small bilateral pleural effusions.    Stable small pericardial effusion.    < end of copied text >    < from: CT Head No Cont (02.16.20 @ 16:51) >  Impression:     No intracranial hemorrhage, mass effect or CT evidence of acute infarct      < end of copied text >              MEDICATIONS  (STANDING):  acetaminophen   Tablet .. 975 milliGRAM(s) Oral every 8 hours  cloNIDine 0.1 milliGRAM(s) Oral every 8 hours  dextrose 5% + sodium chloride 0.9%. 1000 milliLiter(s) (75 mL/Hr) IV Continuous <Continuous>  gabapentin 300 milliGRAM(s) Oral three times a day  influenza   Vaccine 0.5 milliLiter(s) IntraMuscular once  pantoprazole  Injectable 40 milliGRAM(s) IV Push every 12 hours  PARoxetine 60 milliGRAM(s) Oral daily  piperacillin/tazobactam IVPB. 3.375 Gram(s) IV Intermittent once  piperacillin/tazobactam IVPB.. 3.375 Gram(s) IV Intermittent every 8 hours  polyethylene glycol 3350 17 Gram(s) Oral daily  potassium phosphate IVPB 15 milliMole(s) IV Intermittent once  sodium chloride 0.9% lock flush 3 milliLiter(s) IV Push every 8 hours  traZODone 150 milliGRAM(s) Oral at bedtime  vancomycin  IVPB 1000 milliGRAM(s) IV Intermittent once

## 2020-02-16 NOTE — PROGRESS NOTE ADULT - PROBLEM SELECTOR PLAN 2
Problem: Kidney failure with use of NSAIDS,  dehydration associated diarrhoea and NPO  continue IV fluid, renal management

## 2020-02-16 NOTE — PROGRESS NOTE ADULT - ASSESSMENT
A/P: 59 female with acute hypoxic respiratory from aspiration PNA  Plan  O2 to keep SAt > 90%  Agree with Zosyn and Vanco  HCT I reviewed was neg for a BLEED  CT Chest that I reviewed showed patchy multi lobar infiltrates  D/W Ortho team  D/W Hospitalist

## 2020-02-16 NOTE — PROGRESS NOTE ADULT - PROBLEM SELECTOR PLAN 3
·  Problem: Preop cardiovascular exam.  Recommendation: Patient with above hx who had syncope on Thursday due to dehydration, with negative troponin  no arrhythmia  normal ventricular systolic function   who appears optimal for surgery cardiac wise , intermediate risk for low risk surgery  will need follow up monitoring.  DVT prophylaxis per surgery ·  Problem: Anemia.  Recommendation: as per hospitalist GI.

## 2020-02-16 NOTE — PROGRESS NOTE ADULT - ASSESSMENT
59/F with PMHx of anxiety/ depression, asthma,  HTN on enalapril, spinal stenosis, osteoarthritis, seasonal allergies admitted with     1) Syncope: no prior hx of syncope  admit to tele  r/o cardiac vs vasovagal vs orthostatic causes  Echo; EF 60%  tele monitoring  serial cardiac enz; 1st trop neg  cardio consult; spoke with Dr. Palla  tele shows NSR  repeat EKG NSR    2) Right Ankle Fx:   s/p right ankle External fixator application  pain meds prn  follow u with Dr. Knight in office for definitive fixation      3) ARF: likely sec to NSAIDS; rersolved  d/c them  cont iv fluids  renal consult appreciated  Cr trending down; normalized  renal panel in am    4) Melena and Dark colored stools + Acute Anemia of acte hemorrhage from GI bleed:  likely from NSAIDS   d/c NSAIDS  cont Protonix  NPO except meds for now  GI consult appreciated  s/p PRBC 2/15  H/H q 8 hrs; transfuse for Hb less than 8  EGD and colonoscopy    5) Left sided Colitis: start cipro + flagyl iv  monitor closely  would need colonoscopy in 4-6 weeks to r/o colon CA  GI f/u    6) Hypotension: Better after fluid bolus  cont D5NS at 100 ml/hr    7) Hyperkalemia 6.4: resolved with Rx  monitor closely    8) Chronic Back Pain: no NSAIDS    9) DVT PPX: venodynes    10) Intractable nausea and vomiting: CT head stat to r/o ICH  add compazine to zofran prn    11) Anxiety: xanax prn    12) Hypophosphatemia 1.9: replace   recheck in am    poc discussed with pt, her  at bedside, team 59/F with PMHx of anxiety/ depression, asthma,  HTN on enalapril, spinal stenosis, osteoarthritis, seasonal allergies admitted with     1) Syncope: no prior hx of syncope  admit to tele  r/o cardiac vs vasovagal vs orthostatic causes  Echo; EF 60%  tele monitoring  serial cardiac enz; 1st trop neg  cardio consult; spoke with Dr. Palla  tele shows NSR  repeat EKG NSR    2) Right Ankle Fx:   s/p right ankle External fixator application  pain meds prn  follow u with Dr. Knight in office for definitive fixation      3) ARF: likely sec to NSAIDS; rersolved  d/c them  cont iv fluids  renal consult appreciated  Cr trending down; normalized  renal panel in am    4) Melena and Dark colored stools + Acute Anemia of acte hemorrhage from GI bleed:  likely from NSAIDS   d/c NSAIDS  cont Protonix  NPO except meds for now  GI consult appreciated  s/p PRBC 2/15  H/H q 8 hrs; transfuse for Hb less than 8  EGD and colonoscopy    5) Left sided Colitis: start cipro + flagyl iv  monitor closely  would need colonoscopy in 4-6 weeks to r/o colon CA  GI f/u    6) Hypotension: Better after fluid bolus  cont D5NS at 100 ml/hr    7) Hyperkalemia 6.4: resolved with Rx  monitor closely    8) Chronic Back Pain: no NSAIDS    9) DVT PPX: venodynes    10) Intractable nausea and vomiting: CT head stat to r/o ICH  add compazine to zofran prn    11) Anxiety: xanax prn    12) Hypophosphatemia 1.9: replace   recheck in am    13)  Acute Hypoxic Respiratory Failure + b/l PNA aspiration vs HCAP + Lung nodules + pulmonary edema:   transferred to SICU for up close monitoring  started on zosyn + vanco  decrease iv fluids  NPO except meds  pulmo consult  if worsening, might need intubation and ventilator    14) Peripancreatic stranding on CT chest:  r/o pancreatitis  keep NPO except meds  iv fluids  check lipase  GI f/u  PPI    Extremely sick and critical patient with multiple medical /sx problems.      poc discussed in great detail with pt, her  at bedside, team, ICU Dr. Zhang.     extremely prolonged visit 165 min 59/F with PMHx of anxiety/ depression, asthma,  HTN on enalapril, spinal stenosis, osteoarthritis, seasonal allergies admitted with     1) Syncope: no prior hx of syncope  admit to tele  r/o cardiac vs vasovagal vs orthostatic causes  Echo; EF 60%  tele monitoring  serial cardiac enz; 1st trop neg  cardio consult; spoke with Dr. Palla  tele shows NSR  repeat EKG NSR    2) Right Ankle Fx:   s/p right ankle External fixator application  pain meds prn  follow u with Dr. Knight in office for definitive fixation      3) ARF: likely sec to NSAIDS; rersolved  d/c them  cont iv fluids  renal consult appreciated  Cr trending down; normalized  renal panel in am    4) Melena and Dark colored stools + Acute Anemia of acte hemorrhage from GI bleed:  likely from NSAIDS   d/c NSAIDS  cont Protonix  NPO except meds for now  GI consult appreciated  s/p PRBC 2/15  H/H q 8 hrs; transfuse for Hb less than 8  EGD and colonoscopy    5) Left sided Colitis: start cipro + flagyl iv  monitor closely  would need colonoscopy in 4-6 weeks to r/o colon CA  GI f/u    6) Hypotension: resolved , now uncontrolled HTN    7) Hyperkalemia 6.4: resolved with Rx  monitor closely    8) Chronic Back Pain: no NSAIDS    9) DVT PPX: venodynes    10) Intractable nausea and vomiting: CT head stat to r/o ICH  add compazine to zofran prn    11) Anxiety: xanax prn  restarted Paroxetine 60 mg po daily     12) Hypophosphatemia 1.9: replace   recheck in am    13)  Acute Hypoxic Respiratory Failure + b/l PNA aspiration vs HCAP + Lung nodules + pulmonary edema:   transferred to SICU for up close monitoring  started on zosyn + vanco  decrease iv fluids  NPO except meds  pulmo consult  if worsening, might need intubation and ventilator    14) Peripancreatic stranding on CT chest:  r/o pancreatitis  keep NPO except meds  iv fluids  check lipase  GI f/u  PPI    15) Uncontrolled HTN: likely rebound HTN form clonidine withdrawal  clonidine was not restarted after Sx  given iv hydralazine, cont 10 mg iv q 6 hrs prn for SBP more than 160  clonidine restarted 0.1 mg po tid  BP better      Extremely sick and critical patient with multiple medical /sx problems. Monitor very closely in SICU/ICU     poc discussed in great detail with pt, her  at bedside, team, ICU Dr. Zhang.     extremely prolonged visit 165 min

## 2020-02-16 NOTE — PROGRESS NOTE ADULT - PROBLEM SELECTOR PLAN 1
Problem: Syncope. likely 2/2 Diarrhea, dehydration, anemia GI bleeding possibly due to NSAID use normal LVEF  no arrhythmia.  H&H stable, Creat improving Problem: Syncope. likely 2/2 Diarrhea, dehydration, anemia GI bleeding possibly due to NSAID use normal LVEF  no arrhythmia.  H&H stable, Creat  normal today

## 2020-02-17 DIAGNOSIS — I21.4 NON-ST ELEVATION (NSTEMI) MYOCARDIAL INFARCTION: ICD-10-CM

## 2020-02-17 LAB
ANION GAP SERPL CALC-SCNC: 4 MMOL/L — LOW (ref 5–17)
APTT BLD: 31.1 SEC — SIGNIFICANT CHANGE UP (ref 27.5–36.3)
APTT BLD: 31.7 SEC — SIGNIFICANT CHANGE UP (ref 27.5–36.3)
APTT BLD: 46.2 SEC — HIGH (ref 27.5–36.3)
BUN SERPL-MCNC: 16 MG/DL — SIGNIFICANT CHANGE UP (ref 7–23)
CALCIUM SERPL-MCNC: 8.2 MG/DL — LOW (ref 8.5–10.1)
CHLORIDE SERPL-SCNC: 112 MMOL/L — HIGH (ref 96–108)
CO2 SERPL-SCNC: 26 MMOL/L — SIGNIFICANT CHANGE UP (ref 22–31)
CREAT SERPL-MCNC: 1.13 MG/DL — SIGNIFICANT CHANGE UP (ref 0.5–1.3)
GLUCOSE SERPL-MCNC: 170 MG/DL — HIGH (ref 70–99)
HCT VFR BLD CALC: 35.9 % — SIGNIFICANT CHANGE UP (ref 34.5–45)
HGB BLD-MCNC: 11.4 G/DL — LOW (ref 11.5–15.5)
LIDOCAIN IGE QN: 72 U/L — LOW (ref 73–393)
MCHC RBC-ENTMCNC: 30.3 PG — SIGNIFICANT CHANGE UP (ref 27–34)
MCHC RBC-ENTMCNC: 31.8 GM/DL — LOW (ref 32–36)
MCV RBC AUTO: 95.5 FL — SIGNIFICANT CHANGE UP (ref 80–100)
PHOSPHATE SERPL-MCNC: 3.5 MG/DL — SIGNIFICANT CHANGE UP (ref 2.5–4.5)
PLATELET # BLD AUTO: 350 K/UL — SIGNIFICANT CHANGE UP (ref 150–400)
POTASSIUM SERPL-MCNC: 4.4 MMOL/L — SIGNIFICANT CHANGE UP (ref 3.5–5.3)
POTASSIUM SERPL-SCNC: 4.4 MMOL/L — SIGNIFICANT CHANGE UP (ref 3.5–5.3)
RBC # BLD: 3.76 M/UL — LOW (ref 3.8–5.2)
RBC # FLD: 13.4 % — SIGNIFICANT CHANGE UP (ref 10.3–14.5)
SODIUM SERPL-SCNC: 142 MMOL/L — SIGNIFICANT CHANGE UP (ref 135–145)
TROPONIN I SERPL-MCNC: 5.23 NG/ML — HIGH (ref 0.01–0.04)
WBC # BLD: 12.41 K/UL — HIGH (ref 3.8–10.5)
WBC # FLD AUTO: 12.41 K/UL — HIGH (ref 3.8–10.5)

## 2020-02-17 PROCEDURE — 99233 SBSQ HOSP IP/OBS HIGH 50: CPT

## 2020-02-17 PROCEDURE — 93010 ELECTROCARDIOGRAM REPORT: CPT

## 2020-02-17 PROCEDURE — 71045 X-RAY EXAM CHEST 1 VIEW: CPT | Mod: 26

## 2020-02-17 RX ORDER — HEPARIN SODIUM 5000 [USP'U]/ML
4000 INJECTION INTRAVENOUS; SUBCUTANEOUS EVERY 6 HOURS
Refills: 0 | Status: DISCONTINUED | OUTPATIENT
Start: 2020-02-17 | End: 2020-02-17

## 2020-02-17 RX ORDER — ASPIRIN/CALCIUM CARB/MAGNESIUM 324 MG
325 TABLET ORAL ONCE
Refills: 0 | Status: COMPLETED | OUTPATIENT
Start: 2020-02-17 | End: 2020-02-17

## 2020-02-17 RX ORDER — METOPROLOL TARTRATE 50 MG
25 TABLET ORAL DAILY
Refills: 0 | Status: DISCONTINUED | OUTPATIENT
Start: 2020-02-17 | End: 2020-02-18

## 2020-02-17 RX ORDER — ENOXAPARIN SODIUM 100 MG/ML
40 INJECTION SUBCUTANEOUS DAILY
Refills: 0 | Status: COMPLETED | OUTPATIENT
Start: 2020-02-17 | End: 2020-02-24

## 2020-02-17 RX ORDER — HEPARIN SODIUM 5000 [USP'U]/ML
INJECTION INTRAVENOUS; SUBCUTANEOUS
Qty: 25000 | Refills: 0 | Status: DISCONTINUED | OUTPATIENT
Start: 2020-02-17 | End: 2020-02-17

## 2020-02-17 RX ADMIN — GABAPENTIN 300 MILLIGRAM(S): 400 CAPSULE ORAL at 06:05

## 2020-02-17 RX ADMIN — HYDROMORPHONE HYDROCHLORIDE 1 MILLIGRAM(S): 2 INJECTION INTRAMUSCULAR; INTRAVENOUS; SUBCUTANEOUS at 13:05

## 2020-02-17 RX ADMIN — OXYCODONE HYDROCHLORIDE 5 MILLIGRAM(S): 5 TABLET ORAL at 07:00

## 2020-02-17 RX ADMIN — TRAMADOL HYDROCHLORIDE 50 MILLIGRAM(S): 50 TABLET ORAL at 19:10

## 2020-02-17 RX ADMIN — Medication 975 MILLIGRAM(S): at 14:41

## 2020-02-17 RX ADMIN — SODIUM CHLORIDE 3 MILLILITER(S): 9 INJECTION INTRAMUSCULAR; INTRAVENOUS; SUBCUTANEOUS at 21:03

## 2020-02-17 RX ADMIN — HYDROMORPHONE HYDROCHLORIDE 1 MILLIGRAM(S): 2 INJECTION INTRAMUSCULAR; INTRAVENOUS; SUBCUTANEOUS at 21:02

## 2020-02-17 RX ADMIN — PANTOPRAZOLE SODIUM 40 MILLIGRAM(S): 20 TABLET, DELAYED RELEASE ORAL at 06:05

## 2020-02-17 RX ADMIN — OXYCODONE HYDROCHLORIDE 5 MILLIGRAM(S): 5 TABLET ORAL at 15:37

## 2020-02-17 RX ADMIN — HYDROMORPHONE HYDROCHLORIDE 1 MILLIGRAM(S): 2 INJECTION INTRAMUSCULAR; INTRAVENOUS; SUBCUTANEOUS at 17:00

## 2020-02-17 RX ADMIN — OXYCODONE HYDROCHLORIDE 5 MILLIGRAM(S): 5 TABLET ORAL at 16:07

## 2020-02-17 RX ADMIN — ENOXAPARIN SODIUM 40 MILLIGRAM(S): 100 INJECTION SUBCUTANEOUS at 21:01

## 2020-02-17 RX ADMIN — Medication 975 MILLIGRAM(S): at 21:00

## 2020-02-17 RX ADMIN — Medication 0.25 MILLIGRAM(S): at 14:41

## 2020-02-17 RX ADMIN — Medication 0.25 MILLIGRAM(S): at 06:10

## 2020-02-17 RX ADMIN — HYDROMORPHONE HYDROCHLORIDE 1 MILLIGRAM(S): 2 INJECTION INTRAMUSCULAR; INTRAVENOUS; SUBCUTANEOUS at 02:30

## 2020-02-17 RX ADMIN — GABAPENTIN 300 MILLIGRAM(S): 400 CAPSULE ORAL at 14:41

## 2020-02-17 RX ADMIN — PIPERACILLIN AND TAZOBACTAM 25 GRAM(S): 4; .5 INJECTION, POWDER, LYOPHILIZED, FOR SOLUTION INTRAVENOUS at 06:04

## 2020-02-17 RX ADMIN — HYDROMORPHONE HYDROCHLORIDE 1 MILLIGRAM(S): 2 INJECTION INTRAMUSCULAR; INTRAVENOUS; SUBCUTANEOUS at 07:00

## 2020-02-17 RX ADMIN — HYDROMORPHONE HYDROCHLORIDE 1 MILLIGRAM(S): 2 INJECTION INTRAMUSCULAR; INTRAVENOUS; SUBCUTANEOUS at 16:41

## 2020-02-17 RX ADMIN — SODIUM CHLORIDE 3 MILLILITER(S): 9 INJECTION INTRAMUSCULAR; INTRAVENOUS; SUBCUTANEOUS at 14:42

## 2020-02-17 RX ADMIN — PIPERACILLIN AND TAZOBACTAM 25 GRAM(S): 4; .5 INJECTION, POWDER, LYOPHILIZED, FOR SOLUTION INTRAVENOUS at 14:41

## 2020-02-17 RX ADMIN — SODIUM CHLORIDE 75 MILLILITER(S): 9 INJECTION, SOLUTION INTRAVENOUS at 12:39

## 2020-02-17 RX ADMIN — SODIUM CHLORIDE 3 MILLILITER(S): 9 INJECTION INTRAMUSCULAR; INTRAVENOUS; SUBCUTANEOUS at 06:11

## 2020-02-17 RX ADMIN — OXYCODONE HYDROCHLORIDE 5 MILLIGRAM(S): 5 TABLET ORAL at 01:17

## 2020-02-17 RX ADMIN — Medication 25 MILLIGRAM(S): at 02:30

## 2020-02-17 RX ADMIN — Medication 0.1 MILLIGRAM(S): at 06:05

## 2020-02-17 RX ADMIN — PIPERACILLIN AND TAZOBACTAM 25 GRAM(S): 4; .5 INJECTION, POWDER, LYOPHILIZED, FOR SOLUTION INTRAVENOUS at 21:01

## 2020-02-17 RX ADMIN — HYDROMORPHONE HYDROCHLORIDE 1 MILLIGRAM(S): 2 INJECTION INTRAMUSCULAR; INTRAVENOUS; SUBCUTANEOUS at 02:45

## 2020-02-17 RX ADMIN — HYDROMORPHONE HYDROCHLORIDE 1 MILLIGRAM(S): 2 INJECTION INTRAMUSCULAR; INTRAVENOUS; SUBCUTANEOUS at 21:15

## 2020-02-17 RX ADMIN — Medication 150 MILLIGRAM(S): at 21:00

## 2020-02-17 RX ADMIN — OXYCODONE HYDROCHLORIDE 5 MILLIGRAM(S): 5 TABLET ORAL at 02:15

## 2020-02-17 RX ADMIN — GABAPENTIN 300 MILLIGRAM(S): 400 CAPSULE ORAL at 21:00

## 2020-02-17 RX ADMIN — HYDROMORPHONE HYDROCHLORIDE 1 MILLIGRAM(S): 2 INJECTION INTRAMUSCULAR; INTRAVENOUS; SUBCUTANEOUS at 12:35

## 2020-02-17 RX ADMIN — HEPARIN SODIUM 1150 UNIT(S)/HR: 5000 INJECTION INTRAVENOUS; SUBCUTANEOUS at 09:29

## 2020-02-17 RX ADMIN — PANTOPRAZOLE SODIUM 40 MILLIGRAM(S): 20 TABLET, DELAYED RELEASE ORAL at 16:41

## 2020-02-17 RX ADMIN — Medication 325 MILLIGRAM(S): at 02:30

## 2020-02-17 RX ADMIN — HYDROMORPHONE HYDROCHLORIDE 1 MILLIGRAM(S): 2 INJECTION INTRAMUSCULAR; INTRAVENOUS; SUBCUTANEOUS at 07:15

## 2020-02-17 RX ADMIN — SENNA PLUS 2 TABLET(S): 8.6 TABLET ORAL at 21:00

## 2020-02-17 RX ADMIN — Medication 975 MILLIGRAM(S): at 15:11

## 2020-02-17 RX ADMIN — HEPARIN SODIUM 1000 UNIT(S)/HR: 5000 INJECTION INTRAVENOUS; SUBCUTANEOUS at 02:32

## 2020-02-17 RX ADMIN — Medication 975 MILLIGRAM(S): at 07:00

## 2020-02-17 RX ADMIN — Medication 975 MILLIGRAM(S): at 21:36

## 2020-02-17 RX ADMIN — Medication 975 MILLIGRAM(S): at 06:05

## 2020-02-17 RX ADMIN — Medication 60 MILLIGRAM(S): at 12:27

## 2020-02-17 RX ADMIN — OXYCODONE HYDROCHLORIDE 5 MILLIGRAM(S): 5 TABLET ORAL at 06:10

## 2020-02-17 NOTE — CONSULT NOTE ADULT - ASSESSMENT
- hypoxemics respiratory failure with the acute pulmonary edema   - less likely pneumonia   - low EF with the cath and normal caronaries   - history of mild asthma with symptom of exacerbation with the pulmonary edema   - status post fall ankle fracture and repair   - anemia with the G.I bleeding   - acute renal failure resolved     PLAN   - would recommend to discontinue fluids   -stat dose of lasix for the pulmonary edema   - negative work up for the P.E   - early discontinuation of antibiotics if the cxr clears after the diuretics   -  use of nebs for the wheezing   -  might consider steroids if the wheezing do not clear with the diuretics   - repeat cxr for any worsening of the effusions   - monitor renal function while receiving the diuretics

## 2020-02-17 NOTE — PROGRESS NOTE ADULT - SUBJECTIVE AND OBJECTIVE BOX
HPI: 59/F with PMHx of anxiety/ depression, asthma,  HTN on enalapril, spinal stenosis, osteoarthritis, seasonal allergies who was sent to the ED by Dr. Knight for ankle injury requiring surgery.  Pt  c/o right ankle pain after a fall on 2/12, for which she was splinted at [facility] ED.   She states that  she was NPO for planned epidural procedure for pain control .  At 11 am she was getting dressed  and while putting on earrings she had unwitnessed LOC and  fell from standing , injuring her right ankle.  She does not remember the incident. She states that she was standing and next thing she found was that she was on floor with her ankle was twisted. She denies preceding cpain/SOB/palpitations/dizziness.  She does not recall tripping over anything.        Pt also had 5 episodes of diarrhea 2 days ago with LLQ pain which improved.     Pt reports she stopped taking motrin 600mg BID , approximately 5 days  ago for the planned epidural procedure for pain control.  She was on mobic until January 2020.      She has noticed increasingly dark stools over the last 1-2 weeks.    She also reports some difficulty swallowing solids for 2-3 weeks.          2/15: Cr improving  BP was low, gave 1 L NS bolus, better; switched to D5NS at 100 ml/hr  Pt had syncopal episode leading to her right ankle Fx.  Hb better post transfusion    2/17: seen and eval. Hemodynamically stable. Denies any HA, CP, SOB. Patient does notes of RLQ pain with deep palpation. There was a concern raised about pancreatitis on admission - will check lipase levels.     PHYSICAL EXAM:    ICU Vital Signs Last 24 Hrs  T(C): 36.9 (17 Feb 2020 13:10), Max: 36.9 (17 Feb 2020 13:10)  T(F): 98.4 (17 Feb 2020 13:10), Max: 98.4 (17 Feb 2020 13:10)  HR: 101 (17 Feb 2020 13:00) (101 - 135)  BP: 96/76 (17 Feb 2020 13:00) (87/68 - 175/114)  BP(mean): 80 (17 Feb 2020 13:00) (72 - 134)  RR: 18 (17 Feb 2020 13:00) (13 - 33)  SpO2: 98% (17 Feb 2020 13:00) (84% - 100%)    Constitutional: Weak appearing  HEENT: Atraumatic, DEREK, Normal, No congestion  Respiratory: Breath Sounds normal, no rhonchi/wheeze  Cardiovascular: N S1S2;   Gastrointestinal: Abdomen soft, non tender, Bowel Sounds present  Extremities: No edema, peripheral pulses present; right ankle dressed  Neurological: AAO x 3, no gross focal motor deficits  Skin: Non cellulitic, no rash, ulcers  Lymph Nodes: No lymphadenopathy noted  Back: No CVA tenderness   Musculoskeletal: non tender  Breasts: Deferred  Genitourinary: deferred  Rectal: Deferred      Labs:   CBC Full  -  ( 17 Feb 2020 08:15 )  WBC Count : 12.41 K/uL  RBC Count : 3.76 M/uL  Hemoglobin : 11.4 g/dL  Hematocrit : 35.9 %  Platelet Count - Automated : 350 K/uL    PTT - ( 17 Feb 2020 08:15 )  PTT:46.2 sec    142  |  112<H>  |  16  ----------------------------<  170<H>  4.4   |  26  |  1.13    Ca    8.2<L>      17 Feb 2020 08:15  Phos  3.5     02-17    TPro  x   /  Alb  3.0<L>  /  TBili  x   /  DBili  x   /  AST  x   /  ALT  x   /  AlkPhos  x   02-16          CAPILLARY BLOOD GLUCOSE    CARDIAC MARKERS ( 15 Feb 2020 17:58 )  <0.015 ng/mL / x     / x     / x     / x        < from: CT Chest No Cont (02.16.20 @ 16:51) >  Impression:    Patchy infiltrates in both lungs with nodular feature, right greater than left, suggestive of pneumonia. Follow-up chest CT may be pursued in 4-6 weeks to ensure resolution. Attention should be directed to the small 6 mm left lower lobe lung nodule on the follow-up chestCT to ensure stability.    No pneumothorax.    Interlobular septal thickening in both lungs, suggestive of interstitial pulmonary edema.    New mild peribronchial cuffing, suggestive of reactive airway disease.    Peripancreatic stranding, new since the previous abdominal CT dated 2/14/2020, suggestive of acute pancreatitis. Clinical correlation with pancreatic enzymes is recommended.    Stable 0.8 cm splenic artery aneurysm with mural calcification.    Small bilateral pleural effusions.    Stable small pericardial effusion.    < end of copied text >    < from: CT Head No Cont (02.16.20 @ 16:51) >  Impression:     No intracranial hemorrhage, mass effect or CT evidence of acute infarct      < end of copied text > HPI: 59/F with PMHx of anxiety/ depression, asthma,  HTN on enalapril, spinal stenosis, osteoarthritis, seasonal allergies who was sent to the ED by Dr. Knight for ankle injury requiring surgery.  Pt  c/o right ankle pain after a fall on 2/12, for which she was splinted at [facility] ED.   She states that  she was NPO for planned epidural procedure for pain control .  At 11 am she was getting dressed  and while putting on earrings she had unwitnessed LOC and  fell from standing , injuring her right ankle.  She does not remember the incident. She states that she was standing and next thing she found was that she was on floor with her ankle was twisted. She denies preceding cpain/SOB/palpitations/dizziness.  She does not recall tripping over anything.        Pt also had 5 episodes of diarrhea 2 days ago with LLQ pain which improved.     Pt reports she stopped taking motrin 600mg BID , approximately 5 days  ago for the planned epidural procedure for pain control.  She was on mobic until January 2020.      She has noticed increasingly dark stools over the last 1-2 weeks.    She also reports some difficulty swallowing solids for 2-3 weeks.          2/15: Cr improving  BP was low, gave 1 L NS bolus, better; switched to D5NS at 100 ml/hr  Pt had syncopal episode leading to her right ankle Fx.  Hb better post transfusion    2/17: seen and eval. Hemodynamically stable. Denies any HA, CP, SOB. Patient does notes of RLQ pain with deep palpation. There was a concern raised about pancreatitis on admission - will check lipase levels. Care discussed with Dr. diaz and Dr. Tenorio.     PHYSICAL EXAM:    ICU Vital Signs Last 24 Hrs  T(C): 36.9 (17 Feb 2020 13:10), Max: 36.9 (17 Feb 2020 13:10)  T(F): 98.4 (17 Feb 2020 13:10), Max: 98.4 (17 Feb 2020 13:10)  HR: 101 (17 Feb 2020 13:00) (101 - 135)  BP: 96/76 (17 Feb 2020 13:00) (87/68 - 175/114)  BP(mean): 80 (17 Feb 2020 13:00) (72 - 134)  RR: 18 (17 Feb 2020 13:00) (13 - 33)  SpO2: 98% (17 Feb 2020 13:00) (84% - 100%)    Constitutional: Weak appearing  HEENT: Atraumatic, DEREK, Normal, No congestion  Respiratory: Breath Sounds normal, no rhonchi/wheeze  Cardiovascular: N S1S2;   Gastrointestinal: Abdomen soft, non tender, Bowel Sounds present  Extremities: No edema, peripheral pulses present; right ankle dressed  Neurological: AAO x 3, no gross focal motor deficits  Skin: Non cellulitic, no rash, ulcers  Lymph Nodes: No lymphadenopathy noted  Back: No CVA tenderness   Musculoskeletal: non tender  Breasts: Deferred  Genitourinary: deferred  Rectal: Deferred      Labs:   CBC Full  -  ( 17 Feb 2020 08:15 )  WBC Count : 12.41 K/uL  RBC Count : 3.76 M/uL  Hemoglobin : 11.4 g/dL  Hematocrit : 35.9 %  Platelet Count - Automated : 350 K/uL    PTT - ( 17 Feb 2020 08:15 )  PTT:46.2 sec    142  |  112<H>  |  16  ----------------------------<  170<H>  4.4   |  26  |  1.13    Ca    8.2<L>      17 Feb 2020 08:15  Phos  3.5     02-17    TPro  x   /  Alb  3.0<L>  /  TBili  x   /  DBili  x   /  AST  x   /  ALT  x   /  AlkPhos  x   02-16          CAPILLARY BLOOD GLUCOSE    CARDIAC MARKERS ( 15 Feb 2020 17:58 )  <0.015 ng/mL / x     / x     / x     / x        < from: CT Chest No Cont (02.16.20 @ 16:51) >  Impression:    Patchy infiltrates in both lungs with nodular feature, right greater than left, suggestive of pneumonia. Follow-up chest CT may be pursued in 4-6 weeks to ensure resolution. Attention should be directed to the small 6 mm left lower lobe lung nodule on the follow-up chestCT to ensure stability.    No pneumothorax.    Interlobular septal thickening in both lungs, suggestive of interstitial pulmonary edema.    New mild peribronchial cuffing, suggestive of reactive airway disease.    Peripancreatic stranding, new since the previous abdominal CT dated 2/14/2020, suggestive of acute pancreatitis. Clinical correlation with pancreatic enzymes is recommended.    Stable 0.8 cm splenic artery aneurysm with mural calcification.    Small bilateral pleural effusions.    Stable small pericardial effusion.    < end of copied text >    < from: CT Head No Cont (02.16.20 @ 16:51) >  Impression:     No intracranial hemorrhage, mass effect or CT evidence of acute infarct      < end of copied text >        # Syncope: can be secondary to orthostatic causes in combination with cardiac cause  - tele monitoring   - new low EF with recent cath 2/17 with normal coronaries  - elevated trops      # Acute hypoxic respiratory failure in the setting of acute on chronic systolic CHF /  Aspiration pneumonia  - d/c fluids  - continue with IV zosyn  - O2 support    # NSTEMI  - s/p cardiac cath 2/17:  Normal coronary arteries.  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 Moderately reduced left ventricular systolic function with segmental wall   motion abnormalities.  Normal LV systolic function. Estimated LV ejection fraction is 35 %.  Elevated left ventricular end-diastolic pressure.   - cardiology following .    # Right Ankle Fx:   s/p right ankle External fixator application  pain meds prn  follow u with Dr. Knight in office for definitive fixation      # ARF: likely sec to NSAIDS; rersolved      # Melena and Dark colored stools + Acute Anemia of acte hemorrhage from GI bleed:  - likely from NSAIDS   - d/c NSAIDS  - cont Protonix  - s/p PRBC 2/15  - H/H q 8 hrs; transfuse for Hb less than 8       # Hypotension:  - resolved     # Hyperkalemia    - resolved    # Peripancreatic stranding on CT chest:  r/o pancreatitis  keep NPO except meds  iv fluids  check lipase  GI f/u  PPI HPI: 59/F with PMHx of anxiety/ depression, asthma,  HTN on enalapril, spinal stenosis, osteoarthritis, seasonal allergies who was sent to the ED by Dr. Knight for ankle injury requiring surgery.  Pt  c/o right ankle pain after a fall on 2/12, for which she was splinted at [facility] ED.   She states that  she was NPO for planned epidural procedure for pain control .  At 11 am she was getting dressed  and while putting on earrings she had unwitnessed LOC and  fell from standing , injuring her right ankle.  She does not remember the incident. She states that she was standing and next thing she found was that she was on floor with her ankle was twisted. She denies preceding cpain/SOB/palpitations/dizziness.  She does not recall tripping over anything.        Pt also had 5 episodes of diarrhea 2 days ago with LLQ pain which improved.     Pt reports she stopped taking motrin 600mg BID , approximately 5 days  ago for the planned epidural procedure for pain control.  She was on mobic until January 2020.      She has noticed increasingly dark stools over the last 1-2 weeks.    She also reports some difficulty swallowing solids for 2-3 weeks.          2/15: Cr improving  BP was low, gave 1 L NS bolus, better; switched to D5NS at 100 ml/hr  Pt had syncopal episode leading to her right ankle Fx.  Hb better post transfusion    2/17: seen and eval. Hemodynamically stable. Denies any HA, CP, SOB. Patient does notes of RLQ pain with deep palpation. There was a concern raised about pancreatitis on admission - will check lipase levels. Care discussed with Dr. diaz and Dr. Tenorio.     PHYSICAL EXAM:    ICU Vital Signs Last 24 Hrs  T(C): 36.9 (17 Feb 2020 13:10), Max: 36.9 (17 Feb 2020 13:10)  T(F): 98.4 (17 Feb 2020 13:10), Max: 98.4 (17 Feb 2020 13:10)  HR: 101 (17 Feb 2020 13:00) (101 - 135)  BP: 96/76 (17 Feb 2020 13:00) (87/68 - 175/114)  BP(mean): 80 (17 Feb 2020 13:00) (72 - 134)  RR: 18 (17 Feb 2020 13:00) (13 - 33)  SpO2: 98% (17 Feb 2020 13:00) (84% - 100%)    Constitutional: Weak appearing  HEENT: Atraumatic, DEREK, Normal, No congestion  Respiratory: Breath Sounds normal, no rhonchi/wheeze  Cardiovascular: N S1S2;   Gastrointestinal: Abdomen soft, non tender, Bowel Sounds present  Extremities: No edema, peripheral pulses present; right ankle dressed  Neurological: AAO x 3, no gross focal motor deficits  Skin: Non cellulitic, no rash, ulcers  Lymph Nodes: No lymphadenopathy noted  Back: No CVA tenderness   Musculoskeletal: non tender  Breasts: Deferred  Genitourinary: deferred  Rectal: Deferred      Labs:   CBC Full  -  ( 17 Feb 2020 08:15 )  WBC Count : 12.41 K/uL  RBC Count : 3.76 M/uL  Hemoglobin : 11.4 g/dL  Hematocrit : 35.9 %  Platelet Count - Automated : 350 K/uL    PTT - ( 17 Feb 2020 08:15 )  PTT:46.2 sec    142  |  112<H>  |  16  ----------------------------<  170<H>  4.4   |  26  |  1.13    Ca    8.2<L>      17 Feb 2020 08:15  Phos  3.5     02-17    TPro  x   /  Alb  3.0<L>  /  TBili  x   /  DBili  x   /  AST  x   /  ALT  x   /  AlkPhos  x   02-16          CAPILLARY BLOOD GLUCOSE    CARDIAC MARKERS ( 15 Feb 2020 17:58 )  <0.015 ng/mL / x     / x     / x     / x        < from: CT Chest No Cont (02.16.20 @ 16:51) >  Impression:    Patchy infiltrates in both lungs with nodular feature, right greater than left, suggestive of pneumonia. Follow-up chest CT may be pursued in 4-6 weeks to ensure resolution. Attention should be directed to the small 6 mm left lower lobe lung nodule on the follow-up chestCT to ensure stability.    No pneumothorax.    Interlobular septal thickening in both lungs, suggestive of interstitial pulmonary edema.    New mild peribronchial cuffing, suggestive of reactive airway disease.    Peripancreatic stranding, new since the previous abdominal CT dated 2/14/2020, suggestive of acute pancreatitis. Clinical correlation with pancreatic enzymes is recommended.    Stable 0.8 cm splenic artery aneurysm with mural calcification.    Small bilateral pleural effusions.    Stable small pericardial effusion.    < end of copied text >    < from: CT Head No Cont (02.16.20 @ 16:51) >  Impression:     No intracranial hemorrhage, mass effect or CT evidence of acute infarct      < end of copied text >        # Syncope: can be secondary to orthostatic causes in combination with cardiac cause  - tele monitoring   - new low EF with recent cath 2/17 with normal coronaries  - elevated trops      # Acute hypoxic respiratory failure in the setting of acute on chronic systolic CHF /  Aspiration pneumonia  - d/c fluids  - continue with IV zosyn  - O2 support    # NSTEMI  - s/p cardiac cath 2/17:  Normal coronary arteries.  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 Moderately reduced left ventricular systolic function with segmental wall   motion abnormalities.  Normal LV systolic function. Estimated LV ejection fraction is 35 %.  Elevated left ventricular end-diastolic pressure.   - cardiology following .    # Peripancreatic stranding on CT chest:  - CT: Peripancreatic stranding, new since the previous abdominal CT dated 2/14/2020, suggestive of acute pancreatitis. Clinical correlation with pancreatic enzymes is recommended.  - keep NPO except meds  - iv fluids  - check lipase    # Right Ankle Fx:   s/p right ankle External fixator application  pain meds prn  follow u with Dr. Knight in office for definitive fixation      # ARF: likely sec to NSAIDS; rersolved      # Melena and Dark colored stools + Acute Anemia of acte hemorrhage from GI bleed:  - likely from NSAIDS   - d/c NSAIDS  - cont Protonix  - s/p PRBC 2/15  - H/H q 8 hrs; transfuse for Hb less than 8       # Hypotension:  - resolved     # Hyperkalemia    - resolved         PPI

## 2020-02-17 NOTE — PROGRESS NOTE ADULT - SUBJECTIVE AND OBJECTIVE BOX
60 y/o w/ h/o HTN no prior cardiac history admitted for for syncopal episode resulting in ankle injury requiring surgery.  s/p open reduction .  Postop yesterday pt had epigastric discomfort intermittent w/ nausea.  Trop 3.4 -> 4.49 -> 5.23 overnight. CT chest neg for PE showed pneumonia. ECG w/ no ischemic changes.  This AM pt was sleeping but on arousal reports persistent mild epigastric discomfort  4/10 severity.  SBPs 80s-90s somewhat labile, received clonidine this AM.  Discussed w/ GI, had melena & drop in Hb, likely due to high dose NSAID use prior to admit.  Discussed w/ orthopedics, they will operate on antiplatelet therapy if PCI needed, no need to consider BMS.      MEDICATIONS:    MEDICATIONS  (STANDING):  acetaminophen   Tablet .. 975 milliGRAM(s) Oral every 8 hours  cloNIDine 0.1 milliGRAM(s) Oral every 8 hours  dextrose 5% + sodium chloride 0.9%. 1000 milliLiter(s) (75 mL/Hr) IV Continuous <Continuous>  gabapentin 300 milliGRAM(s) Oral three times a day  heparin  Infusion.  Unit(s)/Hr (10 mL/Hr) IV Continuous <Continuous>  influenza   Vaccine 0.5 milliLiter(s) IntraMuscular once  metoprolol succinate ER 25 milliGRAM(s) Oral daily  pantoprazole  Injectable 40 milliGRAM(s) IV Push every 12 hours  PARoxetine 60 milliGRAM(s) Oral daily  piperacillin/tazobactam IVPB.. 3.375 Gram(s) IV Intermittent every 8 hours  polyethylene glycol 3350 17 Gram(s) Oral daily  sodium chloride 0.9% lock flush 3 milliLiter(s) IV Push every 8 hours  traZODone 150 milliGRAM(s) Oral at bedtime    MEDICATIONS  (PRN):  ALPRAZolam 0.25 milliGRAM(s) Oral every 8 hours PRN anxiety  aluminum hydroxide/magnesium hydroxide/simethicone Suspension 30 milliLiter(s) Oral four times a day PRN Indigestion  bisacodyl Suppository 10 milliGRAM(s) Rectal daily PRN If no bowel movement by postoperative day #2  heparin  Injectable 4000 Unit(s) IV Push every 6 hours PRN For aPTT less than 40  hydrALAZINE Injectable 10 milliGRAM(s) IV Push every 6 hours PRN for SBP more than 160  HYDROmorphone  Injectable 1 milliGRAM(s) IV Push every 4 hours PRN Severe Pain (7 - 10)  magnesium hydroxide Suspension 30 milliLiter(s) Oral daily PRN Constipation  ondansetron Injectable 4 milliGRAM(s) IV Push every 6 hours PRN Nausea and/or Vomiting  oxyCODONE    IR 5 milliGRAM(s) Oral every 4 hours PRN Moderate Pain (4 - 6)  senna 2 Tablet(s) Oral at bedtime PRN Constipation  traMADol 50 milliGRAM(s) Oral every 6 hours PRN Mild Pain (1 - 3)      Vital Signs Last 24 Hrs  T(C): 36.8 (2020 05:00), Max: 37 (2020 13:05)  T(F): 98.2 (2020 05:00), Max: 98.6 (2020 13:05)  HR: 128 (2020 06:00) (71 - 135)  BP: 126/105 (2020 06:00) (105/89 - 181/109)  BP(mean): 110 (2020 06:00) (93 - 134)  RR: 19 (2020 06:00) (13 - 33)  SpO2: 96% (2020 06:00) (84% - 100%)Daily     Daily Weight in k.4 (2020 05:00)I&O's Summary    2020 07:01  -  2020 07:00  --------------------------------------------------------  IN: 1805 mL / OUT: 375 mL / NET: 1430 mL        PHYSICAL EXAM:    Constitutional: NAD, mildly distressed.  HEENT: PERR, EOMI,  No oral cyananosis.  Neck:  supple,  No JVD  Respiratory: wheezing bilaterally, No wheezing, rales or rhonchi  Cardiovascular: S1 and S2, regular rate and rhythm, no Murmurs, gallops or rubs  Gastrointestinal: Bowel Sounds present, soft, nontender.   Extremities: No peripheral edema. No clubbing or cyanosis.  Vascular: 2+ peripheral pulses  Neurological: A/O x 3, no focal deficits  Musculoskeletal: no calf tenderness.  Skin: No rashes.      LABS: All Labs Reviewed:                        12.6   x     )-----------( x        ( 2020 22:53 )             39.7                         12.3   x     )-----------( x        ( 2020 18:21 )             37.5                         10.7   9.56  )-----------( 248      ( 2020 08:09 )             33.2     2020 08:09    142    |  112    |  16     ----------------------------<  102    4.7     |  24     |  1.14   15 Feb 2020 17:58    142    |  114    |  23     ----------------------------<  113    5.4     |  26     |  1.30   15 Feb 2020 07:56    143    |  114    |  34     ----------------------------<  113    5.0     |  26     |  1.59     Ca    9.2        2020 08:09  Ca    8.3        15 Feb 2020 17:58  Ca    8.4        15 Feb 2020 07:56  Phos  1.9       2020 08:09    TPro  x      /  Alb  3.0    /  TBili  x      /  DBili  x      /  AST  x      /  ALT  x      /  AlkPhos  x      2020 08:09  TPro  6.6    /  Alb  3.1    /  TBili  0.2    /  DBili  x      /  AST  29     /  ALT  23     /  AlkPhos  84     2020 14:33    PTT - ( 2020 02:32 )  PTT:31.7 sec  CARDIAC MARKERS ( 2020 00:40 )  5.230 ng/mL / x     / x     / x     / x      CARDIAC MARKERS ( 2020 22:53 )  4.490 ng/mL / x     / x     / x     / x      CARDIAC MARKERS ( 2020 19:00 )  3.420 ng/mL / x     / x     / x     / x      CARDIAC MARKERS ( 15 Feb 2020 17:58 )  <0.015 ng/mL / x     / x     / x     / x      CARDIAC MARKERS ( 15 Feb 2020 13:33 )  <0.015 ng/mL / x     / x     / x     / x        EKG: NSR no ischemic changes.    ECHO:    < from: Transthoracic Echocardiogram (02.15.20 @ 13:00) >   Summary     The left ventricle is normal in size, wall thickness, wall motion and   contractility.   Estimated left ventricular ejection fraction is 60 %.   Mild (1+) mitral regurgitation is present.   Mild (1+) tricuspid valve regurgitation is present.   Trace pericardial effusion is present.     Signature     ----------------------------------------------------------------   Electronically signed by Sixto Hoover MD(Interpreting   physician) on 2020 11:57 AM   ----------------------------------------------------------------    < end of copied text >

## 2020-02-17 NOTE — PROGRESS NOTE ADULT - ASSESSMENT
A 58 y/o female with syncopal episode falling fraturing right ankle, adm with ARF, hyperkalemia, now resolved, poss upper GIB from increased intake of NSAIDs, hgb now stable, no further black stool. Consulted by Dr Knight for DVT/PE prophylaxis, risk stratification and Anticoagulation Management. Pt is s/p cardiac cath today and no significant abnormal findings on report. Pt now off of UFH; no VTE ppx in place.     PLAN:  - If no other procedures pending resume Lovenox 40 mg sq daily x 4 weeks post-op for VTE prophylaxis  - Monitor daily CBC, BMP  - Encourage mobilization and maintain venodynes    Will continue to follow

## 2020-02-17 NOTE — CONSULT NOTE ADULT - SUBJECTIVE AND OBJECTIVE BOX
60 y/o female with a PMHx of anxiety/ depression, asthma,  HTN on enalapril, spinal stenosis, osteoarthritis, seasonal allergies who was sent to the ED by Dr. Knight  for ankle injury requiring surgery.  Pt  c/o right ankle pain after a fall yesterday, for which she was splinted at [facility] ED.   Pt also had 5 episodes of diarrhea 2 days ago with LLQ pain which improved .   she was NPO for planned epidural procedure for pain control .  At 11 am she was getting dressed  and while putting on earrings she had unwitnessed LOC and  fell from standing , injuring her right ankle.  She denies preceding cpain/SOB/palpitations/dizziness.  She does not recall tripping over anything. Pt reports she stopped taking motrin 600mg BID , approximately 5 days  ago for the planned epidural procedure for pain control.  She was on mobic until 2020.  She has noticed increasingly dark stools over the last 1-2 weeks.  She also reports some difficulty swallowing solids for 2-3 weeks. In the ED , pt is found to have ARF, hyperkalemia, anemia and hypotension to 85/38. Pt reported she may have accidentally taken an extra dose of her Clonidine . 2/15 pt underwent external fixation of ankle with ortho, hospital course  c/b hypoxia, cough, nausea, vomiting, CT chest with pulm edema, patchy infiltrates concern for asp pna, possible pancreatitis was started on zosyn.         PMH: as above  PSH: as above  Meds: per reconciliation sheet, noted below  MEDICATIONS  (STANDING):  acetaminophen   Tablet .. 975 milliGRAM(s) Oral every 8 hours  cloNIDine 0.1 milliGRAM(s) Oral every 8 hours  dextrose 5% + sodium chloride 0.9%. 1000 milliLiter(s) (75 mL/Hr) IV Continuous <Continuous>  gabapentin 300 milliGRAM(s) Oral three times a day  heparin  Infusion.  Unit(s)/Hr (10 mL/Hr) IV Continuous <Continuous>  influenza   Vaccine 0.5 milliLiter(s) IntraMuscular once  metoprolol succinate ER 25 milliGRAM(s) Oral daily  pantoprazole  Injectable 40 milliGRAM(s) IV Push every 12 hours  PARoxetine 60 milliGRAM(s) Oral daily  piperacillin/tazobactam IVPB.. 3.375 Gram(s) IV Intermittent every 8 hours  polyethylene glycol 3350 17 Gram(s) Oral daily  sodium chloride 0.9% lock flush 3 milliLiter(s) IV Push every 8 hours  traZODone 150 milliGRAM(s) Oral at bedtime      Allergies    No Known Allergies    Intolerances      Social: no smoking, no alcohol, no illegal drugs; no recent travel, no exposure to TB  FAMILY HISTORY:  Family history of COPD (chronic obstructive pulmonary disease)  Family history of CVA     no history of premature cardiovascular disease in first degree relatives    ROS:  no HA, no dizziness, no sore throat, no blurry vision, no CP, no palpitations, no diarrhea, no dysuria, no leg pain, no claudication, no rash, no joint aches, no rectal pain or bleeding, no night sweats  All other systems reviewed and are negative    Vital Signs Last 24 Hrs  T(C): 36.6 (2020 09:29), Max: 37 (2020 13:05)  T(F): 97.9 (2020 09:29), Max: 98.6 (2020 13:05)  HR: 102 (2020 10:00) (81 - 135)  BP: 111/96 (2020 10:00) (87/68 - 181/109)  BP(mean): 100 (2020 10:00) (72 - 134)  RR: 17 (2020 10:00) (13 - 33)  SpO2: 99% (2020 10:00) (84% - 100%)  Daily     Daily Weight in k.4 (2020 05:00)    PE:  Constitutional: frail looking  HEENT: NC/AT, EOMI, PERRLA, conjunctivae clear; ears and nose atraumatic; pharynx benign  Neck: supple; thyroid not palpable  Back: no tenderness  Respiratory: decreased breath sounds  Cardiovascular: S1S2 regular, no murmurs  Abdomen: soft, not tender, not distended, positive BS; liver and spleen WNL  Genitourinary: no suprapubic tenderness  Lymphatic: no LN palpable  Musculoskeletal: no muscle tenderness, no joint swelling or tenderness  Extremities: no pedal edema  Neurological/ Psychiatric:  moving all extremities  Skin: no rashes; RLE w dressing in place, no palpable lesions    Labs: all available labs reviewed                        11.4   12.41 )-----------( 350      ( 2020 08:15 )             35.9     02-    142  |  112<H>  |  16  ----------------------------<  170<H>  4.4   |  26  |  1.13    Ca    8.2<L>      2020 08:15  Phos  3.5         TPro  x   /  Alb  3.0<L>  /  TBili  x   /  DBili  x   /  AST  x   /  ALT  x   /  AlkPhos  x        LIVER FUNCTIONS - ( 2020 08:09 )  Alb: 3.0 g/dL / Pro: x     / ALK PHOS: x     / ALT: x     / AST: x     / GGT: x                 Radiology: all available radiological tests reviewed  < from: CT Angio Chest PE Protocol w/ IV Cont (20 @ 22:13) >    EXAM:  CTA CHEST PE PROTOCOL (W)AW IC                            PROCEDURE DATE:  2020          INTERPRETATION:  Exam: CT Angiography Chest With Contrast   Exam date and time: 2020 9:45 PM   Age: 59 years old   Clinical indication: Other: SOB, hypoxia     TECHNIQUE:   Imaging protocol: Computed tomographic angiography of the chest with intravenous contrast.   3D rendering: MIP and/or 3D reconstructed images were created by the technologist.     COMPARISON:     CT CHEST 2020 4:37PM     FINDINGS:   Pulmonary arteries: No pulmonary emboli.   Aorta:  No aortic aneurysm.   Lungs: Interlobular septal thickening with patchy groundglass opacities suggesting pulmonary edema. Additional multifocal airspace disease throughout the lungs most prominent in the right upper lobe and bilateral lower lobes may be a component of pulmonary edema however multifocal pneumonia cannot be excluded. Bilateral lower lobe compressive atelectasis left greater than right.  Pleural space: Small bilateral pleural effusions.   Heart: No cardiomegaly. No pericardial effusion.   Gallbladder and bile ducts: Status post cholecystectomy.   Bones/joints: No acute fracture. Degenerative changes.  Upper abdomen: Findings compatible with acute pancreatitis partially visualized as seen on CT of prior day. Prior cholecystectomy. Mild elevation the right hemidiaphragm.    IMPRESSION:     1. No evidence of acute pulmonary artery embolism.   2. Pulmonary edema with bilateral pleural effusions and bilateral compressive atelectasis. Additional findings which may be associated with the pulmonary edema versus representing multifocal pneumonia, clinically correlate and follow-up to resolution.    A preliminary report was given by the overnight radiologist          Advanced directives addressed: full resuscitation

## 2020-02-17 NOTE — CONSULT NOTE ADULT - ASSESSMENT
60 y/o female with a PMHx of anxiety/ depression, asthma,  HTN on enalapril, spinal stenosis, osteoarthritis, seasonal allergies who was sent to the ED by Dr. Knight 2/14 for ankle injury requiring surgery.  Pt c/o right ankle pain after a fall for which she was splinted at [facility] ED. Pt also had 5 episodes of diarrhea 2 days ago with LLQ pain which improved 2/13.  2/13 she was NPO for planned epidural procedure for pain control. At 11 am she was getting dressed 2/13 and while putting on earrings she had unwitnessed LOC and  fell from standing, injuring her right ankle. She denies preceding pain/SOB/palpitations/dizziness. She does not recall tripping over anything. Pt reports she stopped taking motrin 600mg BID , approximately 5 days  ago for the planned epidural procedure for pain control.  She was on mobic until January 2020.  She has noticed increasingly dark stools over the last 1-2 weeks.  She also reports some difficulty swallowing solids for 2-3 weeks. In the ED, pt is found to have ARF, hyperkalemia, anemia and hypotension to 85/38. Pt reported she may have accidentally taken an extra dose of her Clonidine 2/13. 2/15 pt underwent external fixation of ankle with ortho, hospital course 2/16 c/b hypoxia, cough, nausea, vomiting, CT chest with pulm edema, patchy infiltrates concern for asp pna, possible pancreatitis was started on zosyn.     1. acute hypoxic resp failure. aspiration pneumonia. possible pancreatitis. s/p R ankle surgery  - agree with IV zosyn [id number]  - f/u cultures  - aspiration precautions  - check lipase  - monitor temps  - imaging reviewed   - tolerating abx well so far; no side effects noted  - reason for abx use and side effects reviewed with patient  - supportive care  - fu cbc    2. other issues - care per medicine

## 2020-02-17 NOTE — CONSULT NOTE ADULT - SUBJECTIVE AND OBJECTIVE BOX
Pulmonary Consult    CHIEF COMPLAINT:    HPI:  Pt is a 60 y/o female with a PMHx of anxiety/ depression, asthma,  HTN on enalapril, spinal stenosis, osteoarthritis, seasonal allergies who was sent to the ED by Dr. Knight today for ankle injury requiring surgery.  Pt  c/o right ankle pain after a fall yesterday, for which she was splinted at [facility] ED.   Pt also had 5 episodes of diarrhea 2 days ago with LLQ pain which improved yesterday.   Yesterday morning she was NPO for planned epidural procedure for pain control .  At 11 am she was getting dressed yesterday and while putting on earrings she had unwitnessed LOC and  fell from standing , injuring her right ankle.  She denies preceding cpain/SOB/palpitations/dizziness.  She does not recall tripping over anything.        Pt reports she stopped taking motrin 600mg BID , approximately 5 days  ago for the planned epidural procedure for pain control.  She was on mobic until January 2020.  She has noticed increasingly dark stools over the last 1-2 weeks.    She also reports some difficulty swallowing solids for 2-3 weeks.        In the ED , pt is found to have ARF, hyperkalemia, anemia and hypotension to 85/38.  Pt reported she may have accidentally taken an extra dose of her Clonidine today.     patient is seen today and has sob and hypoxaemic on 4 lit with the sat of 92   has diffuse bilateral wheeze   has cardiac cath and was told non obstructive caronires   has history of only mild intermittent asthma in the past   mild cough no fever on fluids     PAST MEDICAL & SURGICAL HISTORY:  Hypertension  Anxiety and depression  Seasonal allergies  Asthma  Osteoarthritis  S/P hip replacement: left  S/P tubal ligation  S/P cholecystectomy        FAMILY HISTORY:  Family history of COPD (chronic obstructive pulmonary disease)  Family history of CVA      SOCIAL HISTORY:    denies any current smoking     Allergies    No Known Allergies    Intolerances        REVIEW OF SYSTEMS:  Constitutional: No fevers or chills or weight loss.   Eyes: No itching or discharge from the eyes  ENT:  No post nasal drip. No epistaxis. No throat pain. . No difficulty swallowing.   CV: No chest pain. No palpitations.  or dizziness.   Resp: positive for the cough and sob and wheezing   GI: No nausea. No vomiting. No diarrhea or abdominal pain   MSK: No joint pain or pain in any extremities  Integumentary status post ankle repair   Neurological: No gross motor weakness. No sensory changes.      OBJECTIVE:  Vital Signs Last 24 Hrs  T(C): 36.6 (17 Feb 2020 09:29), Max: 37 (16 Feb 2020 13:05)  T(F): 97.9 (17 Feb 2020 09:29), Max: 98.6 (16 Feb 2020 13:05)  HR: 102 (17 Feb 2020 10:00) (97 - 135)  BP: 111/96 (17 Feb 2020 10:00) (87/68 - 175/114)  BP(mean): 100 (17 Feb 2020 10:00) (72 - 134)  RR: 17 (17 Feb 2020 10:00) (13 - 33)  SpO2: 99% (17 Feb 2020 10:00) (84% - 100%)      PHYSICAL EXAM:  General: Awake, alert, oriented X 3.   HEENT: Atraumatic, normocephalic.   Neck: No JVD no lymphadenopathy   Respiratory: normal vesicular breathing with diffuse bilateral wheeze and rales   Cardiovascular: S1 S2 normal. No murmurs, rubs or gallops.   Abdomen: Soft, non-tender, non-distended. No organomegaly.  Extremities: left leg status ankle surgery   Skin: No rashes or skin lesions  Neurological: Motor and sensory examination equal and normal in all four extremities.  Psychiatry: Appropriate mood and affect.    HOSPITAL MEDICATIONS:  MEDICATIONS  (STANDING):  acetaminophen   Tablet .. 975 milliGRAM(s) Oral every 8 hours  cloNIDine 0.1 milliGRAM(s) Oral every 8 hours  dextrose 5% + sodium chloride 0.9%. 1000 milliLiter(s) (75 mL/Hr) IV Continuous <Continuous>  gabapentin 300 milliGRAM(s) Oral three times a day  influenza   Vaccine 0.5 milliLiter(s) IntraMuscular once  metoprolol succinate ER 25 milliGRAM(s) Oral daily  pantoprazole  Injectable 40 milliGRAM(s) IV Push every 12 hours  PARoxetine 60 milliGRAM(s) Oral daily  piperacillin/tazobactam IVPB.. 3.375 Gram(s) IV Intermittent every 8 hours  polyethylene glycol 3350 17 Gram(s) Oral daily  sodium chloride 0.9% lock flush 3 milliLiter(s) IV Push every 8 hours  traZODone 150 milliGRAM(s) Oral at bedtime    MEDICATIONS  (PRN):  ALPRAZolam 0.25 milliGRAM(s) Oral every 8 hours PRN anxiety  aluminum hydroxide/magnesium hydroxide/simethicone Suspension 30 milliLiter(s) Oral four times a day PRN Indigestion  bisacodyl Suppository 10 milliGRAM(s) Rectal daily PRN If no bowel movement by postoperative day #2  hydrALAZINE Injectable 10 milliGRAM(s) IV Push every 6 hours PRN for SBP more than 160  HYDROmorphone  Injectable 1 milliGRAM(s) IV Push every 4 hours PRN Severe Pain (7 - 10)  magnesium hydroxide Suspension 30 milliLiter(s) Oral daily PRN Constipation  ondansetron Injectable 4 milliGRAM(s) IV Push every 6 hours PRN Nausea and/or Vomiting  oxyCODONE    IR 5 milliGRAM(s) Oral every 4 hours PRN Moderate Pain (4 - 6)  senna 2 Tablet(s) Oral at bedtime PRN Constipation  traMADol 50 milliGRAM(s) Oral every 6 hours PRN Mild Pain (1 - 3)      LABS:                        11.4   12.41 )-----------( 350      ( 17 Feb 2020 08:15 )             35.9     02-17    142  |  112<H>  |  16  ----------------------------<  170<H>  4.4   |  26  |  1.13    Ca    8.2<L>      17 Feb 2020 08:15  Phos  3.5     02-17    TPro  x   /  Alb  3.0<L>  /  TBili  x   /  DBili  x   /  AST  x   /  ALT  x   /  AlkPhos  x   02-16    PTT - ( 17 Feb 2020 08:15 )  PTT:46.2 sec        < from: CT Angio Chest PE Protocol w/ IV Cont (02.16.20 @ 22:13) >  m date and time: 2/16/2020 9:45 PM   Age: 59 years old   Clinical indication: Other: SOB, hypoxia     TECHNIQUE:   Imaging protocol: Computed tomographic angiography of the chest with intravenous contrast.   3D rendering: MIP and/or 3D reconstructed images were created by the technologist.     COMPARISON:     CT CHEST 2/16/2020 4:37PM     FINDINGS:   Pulmonary arteries: No pulmonary emboli.   Aorta:  No aortic aneurysm.   Lungs: Interlobular septal thickening with patchy groundglass opacities suggesting pulmonary edema. Additional multifocal airspace disease throughout the lungs most prominent in the right upper lobe and bilateral lower lobes may be a component of pulmonary edema however multifocal pneumonia cannot be excluded. Bilateral lower lobe compressive atelectasis left greater than right.  Pleural space: Small bilateral pleural effusions.   Heart: No cardiomegaly. No pericardial effusion.   Gallbladder and bile ducts: Status post cholecystectomy.   Bones/joints: No acute fracture. Degenerative changes.  Upper abdomen: Findings compatible with acute pancreatitis partially visualized as seen on CT of prior day. Prior cholecystectomy. Mild elevation the right hemidiaphragm.    IMPRESSION:     1. No evidence of acute pulmonary artery embolism.   2. Pulmonary edema with bilateral pleural effusions and bilateral compressive atelectasis. Additional findings which may be associated with the pulmonary edema versus representing multifocal pneumonia, clinically correlate and follow-up to resolution.    A preliminary report was given by the overnight radiologist        < from: Cardiac Cath Lab - Adult (02.17.20 @ 11:44) >  [facility]  LV function assessed as:Abnormal.  Ejection Fraction  +----------------------------------------------------------------------+---+  !Method                               !EF%!  +----------------------------------------------------------------------+---+  !LV gram                                                               !35 !  +----------------------------------------------------------------------+---+     Impression     Diagnostic Conclusions     Normal coronary arteries.   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   Moderately reduced left ventricular systolic function with segmental wall   motion abnormalities.   Normal LV systolic function. Estimated LV ejection fraction is 35 %.   Elevated left ventricular end-diastolic pressure.   No aortic valve stenosis.     Recommendations     Manage with medical therapy.    Estimated Blood Loss:5ml.    Complications:  No complication.     Signatures     ----------------------------------------------------------------   Electronically signed by George Lowe MD(Performing   Physician) on 02/17/2020 12:17   ----------------------------------------------------------------    < end of copied text >

## 2020-02-17 NOTE — PROGRESS NOTE ADULT - ASSESSMENT
Imp:  Melena PTA  Now with +troponin    Rec:  OK by me for angiogram prior to EGD as major bleeding source unlikely  However, if team prefers I can do EGD prior, although timing still unclear given long weekend etc.

## 2020-02-17 NOTE — PROGRESS NOTE ADULT - ASSESSMENT
A/P: 59F s/p R ankle ex-fix  Appreciate care per medicine/cardiology  WIll likely need cardiac catheterization either today or tomorrow 2/18  CTPA showing pulmonary edema vs multifocal pneumonia  Appreciate antibiotics per ID/medicine  Rising Troponins, started on heparin gtt  Analgesia  DVT ppx  NWB RLE in ex fix  PT  DC planning

## 2020-02-17 NOTE — PROGRESS NOTE ADULT - PROBLEM SELECTOR PLAN 2
likely 2/2 Diarrhea, dehydration, anemia GI bleeding possibly due to NSAID use normal LVEF  no arrhythmia.  H&H stable, Creat  normal today

## 2020-02-17 NOTE — PROGRESS NOTE ADULT - SUBJECTIVE AND OBJECTIVE BOX
Pt S/E at bedside, moved to ICU yesterday for desaturation and now suspected NSTEMI with epigastric pain, started on heparin drip     Vital Signs Last 24 Hrs  T(C): 36.6 (17 Feb 2020 09:29), Max: 37 (16 Feb 2020 13:05)  T(F): 97.9 (17 Feb 2020 09:29), Max: 98.6 (16 Feb 2020 13:05)  HR: 128 (17 Feb 2020 06:00) (81 - 135)  BP: 126/105 (17 Feb 2020 06:00) (105/89 - 181/109)  BP(mean): 110 (17 Feb 2020 06:00) (93 - 134)  RR: 19 (17 Feb 2020 06:00) (13 - 33)  SpO2: 96% (17 Feb 2020 06:00) (84% - 100%)    Gen: lying in bed, uncomfortable    Right Lower Extremity:  Dressing clean dry intact  +EHL/FHL/TA/GS  SILT L3-S1  +DP/PT Pulses  Compartments soft  No calf TTP B/L

## 2020-02-17 NOTE — PROGRESS NOTE ADULT - SUBJECTIVE AND OBJECTIVE BOX
HPI: Pt is a 60 y/o female with a PMHx of anxiety/depression, asthma, HTN on enalapril, spinal stenosis, osteoarthritis, seasonal allergies who was sent to the ED by Dr. Knight today for ankle injury requiring surgery.  Pt c/o right ankle pain after a fall yesterday, for which she was splinted at [facility] ED. Pt also had 5 episodes of diarrhea 2 days ago with LLQ pain which improved yesterday.  day prior to admission pt was NPO for planned epidural procedure for pain control. At 11 am she was getting dressed yesterday and while putting on earrings she had unwitnessed LOC and  fell from standing , injuring her right ankle sustaining a left ankle fracture She denies preceding cpain/SOB/palpitations/dizziness.  She does not recall tripping over anything. Pt reports she stopped taking motrin 600mg BID , approximately 5 days  ago for the planned epidural procedure for pain control.  She was on mobic until 2020.  She has noticed increasingly dark stools over the last 1-2 weeks. She also reports some difficulty swallowing solids for 2-3 weeks.        In the ED , pt is found to have ARF, hyperkalemia, anemia and hypotension to 85/38.  Pt reported she may have accidentally taken an extra dose of her Clonidine today.     INTERVAL HISTORY:  she is now on 3 north s/p Right ankleplacement of ext-fix  : seen at bedside,  anxious, vomiting, states nausea form pain meds  2020: Pt seen at bedside in SICU with  at side. She reports she s/p cardiac cath, no abnormal findings per , and off of UFH per primary RN. Pt with VTE risk factors.     Fam HX:  Father DM, had CVa  Mother COPD  Brother  at 65 of alcoholism  Sister has lung ca (2020 18:56)    Consulted by Dr. Rogers for VTE prophylaxis, risk stratification, and anticoagulation management.    PAST MEDICAL & SURGICAL HISTORY:  Hypertension  Anxiety and depression  Seasonal allergies  Asthma  Osteoarthritis  S/P hip replacement: left  S/P tubal ligation  S/P cholecystectomy    CrCl: 69.7    Caprini VTE Risk Score: CAPRINI SCORE  AGE RELATED RISK FACTORS                                                       MOBILITY RELATED FACTORS  [x ] Age 41-60 years                                            (1 Point)                  [ ] Bed rest                                                        (1 Point)  [ ] Age: 61-74 years                                           (2 Points)                [ ] Plaster cast                                                   (2 Points)  [ ] Age= 75 years                                              (3 Points)                 [ ] Bed bound for more than 72 hours                   (2 Points)    DISEASE RELATED RISK FACTORS                                               GENDER SPECIFIC FACTORS  [ ] Edema in the lower extremities                       (1 Point)           [ ] Pregnancy                                                            (1 Point)  [ ] Varicose veins                                               (1 Point)                  [ ] Post-partum < 6 weeks                                      (1 Point)             x[ ] BMI > 25 Kg/m2                                            (1 Point)                  [ ] Hormonal therapy or oral contraception       (1 Point)                 [ ] Sepsis (in the previous month)                        (1 Point)             [ ] History of pregnancy complications                (1Point)  [ ] Pneumonia or serious lung disease                                             [ ] Unexplained or recurrent  (>/= 3), premature                                 (In the previous month)                               (1 Point)                birth with toxemia or growth-restricted infant (1 Point)  [ ] Abnormal pulmonary function test            (1 Point)                                   SURGERY RELATED RISK FACTORS  [ ] Acute myocardial infarction                       (1 Point)                  [ ]  Section                                         (1 Point)  [ ] Congestive heart failure (in the previous month) (1 Point)   [ ] Minor surgery   lasting <45 minutes       (1 Point)   [ ] Inflammatory bowel disease                             (1 Point)          [ ] Arthroscopic surgery                                  (2 Points)  [ ] Central venous access                                    (2 Points)            [x ] General surgery lasting >45 minutes      (2 Points)       [ ] Stroke (in the previous month)                  (5 Points)            [ ] Elective major lower extremity arthroplasty (5 Points)                                   [  ] Malignancy (present or past include skin melanoma                                          but exclude  basal skin cell)    (2 points)                                      TRAUMA RELATED RISK FACTORS                HEMATOLOGY RELATED FACTORS                                  [x ] Fracture of the hip, pelvis, or leg                       (5 Points)  [ ] Prior episodes of VTE                                     (3 Points)          [ ] Acute spinal cord injury (in the previous month)  (5 Points)  [ ] Positive family history for VTE                         (3 Points)       [ ] Paralysis (less than 1 month)                          (5 Points)  [ ] Prothrombin 74926 A                                      (3 Points)         [ ] Multiple Trauma (within 1month)                 (5Points)                                                                                                                                                                [ ] Factor V Leiden                                          (3 Points)                                OTHER RISK FACTORS                          [ ] Lupus anticoagulants                                     (3 Points)                       [ ] BMI > 40                          (1 Point)                                                         [ ] Anticardiolipin antibodies                                (3 Points)                   [ ] Smoking                              (1Point)                                                [ ] High homocysteine in the blood                      (3 Points)                [  ] Diabetes requiring insulin (1point)                         [ ] Other congenital or acquired thrombophilia       (3 Points)          [  ] Chemotherapy                   (1 Point)  [ ] Heparin induced thrombocytopenia                  (3 Points)             [  ] Blood Transfusion                (1 point)                                                                                                         Total Score [    9      ]                                                                                                                                                                                                                 IMPROVE Bleeding Risk Score 1    Time In:   Time Out:     Falls Risk:   High ( x )  Mod (  )  Low (  )      FAMILY HISTORY:  Family history of COPD (chronic obstructive pulmonary disease)  Family history of CVA  Denies any personal or familial history of clotting or bleeding disorders.    Allergies  No Known Allergies  Intolerances    REVIEW OF SYSTEMS    (  )Fever	     (  )Constipation	(  )SOB				(  )Headache	(  )Dysuria  (  )Chills	     (  )Melena	(  )Dyspnea present on exertion	                    (  )Dizziness                    (  )Polyuria  (  )Nausea	     (  )Hematochezia	(  )Cough			                    (  )Syncope   	(  )Hematuria  (  )Vomiting    (  )Chest Pain	(  )Wheezing			(  )Weakness  (  )Diarrhea     (  )Palpitations	(  )Anorexia			( x )joint pain    Reports, "hungry" All  other review of systems negative: Yes    PHYSICAL EXAM:    Vital Signs Last 24 Hrs  T(C): 36.6 (20 @ 09:29), Max: 36.8 (20 @ 15:48)  T(F): 97.9 (20 @ 09:29), Max: 98.3 (20 @ 15:48)  HR: 101 (20 @ 13:00) (101 - 135)  BP: 96/76 (20 @ 13:00) (87/68 - 175/114)  BP(mean): 80 (20 @ 13:00) (72 - 134)  RR: 18 (20 @ 13:00) (13 - 33)  SpO2: 98% (20 @ 13:00) (84% - 100%)    Constitutional: Appears Well    Neurological: A& O x 4    Skin: Warm    Respiratory and Thorax: normal effort; Breath sounds: normal; No rales/wheezing/rhonchi  	  Cardiovascular: S1, S2, regular, NMBR	    Gastrointestinal: BS + x 4Q, nontender	    Genitourinary:  Bladder nondistended, nontender    Musculoskeletal:   General Right:   + muscle/joint tenderness,   normal tone, no joint swelling,   ROM: limited	    General Left:   no muscle/joint tenderness,   normal tone, no joint swelling,   ROM: full    Right ankle: Drsing CDI; ext fix in place, tos warm and mobile Cap refill good; +Sensation intact                      Lower extrems:   Right: no calf tenderness              negative melba's sign               + pedal pulses    Left:   no calf tenderness              negative melba's sign               + pedal pulses    LABS:                        11.4   12.41 )-----------( 350      ( 2020 08:15 )             35.9       02-    142  |  112<H>  |  16  ----------------------------<  170<H>  4.4   |  26  |  1.13    Ca    8.2<L>      2020 08:15  Phos  3.5         TPro  x   /  Alb  3.0<L>  /  TBili  x   /  DBili  x   /  AST  x   /  ALT  x   /  AlkPhos  x       PTT - ( 2020 08:15 )  PTT:46.2 sec    MEDICATIONS  (STANDING):  acetaminophen   Tablet .. 975 milliGRAM(s) Oral every 8 hours  cloNIDine 0.1 milliGRAM(s) Oral every 8 hours  dextrose 5% + sodium chloride 0.9%. 1000 milliLiter(s) (75 mL/Hr) IV Continuous <Continuous>  enoxaparin Injectable 40 milliGRAM(s) SubCutaneous daily  gabapentin 300 milliGRAM(s) Oral three times a day  influenza   Vaccine 0.5 milliLiter(s) IntraMuscular once  metoprolol succinate ER 25 milliGRAM(s) Oral daily  pantoprazole  Injectable 40 milliGRAM(s) IV Push every 12 hours  PARoxetine 60 milliGRAM(s) Oral daily  piperacillin/tazobactam IVPB.. 3.375 Gram(s) IV Intermittent every 8 hours  polyethylene glycol 3350 17 Gram(s) Oral daily  sodium chloride 0.9% lock flush 3 milliLiter(s) IV Push every 8 hours  traZODone 150 milliGRAM(s) Oral at bedtime       EXAM:  CARDIAC CATHERIZATION         PROCEDURE DATE:  2020        INTERPRETATION:  Cardiac Catheterization Report     Demographics     Patient Name        ADEOLA GRAY Height              66 Inches     Medical Record      [id number]       Weight              179.9 Pounds   Number     Account Number      [id number]    BSA                 1.91 m^2     Date of Birth       1960      BMI                 29.04 kg/m^2     Age                 59 year(s)      Performing          Jamshid Serrano MD                                       Physician     Gender              Female          Referring Physician Sixto Hoover MD    Procedure Start Time: 2020 11:29.    Procedure  Procedure Type:  Coronary Angiography and Left Heart Cath With Ventriculogram .    Closure Device .    Admission Data  Admission Status: Inpatient    Procedure Data    Continuous Physiologic monitoring was performed pre, meche, and post  procedure.    Diagnostic Cath Status: Urgent    Indications   1) Non-ST elevation (NSTEMI) myocardial infarction    Entry Locations    - Retrograde Percutaneous access was performed through the Right Radial      artery. A 6 Fr sheath was inserted. Hemostasis was successfully obtained      using General.      Comments: HEMOSTASIS OBTAINED WITH VASC BAND.    Procedure Medications:    - Fentanyl I.V. 25 mcg.      - Versed I.V. 1 mg.      - Verapamil I.A. 2.5 mg.      - Heparin I.A. 4000 units.      - Versed I.V. 0.5 mg.    Diagnostic Catheters    - 5FR  DIAGNOSTIC was used for Left ventriculography.      - 5FR FL 4 DIAGNOSTIC was used for Left coronary angiography.      - 5FR FR 4 DIAGNOSTIC was used for Right coronary angiography.      - 5FR 3DRC (WR) DIAGNOSTIC was used for Left coronary angiography.      Comments: USED FOR ADDITIONAL IMAGING      .    Contrast Material:    - [id number] ml    Fluoroscopy Time:Diagnostic: 8:03 minutes. Total: 8:03 minutes.    Fluoroscopy Dose:Diagnostic: 1236 mGy. Total: 1236 mGy.    Estimated Blood Loss:5 ml     Hemodynamics     Condition: Rest    Pressure  +----------------------------------+---------------------------------------+  !Site                              !Pressure                               !  +----------------------------------+---------------------------------------+  !LV                                !80/16 ,28                              !  +----------------------------------+---------------------------------------+     Condition: Post LV    Pressure  +-----+--------------------------------------------------------------------+  !Site !Pressure                                                            !  +-----+--------------------------------------------------------------------+  !AO   !83/55 (64)                                                          !  +-----+--------------------------------------------------------------------+  !LV   !81/16 ,30                                                           !  +-----+--------------------------------------------------------------------+     Angiographic Findings     Cardiac Arteries and Lesion Findings    LMCA: Normal.    LAD: Normal.Large caliber vessel.    LCx: Normal.Large caliber vessel. Arises from the right coronary cusp. The  anomalous LCx passes posterior to the aorta.    RCA: Normal.Large caliber vessel.    Ramus: Normal.Large caliber vessel.    VA  LV function assessed as:Abnormal.  Ejection Fraction  +----------------------------------------------------------------------+---+  !Method                                                                !EF%!  +----------------------------------------------------------------------+---+  !LV gram                                                               !35 !  +----------------------------------------------------------------------+---+     Impression     Diagnostic Conclusions     Normal coronary arteries.   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   Moderately reduced left ventricular systolic function with segmental wall   motion abnormalities.   Normal LV systolic function. Estimated LV ejection fraction is 35 %.   Elevated left ventricular end-diastolic pressure.   No aortic valve stenosis.     Recommendations     Manage with medical therapy.    Estimated Blood Loss:5ml.    Complications:  No complication.     Signatures     ----------------------------------------------------------------   Electronically signed by Jamshid Serrano MD(Performing   Physician) on 2020 12:17   ----------------------------------------------------------------     LVA Segment Contractility     1 - Normal    3 - Mild         5 - Severe       7 - Dyskinesis   hypokinesis      hypokinesis     2 -           4 - Moderate     6 - Akinesis     8 - Aneurysm   Hypokinesis   hypokinesis      JAMSHID SERRANO M.D., ATTENDING CARDIOLOGIST  This document has been electronically signed. 2020 12:18PM    DVT Prophylaxis:  LMWH                   ( x )  Heparin SQ           (  )  Coumadin             (  )  Xarelto                  (  )  Eliquis                   (  )  Venodynes           (x  )  Ambulation          ( x )  UFH                       (  )  Contraindicated  (  )  EC ASPIRIN       (  )

## 2020-02-17 NOTE — PROGRESS NOTE ADULT - PROBLEM SELECTOR PLAN 4
GI following, anemia and melena likely due to excessive NSAID use.  Agree w/ urgent cath if indicated. GI following, anemia and melena likely due to excessive NSAID use.  Agree w/ urgent cath and DAPT if indicated.

## 2020-02-17 NOTE — PROGRESS NOTE ADULT - SUBJECTIVE AND OBJECTIVE BOX
Patient is a 59y old  Female who presents with a chief complaint of ARF  concern for ATN  Hyperkalemia  Chronic back pain  Increasing anemia  Melena/GI bleed  Left sided Colitis with diarrhea, dehydration  ankle fx after syncope and fall yesterday (16 Feb 2020 18:20)      Subective:  Resting  No bleeding    PAST MEDICAL & SURGICAL HISTORY:  Hypertension  Anxiety and depression  Seasonal allergies  Asthma  Osteoarthritis  S/P hip replacement: left  S/P tubal ligation  S/P cholecystectomy      MEDICATIONS  (STANDING):  acetaminophen   Tablet .. 975 milliGRAM(s) Oral every 8 hours  cloNIDine 0.1 milliGRAM(s) Oral every 8 hours  dextrose 5% + sodium chloride 0.9%. 1000 milliLiter(s) (75 mL/Hr) IV Continuous <Continuous>  gabapentin 300 milliGRAM(s) Oral three times a day  heparin  Infusion.  Unit(s)/Hr (10 mL/Hr) IV Continuous <Continuous>  influenza   Vaccine 0.5 milliLiter(s) IntraMuscular once  metoprolol succinate ER 25 milliGRAM(s) Oral daily  pantoprazole  Injectable 40 milliGRAM(s) IV Push every 12 hours  PARoxetine 60 milliGRAM(s) Oral daily  piperacillin/tazobactam IVPB.. 3.375 Gram(s) IV Intermittent every 8 hours  polyethylene glycol 3350 17 Gram(s) Oral daily  sodium chloride 0.9% lock flush 3 milliLiter(s) IV Push every 8 hours  traZODone 150 milliGRAM(s) Oral at bedtime    MEDICATIONS  (PRN):  ALPRAZolam 0.25 milliGRAM(s) Oral every 8 hours PRN anxiety  aluminum hydroxide/magnesium hydroxide/simethicone Suspension 30 milliLiter(s) Oral four times a day PRN Indigestion  bisacodyl Suppository 10 milliGRAM(s) Rectal daily PRN If no bowel movement by postoperative day #2  heparin  Injectable 4000 Unit(s) IV Push every 6 hours PRN For aPTT less than 40  hydrALAZINE Injectable 10 milliGRAM(s) IV Push every 6 hours PRN for SBP more than 160  HYDROmorphone  Injectable 1 milliGRAM(s) IV Push every 4 hours PRN Severe Pain (7 - 10)  magnesium hydroxide Suspension 30 milliLiter(s) Oral daily PRN Constipation  ondansetron Injectable 4 milliGRAM(s) IV Push every 6 hours PRN Nausea and/or Vomiting  oxyCODONE    IR 5 milliGRAM(s) Oral every 4 hours PRN Moderate Pain (4 - 6)  senna 2 Tablet(s) Oral at bedtime PRN Constipation  traMADol 50 milliGRAM(s) Oral every 6 hours PRN Mild Pain (1 - 3)      REVIEW OF SYSTEMS:    RESPIRATORY: No shortness of breath  CARDIOVASCULAR: No chest pain  All other review of systems is negative unless indicated above.    Vital Signs Last 24 Hrs  T(C): 36.8 (17 Feb 2020 05:00), Max: 37 (16 Feb 2020 13:05)  T(F): 98.2 (17 Feb 2020 05:00), Max: 98.6 (16 Feb 2020 13:05)  HR: 128 (17 Feb 2020 06:00) (71 - 135)  BP: 126/105 (17 Feb 2020 06:00) (105/89 - 181/109)  BP(mean): 110 (17 Feb 2020 06:00) (93 - 134)  RR: 19 (17 Feb 2020 06:00) (13 - 33)  SpO2: 96% (17 Feb 2020 06:00) (84% - 100%)    PHYSICAL EXAM:    Constitutional: NAD, well-developed  Respiratory: CTAB  Cardiovascular: S1 and S2, RRR  Gastrointestinal: BS+, soft, NT/ND  Extremities: No peripheral edema  Psychiatric: Normal mood, normal affect    LABS:                        12.6   x     )-----------( x        ( 16 Feb 2020 22:53 )             39.7     02-16    142  |  112<H>  |  16  ----------------------------<  102<H>  4.7   |  24  |  1.14    Ca    9.2      16 Feb 2020 08:09  Phos  1.9     02-16    TPro  x   /  Alb  3.0<L>  /  TBili  x   /  DBili  x   /  AST  x   /  ALT  x   /  AlkPhos  x   02-16    PT/INR - ( 15 Feb 2020 07:56 )   PT: 11.9 sec;   INR: 1.07 ratio         PTT - ( 17 Feb 2020 02:32 )  PTT:31.7 sec  LIVER FUNCTIONS - ( 16 Feb 2020 08:09 )  Alb: 3.0 g/dL / Pro: x     / ALK PHOS: x     / ALT: x     / AST: x     / GGT: x             RADIOLOGY & ADDITIONAL STUDIES:

## 2020-02-17 NOTE — PROGRESS NOTE ADULT - PROBLEM SELECTOR PLAN 1
Peak trop 5.  ECG w/ no ST elevations or depressions.  Persistent epigastric pain yesterday intermittent still has this AM 4/10 severity.  Also persistent dyspnea pna on CXR. H/o GIB this admit likely due to excessive motrin use.  S/p open reduction by ortho may need future surgeries which can be done on DAPT.  Labile BPs may be due recent initiation of clonidine.  Discussed w/ Dr. Lowe, will consider urgent cardiac cath today for further evaluation.  cont. ASA, heparin.

## 2020-02-18 DIAGNOSIS — R11.0 NAUSEA: ICD-10-CM

## 2020-02-18 LAB
ALBUMIN SERPL ELPH-MCNC: 2.5 G/DL — LOW (ref 3.3–5)
ALP SERPL-CCNC: 74 U/L — SIGNIFICANT CHANGE UP (ref 40–120)
ALT FLD-CCNC: 12 U/L — SIGNIFICANT CHANGE UP (ref 12–78)
ANION GAP SERPL CALC-SCNC: 4 MMOL/L — LOW (ref 5–17)
AST SERPL-CCNC: 30 U/L — SIGNIFICANT CHANGE UP (ref 15–37)
BASE EXCESS BLDA CALC-SCNC: 4.4 MMOL/L — HIGH (ref -2–2)
BILIRUB SERPL-MCNC: 0.3 MG/DL — SIGNIFICANT CHANGE UP (ref 0.2–1.2)
BLOOD GAS COMMENTS ARTERIAL: SIGNIFICANT CHANGE UP
BUN SERPL-MCNC: 14 MG/DL — SIGNIFICANT CHANGE UP (ref 7–23)
CALCIUM SERPL-MCNC: 8.7 MG/DL — SIGNIFICANT CHANGE UP (ref 8.5–10.1)
CHLORIDE SERPL-SCNC: 109 MMOL/L — HIGH (ref 96–108)
CO2 SERPL-SCNC: 28 MMOL/L — SIGNIFICANT CHANGE UP (ref 22–31)
CREAT SERPL-MCNC: 1.08 MG/DL — SIGNIFICANT CHANGE UP (ref 0.5–1.3)
GAS PNL BLDA: SIGNIFICANT CHANGE UP
GLUCOSE SERPL-MCNC: 129 MG/DL — HIGH (ref 70–99)
HCO3 BLDA-SCNC: 30 MMOL/L — HIGH (ref 21–29)
HCT VFR BLD CALC: 33.4 % — LOW (ref 34.5–45)
HGB BLD-MCNC: 10.5 G/DL — LOW (ref 11.5–15.5)
MCHC RBC-ENTMCNC: 30.6 PG — SIGNIFICANT CHANGE UP (ref 27–34)
MCHC RBC-ENTMCNC: 31.4 GM/DL — LOW (ref 32–36)
MCV RBC AUTO: 97.4 FL — SIGNIFICANT CHANGE UP (ref 80–100)
NT-PROBNP SERPL-SCNC: HIGH PG/ML (ref 0–125)
PCO2 BLDA: 52 MMHG — HIGH (ref 32–46)
PH BLDA: 7.38 — SIGNIFICANT CHANGE UP (ref 7.35–7.45)
PLATELET # BLD AUTO: 255 K/UL — SIGNIFICANT CHANGE UP (ref 150–400)
PO2 BLDA: 68 MMHG — LOW (ref 74–108)
POTASSIUM SERPL-MCNC: 4 MMOL/L — SIGNIFICANT CHANGE UP (ref 3.5–5.3)
POTASSIUM SERPL-SCNC: 4 MMOL/L — SIGNIFICANT CHANGE UP (ref 3.5–5.3)
PROT SERPL-MCNC: 6.4 GM/DL — SIGNIFICANT CHANGE UP (ref 6–8.3)
RBC # BLD: 3.43 M/UL — LOW (ref 3.8–5.2)
RBC # FLD: 13.4 % — SIGNIFICANT CHANGE UP (ref 10.3–14.5)
SAO2 % BLDA: 91 % — LOW (ref 92–96)
SODIUM SERPL-SCNC: 141 MMOL/L — SIGNIFICANT CHANGE UP (ref 135–145)
TROPONIN I SERPL-MCNC: 1.72 NG/ML — HIGH (ref 0.01–0.04)
WBC # BLD: 10.55 K/UL — HIGH (ref 3.8–10.5)
WBC # FLD AUTO: 10.55 K/UL — HIGH (ref 3.8–10.5)

## 2020-02-18 PROCEDURE — 93010 ELECTROCARDIOGRAM REPORT: CPT

## 2020-02-18 PROCEDURE — 93306 TTE W/DOPPLER COMPLETE: CPT | Mod: 26

## 2020-02-18 PROCEDURE — 99233 SBSQ HOSP IP/OBS HIGH 50: CPT

## 2020-02-18 PROCEDURE — 99231 SBSQ HOSP IP/OBS SF/LOW 25: CPT

## 2020-02-18 RX ORDER — MORPHINE SULFATE 50 MG/1
4 CAPSULE, EXTENDED RELEASE ORAL ONCE
Refills: 0 | Status: DISCONTINUED | OUTPATIENT
Start: 2020-02-18 | End: 2020-02-18

## 2020-02-18 RX ORDER — TIOTROPIUM BROMIDE 18 UG/1
1 CAPSULE ORAL; RESPIRATORY (INHALATION) DAILY
Refills: 0 | Status: DISCONTINUED | OUTPATIENT
Start: 2020-02-18 | End: 2020-02-19

## 2020-02-18 RX ORDER — METOCLOPRAMIDE HCL 10 MG
5 TABLET ORAL ONCE
Refills: 0 | Status: COMPLETED | OUTPATIENT
Start: 2020-02-18 | End: 2020-02-18

## 2020-02-18 RX ORDER — ASPIRIN/CALCIUM CARB/MAGNESIUM 324 MG
81 TABLET ORAL DAILY
Refills: 0 | Status: DISCONTINUED | OUTPATIENT
Start: 2020-02-18 | End: 2020-02-25

## 2020-02-18 RX ORDER — FUROSEMIDE 40 MG
20 TABLET ORAL ONCE
Refills: 0 | Status: COMPLETED | OUTPATIENT
Start: 2020-02-18 | End: 2020-02-18

## 2020-02-18 RX ORDER — LISINOPRIL 2.5 MG/1
5 TABLET ORAL DAILY
Refills: 0 | Status: DISCONTINUED | OUTPATIENT
Start: 2020-02-18 | End: 2020-02-19

## 2020-02-18 RX ORDER — PROCHLORPERAZINE MALEATE 5 MG
10 TABLET ORAL ONCE
Refills: 0 | Status: COMPLETED | OUTPATIENT
Start: 2020-02-18 | End: 2020-02-18

## 2020-02-18 RX ORDER — METOPROLOL TARTRATE 50 MG
25 TABLET ORAL EVERY 12 HOURS
Refills: 0 | Status: DISCONTINUED | OUTPATIENT
Start: 2020-02-18 | End: 2020-02-19

## 2020-02-18 RX ORDER — IPRATROPIUM/ALBUTEROL SULFATE 18-103MCG
3 AEROSOL WITH ADAPTER (GRAM) INHALATION EVERY 6 HOURS
Refills: 0 | Status: DISCONTINUED | OUTPATIENT
Start: 2020-02-18 | End: 2020-02-26

## 2020-02-18 RX ORDER — HYDROMORPHONE HYDROCHLORIDE 2 MG/ML
1 INJECTION INTRAMUSCULAR; INTRAVENOUS; SUBCUTANEOUS ONCE
Refills: 0 | Status: DISCONTINUED | OUTPATIENT
Start: 2020-02-18 | End: 2020-02-18

## 2020-02-18 RX ORDER — ALBUTEROL 90 UG/1
1 AEROSOL, METERED ORAL EVERY 4 HOURS
Refills: 0 | Status: DISCONTINUED | OUTPATIENT
Start: 2020-02-18 | End: 2020-02-19

## 2020-02-18 RX ADMIN — Medication 975 MILLIGRAM(S): at 15:08

## 2020-02-18 RX ADMIN — ONDANSETRON 4 MILLIGRAM(S): 8 TABLET, FILM COATED ORAL at 11:58

## 2020-02-18 RX ADMIN — SODIUM CHLORIDE 3 MILLILITER(S): 9 INJECTION INTRAMUSCULAR; INTRAVENOUS; SUBCUTANEOUS at 13:02

## 2020-02-18 RX ADMIN — SODIUM CHLORIDE 3 MILLILITER(S): 9 INJECTION INTRAMUSCULAR; INTRAVENOUS; SUBCUTANEOUS at 05:11

## 2020-02-18 RX ADMIN — GABAPENTIN 300 MILLIGRAM(S): 400 CAPSULE ORAL at 20:09

## 2020-02-18 RX ADMIN — Medication 0.1 MILLIGRAM(S): at 05:09

## 2020-02-18 RX ADMIN — POLYETHYLENE GLYCOL 3350 17 GRAM(S): 17 POWDER, FOR SOLUTION ORAL at 11:58

## 2020-02-18 RX ADMIN — HYDROMORPHONE HYDROCHLORIDE 1 MILLIGRAM(S): 2 INJECTION INTRAMUSCULAR; INTRAVENOUS; SUBCUTANEOUS at 20:24

## 2020-02-18 RX ADMIN — Medication 25 MILLIGRAM(S): at 19:05

## 2020-02-18 RX ADMIN — HYDROMORPHONE HYDROCHLORIDE 1 MILLIGRAM(S): 2 INJECTION INTRAMUSCULAR; INTRAVENOUS; SUBCUTANEOUS at 06:50

## 2020-02-18 RX ADMIN — Medication 30 MILLILITER(S): at 08:03

## 2020-02-18 RX ADMIN — HYDROMORPHONE HYDROCHLORIDE 1 MILLIGRAM(S): 2 INJECTION INTRAMUSCULAR; INTRAVENOUS; SUBCUTANEOUS at 02:30

## 2020-02-18 RX ADMIN — HYDROMORPHONE HYDROCHLORIDE 1 MILLIGRAM(S): 2 INJECTION INTRAMUSCULAR; INTRAVENOUS; SUBCUTANEOUS at 02:14

## 2020-02-18 RX ADMIN — HYDROMORPHONE HYDROCHLORIDE 1 MILLIGRAM(S): 2 INJECTION INTRAMUSCULAR; INTRAVENOUS; SUBCUTANEOUS at 06:36

## 2020-02-18 RX ADMIN — Medication 975 MILLIGRAM(S): at 06:00

## 2020-02-18 RX ADMIN — PANTOPRAZOLE SODIUM 40 MILLIGRAM(S): 20 TABLET, DELAYED RELEASE ORAL at 05:09

## 2020-02-18 RX ADMIN — OXYCODONE HYDROCHLORIDE 5 MILLIGRAM(S): 5 TABLET ORAL at 06:00

## 2020-02-18 RX ADMIN — OXYCODONE HYDROCHLORIDE 5 MILLIGRAM(S): 5 TABLET ORAL at 05:09

## 2020-02-18 RX ADMIN — LISINOPRIL 5 MILLIGRAM(S): 2.5 TABLET ORAL at 11:56

## 2020-02-18 RX ADMIN — Medication 25 MILLIGRAM(S): at 05:09

## 2020-02-18 RX ADMIN — PIPERACILLIN AND TAZOBACTAM 25 GRAM(S): 4; .5 INJECTION, POWDER, LYOPHILIZED, FOR SOLUTION INTRAVENOUS at 15:09

## 2020-02-18 RX ADMIN — Medication 3 MILLILITER(S): at 21:46

## 2020-02-18 RX ADMIN — Medication 975 MILLIGRAM(S): at 20:09

## 2020-02-18 RX ADMIN — HYDROMORPHONE HYDROCHLORIDE 1 MILLIGRAM(S): 2 INJECTION INTRAMUSCULAR; INTRAVENOUS; SUBCUTANEOUS at 11:05

## 2020-02-18 RX ADMIN — Medication 3 MILLILITER(S): at 11:48

## 2020-02-18 RX ADMIN — PIPERACILLIN AND TAZOBACTAM 25 GRAM(S): 4; .5 INJECTION, POWDER, LYOPHILIZED, FOR SOLUTION INTRAVENOUS at 05:08

## 2020-02-18 RX ADMIN — Medication 0.25 MILLIGRAM(S): at 08:03

## 2020-02-18 RX ADMIN — OXYCODONE HYDROCHLORIDE 5 MILLIGRAM(S): 5 TABLET ORAL at 12:30

## 2020-02-18 RX ADMIN — MORPHINE SULFATE 4 MILLIGRAM(S): 50 CAPSULE, EXTENDED RELEASE ORAL at 23:12

## 2020-02-18 RX ADMIN — OXYCODONE HYDROCHLORIDE 5 MILLIGRAM(S): 5 TABLET ORAL at 11:56

## 2020-02-18 RX ADMIN — Medication 60 MILLIGRAM(S): at 11:57

## 2020-02-18 RX ADMIN — OXYCODONE HYDROCHLORIDE 5 MILLIGRAM(S): 5 TABLET ORAL at 20:00

## 2020-02-18 RX ADMIN — OXYCODONE HYDROCHLORIDE 5 MILLIGRAM(S): 5 TABLET ORAL at 19:05

## 2020-02-18 RX ADMIN — Medication 40 MILLIGRAM(S): at 15:48

## 2020-02-18 RX ADMIN — Medication 150 MILLIGRAM(S): at 20:10

## 2020-02-18 RX ADMIN — PIPERACILLIN AND TAZOBACTAM 25 GRAM(S): 4; .5 INJECTION, POWDER, LYOPHILIZED, FOR SOLUTION INTRAVENOUS at 20:10

## 2020-02-18 RX ADMIN — HYDROMORPHONE HYDROCHLORIDE 1 MILLIGRAM(S): 2 INJECTION INTRAMUSCULAR; INTRAVENOUS; SUBCUTANEOUS at 16:30

## 2020-02-18 RX ADMIN — Medication 10 MILLIGRAM(S): at 23:10

## 2020-02-18 RX ADMIN — HYDROMORPHONE HYDROCHLORIDE 1 MILLIGRAM(S): 2 INJECTION INTRAMUSCULAR; INTRAVENOUS; SUBCUTANEOUS at 12:44

## 2020-02-18 RX ADMIN — HYDROMORPHONE HYDROCHLORIDE 1 MILLIGRAM(S): 2 INJECTION INTRAMUSCULAR; INTRAVENOUS; SUBCUTANEOUS at 13:00

## 2020-02-18 RX ADMIN — HYDROMORPHONE HYDROCHLORIDE 1 MILLIGRAM(S): 2 INJECTION INTRAMUSCULAR; INTRAVENOUS; SUBCUTANEOUS at 20:09

## 2020-02-18 RX ADMIN — Medication 20 MILLIGRAM(S): at 09:29

## 2020-02-18 RX ADMIN — Medication 5 MILLIGRAM(S): at 08:43

## 2020-02-18 RX ADMIN — HYDROMORPHONE HYDROCHLORIDE 1 MILLIGRAM(S): 2 INJECTION INTRAMUSCULAR; INTRAVENOUS; SUBCUTANEOUS at 10:44

## 2020-02-18 RX ADMIN — PANTOPRAZOLE SODIUM 40 MILLIGRAM(S): 20 TABLET, DELAYED RELEASE ORAL at 18:30

## 2020-02-18 RX ADMIN — Medication 975 MILLIGRAM(S): at 21:00

## 2020-02-18 RX ADMIN — Medication 81 MILLIGRAM(S): at 11:56

## 2020-02-18 RX ADMIN — GABAPENTIN 300 MILLIGRAM(S): 400 CAPSULE ORAL at 05:09

## 2020-02-18 RX ADMIN — Medication 975 MILLIGRAM(S): at 05:09

## 2020-02-18 RX ADMIN — ENOXAPARIN SODIUM 40 MILLIGRAM(S): 100 INJECTION SUBCUTANEOUS at 11:56

## 2020-02-18 RX ADMIN — Medication 975 MILLIGRAM(S): at 16:03

## 2020-02-18 RX ADMIN — ONDANSETRON 4 MILLIGRAM(S): 8 TABLET, FILM COATED ORAL at 19:27

## 2020-02-18 RX ADMIN — ONDANSETRON 4 MILLIGRAM(S): 8 TABLET, FILM COATED ORAL at 04:47

## 2020-02-18 RX ADMIN — GABAPENTIN 300 MILLIGRAM(S): 400 CAPSULE ORAL at 15:08

## 2020-02-18 RX ADMIN — HYDROMORPHONE HYDROCHLORIDE 1 MILLIGRAM(S): 2 INJECTION INTRAMUSCULAR; INTRAVENOUS; SUBCUTANEOUS at 16:03

## 2020-02-18 RX ADMIN — SENNA PLUS 2 TABLET(S): 8.6 TABLET ORAL at 20:09

## 2020-02-18 RX ADMIN — Medication 0.25 MILLIGRAM(S): at 19:05

## 2020-02-18 RX ADMIN — SODIUM CHLORIDE 3 MILLILITER(S): 9 INJECTION INTRAMUSCULAR; INTRAVENOUS; SUBCUTANEOUS at 20:10

## 2020-02-18 NOTE — PROGRESS NOTE ADULT - SUBJECTIVE AND OBJECTIVE BOX
60 y/o w/ h/o HTN no prior cardiac history admitted for for syncopal episode resulting in ankle injury requiring surgery.  s/p open reduction Feb 15th.  Postop yesterday pt had epigastric discomfort intermittent w/ nausea.  Trop 3.4 -> 4.49 -> 5.23 overnight. CT chest neg for PE showed pneumonia. ECG w/ no ischemic changes.  This AM pt was sleeping but on arousal reports persistent mild epigastric discomfort  4/10 severity.  SBPs 80s-90s somewhat labile, received clonidine this AM.  Discussed w/ GI, had melena & drop in Hb, likely due to high dose NSAID use prior to admit.  Discussed w/ orthopedics, they will operate on antiplatelet therapy if PCI needed, no need to consider BMS.    2/18/20  Patient is feeling nauseous on oxycodone , constipated , denies any chest pain , her back is down the lumbar area  , patient had cardiac cath showed issac coronaries ,  possible  takotsubo cardiomyopathy . on low dose BB ,     MEDICATIONS  (STANDING):  acetaminophen   Tablet .. 975 milliGRAM(s) Oral every 8 hours  cloNIDine 0.1 milliGRAM(s) Oral every 8 hours  enoxaparin Injectable 40 milliGRAM(s) SubCutaneous daily  gabapentin 300 milliGRAM(s) Oral three times a day  influenza   Vaccine 0.5 milliLiter(s) IntraMuscular once  metoprolol succinate ER 25 milliGRAM(s) Oral daily  pantoprazole  Injectable 40 milliGRAM(s) IV Push every 12 hours  PARoxetine 60 milliGRAM(s) Oral daily  piperacillin/tazobactam IVPB.. 3.375 Gram(s) IV Intermittent every 8 hours  polyethylene glycol 3350 17 Gram(s) Oral daily  sodium chloride 0.9% lock flush 3 milliLiter(s) IV Push every 8 hours  traZODone 150 milliGRAM(s) Oral at bedtime    MEDICATIONS  (PRN):  ALPRAZolam 0.25 milliGRAM(s) Oral every 8 hours PRN anxiety  aluminum hydroxide/magnesium hydroxide/simethicone Suspension 30 milliLiter(s) Oral four times a day PRN Indigestion  bisacodyl Suppository 10 milliGRAM(s) Rectal daily PRN If no bowel movement by postoperative day #2  hydrALAZINE Injectable 10 milliGRAM(s) IV Push every 6 hours PRN for SBP more than 160  HYDROmorphone  Injectable 1 milliGRAM(s) IV Push every 4 hours PRN Severe Pain (7 - 10)  magnesium hydroxide Suspension 30 milliLiter(s) Oral daily PRN Constipation  ondansetron Injectable 4 milliGRAM(s) IV Push every 6 hours PRN Nausea and/or Vomiting  oxyCODONE    IR 5 milliGRAM(s) Oral every 4 hours PRN Moderate Pain (4 - 6)  senna 2 Tablet(s) Oral at bedtime PRN Constipation  traMADol 50 milliGRAM(s) Oral every 6 hours PRN Mild Pain (1 - 3)      Vital Signs Last 24 Hrs  T(C): 36.6 (18 Feb 2020 05:44), Max: 37 (17 Feb 2020 20:56)  T(F): 97.8 (18 Feb 2020 05:44), Max: 98.6 (17 Feb 2020 20:56)  HR: 114 (18 Feb 2020 08:00) (85 - 114)  BP: 151/109 (18 Feb 2020 07:00) (93/64 - 151/109)  BP(mean): 118 (18 Feb 2020 07:00) (70 - 118)  RR: 16 (18 Feb 2020 08:00) (13 - 22)  SpO2: 96% (18 Feb 2020 08:00) (92% - 100%)    I&O's Summary    17 Feb 2020 07:01  -  18 Feb 2020 07:00  --------------------------------------------------------  IN: 440 mL / OUT: 500 mL / NET: -60 mL          PHYSICAL EXAM:    Constitutional: NAD, mildly distressed.  HEENT: PERR, EOMI,  No oral cyananosis.  Neck:  supple,  No JVD  Respiratory: wheezing bilaterally, No wheezing, rales or rhonchi  Cardiovascular: S1 and S2, regular rate and rhythm, no Murmurs, gallops or rubs  Gastrointestinal: Bowel Sounds present, soft, nontender.   Extremities: No peripheral edema. No clubbing or cyanosis.  Vascular: 2+ peripheral pulses  Neurological: A/O x 3, no focal deficits  Musculoskeletal: no calf tenderness.  Skin: No rashes.      LABS: All Labs Reviewed:                                   11.4   12.41 )-----------( 350      ( 17 Feb 2020 08:15 )             35.9     02-17    142  |  112<H>  |  16  ----------------------------<  170<H>  4.4   |  26  |  1.13    Ca    8.2<L>      17 Feb 2020 08:15  Phos  3.5     02-17      CARDIAC MARKERS ( 17 Feb 2020 08:15 )  4.690 ng/mL / x     / x     / x     / x      CARDIAC MARKERS ( 17 Feb 2020 00:40 )  5.230 ng/mL / x     / x     / x     / x      CARDIAC MARKERS ( 16 Feb 2020 22:53 )  4.490 ng/mL / x     / x     / x     / x      CARDIAC MARKERS ( 16 Feb 2020 19:00 )  3.420 ng/mL / x     / x     / x     / x            PTT - ( 17 Feb 2020 15:31 )  PTT:31.1 sec      Culture Results:   >=3 organisms. Probable collection contamination. (02-14-20 @ 22:21)  Culture Results:   No growth to date. (02-14-20 @ 17:48)  Culture Results:   No growth to date. (02-14-20 @ 17:48)    ECHO:    < from: Transthoracic Echocardiogram (02.15.20 @ 13:00) >   Summary     The left ventricle is normal in size, wall thickness, wall motion and   contractility.   Estimated left ventricular ejection fraction is 60 %.   Mild (1+) mitral regurgitation is present.   Mild (1+) tricuspid valve regurgitation is present.   Trace pericardial effusion is present.     Signature     ----------------------------------------------------------------   Electronically signed by Sixto Hoover MD(Interpreting   physician) on 02/16/2020 11:57 AM   ----------------------------------------------------------------    < end of copied text >    < from: Cardiac Cath Lab - Adult (02.17.20 @ 11:44) >  Angiographic Findings     Cardiac Arteries and Lesion Findings    LMCA: Normal.    LAD: Normal.Large caliber vessel.    LCx: Normal.Large caliber vessel. Arises from the right coronary cusp. The  anomalous LCx passes posterior to the aorta.    RCA: Normal.Large caliber vessel.    Ramus: Normal.Large caliber vessel.    VA  LV function assessed as:Abnormal.  Ejection Fraction  +----------------------------------------------------------------------+---+  !Method                               !EF%!  +----------------------------------------------------------------------+---+  !LV gram                                                               !35 !  +----------------------------------------------------------------------+---+     Impression     Diagnostic Conclusions     Normal coronary arteries.   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   Moderately reduced left ventricular systolic function with segmental wall   motion abnormalities.   Normal LV systolic function. Estimated LV ejection fraction is 35 %.   Elevated left ventricular end-diastolic pressure.   No aortic valve stenosis.     Recommendations     Manage with medical therapy.    Estimated Blood Loss:5ml.    Complications:  No complication.     Signatures    < end of copied text >

## 2020-02-18 NOTE — PROGRESS NOTE ADULT - ASSESSMENT
Imp:  I doubt pancreatitsi based on imaging, which seems non-specific, and normal lipase.    Rec:  I do think EGD is appropriate but not optimized right now and not urgent.  Cont with POs, which she's tolerating.

## 2020-02-18 NOTE — PROGRESS NOTE ADULT - ASSESSMENT
A/P: 59F s/p R ankle ex-fix  Appreciate care per medicine/cardiology  Cath showing newonset EF 35% normal coronalrs  CTPA showing pulmonary edema vs multifocal pneumonia  Appreciate antibiotics per ID/medicine  Analgesia  DVT ppx  NWB RLE in ex fix  PT  DC planning

## 2020-02-18 NOTE — PROGRESS NOTE ADULT - SUBJECTIVE AND OBJECTIVE BOX
SUBJECTIVE     Patient still hypoxaemic with the sat of 92 on the 3 lit by the nasal canula   yesterday cxr noted to be pulmonary edema and did not received lasix   uncomfortable with the pain   has wheezing     PAST MEDICAL & SURGICAL HISTORY:  Hypertension  Anxiety and depression  Seasonal allergies  Asthma  Osteoarthritis  S/P hip replacement: left  S/P tubal ligation  S/P cholecystectomy    OBJECTIVE   Vital Signs Last 24 Hrs  T(C): 36.5 (18 Feb 2020 08:59), Max: 37 (17 Feb 2020 20:56)  T(F): 97.7 (18 Feb 2020 08:59), Max: 98.6 (17 Feb 2020 20:56)  HR: 114 (18 Feb 2020 08:00) (85 - 114)  BP: 151/109 (18 Feb 2020 07:00) (93/64 - 151/109)  BP(mean): 118 (18 Feb 2020 07:00) (70 - 118)  RR: 16 (18 Feb 2020 08:00) (13 - 22)  SpO2: 96% (18 Feb 2020 08:00) (92% - 100%)    PHYSICAL EXAM:  Constitutional: , awake on the nasal canula and not in distress   HEENT: Normo cephalic atraumatic  Neck: Soft and supple, No J.V.D   Respiratory: vesicular breathing has bilateral wheeze and rhonchi   Cardiovascular: S1 and S2, regular rate and has mild tachycardia   Gastrointestinal:  soft, nontender,   Extremities: right leg under cast and edema of the extremities   Neurological: No new  focal deficits.    MEDICATIONS  (STANDING):  acetaminophen   Tablet .. 975 milliGRAM(s) Oral every 8 hours  aspirin enteric coated 81 milliGRAM(s) Oral daily  enoxaparin Injectable 40 milliGRAM(s) SubCutaneous daily  furosemide   Injectable 20 milliGRAM(s) IV Push once  gabapentin 300 milliGRAM(s) Oral three times a day  influenza   Vaccine 0.5 milliLiter(s) IntraMuscular once  lisinopril 5 milliGRAM(s) Oral daily  metoprolol succinate ER 25 milliGRAM(s) Oral every 12 hours  pantoprazole  Injectable 40 milliGRAM(s) IV Push every 12 hours  PARoxetine 60 milliGRAM(s) Oral daily  piperacillin/tazobactam IVPB.. 3.375 Gram(s) IV Intermittent every 8 hours  polyethylene glycol 3350 17 Gram(s) Oral daily  sodium chloride 0.9% lock flush 3 milliLiter(s) IV Push every 8 hours  traZODone 150 milliGRAM(s) Oral at bedtime                            10.5   10.55 )-----------( 255      ( 18 Feb 2020 07:59 )             33.4     02-18    141  |  109<H>  |  14  ----------------------------<  129<H>  4.0   |  28  |  1.08    Ca    8.7      18 Feb 2020 07:59  Phos  3.5     02-17    TPro  6.4  /  Alb  2.5<L>  /  TBili  0.3  /  DBili  x   /  AST  30  /  ALT  12  /  AlkPhos  74  02-18         < from: Xray Chest 1 View-PORTABLE IMMEDIATE (02.17.20 @ 13:32) >  EXAM:  XR CHEST PORTABLE IMMED 1V                            PROCEDURE DATE:  02/17/2020          INTERPRETATION:  XR CHEST IMMEDIATE    Single AP view    HISTORY:  Shortness of breath    Comparison: Chest x-ray 2/16/2020      The cardiac silhouette is within normal limits. Pulmonary edema. No pleural abnormality.    IMPRESSION: Pulmonary edema persists            < end of copied text >

## 2020-02-18 NOTE — PROGRESS NOTE ADULT - SUBJECTIVE AND OBJECTIVE BOX
Pt S/E at bedside, pt in ICU underwent cath yesterday showing normal coronary arteries with EF of now 35%. On exam this morning pt reports no current chest pain or SOB. Ankle pain increased but controlled. Denies CP/sob/n/v/d/fevers    Vital Signs Last 24 Hrs  T(C): 36.6 (18 Feb 2020 05:44), Max: 37 (17 Feb 2020 20:56)  T(F): 97.8 (18 Feb 2020 05:44), Max: 98.6 (17 Feb 2020 20:56)  HR: 102 (18 Feb 2020 03:00) (85 - 117)  BP: 105/66 (18 Feb 2020 03:00) (87/68 - 140/100)  BP(mean): 75 (18 Feb 2020 03:00) (70 - 110)  RR: 14 (18 Feb 2020 03:00) (13 - 22)  SpO2: 98% (18 Feb 2020 03:00) (93% - 100%)    Gen: lying in bed, uncomfortable    Right Lower Extremity:  Dressing clean dry intact in ex fix  +EHL/FHL/TA/GS  SILT L3-S1  +DP/PT Pulses  Compartments soft  No calf TTP B/L

## 2020-02-18 NOTE — PROGRESS NOTE ADULT - ASSESSMENT
60 y/o female with a PMHx of anxiety/ depression, asthma,  HTN on enalapril, spinal stenosis, osteoarthritis, seasonal allergies who was sent to the ED by Dr. Knight 2/14 for ankle injury requiring surgery.  Pt c/o right ankle pain after a fall for which she was splinted at [facility] ED. Pt also had 5 episodes of diarrhea 2 days ago with LLQ pain which improved 2/13.  2/13 she was NPO for planned epidural procedure for pain control. At 11 am she was getting dressed 2/13 and while putting on earrings she had unwitnessed LOC and  fell from standing, injuring her right ankle. She denies preceding pain/SOB/palpitations/dizziness. She does not recall tripping over anything. Pt reports she stopped taking motrin 600mg BID , approximately 5 days  ago for the planned epidural procedure for pain control.  She was on mobic until January 2020.  She has noticed increasingly dark stools over the last 1-2 weeks.  She also reports some difficulty swallowing solids for 2-3 weeks. In the ED, pt is found to have ARF, hyperkalemia, anemia and hypotension to 85/38. Pt reported she may have accidentally taken an extra dose of her Clonidine 2/13. 2/15 pt underwent external fixation of ankle with ortho, hospital course 2/16 c/b hypoxia, cough, nausea, vomiting, CT chest with pulm edema, patchy infiltrates concern for asp pna, possible pancreatitis was started on zosyn.     1. acute hypoxic resp failure. pulmonary edema. possible asp pna? L sided colitis. s/p R ankle surgery  - on IV zosyn [id number] #3  - imaging reviewed, pulm eval noted  - changes could be related to pulm edema over pna  - being diuresed f/u imaging, will dc abx soon  - urine cx contaminated, blood cx no growth  - lipase 72 nausea/abd pain could be d/t L sided colitis   - monitor temps  - imaging reviewed   - tolerating abx well so far; no side effects noted  - reason for abx use and side effects reviewed with patient  - supportive care  - fu cbc    2. other issues - care per medicine

## 2020-02-18 NOTE — PROGRESS NOTE ADULT - SUBJECTIVE AND OBJECTIVE BOX
Date of service: 02-18-20 @ 10:36    pt seen and examined  has sob/wheezing  feels uncomfortable  afebrile     ROS: no fever or chills; denies dizziness, no HA, no abdominal pain, no diarrhea or constipation; no dysuria, no urinary frequency, no legs pain, no rashes    MEDICATIONS  (STANDING):  acetaminophen   Tablet .. 975 milliGRAM(s) Oral every 8 hours  ALBUTerol    90 MICROgram(s) HFA Inhaler 1 Puff(s) Inhalation every 4 hours  albuterol/ipratropium for Nebulization 3 milliLiter(s) Nebulizer every 6 hours  aspirin enteric coated 81 milliGRAM(s) Oral daily  enoxaparin Injectable 40 milliGRAM(s) SubCutaneous daily  gabapentin 300 milliGRAM(s) Oral three times a day  influenza   Vaccine 0.5 milliLiter(s) IntraMuscular once  lisinopril 5 milliGRAM(s) Oral daily  metoprolol succinate ER 25 milliGRAM(s) Oral every 12 hours  pantoprazole  Injectable 40 milliGRAM(s) IV Push every 12 hours  PARoxetine 60 milliGRAM(s) Oral daily  piperacillin/tazobactam IVPB.. 3.375 Gram(s) IV Intermittent every 8 hours  polyethylene glycol 3350 17 Gram(s) Oral daily  sodium chloride 0.9% lock flush 3 milliLiter(s) IV Push every 8 hours  tiotropium 18 MICROgram(s) Capsule 1 Capsule(s) Inhalation daily  traZODone 150 milliGRAM(s) Oral at bedtime    Vital Signs Last 24 Hrs  T(C): 36.5 (18 Feb 2020 08:59), Max: 37 (17 Feb 2020 20:56)  T(F): 97.7 (18 Feb 2020 08:59), Max: 98.6 (17 Feb 2020 20:56)  HR: 107 (18 Feb 2020 09:00) (85 - 114)  BP: 162/108 (18 Feb 2020 09:00) (93/64 - 162/108)  BP(mean): 119 (18 Feb 2020 09:00) (70 - 119)  RR: 18 (18 Feb 2020 09:00) (13 - 22)  SpO2: 90% (18 Feb 2020 09:00) (90% - 100%)      PE:  Constitutional: frail looking  HEENT: NC/AT, EOMI, PERRLA, conjunctivae clear; ears and nose atraumatic; pharynx benign  Neck: supple; thyroid not palpable  Back: no tenderness  Respiratory: decreased breath sounds  Cardiovascular: S1S2 regular, no murmurs  Abdomen: soft, not tender, not distended, positive BS; liver and spleen WNL  Genitourinary: no suprapubic tenderness  Lymphatic: no LN palpable  Musculoskeletal: no muscle tenderness, no joint swelling or tenderness  Extremities: no pedal edema  Neurological/ Psychiatric:  moving all extremities  Skin: no rashes; RLE w dressing in place, no palpable lesions    Labs: all available labs reviewed                                   10.5   10.55 )-----------( 255      ( 18 Feb 2020 07:59 )             33.4     02-18    141  |  109<H>  |  14  ----------------------------<  129<H>  4.0   |  28  |  1.08    Ca    8.7      18 Feb 2020 07:59  Phos  3.5     02-17    TPro  6.4  /  Alb  2.5<L>  /  TBili  0.3  /  DBili  x   /  AST  30  /  ALT  12  /  AlkPhos  74  02-18    Culture - Urine (02.14.20 @ 22:21)    Specimen Source: .Urine None    Culture Results:   >=3 organisms. Probable collection contamination.    Culture - Blood (02.14.20 @ 17:48)    Specimen Source: .Blood None    Culture Results:   No growth to date.      Culture - Blood (02.14.20 @ 17:48)    Specimen Source: .Blood None    Culture Results:   No growth to date.      Radiology: all available radiological tests reviewed  < from: CT Angio Chest PE Protocol w/ IV Cont (02.16.20 @ 22:13) >    EXAM:  CTA CHEST PE PROTOCOL (W)AW IC                            PROCEDURE DATE:  02/16/2020          INTERPRETATION:  Exam: CT Angiography Chest With Contrast   Exam date and time: 2/16/2020 9:45 PM   Age: 59 years old   Clinical indication: Other: SOB, hypoxia     TECHNIQUE:   Imaging protocol: Computed tomographic angiography of the chest with intravenous contrast.   3D rendering: MIP and/or 3D reconstructed images were created by the technologist.     COMPARISON:     CT CHEST 2/16/2020 4:37PM     FINDINGS:   Pulmonary arteries: No pulmonary emboli.   Aorta:  No aortic aneurysm.   Lungs: Interlobular septal thickening with patchy groundglass opacities suggesting pulmonary edema. Additional multifocal airspace disease throughout the lungs most prominent in the right upper lobe and bilateral lower lobes may be a component of pulmonary edema however multifocal pneumonia cannot be excluded. Bilateral lower lobe compressive atelectasis left greater than right.  Pleural space: Small bilateral pleural effusions.   Heart: No cardiomegaly. No pericardial effusion.   Gallbladder and bile ducts: Status post cholecystectomy.   Bones/joints: No acute fracture. Degenerative changes.  Upper abdomen: Findings compatible with acute pancreatitis partially visualized as seen on CT of prior day. Prior cholecystectomy. Mild elevation the right hemidiaphragm.    IMPRESSION:     1. No evidence of acute pulmonary artery embolism.   2. Pulmonary edema with bilateral pleural effusions and bilateral compressive atelectasis. Additional findings which may be associated with the pulmonary edema versus representing multifocal pneumonia, clinically correlate and follow-up to resolution.    A preliminary report was given by the overnight radiologist          Advanced directives addressed: full resuscitation

## 2020-02-18 NOTE — PROGRESS NOTE ADULT - PROBLEM SELECTOR PLAN 1
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

## 2020-02-18 NOTE — PROGRESS NOTE ADULT - SUBJECTIVE AND OBJECTIVE BOX
Patient is a 59y old  Female who presents with a chief complaint of ARF  concern for ATN  Hyperkalemia  Chronic back pain  Increasing anemia  Melena/GI bleed  Left sided Colitis with diarrhea, dehydration  ankle fx after syncope and fall yesterday (18 Feb 2020 10:35)      Subective:  Complaints mostly of pain in the lower right side of the back. No abdominal pain now. No further N/V. No BM.    PAST MEDICAL & SURGICAL HISTORY:  Hypertension  Anxiety and depression  Seasonal allergies  Asthma  Osteoarthritis  S/P hip replacement: left  S/P tubal ligation  S/P cholecystectomy      MEDICATIONS  (STANDING):  acetaminophen   Tablet .. 975 milliGRAM(s) Oral every 8 hours  ALBUTerol    90 MICROgram(s) HFA Inhaler 1 Puff(s) Inhalation every 4 hours  albuterol/ipratropium for Nebulization 3 milliLiter(s) Nebulizer every 6 hours  aspirin enteric coated 81 milliGRAM(s) Oral daily  enoxaparin Injectable 40 milliGRAM(s) SubCutaneous daily  gabapentin 300 milliGRAM(s) Oral three times a day  influenza   Vaccine 0.5 milliLiter(s) IntraMuscular once  lisinopril 5 milliGRAM(s) Oral daily  metoprolol succinate ER 25 milliGRAM(s) Oral every 12 hours  pantoprazole  Injectable 40 milliGRAM(s) IV Push every 12 hours  PARoxetine 60 milliGRAM(s) Oral daily  piperacillin/tazobactam IVPB.. 3.375 Gram(s) IV Intermittent every 8 hours  polyethylene glycol 3350 17 Gram(s) Oral daily  sodium chloride 0.9% lock flush 3 milliLiter(s) IV Push every 8 hours  tiotropium 18 MICROgram(s) Capsule 1 Capsule(s) Inhalation daily  traZODone 150 milliGRAM(s) Oral at bedtime    MEDICATIONS  (PRN):  ALPRAZolam 0.25 milliGRAM(s) Oral every 8 hours PRN anxiety  aluminum hydroxide/magnesium hydroxide/simethicone Suspension 30 milliLiter(s) Oral four times a day PRN Indigestion  bisacodyl Suppository 10 milliGRAM(s) Rectal daily PRN If no bowel movement by postoperative day #2  hydrALAZINE Injectable 10 milliGRAM(s) IV Push every 6 hours PRN for SBP more than 160  HYDROmorphone  Injectable 1 milliGRAM(s) IV Push every 4 hours PRN Severe Pain (7 - 10)  magnesium hydroxide Suspension 30 milliLiter(s) Oral daily PRN Constipation  ondansetron Injectable 4 milliGRAM(s) IV Push every 6 hours PRN Nausea and/or Vomiting  oxyCODONE    IR 5 milliGRAM(s) Oral every 4 hours PRN Moderate Pain (4 - 6)  senna 2 Tablet(s) Oral at bedtime PRN Constipation  traMADol 50 milliGRAM(s) Oral every 6 hours PRN Mild Pain (1 - 3)      REVIEW OF SYSTEMS:    RESPIRATORY: No shortness of breath  CARDIOVASCULAR: No chest pain  All other review of systems is negative unless indicated above.    Vital Signs Last 24 Hrs  T(C): 36.5 (18 Feb 2020 08:59), Max: 37 (17 Feb 2020 20:56)  T(F): 97.7 (18 Feb 2020 08:59), Max: 98.6 (17 Feb 2020 20:56)  HR: 112 (18 Feb 2020 11:00) (85 - 114)  BP: 152/111 (18 Feb 2020 11:00) (93/64 - 162/108)  BP(mean): 121 (18 Feb 2020 11:00) (70 - 121)  RR: 18 (18 Feb 2020 11:00) (13 - 22)  SpO2: 99% (18 Feb 2020 11:00) (90% - 100%)    PHYSICAL EXAM:    Constitutional: NAD, well-developed  Respiratory: CTAB  Cardiovascular: S1 and S2, RRR  Gastrointestinal: BS+, soft, NT/ND  Extremities: No peripheral edema  Psychiatric: Normal mood, normal affect    LABS:                        10.5   10.55 )-----------( 255      ( 18 Feb 2020 07:59 )             33.4     02-18    141  |  109<H>  |  14  ----------------------------<  129<H>  4.0   |  28  |  1.08    Ca    8.7      18 Feb 2020 07:59  Phos  3.5     02-17    TPro  6.4  /  Alb  2.5<L>  /  TBili  0.3  /  DBili  x   /  AST  30  /  ALT  12  /  AlkPhos  74  02-18    PTT - ( 17 Feb 2020 15:31 )  PTT:31.1 sec  LIVER FUNCTIONS - ( 18 Feb 2020 07:59 )  Alb: 2.5 g/dL / Pro: 6.4 gm/dL / ALK PHOS: 74 U/L / ALT: 12 U/L / AST: 30 U/L / GGT: x             RADIOLOGY & ADDITIONAL STUDIES:

## 2020-02-18 NOTE — PROVIDER CONTACT NOTE (OTHER) - SITUATION
Spoke with Kalyani in office to inform dr that patient is in [facility].  Please fax discharge papers to [phone].

## 2020-02-18 NOTE — PROGRESS NOTE ADULT - ASSESSMENT
- hypoxemics respiratory failure with the acute pulmonary edema   - less likely pneumonia   - low EF with the cath and normal caronaries with the cath   - history of mild asthma with symptom of exacerbation with the pulmonary edema   - status post fall ankle fracture and repair   - anemia with the G.I bleeding   - acute renal failure resolved     PLAN   - would recommend continue with the diuretics to achieve negative balance and hold off antihypertensive for the adequate diuresis and discontinue of ACE if needed   -stat dose of lasix for the pulmonary edema   - negative work up for the P.E   - early discontinuation of antibiotics if the cxr clears after the diuretics   -  use of nebs for the wheezing   -  follow up electrolytes with the diuretics

## 2020-02-18 NOTE — PROGRESS NOTE ADULT - PROBLEM SELECTOR PLAN 4
GI following, anemia and melena likely due to excessive NSAID use. stable hemoglobin , will give low dose ecotrin and PPI , monitor hemoglobin

## 2020-02-18 NOTE — PROGRESS NOTE ADULT - ASSESSMENT
A 60 y/o female with syncopal episode falling fraturing right ankle, adm with ARF, hyperkalemia, now resolved, poss upper GIB from increased intake of NSAIDs, hgb now stable, no further black stool. Ct scan of abdo R/O  Pancreatitis, Consulted by Dr Knight for DVT/PE prophylaxis, risk stratification and Anticoagulation Management.   PLAN:  contLovenox 40 mg sq daily x 4 weeks post-op for VTE prophylaxis  - Monitor daily CBC, BMP  - Encourage mobilization and maintain venodynes    Will continue to follow

## 2020-02-18 NOTE — PROGRESS NOTE ADULT - SUBJECTIVE AND OBJECTIVE BOX
HPI: 59/F with PMHx of anxiety/ depression, asthma,  HTN on enalapril, spinal stenosis, osteoarthritis, seasonal allergies who was sent to the ED by Dr. Knight for ankle injury requiring surgery.  Pt  c/o right ankle pain after a fall on 2/12, for which she was splinted at [facility] ED.   She states that  she was NPO for planned epidural procedure for pain control .  At 11 am she was getting dressed  and while putting on earrings she had unwitnessed LOC and  fell from standing , injuring her right ankle.  She does not remember the incident. She states that she was standing and next thing she found was that she was on floor with her ankle was twisted. She denies preceding cpain/SOB/palpitations/dizziness.  She does not recall tripping over anything.        Pt also had 5 episodes of diarrhea 2 days ago with LLQ pain which improved.     Pt reports she stopped taking motrin 600mg BID , approximately 5 days  ago for the planned epidural procedure for pain control.  She was on mobic until January 2020.      She has noticed increasingly dark stools over the last 1-2 weeks.    She also reports some difficulty swallowing solids for 2-3 weeks.          2/15: Cr improving  BP was low, gave 1 L NS bolus, better; switched to D5NS at 100 ml/hr  Pt had syncopal episode leading to her right ankle Fx.  Hb better post transfusion    2/17: seen and eval. Hemodynamically stable. Denies any HA, CP, SOB. Patient does notes of RLQ pain with deep palpation. There was a concern raised about pancreatitis on admission - will check lipase levels. Care discussed with Dr. diaz and Dr. Tenorio.     2/18: seen and eval. Continues to have abdominal pain. N/V today. patient feels better when eating. Lipase levels low and abdominal pain not a classic type of pain for pancreatitis.     PHYSICAL EXAM:    ICU Vital Signs Last 24 Hrs  T(C): 36.5 (18 Feb 2020 08:59), Max: 37 (17 Feb 2020 20:56)  T(F): 97.7 (18 Feb 2020 08:59), Max: 98.6 (17 Feb 2020 20:56)  HR: 107 (18 Feb 2020 09:00) (85 - 114)  BP: 162/108 (18 Feb 2020 09:00) (93/64 - 162/108)  BP(mean): 119 (18 Feb 2020 09:00) (70 - 119)  ABP: --  ABP(mean): --  RR: 18 (18 Feb 2020 09:00) (13 - 22)  SpO2: 90% (18 Feb 2020 09:00) (90% - 100%)    Constitutional: Weak appearing   HEENT: Atraumatic, DEREK, Normal, No congestion  Respiratory: + wheezing /  ronchi today  Cardiovascular: N S1S2;   Gastrointestinal: abdominal pain /  feeling nauseated  Extremities: No edema, peripheral pulses present; right ankle dressed  Neurological: AAO x 3, no gross focal motor deficits  Skin: Non cellulitic, no rash, ulcers  Lymph Nodes: No lymphadenopathy noted  Back: No CVA tenderness   Musculoskeletal: non tender  Breasts: Deferred  Genitourinary: deferred  Rectal: Deferred      Labs:   CBC Full  -  ( 17 Feb 2020 08:15 )  WBC Count : 12.41 K/uL  RBC Count : 3.76 M/uL  Hemoglobin : 11.4 g/dL  Hematocrit : 35.9 %  Platelet Count - Automated : 350 K/uL    PTT - ( 17 Feb 2020 08:15 )  PTT:46.2 sec    142  |  112<H>  |  16  ----------------------------<  170<H>  4.4   |  26  |  1.13    Ca    8.2<L>      17 Feb 2020 08:15  Phos  3.5     02-17    TPro  x   /  Alb  3.0<L>  /  TBili  x   /  DBili  x   /  AST  x   /  ALT  x   /  AlkPhos  x   02-16          CAPILLARY BLOOD GLUCOSE    CARDIAC MARKERS ( 15 Feb 2020 17:58 )  <0.015 ng/mL / x     / x     / x     / x        < from: CT Chest No Cont (02.16.20 @ 16:51) >  Impression:    Patchy infiltrates in both lungs with nodular feature, right greater than left, suggestive of pneumonia. Follow-up chest CT may be pursued in 4-6 weeks to ensure resolution. Attention should be directed to the small 6 mm left lower lobe lung nodule on the follow-up chestCT to ensure stability.    No pneumothorax.    Interlobular septal thickening in both lungs, suggestive of interstitial pulmonary edema.    New mild peribronchial cuffing, suggestive of reactive airway disease.    Peripancreatic stranding, new since the previous abdominal CT dated 2/14/2020, suggestive of acute pancreatitis. Clinical correlation with pancreatic enzymes is recommended.    Stable 0.8 cm splenic artery aneurysm with mural calcification.    Small bilateral pleural effusions.    Stable small pericardial effusion.    < end of copied text >    < from: CT Head No Cont (02.16.20 @ 16:51) >  Impression:     No intracranial hemorrhage, mass effect or CT evidence of acute infarct      < end of copied text >        # Syncope: can be secondary to orthostatic causes in combination with cardiac cause  - tele monitoring   - new low EF with recent cath 2/17 with normal coronaries  - elevated trops    - medication optimization for GDT    # Acute hypoxic respiratory failure in the setting of acute on chronic systolic CHF /  Aspiration pneumonia /  reactive airway dz  - d/c fluids  - started on duoneb treatments as patient has wheezing now  - continue with IV zosyn  - O2 support    # NSTEMI  - s/p cardiac cath 2/17:  Normal coronary arteries.  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 Moderately reduced left ventricular systolic function with segmental wall   motion abnormalities.  Normal LV systolic function. Estimated LV ejection fraction is 35 %.  Elevated left ventricular end-diastolic pressure.   - cardiology following .    # Peripancreatic stranding on CT chest:  - CT: Peripancreatic stranding, new since the previous abdominal CT dated 2/14/2020, suggestive of acute pancreatitis. Clinical correlation with pancreatic enzymes is recommended.  - patient's abdominal pain could be also secondary to (L) sided colitis noted on 2/14 CT scan  - tolerating po diet  - iv fluids  - check lipase    # Right Ankle Fx:   s/p right ankle External fixator application  pain meds prn  follow u with Dr. Knight in office for definitive fixation      # ARF: likely sec to NSAIDS; rersolved      # Melena and Dark colored stools + Acute Anemia of acte hemorrhage from GI bleed:  - likely from NSAIDS   - d/c NSAIDS  - cont Protonix  - s/p PRBC 2/15  - H/H q 8 hrs; transfuse for Hb less than 8       # Hypotension:  - resolved     # Hyperkalemia    - resolved

## 2020-02-18 NOTE — PROGRESS NOTE ADULT - SUBJECTIVE AND OBJECTIVE BOX
HPI: Pt is a 60 y/o female with a PMHx of anxiety/depression, asthma, HTN on enalapril, spinal stenosis, osteoarthritis, seasonal allergies who was sent to the ED by Dr. Knight today for ankle injury requiring surgery.  Pt c/o right ankle pain after a fall yesterday, for which she was splinted at [facility] ED. Pt also had 5 episodes of diarrhea 2 days ago with LLQ pain which improved yesterday.  day prior to admission pt was NPO for planned epidural procedure for pain control. At 11 am she was getting dressed yesterday and while putting on earrings she had unwitnessed LOC and  fell from standing , injuring her right ankle sustaining a left ankle fracture She denies preceding cpain/SOB/palpitations/dizziness.  She does not recall tripping over anything. Pt reports she stopped taking motrin 600mg BID , approximately 5 days  ago for the planned epidural procedure for pain control.  She was on mobic until 2020.  She has noticed increasingly dark stools over the last 1-2 weeks. She also reports some difficulty swallowing solids for 2-3 weeks.        In the ED , pt is found to have ARF, hyperkalemia, anemia and hypotension to 85/38.  Pt reported she may have accidentally taken an extra dose of her Clonidine today.     INTERVAL HISTORY:  she is now on 3 north s/p Right ankleplacement of ext-fix  : seen at bedside,  anxious, vomiting, states nausea form pain meds  2020: Pt seen at bedside in SICU with  at side. She reports she s/p cardiac cath, no abnormal findings per , and off of UFH per primary RN. Pt with VTE risk factors.   : seen at bedside, anxious, c/o lower back pain, not new      Fam HX:  Father DM, had CVa  Mother COPD  Brother  at 65 of alcoholism  Sister has lung ca (2020 18:56)    Consulted by Dr. Rogers for VTE prophylaxis, risk stratification, and anticoagulation management.    PAST MEDICAL & SURGICAL HISTORY:  Hypertension  Anxiety and depression  Seasonal allergies  Asthma  Osteoarthritis  S/P hip replacement: left  S/P tubal ligation  S/P cholecystectomy    CrCl: 69.7    Caprini VTE Risk Score: CAPRINI SCORE  AGE RELATED RISK FACTORS                                                       MOBILITY RELATED FACTORS  [x ] Age 41-60 years                                            (1 Point)                  [ ] Bed rest                                                        (1 Point)  [ ] Age: 61-74 years                                           (2 Points)                [ ] Plaster cast                                                   (2 Points)  [ ] Age= 75 years                                              (3 Points)                 [ ] Bed bound for more than 72 hours                   (2 Points)    DISEASE RELATED RISK FACTORS                                               GENDER SPECIFIC FACTORS  [ ] Edema in the lower extremities                       (1 Point)           [ ] Pregnancy                                                            (1 Point)  [ ] Varicose veins                                               (1 Point)                  [ ] Post-partum < 6 weeks                                      (1 Point)             x[ ] BMI > 25 Kg/m2                                            (1 Point)                  [ ] Hormonal therapy or oral contraception       (1 Point)                 [ ] Sepsis (in the previous month)                        (1 Point)             [ ] History of pregnancy complications                (1Point)  [ ] Pneumonia or serious lung disease                                             [ ] Unexplained or recurrent  (>/= 3), premature                                 (In the previous month)                               (1 Point)                birth with toxemia or growth-restricted infant (1 Point)  [ ] Abnormal pulmonary function test            (1 Point)                                   SURGERY RELATED RISK FACTORS  [ ] Acute myocardial infarction                       (1 Point)                  [ ]  Section                                         (1 Point)  [ ] Congestive heart failure (in the previous month) (1 Point)   [ ] Minor surgery   lasting <45 minutes       (1 Point)   [ ] Inflammatory bowel disease                             (1 Point)          [ ] Arthroscopic surgery                                  (2 Points)  [ ] Central venous access                                    (2 Points)            [x ] General surgery lasting >45 minutes      (2 Points)       [ ] Stroke (in the previous month)                  (5 Points)            [ ] Elective major lower extremity arthroplasty (5 Points)                                   [  ] Malignancy (present or past include skin melanoma                                          but exclude  basal skin cell)    (2 points)                                      TRAUMA RELATED RISK FACTORS                HEMATOLOGY RELATED FACTORS                                  [x ] Fracture of the hip, pelvis, or leg                       (5 Points)  [ ] Prior episodes of VTE                                     (3 Points)          [ ] Acute spinal cord injury (in the previous month)  (5 Points)  [ ] Positive family history for VTE                         (3 Points)       [ ] Paralysis (less than 1 month)                          (5 Points)  [ ] Prothrombin 47473 A                                      (3 Points)         [ ] Multiple Trauma (within 1month)                 (5Points)                                                                                                                                                                [ ] Factor V Leiden                                          (3 Points)                                OTHER RISK FACTORS                          [ ] Lupus anticoagulants                                     (3 Points)                       [ ] BMI > 40                          (1 Point)                                                         [ ] Anticardiolipin antibodies                                (3 Points)                   [ ] Smoking                              (1Point)                                                [ ] High homocysteine in the blood                      (3 Points)                [  ] Diabetes requiring insulin (1point)                         [ ] Other congenital or acquired thrombophilia       (3 Points)          [  ] Chemotherapy                   (1 Point)  [ ] Heparin induced thrombocytopenia                  (3 Points)             [  ] Blood Transfusion                (1 point)                                                                                                         Total Score [    9      ]                                                                                                                                                                                                                 IMPROVE Bleeding Risk Score 1    Time In:   Time Out:     Falls Risk:   High ( x )  Mod (  )  Low (  )      FAMILY HISTORY:  Family history of COPD (chronic obstructive pulmonary disease)  Family history of CVA  Denies any personal or familial history of clotting or bleeding disorders.    Allergies  No Known Allergies  Intolerances    REVIEW OF SYSTEMS    (  )Fever	     (  )Constipation	(  )SOB				(  )Headache	(  )Dysuria  (  )Chills	     (  )Melena	(  )Dyspnea present on exertion	                    (  )Dizziness                    (  )Polyuria  (  )Nausea	     (  )Hematochezia	(  )Cough			                    (  )Syncope   	(  )Hematuria  (  )Vomiting    (  )Chest Pain	(  )Wheezing			(  )Weakness  (  )Diarrhea     (  )Palpitations	(  )Anorexia			( x )joint pain    Reports, "hungry" All  other review of systems negative: Yes    PHYSICAL EXAM:  Vital Signs Last 24 Hrs  T(C): 36.8 (-18-20 @ 13:15), Max: 37 (02-17-20 @ 20:56)  T(F): 98.2 (02-18-20 @ 13:15), Max: 98.6 (02-17-20 @ 20:56)  HR: 100 (02-18-20 @ 13:49) (85 - 114)  BP: 136/94 (02-18-20 @ 13:00) (93/64 - 162/108)  BP(mean): 105 (02-18-20 @ 13:00) (70 - 121)  RR: 13 (-18-20 @ 13:00) (13 - 22)  SpO2: 100% (02-18-20 @ 13:49) (90% - 100%)      Constitutional: Appears Well    Neurological: A& O x 4    Skin: Warm    Respiratory and Thorax: normal effort; Breath sounds: normal; No rales/wheezing/rhonchi  	  Cardiovascular: S1, S2, regular, NMBR	    Gastrointestinal: BS + x 4Q, nontender	    Genitourinary:  Bladder nondistended, nontender    Musculoskeletal:   General Right:   + muscle/joint tenderness,   normal tone, no joint swelling,   ROM: limited	    General Left:   no muscle/joint tenderness,   normal tone, no joint swelling,   ROM: full    Right ankle: Drsing CDI; ext fix in place, tos warm and mobile Cap refill good; +Sensation intact                      Lower extrems:   Right: no calf tenderness              negative melba's sign               + pedal pulses    Left:   no calf tenderness              negative melba's sign               + pedal pulses    LABS:                          10.5   10.55 )-----------( 255      ( 2020 07:59 )             33.4       02-18    141  |  109<H>  |  14  ----------------------------<  129<H>  4.0   |  28  |  1.08    Ca    8.7      2020 07:59  Phos  3.5     02-17    TPro  6.4  /  Alb  2.5<L>  /  TBili  0.3  /  DBili  x   /  AST  30  /  ALT  12  /  AlkPhos  74  02-18      PTT - ( 2020 15:31 )  PTT:31.1 sec                              11.4   12.41 )-----------( 350      ( 2020 08:15 )             35.9       02-17    142  |  112<H>  |  16  ----------------------------<  170<H>  4.4   |  26  |  1.13    Ca    8.2<L>      2020 08:15  Phos  3.5     02-17    TPro  x   /  Alb  3.0<L>  /  TBili  x   /  DBili  x   /  AST  x   /  ALT  x   /  AlkPhos  x   02-16    PTT - ( 2020 08:15 )  PTT:46.2 sec       EXAM:  CARDIAC CATHERIZATION         PROCEDURE DATE:  2020        INTERPRETATION:  Cardiac Catheterization Report     Demographics     Patient Name        ADEOLA GRAY Height              66 Inches     Medical Record      [id number]       Weight              179.9 Pounds   Number     Account Number      [id number]    BSA                 1.91 m^2     Date of Birth       1960      BMI                 29.04 kg/m^2     Age                 59 year(s)      Performing          Jamshid Serrano MD                                       Physician     Gender              Female          Referring Physician Sixto Hoover MD    Procedure Start Time: 2020 11:29.    Procedure  Procedure Type:  Coronary Angiography and Left Heart Cath With Ventriculogram .    Closure Device .    Admission Data  Admission Status: Inpatient    Procedure Data    Continuous Physiologic monitoring was performed pre, meche, and post  procedure.    Diagnostic Cath Status: Urgent    Indications   1) Non-ST elevation (NSTEMI) myocardial infarction    Entry Locations    - Retrograde Percutaneous access was performed through the Right Radial      artery. A 6 Fr sheath was inserted. Hemostasis was successfully obtained      using General.      Comments: HEMOSTASIS OBTAINED WITH VASC BAND.    Procedure Medications:    - Fentanyl I.V. 25 mcg.      - Versed I.V. 1 mg.      - Verapamil I.A. 2.5 mg.      - Heparin I.A. 4000 units.      - Versed I.V. 0.5 mg.    Diagnostic Catheters    - 5FR  DIAGNOSTIC was used for Left ventriculography.      - 5FR FL 4 DIAGNOSTIC was used for Left coronary angiography.      - 5FR FR 4 DIAGNOSTIC was used for Right coronary angiography.      - 5FR 3DRC (WR) DIAGNOSTIC was used for Left coronary angiography.      Comments: USED FOR ADDITIONAL IMAGING      .    Contrast Material:    - [id number] ml    Fluoroscopy Time:Diagnostic: 8:03 minutes. Total: 8:03 minutes.    Fluoroscopy Dose:Diagnostic: 1236 mGy. Total: 1236 mGy.    Estimated Blood Loss:5 ml     Hemodynamics     Condition: Rest    Pressure  +----------------------------------+---------------------------------------+  !Site                              !Pressure                               !  +----------------------------------+---------------------------------------+  !LV                                !80/16 ,28                              !  +----------------------------------+---------------------------------------+     Condition: Post LV    Pressure  +-----+--------------------------------------------------------------------+  !Site !Pressure                                                            !  +-----+--------------------------------------------------------------------+  !AO   !83/55 (64)                                                          !  +-----+--------------------------------------------------------------------+  !LV   !81/16 ,30                                                           !  +-----+--------------------------------------------------------------------+     Angiographic Findings     Cardiac Arteries and Lesion Findings    LMCA: Normal.    LAD: Normal.Large caliber vessel.    LCx: Normal.Large caliber vessel. Arises from the right coronary cusp. The  anomalous LCx passes posterior to the aorta.    RCA: Normal.Large caliber vessel.    Ramus: Normal.Large caliber vessel.    VA  LV function assessed as:Abnormal.  Ejection Fraction  +----------------------------------------------------------------------+---+  !Method                                                                !EF%!  +----------------------------------------------------------------------+---+  !LV gram                                                               !35 !  +----------------------------------------------------------------------+---+     Impression     Diagnostic Conclusions     Normal coronary arteries.   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   Moderately reduced left ventricular systolic function with segmental wall   motion abnormalities.   Normal LV systolic function. Estimated LV ejection fraction is 35 %.   Elevated left ventricular end-diastolic pressure.   No aortic valve stenosis.     Recommendations     Manage with medical therapy.    Estimated Blood Loss:5ml.    Complications:  No complication.     Signatures     ----------------------------------------------------------------   Electronically signed by Jamshid Serrano MD(Performing   Physician) on 2020 12:17   ----------------------------------------------------------------     LVA Segment Contractility     1 - Normal    3 - Mild         5 - Severe       7 - Dyskinesis   hypokinesis      hypokinesis     2 -           4 - Moderate     6 - Akinesis     8 - Aneurysm   Hypokinesis   hypokinesis      JAMSHID SERRANO M.D., ATTENDING CARDIOLOGIST  This document has been electronically signed. 2020 12:18PM              INTERPRETATION:  CT ABDOMEN AND PELVIS    CLINICAL INFORMATION: elevated cr, lower abdominal pain      COMPARISON: CT abdomen and pelvis 2019    PROCEDURE:   CT of the Abdomen and Pelvis was performed without intravenous contrast.  Intravenous contrast: None.  Oral contrast: None.  Sagittal and coronal reformats were performed.    FINDINGS:    LOWER CHEST: Clear.    LIVER: Normal.  BILE DUCTS: Nondilated.  GALLBLADDER: Prior cholecystectomy.  SPLEEN: Normal.  PANCREAS: Normal.  ADRENALS: Normal.  KIDNEYS/URETERS: No hydronephrosis or urinary tract calculi.    BLADDER: Obscured by artifact  REPRODUCTIVE ORGANS: Partially obscured    BOWEL: Mild left-sided colitis of the descending colon. No obstruction. Normal appendix.  PERITONEUM: Small pelvic free fluid. No free air.  VESSELS:  Normal caliber aorta.  RETROPERITONEUM/LYMPH NODES: No adenopathy.    ABDOMINAL WALL: Normal.  BONES: No acute bony abnormality. Bilateral hip replacements.    IMPRESSION:     Left sided colitis.            MEDICATIONS  (STANDING):  acetaminophen   Tablet .. 975 milliGRAM(s) Oral every 8 hours  ALBUTerol    90 MICROgram(s) HFA Inhaler 1 Puff(s) Inhalation every 4 hours  albuterol/ipratropium for Nebulization 3 milliLiter(s) Nebulizer every 6 hours  aspirin enteric coated 81 milliGRAM(s) Oral daily  enoxaparin Injectable 40 milliGRAM(s) SubCutaneous daily  gabapentin 300 milliGRAM(s) Oral three times a day  influenza   Vaccine 0.5 milliLiter(s) IntraMuscular once  lisinopril 5 milliGRAM(s) Oral daily  metoprolol succinate ER 25 milliGRAM(s) Oral every 12 hours  pantoprazole  Injectable 40 milliGRAM(s) IV Push every 12 hours  PARoxetine 60 milliGRAM(s) Oral daily  piperacillin/tazobactam IVPB.. 3.375 Gram(s) IV Intermittent every 8 hours  polyethylene glycol 3350 17 Gram(s) Oral daily  sodium chloride 0.9% lock flush 3 milliLiter(s) IV Push every 8 hours  tiotropium 18 MICROgram(s) Capsule 1 Capsule(s) Inhalation daily  traZODone 150 milliGRAM(s) Oral at bedtime                DVT Prophylaxis:  LMWH                   ( x )  Heparin SQ           (  )  Coumadin             (  )  Xarelto                  (  )  Eliquis                   (  )  Venodynes           (x  )  Ambulation          ( x )  UFH                       (  )  Contraindicated  (  )  EC ASPIRIN       (  )

## 2020-02-19 DIAGNOSIS — I50.9 HEART FAILURE, UNSPECIFIED: ICD-10-CM

## 2020-02-19 LAB
ANION GAP SERPL CALC-SCNC: 4 MMOL/L — LOW (ref 5–17)
BUN SERPL-MCNC: 14 MG/DL — SIGNIFICANT CHANGE UP (ref 7–23)
CALCIUM SERPL-MCNC: 8.6 MG/DL — SIGNIFICANT CHANGE UP (ref 8.5–10.1)
CHLORIDE SERPL-SCNC: 108 MMOL/L — SIGNIFICANT CHANGE UP (ref 96–108)
CO2 SERPL-SCNC: 31 MMOL/L — SIGNIFICANT CHANGE UP (ref 22–31)
CREAT SERPL-MCNC: 0.88 MG/DL — SIGNIFICANT CHANGE UP (ref 0.5–1.3)
CULTURE RESULTS: SIGNIFICANT CHANGE UP
CULTURE RESULTS: SIGNIFICANT CHANGE UP
GLUCOSE SERPL-MCNC: 118 MG/DL — HIGH (ref 70–99)
HCT VFR BLD CALC: 29.5 % — LOW (ref 34.5–45)
HGB BLD-MCNC: 9.5 G/DL — LOW (ref 11.5–15.5)
MCHC RBC-ENTMCNC: 30.9 PG — SIGNIFICANT CHANGE UP (ref 27–34)
MCHC RBC-ENTMCNC: 32.2 GM/DL — SIGNIFICANT CHANGE UP (ref 32–36)
MCV RBC AUTO: 96.1 FL — SIGNIFICANT CHANGE UP (ref 80–100)
PLATELET # BLD AUTO: 266 K/UL — SIGNIFICANT CHANGE UP (ref 150–400)
POTASSIUM SERPL-MCNC: 3.7 MMOL/L — SIGNIFICANT CHANGE UP (ref 3.5–5.3)
POTASSIUM SERPL-SCNC: 3.7 MMOL/L — SIGNIFICANT CHANGE UP (ref 3.5–5.3)
RBC # BLD: 3.07 M/UL — LOW (ref 3.8–5.2)
RBC # FLD: 13.2 % — SIGNIFICANT CHANGE UP (ref 10.3–14.5)
SODIUM SERPL-SCNC: 143 MMOL/L — SIGNIFICANT CHANGE UP (ref 135–145)
SPECIMEN SOURCE: SIGNIFICANT CHANGE UP
SPECIMEN SOURCE: SIGNIFICANT CHANGE UP
WBC # BLD: 9.77 K/UL — SIGNIFICANT CHANGE UP (ref 3.8–10.5)
WBC # FLD AUTO: 9.77 K/UL — SIGNIFICANT CHANGE UP (ref 3.8–10.5)

## 2020-02-19 PROCEDURE — 71045 X-RAY EXAM CHEST 1 VIEW: CPT | Mod: 26

## 2020-02-19 PROCEDURE — 99231 SBSQ HOSP IP/OBS SF/LOW 25: CPT

## 2020-02-19 PROCEDURE — 74176 CT ABD & PELVIS W/O CONTRAST: CPT | Mod: 26

## 2020-02-19 PROCEDURE — 99233 SBSQ HOSP IP/OBS HIGH 50: CPT

## 2020-02-19 RX ORDER — FUROSEMIDE 40 MG
40 TABLET ORAL ONCE
Refills: 0 | Status: COMPLETED | OUTPATIENT
Start: 2020-02-19 | End: 2020-02-19

## 2020-02-19 RX ORDER — SCOPALAMINE 1 MG/3D
1 PATCH, EXTENDED RELEASE TRANSDERMAL ONCE
Refills: 0 | Status: COMPLETED | OUTPATIENT
Start: 2020-02-19 | End: 2020-02-20

## 2020-02-19 RX ORDER — ACETAMINOPHEN 500 MG
1000 TABLET ORAL ONCE
Refills: 0 | Status: COMPLETED | OUTPATIENT
Start: 2020-02-19 | End: 2020-02-19

## 2020-02-19 RX ORDER — HYDROMORPHONE HYDROCHLORIDE 2 MG/ML
INJECTION INTRAMUSCULAR; INTRAVENOUS; SUBCUTANEOUS
Refills: 0 | Status: DISCONTINUED | OUTPATIENT
Start: 2020-02-19 | End: 2020-02-19

## 2020-02-19 RX ORDER — HYDROMORPHONE HYDROCHLORIDE 2 MG/ML
1.5 INJECTION INTRAMUSCULAR; INTRAVENOUS; SUBCUTANEOUS THREE TIMES A DAY
Refills: 0 | Status: DISCONTINUED | OUTPATIENT
Start: 2020-02-19 | End: 2020-02-19

## 2020-02-19 RX ORDER — METOPROLOL TARTRATE 50 MG
75 TABLET ORAL DAILY
Refills: 0 | Status: DISCONTINUED | OUTPATIENT
Start: 2020-02-19 | End: 2020-02-25

## 2020-02-19 RX ORDER — HYDROMORPHONE HYDROCHLORIDE 2 MG/ML
1.5 INJECTION INTRAMUSCULAR; INTRAVENOUS; SUBCUTANEOUS ONCE
Refills: 0 | Status: DISCONTINUED | OUTPATIENT
Start: 2020-02-19 | End: 2020-02-19

## 2020-02-19 RX ORDER — LISINOPRIL 2.5 MG/1
10 TABLET ORAL DAILY
Refills: 0 | Status: DISCONTINUED | OUTPATIENT
Start: 2020-02-19 | End: 2020-02-21

## 2020-02-19 RX ORDER — HYDROMORPHONE HYDROCHLORIDE 2 MG/ML
1.5 INJECTION INTRAMUSCULAR; INTRAVENOUS; SUBCUTANEOUS EVERY 4 HOURS
Refills: 0 | Status: DISCONTINUED | OUTPATIENT
Start: 2020-02-19 | End: 2020-02-22

## 2020-02-19 RX ORDER — HYDROMORPHONE HYDROCHLORIDE 2 MG/ML
0.5 INJECTION INTRAMUSCULAR; INTRAVENOUS; SUBCUTANEOUS EVERY 8 HOURS
Refills: 0 | Status: DISCONTINUED | OUTPATIENT
Start: 2020-02-19 | End: 2020-02-19

## 2020-02-19 RX ORDER — METOCLOPRAMIDE HCL 10 MG
5 TABLET ORAL ONCE
Refills: 0 | Status: DISCONTINUED | OUTPATIENT
Start: 2020-02-19 | End: 2020-02-19

## 2020-02-19 RX ORDER — FUROSEMIDE 40 MG
20 TABLET ORAL EVERY 12 HOURS
Refills: 0 | Status: DISCONTINUED | OUTPATIENT
Start: 2020-02-19 | End: 2020-02-21

## 2020-02-19 RX ADMIN — OXYCODONE HYDROCHLORIDE 5 MILLIGRAM(S): 5 TABLET ORAL at 10:03

## 2020-02-19 RX ADMIN — SODIUM CHLORIDE 3 MILLILITER(S): 9 INJECTION INTRAMUSCULAR; INTRAVENOUS; SUBCUTANEOUS at 06:29

## 2020-02-19 RX ADMIN — ONDANSETRON 4 MILLIGRAM(S): 8 TABLET, FILM COATED ORAL at 11:31

## 2020-02-19 RX ADMIN — PANTOPRAZOLE SODIUM 40 MILLIGRAM(S): 20 TABLET, DELAYED RELEASE ORAL at 17:52

## 2020-02-19 RX ADMIN — TRAMADOL HYDROCHLORIDE 50 MILLIGRAM(S): 50 TABLET ORAL at 20:04

## 2020-02-19 RX ADMIN — Medication 150 MILLIGRAM(S): at 21:18

## 2020-02-19 RX ADMIN — Medication 40 MILLIGRAM(S): at 09:36

## 2020-02-19 RX ADMIN — PANTOPRAZOLE SODIUM 40 MILLIGRAM(S): 20 TABLET, DELAYED RELEASE ORAL at 05:21

## 2020-02-19 RX ADMIN — HYDROMORPHONE HYDROCHLORIDE 1.5 MILLIGRAM(S): 2 INJECTION INTRAMUSCULAR; INTRAVENOUS; SUBCUTANEOUS at 20:25

## 2020-02-19 RX ADMIN — Medication 60 MILLIGRAM(S): at 12:00

## 2020-02-19 RX ADMIN — HYDROMORPHONE HYDROCHLORIDE 1 MILLIGRAM(S): 2 INJECTION INTRAMUSCULAR; INTRAVENOUS; SUBCUTANEOUS at 01:49

## 2020-02-19 RX ADMIN — Medication 20 MILLIGRAM(S): at 17:51

## 2020-02-19 RX ADMIN — GABAPENTIN 300 MILLIGRAM(S): 400 CAPSULE ORAL at 05:21

## 2020-02-19 RX ADMIN — SODIUM CHLORIDE 3 MILLILITER(S): 9 INJECTION INTRAMUSCULAR; INTRAVENOUS; SUBCUTANEOUS at 20:48

## 2020-02-19 RX ADMIN — Medication 975 MILLIGRAM(S): at 05:22

## 2020-02-19 RX ADMIN — OXYCODONE HYDROCHLORIDE 5 MILLIGRAM(S): 5 TABLET ORAL at 10:40

## 2020-02-19 RX ADMIN — ONDANSETRON 4 MILLIGRAM(S): 8 TABLET, FILM COATED ORAL at 19:26

## 2020-02-19 RX ADMIN — Medication 0.25 MILLIGRAM(S): at 05:21

## 2020-02-19 RX ADMIN — Medication 30 MILLILITER(S): at 22:13

## 2020-02-19 RX ADMIN — Medication 81 MILLIGRAM(S): at 11:59

## 2020-02-19 RX ADMIN — LISINOPRIL 5 MILLIGRAM(S): 2.5 TABLET ORAL at 05:22

## 2020-02-19 RX ADMIN — Medication 0.25 MILLIGRAM(S): at 13:37

## 2020-02-19 RX ADMIN — Medication 10 MILLIGRAM(S): at 19:26

## 2020-02-19 RX ADMIN — HYDROMORPHONE HYDROCHLORIDE 1.5 MILLIGRAM(S): 2 INJECTION INTRAMUSCULAR; INTRAVENOUS; SUBCUTANEOUS at 20:11

## 2020-02-19 RX ADMIN — SENNA PLUS 2 TABLET(S): 8.6 TABLET ORAL at 21:19

## 2020-02-19 RX ADMIN — HYDROMORPHONE HYDROCHLORIDE 1 MILLIGRAM(S): 2 INJECTION INTRAMUSCULAR; INTRAVENOUS; SUBCUTANEOUS at 08:00

## 2020-02-19 RX ADMIN — GABAPENTIN 300 MILLIGRAM(S): 400 CAPSULE ORAL at 13:37

## 2020-02-19 RX ADMIN — MORPHINE SULFATE 4 MILLIGRAM(S): 50 CAPSULE, EXTENDED RELEASE ORAL at 00:00

## 2020-02-19 RX ADMIN — Medication 400 MILLIGRAM(S): at 21:17

## 2020-02-19 RX ADMIN — GABAPENTIN 300 MILLIGRAM(S): 400 CAPSULE ORAL at 21:19

## 2020-02-19 RX ADMIN — OXYCODONE HYDROCHLORIDE 5 MILLIGRAM(S): 5 TABLET ORAL at 06:20

## 2020-02-19 RX ADMIN — Medication 25 MILLIGRAM(S): at 05:23

## 2020-02-19 RX ADMIN — Medication 3 MILLILITER(S): at 07:39

## 2020-02-19 RX ADMIN — Medication 975 MILLIGRAM(S): at 06:20

## 2020-02-19 RX ADMIN — OXYCODONE HYDROCHLORIDE 5 MILLIGRAM(S): 5 TABLET ORAL at 05:21

## 2020-02-19 RX ADMIN — HYDROMORPHONE HYDROCHLORIDE 1.5 MILLIGRAM(S): 2 INJECTION INTRAMUSCULAR; INTRAVENOUS; SUBCUTANEOUS at 17:05

## 2020-02-19 RX ADMIN — TRAMADOL HYDROCHLORIDE 50 MILLIGRAM(S): 50 TABLET ORAL at 19:27

## 2020-02-19 RX ADMIN — SODIUM CHLORIDE 3 MILLILITER(S): 9 INJECTION INTRAMUSCULAR; INTRAVENOUS; SUBCUTANEOUS at 12:24

## 2020-02-19 RX ADMIN — HYDROMORPHONE HYDROCHLORIDE 1 MILLIGRAM(S): 2 INJECTION INTRAMUSCULAR; INTRAVENOUS; SUBCUTANEOUS at 12:23

## 2020-02-19 RX ADMIN — Medication 975 MILLIGRAM(S): at 14:15

## 2020-02-19 RX ADMIN — Medication 0.25 MILLIGRAM(S): at 22:13

## 2020-02-19 RX ADMIN — HYDROMORPHONE HYDROCHLORIDE 1 MILLIGRAM(S): 2 INJECTION INTRAMUSCULAR; INTRAVENOUS; SUBCUTANEOUS at 01:34

## 2020-02-19 RX ADMIN — Medication 975 MILLIGRAM(S): at 13:37

## 2020-02-19 RX ADMIN — Medication 1000 MILLIGRAM(S): at 22:20

## 2020-02-19 RX ADMIN — HYDROMORPHONE HYDROCHLORIDE 1 MILLIGRAM(S): 2 INJECTION INTRAMUSCULAR; INTRAVENOUS; SUBCUTANEOUS at 11:59

## 2020-02-19 RX ADMIN — POLYETHYLENE GLYCOL 3350 17 GRAM(S): 17 POWDER, FOR SOLUTION ORAL at 12:00

## 2020-02-19 RX ADMIN — ENOXAPARIN SODIUM 40 MILLIGRAM(S): 100 INJECTION SUBCUTANEOUS at 11:59

## 2020-02-19 RX ADMIN — HYDROMORPHONE HYDROCHLORIDE 1.5 MILLIGRAM(S): 2 INJECTION INTRAMUSCULAR; INTRAVENOUS; SUBCUTANEOUS at 16:27

## 2020-02-19 RX ADMIN — HYDROMORPHONE HYDROCHLORIDE 1 MILLIGRAM(S): 2 INJECTION INTRAMUSCULAR; INTRAVENOUS; SUBCUTANEOUS at 07:48

## 2020-02-19 NOTE — PROGRESS NOTE ADULT - SUBJECTIVE AND OBJECTIVE BOX
Patient seen and examined at bedside. No acute events over night. Patient reports that her pain is improved and controlled. Denies chest pain, shortness of breath, nausea or vomiting.     PE:  Vital Signs Last 24 Hrs  T(C): 36.6 (02-19-20 @ 06:31), Max: 36.9 (02-18-20 @ 21:56)  T(F): 97.8 (02-19-20 @ 06:31), Max: 98.4 (02-18-20 @ 21:56)  HR: 73 (02-19-20 @ 03:00) (73 - 114)  BP: 95/59 (02-19-20 @ 03:00) (94/61 - 162/108)  BP(mean): 67 (02-19-20 @ 03:00) (67 - 121)  RR: 14 (02-19-20 @ 03:00) (11 - 21)  SpO2: 98% (02-19-20 @ 03:00) (90% - 100%)    General: NAD, resting comfortably in bed  R LE:   in ex fix C/D/I  SCD in place contralaterally  No calf tenderness contralterally  Able to grossly wiggle toes  Gross sesnsation to toes intact  Cap refill <2 seconds                          10.5   10.55 )-----------( 255      ( 18 Feb 2020 07:59 )             33.4     18 Feb 2020 07:59    141    |  109    |  14     ----------------------------<  129    4.0     |  28     |  1.08     Ca    8.7        18 Feb 2020 07:59  Phos  3.5       17 Feb 2020 08:15    TPro  6.4    /  Alb  2.5    /  TBili  0.3    /  DBili  x      /  AST  30     /  ALT  12     /  AlkPhos  74     18 Feb 2020 07:59    PTT - ( 17 Feb 2020 15:31 )  PTT:31.1 sec    A/P:  59y f s/p R LE ex fix  -PT/OT - NWB RLE  -Pain Control  -DVT ppx per primary team with A81 and Lovenox  -FU AM Labs  -Rest, ice, compress and elevate the extremity as we needed  -Incentive Spirometry  -Medical management appreciated    Ortho Patient seen and examined at bedside. No acute events over night. Patient reports that her pain is improved and controlled. Denies chest pain, shortness of breath, nausea or vomiting.     PE:  Vital Signs Last 24 Hrs  T(C): 36.6 (02-19-20 @ 06:31), Max: 36.9 (02-18-20 @ 21:56)  T(F): 97.8 (02-19-20 @ 06:31), Max: 98.4 (02-18-20 @ 21:56)  HR: 73 (02-19-20 @ 03:00) (73 - 114)  BP: 95/59 (02-19-20 @ 03:00) (94/61 - 162/108)  BP(mean): 67 (02-19-20 @ 03:00) (67 - 121)  RR: 14 (02-19-20 @ 03:00) (11 - 21)  SpO2: 98% (02-19-20 @ 03:00) (90% - 100%)    General: NAD, resting comfortably in bed  R LE:   in ex fix C/D/I  SCD in place contralaterally  No calf tenderness contralterally  Able to grossly wiggle toes  Gross sesnsation to toes intact  Cap refill <2 seconds                          10.5   10.55 )-----------( 255      ( 18 Feb 2020 07:59 )             33.4     18 Feb 2020 07:59    141    |  109    |  14     ----------------------------<  129    4.0     |  28     |  1.08     Ca    8.7        18 Feb 2020 07:59  Phos  3.5       17 Feb 2020 08:15    TPro  6.4    /  Alb  2.5    /  TBili  0.3    /  DBili  x      /  AST  30     /  ALT  12     /  AlkPhos  74     18 Feb 2020 07:59    PTT - ( 17 Feb 2020 15:31 )  PTT:31.1 sec    A/P:  59y f s/p R LE ex fix  -PT/OT - NWB RLE  -Pain Control  -DVT ppx per primary team with ADarío and Lovenox  -FU AM Labs  -Rest, ice, compress and elevate the extremity as we needed  -Incentive Spirometry  -Medical management appreciated  -PT was to follow up outpatient with Dr. Knight for definitive fixation of the ankle. If patient remains in the hospital may recommend operative fixation during the in hospital stay if medically stable.    Ortho

## 2020-02-19 NOTE — PROGRESS NOTE ADULT - SUBJECTIVE AND OBJECTIVE BOX
SUBJECTIVE       PAST MEDICAL & SURGICAL HISTORY:  Hypertension  Anxiety and depression  Seasonal allergies  Asthma  Osteoarthritis  S/P hip replacement: left  S/P tubal ligation  S/P cholecystectomy    OBJECTIVE   Vital Signs Last 24 Hrs  T(C): 36.6 (19 Feb 2020 06:31), Max: 36.9 (18 Feb 2020 21:56)  T(F): 97.8 (19 Feb 2020 06:31), Max: 98.4 (18 Feb 2020 21:56)  HR: 81 (19 Feb 2020 09:00) (73 - 114)  BP: 125/84 (19 Feb 2020 09:00) (94/61 - 152/111)  BP(mean): 94 (19 Feb 2020 09:00) (67 - 121)  RR: 15 (19 Feb 2020 09:00) (11 - 21)  SpO2: 96% (19 Feb 2020 09:00) (92% - 100%)    PHYSICAL EXAM:  Constitutional: , awake and alert, not in distress.  HEENT: Normo cephalic atraumatic  Neck: Soft and supple, No J.V.D   Respiratory: vesicular breathing , No wheezing, rales or rhonchi.   Cardiovascular: S1 and S2, regular rate .   Gastrointestinal:  soft, nontender,   Extremities: No  edema or calf tenderness .  Neurological: No new  focal deficits.    MEDICATIONS  (STANDING):  acetaminophen   Tablet .. 975 milliGRAM(s) Oral every 8 hours  albuterol/ipratropium for Nebulization 3 milliLiter(s) Nebulizer every 6 hours  aspirin enteric coated 81 milliGRAM(s) Oral daily  enoxaparin Injectable 40 milliGRAM(s) SubCutaneous daily  furosemide   Injectable 40 milliGRAM(s) IV Push once  gabapentin 300 milliGRAM(s) Oral three times a day  influenza   Vaccine 0.5 milliLiter(s) IntraMuscular once  lisinopril 5 milliGRAM(s) Oral daily  metoprolol succinate ER 25 milliGRAM(s) Oral every 12 hours  pantoprazole  Injectable 40 milliGRAM(s) IV Push every 12 hours  PARoxetine 60 milliGRAM(s) Oral daily  polyethylene glycol 3350 17 Gram(s) Oral daily  sodium chloride 0.9% lock flush 3 milliLiter(s) IV Push every 8 hours  traZODone 150 milliGRAM(s) Oral at bedtime                            9.5    9.77  )-----------( 266      ( 19 Feb 2020 06:36 )             29.5     02-19    143  |  108  |  14  ----------------------------<  118<H>  3.7   |  31  |  0.88    Ca    8.6      19 Feb 2020 06:36    TPro  6.4  /  Alb  2.5<L>  /  TBili  0.3  /  DBili  x   /  AST  30  /  ALT  12  /  AlkPhos  74  02-18       ABG - ( 18 Feb 2020 15:15 )  pH, Arterial: 7.38  pH, Blood: x     /  pCO2: 52    /  pO2: 68    / HCO3: 30    / Base Excess: 4.4   /  SaO2: 91 SUBJECTIVE      patient seen in the a.m. feeling better from the respiratory standpoint with improvement with wheezing with IV diuresis   comfortable breathing on nasal cannula now   she diuresed nearly 2 L   has high proBNP  indicative of CHF and with pulmonary edema    off the antibiotic as there is less clinical concern for pneumonia   ABG results noted suggestive of mild pCO2 retention  with hypoxia    PAST MEDICAL & SURGICAL HISTORY:  Hypertension  Anxiety and depression  Seasonal allergies  Asthma  Osteoarthritis  S/P hip replacement: left  S/P tubal ligation  S/P cholecystectomy    OBJECTIVE   Vital Signs Last 24 Hrs  T(C): 36.6 (19 Feb 2020 06:31), Max: 36.9 (18 Feb 2020 21:56)  T(F): 97.8 (19 Feb 2020 06:31), Max: 98.4 (18 Feb 2020 21:56)  HR: 81 (19 Feb 2020 09:00) (73 - 114)  BP: 125/84 (19 Feb 2020 09:00) (94/61 - 152/111)  BP(mean): 94 (19 Feb 2020 09:00) (67 - 121)  RR: 15 (19 Feb 2020 09:00) (11 - 21)  SpO2: 96% (19 Feb 2020 09:00) (92% - 100%)    PHYSICAL EXAM:  Constitutional: , awake and alert, not in distress  breathing comfortably on the nasal cannulae  HEENT: Normo cephalic atraumatic  Neck: Soft and supple, No J.V.D   Respiratory: vesicular breathing  improved wheezing with rales in the bases  Cardiovascular: S1 and S2, regular rate .   Gastrointestinal:  soft, nontender,   Extremities:  left leg under cast  Neurological: No new  focal deficits.    MEDICATIONS  (STANDING):  acetaminophen   Tablet .. 975 milliGRAM(s) Oral every 8 hours  albuterol/ipratropium for Nebulization 3 milliLiter(s) Nebulizer every 6 hours  aspirin enteric coated 81 milliGRAM(s) Oral daily  enoxaparin Injectable 40 milliGRAM(s) SubCutaneous daily  furosemide   Injectable 40 milliGRAM(s) IV Push once  gabapentin 300 milliGRAM(s) Oral three times a day  influenza   Vaccine 0.5 milliLiter(s) IntraMuscular once  lisinopril 5 milliGRAM(s) Oral daily  metoprolol succinate ER 25 milliGRAM(s) Oral every 12 hours  pantoprazole  Injectable 40 milliGRAM(s) IV Push every 12 hours  PARoxetine 60 milliGRAM(s) Oral daily  polyethylene glycol 3350 17 Gram(s) Oral daily  sodium chloride 0.9% lock flush 3 milliLiter(s) IV Push every 8 hours  traZODone 150 milliGRAM(s) Oral at bedtime                            9.5    9.77  )-----------( 266      ( 19 Feb 2020 06:36 )             29.5     02-19    143  |  108  |  14  ----------------------------<  118<H>  3.7   |  31  |  0.88    Ca    8.6      19 Feb 2020 06:36    TPro  6.4  /  Alb  2.5<L>  /  TBili  0.3  /  DBili  x   /  AST  30  /  ALT  12  /  AlkPhos  74  02-18       ABG - ( 18 Feb 2020 15:15 )  pH, Arterial: 7.38  pH, Blood: x     /  pCO2: 52    /  pO2: 68    / HCO3: 30    / Base Excess: 4.4   /  SaO2: 91             follow-up chest x-ray noted still has vascular congestion with effusions

## 2020-02-19 NOTE — PROGRESS NOTE ADULT - ASSESSMENT
- hypoxemics respiratory failure with the acute pulmonary edema  still persistent vascular congestion  with some improvement with wheeze  with diuresis  and received 1 dose of Solu-Medrol yesterday  - less likely pneumonia  off the antibiotic  - low EF with the cath and normal coronaries with the cath   - history of mild asthma with symptom of exacerbation with the pulmonary edema   - status post fall ankle fracture and repair   - anemia with the G.I bleeding   - acute renal failure resolved     PLAN   -  would give additional dose of Lasix with residual vascular congestion  -  recommend to keep on maintenance diuretics  until pulmonary edema resolves  as tolerated by the blood pressure  -   use of nebs p.r.n. for wheezing  -   follow-up of K and magnesium while on diuretics  -    use of BiPAP p.r.n. basis  -    adequate DVT prophylaxis with recent ankle surgery.

## 2020-02-19 NOTE — PROGRESS NOTE ADULT - SUBJECTIVE AND OBJECTIVE BOX
HPI: Pt is a 60 y/o female with a PMHx of anxiety/depression, asthma, HTN on enalapril, spinal stenosis, osteoarthritis, seasonal allergies who was sent to the ED by Dr. Knight today for ankle injury requiring surgery.  Pt c/o right ankle pain after a fall yesterday, for which she was splinted at [facility] ED. Pt also had 5 episodes of diarrhea 2 days ago with LLQ pain which improved yesterday.  day prior to admission pt was NPO for planned epidural procedure for pain control. At 11 am she was getting dressed yesterday and while putting on earrings she had unwitnessed LOC and  fell from standing , injuring her right ankle sustaining a left ankle fracture She denies preceding cpain/SOB/palpitations/dizziness.  She does not recall tripping over anything. Pt reports she stopped taking motrin 600mg BID , approximately 5 days  ago for the planned epidural procedure for pain control.  She was on mobic until 2020.  She has noticed increasingly dark stools over the last 1-2 weeks. She also reports some difficulty swallowing solids for 2-3 weeks.        In the ED , pt is found to have ARF, hyperkalemia, anemia and hypotension to 85/38.  Pt reported she may have accidentally taken an extra dose of her Clonidine today.     INTERVAL HISTORY:  she is now on 3 north s/p Right ankleplacement of ext-fix  : seen at bedside,  anxious, vomiting, states nausea form pain meds  2020: Pt seen at bedside in SICU with  at side. She reports she s/p cardiac cath, no abnormal findings per , and off of UFH per primary RN. Pt with VTE risk factors.   : seen at bedside, anxious, c/o lower back pain, not new  : seen at bedside, c/o n/v still anxious      Fam HX:  Father DM, had CVa  Mother COPD  Brother  at 65 of alcoholism  Sister has lung ca (2020 18:56)    Consulted by Dr. Rogers for VTE prophylaxis, risk stratification, and anticoagulation management.    PAST MEDICAL & SURGICAL HISTORY:  Hypertension  Anxiety and depression  Seasonal allergies  Asthma  Osteoarthritis  S/P hip replacement: left  S/P tubal ligation  S/P cholecystectomy    CrCl: 69.7    Caprini VTE Risk Score: CAPRINI SCORE  AGE RELATED RISK FACTORS                                                       MOBILITY RELATED FACTORS  [x ] Age 41-60 years                                            (1 Point)                  [ ] Bed rest                                                        (1 Point)  [ ] Age: 61-74 years                                           (2 Points)                [ ] Plaster cast                                                   (2 Points)  [ ] Age= 75 years                                              (3 Points)                 [ ] Bed bound for more than 72 hours                   (2 Points)    DISEASE RELATED RISK FACTORS                                               GENDER SPECIFIC FACTORS  [ ] Edema in the lower extremities                       (1 Point)           [ ] Pregnancy                                                            (1 Point)  [ ] Varicose veins                                               (1 Point)                  [ ] Post-partum < 6 weeks                                      (1 Point)             x[ ] BMI > 25 Kg/m2                                            (1 Point)                  [ ] Hormonal therapy or oral contraception       (1 Point)                 [ ] Sepsis (in the previous month)                        (1 Point)             [ ] History of pregnancy complications                (1Point)  [ ] Pneumonia or serious lung disease                                             [ ] Unexplained or recurrent  (>/= 3), premature                                 (In the previous month)                               (1 Point)                birth with toxemia or growth-restricted infant (1 Point)  [ ] Abnormal pulmonary function test            (1 Point)                                   SURGERY RELATED RISK FACTORS  [ ] Acute myocardial infarction                       (1 Point)                  [ ]  Section                                         (1 Point)  [ ] Congestive heart failure (in the previous month) (1 Point)   [ ] Minor surgery   lasting <45 minutes       (1 Point)   [ ] Inflammatory bowel disease                             (1 Point)          [ ] Arthroscopic surgery                                  (2 Points)  [ ] Central venous access                                    (2 Points)            [x ] General surgery lasting >45 minutes      (2 Points)       [ ] Stroke (in the previous month)                  (5 Points)            [ ] Elective major lower extremity arthroplasty (5 Points)                                   [  ] Malignancy (present or past include skin melanoma                                          but exclude  basal skin cell)    (2 points)                                      TRAUMA RELATED RISK FACTORS                HEMATOLOGY RELATED FACTORS                                  [x ] Fracture of the hip, pelvis, or leg                       (5 Points)  [ ] Prior episodes of VTE                                     (3 Points)          [ ] Acute spinal cord injury (in the previous month)  (5 Points)  [ ] Positive family history for VTE                         (3 Points)       [ ] Paralysis (less than 1 month)                          (5 Points)  [ ] Prothrombin 14571 A                                      (3 Points)         [ ] Multiple Trauma (within 1month)                 (5Points)                                                                                                                                                                [ ] Factor V Leiden                                          (3 Points)                                OTHER RISK FACTORS                          [ ] Lupus anticoagulants                                     (3 Points)                       [ ] BMI > 40                          (1 Point)                                                         [ ] Anticardiolipin antibodies                                (3 Points)                   [ ] Smoking                              (1Point)                                                [ ] High homocysteine in the blood                      (3 Points)                [  ] Diabetes requiring insulin (1point)                         [ ] Other congenital or acquired thrombophilia       (3 Points)          [  ] Chemotherapy                   (1 Point)  [ ] Heparin induced thrombocytopenia                  (3 Points)             [  ] Blood Transfusion                (1 point)                                                                                                         Total Score [    9      ]                                                                                                                                                                                                                 IMPROVE Bleeding Risk Score 1    Time In:   Time Out:     Falls Risk:   High ( x )  Mod (  )  Low (  )      FAMILY HISTORY:  Family history of COPD (chronic obstructive pulmonary disease)  Family history of CVA  Denies any personal or familial history of clotting or bleeding disorders.    Allergies  No Known Allergies  Intolerances    REVIEW OF SYSTEMS    (  )Fever	     (  )Constipation	(  )SOB				(  )Headache	(  )Dysuria  (  )Chills	     (  )Melena	(  )Dyspnea present on exertion	                    (  )Dizziness                    (  )Polyuria  (x  )Nausea	     (  )Hematochezia	(  )Cough			                    (  )Syncope   	(  )Hematuria  (  )Vomiting    (  )Chest Pain	(  )Wheezing			(  )Weakness  (  )Diarrhea     (  )Palpitations	(  )Anorexia			( x )joint pain     All  other review of systems negative: Yes    Vital Signs Last 24 Hrs  T(C): 36.3 (2020 16:37), Max: 36.9 (2020 21:56)  T(F): 97.4 (2020 16:37), Max: 98.4 (2020 21:56)  HR: 106 (2020 17:00) (73 - 108)  BP: 149/94 (2020 17:00) (95/59 - 163/120)  BP(mean): 108 (2020 17:00) (67 - 131)  RR: 17 (2020 17:00) (11 - 18)  SpO2: 90% (2020 17:00) (90% - 100%)    Constitutional: Appears Well    Neurological: A& O x 4    Skin: Warm    Respiratory and Thorax: normal effort; Breath sounds: normal; No rales/wheezing/rhonchi  	  Cardiovascular: S1, S2, regular, NMBR	    Gastrointestinal: BS + x 4Q, nontender	    Genitourinary:  Bladder nondistended, nontender    Musculoskeletal:   General Right:   + muscle/joint tenderness,   normal tone, no joint swelling,   ROM: limited	    General Left:   no muscle/joint tenderness,   normal tone, no joint swelling,   ROM: full    Right ankle: Drsing CDI; ext fix in place, tos warm and mobile Cap refill good; +Sensation intact                      Lower extrems:   Right: no calf tenderness              negative melba's sign               + pedal pulses    Left:   no calf tenderness              negative melba's sign               + pedal pulses    LABS:                            9.5    9.77  )-----------( 266      ( 2020 06:36 )             29.5       02-19    143  |  108  |  14  ----------------------------<  118<H>  3.7   |  31  |  0.88    Ca    8.6      2020 06:36    TPro  6.4  /  Alb  2.5<L>  /  TBili  0.3  /  DBili  x   /  AST  30  /  ALT  12  /  AlkPhos  74  0218                          10.5   10.55 )-----------( 255      ( 2020 07:59 )             33.4       02-18    141  |  109<H>  |  14  ----------------------------<  129<H>  4.0   |  28  |  1.08    Ca    8.7      2020 07:59  Phos  3.5     02-17    TPro  6.4  /  Alb  2.5<L>  /  TBili  0.3  /  DBili  x   /  AST  30  /  ALT  12  /  AlkPhos  74  02-18      PTT - ( 2020 15:31 )  PTT:31.1 sec                              11.4   12.41 )-----------( 350      ( 2020 08:15 )             35.9       02-17    142  |  112<H>  |  16  ----------------------------<  170<H>  4.4   |  26  |  1.13    Ca    8.2<L>      2020 08:15  Phos  3.5     02-17    TPro  x   /  Alb  3.0<L>  /  TBili  x   /  DBili  x   /  AST  x   /  ALT  x   /  AlkPhos  x   02-16    PTT - ( 2020 08:15 )  PTT:46.2 sec       EXAM:  CARDIAC CATHERIZATION         PROCEDURE DATE:  2020        INTERPRETATION:  Cardiac Catheterization Report     Demographics     Patient Name        ADEOLA CHERYL T Height              66 Inches     Medical Record      [id number]       Weight              179.9 Pounds   Number     Account Number      [id number]    BSA                 1.91 m^2     Date of Birth       1960      BMI                 29.04 kg/m^2     Age                 59 year(s)      Performing          Jamshid Serrano MD                                       Physician     Gender              Female          Referring Physician Sixto Hoover MD    Procedure Start Time: 2020 11:29.    Procedure  Procedure Type:  Coronary Angiography and Left Heart Cath With Ventriculogram .    Closure Device .    Admission Data  Admission Status: Inpatient    Procedure Data    Continuous Physiologic monitoring was performed pre, meche, and post  procedure.    Diagnostic Cath Status: Urgent    Indications   1) Non-ST elevation (NSTEMI) myocardial infarction    Entry Locations    - Retrograde Percutaneous access was performed through the Right Radial      artery. A 6 Fr sheath was inserted. Hemostasis was successfully obtained      using General.      Comments: HEMOSTASIS OBTAINED WITH VASC BAND.    Procedure Medications:    - Fentanyl I.V. 25 mcg.      - Versed I.V. 1 mg.      - Verapamil I.A. 2.5 mg.      - Heparin I.A. 4000 units.      - Versed I.V. 0.5 mg.    Diagnostic Catheters    - 5FR  DIAGNOSTIC was used for Left ventriculography.      - 5FR FL 4 DIAGNOSTIC was used for Left coronary angiography.      - 5FR FR 4 DIAGNOSTIC was used for Right coronary angiography.      - 5FR 3DRC (WR) DIAGNOSTIC was used for Left coronary angiography.      Comments: USED FOR ADDITIONAL IMAGING      .    Contrast Material:    - [id number] ml    Fluoroscopy Time:Diagnostic: 8:03 minutes. Total: 8:03 minutes.    Fluoroscopy Dose:Diagnostic: 1236 mGy. Total: 1236 mGy.    Estimated Blood Loss:5 ml     Hemodynamics     Condition: Rest    Pressure  +----------------------------------+---------------------------------------+  !Site                              !Pressure                               !  +----------------------------------+---------------------------------------+  !LV                                !80/16 ,28                              !  +----------------------------------+---------------------------------------+     Condition: Post LV    Pressure  +-----+--------------------------------------------------------------------+  !Site !Pressure                                                            !  +-----+--------------------------------------------------------------------+  !AO   !83/55 (64)                                                          !  +-----+--------------------------------------------------------------------+  !LV   !81/16 ,30                                                           !  +-----+--------------------------------------------------------------------+     Angiographic Findings     Cardiac Arteries and Lesion Findings    LMCA: Normal.    LAD: Normal.Large caliber vessel.    LCx: Normal.Large caliber vessel. Arises from the right coronary cusp. The  anomalous LCx passes posterior to the aorta.    RCA: Normal.Large caliber vessel.    Ramus: Normal.Large caliber vessel.    VA  LV function assessed as:Abnormal.  Ejection Fraction  +----------------------------------------------------------------------+---+  !Method                                                                !EF%!  +----------------------------------------------------------------------+---+  !LV gram                                                               !35 !  +----------------------------------------------------------------------+---+     Impression     Diagnostic Conclusions     Normal coronary arteries.   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   Moderately reduced left ventricular systolic function with segmental wall   motion abnormalities.   Normal LV systolic function. Estimated LV ejection fraction is 35 %.   Elevated left ventricular end-diastolic pressure.   No aortic valve stenosis.     Recommendations     Manage with medical therapy.    Estimated Blood Loss:5ml.    Complications:  No complication.     Signatures     ----------------------------------------------------------------   Electronically signed by Jamshid Serrano MD(Performing   Physician) on 2020 12:17   ----------------------------------------------------------------     LVA Segment Contractility     1 - Normal    3 - Mild         5 - Severe       7 - Dyskinesis   hypokinesis      hypokinesis     2 -           4 - Moderate     6 - Akinesis     8 - Aneurysm   Hypokinesis   hypokinesis      JAMSHID SERRANO M.D., ATTENDING CARDIOLOGIST  This document has been electronically signed. 2020 12:18PM              INTERPRETATION:  CT ABDOMEN AND PELVIS    CLINICAL INFORMATION: elevated cr, lower abdominal pain      COMPARISON: CT abdomen and pelvis 2019    PROCEDURE:   CT of the Abdomen and Pelvis was performed without intravenous contrast.  Intravenous contrast: None.  Oral contrast: None.  Sagittal and coronal reformats were performed.    FINDINGS:    LOWER CHEST: Clear.    LIVER: Normal.  BILE DUCTS: Nondilated.  GALLBLADDER: Prior cholecystectomy.  SPLEEN: Normal.  PANCREAS: Normal.  ADRENALS: Normal.  KIDNEYS/URETERS: No hydronephrosis or urinary tract calculi.    BLADDER: Obscured by artifact  REPRODUCTIVE ORGANS: Partially obscured    BOWEL: Mild left-sided colitis of the descending colon. No obstruction. Normal appendix.  PERITONEUM: Small pelvic free fluid. No free air.  VESSELS:  Normal caliber aorta.  RETROPERITONEUM/LYMPH NODES: No adenopathy.    ABDOMINAL WALL: Normal.  BONES: No acute bony abnormality. Bilateral hip replacements.    IMPRESSION:     Left sided colitis.            MEDICATIONS  (STANDING):  acetaminophen   Tablet .. 975 milliGRAM(s) Oral every 8 hours  ALBUTerol    90 MICROgram(s) HFA Inhaler 1 Puff(s) Inhalation every 4 hours  albuterol/ipratropium for Nebulization 3 milliLiter(s) Nebulizer every 6 hours  aspirin enteric coated 81 milliGRAM(s) Oral daily  enoxaparin Injectable 40 milliGRAM(s) SubCutaneous daily  gabapentin 300 milliGRAM(s) Oral three times a day  influenza   Vaccine 0.5 milliLiter(s) IntraMuscular once  lisinopril 5 milliGRAM(s) Oral daily  metoprolol succinate ER 25 milliGRAM(s) Oral every 12 hours  pantoprazole  Injectable 40 milliGRAM(s) IV Push every 12 hours  PARoxetine 60 milliGRAM(s) Oral daily  piperacillin/tazobactam IVPB.. 3.375 Gram(s) IV Intermittent every 8 hours  polyethylene glycol 3350 17 Gram(s) Oral daily  sodium chloride 0.9% lock flush 3 milliLiter(s) IV Push every 8 hours  tiotropium 18 MICROgram(s) Capsule 1 Capsule(s) Inhalation daily  traZODone 150 milliGRAM(s) Oral at bedtime                DVT Prophylaxis:  LMWH                   ( x )  Heparin SQ           (  )  Coumadin             (  )  Xarelto                  (  )  Eliquis                   (  )  Venodynes           (x  )  Ambulation          ( x )  UFH                       (  )  Contraindicated  (  )  EC ASPIRIN       (  )

## 2020-02-19 NOTE — PROGRESS NOTE ADULT - SUBJECTIVE AND OBJECTIVE BOX
58 y/o w/ h/o HTN no prior cardiac history admitted for for syncopal episode resulting in ankle injury requiring surgery.  s/p open reduction .  Postop yesterday pt had epigastric discomfort intermittent w/ nausea.  Trop 3.4 -> 4.49 -> 5.23 overnight. CT chest neg for PE showed pneumonia. ECG w/ no ischemic changes.  This AM pt was sleeping but on arousal reports persistent mild epigastric discomfort  4/10 severity.  SBPs 80s-90s somewhat labile, received clonidine this AM.  Discussed w/ GI, had melena & drop in Hb, likely due to high dose NSAID use prior to admit.  Discussed w/ orthopedics, they will operate on antiplatelet therapy if PCI needed, no need to consider BMS.    20  Patient is feeling nauseous on oxycodone , constipated , denies any chest pain , her back is down the lumbar area  , patient had cardiac cath showed issac coronaries ,  possible  takotsubo cardiomyopathy . on low dose BB ,   20  no chest pain, dyspnea overnight    PCP:    REQUESTING PHYSICIAN:    REASON FOR CONSULT:    CHIEF COMPLAINT:    HPI:  Pt is a 58 y/o female with a PMHx of anxiety/ depression, asthma,  HTN on enalapril, spinal stenosis, osteoarthritis, seasonal allergies who was sent to the ED by Dr. Knight today for ankle injury requiring surgery.  Pt  c/o right ankle pain after a fall yesterday, for which she was splinted at [facility] ED.   Pt also had 5 episodes of diarrhea 2 days ago with LLQ pain which improved yesterday.   Yesterday morning she was NPO for planned epidural procedure for pain control .  At 11 am she was getting dressed yesterday and while putting on earrings she had unwitnessed LOC and  fell from standing , injuring her right ankle.  She denies preceding cpain/SOB/palpitations/dizziness.  She does not recall tripping over anything.        Pt reports she stopped taking motrin 600mg BID , approximately 5 days  ago for the planned epidural procedure for pain control.  She was on mobic until 2020.  She has noticed increasingly dark stools over the last 1-2 weeks.    She also reports some difficulty swallowing solids for 2-3 weeks.        In the ED , pt is found to have ARF, hyperkalemia, anemia and hypotension to 85/38.  Pt reported she may have accidentally taken an extra dose of her Clonidine today.     Fam HX:   Father DM, had CVa  Mother COPD  Brother  at 65 of alcoholism  Sister has lung ca (2020 18:56)      PAST MEDICAL & SURGICAL HISTORY:  Hypertension  Anxiety and depression  Seasonal allergies  Asthma  Osteoarthritis  S/P hip replacement: left  S/P tubal ligation  S/P cholecystectomy      SOCIAL HISTORY:    FAMILY HISTORY:  Family history of COPD (chronic obstructive pulmonary disease)  Family history of CVA      ALLERGIES:  Allergies    No Known Allergies    Intolerances        MEDICATIONS:    MEDICATIONS  (STANDING):  acetaminophen   Tablet .. 975 milliGRAM(s) Oral every 8 hours  albuterol/ipratropium for Nebulization 3 milliLiter(s) Nebulizer every 6 hours  aspirin enteric coated 81 milliGRAM(s) Oral daily  enoxaparin Injectable 40 milliGRAM(s) SubCutaneous daily  gabapentin 300 milliGRAM(s) Oral three times a day  influenza   Vaccine 0.5 milliLiter(s) IntraMuscular once  lisinopril 5 milliGRAM(s) Oral daily  metoprolol succinate ER 25 milliGRAM(s) Oral every 12 hours  pantoprazole  Injectable 40 milliGRAM(s) IV Push every 12 hours  PARoxetine 60 milliGRAM(s) Oral daily  polyethylene glycol 3350 17 Gram(s) Oral daily  sodium chloride 0.9% lock flush 3 milliLiter(s) IV Push every 8 hours  traZODone 150 milliGRAM(s) Oral at bedtime    MEDICATIONS  (PRN):  ALPRAZolam 0.25 milliGRAM(s) Oral every 8 hours PRN anxiety  aluminum hydroxide/magnesium hydroxide/simethicone Suspension 30 milliLiter(s) Oral four times a day PRN Indigestion  bisacodyl Suppository 10 milliGRAM(s) Rectal daily PRN If no bowel movement by postoperative day #2  hydrALAZINE Injectable 10 milliGRAM(s) IV Push every 6 hours PRN for SBP more than 160  HYDROmorphone  Injectable 1 milliGRAM(s) IV Push every 4 hours PRN Severe Pain (7 - 10)  magnesium hydroxide Suspension 30 milliLiter(s) Oral daily PRN Constipation  ondansetron Injectable 4 milliGRAM(s) IV Push every 6 hours PRN Nausea and/or Vomiting  oxyCODONE    IR 5 milliGRAM(s) Oral every 4 hours PRN Moderate Pain (4 - 6)  senna 2 Tablet(s) Oral at bedtime PRN Constipation  traMADol 50 milliGRAM(s) Oral every 6 hours PRN Mild Pain (1 - 3)      REVIEW OF SYSTEMS:    CONSTITUTIONAL: No weakness, fevers or chills  EYES/ENT: No visual changes;  No vertigo or throat pain   NECK: No pain or stiffness  RESPIRATORY: No cough, wheezing, hemoptysis; No shortness of breath  CARDIOVASCULAR: No chest pain or palpitations  GASTROINTESTINAL: No abdominal or epigastric pain. No nausea, vomiting, or hematemesis; No diarrhea or constipation. No melena or hematochezia.  GENITOURINARY: No dysuria, frequency or hematuria  NEUROLOGICAL: No numbness or weakness  SKIN: No itching, burning, rashes, or lesions   All other review of systems is negative unless indicated above    Vital Signs Last 24 Hrs  T(C): 36.5 (2020 10:04), Max: 36.9 (2020 21:56)  T(F): 97.7 (2020 10:04), Max: 98.4 (2020 21:56)  HR: 98 (2020 13:00) (73 - 108)  BP: 145/128 (2020 13:00) (94/61 - 145/128)  BP(mean): 131 (2020 13:00) (67 - 131)  RR: 17 (2020 13:00) (11 - 21)  SpO2: 95% (2020 13:00) (92% - 100%)Daily     Daily I&O's Summary    2020 07:  -  2020 07:00  --------------------------------------------------------  IN: 0 mL / OUT: 1850 mL / NET: -1850 mL    2020 07:01  -  2020 13:53  --------------------------------------------------------  IN: 0 mL / OUT: 1100 mL / NET: -1100 mL        PHYSICAL EXAM:    Constitutional: NAD, awake and alert, well-developed  HEENT: PERR, EOMI,  No oral cyananosis.  Neck:  supple,  No JVD  Respiratory: Breath sounds are clear bilaterally, No wheezing, rales or rhonchi  Cardiovascular: S1 and S2, regular rate and rhythm, no Murmurs, gallops or rubs  Gastrointestinal: Bowel Sounds present, soft, nontender.   Extremities: No peripheral edema. No clubbing or cyanosis.  Vascular: 2+ peripheral pulses  Neurological: A/O x 3, no focal deficits  Musculoskeletal: no calf tenderness.  Skin: No rashes.      LABS: All Labs Reviewed:                        9.5    9.77  )-----------( 266      ( 2020 06:36 )             29.5                         10.5   10.55 )-----------( 255      ( 2020 07:59 )             33.4                         11.4   12.41 )-----------( 350      ( 2020 08:15 )             35.9     2020 06:36    143    |  108    |  14     ----------------------------<  118    3.7     |  31     |  0.88   2020 07:59    141    |  109    |  14     ----------------------------<  129    4.0     |  28     |  1.08   2020 08:15    142    |  112    |  16     ----------------------------<  170    4.4     |  26     |  1.13     Ca    8.6        2020 06:36  Ca    8.7        2020 07:59  Ca    8.2        2020 08:15  Phos  3.5       2020 08:15    TPro  6.4    /  Alb  2.5    /  TBili  0.3    /  DBili  x      /  AST  30     /  ALT  12     /  AlkPhos  74     2020 07:59    PTT - ( 2020 15:31 )  PTT:31.1 sec  CARDIAC MARKERS ( 2020 13:21 )  1.550 ng/mL / x     / x     / x     / x      CARDIAC MARKERS ( 2020 09:54 )  1.720 ng/mL / x     / x     / x     / x        02 @ 13:21  Pro Bnp [id number]    RADIOLOGY/EKG:      ECHO/CARDIAC CATHTERIZATION/STRESS TEST:  < from: Cardiac Cath Lab - Adult (20 @ 11:44) >  Angiographic Findings     Cardiac Arteries and Lesion Findings    LMCA: Normal.    LAD: Normal.Large caliber vessel.    LCx: Normal.Large caliber vessel. Arises from the right coronary cusp. The  anomalous LCx passes posterior to the aorta.    RCA: Normal.Large caliber vessel.    Ramus: Normal.Large caliber vessel.    VA  LV function assessed as:Abnormal.  Ejection Fraction  +----------------------------------------------------------------------+---+  !Method                               !EF%!  +----------------------------------------------------------------------+---+  !LV gram                                                               !35 !  +----------------------------------------------------------------------+---+     Impression     Diagnostic Conclusions     Normal coronary arteries.   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   Moderately reduced left ventricular systolic function with segmental wall   motion abnormalities.   Normal LV systolic function. Estimated LV ejection fraction is 35 %.   Elevated left ventricular end-diastolic pressure.   No aortic valve stenosis.     Recommendations     Manage with medical therapy.    Estimated Blood Loss:5ml. 58 y/o w/ h/o HTN no prior cardiac history admitted for for syncopal episode resulting in ankle injury requiring surgery.  s/p open reduction Feb 15th.  Postop yesterday pt had epigastric discomfort intermittent w/ nausea.  Trop 3.4 -> 4.49 -> 5.23 overnight. CT chest neg for PE showed pneumonia. ECG w/ no ischemic changes.  This AM pt was sleeping but on arousal reports persistent mild epigastric discomfort  4/10 severity.  SBPs 80s-90s somewhat labile, received clonidine this AM.  Discussed w/ GI, had melena & drop in Hb, likely due to high dose NSAID use prior to admit.  Discussed w/ orthopedics, they will operate on antiplatelet therapy if PCI needed, no need to consider BMS.    2/18/20  Patient is feeling nauseous on oxycodone , constipated , denies any chest pain , her back is down the lumbar area  , patient had cardiac cath showed issac coronaries ,  possible  takotsubo cardiomyopathy . on low dose BB ,   2/19/20  still somewhat dyspneic, on 2L O2, no chest pain.    MEDICATIONS:    MEDICATIONS  (STANDING):  acetaminophen   Tablet .. 975 milliGRAM(s) Oral every 8 hours  albuterol/ipratropium for Nebulization 3 milliLiter(s) Nebulizer every 6 hours  aspirin enteric coated 81 milliGRAM(s) Oral daily  enoxaparin Injectable 40 milliGRAM(s) SubCutaneous daily  gabapentin 300 milliGRAM(s) Oral three times a day  influenza   Vaccine 0.5 milliLiter(s) IntraMuscular once  lisinopril 5 milliGRAM(s) Oral daily  metoprolol succinate ER 25 milliGRAM(s) Oral every 12 hours  pantoprazole  Injectable 40 milliGRAM(s) IV Push every 12 hours  PARoxetine 60 milliGRAM(s) Oral daily  polyethylene glycol 3350 17 Gram(s) Oral daily  sodium chloride 0.9% lock flush 3 milliLiter(s) IV Push every 8 hours  traZODone 150 milliGRAM(s) Oral at bedtime    MEDICATIONS  (PRN):  ALPRAZolam 0.25 milliGRAM(s) Oral every 8 hours PRN anxiety  aluminum hydroxide/magnesium hydroxide/simethicone Suspension 30 milliLiter(s) Oral four times a day PRN Indigestion  bisacodyl Suppository 10 milliGRAM(s) Rectal daily PRN If no bowel movement by postoperative day #2  hydrALAZINE Injectable 10 milliGRAM(s) IV Push every 6 hours PRN for SBP more than 160  HYDROmorphone  Injectable 1 milliGRAM(s) IV Push every 4 hours PRN Severe Pain (7 - 10)  magnesium hydroxide Suspension 30 milliLiter(s) Oral daily PRN Constipation  ondansetron Injectable 4 milliGRAM(s) IV Push every 6 hours PRN Nausea and/or Vomiting  oxyCODONE    IR 5 milliGRAM(s) Oral every 4 hours PRN Moderate Pain (4 - 6)  senna 2 Tablet(s) Oral at bedtime PRN Constipation  traMADol 50 milliGRAM(s) Oral every 6 hours PRN Mild Pain (1 - 3)      Vital Signs Last 24 Hrs  T(C): 36.5 (19 Feb 2020 10:04), Max: 36.9 (18 Feb 2020 21:56)  T(F): 97.7 (19 Feb 2020 10:04), Max: 98.4 (18 Feb 2020 21:56)  HR: 98 (19 Feb 2020 13:00) (73 - 108)  BP: 145/128 (19 Feb 2020 13:00) (94/61 - 145/128)  BP(mean): 131 (19 Feb 2020 13:00) (67 - 131)  RR: 17 (19 Feb 2020 13:00) (11 - 21)  SpO2: 95% (19 Feb 2020 13:00) (92% - 100%)Daily     Daily I&O's Summary    18 Feb 2020 07:01  -  19 Feb 2020 07:00  --------------------------------------------------------  IN: 0 mL / OUT: 1850 mL / NET: -1850 mL    19 Feb 2020 07:01  -  19 Feb 2020 13:53  --------------------------------------------------------  IN: 0 mL / OUT: 1100 mL / NET: -1100 mL        PHYSICAL EXAM:    Constitutional: NAD, awake and alert,  HEENT: PERR, EOMI,    Neck:  supple,  No JVD  Respiratory: reduced BS bilaterally, No wheezing, rales or rhonchi  Cardiovascular: S1 and S2, regular rate and rhythm, no Murmurs, gallops or rubs  Gastrointestinal: Bowel Sounds present, soft, nontender.   Extremities: No peripheral edema. No clubbing or cyanosis.  Vascular: 2+ peripheral pulses  Neurological: A/O x 3, no focal deficits  Musculoskeletal: no calf tenderness.  Skin: No rashes.      LABS: All Labs Reviewed:                        9.5    9.77  )-----------( 266      ( 19 Feb 2020 06:36 )             29.5                         10.5   10.55 )-----------( 255      ( 18 Feb 2020 07:59 )             33.4                         11.4   12.41 )-----------( 350      ( 17 Feb 2020 08:15 )             35.9     19 Feb 2020 06:36    143    |  108    |  14     ----------------------------<  118    3.7     |  31     |  0.88   18 Feb 2020 07:59    141    |  109    |  14     ----------------------------<  129    4.0     |  28     |  1.08   17 Feb 2020 08:15    142    |  112    |  16     ----------------------------<  170    4.4     |  26     |  1.13     Ca    8.6        19 Feb 2020 06:36  Ca    8.7        18 Feb 2020 07:59  Ca    8.2        17 Feb 2020 08:15  Phos  3.5       17 Feb 2020 08:15    TPro  6.4    /  Alb  2.5    /  TBili  0.3    /  DBili  x      /  AST  30     /  ALT  12     /  AlkPhos  74     18 Feb 2020 07:59    PTT - ( 17 Feb 2020 15:31 )  PTT:31.1 sec  CARDIAC MARKERS ( 18 Feb 2020 13:21 )  1.550 ng/mL / x     / x     / x     / x      CARDIAC MARKERS ( 18 Feb 2020 09:54 )  1.720 ng/mL / x     / x     / x     / x        02-18 @ 13:21  Pro Bnp [id number]    RADIOLOGY/EKG:      ECHO/CARDIAC CATHTERIZATION/STRESS TEST:  < from: Cardiac Cath Lab - Adult (02.17.20 @ 11:44) >  Angiographic Findings     Cardiac Arteries and Lesion Findings    LMCA: Normal.    LAD: Normal.Large caliber vessel.    LCx: Normal.Large caliber vessel. Arises from the right coronary cusp. The  anomalous LCx passes posterior to the aorta.    RCA: Normal.Large caliber vessel.    Ramus: Normal.Large caliber vessel.    VA  LV function assessed as:Abnormal.  Ejection Fraction  +----------------------------------------------------------------------+---+  !Method                               !EF%!  +----------------------------------------------------------------------+---+  !LV gram                                                               !35 !  +----------------------------------------------------------------------+---+     Impression     Diagnostic Conclusions     Normal coronary arteries.   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   Moderately reduced left ventricular systolic function with segmental wall   motion abnormalities.   Normal LV systolic function. Estimated LV ejection fraction is 35 %.   Elevated left ventricular end-diastolic pressure.   No aortic valve stenosis.     Recommendations     Manage with medical therapy.    Estimated Blood Loss:5ml.

## 2020-02-19 NOTE — PROGRESS NOTE ADULT - PROBLEM SELECTOR PLAN 1
possible takatsubo  cardiomyopathyN ("reverse takotsubo" contractility pattern on echo & LV gram) , cath showed normal coronary anomalous origin of LCX ecotrin ,

## 2020-02-19 NOTE — PROGRESS NOTE ADULT - PROBLEM SELECTOR PLAN 2
moderate left ventricular dysfunction on cath & echo,change toprol from 25 BID to 75 daily , increase lisinopril to 10 mg daily , monitor renal function , agree w/ lasix 20 IV BID.

## 2020-02-19 NOTE — PROGRESS NOTE ADULT - SUBJECTIVE AND OBJECTIVE BOX
HPI: 59/F with PMHx of anxiety/ depression, asthma,  HTN on enalapril, spinal stenosis, osteoarthritis, seasonal allergies who was sent to the ED by Dr. Knight for ankle injury requiring surgery.  Pt  c/o right ankle pain after a fall on 2/12, for which she was splinted at [facility] ED.   She states that  she was NPO for planned epidural procedure for pain control .  At 11 am she was getting dressed  and while putting on earrings she had unwitnessed LOC and  fell from standing , injuring her right ankle.  She does not remember the incident. She states that she was standing and next thing she found was that she was on floor with her ankle was twisted. She denies preceding cpain/SOB/palpitations/dizziness.  She does not recall tripping over anything.        Pt also had 5 episodes of diarrhea 2 days ago with LLQ pain which improved.     Pt reports she stopped taking motrin 600mg BID , approximately 5 days  ago for the planned epidural procedure for pain control.  She was on mobic until January 2020.      She has noticed increasingly dark stools over the last 1-2 weeks.    She also reports some difficulty swallowing solids for 2-3 weeks.          2/15: Cr improving  BP was low, gave 1 L NS bolus, better; switched to D5NS at 100 ml/hr  Pt had syncopal episode leading to her right ankle Fx.  Hb better post transfusion    2/17: seen and eval. Hemodynamically stable. Denies any HA, CP, SOB. Patient does notes of RLQ pain with deep palpation. There was a concern raised about pancreatitis on admission - will check lipase levels. Care discussed with Dr. diaz and Dr. Tenorio.     2/18: seen and eval. Continues to have abdominal pain. N/V today. patient feels better when eating. Lipase levels low and abdominal pain not a classic type of pain for pancreatitis.     2/19: patient continues to have worsening abdomainal pain. poor appetitie. N/V. Will rescan the abdomen. Changed IV meds.     PHYSICAL EXAM:    ICU Vital Signs Last 24 Hrs  T(C): 36.7 (19 Feb 2020 14:01), Max: 36.9 (18 Feb 2020 21:56)  T(F): 98 (19 Feb 2020 14:01), Max: 98.4 (18 Feb 2020 21:56)  HR: 101 (19 Feb 2020 15:00) (73 - 108)  BP: 163/120 (19 Feb 2020 15:00) (94/61 - 163/120)  BP(mean): 130 (19 Feb 2020 15:00) (67 - 131)  ABP: --  ABP(mean): --  RR: 15 (19 Feb 2020 15:00) (11 - 18)  SpO2: 98% (19 Feb 2020 15:00) (92% - 100%)  Constitutional: Weak appearing   HEENT: Atraumatic, DEREK, Normal, No congestion  Respiratory: + wheezing /  ronchi today  Cardiovascular: N S1S2;   Gastrointestinal: abdominal pain, + tenderness on deep palpation  Extremities: No edema, peripheral pulses present; right ankle dressed  Neurological: AAO x 3, no gross focal motor deficits  Skin: Non cellulitic, no rash, ulcers  Lymph Nodes: No lymphadenopathy noted  Back: No CVA tenderness   Musculoskeletal: non tender  Breasts: Deferred  Genitourinary: deferred  Rectal: Deferred      Labs:   CBC Full  -  ( 17 Feb 2020 08:15 )  WBC Count : 12.41 K/uL  RBC Count : 3.76 M/uL  Hemoglobin : 11.4 g/dL  Hematocrit : 35.9 %  Platelet Count - Automated : 350 K/uL    PTT - ( 17 Feb 2020 08:15 )  PTT:46.2 sec    142  |  112<H>  |  16  ----------------------------<  170<H>  4.4   |  26  |  1.13    Ca    8.2<L>      17 Feb 2020 08:15  Phos  3.5     02-17    TPro  x   /  Alb  3.0<L>  /  TBili  x   /  DBili  x   /  AST  x   /  ALT  x   /  AlkPhos  x   02-16          CAPILLARY BLOOD GLUCOSE    CARDIAC MARKERS ( 15 Feb 2020 17:58 )  <0.015 ng/mL / x     / x     / x     / x        < from: CT Chest No Cont (02.16.20 @ 16:51) >  Impression:    Patchy infiltrates in both lungs with nodular feature, right greater than left, suggestive of pneumonia. Follow-up chest CT may be pursued in 4-6 weeks to ensure resolution. Attention should be directed to the small 6 mm left lower lobe lung nodule on the follow-up chestCT to ensure stability.    No pneumothorax.    Interlobular septal thickening in both lungs, suggestive of interstitial pulmonary edema.    New mild peribronchial cuffing, suggestive of reactive airway disease.    Peripancreatic stranding, new since the previous abdominal CT dated 2/14/2020, suggestive of acute pancreatitis. Clinical correlation with pancreatic enzymes is recommended.    Stable 0.8 cm splenic artery aneurysm with mural calcification.    Small bilateral pleural effusions.    Stable small pericardial effusion.    < end of copied text >    < from: CT Head No Cont (02.16.20 @ 16:51) >  Impression:     No intracranial hemorrhage, mass effect or CT evidence of acute infarct      < end of copied text >        # Syncope: can be secondary to orthostatic causes in combination with cardiac cause  - tele monitoring   - new low EF with recent cath 2/17 with normal coronaries  - elevated trops    - medication optimization for GDT    # Acute hypoxic respiratory failure in the setting of acute on chronic systolic CHF /  Aspiration pneumonia /  reactive airway dz  - d/c fluids  - started on duoneb treatments as patient has wheezing now  - continue with IV zosyn  - O2 support    # NSTEMI  - s/p cardiac cath 2/17:  Normal coronary arteries.  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 Moderately reduced left ventricular systolic function with segmental wall   motion abnormalities.  Normal LV systolic function. Estimated LV ejection fraction is 35 %.  Elevated left ventricular end-diastolic pressure.   - cardiology following .    # Peripancreatic stranding on CT chest:  - CT: Peripancreatic stranding, new since the previous abdominal CT dated 2/14/2020, suggestive of acute pancreatitis. Clinical correlation with pancreatic enzymes is recommended.  - patient's abdominal pain could be also secondary to (L) sided colitis noted on 2/14 CT scan  - tolerating po diet  - iv fluids  - check lipase    # Right Ankle Fx:   s/p right ankle External fixator application  pain meds prn  follow u with Dr. Knight in office for definitive fixation      # ARF: likely sec to NSAIDS; rersolved      # Melena and Dark colored stools + Acute Anemia of acte hemorrhage from GI bleed:  - likely from NSAIDS   - d/c NSAIDS  - cont Protonix  - s/p PRBC 2/15  - H/H q 8 hrs; transfuse for Hb less than 8       # Hypotension:  - resolved     # Hyperkalemia    - resolved

## 2020-02-20 DIAGNOSIS — I42.9 CARDIOMYOPATHY, UNSPECIFIED: ICD-10-CM

## 2020-02-20 LAB
ANION GAP SERPL CALC-SCNC: 4 MMOL/L — LOW (ref 5–17)
BUN SERPL-MCNC: 18 MG/DL — SIGNIFICANT CHANGE UP (ref 7–23)
CALCIUM SERPL-MCNC: 8.7 MG/DL — SIGNIFICANT CHANGE UP (ref 8.5–10.1)
CHLORIDE SERPL-SCNC: 100 MMOL/L — SIGNIFICANT CHANGE UP (ref 96–108)
CO2 SERPL-SCNC: 35 MMOL/L — HIGH (ref 22–31)
CREAT SERPL-MCNC: 0.89 MG/DL — SIGNIFICANT CHANGE UP (ref 0.5–1.3)
GLUCOSE SERPL-MCNC: 101 MG/DL — HIGH (ref 70–99)
HCT VFR BLD CALC: 32.1 % — LOW (ref 34.5–45)
HGB BLD-MCNC: 10.4 G/DL — LOW (ref 11.5–15.5)
MCHC RBC-ENTMCNC: 31 PG — SIGNIFICANT CHANGE UP (ref 27–34)
MCHC RBC-ENTMCNC: 32.4 GM/DL — SIGNIFICANT CHANGE UP (ref 32–36)
MCV RBC AUTO: 95.5 FL — SIGNIFICANT CHANGE UP (ref 80–100)
PLATELET # BLD AUTO: 316 K/UL — SIGNIFICANT CHANGE UP (ref 150–400)
POTASSIUM SERPL-MCNC: 3.4 MMOL/L — LOW (ref 3.5–5.3)
POTASSIUM SERPL-SCNC: 3.4 MMOL/L — LOW (ref 3.5–5.3)
RBC # BLD: 3.36 M/UL — LOW (ref 3.8–5.2)
RBC # FLD: 13 % — SIGNIFICANT CHANGE UP (ref 10.3–14.5)
SODIUM SERPL-SCNC: 139 MMOL/L — SIGNIFICANT CHANGE UP (ref 135–145)
WBC # BLD: 8.87 K/UL — SIGNIFICANT CHANGE UP (ref 3.8–10.5)
WBC # FLD AUTO: 8.87 K/UL — SIGNIFICANT CHANGE UP (ref 3.8–10.5)

## 2020-02-20 PROCEDURE — 99233 SBSQ HOSP IP/OBS HIGH 50: CPT

## 2020-02-20 PROCEDURE — 99231 SBSQ HOSP IP/OBS SF/LOW 25: CPT

## 2020-02-20 RX ORDER — POLYETHYLENE GLYCOL 3350 17 G/17G
17 POWDER, FOR SOLUTION ORAL EVERY 12 HOURS
Refills: 0 | Status: DISCONTINUED | OUTPATIENT
Start: 2020-02-20 | End: 2020-02-25

## 2020-02-20 RX ORDER — METOCLOPRAMIDE HCL 10 MG
10 TABLET ORAL ONCE
Refills: 0 | Status: COMPLETED | OUTPATIENT
Start: 2020-02-20 | End: 2020-02-20

## 2020-02-20 RX ORDER — POTASSIUM CHLORIDE 20 MEQ
40 PACKET (EA) ORAL EVERY 4 HOURS
Refills: 0 | Status: COMPLETED | OUTPATIENT
Start: 2020-02-20 | End: 2020-02-20

## 2020-02-20 RX ADMIN — GABAPENTIN 300 MILLIGRAM(S): 400 CAPSULE ORAL at 12:14

## 2020-02-20 RX ADMIN — PANTOPRAZOLE SODIUM 40 MILLIGRAM(S): 20 TABLET, DELAYED RELEASE ORAL at 06:13

## 2020-02-20 RX ADMIN — Medication 10 MILLIGRAM(S): at 09:56

## 2020-02-20 RX ADMIN — Medication 3 MILLILITER(S): at 09:20

## 2020-02-20 RX ADMIN — HYDROMORPHONE HYDROCHLORIDE 1.5 MILLIGRAM(S): 2 INJECTION INTRAMUSCULAR; INTRAVENOUS; SUBCUTANEOUS at 21:41

## 2020-02-20 RX ADMIN — Medication 975 MILLIGRAM(S): at 21:42

## 2020-02-20 RX ADMIN — ONDANSETRON 4 MILLIGRAM(S): 8 TABLET, FILM COATED ORAL at 05:43

## 2020-02-20 RX ADMIN — Medication 150 MILLIGRAM(S): at 21:40

## 2020-02-20 RX ADMIN — Medication 40 MILLIEQUIVALENT(S): at 17:37

## 2020-02-20 RX ADMIN — Medication 975 MILLIGRAM(S): at 22:16

## 2020-02-20 RX ADMIN — LISINOPRIL 10 MILLIGRAM(S): 2.5 TABLET ORAL at 06:13

## 2020-02-20 RX ADMIN — TRAMADOL HYDROCHLORIDE 50 MILLIGRAM(S): 50 TABLET ORAL at 12:21

## 2020-02-20 RX ADMIN — HYDROMORPHONE HYDROCHLORIDE 1.5 MILLIGRAM(S): 2 INJECTION INTRAMUSCULAR; INTRAVENOUS; SUBCUTANEOUS at 05:43

## 2020-02-20 RX ADMIN — Medication 40 MILLIEQUIVALENT(S): at 14:22

## 2020-02-20 RX ADMIN — ONDANSETRON 4 MILLIGRAM(S): 8 TABLET, FILM COATED ORAL at 18:34

## 2020-02-20 RX ADMIN — Medication 975 MILLIGRAM(S): at 12:15

## 2020-02-20 RX ADMIN — SODIUM CHLORIDE 3 MILLILITER(S): 9 INJECTION INTRAMUSCULAR; INTRAVENOUS; SUBCUTANEOUS at 12:21

## 2020-02-20 RX ADMIN — GABAPENTIN 300 MILLIGRAM(S): 400 CAPSULE ORAL at 06:15

## 2020-02-20 RX ADMIN — HYDROMORPHONE HYDROCHLORIDE 1.5 MILLIGRAM(S): 2 INJECTION INTRAMUSCULAR; INTRAVENOUS; SUBCUTANEOUS at 09:43

## 2020-02-20 RX ADMIN — POLYETHYLENE GLYCOL 3350 17 GRAM(S): 17 POWDER, FOR SOLUTION ORAL at 17:36

## 2020-02-20 RX ADMIN — Medication 975 MILLIGRAM(S): at 06:48

## 2020-02-20 RX ADMIN — Medication 60 MILLIGRAM(S): at 12:13

## 2020-02-20 RX ADMIN — GABAPENTIN 300 MILLIGRAM(S): 400 CAPSULE ORAL at 21:41

## 2020-02-20 RX ADMIN — Medication 20 MILLIGRAM(S): at 17:36

## 2020-02-20 RX ADMIN — SODIUM CHLORIDE 3 MILLILITER(S): 9 INJECTION INTRAMUSCULAR; INTRAVENOUS; SUBCUTANEOUS at 06:15

## 2020-02-20 RX ADMIN — HYDROMORPHONE HYDROCHLORIDE 1.5 MILLIGRAM(S): 2 INJECTION INTRAMUSCULAR; INTRAVENOUS; SUBCUTANEOUS at 00:45

## 2020-02-20 RX ADMIN — Medication 81 MILLIGRAM(S): at 12:15

## 2020-02-20 RX ADMIN — HYDROMORPHONE HYDROCHLORIDE 1.5 MILLIGRAM(S): 2 INJECTION INTRAMUSCULAR; INTRAVENOUS; SUBCUTANEOUS at 10:43

## 2020-02-20 RX ADMIN — Medication 975 MILLIGRAM(S): at 06:15

## 2020-02-20 RX ADMIN — TRAMADOL HYDROCHLORIDE 50 MILLIGRAM(S): 50 TABLET ORAL at 13:15

## 2020-02-20 RX ADMIN — Medication 975 MILLIGRAM(S): at 13:15

## 2020-02-20 RX ADMIN — SCOPALAMINE 1 PATCH: 1 PATCH, EXTENDED RELEASE TRANSDERMAL at 00:12

## 2020-02-20 RX ADMIN — Medication 20 MILLIGRAM(S): at 06:13

## 2020-02-20 RX ADMIN — HYDROMORPHONE HYDROCHLORIDE 1.5 MILLIGRAM(S): 2 INJECTION INTRAMUSCULAR; INTRAVENOUS; SUBCUTANEOUS at 00:13

## 2020-02-20 RX ADMIN — POLYETHYLENE GLYCOL 3350 17 GRAM(S): 17 POWDER, FOR SOLUTION ORAL at 09:47

## 2020-02-20 RX ADMIN — Medication 75 MILLIGRAM(S): at 06:13

## 2020-02-20 RX ADMIN — HYDROMORPHONE HYDROCHLORIDE 1.5 MILLIGRAM(S): 2 INJECTION INTRAMUSCULAR; INTRAVENOUS; SUBCUTANEOUS at 22:16

## 2020-02-20 RX ADMIN — ENOXAPARIN SODIUM 40 MILLIGRAM(S): 100 INJECTION SUBCUTANEOUS at 12:15

## 2020-02-20 RX ADMIN — PANTOPRAZOLE SODIUM 40 MILLIGRAM(S): 20 TABLET, DELAYED RELEASE ORAL at 17:36

## 2020-02-20 RX ADMIN — HYDROMORPHONE HYDROCHLORIDE 1.5 MILLIGRAM(S): 2 INJECTION INTRAMUSCULAR; INTRAVENOUS; SUBCUTANEOUS at 14:21

## 2020-02-20 RX ADMIN — SODIUM CHLORIDE 3 MILLILITER(S): 9 INJECTION INTRAMUSCULAR; INTRAVENOUS; SUBCUTANEOUS at 21:35

## 2020-02-20 RX ADMIN — HYDROMORPHONE HYDROCHLORIDE 1.5 MILLIGRAM(S): 2 INJECTION INTRAMUSCULAR; INTRAVENOUS; SUBCUTANEOUS at 06:15

## 2020-02-20 NOTE — PROGRESS NOTE ADULT - SUBJECTIVE AND OBJECTIVE BOX
SUBJECTIVE     Patient from pulmonary stand point feels no sob or acute wheezing   no fever spikes   had ct abdomen and pelvis that shows small bilateral effusions with the compressive atelectasis with the minimal air space disease  sat are improved with out the need for the 02 and not wearing the 02 now   complaining of nausea and pain in the leg     PAST MEDICAL & SURGICAL HISTORY:  Hypertension  Anxiety and depression  Seasonal allergies  Asthma  Osteoarthritis  S/P hip replacement: left  S/P tubal ligation  S/P cholecystectomy    OBJECTIVE   Vital Signs Last 24 Hrs  T(C): 36.6 (20 Feb 2020 05:33), Max: 36.8 (19 Feb 2020 20:55)  T(F): 97.9 (20 Feb 2020 05:33), Max: 98.2 (19 Feb 2020 20:55)  HR: 82 (20 Feb 2020 05:33) (80 - 106)  BP: 140/86 (20 Feb 2020 05:33) (103/68 - 163/120)  BP(mean): 77 (19 Feb 2020 21:09) (77 - 131)  RR: 17 (19 Feb 2020 22:14) (12 - 21)  SpO2: 92% (20 Feb 2020 05:33) (90% - 100%)    PHYSICAL EXAM:  Constitutional: , awake and alert, not in distress and  not on the 02   HEENT: Normo cephalic atraumatic  Neck: Soft and supple, No J.V.D   Respiratory: vesicular breathing , No wheezing, and mildly decreased breath sounds in the bases   Cardiovascular: S1 and S2, regular rate .   Gastrointestinal:  soft, nontender,   Extremities: right leg with the soft cast with the screws in the place   Neurological: No new  focal deficits.    MEDICATIONS  (STANDING):  acetaminophen   Tablet .. 975 milliGRAM(s) Oral every 8 hours  albuterol/ipratropium for Nebulization 3 milliLiter(s) Nebulizer every 6 hours  aspirin enteric coated 81 milliGRAM(s) Oral daily  enoxaparin Injectable 40 milliGRAM(s) SubCutaneous daily  furosemide   Injectable 20 milliGRAM(s) IV Push every 12 hours  gabapentin 300 milliGRAM(s) Oral three times a day  influenza   Vaccine 0.5 milliLiter(s) IntraMuscular once  lisinopril 10 milliGRAM(s) Oral daily  metoprolol succinate ER 75 milliGRAM(s) Oral daily  pantoprazole  Injectable 40 milliGRAM(s) IV Push every 12 hours  PARoxetine 60 milliGRAM(s) Oral daily  polyethylene glycol 3350 17 Gram(s) Oral daily  sodium chloride 0.9% lock flush 3 milliLiter(s) IV Push every 8 hours  traZODone 150 milliGRAM(s) Oral at bedtime                            10.4   8.87  )-----------( 316      ( 20 Feb 2020 07:29 )             32.1     02-20    139  |  100  |  18  ----------------------------<  101<H>  3.4<L>   |  35<H>  |  0.89    Ca    8.7      20 Feb 2020 07:29         ABG - ( 18 Feb 2020 15:15 )  pH, Arterial: 7.38  pH, Blood: x     /  pCO2: 52    /  pO2: 68    / HCO3: 30    / Base Excess: 4.4   /  SaO2: 91                < from: CT Abdomen and Pelvis No Cont (02.19.20 @ 17:29) >  INDINGS:   Lungs: Minimal additional patchy airspace disease in the visualized aerated   lung cannot exclude pneumonia.   Pleural space: New small bilateral pleural effusions with compressive   atelectasis.   Heart: Small pericardial effusion again seen.     Liver: Normal. No mass.   Gallbladder and bile ducts: There has been a cholecystectomy.There is a mild,   expected degree of bile duct dilation post-cholecystectomy.  Pancreas: Normal. No ductal dilation.   Spleen: Normal. No splenomegaly.   Adrenals: Normal. No mass.   Kidneys and ureters: Nonspecific bilateral perinephric fat stranding.Clinical correlation   is recommended. No obstructive uropathy.   Stomach and bowel: Unremarkable. No obstruction. No mucosal thickening.   Appendix: No evidence of appendicitis.   Intraperitoneal space: Unremarkable. No free air. No significant fluid   collection.   Vasculature: Unremarkable. No abdominal aortic aneurysm.   Lymph nodes: Unremarkable. No enlarged lymph nodes.     Bladder: Unremarkable as visualized. The urinary bladder is distended.  Reproductive: Unremarkable as visualized.   Bones/joints: Bilateral hip prostheses again seen. There are degenerative   changes of the spine with degenerative grade 1 anterolisthesis of L4 on L5 and   suggestion of a large posterior disc protrusion along with facet and ligamenta   flava hypertrophy resulting in severe central canal stenosis and moderately   severe bilateral neural foraminal narrowing.   Soft tissues: There is a small fat-containing umbilical hernia. Mild soft   tissue edema.     IMPRESSION:   1. New small bilateral pleural effusions with compressive atelectasis.   2. Minimal additional patchy airspace disease in the visualized aerated lung   cannot exclude pneumonia.   3. Nonspecific bilateral perinephric fat stranding.Clinical correlation is recommended.   4. There are degenerative changes of the spine with degenerative grade 1     < end of copied text >

## 2020-02-20 NOTE — PROGRESS NOTE ADULT - SUBJECTIVE AND OBJECTIVE BOX
Patient seen and examined at bedside. No acute events over night. Patient transferred to [facility] from ICU. No acute complaints at this time. Pain well controlled. Denies chest pain, shortness of breath, nausea or vomiting.     PE:  Vital Signs Last 24 Hrs  T(C): 36.6 (02-20-20 @ 05:33), Max: 36.8 (02-19-20 @ 20:55)  T(F): 97.9 (02-20-20 @ 05:33), Max: 98.2 (02-19-20 @ 20:55)  HR: 82 (02-20-20 @ 05:33) (75 - 106)  BP: 140/86 (02-20-20 @ 05:33) (97/65 - 163/120)  BP(mean): 77 (02-19-20 @ 21:09) (73 - 131)  RR: 17 (02-19-20 @ 22:14) (11 - 21)  SpO2: 92% (02-20-20 @ 05:33) (90% - 100%)    General: NAD, resting comfortably in bed  R LE:   Dressing C/D/I in ex fix  SCDs present contralaterally  Compartments soft and compressible  No calf tenderness bilaterally  grossly moving toes, sensation intact to toes  Cap refill < 2seconds                          9.5    9.77  )-----------( 266      ( 19 Feb 2020 06:36 )             29.5     19 Feb 2020 06:36    143    |  108    |  14     ----------------------------<  118    3.7     |  31     |  0.88     Ca    8.6        19 Feb 2020 06:36    TPro  6.4    /  Alb  2.5    /  TBili  0.3    /  DBili  x      /  AST  30     /  ALT  12     /  AlkPhos  74     18 Feb 2020 07:59        A/P:  59y f s/p ex fix to OR for definitive fixation if remains in house.   -If patient able to medically optimized, will consider definitive fixation during hospital stay, otherwise definitive fixation will be arranged outpatient.  -please document OR clearance when stable  -PT/OT - NWB RLE  -Pain Control  -DVT ppx per AC team  -FU AM Labs  -Rest, ice, compress and elevate the extremity as we needed  -Incentive Spirometry  -Medical management appreciated

## 2020-02-20 NOTE — CONSULT NOTE ADULT - SUBJECTIVE AND OBJECTIVE BOX
Patient is a 59y Female  a/w right ankle fracture and medical workup for abnormal labs.  Underwent Ex-fix of right ankle for unstable injury and soft tissue swelling on 2/15. Pt sustained injury from fall while going to get epidural injection into her low back for continued LBP s/p fall at work back in october.  MRI done in beginning of november demonstrates L4-L5 HNP with severe canal stenosis, also demonstrates spondylosis, grade 1 L4-5 spondylolisthesis.  Pt had recent CT scan of abdomen which re demonstrated this known HNP of lumbar spine and primary team requested ortho consult for known diagnosis w/o any LE symptoms.  Denies pain/injury elsewhere. Denies numbness/tingling/paresthesias/weakness down either LE in the past and currently. Denies bowel/bladder incontinence. Denies fevers/chills. No other complaints at this time.    HEALTH ISSUES - PROBLEM Dx:  Trimalleolar fracture of right ankle  Anemia  Cardiomyopathy: Cardiomyopathy  Congestive heart failure: Congestive heart failure  NSTEMI (non-ST elevated myocardial infarction): NSTEMI (non-ST elevated myocardial infarction)  HTN (hypertension): HTN (hypertension)  Anx/depression  Left DANG  S/P tubal ligation  S/P cholecystectomy    Allergies    No Known Allergies               10.4   8.87  )-----------( 316      ( 20 Feb 2020 07:29 )             32.1       20 Feb 2020 07:29    139    |  100    |  18     ----------------------------<  101    3.4     |  35     |  0.89     Ca    8.7        20 Feb 2020 07:29      Vital Signs Last 24 Hrs  T(C): 36.7 (02-20-20 @ 11:11), Max: 36.8 (02-19-20 @ 20:55)  T(F): 98 (02-20-20 @ 11:11), Max: 98.2 (02-19-20 @ 20:55)  HR: 80 (02-20-20 @ 17:34) (76 - 102)  BP: 124/83 (02-20-20 @ 17:34) (103/68 - 162/109)  BP(mean): 77 (02-19-20 @ 21:09) (77 - 121)  RR: 18 (02-20-20 @ 11:11) (17 - 21)  SpO2: 97% (02-20-20 @ 11:11) (92% - 100%)    Gen: NAD    Spine PE:  Skin intact  No gross deformity  Midline TTP along lumbar spine, paraspinal  No bony step offs  Paraspinal muscle ttp/hypertonicity lumbar spine, paraspinal   Negative clonus in LLE  Negative babinski in LLE  Negative salazar  No saddle anesthesia    Motor:                   C5                C6              C7               C8           T1   R            5/5                5/5            5/5             5/5          5/5  L             5/5               5/5             5/5             5/5          5/5                L2             L3             L4               L5            S1  R         5/5           5/5     unable to obtain  5/5	  unable to obtain: Ex-fix in place  L          5/5          5/5           5/5             5/5           5/5    Sensory:            C5         C6         C7      C8       T1        (0=absent, 1=impaired, 2=normal, NT=not testable)  R         2            2           2        2         2  L          2            2           2        2         2               L2          L3         L4      L5       S1         (0=absent, 1=impaired, 2=normal, NT=not testable)  R         2            2            2        2        2  L          2            2           2        2         2    Imaging: CT Abdomen: demonstrates lumbar spondylosis and L4-5 HNP  MRI from 11/2: Large central L4-5 HNP    A/P: 59y Female with Lumbar L4/5 HNP    Known diagnosis of Lumbar HNP back in November, NO LE symptoms, No paresthesias, no weakness, only mid low back pain  No urgent orthopedic sx intervention at this time, pt having minimal complaints  d/w patient, goal is to treat ankle now while in house outpatient FU for epidural vs surgical options  Pain control, muscle relaxants prn  D/w patient that needs to inform primary team if new onset paresthesias LE weakness or incontinence occurs   DVT ppx per primary team   FU with Dr Holley as outpatient for FU when Discharged from hospital  D/w Dr Holley who agrees with plan

## 2020-02-20 NOTE — PROGRESS NOTE ADULT - ASSESSMENT
- hypoxemics respiratory failure with the acute pulmonary edema  slowly improving with the diuresis with the residual small effusions   - minimal patchy air space disease less likely pneumonia with no fever or WBC and patient already received antibiotics and continue to observe with out antibiotics   - takasubu cardiomyopthy as per the cardiology   - history of mild asthma with symptom of exacerbation with the pulmonary edema feels less need for the neb now   - status post fall ankle fracture and repair   - anemia with the G.I bleeding with the stable hb now   - acute renal failure resolved   - spinal stenosis noted by the ct   - nausea being followed up with the G.I     PLAN   -  continue to keep on low dose diuretics as tolerate by the B.P and renal function   -  would repeat cxr in the A.M for the follow up of chf while being diuresed and effusions are small and do not need drainage now   -   use of nebs p.r.n. for wheezing  -   follow-up of K and magnesium while on diuretics  -   02 to main margo sat around 92   -    adequate DVT prophylaxis with recent ankle surgery.

## 2020-02-20 NOTE — PROGRESS NOTE ADULT - SUBJECTIVE AND OBJECTIVE BOX
HPI: 59/F with PMHx of anxiety/ depression, asthma,  HTN on enalapril, spinal stenosis, osteoarthritis, seasonal allergies who was sent to the ED by Dr. Knight for ankle injury requiring surgery.  Pt  c/o right ankle pain after a fall on 2/12, for which she was splinted at [facility] ED.   She states that  she was NPO for planned epidural procedure for pain control .  At 11 am she was getting dressed  and while putting on earrings she had unwitnessed LOC and  fell from standing , injuring her right ankle.  She does not remember the incident. She states that she was standing and next thing she found was that she was on floor with her ankle was twisted. She denies preceding cpain/SOB/palpitations/dizziness.  She does not recall tripping over anything.        Pt also had 5 episodes of diarrhea 2 days ago with LLQ pain which improved.     Pt reports she stopped taking motrin 600mg BID , approximately 5 days  ago for the planned epidural procedure for pain control.  She was on mobic until January 2020.      She has noticed increasingly dark stools over the last 1-2 weeks.    She also reports some difficulty swallowing solids for 2-3 weeks.          2/15: Cr improving  BP was low, gave 1 L NS bolus, better; switched to D5NS at 100 ml/hr  Pt had syncopal episode leading to her right ankle Fx.  Hb better post transfusion    2/17: seen and eval. Hemodynamically stable. Denies any HA, CP, SOB. Patient does notes of RLQ pain with deep palpation. There was a concern raised about pancreatitis on admission - will check lipase levels. Care discussed with Dr. diaz and Dr. Tenorio.     2/18: seen and eval. Continues to have abdominal pain. N/V today. patient feels better when eating. Lipase levels low and abdominal pain not a classic type of pain for pancreatitis.     2/20: Seen and eval. hemodynamically stable. denies any HA, CP, SOB. + Abdominal pain. patient on scopolamine patch over night (unknown reasons), this will be addressed with residents.      PHYSICAL EXAM:  ICU Vital Signs Last 24 Hrs  T(C): 36.6 (20 Feb 2020 05:33), Max: 36.8 (19 Feb 2020 20:55)  T(F): 97.9 (20 Feb 2020 05:33), Max: 98.2 (19 Feb 2020 20:55)  HR: 82 (20 Feb 2020 05:33) (80 - 106)  BP: 140/86 (20 Feb 2020 05:33) (103/68 - 163/120)  BP(mean): 77 (19 Feb 2020 21:09) (77 - 131)  RR: 17 (19 Feb 2020 22:14) (12 - 21)  SpO2: 92% (20 Feb 2020 05:33) (90% - 100%)  Constitutional: Weak appearing   HEENT: Atraumatic, DEREK, Normal, No congestion  Respiratory: + wheezing /  ronchi today  Cardiovascular: N S1S2;   Gastrointestinal: abdominal pain, + tenderness on deep palpation  Extremities: No edema, peripheral pulses present; right ankle dressed  Neurological: AAO x 3, no gross focal motor deficits  Skin: Non cellulitic, no rash, ulcers  Lymph Nodes: No lymphadenopathy noted  Back: No CVA tenderness   Musculoskeletal: non tender  Breasts: Deferred  Genitourinary: deferred  Rectal: Deferred      Labs:       CBC Full  -  ( 20 Feb 2020 07:29 )  WBC Count : 8.87 K/uL  RBC Count : 3.36 M/uL  Hemoglobin : 10.4 g/dL  Hematocrit : 32.1 %  Platelet Count - Automated : 316 K/uL    139  |  100  |  18  ----------------------------<  101<H>  3.4<L>   |  35<H>  |  0.89    Ca    8.7      20 Feb 2020 07:29            < from: CT Chest No Cont (02.16.20 @ 16:51) >  Impression:    Patchy infiltrates in both lungs with nodular feature, right greater than left, suggestive of pneumonia. Follow-up chest CT may be pursued in 4-6 weeks to ensure resolution. Attention should be directed to the small 6 mm left lower lobe lung nodule on the follow-up chestCT to ensure stability.    No pneumothorax.    Interlobular septal thickening in both lungs, suggestive of interstitial pulmonary edema.    New mild peribronchial cuffing, suggestive of reactive airway disease.    Peripancreatic stranding, new since the previous abdominal CT dated 2/14/2020, suggestive of acute pancreatitis. Clinical correlation with pancreatic enzymes is recommended.    Stable 0.8 cm splenic artery aneurysm with mural calcification.    Small bilateral pleural effusions.    Stable small pericardial effusion.    < end of copied text >    < from: CT Head No Cont (02.16.20 @ 16:51) >  Impression:     No intracranial hemorrhage, mass effect or CT evidence of acute infarct      < end of copied text >        # Syncope: can be secondary to orthostatic causes in combination with cardiac cause  - tele monitoring   - new low EF with recent cath 2/17 with normal coronaries  - elevated trops    - medication optimization for GDT    # Acute hypoxic respiratory failure in the setting of acute on chronic systolic CHF /  Aspiration pneumonia /  reactive airway dz  - d/c fluids  - started on duoneb treatments as patient has wheezing now  - continue with IV zosyn  - now off O2, speaking full sentences     # NSTEMI  - s/p cardiac cath 2/17:  Normal coronary arteries.  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 Moderately reduced left ventricular systolic function with segmental wall   motion abnormalities.  Normal LV systolic function. Estimated LV ejection fraction is 35 %.  Elevated left ventricular end-diastolic pressure.   - cardiology following .    # Peripancreatic stranding on CT chest:  - CT: Peripancreatic stranding, new since the previous abdominal CT dated 2/14/2020, suggestive of acute pancreatitis. Clinical correlation with pancreatic enzymes is recommended.  - patient's abdominal pain could be also secondary to (L) sided colitis noted on 2/14 CT scan  - tolerating po diet  - iv fluids  - check lipase    # Right Ankle Fx:   s/p right ankle External fixator application  pain meds prn  follow u with Dr. Knight in office for definitive fixation      # ARF: likely sec to NSAIDS; rersolved      # Melena and Dark colored stools + Acute Anemia of acte hemorrhage from GI bleed:  - likely from NSAIDS   - d/c NSAIDS  - cont Protonix  - s/p PRBC 2/15  - H/H q 8 hrs; transfuse for Hb less than 8       # Hypotension:  - resolved     # Hyperkalemia    - resolved

## 2020-02-20 NOTE — PROGRESS NOTE ADULT - SUBJECTIVE AND OBJECTIVE BOX
REASON FOR VISIT: Cardiomyopathy    HPI:  58 y/o woman with a history of hypertension admitted following a syncopal event with a severe bimalleolar fracture dislocation of the right ankle joint and acute kidney injury with hospitalization complicated by (1) elevated troponin with cath revealing normal coronary arteries but LV dysfunction suggestive of [place] Cardiomyopathy with pulmonary edema, (2) anemia / GI Bleed  / melena, (3) possible pneumonia.    2/18/20  Patient is feeling nauseous on oxycodone , constipated , denies any chest pain , her back is down the lumbar area  , patient had cardiac cath showed issac coronaries ,  possible  takotsubo cardiomyopathy . on low dose BB ,   2/19/20  still somewhat dyspneic, on 2L O2, no chest pain.  2/20/2020:  Feels better with less dyspnea; R ankle pain    CARDIAC MEDICATIONS  (STANDING):  aspirin enteric coated 81 milliGRAM(s) Oral daily  enoxaparin Injectable 40 milliGRAM(s) SubCutaneous daily  furosemide   Injectable 20 milliGRAM(s) IV Push every 12 hours  lisinopril 10 milliGRAM(s) Oral daily  metoprolol succinate ER 75 milliGRAM(s) Oral daily    Vital Signs Last 24 Hrs  T(C): 36.7 (20 Feb 2020 11:11), Max: 36.8 (19 Feb 2020 20:55)  T(F): 98 (20 Feb 2020 11:11), Max: 98.2 (19 Feb 2020 20:55)  HR: 79 (20 Feb 2020 11:11) (79 - 106)  BP: 109/76 (20 Feb 2020 11:11) (103/68 - 163/120)  BP(mean): 77 (19 Feb 2020 21:09) (77 - 131)  RR: 18 (20 Feb 2020 11:11) (15 - 21)  SpO2: 97% (20 Feb 2020 11:11) (90% - 100%)    PHYSICAL EXAM:  Constitutional: NAD, awake and alert  Respiratory: Nonlabored, no crackles  Cardiovascular: S1 and S2, regular  Gastrointestinal: Bowel Sounds present, soft, nontender.   Skin: No rashes.  Ext: R ankle hardware    LABS:   CARDIAC MARKERS ( 18 Feb 2020 13:21 ) 1.550 ng/mL / x     / x     / x     / x                           10.4   8.87  )-----------( 316      ( 20 Feb 2020 07:29 )             32.1     139  |  100  |  18  ----------------------------<  101<H>  3.4<L>   |  35<H>  |  0.89    Ca    8.7      20 Feb 2020 07:29    Cardiac Cath Lab - Adult (02.17.20 @ 11:44):   Normal coronary arteries.  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   Moderately reduced left ventricular systolic function with segmental wall motion abnormalities.  Normal LV systolic function. Estimated LV ejection fraction is 35 %.  Elevated left ventricular end-diastolic pressure.    Transthoracic Echocardiogram (02.18.20 @ 10:13): Left ventricle systolic function appears moderately reduced. Apical segments are hyperkinetic, basal and mid segments are hypokinetic. Visual estimation of left ventricle ejection fraction is 40%.

## 2020-02-20 NOTE — PROGRESS NOTE ADULT - SUBJECTIVE AND OBJECTIVE BOX
HPI: Pt is a 58 y/o female with a PMHx of anxiety/depression, asthma, HTN on enalapril, spinal stenosis, osteoarthritis, seasonal allergies who was sent to the ED by Dr. Knight today for ankle injury requiring surgery.  Pt c/o right ankle pain after a fall yesterday, for which she was splinted at [facility] ED. Pt also had 5 episodes of diarrhea 2 days ago with LLQ pain which improved yesterday.  day prior to admission pt was NPO for planned epidural procedure for pain control. At 11 am she was getting dressed yesterday and while putting on earrings she had unwitnessed LOC and  fell from standing , injuring her right ankle sustaining a left ankle fracture She denies preceding cpain/SOB/palpitations/dizziness.  She does not recall tripping over anything. Pt reports she stopped taking motrin 600mg BID , approximately 5 days  ago for the planned epidural procedure for pain control.  She was on mobic until 2020.  She has noticed increasingly dark stools over the last 1-2 weeks. She also reports some difficulty swallowing solids for 2-3 weeks.        In the ED , pt is found to have ARF, hyperkalemia, anemia and hypotension to 85/38.  Pt reported she may have accidentally taken an extra dose of her Clonidine today.     INTERVAL HISTORY:  she is now on 3 north s/p Right ankleplacement of ext-fix  : seen at bedside,  anxious, vomiting, states nausea form pain meds  2020: Pt seen at bedside in SICU with  at side. She reports she s/p cardiac cath, no abnormal findings per , and off of UFH per primary RN. Pt with VTE risk factors.   : seen at bedside, anxious, c/o lower back pain, not new  : seen at bedside, c/o n/v still anxious.  : Pt seen at bedside c/o leg pain with movement, pain medication received      Fam HX:  Father DM, had CVa  Mother COPD  Brother  at 65 of alcoholism  Sister has lung ca (2020 18:56)    Consulted by Dr. Rogers for VTE prophylaxis, risk stratification, and anticoagulation management.    PAST MEDICAL & SURGICAL HISTORY:  Hypertension  Anxiety and depression  Seasonal allergies  Asthma  Osteoarthritis  S/P hip replacement: left  S/P tubal ligation  S/P cholecystectomy    CrCl: 69.7    Caprini VTE Risk Score: CAPRINI SCORE  AGE RELATED RISK FACTORS                                                       MOBILITY RELATED FACTORS  [x ] Age 41-60 years                                            (1 Point)                  [ ] Bed rest                                                        (1 Point)  [ ] Age: 61-74 years                                           (2 Points)                [ ] Plaster cast                                                   (2 Points)  [ ] Age= 75 years                                              (3 Points)                 [ ] Bed bound for more than 72 hours                   (2 Points)    DISEASE RELATED RISK FACTORS                                               GENDER SPECIFIC FACTORS  [ ] Edema in the lower extremities                       (1 Point)           [ ] Pregnancy                                                            (1 Point)  [ ] Varicose veins                                               (1 Point)                  [ ] Post-partum < 6 weeks                                      (1 Point)             x[ ] BMI > 25 Kg/m2                                            (1 Point)                  [ ] Hormonal therapy or oral contraception       (1 Point)                 [ ] Sepsis (in the previous month)                        (1 Point)             [ ] History of pregnancy complications                (1Point)  [ ] Pneumonia or serious lung disease                                             [ ] Unexplained or recurrent  (>/= 3), premature                                 (In the previous month)                               (1 Point)                birth with toxemia or growth-restricted infant (1 Point)  [ ] Abnormal pulmonary function test            (1 Point)                                   SURGERY RELATED RISK FACTORS  [ ] Acute myocardial infarction                       (1 Point)                  [ ]  Section                                         (1 Point)  [ ] Congestive heart failure (in the previous month) (1 Point)   [ ] Minor surgery   lasting <45 minutes       (1 Point)   [ ] Inflammatory bowel disease                             (1 Point)          [ ] Arthroscopic surgery                                  (2 Points)  [ ] Central venous access                                    (2 Points)            [x ] General surgery lasting >45 minutes      (2 Points)       [ ] Stroke (in the previous month)                  (5 Points)            [ ] Elective major lower extremity arthroplasty (5 Points)                                   [  ] Malignancy (present or past include skin melanoma                                          but exclude  basal skin cell)    (2 points)                                      TRAUMA RELATED RISK FACTORS                HEMATOLOGY RELATED FACTORS                                  [x ] Fracture of the hip, pelvis, or leg                       (5 Points)  [ ] Prior episodes of VTE                                     (3 Points)          [ ] Acute spinal cord injury (in the previous month)  (5 Points)  [ ] Positive family history for VTE                         (3 Points)       [ ] Paralysis (less than 1 month)                          (5 Points)  [ ] Prothrombin 38365 A                                      (3 Points)         [ ] Multiple Trauma (within 1month)                 (5Points)                                                                                                                                                                [ ] Factor V Leiden                                          (3 Points)                                OTHER RISK FACTORS                          [ ] Lupus anticoagulants                                     (3 Points)                       [ ] BMI > 40                          (1 Point)                                                         [ ] Anticardiolipin antibodies                                (3 Points)                   [ ] Smoking                              (1Point)                                                [ ] High homocysteine in the blood                      (3 Points)                [  ] Diabetes requiring insulin (1point)                         [ ] Other congenital or acquired thrombophilia       (3 Points)          [  ] Chemotherapy                   (1 Point)  [ ] Heparin induced thrombocytopenia                  (3 Points)             [  ] Blood Transfusion                (1 point)                                                                                                         Total Score [    9      ]                                                                                                                                                                                                                 IMPROVE Bleeding Risk Score 1    Time In:   Time Out:     Falls Risk:   High ( x )  Mod (  )  Low (  )      FAMILY HISTORY:  Family history of COPD (chronic obstructive pulmonary disease)  Family history of CVA  Denies any personal or familial history of clotting or bleeding disorders.    Allergies  No Known Allergies  Intolerances    REVIEW OF SYSTEMS    (  )Fever	     (  )Constipation	(  )SOB				(  )Headache	(  )Dysuria  (  )Chills	     (  )Melena	(  )Dyspnea present on exertion	                    (  )Dizziness                    (  )Polyuria  (x  )Nausea	     (  )Hematochezia	(  )Cough			                    (  )Syncope   	(  )Hematuria  (  )Vomiting    (  )Chest Pain	(  )Wheezing			(  )Weakness  (  )Diarrhea     (  )Palpitations	(  )Anorexia			( x )joint pain     All  other review of systems negative: Yes    Vital Signs Last 24 Hrs  T(C): 36.3 (2020 16:37), Max: 36.9 (2020 21:56)  T(F): 97.4 (2020 16:37), Max: 98.4 (2020 21:56)  HR: 106 (2020 17:00) (73 - 108)  BP: 149/94 (2020 17:00) (95/59 - 163/120)  BP(mean): 108 (2020 17:00) (67 - 131)  RR: 17 (2020 17:00) (11 - 18)  SpO2: 90% (2020 17:00) (90% - 100%)    Constitutional: Appears Well    Neurological: A& O x 4    Skin: Warm    Respiratory and Thorax: normal effort; Breath sounds: normal; No rales/wheezing/rhonchi  	  Cardiovascular: S1, S2, regular, NMBR	    Gastrointestinal: BS + x 4Q, nontender	    Genitourinary:  Bladder nondistended, nontender    Musculoskeletal:   General Right:   + muscle/joint tenderness,   normal tone, no joint swelling,   ROM: limited	    General Left:   no muscle/joint tenderness,   normal tone, no joint swelling,   ROM: full    Right ankle: Drsing CDI; ext fix in place, tos warm and mobile Cap refill good; +Sensation intact                      Lower extrems:   Right: no calf tenderness              negative melba's sign               + pedal pulses    Left:   no calf tenderness              negative melba's sign               + pedal pulses    LABS:                            9.5    9.77  )-----------( 266      ( 2020 06:36 )             29.5       02-19    143  |  108  |  14  ----------------------------<  118<H>  3.7   |  31  |  0.88    Ca    8.6      2020 06:36    TPro  6.4  /  Alb  2.5<L>  /  TBili  0.3  /  DBili  x   /  AST  30  /  ALT  12  /  AlkPhos  74  02-18                          10.5   10.55 )-----------( 255      ( 2020 07:59 )             33.4       02-18    141  |  109<H>  |  14  ----------------------------<  129<H>  4.0   |  28  |  1.08    Ca    8.7      2020 07:59  Phos  3.5     02-17    TPro  6.4  /  Alb  2.5<L>  /  TBili  0.3  /  DBili  x   /  AST  30  /  ALT  12  /  AlkPhos  74  02-18      PTT - ( 2020 15:31 )  PTT:31.1 sec                              11.4   12.41 )-----------( 350      ( 2020 08:15 )             35.9       02-17    142  |  112<H>  |  16  ----------------------------<  170<H>  4.4   |  26  |  1.13    Ca    8.2<L>      2020 08:15  Phos  3.5     02-17    TPro  x   /  Alb  3.0<L>  /  TBili  x   /  DBili  x   /  AST  x   /  ALT  x   /  AlkPhos  x   02-16    PTT - ( 2020 08:15 )  PTT:46.2 sec       EXAM:  CARDIAC CATHERIZATION         PROCEDURE DATE:  2020        INTERPRETATION:  Cardiac Catheterization Report     Demographics     Patient Name        ADEOLA GRAY Height              66 Inches     Medical Record      [id number]       Weight              179.9 Pounds   Number     Account Number      [id number]    BSA                 1.91 m^2     Date of Birth       1960      BMI                 29.04 kg/m^2     Age                 59 year(s)      Performing          Jamshid Serrano MD                                       Physician     Gender              Female          Referring Physician Sixto Hoover MD    Procedure Start Time: 2020 11:29.    Procedure  Procedure Type:  Coronary Angiography and Left Heart Cath With Ventriculogram .    Closure Device .    Admission Data  Admission Status: Inpatient    Procedure Data    Continuous Physiologic monitoring was performed pre, meche, and post  procedure.    Diagnostic Cath Status: Urgent    Indications   1) Non-ST elevation (NSTEMI) myocardial infarction    Entry Locations    - Retrograde Percutaneous access was performed through the Right Radial      artery. A 6 Fr sheath was inserted. Hemostasis was successfully obtained      using General.      Comments: HEMOSTASIS OBTAINED WITH VASC BAND.    Procedure Medications:    - Fentanyl I.V. 25 mcg.      - Versed I.V. 1 mg.      - Verapamil I.A. 2.5 mg.      - Heparin I.A. 4000 units.      - Versed I.V. 0.5 mg.    Diagnostic Catheters    - 5FR  DIAGNOSTIC was used for Left ventriculography.      - 5FR FL 4 DIAGNOSTIC was used for Left coronary angiography.      - 5FR FR 4 DIAGNOSTIC was used for Right coronary angiography.      - 5FR 3DRC (WR) DIAGNOSTIC was used for Left coronary angiography.      Comments: USED FOR ADDITIONAL IMAGING      .    Contrast Material:    - [id number] ml    Fluoroscopy Time:Diagnostic: 8:03 minutes. Total: 8:03 minutes.    Fluoroscopy Dose:Diagnostic: 1236 mGy. Total: 1236 mGy.    Estimated Blood Loss:5 ml     Hemodynamics     Condition: Rest    Pressure  +----------------------------------+---------------------------------------+  !Site                              !Pressure                               !  +----------------------------------+---------------------------------------+  !LV                                !80/16 ,28                              !  +----------------------------------+---------------------------------------+     Condition: Post LV    Pressure  +-----+--------------------------------------------------------------------+  !Site !Pressure                                                            !  +-----+--------------------------------------------------------------------+  !AO   !83/55 (64)                                                          !  +-----+--------------------------------------------------------------------+  !LV   !81/16 ,30                                                           !  +-----+--------------------------------------------------------------------+     Angiographic Findings     Cardiac Arteries and Lesion Findings    LMCA: Normal.    LAD: Normal.Large caliber vessel.    LCx: Normal.Large caliber vessel. Arises from the right coronary cusp. The  anomalous LCx passes posterior to the aorta.    RCA: Normal.Large caliber vessel.    Ramus: Normal.Large caliber vessel.    VA  LV function assessed as:Abnormal.  Ejection Fraction  +----------------------------------------------------------------------+---+  !Method                                                                !EF%!  +----------------------------------------------------------------------+---+  !LV gram                                                               !35 !  +----------------------------------------------------------------------+---+     Impression     Diagnostic Conclusions     Normal coronary arteries.   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   Moderately reduced left ventricular systolic function with segmental wall   motion abnormalities.   Normal LV systolic function. Estimated LV ejection fraction is 35 %.   Elevated left ventricular end-diastolic pressure.   No aortic valve stenosis.     Recommendations     Manage with medical therapy.    Estimated Blood Loss:5ml.    Complications:  No complication.     Signatures     ----------------------------------------------------------------   Electronically signed by Jamshid Serrano MD(Performing   Physician) on 2020 12:17   ----------------------------------------------------------------     LVA Segment Contractility     1 - Normal    3 - Mild         5 - Severe       7 - Dyskinesis   hypokinesis      hypokinesis     2 -           4 - Moderate     6 - Akinesis     8 - Aneurysm   Hypokinesis   hypokinesis      JAMSHID SERRANO M.D., ATTENDING CARDIOLOGIST  This document has been electronically signed. 2020 12:18PM              INTERPRETATION:  CT ABDOMEN AND PELVIS    CLINICAL INFORMATION: elevated cr, lower abdominal pain      COMPARISON: CT abdomen and pelvis 2019    PROCEDURE:   CT of the Abdomen and Pelvis was performed without intravenous contrast.  Intravenous contrast: None.  Oral contrast: None.  Sagittal and coronal reformats were performed.    FINDINGS:    LOWER CHEST: Clear.    LIVER: Normal.  BILE DUCTS: Nondilated.  GALLBLADDER: Prior cholecystectomy.  SPLEEN: Normal.  PANCREAS: Normal.  ADRENALS: Normal.  KIDNEYS/URETERS: No hydronephrosis or urinary tract calculi.    BLADDER: Obscured by artifact  REPRODUCTIVE ORGANS: Partially obscured    BOWEL: Mild left-sided colitis of the descending colon. No obstruction. Normal appendix.  PERITONEUM: Small pelvic free fluid. No free air.  VESSELS:  Normal caliber aorta.  RETROPERITONEUM/LYMPH NODES: No adenopathy.    ABDOMINAL WALL: Normal.  BONES: No acute bony abnormality. Bilateral hip replacements.    IMPRESSION:     Left sided colitis.            MEDICATIONS  (STANDING):  acetaminophen   Tablet .. 975 milliGRAM(s) Oral every 8 hours  ALBUTerol    90 MICROgram(s) HFA Inhaler 1 Puff(s) Inhalation every 4 hours  albuterol/ipratropium for Nebulization 3 milliLiter(s) Nebulizer every 6 hours  aspirin enteric coated 81 milliGRAM(s) Oral daily  enoxaparin Injectable 40 milliGRAM(s) SubCutaneous daily  gabapentin 300 milliGRAM(s) Oral three times a day  influenza   Vaccine 0.5 milliLiter(s) IntraMuscular once  lisinopril 5 milliGRAM(s) Oral daily  metoprolol succinate ER 25 milliGRAM(s) Oral every 12 hours  pantoprazole  Injectable 40 milliGRAM(s) IV Push every 12 hours  PARoxetine 60 milliGRAM(s) Oral daily  piperacillin/tazobactam IVPB.. 3.375 Gram(s) IV Intermittent every 8 hours  polyethylene glycol 3350 17 Gram(s) Oral daily  sodium chloride 0.9% lock flush 3 milliLiter(s) IV Push every 8 hours  tiotropium 18 MICROgram(s) Capsule 1 Capsule(s) Inhalation daily  traZODone 150 milliGRAM(s) Oral at bedtime                DVT Prophylaxis:  LMWH                   ( x )  Heparin SQ           (  )  Coumadin             (  )  Xarelto                  (  )  Eliquis                   (  )  Venodynes           (x  )  Ambulation          ( x )  UFH                       (  )  Contraindicated  (  )  EC ASPIRIN       (  )

## 2020-02-20 NOTE — PROGRESS NOTE ADULT - PROVIDER SPECIALTY LIST ADULT
Pulmonology Complex Repair And Single Advancement Flap Text: The defect edges were debeveled with a #15 scalpel blade.  The primary defect was closed partially with a complex linear closure.  Given the location of the remaining defect, shape of the defect and the proximity to free margins a single advancement flap was deemed most appropriate for complete closure of the defect.  Using a sterile surgical marker, an appropriate advancement flap was drawn incorporating the defect and placing the expected incisions within the relaxed skin tension lines where possible.    The area thus outlined was incised deep to adipose tissue with a #15 scalpel blade.  The skin margins were undermined to an appropriate distance in all directions utilizing iris scissors.

## 2020-02-20 NOTE — PROGRESS NOTE ADULT - PROBLEM SELECTOR PLAN 1
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

## 2020-02-20 NOTE — CONSULT NOTE ADULT - PROVIDER SPECIALTY LIST ADULT
Orthopedics
Orthopedics
Anticoag Management
Gastroenterology
Infectious Disease
Nephrology
Pulmonology
Cardiology

## 2020-02-21 DIAGNOSIS — S82.891S OTHER FRACTURE OF RIGHT LOWER LEG, SEQUELA: ICD-10-CM

## 2020-02-21 DIAGNOSIS — Z01.818 ENCOUNTER FOR OTHER PREPROCEDURAL EXAMINATION: ICD-10-CM

## 2020-02-21 DIAGNOSIS — K92.1 MELENA: ICD-10-CM

## 2020-02-21 DIAGNOSIS — K59.00 CONSTIPATION, UNSPECIFIED: ICD-10-CM

## 2020-02-21 DIAGNOSIS — I50.23 ACUTE ON CHRONIC SYSTOLIC (CONGESTIVE) HEART FAILURE: ICD-10-CM

## 2020-02-21 DIAGNOSIS — M54.5 LOW BACK PAIN: ICD-10-CM

## 2020-02-21 DIAGNOSIS — R55 SYNCOPE AND COLLAPSE: ICD-10-CM

## 2020-02-21 LAB
HCT VFR BLD CALC: 35.6 % — SIGNIFICANT CHANGE UP (ref 34.5–45)
HGB BLD-MCNC: 11.9 G/DL — SIGNIFICANT CHANGE UP (ref 11.5–15.5)
MCHC RBC-ENTMCNC: 31.2 PG — SIGNIFICANT CHANGE UP (ref 27–34)
MCHC RBC-ENTMCNC: 33.4 GM/DL — SIGNIFICANT CHANGE UP (ref 32–36)
MCV RBC AUTO: 93.4 FL — SIGNIFICANT CHANGE UP (ref 80–100)
PLATELET # BLD AUTO: 369 K/UL — SIGNIFICANT CHANGE UP (ref 150–400)
RBC # BLD: 3.81 M/UL — SIGNIFICANT CHANGE UP (ref 3.8–5.2)
RBC # FLD: 13 % — SIGNIFICANT CHANGE UP (ref 10.3–14.5)
WBC # BLD: 9.79 K/UL — SIGNIFICANT CHANGE UP (ref 3.8–10.5)
WBC # FLD AUTO: 9.79 K/UL — SIGNIFICANT CHANGE UP (ref 3.8–10.5)

## 2020-02-21 PROCEDURE — 99233 SBSQ HOSP IP/OBS HIGH 50: CPT

## 2020-02-21 PROCEDURE — 99231 SBSQ HOSP IP/OBS SF/LOW 25: CPT

## 2020-02-21 PROCEDURE — 71045 X-RAY EXAM CHEST 1 VIEW: CPT | Mod: 26

## 2020-02-21 RX ORDER — METOCLOPRAMIDE HCL 10 MG
10 TABLET ORAL ONCE
Refills: 0 | Status: COMPLETED | OUTPATIENT
Start: 2020-02-21 | End: 2020-02-21

## 2020-02-21 RX ORDER — LISINOPRIL 2.5 MG/1
20 TABLET ORAL DAILY
Refills: 0 | Status: DISCONTINUED | OUTPATIENT
Start: 2020-02-21 | End: 2020-02-25

## 2020-02-21 RX ORDER — HYDROMORPHONE HYDROCHLORIDE 2 MG/ML
30 INJECTION INTRAMUSCULAR; INTRAVENOUS; SUBCUTANEOUS
Refills: 0 | Status: DISCONTINUED | OUTPATIENT
Start: 2020-02-25 | End: 2020-02-27

## 2020-02-21 RX ORDER — ATORVASTATIN CALCIUM 80 MG/1
40 TABLET, FILM COATED ORAL AT BEDTIME
Refills: 0 | Status: DISCONTINUED | OUTPATIENT
Start: 2020-02-21 | End: 2020-02-25

## 2020-02-21 RX ORDER — CYCLOBENZAPRINE HYDROCHLORIDE 10 MG/1
5 TABLET, FILM COATED ORAL THREE TIMES A DAY
Refills: 0 | Status: DISCONTINUED | OUTPATIENT
Start: 2020-02-21 | End: 2020-02-25

## 2020-02-21 RX ORDER — FUROSEMIDE 40 MG
40 TABLET ORAL DAILY
Refills: 0 | Status: DISCONTINUED | OUTPATIENT
Start: 2020-02-21 | End: 2020-02-24

## 2020-02-21 RX ORDER — HYDROMORPHONE HYDROCHLORIDE 2 MG/ML
0.5 INJECTION INTRAMUSCULAR; INTRAVENOUS; SUBCUTANEOUS ONCE
Refills: 0 | Status: DISCONTINUED | OUTPATIENT
Start: 2020-02-21 | End: 2020-02-21

## 2020-02-21 RX ORDER — NALOXONE HYDROCHLORIDE 4 MG/.1ML
0.1 SPRAY NASAL
Refills: 0 | Status: DISCONTINUED | OUTPATIENT
Start: 2020-02-25 | End: 2020-02-27

## 2020-02-21 RX ORDER — LIDOCAINE 4 G/100G
1 CREAM TOPICAL DAILY
Refills: 0 | Status: COMPLETED | OUTPATIENT
Start: 2020-02-21 | End: 2020-02-25

## 2020-02-21 RX ORDER — ATORVASTATIN CALCIUM 80 MG/1
10 TABLET, FILM COATED ORAL AT BEDTIME
Refills: 0 | Status: DISCONTINUED | OUTPATIENT
Start: 2020-02-21 | End: 2020-02-21

## 2020-02-21 RX ORDER — HYDROMORPHONE HYDROCHLORIDE 2 MG/ML
0.5 INJECTION INTRAMUSCULAR; INTRAVENOUS; SUBCUTANEOUS
Refills: 0 | Status: DISCONTINUED | OUTPATIENT
Start: 2020-02-25 | End: 2020-02-27

## 2020-02-21 RX ORDER — MULTIVIT WITH MIN/MFOLATE/K2 340-15/3 G
1 POWDER (GRAM) ORAL ONCE
Refills: 0 | Status: COMPLETED | OUTPATIENT
Start: 2020-02-21 | End: 2020-02-21

## 2020-02-21 RX ADMIN — ONDANSETRON 4 MILLIGRAM(S): 8 TABLET, FILM COATED ORAL at 06:58

## 2020-02-21 RX ADMIN — HYDROMORPHONE HYDROCHLORIDE 1.5 MILLIGRAM(S): 2 INJECTION INTRAMUSCULAR; INTRAVENOUS; SUBCUTANEOUS at 18:48

## 2020-02-21 RX ADMIN — HYDROMORPHONE HYDROCHLORIDE 1.5 MILLIGRAM(S): 2 INJECTION INTRAMUSCULAR; INTRAVENOUS; SUBCUTANEOUS at 20:58

## 2020-02-21 RX ADMIN — LIDOCAINE 1 PATCH: 4 CREAM TOPICAL at 13:03

## 2020-02-21 RX ADMIN — LIDOCAINE 1 PATCH: 4 CREAM TOPICAL at 19:24

## 2020-02-21 RX ADMIN — SODIUM CHLORIDE 3 MILLILITER(S): 9 INJECTION INTRAMUSCULAR; INTRAVENOUS; SUBCUTANEOUS at 05:55

## 2020-02-21 RX ADMIN — Medication 975 MILLIGRAM(S): at 16:21

## 2020-02-21 RX ADMIN — TRAMADOL HYDROCHLORIDE 50 MILLIGRAM(S): 50 TABLET ORAL at 00:09

## 2020-02-21 RX ADMIN — HYDROMORPHONE HYDROCHLORIDE 0.5 MILLIGRAM(S): 2 INJECTION INTRAMUSCULAR; INTRAVENOUS; SUBCUTANEOUS at 12:20

## 2020-02-21 RX ADMIN — PANTOPRAZOLE SODIUM 40 MILLIGRAM(S): 20 TABLET, DELAYED RELEASE ORAL at 16:39

## 2020-02-21 RX ADMIN — PANTOPRAZOLE SODIUM 40 MILLIGRAM(S): 20 TABLET, DELAYED RELEASE ORAL at 06:10

## 2020-02-21 RX ADMIN — GABAPENTIN 300 MILLIGRAM(S): 400 CAPSULE ORAL at 16:37

## 2020-02-21 RX ADMIN — GABAPENTIN 300 MILLIGRAM(S): 400 CAPSULE ORAL at 06:10

## 2020-02-21 RX ADMIN — Medication 150 MILLIGRAM(S): at 21:07

## 2020-02-21 RX ADMIN — LISINOPRIL 10 MILLIGRAM(S): 2.5 TABLET ORAL at 06:10

## 2020-02-21 RX ADMIN — Medication 10 MILLIGRAM(S): at 13:28

## 2020-02-21 RX ADMIN — Medication 975 MILLIGRAM(S): at 13:10

## 2020-02-21 RX ADMIN — Medication 0.25 MILLIGRAM(S): at 20:59

## 2020-02-21 RX ADMIN — Medication 975 MILLIGRAM(S): at 21:05

## 2020-02-21 RX ADMIN — Medication 81 MILLIGRAM(S): at 13:03

## 2020-02-21 RX ADMIN — HYDROMORPHONE HYDROCHLORIDE 0.5 MILLIGRAM(S): 2 INJECTION INTRAMUSCULAR; INTRAVENOUS; SUBCUTANEOUS at 13:30

## 2020-02-21 RX ADMIN — LISINOPRIL 20 MILLIGRAM(S): 2.5 TABLET ORAL at 13:31

## 2020-02-21 RX ADMIN — SODIUM CHLORIDE 3 MILLILITER(S): 9 INJECTION INTRAMUSCULAR; INTRAVENOUS; SUBCUTANEOUS at 13:11

## 2020-02-21 RX ADMIN — ENOXAPARIN SODIUM 40 MILLIGRAM(S): 100 INJECTION SUBCUTANEOUS at 13:04

## 2020-02-21 RX ADMIN — GABAPENTIN 300 MILLIGRAM(S): 400 CAPSULE ORAL at 21:07

## 2020-02-21 RX ADMIN — Medication 1 BOTTLE: at 13:02

## 2020-02-21 RX ADMIN — Medication 20 MILLIGRAM(S): at 06:10

## 2020-02-21 RX ADMIN — Medication 3 MILLILITER(S): at 09:40

## 2020-02-21 RX ADMIN — Medication 975 MILLIGRAM(S): at 21:20

## 2020-02-21 RX ADMIN — HYDROMORPHONE HYDROCHLORIDE 1.5 MILLIGRAM(S): 2 INJECTION INTRAMUSCULAR; INTRAVENOUS; SUBCUTANEOUS at 03:25

## 2020-02-21 RX ADMIN — Medication 975 MILLIGRAM(S): at 08:00

## 2020-02-21 RX ADMIN — Medication 975 MILLIGRAM(S): at 06:10

## 2020-02-21 RX ADMIN — Medication 60 MILLIGRAM(S): at 13:03

## 2020-02-21 RX ADMIN — HYDROMORPHONE HYDROCHLORIDE 1.5 MILLIGRAM(S): 2 INJECTION INTRAMUSCULAR; INTRAVENOUS; SUBCUTANEOUS at 08:40

## 2020-02-21 RX ADMIN — ATORVASTATIN CALCIUM 40 MILLIGRAM(S): 80 TABLET, FILM COATED ORAL at 21:07

## 2020-02-21 RX ADMIN — HYDROMORPHONE HYDROCHLORIDE 1.5 MILLIGRAM(S): 2 INJECTION INTRAMUSCULAR; INTRAVENOUS; SUBCUTANEOUS at 08:09

## 2020-02-21 RX ADMIN — POLYETHYLENE GLYCOL 3350 17 GRAM(S): 17 POWDER, FOR SOLUTION ORAL at 06:10

## 2020-02-21 RX ADMIN — TRAMADOL HYDROCHLORIDE 50 MILLIGRAM(S): 50 TABLET ORAL at 00:46

## 2020-02-21 RX ADMIN — Medication 75 MILLIGRAM(S): at 06:10

## 2020-02-21 RX ADMIN — HYDROMORPHONE HYDROCHLORIDE 1.5 MILLIGRAM(S): 2 INJECTION INTRAMUSCULAR; INTRAVENOUS; SUBCUTANEOUS at 16:38

## 2020-02-21 RX ADMIN — Medication 10 MILLIGRAM(S): at 12:21

## 2020-02-21 RX ADMIN — Medication 3 MILLILITER(S): at 21:39

## 2020-02-21 RX ADMIN — SODIUM CHLORIDE 3 MILLILITER(S): 9 INJECTION INTRAMUSCULAR; INTRAVENOUS; SUBCUTANEOUS at 21:08

## 2020-02-21 RX ADMIN — HYDROMORPHONE HYDROCHLORIDE 1.5 MILLIGRAM(S): 2 INJECTION INTRAMUSCULAR; INTRAVENOUS; SUBCUTANEOUS at 21:21

## 2020-02-21 RX ADMIN — HYDROMORPHONE HYDROCHLORIDE 1.5 MILLIGRAM(S): 2 INJECTION INTRAMUSCULAR; INTRAVENOUS; SUBCUTANEOUS at 02:52

## 2020-02-21 RX ADMIN — SCOPALAMINE 1 PATCH: 1 PATCH, EXTENDED RELEASE TRANSDERMAL at 19:22

## 2020-02-21 NOTE — PROGRESS NOTE ADULT - SUBJECTIVE AND OBJECTIVE BOX
Patient is a 59y old  Female who presents with a chief complaint of ARF  concern for ATN  Hyperkalemia  Chronic back pain  Increasing anemia  Melena/GI bleed  Left sided Colitis with diarrhea, dehydration  ankle fx after syncope and fall yesterday (21 Feb 2020 14:23)      Subective:  Feels good but continues to have nausea and vomiting episodes.  No pain    PAST MEDICAL & SURGICAL HISTORY:  Hypertension  Anxiety and depression  Seasonal allergies  Asthma  Osteoarthritis  S/P hip replacement: left  S/P tubal ligation  S/P cholecystectomy      MEDICATIONS  (STANDING):  acetaminophen   Tablet .. 975 milliGRAM(s) Oral every 8 hours  albuterol/ipratropium for Nebulization 3 milliLiter(s) Nebulizer every 6 hours  aspirin enteric coated 81 milliGRAM(s) Oral daily  atorvastatin 40 milliGRAM(s) Oral at bedtime  enoxaparin Injectable 40 milliGRAM(s) SubCutaneous daily  furosemide    Tablet 40 milliGRAM(s) Oral daily  gabapentin 300 milliGRAM(s) Oral three times a day  influenza   Vaccine 0.5 milliLiter(s) IntraMuscular once  lidocaine   Patch 1 Patch Transdermal daily  lisinopril 20 milliGRAM(s) Oral daily  metoprolol succinate ER 75 milliGRAM(s) Oral daily  pantoprazole  Injectable 40 milliGRAM(s) IV Push every 12 hours  PARoxetine 60 milliGRAM(s) Oral daily  polyethylene glycol 3350 17 Gram(s) Oral every 12 hours  polyethylene glycol 3350 17 Gram(s) Oral daily  sodium chloride 0.9% lock flush 3 milliLiter(s) IV Push every 8 hours  traZODone 150 milliGRAM(s) Oral at bedtime    MEDICATIONS  (PRN):  ALPRAZolam 0.25 milliGRAM(s) Oral every 8 hours PRN anxiety  aluminum hydroxide/magnesium hydroxide/simethicone Suspension 30 milliLiter(s) Oral four times a day PRN Indigestion  bisacodyl Suppository 10 milliGRAM(s) Rectal daily PRN If no bowel movement by postoperative day #2  cyclobenzaprine 5 milliGRAM(s) Oral three times a day PRN Muscle Spasm  hydrALAZINE Injectable 10 milliGRAM(s) IV Push every 6 hours PRN for SBP more than 160  HYDROmorphone  Injectable 1.5 milliGRAM(s) IV Push every 4 hours PRN Severe Pain (7 - 10)  magnesium hydroxide Suspension 30 milliLiter(s) Oral daily PRN Constipation  ondansetron Injectable 4 milliGRAM(s) IV Push every 6 hours PRN Nausea and/or Vomiting  senna 2 Tablet(s) Oral at bedtime PRN Constipation  traMADol 50 milliGRAM(s) Oral every 6 hours PRN Mild Pain (1 - 3)      REVIEW OF SYSTEMS:    RESPIRATORY: No shortness of breath  CARDIOVASCULAR: No chest pain  All other review of systems is negative unless indicated above.    Vital Signs Last 24 Hrs  T(C): 36.7 (21 Feb 2020 11:43), Max: 36.8 (21 Feb 2020 05:12)  T(F): 98.1 (21 Feb 2020 11:43), Max: 98.2 (21 Feb 2020 05:12)  HR: 98 (21 Feb 2020 16:21) (75 - 98)  BP: 158/99 (21 Feb 2020 16:21) (121/80 - 181/98)  BP(mean): --  RR: 19 (21 Feb 2020 11:43) (17 - 19)  SpO2: 95% (21 Feb 2020 11:43) (92% - 95%)    PHYSICAL EXAM:    Constitutional: NAD, well-developed  Respiratory: CTAB  Cardiovascular: S1 and S2, RRR  Gastrointestinal: BS+, soft, NT/NDPsychiatric: Normal mood, normal affect    LABS:                        11.9   9.79  )-----------( 369      ( 21 Feb 2020 08:34 )             35.6     02-20    139  |  100  |  18  ----------------------------<  101<H>  3.4<L>   |  35<H>  |  0.89    Ca    8.7      20 Feb 2020 07:29            RADIOLOGY & ADDITIONAL STUDIES:

## 2020-02-21 NOTE — PROGRESS NOTE ADULT - PROBLEM SELECTOR PLAN 6
due to pain medication , consider changing the medication
Lidocaine patch daily  Pain consult with anesthesia  Position pt for comfort  Continue to medicate for pain  Continue with neuro and VS monitoring
low salt diet , cont. clonidine.

## 2020-02-21 NOTE — PROGRESS NOTE ADULT - PROBLEM SELECTOR PROBLEM 5
HTN (hypertension)
HTN (hypertension)
Acute on chronic systolic heart failure
Anemia
Preoperative clearance

## 2020-02-21 NOTE — PROGRESS NOTE ADULT - SUBJECTIVE AND OBJECTIVE BOX
HPI: Pt is a 58 y/o female with a PMHx of anxiety/depression, asthma, HTN on enalapril, spinal stenosis, osteoarthritis, seasonal allergies who was sent to the ED by Dr. Knight today for ankle injury requiring surgery.  Pt c/o right ankle pain after a fall yesterday, for which she was splinted at [facility] ED. Pt also had 5 episodes of diarrhea 2 days ago with LLQ pain which improved yesterday.  day prior to admission pt was NPO for planned epidural procedure for pain control. At 11 am she was getting dressed yesterday and while putting on earrings she had unwitnessed LOC and  fell from standing , injuring her right ankle sustaining a left ankle fracture She denies preceding cpain/SOB/palpitations/dizziness.  She does not recall tripping over anything. Pt reports she stopped taking motrin 600mg BID , approximately 5 days  ago for the planned epidural procedure for pain control.  She was on mobic until 2020.  She has noticed increasingly dark stools over the last 1-2 weeks. She also reports some difficulty swallowing solids for 2-3 weeks.        In the ED , pt is found to have ARF, hyperkalemia, anemia and hypotension to 85/38.  Pt reported she may have accidentally taken an extra dose of her Clonidine today.     INTERVAL HISTORY:  she is now on 3 north s/p Right ankleplacement of ext-fix  : seen at bedside,  anxious, vomiting, states nausea form pain meds  2020: Pt seen at bedside in SICU with  at side. She reports she s/p cardiac cath, no abnormal findings per , and off of UFH per primary RN. Pt with VTE risk factors.   : seen at bedside, anxious, c/o lower back pain, not new  : seen at bedside, c/o n/v still anxious.  : Pt seen at bedside c/o leg pain with movement, pain medication received  2020 Pt seen at bedside on 3 east, c/o pain to ankle, Discussed his anticoagulation with lovenox.  Benefits and risks discussed. Pt states she has no concerns.    Fam HX:  Father DM, had CVa  Mother COPD  Brother  at 65 of alcoholism  Sister has lung ca (2020 18:56)    Consulted by Dr. Rogers for VTE prophylaxis, risk stratification, and anticoagulation management.    PAST MEDICAL & SURGICAL HISTORY:  Hypertension  Anxiety and depression  Seasonal allergies  Asthma  Osteoarthritis  S/P hip replacement: left  S/P tubal ligation  S/P cholecystectomy    CrCl: 69.7    Caprini VTE Risk Score: CAPRINI SCORE  AGE RELATED RISK FACTORS                                                       MOBILITY RELATED FACTORS  [x ] Age 41-60 years                                            (1 Point)                  [ ] Bed rest                                                        (1 Point)  [ ] Age: 61-74 years                                           (2 Points)                [ ] Plaster cast                                                   (2 Points)  [ ] Age= 75 years                                              (3 Points)                 [ ] Bed bound for more than 72 hours                   (2 Points)    DISEASE RELATED RISK FACTORS                                               GENDER SPECIFIC FACTORS  [ ] Edema in the lower extremities                       (1 Point)           [ ] Pregnancy                                                            (1 Point)  [ ] Varicose veins                                               (1 Point)                  [ ] Post-partum < 6 weeks                                      (1 Point)             x[ ] BMI > 25 Kg/m2                                            (1 Point)                  [ ] Hormonal therapy or oral contraception       (1 Point)                 [ ] Sepsis (in the previous month)                        (1 Point)             [ ] History of pregnancy complications                (1Point)  [ ] Pneumonia or serious lung disease                                             [ ] Unexplained or recurrent  (>/= 3), premature                                 (In the previous month)                               (1 Point)                birth with toxemia or growth-restricted infant (1 Point)  [ ] Abnormal pulmonary function test            (1 Point)                                   SURGERY RELATED RISK FACTORS  [ ] Acute myocardial infarction                       (1 Point)                  [ ]  Section                                         (1 Point)  [ ] Congestive heart failure (in the previous month) (1 Point)   [ ] Minor surgery   lasting <45 minutes       (1 Point)   [ ] Inflammatory bowel disease                             (1 Point)          [ ] Arthroscopic surgery                                  (2 Points)  [ ] Central venous access                                    (2 Points)            [x ] General surgery lasting >45 minutes      (2 Points)       [ ] Stroke (in the previous month)                  (5 Points)            [ ] Elective major lower extremity arthroplasty (5 Points)                                   [  ] Malignancy (present or past include skin melanoma                                          but exclude  basal skin cell)    (2 points)                                      TRAUMA RELATED RISK FACTORS                HEMATOLOGY RELATED FACTORS                                  [x ] Fracture of the hip, pelvis, or leg                       (5 Points)  [ ] Prior episodes of VTE                                     (3 Points)          [ ] Acute spinal cord injury (in the previous month)  (5 Points)  [ ] Positive family history for VTE                         (3 Points)       [ ] Paralysis (less than 1 month)                          (5 Points)  [ ] Prothrombin 59857 A                                      (3 Points)         [ ] Multiple Trauma (within 1month)                 (5Points)                                                                                                                                                                [ ] Factor V Leiden                                          (3 Points)                                OTHER RISK FACTORS                          [ ] Lupus anticoagulants                                     (3 Points)                       [ ] BMI > 40                          (1 Point)                                                         [ ] Anticardiolipin antibodies                                (3 Points)                   [ ] Smoking                              (1Point)                                                [ ] High homocysteine in the blood                      (3 Points)                [  ] Diabetes requiring insulin (1point)                         [ ] Other congenital or acquired thrombophilia       (3 Points)          [  ] Chemotherapy                   (1 Point)  [ ] Heparin induced thrombocytopenia                  (3 Points)             [  ] Blood Transfusion                (1 point)                                                                                                         Total Score [    9      ]                                                                                                                                                                                                                 IMPROVE Bleeding Risk Score 1      Falls Risk:   High ( x )  Mod (  )  Low (  )      FAMILY HISTORY:  Family history of COPD (chronic obstructive pulmonary disease)  Family history of CVA  Denies any personal or familial history of clotting or bleeding disorders.    Allergies  No Known Allergies  Intolerances    REVIEW OF SYSTEMS    (  )Fever	     (  )Constipation	(  )SOB				(  )Headache	(  )Dysuria  (  )Chills	     (  )Melena	(  )Dyspnea present on exertion	                    (  )Dizziness                    (  )Polyuria  (x  )Nausea	     (  )Hematochezia	(  )Cough			                    (  )Syncope   	(  )Hematuria  (  )Vomiting    (  )Chest Pain	(  )Wheezing			(  )Weakness  (  )Diarrhea     (  )Palpitations	(  )Anorexia			( x )joint pain     All  other review of systems negative: Yes    Vital Signs Last 24 Hrs  T(C): 36.7 (20 @ 11:43), Max: 36.8 (20 @ 05:12)  T(F): 98.1 (20 @ 11:43), Max: 98.2 (20 @ 05:12)  HR: 78 (20 @ 12:53) (75 - 82)  BP: 181/98 (20 @ 12:53) (121/80 - 181/98)  BP(mean): --  RR: 19 (20 @ 11:43) (17 - 19)  SpO2: 95% (20 @ 11:43) (92% - 95%)    Constitutional: Appears Well    Neurological: A& O x 4    Skin: Warm    Respiratory and Thorax: normal effort; Breath sounds: normal; No rales/wheezing/rhonchi  	  Cardiovascular: S1, S2, regular, NMBR	    Gastrointestinal: BS + x 4Q, nontender	    Genitourinary:  Bladder nondistended, nontender    Musculoskeletal:   General Right:   + muscle/joint tenderness,   normal tone, no joint swelling,   ROM: limited	    General Left:   no muscle/joint tenderness,   normal tone, no joint swelling,   ROM: full    Right ankle: Drsing CDI; ext fix in place, tos warm and mobile Cap refill good; +Sensation intact                      Lower extrems:   Right: no calf tenderness              negative melba's sign               + pedal pulses    Left:   no calf tenderness              negative melba's sign               + pedal pulses    LABS:                        11.9   9.79  )-----------( 369      ( 2020 08:34 )             35.6       02-20    139  |  100  |  18  ----------------------------<  101<H>  3.4<L>   |  35<H>  |  0.89    Ca    8.7      2020 07:29                                  9.5    9.77  )-----------( 266      ( 2020 06:36 )             29.5       02-19    143  |  108  |  14  ----------------------------<  118<H>  3.7   |  31  |  0.88    Ca    8.6      2020 06:36    TPro  6.4  /  Alb  2.5<L>  /  TBili  0.3  /  DBili  x   /  AST  30  /  ALT  12  /  AlkPhos  74  02-18                          10.5   10.55 )-----------( 255      ( 2020 07:59 )             33.4       02-18    141  |  109<H>  |  14  ----------------------------<  129<H>  4.0   |  28  |  1.08    Ca    8.7      2020 07:59  Phos  3.5     02-17    TPro  6.4  /  Alb  2.5<L>  /  TBili  0.3  /  DBili  x   /  AST  30  /  ALT  12  /  AlkPhos  74  02-18      PTT - ( 2020 15:31 )  PTT:31.1 sec                              11.4   12.41 )-----------( 350      ( 2020 08:15 )             35.9       02-    142  |  112<H>  |  16  ----------------------------<  170<H>  4.4   |  26  |  1.13    Ca    8.2<L>      2020 08:15  Phos  3.5         TPro  x   /  Alb  3.0<L>  /  TBili  x   /  DBili  x   /  AST  x   /  ALT  x   /  AlkPhos  x   -    PTT - ( 2020 08:15 )  PTT:46.2 sec          DVT Prophylaxis:  LMWH                   ( x )  Heparin SQ           (  )  Coumadin             (  )  Xarelto                  (  )  Eliquis                   (  )  Venodynes           (x  )  Ambulation          ( x )  UFH                       (  )  Contraindicated  (  )  EC ASPIRIN       (  )

## 2020-02-21 NOTE — PROGRESS NOTE ADULT - PROBLEM SELECTOR PLAN 7
Avoid NSAIDs  Monitor H and H   Gi Consult ordered- possible endoscopy on Monday  Give stool laxatives to avoid constipation with pain medication  n/v- prn zofran and reglan
due to pain medication , consider changing the medication

## 2020-02-21 NOTE — PROGRESS NOTE ADULT - PROBLEM SELECTOR PLAN 5
low salt diet , cont. clonidine.
low salt diet , cont. clonidine.
Ef 40 %  Continue with optimal medical therapy including lasix  Ischemic eval was performed in 2017  Low Na diet  Restrict fluid intake to 1500 ml daily
GI following, anemia and melena likely due to excessive NSAID use. stable hemoglobin , will give low dose ecotrin and PPI , monitor hemoglobin
Pt is scheduled for colonoscopy on Mon & orthopedic surgery on tues.  She has no obstructive CAD on recent cath.  She is euvolemic.  She is optimized from a cardiac standpoint for both procedures & may proceed w/o further cardiac testing.  Low risk for cardiac events for low risk procedures.

## 2020-02-21 NOTE — PROGRESS NOTE ADULT - PROBLEM SELECTOR PROBLEM 6
Nausea
Low back pain, unspecified back pain laterality, unspecified chronicity, unspecified whether sciatica present
HTN (hypertension)

## 2020-02-21 NOTE — PROGRESS NOTE ADULT - SUBJECTIVE AND OBJECTIVE BOX
Patient seen and examined at bedside. No acute events over night. Patient reports feeling better. No acute complaints at this time. Pain well controlled. Denies chest pain, shortness of breath, nausea or vomiting.     PE:  Vital Signs Last 24 Hrs  T(C): 36.8 (21 Feb 2020 05:12), Max: 36.8 (21 Feb 2020 05:12)  T(F): 98.2 (21 Feb 2020 05:12), Max: 98.2 (21 Feb 2020 05:12)  HR: 79 (21 Feb 2020 05:12) (76 - 82)  BP: 121/80 (21 Feb 2020 05:12) (109/76 - 135/91)  BP(mean): --  RR: 18 (21 Feb 2020 05:12) (17 - 18)  SpO2: 93% (21 Feb 2020 05:12) (92% - 97%)    General: NAD, resting comfortably in bed  R LE:   Dressing C/D/I in ex fix  SCDs present contralaterally  Compartments soft and compressible  No calf tenderness bilaterally  grossly moving toes, sensation intact to toes  Cap refill < 2seconds                                                10.4   8.87  )-----------( 316      ( 20 Feb 2020 07:29 )             32.1   02-20    139  |  100  |  18  ----------------------------<  101<H>  3.4<L>   |  35<H>  |  0.89    Ca    8.7      20 Feb 2020 07:29            A/P:  59y f s/p ex fix to OR for definitive fixation if remains in house.   -If patient able to medically optimized, will consider definitive fixation during hospital stay, otherwise definitive fixation will be arranged outpatient.  -please document OR clearance   -PT/OT - NWB RLE  -Pain Control  -DVT ppx per AC team  -FU AM Labs  -Rest, ice, compress and elevate the extremity as we needed  -Incentive Spirometry  -Medical management appreciated

## 2020-02-21 NOTE — PROGRESS NOTE ADULT - PROBLEM SELECTOR PLAN 4
Elevate foot   Medicate for pain   preop for ortho surgery on Tuesday  - will need cardiology clearance prior to

## 2020-02-21 NOTE — PROGRESS NOTE ADULT - SUBJECTIVE AND OBJECTIVE BOX
58 y/o woman with a history of hypertension admitted following a syncopal event with a severe bimalleolar fracture dislocation of the right ankle joint and acute kidney injury with hospitalization complicated by (1) elevated troponin with cath revealing normal coronary arteries but LV dysfunction suggestive of TakoTsubo Cardiomyopathy with pulmonary edema, (2) anemia / GI Bleed  / melena, (3) possible pneumonia.    20  Patient is feeling nauseous on oxycodone , constipated , denies any chest pain , her back is down the lumbar area  , patient had cardiac cath showed issac coronaries ,  possible  takotsubo cardiomyopathy . on low dose BB ,   20  still somewhat dyspneic, on 2L O2, no chest pain.  2020:  Feels better with less dyspnea; R ankle pain  20: still w/ dyspnea.  tele w/ short run SVT no other arrhythmias.    MEDICATIONS:    MEDICATIONS  (STANDING):  acetaminophen   Tablet .. 975 milliGRAM(s) Oral every 8 hours  albuterol/ipratropium for Nebulization 3 milliLiter(s) Nebulizer every 6 hours  aspirin enteric coated 81 milliGRAM(s) Oral daily  enoxaparin Injectable 40 milliGRAM(s) SubCutaneous daily  furosemide   Injectable 20 milliGRAM(s) IV Push every 12 hours  gabapentin 300 milliGRAM(s) Oral three times a day  HYDROmorphone  Injectable 0.5 milliGRAM(s) IV Push once  influenza   Vaccine 0.5 milliLiter(s) IntraMuscular once  lidocaine   Patch 1 Patch Transdermal daily  lisinopril 10 milliGRAM(s) Oral daily  magnesium citrate Oral Solution 1 Bottle Oral once  metoclopramide Injectable 10 milliGRAM(s) IV Push once  metoprolol succinate ER 75 milliGRAM(s) Oral daily  pantoprazole  Injectable 40 milliGRAM(s) IV Push every 12 hours  PARoxetine 60 milliGRAM(s) Oral daily  polyethylene glycol 3350 17 Gram(s) Oral every 12 hours  polyethylene glycol 3350 17 Gram(s) Oral daily  sodium chloride 0.9% lock flush 3 milliLiter(s) IV Push every 8 hours  traZODone 150 milliGRAM(s) Oral at bedtime    MEDICATIONS  (PRN):  ALPRAZolam 0.25 milliGRAM(s) Oral every 8 hours PRN anxiety  aluminum hydroxide/magnesium hydroxide/simethicone Suspension 30 milliLiter(s) Oral four times a day PRN Indigestion  bisacodyl Suppository 10 milliGRAM(s) Rectal daily PRN If no bowel movement by postoperative day #2  cyclobenzaprine 5 milliGRAM(s) Oral three times a day PRN Muscle Spasm  hydrALAZINE Injectable 10 milliGRAM(s) IV Push every 6 hours PRN for SBP more than 160  HYDROmorphone  Injectable 1.5 milliGRAM(s) IV Push every 4 hours PRN Severe Pain (7 - 10)  magnesium hydroxide Suspension 30 milliLiter(s) Oral daily PRN Constipation  ondansetron Injectable 4 milliGRAM(s) IV Push every 6 hours PRN Nausea and/or Vomiting  senna 2 Tablet(s) Oral at bedtime PRN Constipation  traMADol 50 milliGRAM(s) Oral every 6 hours PRN Mild Pain (1 - 3)      Vital Signs Last 24 Hrs  T(C): 36.7 (2020 11:43), Max: 36.8 (2020 05:12)  T(F): 98.1 (2020 11:43), Max: 98.2 (2020 05:12)  HR: 75 (2020 11:43) (75 - 82)  BP: 175/105 (2020 11:43) (121/80 - 175/105)  BP(mean): --  RR: 19 (2020 11:43) (17 - 19)  SpO2: 95% (2020 11:43) (92% - 95%)Daily     Daily Weight in k.2 (2020 05:12)I&O's Summary    2020 07:01  -  2020 07:00  --------------------------------------------------------  IN: 0 mL / OUT: 900 mL / NET: -900 mL        PHYSICAL EXAM:  Constitutional: NAD, awake and alert  Respiratory: Nonlabored, no crackles  Cardiovascular: S1 and S2, regular  Gastrointestinal: Bowel Sounds present, soft, nontender.   Skin: No rashes.  Ext: R ankle hardware    LABS: All Labs Reviewed:                        .9   9.79  )-----------( 369      ( 2020 08:34 )             35.6                         10.4   8.87  )-----------( 316      ( 2020 07:29 )             32.1                         9.5    9.77  )-----------( 266      ( 2020 06:36 )             29.5     2020 07:29    139    |  100    |  18     ----------------------------<  101    3.4     |  35     |  0.89   2020 06:36    143    |  108    |  14     ----------------------------<  118    3.7     |  31     |  0.88     Ca    8.7        2020 07:29  Ca    8.6        2020 06:36    Blood Culture:    @ 13:21  Pro Bnp [id number]      Cardiac Cath Lab - Adult (20 @ 11:44):   Normal coronary arteries.  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   Moderately reduced left ventricular systolic function with segmental wall motion abnormalities.  Normal LV systolic function. Estimated LV ejection fraction is 35 %.  Elevated left ventricular end-diastolic pressure.    Transthoracic Echocardiogram (20 @ 10:13): Left ventricle systolic function appears moderately reduced. Apical segments are hyperkinetic, basal and mid segments are hypokinetic. Visual estimation of left ventricle ejection fraction is 40%.

## 2020-02-21 NOTE — PROGRESS NOTE ADULT - ASSESSMENT
Imp:  N/V in setting of trauma, could be related to medications, illness etc.    Rec:  EGD tentatively for monday morning. Risks and benefits reviewed.  However, if patient improves over weekend, we can defer

## 2020-02-21 NOTE — PROGRESS NOTE ADULT - PROVIDER SPECIALTY LIST ADULT
Progress Notes by Radha Kaminski MD at 02/17/17 09:19 AM     Author:  Radha Kaminski MD Service:  (none) Author Type:  Physician     Filed:  02/17/17 09:34 AM Encounter Date:  2/17/2017 Status:  Signed     :  Radha Kaminski MD (Physician)            SI joint pain, laying down helps  mild hand edema[CM1.1M]  otherwise no unusual complaints  good fetal movement  no VB/ LOF/ UCs  reviewed labor precautions and kick counts[CM1.2M]        Revision History        User Key Date/Time User Provider Type Action    > CM1.2 02/17/17 09:34 AM Radha Kaminski MD Physician Sign     CM1.1 02/17/17 09:19 AM Radha Kaminski MD Physician     M - Manual             Pulmonology

## 2020-02-21 NOTE — PROGRESS NOTE ADULT - ASSESSMENT
Assessment/Plan: 60 y/o female admitted with right   trimalleolar fracture as a result of a fall.  Her admission was complicated by elevated cardiac enzymes (last ischemic evaluation was negative in 2017). She has known Cardiomyopathy and possible takotsubo  and known CAD: S/P STEMI.  She has chronic back pain and is planned for right ankle surgery this admission pending Cardiac and GI clearance     DVT PPX: Lovenox to be continued    Advance Directive:    Disposition:    Time Span: 45 minutes

## 2020-02-21 NOTE — PROGRESS NOTE ADULT - SUBJECTIVE AND OBJECTIVE BOX
HPI: 59/F with PMHx of anxiety/ depression, asthma,  HTN on enalapril, spinal stenosis, osteoarthritis, seasonal allergies who was sent to the ED by Dr. Knight for ankle injury requiring surgery.  Pt  c/o right ankle pain after a fall on 2/12, for which she was splinted at [facility] ED.   She states that  she was NPO for planned epidural procedure for pain control .  At 11 am she was getting dressed  and while putting on earrings she had unwitnessed LOC and  fell from standing , injuring her right ankle.  She does not remember the incident. She states that she was standing and next thing she found was that she was on floor with her ankle was twisted. She denies preceding cpain/SOB/palpitations/dizziness.  She does not recall tripping over anything.        Pt also had 5 episodes of diarrhea 2 days ago with LLQ pain which improved.     Pt reports she stopped taking motrin 600mg BID , approximately 5 days  ago for the planned epidural procedure for pain control.  She was on mobic until January 2020.      She has noticed increasingly dark stools over the last 1-2 weeks.    She also reports some difficulty swallowing solids for 2-3 weeks.        2/21: seen and eval. Hemodynamically stable. Denies any HA, CP, SOB. No fevers, chills or shakes. Patient continues to complaint about pain (ankle pain), also has N/V this am with abdominal pain.     PHYSICAL EXAM:  T(C): 36.7 (21 Feb 2020 11:43), Max: 36.8 (21 Feb 2020 05:12)  T(F): 98.1 (21 Feb 2020 11:43), Max: 98.2 (21 Feb 2020 05:12)  HR: 78 (21 Feb 2020 12:53) (75 - 82)  BP: 181/98 (21 Feb 2020 12:53) (121/80 - 181/98)  RR: 19 (21 Feb 2020 11:43) (17 - 19)  SpO2: 95% (21 Feb 2020 11:43) (92% - 95%)  Constitutional: Weak appearing   HEENT: Atraumatic, DEREK, Normal, No congestion  Respiratory: + wheezing /  ronchi today  Cardiovascular: N S1S2;   Gastrointestinal: abdominal pain, + tenderness on deep palpation  Extremities: No edema, peripheral pulses present; right ankle dressed  Neurological: AAO x 3, no gross focal motor deficits  Skin: Non cellulitic, no rash, ulcers  Lymph Nodes: No lymphadenopathy noted  Back: No CVA tenderness   Musculoskeletal: non tender  Breasts: Deferred  Genitourinary: deferred  Rectal: Deferred      Labs:       CBC Full  -  ( 21 Feb 2020 08:34 )  WBC Count : 9.79 K/uL  RBC Count : 3.81 M/uL  Hemoglobin : 11.9 g/dL  Hematocrit : 35.6 %  Platelet Count - Automated : 369 K/uL    139  |  100  |  18  ----------------------------<  101<H>  3.4<L>   |  35<H>  |  0.89    Ca    8.7      20 Feb 2020 07:29      < from: CT Chest No Cont (02.16.20 @ 16:51) >  Impression:    Patchy infiltrates in both lungs with nodular feature, right greater than left, suggestive of pneumonia. Follow-up chest CT may be pursued in 4-6 weeks to ensure resolution. Attention should be directed to the small 6 mm left lower lobe lung nodule on the follow-up chestCT to ensure stability.    No pneumothorax.    Interlobular septal thickening in both lungs, suggestive of interstitial pulmonary edema.    New mild peribronchial cuffing, suggestive of reactive airway disease.    Peripancreatic stranding, new since the previous abdominal CT dated 2/14/2020, suggestive of acute pancreatitis. Clinical correlation with pancreatic enzymes is recommended.    Stable 0.8 cm splenic artery aneurysm with mural calcification.    Small bilateral pleural effusions.    Stable small pericardial effusion.    < end of copied text >    < from: CT Head No Cont (02.16.20 @ 16:51) >  Impression:     No intracranial hemorrhage, mass effect or CT evidence of acute infarct      < end of copied text >        # Syncope: can be secondary to orthostatic causes in combination with cardiac cause  - tele monitoring   - new low EF with recent cath 2/17 with normal coronaries  - elevated trops    - medication optimization for GDT    # Ankle pain  - PCI initiated  - care discussed with ortho  - anticipated ankle surgery on tuesday    # N/V  - care discussed with Dr. Mcknight  - ? endoscopy on monday    # Acute hypoxic respiratory failure in the setting of acute on chronic systolic CHF /  Aspiration pneumonia /  reactive airway dz  - d/c fluids  - started on duoneb treatments as patient has wheezing now  - continue with IV zosyn  - now off O2, speaking full sentences     # NSTEMI  - s/p cardiac cath 2/17:  Normal coronary arteries.  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 Moderately reduced left ventricular systolic function with segmental wall   motion abnormalities.  Normal LV systolic function. Estimated LV ejection fraction is 35 %.  Elevated left ventricular end-diastolic pressure.   - cardiology following .    # Peripancreatic stranding on CT chest:  - CT: Peripancreatic stranding, new since the previous abdominal CT dated 2/14/2020, suggestive of acute pancreatitis. Clinical correlation with pancreatic enzymes is recommended.  - patient's abdominal pain could be also secondary to (L) sided colitis noted on 2/14 CT scan  - tolerating po diet  - iv fluids  - check lipase    # Right Ankle Fx:   - s/p right ankle External fixator application  - pain meds prn  - follow u with Dr. Knight in office for definitive fixation    # ARF:   - likely sec to NSAIDS; rersolved    # Melena and Dark colored stools + Acute Anemia of acte hemorrhage from GI bleed:  - likely from NSAIDS   - d/c NSAIDS  - cont Protonix  - s/p PRBC 2/15  - H/H q 8 hrs; transfuse for Hb less than 8    # Hypotension:  - resolved     # Hyperkalemia    - resolved

## 2020-02-21 NOTE — PROGRESS NOTE ADULT - ASSESSMENT
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 for wheezing  -   follow-up of K and magnesium while on diuretics  -   patient sat are comfortable   -   adequate DVT prophylaxis with recent ankle surgery.

## 2020-02-21 NOTE — PROGRESS NOTE ADULT - SUBJECTIVE AND OBJECTIVE BOX
58 y/o female admitted after a fall.    She was admitted to have ankle surgery after having unwitnessedfall/ LOC.  In the ED shes was found to be hyperkalemic, anemic and hypotensive. She said that she might have taken an extra dose of Clonidine. She has known chronic back pain, which she take Motrin and Mobic for pain relief. Previous to fall she had 5 episodes of diarrhea with melena.  She has been complaining of constipation  Her abdomen is softly distended and she has been vomiting bilious fluid.  She is able to eat meals but reports difficulty swallowing food.     PAST MEDICAL & SURGICAL HISTORY:  Trimalleolar fracture of right ankle  Anemia  Cardiomyopathy: Cardiomyopathy  Congestive heart failure: Congestive heart failure  NSTEMI (non-ST elevated myocardial infarction): NSTEMI (non-ST elevated myocardial infarction)  HTN (hypertension): HTN (hypertension)  Anx/depression  Left DANG  S/P tubal ligation  S/P cholecystectomy      Fam HX:  Father DM, had CVa  Mother COPD  Brother  at 65 of alcoholism  Sister has lung ca (2020 18:56)      REVIEW OF SYSTEMS: Complaining of Abdomen vague pain, constipation, back and right foot pain    CONSTITUTIONAL: No weakness, fevers or chills  EYES/ENT: No visual changes;  No vertigo or throat pain   NECK: No pain or stiffness  RESPIRATORY: No cough, wheezing, hemoptysis; No shortness of breath  CARDIOVASCULAR: No chest pain or palpitations  GASTROINTESTINAL: No abdominal or epigastric pain. No nausea, vomiting, or hematemesis; No diarrhea or constipation. No melena or hematochezia.  GENITOURINARY: No dysuria, frequency or hematuria  NEUROLOGICAL: No numbness or weakness  SKIN: No itching, burning, rashes, or lesions   All other review of systems is negative unless indicated above    Vital Signs Last 24 Hrs  T(C): 36.7 (2020 11:43), Max: 36.8 (2020 05:12)  T(F): 98.1 (2020 11:43), Max: 98.2 (2020 05:12)  HR: 75 (2020 11:43) (75 - 82)  BP: 175/105 (2020 11:43) (121/80 - 175/105)  BP(mean): --  RR: 19 (2020 11:43) (17 - 19)  SpO2: 95% (2020 11:43) (92% - 95%)    I&O's Summary    2020 07:01  -  2020 07:00  --------------------------------------------------------  IN: 0 mL / OUT: 900 mL / NET: -900 mL        CAPILLARY BLOOD GLUCOSE          PHYSICAL EXAM:    Constitutional: NAD, awake and alert, well-developed  HEENT: PERR, EOMI, Normal Hearing, MMM  Neck: Soft and supple, No LAD, No JVD  Respiratory: Breath sounds are clear bilaterally, No wheezing, rales or rhonchi  Cardiovascular: S1 and S2, regular rate and rhythm, no Murmurs, gallops or rubs  Gastrointestinal: Bowel Sounds present, soft, nontender, nondistended, no guarding, no rebound  Extremities: No peripheral edema, right foot with surgical splint in place   Vascular: 2+ peripheral pulses  Neurological: A/O x 3, no focal deficits  Musculoskeletal: 5/5 strength b/l upper and lower extremities  Skin: No rashes    Medications:  MEDICATIONS  (STANDING):  acetaminophen   Tablet .. 975 milliGRAM(s) Oral every 8 hours  albuterol/ipratropium for Nebulization 3 milliLiter(s) Nebulizer every 6 hours  aspirin enteric coated 81 milliGRAM(s) Oral daily  enoxaparin Injectable 40 milliGRAM(s) SubCutaneous daily  furosemide    Tablet 40 milliGRAM(s) Oral daily  gabapentin 300 milliGRAM(s) Oral three times a day  influenza   Vaccine 0.5 milliLiter(s) IntraMuscular once  lidocaine   Patch 1 Patch Transdermal daily  lisinopril 10 milliGRAM(s) Oral daily  magnesium citrate Oral Solution 1 Bottle Oral once  metoprolol succinate ER 75 milliGRAM(s) Oral daily  pantoprazole  Injectable 40 milliGRAM(s) IV Push every 12 hours  PARoxetine 60 milliGRAM(s) Oral daily  polyethylene glycol 3350 17 Gram(s) Oral every 12 hours  polyethylene glycol 3350 17 Gram(s) Oral daily  sodium chloride 0.9% lock flush 3 milliLiter(s) IV Push every 8 hours  traZODone 150 milliGRAM(s) Oral at bedtime      Labs: All Labs Reviewed:                        11.9   9.79  )-----------( 369      ( 2020 08:34 )             35.6     02-20    139  |  100  |  18  ----------------------------<  101<H>  3.4<L>   |  35<H>  |  0.89    Ca    8.7      2020 07:29            Blood Culture:     RADIOLOGY/EKG:      Telemetry:  SR with 1 episode f SVT , heart rate averages 80 beat pe minute

## 2020-02-21 NOTE — PROGRESS NOTE ADULT - PROBLEM SELECTOR PLAN 3
Encourage fruit and fiber  Offer dulcolax and Mg Citrate  Continue to monitor Abd  CAT of Abd-negative  GI Consult

## 2020-02-21 NOTE — PROGRESS NOTE ADULT - PROBLEM SELECTOR PLAN 1
Possible TakoTsubo cardiomyopathy; mininally hypervolemic, Change lasix to 40 po daily, cont. metoprolol, lisinopril.  SBP elevated this AM but was in pain, other measurements WNL. Possible TakoTsubo cardiomyopathy; mininally hypervolemic, Change lasix to 40 po daily, cont. metoprolol, lisinopril.  SBP elevated this AM increase lisinopril to 20 daily.

## 2020-02-21 NOTE — PROGRESS NOTE ADULT - PROBLEM SELECTOR PLAN 1
Hydrate  Monitor this pt  Investigate source of Melena  - h/h stable  Continue to monitor this pt  Medicate for pain

## 2020-02-21 NOTE — PROGRESS NOTE ADULT - PROBLEM SELECTOR PLAN 2
Continue with BB, Ace Inhibitor and ASA  To start cholesterol lowering medication  Will need cardiac clearance prior to intended surgery  LVEF -40%

## 2020-02-21 NOTE — PROGRESS NOTE ADULT - SUBJECTIVE AND OBJECTIVE BOX
SUBJECTIVE     Patient says she is in pain and waiting for the pain medications   no sob and has improved wheezing   on iv lasix for the pulmonary edema     PAST MEDICAL & SURGICAL HISTORY:  Hypertension  Anxiety and depression  Seasonal allergies  Asthma  Osteoarthritis  S/P hip replacement: left  S/P tubal ligation  S/P cholecystectomy    OBJECTIVE   Vital Signs Last 24 Hrs  T(C): 36.8 (21 Feb 2020 05:12), Max: 36.8 (21 Feb 2020 05:12)  T(F): 98.2 (21 Feb 2020 05:12), Max: 98.2 (21 Feb 2020 05:12)  HR: 79 (21 Feb 2020 05:12) (76 - 82)  BP: 121/80 (21 Feb 2020 05:12) (109/76 - 135/91)  BP(mean): --  RR: 18 (21 Feb 2020 05:12) (17 - 18)  SpO2: 93% (21 Feb 2020 05:12) (92% - 97%)    PHYSICAL EXAM:  Constitutional: , awake and alert, not in distress.  HEENT: Normo cephalic atraumatic  Neck: Soft and supple, No J.V.D   Respiratory: vesicular breathing , No wheezing, rales or rhonchi.   Cardiovascular: S1 and S2, regular rate .   Gastrointestinal:  soft, nontender,   Extremities: right leg under the cast   Neurological: No new  focal deficits.    MEDICATIONS  (STANDING):  acetaminophen   Tablet .. 975 milliGRAM(s) Oral every 8 hours  albuterol/ipratropium for Nebulization 3 milliLiter(s) Nebulizer every 6 hours  aspirin enteric coated 81 milliGRAM(s) Oral daily  enoxaparin Injectable 40 milliGRAM(s) SubCutaneous daily  furosemide   Injectable 20 milliGRAM(s) IV Push every 12 hours  gabapentin 300 milliGRAM(s) Oral three times a day  influenza   Vaccine 0.5 milliLiter(s) IntraMuscular once  lisinopril 10 milliGRAM(s) Oral daily  metoprolol succinate ER 75 milliGRAM(s) Oral daily  pantoprazole  Injectable 40 milliGRAM(s) IV Push every 12 hours  PARoxetine 60 milliGRAM(s) Oral daily  polyethylene glycol 3350 17 Gram(s) Oral every 12 hours  polyethylene glycol 3350 17 Gram(s) Oral daily  sodium chloride 0.9% lock flush 3 milliLiter(s) IV Push every 8 hours  traZODone 150 milliGRAM(s) Oral at bedtime                            10.4   8.87  )-----------( 316      ( 20 Feb 2020 07:29 )             32.1     02-20    139  |  100  |  18  ----------------------------<  101<H>  3.4<L>   |  35<H>  |  0.89    Ca    8.7      20 Feb 2020 07:29

## 2020-02-22 LAB
HCT VFR BLD CALC: 34.5 % — SIGNIFICANT CHANGE UP (ref 34.5–45)
HGB BLD-MCNC: 11.5 G/DL — SIGNIFICANT CHANGE UP (ref 11.5–15.5)
MCHC RBC-ENTMCNC: 31.2 PG — SIGNIFICANT CHANGE UP (ref 27–34)
MCHC RBC-ENTMCNC: 33.3 GM/DL — SIGNIFICANT CHANGE UP (ref 32–36)
MCV RBC AUTO: 93.5 FL — SIGNIFICANT CHANGE UP (ref 80–100)
PLATELET # BLD AUTO: 382 K/UL — SIGNIFICANT CHANGE UP (ref 150–400)
RBC # BLD: 3.69 M/UL — LOW (ref 3.8–5.2)
RBC # FLD: 13.2 % — SIGNIFICANT CHANGE UP (ref 10.3–14.5)
WBC # BLD: 10.37 K/UL — SIGNIFICANT CHANGE UP (ref 3.8–10.5)
WBC # FLD AUTO: 10.37 K/UL — SIGNIFICANT CHANGE UP (ref 3.8–10.5)

## 2020-02-22 PROCEDURE — 73700 CT LOWER EXTREMITY W/O DYE: CPT | Mod: 26,RT

## 2020-02-22 PROCEDURE — 99233 SBSQ HOSP IP/OBS HIGH 50: CPT

## 2020-02-22 PROCEDURE — 76376 3D RENDER W/INTRP POSTPROCES: CPT | Mod: 26

## 2020-02-22 PROCEDURE — 99232 SBSQ HOSP IP/OBS MODERATE 35: CPT

## 2020-02-22 RX ORDER — HYDROMORPHONE HYDROCHLORIDE 2 MG/ML
1.5 INJECTION INTRAMUSCULAR; INTRAVENOUS; SUBCUTANEOUS
Refills: 0 | Status: DISCONTINUED | OUTPATIENT
Start: 2020-02-22 | End: 2020-02-25

## 2020-02-22 RX ORDER — HYDROMORPHONE HYDROCHLORIDE 2 MG/ML
1 INJECTION INTRAMUSCULAR; INTRAVENOUS; SUBCUTANEOUS ONCE
Refills: 0 | Status: DISCONTINUED | OUTPATIENT
Start: 2020-02-22 | End: 2020-02-22

## 2020-02-22 RX ADMIN — HYDROMORPHONE HYDROCHLORIDE 1.5 MILLIGRAM(S): 2 INJECTION INTRAMUSCULAR; INTRAVENOUS; SUBCUTANEOUS at 12:27

## 2020-02-22 RX ADMIN — ATORVASTATIN CALCIUM 40 MILLIGRAM(S): 80 TABLET, FILM COATED ORAL at 21:49

## 2020-02-22 RX ADMIN — Medication 150 MILLIGRAM(S): at 21:49

## 2020-02-22 RX ADMIN — TRAMADOL HYDROCHLORIDE 50 MILLIGRAM(S): 50 TABLET ORAL at 17:27

## 2020-02-22 RX ADMIN — LIDOCAINE 1 PATCH: 4 CREAM TOPICAL at 01:21

## 2020-02-22 RX ADMIN — HYDROMORPHONE HYDROCHLORIDE 1.5 MILLIGRAM(S): 2 INJECTION INTRAMUSCULAR; INTRAVENOUS; SUBCUTANEOUS at 06:30

## 2020-02-22 RX ADMIN — SCOPALAMINE 1 PATCH: 1 PATCH, EXTENDED RELEASE TRANSDERMAL at 06:48

## 2020-02-22 RX ADMIN — Medication 975 MILLIGRAM(S): at 06:24

## 2020-02-22 RX ADMIN — Medication 81 MILLIGRAM(S): at 09:54

## 2020-02-22 RX ADMIN — LIDOCAINE 1 PATCH: 4 CREAM TOPICAL at 22:01

## 2020-02-22 RX ADMIN — Medication 975 MILLIGRAM(S): at 06:48

## 2020-02-22 RX ADMIN — PANTOPRAZOLE SODIUM 40 MILLIGRAM(S): 20 TABLET, DELAYED RELEASE ORAL at 06:28

## 2020-02-22 RX ADMIN — HYDROMORPHONE HYDROCHLORIDE 1.5 MILLIGRAM(S): 2 INJECTION INTRAMUSCULAR; INTRAVENOUS; SUBCUTANEOUS at 15:29

## 2020-02-22 RX ADMIN — Medication 975 MILLIGRAM(S): at 15:32

## 2020-02-22 RX ADMIN — TRAMADOL HYDROCHLORIDE 50 MILLIGRAM(S): 50 TABLET ORAL at 09:54

## 2020-02-22 RX ADMIN — HYDROMORPHONE HYDROCHLORIDE 1.5 MILLIGRAM(S): 2 INJECTION INTRAMUSCULAR; INTRAVENOUS; SUBCUTANEOUS at 21:48

## 2020-02-22 RX ADMIN — Medication 3 MILLILITER(S): at 20:43

## 2020-02-22 RX ADMIN — Medication 975 MILLIGRAM(S): at 21:50

## 2020-02-22 RX ADMIN — Medication 75 MILLIGRAM(S): at 06:24

## 2020-02-22 RX ADMIN — HYDROMORPHONE HYDROCHLORIDE 1 MILLIGRAM(S): 2 INJECTION INTRAMUSCULAR; INTRAVENOUS; SUBCUTANEOUS at 12:27

## 2020-02-22 RX ADMIN — Medication 60 MILLIGRAM(S): at 09:55

## 2020-02-22 RX ADMIN — HYDROMORPHONE HYDROCHLORIDE 1.5 MILLIGRAM(S): 2 INJECTION INTRAMUSCULAR; INTRAVENOUS; SUBCUTANEOUS at 18:33

## 2020-02-22 RX ADMIN — Medication 975 MILLIGRAM(S): at 14:23

## 2020-02-22 RX ADMIN — LIDOCAINE 1 PATCH: 4 CREAM TOPICAL at 23:03

## 2020-02-22 RX ADMIN — Medication 0.25 MILLIGRAM(S): at 21:49

## 2020-02-22 RX ADMIN — LIDOCAINE 1 PATCH: 4 CREAM TOPICAL at 09:56

## 2020-02-22 RX ADMIN — SCOPALAMINE 1 PATCH: 1 PATCH, EXTENDED RELEASE TRANSDERMAL at 21:59

## 2020-02-22 RX ADMIN — GABAPENTIN 300 MILLIGRAM(S): 400 CAPSULE ORAL at 06:24

## 2020-02-22 RX ADMIN — GABAPENTIN 300 MILLIGRAM(S): 400 CAPSULE ORAL at 14:23

## 2020-02-22 RX ADMIN — HYDROMORPHONE HYDROCHLORIDE 1.5 MILLIGRAM(S): 2 INJECTION INTRAMUSCULAR; INTRAVENOUS; SUBCUTANEOUS at 11:30

## 2020-02-22 RX ADMIN — ENOXAPARIN SODIUM 40 MILLIGRAM(S): 100 INJECTION SUBCUTANEOUS at 09:55

## 2020-02-22 RX ADMIN — SODIUM CHLORIDE 3 MILLILITER(S): 9 INJECTION INTRAMUSCULAR; INTRAVENOUS; SUBCUTANEOUS at 22:01

## 2020-02-22 RX ADMIN — Medication 975 MILLIGRAM(S): at 22:00

## 2020-02-22 RX ADMIN — Medication 0.25 MILLIGRAM(S): at 09:54

## 2020-02-22 RX ADMIN — HYDROMORPHONE HYDROCHLORIDE 1.5 MILLIGRAM(S): 2 INJECTION INTRAMUSCULAR; INTRAVENOUS; SUBCUTANEOUS at 01:37

## 2020-02-22 RX ADMIN — Medication 3 MILLILITER(S): at 13:47

## 2020-02-22 RX ADMIN — HYDROMORPHONE HYDROCHLORIDE 1.5 MILLIGRAM(S): 2 INJECTION INTRAMUSCULAR; INTRAVENOUS; SUBCUTANEOUS at 15:55

## 2020-02-22 RX ADMIN — LISINOPRIL 20 MILLIGRAM(S): 2.5 TABLET ORAL at 06:25

## 2020-02-22 RX ADMIN — HYDROMORPHONE HYDROCHLORIDE 1 MILLIGRAM(S): 2 INJECTION INTRAMUSCULAR; INTRAVENOUS; SUBCUTANEOUS at 13:32

## 2020-02-22 RX ADMIN — HYDROMORPHONE HYDROCHLORIDE 1.5 MILLIGRAM(S): 2 INJECTION INTRAMUSCULAR; INTRAVENOUS; SUBCUTANEOUS at 19:34

## 2020-02-22 RX ADMIN — GABAPENTIN 300 MILLIGRAM(S): 400 CAPSULE ORAL at 21:49

## 2020-02-22 RX ADMIN — HYDROMORPHONE HYDROCHLORIDE 1.5 MILLIGRAM(S): 2 INJECTION INTRAMUSCULAR; INTRAVENOUS; SUBCUTANEOUS at 02:10

## 2020-02-22 RX ADMIN — CYCLOBENZAPRINE HYDROCHLORIDE 5 MILLIGRAM(S): 10 TABLET, FILM COATED ORAL at 13:11

## 2020-02-22 RX ADMIN — SODIUM CHLORIDE 3 MILLILITER(S): 9 INJECTION INTRAMUSCULAR; INTRAVENOUS; SUBCUTANEOUS at 17:30

## 2020-02-22 RX ADMIN — Medication 40 MILLIGRAM(S): at 06:28

## 2020-02-22 RX ADMIN — PANTOPRAZOLE SODIUM 40 MILLIGRAM(S): 20 TABLET, DELAYED RELEASE ORAL at 18:38

## 2020-02-22 RX ADMIN — SODIUM CHLORIDE 3 MILLILITER(S): 9 INJECTION INTRAMUSCULAR; INTRAVENOUS; SUBCUTANEOUS at 06:32

## 2020-02-22 NOTE — PROGRESS NOTE ADULT - ASSESSMENT
Imp:  N/V and recent melena, improved now    Rec:  Plan EGD monday morning 7:30 AM. Patient agreeable.

## 2020-02-22 NOTE — PROGRESS NOTE ADULT - SUBJECTIVE AND OBJECTIVE BOX
HPI: 59/F with PMHx of anxiety/ depression, asthma,  HTN on enalapril, spinal stenosis, osteoarthritis, seasonal allergies who was sent to the ED by Dr. Knight for ankle injury requiring surgery.  Pt  c/o right ankle pain after a fall on 2/12, for which she was splinted at [facility] ED.   She states that  she was NPO for planned epidural procedure for pain control .  At 11 am she was getting dressed  and while putting on earrings she had unwitnessed LOC and  fell from standing , injuring her right ankle.  She does not remember the incident. She states that she was standing and next thing she found was that she was on floor with her ankle was twisted. She denies preceding cpain/SOB/palpitations/dizziness.  She does not recall tripping over anything.        Pt also had 5 episodes of diarrhea 2 days ago with LLQ pain which improved.     Pt reports she stopped taking motrin 600mg BID , approximately 5 days  ago for the planned epidural procedure for pain control.  She was on mobic until January 2020.      She has noticed increasingly dark stools over the last 1-2 weeks.    She also reports some difficulty swallowing solids for 2-3 weeks.        2/21: seen and eval. Hemodynamically stable. Denies any HA, CP, SOB. No fevers, chills or shakes. Patient continues to complaint about pain (ankle pain), also has N/V this am with abdominal pain.     2/22: c/o pain in right ankle and asking for more pain meds    PHYSICAL EXAM:    Vital Signs Last 24 Hrs  T(C): 36.4 (22 Feb 2020 11:51), Max: 36.7 (21 Feb 2020 21:27)  T(F): 97.6 (22 Feb 2020 11:51), Max: 98 (21 Feb 2020 21:27)  HR: 88 (22 Feb 2020 11:51) (81 - 98)  BP: 96/59 (22 Feb 2020 11:51) (96/59 - 158/99)  BP(mean): --  RR: 18 (22 Feb 2020 11:51) (18 - 18)  SpO2: 92% (22 Feb 2020 11:51) (91% - 92%)    Constitutional: Weak appearing   HEENT: Atraumatic, DEREK, Normal, No congestion  Respiratory: + wheezing /  ronchi today  Cardiovascular: N S1S2;   Gastrointestinal: abdominal pain, + tenderness on deep palpation  Extremities: No edema, peripheral pulses present; right ankle dressed/ex fix  Neurological: AAO x 3, no gross focal motor deficits  Skin: Non cellulitic, no rash, ulcers  Lymph Nodes: No lymphadenopathy noted  Back: No CVA tenderness   Musculoskeletal: non tender  Breasts: Deferred  Genitourinary: deferred  Rectal: Deferred      Labs:                    11.5   10.37  )----------(  382       ( 22 Feb 2020 07:45 )               34.5                              < from: CT Chest No Cont (02.16.20 @ 16:51) >  Impression:    Patchy infiltrates in both lungs with nodular feature, right greater than left, suggestive of pneumonia. Follow-up chest CT may be pursued in 4-6 weeks to ensure resolution. Attention should be directed to the small 6 mm left lower lobe lung nodule on the follow-up chestCT to ensure stability.    No pneumothorax.    Interlobular septal thickening in both lungs, suggestive of interstitial pulmonary edema.    New mild peribronchial cuffing, suggestive of reactive airway disease.    Peripancreatic stranding, new since the previous abdominal CT dated 2/14/2020, suggestive of acute pancreatitis. Clinical correlation with pancreatic enzymes is recommended.    Stable 0.8 cm splenic artery aneurysm with mural calcification.    Small bilateral pleural effusions.    Stable small pericardial effusion.    < end of copied text >    < from: CT Head No Cont (02.16.20 @ 16:51) >  Impression:     No intracranial hemorrhage, mass effect or CT evidence of acute infarct      < end of copied text >        MEDICATIONS  (STANDING):  acetaminophen   Tablet .. 975 milliGRAM(s) Oral every 8 hours  albuterol/ipratropium for Nebulization 3 milliLiter(s) Nebulizer every 6 hours  aspirin enteric coated 81 milliGRAM(s) Oral daily  atorvastatin 40 milliGRAM(s) Oral at bedtime  enoxaparin Injectable 40 milliGRAM(s) SubCutaneous daily  furosemide    Tablet 40 milliGRAM(s) Oral daily  gabapentin 300 milliGRAM(s) Oral three times a day  influenza   Vaccine 0.5 milliLiter(s) IntraMuscular once  lidocaine   Patch 1 Patch Transdermal daily  lisinopril 20 milliGRAM(s) Oral daily  metoprolol succinate ER 75 milliGRAM(s) Oral daily  pantoprazole  Injectable 40 milliGRAM(s) IV Push every 12 hours  PARoxetine 60 milliGRAM(s) Oral daily  polyethylene glycol 3350 17 Gram(s) Oral every 12 hours  polyethylene glycol 3350 17 Gram(s) Oral daily  sodium chloride 0.9% lock flush 3 milliLiter(s) IV Push every 8 hours  traZODone 150 milliGRAM(s) Oral at bedtime

## 2020-02-22 NOTE — PROGRESS NOTE ADULT - SUBJECTIVE AND OBJECTIVE BOX
Patient seen and examined at bedside. No acute events over night. No acute complaints at this time. Pain well controlled. Denies chest pain, shortness of breath, nausea or vomiting.     PE:  Vital Signs Last 24 Hrs  T(C): 36.4 (02-22-20 @ 06:00), Max: 36.7 (02-21-20 @ 11:43)  T(F): 97.6 (02-22-20 @ 06:00), Max: 98.1 (02-21-20 @ 11:43)  HR: 84 (02-22-20 @ 06:43) (75 - 98)  BP: 128/80 (02-22-20 @ 06:43) (99/68 - 181/98)  BP(mean): --  RR: 18 (02-22-20 @ 06:00) (18 - 19)  SpO2: 92% (02-22-20 @ 06:00) (91% - 95%)    General: NAD, resting comfortably in bed  R LE:   In ex fixDressing C/D/I  SCDs present contralaterall  Compartments soft and compressible  No calf tenderness bilaterally  grossly moving toes  sensation intact  caprefill < 2 seconds                          11.9   9.79  )-----------( 369      ( 21 Feb 2020 08:34 )             35.6               A/P:  59y f w/ Trimalleolar fx to OR for definitive fixation this week  -PT/OT - NWB RLe in ex fix  -Pain Control  -DVT ppx per ac team  -Please document medical clearance  -FU AM Labs  -Rest, ice, compress and elevate the extremity as we needed  -Incentive Spirometry  -Medical management appreciated    Ortho

## 2020-02-22 NOTE — PROGRESS NOTE ADULT - SUBJECTIVE AND OBJECTIVE BOX
Subjective:    Review of Systems:  All 10 systems reviewed in detailed and found to be negative with the exception of what has already been described above    Allergies:  No Known Allergies    Meds  MEDICATIONS  (STANDING):  acetaminophen   Tablet .. 975 milliGRAM(s) Oral every 8 hours  albuterol/ipratropium for Nebulization 3 milliLiter(s) Nebulizer every 6 hours  aspirin enteric coated 81 milliGRAM(s) Oral daily  atorvastatin 40 milliGRAM(s) Oral at bedtime  enoxaparin Injectable 40 milliGRAM(s) SubCutaneous daily  furosemide    Tablet 40 milliGRAM(s) Oral daily  gabapentin 300 milliGRAM(s) Oral three times a day  influenza   Vaccine 0.5 milliLiter(s) IntraMuscular once  lidocaine   Patch 1 Patch Transdermal daily  lisinopril 20 milliGRAM(s) Oral daily  metoprolol succinate ER 75 milliGRAM(s) Oral daily  pantoprazole  Injectable 40 milliGRAM(s) IV Push every 12 hours  PARoxetine 60 milliGRAM(s) Oral daily  polyethylene glycol 3350 17 Gram(s) Oral every 12 hours  polyethylene glycol 3350 17 Gram(s) Oral daily  sodium chloride 0.9% lock flush 3 milliLiter(s) IV Push every 8 hours  traZODone 150 milliGRAM(s) Oral at bedtime    MEDICATIONS  (PRN):  ALPRAZolam 0.25 milliGRAM(s) Oral every 8 hours PRN anxiety  aluminum hydroxide/magnesium hydroxide/simethicone Suspension 30 milliLiter(s) Oral four times a day PRN Indigestion  bisacodyl Suppository 10 milliGRAM(s) Rectal daily PRN If no bowel movement by postoperative day #2  cyclobenzaprine 5 milliGRAM(s) Oral three times a day PRN Muscle Spasm  hydrALAZINE Injectable 10 milliGRAM(s) IV Push every 6 hours PRN for SBP more than 160  HYDROmorphone  Injectable 1.5 milliGRAM(s) IV Push every 4 hours PRN Severe Pain (7 - 10)  magnesium hydroxide Suspension 30 milliLiter(s) Oral daily PRN Constipation  ondansetron Injectable 4 milliGRAM(s) IV Push every 6 hours PRN Nausea and/or Vomiting  senna 2 Tablet(s) Oral at bedtime PRN Constipation  traMADol 50 milliGRAM(s) Oral every 6 hours PRN Mild Pain (1 - 3)    Physical Exam  T(C): 36.4 (02-22-20 @ 06:00), Max: 36.7 (02-21-20 @ 11:43)  HR: 84 (02-22-20 @ 06:43) (75 - 98)  BP: 128/80 (02-22-20 @ 06:43) (99/68 - 181/98)  RR: 18 (02-22-20 @ 06:00) (18 - 19)  SpO2: 92% (02-22-20 @ 06:00) (91% - 95%)  Gen: Alert, oriented, no distress  HEENT: Anicteric sclera, moist mucous membranes, no JVD, no lymphadenopathy, good dentition  Cardio: Regular rhythm and rate, normal S1S2, no murmurs  Resp: Clear to auscultation bilaterally, no wheezing or rhonchi  GI: Nontender, nondistended, normoactive bowel sounds  Ext: No cyanosis, clubbing or edema  Neuro: Nonfocal    Labs:                        11.5   10.37 )-----------( 382      ( 22 Feb 2020 07:45 )             34.5     < from: Xray Chest 1 View- PORTABLE-Routine (02.21.20 @ 11:44) >    PROCEDURE DATE:  02/21/2020          INTERPRETATION:  Chest one view    HISTORY: CHF    COMPARISON STUDY: 2/19/2020    Frontal expiratory view of the chest shows the heart to be normal in size. The lungs are clear and there is no evidence of pneumothorax nor pleural effusion.    IMPRESSION:  No evidence of CHF.    Thank you for the courtesy of this referral.    < end of copied text > Subjective:  not needing o2  breathing well  complains of right ankle pain    Review of Systems:  All 10 systems reviewed in detailed and found to be negative with the exception of what has already been described above    Allergies:  No Known Allergies    Meds  MEDICATIONS  (STANDING):  acetaminophen   Tablet .. 975 milliGRAM(s) Oral every 8 hours  albuterol/ipratropium for Nebulization 3 milliLiter(s) Nebulizer every 6 hours  aspirin enteric coated 81 milliGRAM(s) Oral daily  atorvastatin 40 milliGRAM(s) Oral at bedtime  enoxaparin Injectable 40 milliGRAM(s) SubCutaneous daily  furosemide    Tablet 40 milliGRAM(s) Oral daily  gabapentin 300 milliGRAM(s) Oral three times a day  influenza   Vaccine 0.5 milliLiter(s) IntraMuscular once  lidocaine   Patch 1 Patch Transdermal daily  lisinopril 20 milliGRAM(s) Oral daily  metoprolol succinate ER 75 milliGRAM(s) Oral daily  pantoprazole  Injectable 40 milliGRAM(s) IV Push every 12 hours  PARoxetine 60 milliGRAM(s) Oral daily  polyethylene glycol 3350 17 Gram(s) Oral every 12 hours  polyethylene glycol 3350 17 Gram(s) Oral daily  sodium chloride 0.9% lock flush 3 milliLiter(s) IV Push every 8 hours  traZODone 150 milliGRAM(s) Oral at bedtime    MEDICATIONS  (PRN):  ALPRAZolam 0.25 milliGRAM(s) Oral every 8 hours PRN anxiety  aluminum hydroxide/magnesium hydroxide/simethicone Suspension 30 milliLiter(s) Oral four times a day PRN Indigestion  bisacodyl Suppository 10 milliGRAM(s) Rectal daily PRN If no bowel movement by postoperative day #2  cyclobenzaprine 5 milliGRAM(s) Oral three times a day PRN Muscle Spasm  hydrALAZINE Injectable 10 milliGRAM(s) IV Push every 6 hours PRN for SBP more than 160  HYDROmorphone  Injectable 1.5 milliGRAM(s) IV Push every 4 hours PRN Severe Pain (7 - 10)  magnesium hydroxide Suspension 30 milliLiter(s) Oral daily PRN Constipation  ondansetron Injectable 4 milliGRAM(s) IV Push every 6 hours PRN Nausea and/or Vomiting  senna 2 Tablet(s) Oral at bedtime PRN Constipation  traMADol 50 milliGRAM(s) Oral every 6 hours PRN Mild Pain (1 - 3)    Physical Exam  T(C): 36.4 (02-22-20 @ 06:00), Max: 36.7 (02-21-20 @ 11:43)  HR: 84 (02-22-20 @ 06:43) (75 - 98)  BP: 128/80 (02-22-20 @ 06:43) (99/68 - 181/98)  RR: 18 (02-22-20 @ 06:00) (18 - 19)  SpO2: 92% (02-22-20 @ 06:00) (91% - 95%)  Gen: Alert, oriented, no distress  HEENT: Anicteric sclera, moist mucous membranes, no JVD, no lymphadenopathy  Cardio: Regular rhythm and rate, normal S1S2, no murmurs  Resp: Clear to auscultation bilaterally, no wheezing or rhonchi  GI: Nontender, nondistended, normoactive bowel sounds  Ext: No cyanosis, clubbing or edema, right ankle in external fixation brace  Neuro: Nonfocal    Labs:                        11.5   10.37 )-----------( 382      ( 22 Feb 2020 07:45 )             34.5     < from: Xray Chest 1 View- PORTABLE-Routine (02.21.20 @ 11:44) >    PROCEDURE DATE:  02/21/2020          INTERPRETATION:  Chest one view    HISTORY: CHF    COMPARISON STUDY: 2/19/2020    Frontal expiratory view of the chest shows the heart to be normal in size. The lungs are clear and there is no evidence of pneumothorax nor pleural effusion.    IMPRESSION:  No evidence of CHF.    Thank you for the courtesy of this referral.    < end of copied text >

## 2020-02-22 NOTE — PROGRESS NOTE ADULT - ASSESSMENT
A 58 y/o female with syncopal episode falling fracturing right ankle, adm with ARF, hyperkalemia, now resolved, poss upper GIB from increased intake of NSAIDs, hgb now stable, no further black stool. CT Abd R/O  Pancreatitis, Consulted by Dr Knight for DVT/PE prophylaxis, risk stratification and Anticoagulation Management.     PLAN:  - C/w Lovenox 40 mg sq daily x 4 weeks post-op for VTE prophylaxis  - Monitor daily CBC, BMP  - Encourage mobilization and maintain venodynes    Will continue to follow

## 2020-02-22 NOTE — PROGRESS NOTE ADULT - SUBJECTIVE AND OBJECTIVE BOX
58 y/o woman with a history of hypertension admitted following a syncopal event with a severe bimalleolar fracture dislocation of the right ankle joint and acute kidney injury with hospitalization complicated by (1) elevated troponin with cath revealing normal coronary arteries but LV dysfunction suggestive of TakoTsubo Cardiomyopathy with pulmonary edema, (2) anemia / GI Bleed  / melena, (3) possible pneumonia.    2/18/20  Patient is feeling nauseous on oxycodone , constipated , denies any chest pain , her back is down the lumbar area  , patient had cardiac cath showed issac coronaries ,  possible  takotsubo cardiomyopathy . on low dose BB ,   2/19/20  still somewhat dyspneic, on 2L O2, no chest pain.  2/20/2020:  Feels better with less dyspnea; R ankle pain  2/21/20: still w/ dyspnea.  tele w/ short run SVT no other arrhythmias.  2/22/20: less dyspnea today. no events on tele.        MEDICATIONS:    MEDICATIONS  (STANDING):  acetaminophen   Tablet .. 975 milliGRAM(s) Oral every 8 hours  albuterol/ipratropium for Nebulization 3 milliLiter(s) Nebulizer every 6 hours  aspirin enteric coated 81 milliGRAM(s) Oral daily  atorvastatin 40 milliGRAM(s) Oral at bedtime  enoxaparin Injectable 40 milliGRAM(s) SubCutaneous daily  furosemide    Tablet 40 milliGRAM(s) Oral daily  gabapentin 300 milliGRAM(s) Oral three times a day  influenza   Vaccine 0.5 milliLiter(s) IntraMuscular once  lidocaine   Patch 1 Patch Transdermal daily  lisinopril 20 milliGRAM(s) Oral daily  metoprolol succinate ER 75 milliGRAM(s) Oral daily  pantoprazole  Injectable 40 milliGRAM(s) IV Push every 12 hours  PARoxetine 60 milliGRAM(s) Oral daily  polyethylene glycol 3350 17 Gram(s) Oral every 12 hours  polyethylene glycol 3350 17 Gram(s) Oral daily  sodium chloride 0.9% lock flush 3 milliLiter(s) IV Push every 8 hours  traZODone 150 milliGRAM(s) Oral at bedtime    MEDICATIONS  (PRN):  ALPRAZolam 0.25 milliGRAM(s) Oral every 8 hours PRN anxiety  aluminum hydroxide/magnesium hydroxide/simethicone Suspension 30 milliLiter(s) Oral four times a day PRN Indigestion  bisacodyl Suppository 10 milliGRAM(s) Rectal daily PRN If no bowel movement by postoperative day #2  cyclobenzaprine 5 milliGRAM(s) Oral three times a day PRN Muscle Spasm  hydrALAZINE Injectable 10 milliGRAM(s) IV Push every 6 hours PRN for SBP more than 160  HYDROmorphone  Injectable 1.5 milliGRAM(s) IV Push every 3 hours PRN Pain  magnesium hydroxide Suspension 30 milliLiter(s) Oral daily PRN Constipation  ondansetron Injectable 4 milliGRAM(s) IV Push every 6 hours PRN Nausea and/or Vomiting  senna 2 Tablet(s) Oral at bedtime PRN Constipation  traMADol 50 milliGRAM(s) Oral every 6 hours PRN Mild Pain (1 - 3)      Vital Signs Last 24 Hrs  T(C): 36.4 (22 Feb 2020 11:51), Max: 36.7 (21 Feb 2020 21:27)  T(F): 97.6 (22 Feb 2020 11:51), Max: 98 (21 Feb 2020 21:27)  HR: 88 (22 Feb 2020 11:51) (81 - 98)  BP: 96/59 (22 Feb 2020 11:51) (96/59 - 158/99)  BP(mean): --  RR: 18 (22 Feb 2020 11:51) (18 - 18)  SpO2: 92% (22 Feb 2020 11:51) (91% - 92%)Daily     Daily I&O's Summary      PHYSICAL EXAM:    PHYSICAL EXAM:  Constitutional: NAD, awake and alert  Respiratory: Nonlabored, no crackles  Cardiovascular: S1 and S2, regular  Gastrointestinal: Bowel Sounds present, soft, nontender.   Skin: No rashes.  Ext: R ankle hardware      LABS: All Labs Reviewed:                        11.5   10.37 )-----------( 382      ( 22 Feb 2020 07:45 )             34.5                         11.9   9.79  )-----------( 369      ( 21 Feb 2020 08:34 )             35.6                         10.4   8.87  )-----------( 316      ( 20 Feb 2020 07:29 )             32.1     20 Feb 2020 07:29    139    |  100    |  18     ----------------------------<  101    3.4     |  35     |  0.89     Ca    8.7        20 Feb 2020 07:29      Cardiac Cath Lab - Adult (02.17.20 @ 11:44):   Normal coronary arteries.  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   Moderately reduced left ventricular systolic function with segmental wall motion abnormalities.  Normal LV systolic function. Estimated LV ejection fraction is 35 %.  Elevated left ventricular end-diastolic pressure.    Transthoracic Echocardiogram (02.18.20 @ 10:13): Left ventricle systolic function appears moderately reduced. Apical segments are hyperkinetic, basal and mid segments are hypokinetic. Visual estimation of left ventricle ejection fraction is 40%.

## 2020-02-22 NOTE — PHYSICAL THERAPY INITIAL EVALUATION ADULT - PERTINENT HX OF CURRENT PROBLEM, REHAB EVAL
Pt c/o right ankle pain after a fall on 2/12, for which she was splinted at [facility] ED. She states that she was NPO for planned epidural procedure for pain control, she was getting dressed and while putting on earrings she had unwitnessed LOC and fell from standing ,injuring her right ankle. Pt also had 5 episodes of diarrhea 2 days ago with LLQ pain which improved. Acute hypoxic respiratory failure in the setting of acute on chronic systolic CHF / Aspiration pneumonia so pt was transferred to SD,

## 2020-02-22 NOTE — PROGRESS NOTE ADULT - SUBJECTIVE AND OBJECTIVE BOX
HPI: Pt is a 60 y/o female with a PMHx of anxiety/depression, asthma, HTN on enalapril, spinal stenosis, osteoarthritis, seasonal allergies who was sent to the ED by Dr. Knight today for ankle injury requiring surgery.  Pt c/o right ankle pain after a fall yesterday, for which she was splinted at [facility] ED. Pt also had 5 episodes of diarrhea 2 days ago with LLQ pain which improved yesterday.  day prior to admission pt was NPO for planned epidural procedure for pain control. At 11 am she was getting dressed yesterday and while putting on earrings she had unwitnessed LOC and  fell from standing , injuring her right ankle sustaining a left ankle fracture She denies preceding cpain/SOB/palpitations/dizziness.  She does not recall tripping over anything. Pt reports she stopped taking motrin 600mg BID , approximately 5 days  ago for the planned epidural procedure for pain control.  She was on mobic until 2020.  She has noticed increasingly dark stools over the last 1-2 weeks. She also reports some difficulty swallowing solids for 2-3 weeks.        In the ED , pt is found to have ARF, hyperkalemia, anemia and hypotension to 85/38.  Pt reported she may have accidentally taken an extra dose of her Clonidine today.     INTERVAL HISTORY:  she is now on 3 north s/p Right ankleplacement of ext-fix  : seen at bedside,  anxious, vomiting, states nausea form pain meds  2020: Pt seen at bedside in SICU with  at side. She reports she s/p cardiac cath, no abnormal findings per , and off of UFH per primary RN. Pt with VTE risk factors.   : seen at bedside, anxious, c/o lower back pain, not new  : seen at bedside, c/o n/v still anxious.  : Pt seen at bedside c/o leg pain with movement, pain medication received  2020 Pt seen at bedside on 3 east, c/o pain to ankle, Discussed his anticoagulation with lovenox.  Benefits and risks discussed. Pt states she has no concerns.  2020: Pt seen at bedside, no issues with bleeding.    Fam HX:  Father DM, had CVa  Mother COPD  Brother  at 65 of alcoholism  Sister has lung ca (2020 18:56)    Consulted by Dr. Rogers for VTE prophylaxis, risk stratification, and anticoagulation management.    PAST MEDICAL & SURGICAL HISTORY:  Hypertension  Anxiety and depression  Seasonal allergies  Asthma  Osteoarthritis  S/P hip replacement: left  S/P tubal ligation  S/P cholecystectomy    CrCl: 69.7    Caprini VTE Risk Score: CAPRINI SCORE  AGE RELATED RISK FACTORS                                                       MOBILITY RELATED FACTORS  [x ] Age 41-60 years                                            (1 Point)                  [ ] Bed rest                                                        (1 Point)  [ ] Age: 61-74 years                                           (2 Points)                [ ] Plaster cast                                                   (2 Points)  [ ] Age= 75 years                                              (3 Points)                 [ ] Bed bound for more than 72 hours                   (2 Points)    DISEASE RELATED RISK FACTORS                                               GENDER SPECIFIC FACTORS  [ ] Edema in the lower extremities                       (1 Point)           [ ] Pregnancy                                                            (1 Point)  [ ] Varicose veins                                               (1 Point)                  [ ] Post-partum < 6 weeks                                      (1 Point)             x[ ] BMI > 25 Kg/m2                                            (1 Point)                  [ ] Hormonal therapy or oral contraception       (1 Point)                 [ ] Sepsis (in the previous month)                        (1 Point)             [ ] History of pregnancy complications                (1Point)  [ ] Pneumonia or serious lung disease                                             [ ] Unexplained or recurrent  (>/= 3), premature                                 (In the previous month)                               (1 Point)                birth with toxemia or growth-restricted infant (1 Point)  [ ] Abnormal pulmonary function test            (1 Point)                                   SURGERY RELATED RISK FACTORS  [ ] Acute myocardial infarction                       (1 Point)                  [ ]  Section                                         (1 Point)  [ ] Congestive heart failure (in the previous month) (1 Point)   [ ] Minor surgery   lasting <45 minutes       (1 Point)   [ ] Inflammatory bowel disease                             (1 Point)          [ ] Arthroscopic surgery                                  (2 Points)  [ ] Central venous access                                    (2 Points)            [x ] General surgery lasting >45 minutes      (2 Points)       [ ] Stroke (in the previous month)                  (5 Points)            [ ] Elective major lower extremity arthroplasty (5 Points)                                   [  ] Malignancy (present or past include skin melanoma                                          but exclude  basal skin cell)    (2 points)                                      TRAUMA RELATED RISK FACTORS                HEMATOLOGY RELATED FACTORS                                  [x ] Fracture of the hip, pelvis, or leg                       (5 Points)  [ ] Prior episodes of VTE                                     (3 Points)          [ ] Acute spinal cord injury (in the previous month)  (5 Points)  [ ] Positive family history for VTE                         (3 Points)       [ ] Paralysis (less than 1 month)                          (5 Points)  [ ] Prothrombin 60069 A                                      (3 Points)         [ ] Multiple Trauma (within 1month)                 (5Points)                                                                                                                                                                [ ] Factor V Leiden                                          (3 Points)                                OTHER RISK FACTORS                          [ ] Lupus anticoagulants                                     (3 Points)                       [ ] BMI > 40                          (1 Point)                                                         [ ] Anticardiolipin antibodies                                (3 Points)                   [ ] Smoking                              (1Point)                                                [ ] High homocysteine in the blood                      (3 Points)                [  ] Diabetes requiring insulin (1point)                         [ ] Other congenital or acquired thrombophilia       (3 Points)          [  ] Chemotherapy                   (1 Point)  [ ] Heparin induced thrombocytopenia                  (3 Points)             [  ] Blood Transfusion                (1 point)                                                                                                         Total Score [    9      ]                                                                                                                                                                                                                 IMPROVE Bleeding Risk Score 1    Falls Risk:   High ( x )  Mod (  )  Low (  )      FAMILY HISTORY:  Family history of COPD (chronic obstructive pulmonary disease)  Family history of CVA  Denies any personal or familial history of clotting or bleeding disorders.    Allergies  No Known Allergies  Intolerances    REVIEW OF SYSTEMS    (  )Fever	     (  )Constipation	(  )SOB				(  )Headache	(  )Dysuria  (  )Chills	     (  )Melena	(  )Dyspnea present on exertion	                    (  )Dizziness                    (  )Polyuria  (  )Nausea	     (  )Hematochezia	(  )Cough			                    (  )Syncope   	(  )Hematuria  (  )Vomiting    (  )Chest Pain	(  )Wheezing			(  )Weakness  (  )Diarrhea     (  )Palpitations	(  )Anorexia			( x )joint pain    All  other review of systems negative: Yes    Vital Signs Last 24 Hrs  T(C): 36.4 (20 @ 11:51), Max: 36.7 (20 @ 21:27)  T(F): 97.6 (20 @ 11:51), Max: 98 (20 @ 21:27)  HR: 75 (20 @ 14:06) (75 - 98)  BP: 96/59 (20 @ 11:51) (96/59 - 158/99)  BP(mean): --  RR: 18 (20 @ 11:51) (18 - 18)  SpO2: 92% (20 @ 11:51) (91% - 92%)    Constitutional: Appears Well    Neurological: A& O x 4    Skin: Warm    Respiratory and Thorax: normal effort; Breath sounds: normal; No rales/wheezing/rhonchi  	  Cardiovascular: S1, S2, regular, NMBR	    Gastrointestinal: BS + x 4Q, nontender	    Genitourinary:  Bladder nondistended, nontender    Musculoskeletal:   General Right:   + muscle/joint tenderness,   normal tone, no joint swelling,   ROM: limited	    General Left:   no muscle/joint tenderness,   normal tone, no joint swelling,   ROM: full    Right ankle: Drsing CDI; ext fix in place, tos warm and mobile Cap refill good; +Sensation intact                      Lower extrems:   Right: no calf tenderness              negative melba's sign               + pedal pulses    Left:   no calf tenderness              negative melba's sign               + pedal pulses    LABS:                        11.5   10.37 )-----------( 382      ( 2020 07:45 )             34.5       RADIOLOGY:  EXAM:  CT ANKLE ONLY RT                        EXAM:  CT 3D RECONSTRUCT WO AJ                          PROCEDURE DATE:  2020      IMPRESSION: Trimalleolar fracture as above. No significant change in fracture appearance or alignment compared to previous plain film exams.    DVT Prophylaxis:  LMWH                   ( x )  Heparin SQ           (  )  Coumadin             (  )  Xarelto                  (  )  Eliquis                   (  )  Venodynes           (x  )  Ambulation          ( x )  UFH                       (  )  Contraindicated  (  )  EC ASPIRIN       (  )

## 2020-02-22 NOTE — PROGRESS NOTE ADULT - PROBLEM SELECTOR PLAN 4
Pt is scheduled for colonoscopy on Mon & orthopedic surgery on tues.  She has no obstructive CAD on recent cath.  She is euvolemic.  She is optimized from a cardiac standpoint for both procedures & may proceed w/o further cardiac testing.  Low risk for cardiac events for low risk procedures.

## 2020-02-22 NOTE — PROGRESS NOTE ADULT - PROBLEM SELECTOR PLAN 1
Possible TakoTsubo cardiomyopathy; currently euvolemic Cont. lasix to 40 po daily, cont. metoprolol, lisinopril.

## 2020-02-22 NOTE — PROGRESS NOTE ADULT - PROBLEM SELECTOR PROBLEM 3
Anesthesia reports 150mg Propofol and 450mL NS given during procedure. Received report from anesthesia staff on vital signs and status of patient. HTN (hypertension)

## 2020-02-22 NOTE — PROGRESS NOTE ADULT - SUBJECTIVE AND OBJECTIVE BOX
Patient is a 59y old  Female who presents with a chief complaint of ARF  concern for ATN  Hyperkalemia  Chronic back pain  Increasing anemia  Melena/GI bleed  Left sided Colitis with diarrhea, dehydration  ankle fx after syncope and fall yesterday (22 Feb 2020 07:53)      Subective:  Feels good. No N/V today    PAST MEDICAL & SURGICAL HISTORY:  Hypertension  Anxiety and depression  Seasonal allergies  Asthma  Osteoarthritis  S/P hip replacement: left  S/P tubal ligation  S/P cholecystectomy      MEDICATIONS  (STANDING):  acetaminophen   Tablet .. 975 milliGRAM(s) Oral every 8 hours  albuterol/ipratropium for Nebulization 3 milliLiter(s) Nebulizer every 6 hours  aspirin enteric coated 81 milliGRAM(s) Oral daily  atorvastatin 40 milliGRAM(s) Oral at bedtime  enoxaparin Injectable 40 milliGRAM(s) SubCutaneous daily  furosemide    Tablet 40 milliGRAM(s) Oral daily  gabapentin 300 milliGRAM(s) Oral three times a day  influenza   Vaccine 0.5 milliLiter(s) IntraMuscular once  lidocaine   Patch 1 Patch Transdermal daily  lisinopril 20 milliGRAM(s) Oral daily  metoprolol succinate ER 75 milliGRAM(s) Oral daily  pantoprazole  Injectable 40 milliGRAM(s) IV Push every 12 hours  PARoxetine 60 milliGRAM(s) Oral daily  polyethylene glycol 3350 17 Gram(s) Oral every 12 hours  polyethylene glycol 3350 17 Gram(s) Oral daily  sodium chloride 0.9% lock flush 3 milliLiter(s) IV Push every 8 hours  traZODone 150 milliGRAM(s) Oral at bedtime    MEDICATIONS  (PRN):  ALPRAZolam 0.25 milliGRAM(s) Oral every 8 hours PRN anxiety  aluminum hydroxide/magnesium hydroxide/simethicone Suspension 30 milliLiter(s) Oral four times a day PRN Indigestion  bisacodyl Suppository 10 milliGRAM(s) Rectal daily PRN If no bowel movement by postoperative day #2  cyclobenzaprine 5 milliGRAM(s) Oral three times a day PRN Muscle Spasm  hydrALAZINE Injectable 10 milliGRAM(s) IV Push every 6 hours PRN for SBP more than 160  HYDROmorphone  Injectable 1.5 milliGRAM(s) IV Push every 4 hours PRN Severe Pain (7 - 10)  magnesium hydroxide Suspension 30 milliLiter(s) Oral daily PRN Constipation  ondansetron Injectable 4 milliGRAM(s) IV Push every 6 hours PRN Nausea and/or Vomiting  senna 2 Tablet(s) Oral at bedtime PRN Constipation  traMADol 50 milliGRAM(s) Oral every 6 hours PRN Mild Pain (1 - 3)      REVIEW OF SYSTEMS:    RESPIRATORY: No shortness of breath  CARDIOVASCULAR: No chest pain  All other review of systems is negative unless indicated above.    Vital Signs Last 24 Hrs  T(C): 36.4 (22 Feb 2020 06:00), Max: 36.7 (21 Feb 2020 11:43)  T(F): 97.6 (22 Feb 2020 06:00), Max: 98.1 (21 Feb 2020 11:43)  HR: 84 (22 Feb 2020 06:43) (75 - 98)  BP: 128/80 (22 Feb 2020 06:43) (99/68 - 181/98)  BP(mean): --  RR: 18 (22 Feb 2020 06:00) (18 - 19)  SpO2: 92% (22 Feb 2020 06:00) (91% - 95%)    PHYSICAL EXAM:    Constitutional: NAD, well-developed  Respiratory: CTAB  Cardiovascular: S1 and S2, RRR  Gastrointestinal: BS+, soft, NT/ND  Extremities: No peripheral edema  Psychiatric: Normal mood, normal affect    LABS:                        11.5   10.37 )-----------( 382      ( 22 Feb 2020 07:45 )             34.5                 RADIOLOGY & ADDITIONAL STUDIES:

## 2020-02-22 NOTE — PROGRESS NOTE ADULT - ASSESSMENT
59/F admitted with     # Syncope: can be secondary to orthostatic causes in combination with cardiac cause  - tele monitoring   - new low EF with recent cath 2/17 with normal coronaries  - elevated trops    - medication optimization for GDT    # Ankle pain  - PCI initiated  - care discussed with ortho  - anticipated ankle surgery on Tuesday    # N/V  -endoscopy on Monday    # Acute hypoxic respiratory failure in the setting of acute on chronic systolic CHF /  Aspiration pneumonia /  reactive airway dz  - d/c fluids  - started on duoneb treatments as patient has wheezing now  - IV zosyn d/tyrel on 2/18  - now off O2, speaking full sentences     # NSTEMI  - s/p cardiac cath 2/17:  Normal coronary arteries.  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 Moderately reduced left ventricular systolic function with segmental wall   motion abnormalities.  Normal LV systolic function. Estimated LV ejection fraction is 35 %.  Elevated left ventricular end-diastolic pressure.   - cardiology following .    # Peripancreatic stranding on CT chest:  - CT: Peripancreatic stranding, new since the previous abdominal CT dated 2/14/2020, suggestive of acute pancreatitis. Clinical correlation with pancreatic enzymes is recommended.  - patient's abdominal pain could be also secondary to (L) sided colitis noted on 2/14 CT scan  - tolerating po diet  - check lipase    # Right Ankle Fx:   - s/p right ankle External fixator application  - pain meds prn  - follow u with Dr. Knight in office for definitive fixation    # ARF:   - likely sec to NSAIDS; resolved    # Melena and Dark colored stools + Acute Anemia of acte hemorrhage from GI bleed:  - likely from NSAIDS   - d/c NSAIDS  - cont Protonix  - s/p PRBC 2/15  - H/H q 8 hrs; transfuse for Hb less than 8  EGD on Monday    # Hypotension:  - resolved     # Hyperkalemia    - resolved      DVT PPX: lovenox

## 2020-02-22 NOTE — CHART NOTE - NSCHARTNOTEFT_GEN_A_CORE
Patient was s/e at bedside by resident and Dr. Holley. Spine condition discussed at length with patient. Patient told that she would likely benefit from surgical intervention in the coming months. However, at this point, recogized primary fixation of the ankle prior to proceeding with spine surgery. Patient verbalized understanding of the plans the next day.     Recommendations:  1) Post void residual  2) Urology consult outpatient for retention    -At present, No acute orthopaedic spine surgical intervention at this time.  -Outpatient follow up with attending on call, Dr. Holley in 1-2 weeks following discharge. Please call office for an appointment.  -Reconsult ortho spine as necessary Patient was s/e at bedside by resident and Dr. Holley. Spine condition discussed at length with patient. Patient told that she would likely benefit from surgical intervention in the coming months. However, at this point, primary fixation of the ankle prior to proceeding with spine surgery. Patient verbalized understanding of the plan.    Recommendations:  1) Post void residual  2) Urology consult outpatient for retention    -At present, No acute orthopaedic spine surgical intervention at this time.  -Outpatient follow up with attending on call, Dr. Holley in 1-2 weeks following discharge. Please call office for an appointment.  -Reconsult ortho spine as necessary

## 2020-02-22 NOTE — PROGRESS NOTE ADULT - ASSESSMENT
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 for wheezing  -   follow-up of K and magnesium while on diuretics  -   patient sat are comfortable   -   adequate DVT prophylaxis with recent ankle surgery. - hypoxemic respiratory failure from acute pulmonary edema  - minimal patchy air space disease less likely pneumonia   - takotsubu cardiomyopthy  b  - history of mild asthma with exacerbation  - status post fall ankle fracture and repair   - anemia with the G.I bleeding with the stable hb now   - acute renal failure resolved   - spinal stenosis   - nausea     PLAN   - continue to keep on low dose diuretics as tolerate by the B.P and renal function   - effusions are small and do not need drainage now   - use of nebs p.r.n. for wheezing  - follow-up of K and magnesium while on diuretics  - adequate DVT prophylaxis

## 2020-02-22 NOTE — PHYSICAL THERAPY INITIAL EVALUATION ADULT - DIAGNOSIS, PT EVAL
Trimalleolar Right ankle fx, to OR for definitive fixation this week, NWB RLE, Hypotension, s/p cardiac cath 2/17:, NSTEMI, Acute hypoxic respiratory failure in the setting of acute on chronic systolic CHF /  Aspiration pneumonia

## 2020-02-23 LAB
ANION GAP SERPL CALC-SCNC: 6 MMOL/L — SIGNIFICANT CHANGE UP (ref 5–17)
BUN SERPL-MCNC: 18 MG/DL — SIGNIFICANT CHANGE UP (ref 7–23)
CALCIUM SERPL-MCNC: 8.7 MG/DL — SIGNIFICANT CHANGE UP (ref 8.5–10.1)
CHLORIDE SERPL-SCNC: 101 MMOL/L — SIGNIFICANT CHANGE UP (ref 96–108)
CO2 SERPL-SCNC: 33 MMOL/L — HIGH (ref 22–31)
CREAT SERPL-MCNC: 1.02 MG/DL — SIGNIFICANT CHANGE UP (ref 0.5–1.3)
GLUCOSE SERPL-MCNC: 106 MG/DL — HIGH (ref 70–99)
HCT VFR BLD CALC: 33 % — LOW (ref 34.5–45)
HGB BLD-MCNC: 11.1 G/DL — LOW (ref 11.5–15.5)
MCHC RBC-ENTMCNC: 31.8 PG — SIGNIFICANT CHANGE UP (ref 27–34)
MCHC RBC-ENTMCNC: 33.6 GM/DL — SIGNIFICANT CHANGE UP (ref 32–36)
MCV RBC AUTO: 94.6 FL — SIGNIFICANT CHANGE UP (ref 80–100)
PLATELET # BLD AUTO: 354 K/UL — SIGNIFICANT CHANGE UP (ref 150–400)
POTASSIUM SERPL-MCNC: 3.6 MMOL/L — SIGNIFICANT CHANGE UP (ref 3.5–5.3)
POTASSIUM SERPL-SCNC: 3.6 MMOL/L — SIGNIFICANT CHANGE UP (ref 3.5–5.3)
RBC # BLD: 3.49 M/UL — LOW (ref 3.8–5.2)
RBC # FLD: 13.4 % — SIGNIFICANT CHANGE UP (ref 10.3–14.5)
SODIUM SERPL-SCNC: 140 MMOL/L — SIGNIFICANT CHANGE UP (ref 135–145)
WBC # BLD: 10.73 K/UL — HIGH (ref 3.8–10.5)
WBC # FLD AUTO: 10.73 K/UL — HIGH (ref 3.8–10.5)

## 2020-02-23 PROCEDURE — 99233 SBSQ HOSP IP/OBS HIGH 50: CPT

## 2020-02-23 PROCEDURE — 99238 HOSP IP/OBS DSCHRG MGMT 30/<: CPT

## 2020-02-23 PROCEDURE — 99231 SBSQ HOSP IP/OBS SF/LOW 25: CPT

## 2020-02-23 RX ORDER — ALPRAZOLAM 0.25 MG
0.25 TABLET ORAL ONCE
Refills: 0 | Status: DISCONTINUED | OUTPATIENT
Start: 2020-02-23 | End: 2020-02-23

## 2020-02-23 RX ADMIN — HYDROMORPHONE HYDROCHLORIDE 1.5 MILLIGRAM(S): 2 INJECTION INTRAMUSCULAR; INTRAVENOUS; SUBCUTANEOUS at 21:44

## 2020-02-23 RX ADMIN — HYDROMORPHONE HYDROCHLORIDE 1.5 MILLIGRAM(S): 2 INJECTION INTRAMUSCULAR; INTRAVENOUS; SUBCUTANEOUS at 13:16

## 2020-02-23 RX ADMIN — Medication 975 MILLIGRAM(S): at 22:00

## 2020-02-23 RX ADMIN — Medication 975 MILLIGRAM(S): at 21:52

## 2020-02-23 RX ADMIN — Medication 150 MILLIGRAM(S): at 21:44

## 2020-02-23 RX ADMIN — LIDOCAINE 1 PATCH: 4 CREAM TOPICAL at 10:29

## 2020-02-23 RX ADMIN — GABAPENTIN 300 MILLIGRAM(S): 400 CAPSULE ORAL at 21:43

## 2020-02-23 RX ADMIN — Medication 975 MILLIGRAM(S): at 14:13

## 2020-02-23 RX ADMIN — Medication 0.25 MILLIGRAM(S): at 18:30

## 2020-02-23 RX ADMIN — SODIUM CHLORIDE 3 MILLILITER(S): 9 INJECTION INTRAMUSCULAR; INTRAVENOUS; SUBCUTANEOUS at 14:13

## 2020-02-23 RX ADMIN — LIDOCAINE 1 PATCH: 4 CREAM TOPICAL at 21:52

## 2020-02-23 RX ADMIN — SODIUM CHLORIDE 3 MILLILITER(S): 9 INJECTION INTRAMUSCULAR; INTRAVENOUS; SUBCUTANEOUS at 06:20

## 2020-02-23 RX ADMIN — Medication 975 MILLIGRAM(S): at 06:36

## 2020-02-23 RX ADMIN — HYDROMORPHONE HYDROCHLORIDE 1.5 MILLIGRAM(S): 2 INJECTION INTRAMUSCULAR; INTRAVENOUS; SUBCUTANEOUS at 10:15

## 2020-02-23 RX ADMIN — HYDROMORPHONE HYDROCHLORIDE 1.5 MILLIGRAM(S): 2 INJECTION INTRAMUSCULAR; INTRAVENOUS; SUBCUTANEOUS at 22:44

## 2020-02-23 RX ADMIN — LIDOCAINE 1 PATCH: 4 CREAM TOPICAL at 19:00

## 2020-02-23 RX ADMIN — GABAPENTIN 300 MILLIGRAM(S): 400 CAPSULE ORAL at 06:31

## 2020-02-23 RX ADMIN — Medication 60 MILLIGRAM(S): at 10:28

## 2020-02-23 RX ADMIN — Medication 0.25 MILLIGRAM(S): at 10:28

## 2020-02-23 RX ADMIN — Medication 3 MILLILITER(S): at 19:45

## 2020-02-23 RX ADMIN — PANTOPRAZOLE SODIUM 40 MILLIGRAM(S): 20 TABLET, DELAYED RELEASE ORAL at 06:31

## 2020-02-23 RX ADMIN — HYDROMORPHONE HYDROCHLORIDE 1.5 MILLIGRAM(S): 2 INJECTION INTRAMUSCULAR; INTRAVENOUS; SUBCUTANEOUS at 15:16

## 2020-02-23 RX ADMIN — PANTOPRAZOLE SODIUM 40 MILLIGRAM(S): 20 TABLET, DELAYED RELEASE ORAL at 18:30

## 2020-02-23 RX ADMIN — HYDROMORPHONE HYDROCHLORIDE 1.5 MILLIGRAM(S): 2 INJECTION INTRAMUSCULAR; INTRAVENOUS; SUBCUTANEOUS at 15:24

## 2020-02-23 RX ADMIN — ENOXAPARIN SODIUM 40 MILLIGRAM(S): 100 INJECTION SUBCUTANEOUS at 09:17

## 2020-02-23 RX ADMIN — GABAPENTIN 300 MILLIGRAM(S): 400 CAPSULE ORAL at 14:13

## 2020-02-23 RX ADMIN — Medication 75 MILLIGRAM(S): at 06:31

## 2020-02-23 RX ADMIN — Medication 975 MILLIGRAM(S): at 06:30

## 2020-02-23 RX ADMIN — Medication 81 MILLIGRAM(S): at 09:17

## 2020-02-23 RX ADMIN — Medication 40 MILLIGRAM(S): at 06:30

## 2020-02-23 RX ADMIN — Medication 3 MILLILITER(S): at 13:32

## 2020-02-23 RX ADMIN — ATORVASTATIN CALCIUM 40 MILLIGRAM(S): 80 TABLET, FILM COATED ORAL at 21:42

## 2020-02-23 RX ADMIN — HYDROMORPHONE HYDROCHLORIDE 1.5 MILLIGRAM(S): 2 INJECTION INTRAMUSCULAR; INTRAVENOUS; SUBCUTANEOUS at 12:16

## 2020-02-23 RX ADMIN — LISINOPRIL 20 MILLIGRAM(S): 2.5 TABLET ORAL at 06:31

## 2020-02-23 RX ADMIN — HYDROMORPHONE HYDROCHLORIDE 1.5 MILLIGRAM(S): 2 INJECTION INTRAMUSCULAR; INTRAVENOUS; SUBCUTANEOUS at 09:11

## 2020-02-23 RX ADMIN — SODIUM CHLORIDE 3 MILLILITER(S): 9 INJECTION INTRAMUSCULAR; INTRAVENOUS; SUBCUTANEOUS at 21:53

## 2020-02-23 RX ADMIN — HYDROMORPHONE HYDROCHLORIDE 1.5 MILLIGRAM(S): 2 INJECTION INTRAMUSCULAR; INTRAVENOUS; SUBCUTANEOUS at 00:27

## 2020-02-23 NOTE — PROGRESS NOTE ADULT - SUBJECTIVE AND OBJECTIVE BOX
Patient seen and examined at bedside. No acute events over night. No acute complaints at this time. Pain well controlled. Denies chest pain, shortness of breath, nausea or vomiting.     PE:  Vital Signs Last 24 Hrs  T(C): 36.6 (02-23-20 @ 06:36), Max: 36.6 (02-22-20 @ 22:09)  T(F): 97.8 (02-23-20 @ 06:36), Max: 97.9 (02-22-20 @ 22:09)  HR: 81 (02-23-20 @ 06:36) (75 - 88)  BP: 108/68 (02-23-20 @ 06:36) (96/59 - 108/68)  BP(mean): --  RR: 16 (02-23-20 @ 06:36) (16 - 18)  SpO2: 95% (02-23-20 @ 06:36) (92% - 96%)    General: NAD, resting comfortably in bed  RLE:   Ex fix in place C/D/I  SCD in place contralaterally  No calf tenderness contralterally  Able to grossly wiggle toes  Gross sesnsation to toes intact  Cap refill <2 seconds                          11.1   10.73 )-----------( 354      ( 23 Feb 2020 07:29 )             33.0               A/P:  59y f s/p ex fix to OR Tuesday or Wednesday for ORIF of right ankle  -PT/OT NWB RLE in trilam splint  -Pain Control  -DVT ppx w/a81 and lovenox  -FU AM Labs  -Rest, ice, compress and elevate the extremity as we needed  -Incentive Spirometry  -Medical management appreciated Patient seen and examined at bedside. No acute events over night. No acute complaints at this time. Pain well controlled. Denies chest pain, shortness of breath, nausea or vomiting.     PE:  Vital Signs Last 24 Hrs  T(C): 36.6 (02-23-20 @ 06:36), Max: 36.6 (02-22-20 @ 22:09)  T(F): 97.8 (02-23-20 @ 06:36), Max: 97.9 (02-22-20 @ 22:09)  HR: 81 (02-23-20 @ 06:36) (75 - 88)  BP: 108/68 (02-23-20 @ 06:36) (96/59 - 108/68)  BP(mean): --  RR: 16 (02-23-20 @ 06:36) (16 - 18)  SpO2: 95% (02-23-20 @ 06:36) (92% - 96%)    General: NAD, resting comfortably in bed  RLE:   Ex fix in place C/D/I  SCD in place contralaterally  No calf tenderness contralterally  Able to grossly wiggle toes  Gross sesnsation to toes intact  Cap refill <2 seconds                          11.1   10.73 )-----------( 354      ( 23 Feb 2020 07:29 )             33.0               A/P:  59y f s/p ex fix to OR Tuesday or Wednesday for ORIF of right ankle  -PT/OT NWB RLE in trilam splint  -Pain Control  -DVT ppx w/a81 and lovenox  -FU AM Labs  -Rest, ice, compress and elevate the extremity as we needed  -Incentive Spirometry  -Medical management appreciated  -Plesae continue to document medical clearance  -FU EG on Monday

## 2020-02-23 NOTE — CHART NOTE - NSCHARTNOTEFT_GEN_A_CORE
Nurse called reporting pt have anxiety and is unable to sleep. Pt had a previous order of Alprazolam 0.25 mg PO every 8 hr PRN anxiety that was auto discontinued. A dose of alprazolam 0.25mg PO given. Reevaluate the need to continue in Alprazolam. There is no evidence of outpatient use of benzodiazepines.

## 2020-02-23 NOTE — PROGRESS NOTE ADULT - SUBJECTIVE AND OBJECTIVE BOX
HPI: 59/F with PMHx of anxiety/ depression, asthma,  HTN on enalapril, spinal stenosis, osteoarthritis, seasonal allergies who was sent to the ED by Dr. Knight for ankle injury requiring surgery.  Pt  c/o right ankle pain after a fall on 2/12, for which she was splinted at [facility] ED.   She states that  she was NPO for planned epidural procedure for pain control .  At 11 am she was getting dressed  and while putting on earrings she had unwitnessed LOC and  fell from standing , injuring her right ankle.  She does not remember the incident. She states that she was standing and next thing she found was that she was on floor with her ankle was twisted. She denies preceding cpain/SOB/palpitations/dizziness.  She does not recall tripping over anything.        Pt also had 5 episodes of diarrhea 2 days ago with LLQ pain which improved.     Pt reports she stopped taking motrin 600mg BID , approximately 5 days  ago for the planned epidural procedure for pain control.  She was on mobic until January 2020.      She has noticed increasingly dark stools over the last 1-2 weeks.    She also reports some difficulty swallowing solids for 2-3 weeks.        2/21: seen and eval. Hemodynamically stable. Denies any HA, CP, SOB. No fevers, chills or shakes. Patient continues to complaint about pain (ankle pain), also has N/V this am with abdominal pain.     2/22: c/o pain in right ankle and asking for more pain meds    2/23: condition same  EGD on 2/24    PHYSICAL EXAM:    Vital Signs Last 24 Hrs  T(C): 36.6 (23 Feb 2020 06:36), Max: 36.6 (22 Feb 2020 22:09)  T(F): 97.8 (23 Feb 2020 06:36), Max: 97.9 (22 Feb 2020 22:09)  HR: 82 (23 Feb 2020 13:34) (79 - 85)  BP: 108/68 (23 Feb 2020 06:36) (108/56 - 108/68)  BP(mean): --  RR: 16 (23 Feb 2020 06:36) (16 - 18)  SpO2: 95% (23 Feb 2020 06:36) (95% - 96%)    Constitutional: Weak appearing   HEENT: Atraumatic, DEREK, Normal, No congestion  Respiratory: B/l CTA  Cardiovascular: N S1S2;   Gastrointestinal: abdominal pain, + tenderness on deep palpation  Extremities: No edema, peripheral pulses present; right ankle dressed/ex fix  Neurological: AAO x 3, no gross focal motor deficits  Skin: Non cellulitic, no rash, ulcers  Lymph Nodes: No lymphadenopathy noted  Back: No CVA tenderness   Musculoskeletal: non tender  Breasts: Deferred  Genitourinary: deferred  Rectal: Deferred      Labs:                  11.1   10.73  )----------(  354       ( 23 Feb 2020 07:29 )               33.0      140    |  101    |  18     ----------------------------<  106        ( 23 Feb 2020 07:29 )  3.6     |  33     |  1.02     Ca    8.7        ( 23 Feb 2020 07:29 )                                  < from: CT Chest No Cont (02.16.20 @ 16:51) >  Impression:    Patchy infiltrates in both lungs with nodular feature, right greater than left, suggestive of pneumonia. Follow-up chest CT may be pursued in 4-6 weeks to ensure resolution. Attention should be directed to the small 6 mm left lower lobe lung nodule on the follow-up chestCT to ensure stability.    No pneumothorax.    Interlobular septal thickening in both lungs, suggestive of interstitial pulmonary edema.    New mild peribronchial cuffing, suggestive of reactive airway disease.    Peripancreatic stranding, new since the previous abdominal CT dated 2/14/2020, suggestive of acute pancreatitis. Clinical correlation with pancreatic enzymes is recommended.    Stable 0.8 cm splenic artery aneurysm with mural calcification.    Small bilateral pleural effusions.    Stable small pericardial effusion.    < end of copied text >    < from: CT Head No Cont (02.16.20 @ 16:51) >  Impression:     No intracranial hemorrhage, mass effect or CT evidence of acute infarct      < end of copied text >        MEDICATIONS  (STANDING):  acetaminophen   Tablet .. 975 milliGRAM(s) Oral every 8 hours  albuterol/ipratropium for Nebulization 3 milliLiter(s) Nebulizer every 6 hours  aspirin enteric coated 81 milliGRAM(s) Oral daily  atorvastatin 40 milliGRAM(s) Oral at bedtime  enoxaparin Injectable 40 milliGRAM(s) SubCutaneous daily  furosemide    Tablet 40 milliGRAM(s) Oral daily  gabapentin 300 milliGRAM(s) Oral three times a day  influenza   Vaccine 0.5 milliLiter(s) IntraMuscular once  lidocaine   Patch 1 Patch Transdermal daily  lisinopril 20 milliGRAM(s) Oral daily  metoprolol succinate ER 75 milliGRAM(s) Oral daily  pantoprazole  Injectable 40 milliGRAM(s) IV Push every 12 hours  PARoxetine 60 milliGRAM(s) Oral daily  polyethylene glycol 3350 17 Gram(s) Oral every 12 hours  polyethylene glycol 3350 17 Gram(s) Oral daily  sodium chloride 0.9% lock flush 3 milliLiter(s) IV Push every 8 hours  traZODone 150 milliGRAM(s) Oral at bedtime

## 2020-02-23 NOTE — PROGRESS NOTE ADULT - SUBJECTIVE AND OBJECTIVE BOX
Subjective:  No SOB  Has leg pain  Denies chest pain    Review of Systems:  All 10 systems reviewed in detailed and found to be negative with the exception of what has already been described above    Allergies:  No Known Allergies    Meds  MEDICATIONS  (STANDING):  acetaminophen   Tablet .. 975 milliGRAM(s) Oral every 8 hours  albuterol/ipratropium for Nebulization 3 milliLiter(s) Nebulizer every 6 hours  aspirin enteric coated 81 milliGRAM(s) Oral daily  atorvastatin 40 milliGRAM(s) Oral at bedtime  enoxaparin Injectable 40 milliGRAM(s) SubCutaneous daily  furosemide    Tablet 40 milliGRAM(s) Oral daily  gabapentin 300 milliGRAM(s) Oral three times a day  influenza   Vaccine 0.5 milliLiter(s) IntraMuscular once  lidocaine   Patch 1 Patch Transdermal daily  lisinopril 20 milliGRAM(s) Oral daily  metoprolol succinate ER 75 milliGRAM(s) Oral daily  pantoprazole  Injectable 40 milliGRAM(s) IV Push every 12 hours  PARoxetine 60 milliGRAM(s) Oral daily  polyethylene glycol 3350 17 Gram(s) Oral every 12 hours  polyethylene glycol 3350 17 Gram(s) Oral daily  sodium chloride 0.9% lock flush 3 milliLiter(s) IV Push every 8 hours  traZODone 150 milliGRAM(s) Oral at bedtime    MEDICATIONS  (PRN):  ALPRAZolam 0.25 milliGRAM(s) Oral every 8 hours PRN anxiety  aluminum hydroxide/magnesium hydroxide/simethicone Suspension 30 milliLiter(s) Oral four times a day PRN Indigestion  bisacodyl Suppository 10 milliGRAM(s) Rectal daily PRN If no bowel movement by postoperative day #2  cyclobenzaprine 5 milliGRAM(s) Oral three times a day PRN Muscle Spasm  hydrALAZINE Injectable 10 milliGRAM(s) IV Push every 6 hours PRN for SBP more than 160  HYDROmorphone  Injectable 1.5 milliGRAM(s) IV Push every 3 hours PRN Pain  magnesium hydroxide Suspension 30 milliLiter(s) Oral daily PRN Constipation  ondansetron Injectable 4 milliGRAM(s) IV Push every 6 hours PRN Nausea and/or Vomiting  senna 2 Tablet(s) Oral at bedtime PRN Constipation    Physical Exam  T(C): 36.6 (02-23-20 @ 06:36), Max: 36.6 (02-22-20 @ 22:09)  HR: 81 (02-23-20 @ 06:36) (75 - 85)  BP: 108/68 (02-23-20 @ 06:36) (108/56 - 108/68)  RR: 16 (02-23-20 @ 06:36) (16 - 18)  SpO2: 95% (02-23-20 @ 06:36) (95% - 96%)  Gen: Alert, oriented, no distress  HEENT: Anicteric sclera, moist mucous membranes, no JVD, no lymphadenopathy  Cardio: Regular rhythm and rate, normal S1S2, no murmurs  Resp: Clear to auscultation bilaterally, no wheezing or rhonchi  GI: Nontender, nondistended, normoactive bowel sounds  Ext: No cyanosis, clubbing or edema, right ankle in external fixation brace  Neuro: Nonfocal    Labs:                        11.1   10.73 )-----------( 354      ( 23 Feb 2020 07:29 )             33.0     02-23    140  |  101  |  18  ----------------------------<  106<H>  3.6   |  33<H>  |  1.02    Ca    8.7      23 Feb 2020 07:29    < from: Xray Chest 1 View- PORTABLE-Routine (02.21.20 @ 11:44) >    PROCEDURE DATE:  02/21/2020          INTERPRETATION:  Chest one view    HISTORY: CHF    COMPARISON STUDY: 2/19/2020    Frontal expiratory view of the chest shows the heart to be normal in size. The lungs are clear and there is no evidence of pneumothorax nor pleural effusion.    IMPRESSION:  No evidence of CHF.

## 2020-02-23 NOTE — PROGRESS NOTE ADULT - ASSESSMENT
- hypoxemic respiratory failure from acute pulmonary edema  - minimal patchy air space disease less likely pneumonia   - takotsubu cardiomyopthy   - history of mild asthma with exacerbation  - status post fall ankle fracture and repair   - anemia with the G.I bleeding with the stable hb now   - acute renal failure resolved   - spinal stenosis   - nausea     PLAN   - continue to keep on low dose diuretics as tolerated by the B.P and renal function   - effusions are small and do not need drainage now   - use of nebs p.r.n. for wheezing  - follow-up of K and magnesium while on diuretics  - DVT prophylaxis   - pain contro

## 2020-02-23 NOTE — PROGRESS NOTE ADULT - SUBJECTIVE AND OBJECTIVE BOX
HPI: Pt is a 60 y/o female with a PMHx of anxiety/depression, asthma, HTN on enalapril, spinal stenosis, osteoarthritis, seasonal allergies who was sent to the ED by Dr. Knight today for ankle injury requiring surgery.  Pt c/o right ankle pain after a fall yesterday, for which she was splinted at [facility] ED. Pt also had 5 episodes of diarrhea 2 days ago with LLQ pain which improved yesterday.  day prior to admission pt was NPO for planned epidural procedure for pain control. At 11 am she was getting dressed yesterday and while putting on earrings she had unwitnessed LOC and  fell from standing , injuring her right ankle sustaining a left ankle fracture She denies preceding cpain/SOB/palpitations/dizziness.  She does not recall tripping over anything. Pt reports she stopped taking motrin 600mg BID , approximately 5 days  ago for the planned epidural procedure for pain control.  She was on mobic until 2020.  She has noticed increasingly dark stools over the last 1-2 weeks. She also reports some difficulty swallowing solids for 2-3 weeks.        In the ED , pt is found to have ARF, hyperkalemia, anemia and hypotension to 85/38.  Pt reported she may have accidentally taken an extra dose of her Clonidine today.     INTERVAL HISTORY:  she is now on 3 north s/p Right ankleplacement of ext-fix  : seen at bedside,  anxious, vomiting, states nausea form pain meds  2020: Pt seen at bedside in SICU with  at side. She reports she s/p cardiac cath, no abnormal findings per , and off of UFH per primary RN. Pt with VTE risk factors.   : seen at bedside, anxious, c/o lower back pain, not new  : seen at bedside, c/o n/v still anxious.  : Pt seen at bedside c/o leg pain with movement, pain medication received  2020 Pt seen at bedside on 3 east, c/o pain to ankle, Discussed his anticoagulation with lovenox.  Benefits and risks discussed. Pt states she has no concerns.  2020: Pt seen at bedside, no issues with bleeding.  2020: Pt seen at bedside, with friend at side. No issues with lovenox. Pending EGD tomorrow and ex-fix ORIF either Tu vs. Wed per ortho    Fam HX:  Father DM, had CVa  Mother COPD  Brother  at 65 of alcoholism  Sister has lung ca (2020 18:56)    Consulted by Dr. Rogers for VTE prophylaxis, risk stratification, and anticoagulation management.    PAST MEDICAL & SURGICAL HISTORY:  Hypertension  Anxiety and depression  Seasonal allergies  Asthma  Osteoarthritis  S/P hip replacement: left  S/P tubal ligation  S/P cholecystectomy    CrCl: 69.7    Caprini VTE Risk Score: CAPRINI SCORE  AGE RELATED RISK FACTORS                                                       MOBILITY RELATED FACTORS  [x ] Age 41-60 years                                            (1 Point)                  [ ] Bed rest                                                        (1 Point)  [ ] Age: 61-74 years                                           (2 Points)                [ ] Plaster cast                                                   (2 Points)  [ ] Age= 75 years                                              (3 Points)                 [ ] Bed bound for more than 72 hours                   (2 Points)    DISEASE RELATED RISK FACTORS                                               GENDER SPECIFIC FACTORS  [ ] Edema in the lower extremities                       (1 Point)           [ ] Pregnancy                                                            (1 Point)  [ ] Varicose veins                                               (1 Point)                  [ ] Post-partum < 6 weeks                                      (1 Point)             x[ ] BMI > 25 Kg/m2                                            (1 Point)                  [ ] Hormonal therapy or oral contraception       (1 Point)                 [ ] Sepsis (in the previous month)                        (1 Point)             [ ] History of pregnancy complications                (1Point)  [ ] Pneumonia or serious lung disease                                             [ ] Unexplained or recurrent  (>/= 3), premature                                 (In the previous month)                               (1 Point)                birth with toxemia or growth-restricted infant (1 Point)  [ ] Abnormal pulmonary function test            (1 Point)                                   SURGERY RELATED RISK FACTORS  [ ] Acute myocardial infarction                       (1 Point)                  [ ]  Section                                         (1 Point)  [ ] Congestive heart failure (in the previous month) (1 Point)   [ ] Minor surgery   lasting <45 minutes       (1 Point)   [ ] Inflammatory bowel disease                             (1 Point)          [ ] Arthroscopic surgery                                  (2 Points)  [ ] Central venous access                                    (2 Points)            [x ] General surgery lasting >45 minutes      (2 Points)       [ ] Stroke (in the previous month)                  (5 Points)            [ ] Elective major lower extremity arthroplasty (5 Points)                                   [  ] Malignancy (present or past include skin melanoma                                          but exclude  basal skin cell)    (2 points)                                      TRAUMA RELATED RISK FACTORS                HEMATOLOGY RELATED FACTORS                                  [x ] Fracture of the hip, pelvis, or leg                       (5 Points)  [ ] Prior episodes of VTE                                     (3 Points)          [ ] Acute spinal cord injury (in the previous month)  (5 Points)  [ ] Positive family history for VTE                         (3 Points)       [ ] Paralysis (less than 1 month)                          (5 Points)  [ ] Prothrombin 81436 A                                      (3 Points)         [ ] Multiple Trauma (within 1month)                 (5Points)                                                                                                                                                                [ ] Factor V Leiden                                          (3 Points)                                OTHER RISK FACTORS                          [ ] Lupus anticoagulants                                     (3 Points)                       [ ] BMI > 40                          (1 Point)                                                         [ ] Anticardiolipin antibodies                                (3 Points)                   [ ] Smoking                              (1Point)                                                [ ] High homocysteine in the blood                      (3 Points)                [  ] Diabetes requiring insulin (1point)                         [ ] Other congenital or acquired thrombophilia       (3 Points)          [  ] Chemotherapy                   (1 Point)  [ ] Heparin induced thrombocytopenia                  (3 Points)             [  ] Blood Transfusion                (1 point)                                                                                                         Total Score [    9      ]                                                                                                                                                                                                                 IMPROVE Bleeding Risk Score 1    Falls Risk:   High ( x )  Mod (  )  Low (  )      FAMILY HISTORY:  Family history of COPD (chronic obstructive pulmonary disease)  Family history of CVA  Denies any personal or familial history of clotting or bleeding disorders.    Allergies  No Known Allergies  Intolerances    REVIEW OF SYSTEMS    (  )Fever	     (  )Constipation	(  )SOB				(  )Headache	(  )Dysuria  (  )Chills	     (  )Melena	(  )Dyspnea present on exertion	                    (  )Dizziness                    (  )Polyuria  (  )Nausea	     (  )Hematochezia	(  )Cough			                    (  )Syncope   	(  )Hematuria  (  )Vomiting    (  )Chest Pain	(  )Wheezing			(  )Weakness  (  )Diarrhea     (  )Palpitations	(  )Anorexia			( x )joint pain    All  other review of systems negative: Yes    Vital Signs Last 24 Hrs  T(C): 36.6 (20 @ 06:36), Max: 36.6 (20 @ 22:09)  T(F): 97.8 (20 @ 06:36), Max: 97.9 (20 @ 22:09)  HR: 81 (20 @ 06:36) (75 - 88)  BP: 108/68 (20 @ 06:36) (96/59 - 108/68)  BP(mean): --  RR: 16 (20 @ 06:36) (16 - 18)  SpO2: 95% (20 @ 06:36) (92% - 96%)    Constitutional: Appears Well    Neurological: A& O x 4    Skin: Warm    Respiratory and Thorax: normal effort; Breath sounds: normal; No rales/wheezing/rhonchi  	  Cardiovascular: S1, S2, regular, NMBR	    Gastrointestinal: BS + x 4Q, nontender	    Genitourinary:  Bladder nondistended, nontender    Musculoskeletal:   General Right:   + muscle/joint tenderness,   normal tone, no joint swelling,   ROM: limited	    General Left:   no muscle/joint tenderness,   normal tone, no joint swelling,   ROM: full    Right ankle: Drsing CDI; ext fix in place, tos warm and mobile Cap refill good; +Sensation intact                      Lower extrems:   Right: no calf tenderness              negative melba's sign               + pedal pulses    Left:   no calf tenderness              negative melba's sign               + pedal pulses    LABS:                        11.1   10.73 )-----------( 354      ( 2020 07:29 )             33.0       02-23    140  |  101  |  18  ----------------------------<  106<H>  3.6   |  33<H>  |  1.02    Ca    8.7      2020 07:29                     11.5   10.37 )-----------( 382      ( 2020 07:45 )             34.5       RADIOLOGY:  EXAM:  CT ANKLE ONLY RT                        EXAM:  CT 3D RECONSTRUCT WO AJ                          PROCEDURE DATE:  2020      IMPRESSION: Trimalleolar fracture as above. No significant change in fracture appearance or alignment compared to previous plain film exams.    DVT Prophylaxis:  LMWH                   ( x )  Heparin SQ           (  )  Coumadin             (  )  Xarelto                  (  )  Eliquis                   (  )  Venodynes           (x  )  Ambulation          ( x )  UFH                       (  )  Contraindicated  (  )  EC ASPIRIN       (  )

## 2020-02-23 NOTE — PROGRESS NOTE ADULT - SUBJECTIVE AND OBJECTIVE BOX
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    20  Patient is feeling nauseous on oxycodone , constipated , denies any chest pain , her back is down the lumbar area  , patient had cardiac cath showed issac coronaries ,  possible  takotsubo cardiomyopathy . on low dose BB ,   20  still somewhat dyspneic, on 2L O2, no chest pain.  2020:  Feels better with less dyspnea; R ankle pain  20: still w/ dyspnea.  tele w/ short run SVT no other arrhythmias.  20: less dyspnea today. no events on tele.  2/3/20: brief episode of palps short run SVT on tele this AM.      MEDICATIONS:    MEDICATIONS  (STANDING):  acetaminophen   Tablet .. 975 milliGRAM(s) Oral every 8 hours  albuterol/ipratropium for Nebulization 3 milliLiter(s) Nebulizer every 6 hours  aspirin enteric coated 81 milliGRAM(s) Oral daily  atorvastatin 40 milliGRAM(s) Oral at bedtime  enoxaparin Injectable 40 milliGRAM(s) SubCutaneous daily  furosemide    Tablet 40 milliGRAM(s) Oral daily  gabapentin 300 milliGRAM(s) Oral three times a day  influenza   Vaccine 0.5 milliLiter(s) IntraMuscular once  lidocaine   Patch 1 Patch Transdermal daily  lisinopril 20 milliGRAM(s) Oral daily  metoprolol succinate ER 75 milliGRAM(s) Oral daily  pantoprazole  Injectable 40 milliGRAM(s) IV Push every 12 hours  PARoxetine 60 milliGRAM(s) Oral daily  polyethylene glycol 3350 17 Gram(s) Oral every 12 hours  polyethylene glycol 3350 17 Gram(s) Oral daily  sodium chloride 0.9% lock flush 3 milliLiter(s) IV Push every 8 hours  traZODone 150 milliGRAM(s) Oral at bedtime    MEDICATIONS  (PRN):  ALPRAZolam 0.25 milliGRAM(s) Oral every 8 hours PRN anxiety  aluminum hydroxide/magnesium hydroxide/simethicone Suspension 30 milliLiter(s) Oral four times a day PRN Indigestion  bisacodyl Suppository 10 milliGRAM(s) Rectal daily PRN If no bowel movement by postoperative day #2  cyclobenzaprine 5 milliGRAM(s) Oral three times a day PRN Muscle Spasm  hydrALAZINE Injectable 10 milliGRAM(s) IV Push every 6 hours PRN for SBP more than 160  HYDROmorphone  Injectable 1.5 milliGRAM(s) IV Push every 3 hours PRN Pain  magnesium hydroxide Suspension 30 milliLiter(s) Oral daily PRN Constipation  ondansetron Injectable 4 milliGRAM(s) IV Push every 6 hours PRN Nausea and/or Vomiting  senna 2 Tablet(s) Oral at bedtime PRN Constipation        Vital Signs Last 24 Hrs  T(C): 36.6 (2020 06:36), Max: 36.6 (2020 22:09)  T(F): 97.8 (2020 06:36), Max: 97.9 (2020 22:09)  HR: 81 (2020 06:36) (75 - 85)  BP: 108/68 (2020 06:36) (108/56 - 108/68)  BP(mean): --  RR: 16 (2020 06:36) (16 - 18)  SpO2: 95% (2020 06:36) (95% - 96%)Daily     Daily Weight in k.9 (2020 10:46)I&O's Summary      PHYSICAL EXAM:  Constitutional: NAD, awake and alert  Respiratory: Nonlabored, no crackles  Cardiovascular: S1 and S2, regular  Gastrointestinal: Bowel Sounds present, soft, nontender.   Skin: No rashes.  Ext: R ankle hardware      LABS: All Labs Reviewed:                        11.1   10.73 )-----------( 354      ( 2020 07:29 )             33.0                         11.5   10.37 )-----------( 382      ( 2020 07:45 )             34.5                         11.9   9.79  )-----------( 369      ( 2020 08:34 )             35.6     2020 07:29    140    |  101    |  18     ----------------------------<  106    3.6     |  33     |  1.02     Ca    8.7        2020 07:29    Cardiac Cath Lab - Adult (20 @ 11:44):   Normal coronary arteries.  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   Moderately reduced left ventricular systolic function with segmental wall motion abnormalities.  Normal LV systolic function. Estimated LV ejection fraction is 35 %.  Elevated left ventricular end-diastolic pressure.    Transthoracic Echocardiogram (20 @ 10:13): Left ventricle systolic function appears moderately reduced. Apical segments are hyperkinetic, basal and mid segments are hypokinetic. Visual estimation of left ventricle ejection fraction is 40%.

## 2020-02-23 NOTE — PROGRESS NOTE ADULT - ASSESSMENT
59/F admitted with     # Syncope: can be secondary to orthostatic causes in combination with cardiac cause  - tele monitoring   - new low EF with recent cath 2/17 with normal coronaries  - elevated trops    - medication optimization for GDT    # Ankle pain  - PCI initiated  - care discussed with ortho  - anticipated ankle surgery on Tuesday    # N/V  -endoscopy on Monday    # Acute hypoxic respiratory failure in the setting of acute on chronic systolic CHF /  Aspiration pneumonia /  reactive airway dz  - d/c fluids  - started on duoneb treatments as patient has wheezing now  - IV zosyn d/tyrel on 2/18  - now off O2, speaking full sentences     # NSTEMI  - s/p cardiac cath 2/17:  Normal coronary arteries.  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 Moderately reduced left ventricular systolic function with segmental wall   motion abnormalities.  Normal LV systolic function. Estimated LV ejection fraction is 35 %.  Elevated left ventricular end-diastolic pressure.   - cardiology following .    # Peripancreatic stranding on CT chest:  - CT: Peripancreatic stranding, new since the previous abdominal CT dated 2/14/2020, suggestive of acute pancreatitis. Clinical correlation with pancreatic enzymes is recommended.  - patient's abdominal pain could be also secondary to (L) sided colitis noted on 2/14 CT scan  - tolerating po diet  - check lipase    # Right Ankle Fx:   - s/p right ankle External fixator application  - pain meds prn  Sx on Tuesday    # ARF:   - likely sec to NSAIDS; resolved    # Melena and Dark colored stools + Acute Anemia of acte hemorrhage from GI bleed:  - likely from NSAIDS   - d/c NSAIDS  - cont Protonix  - s/p PRBC 2/15  - H/H q 8 hrs; transfuse for Hb less than 8  EGD on Monday    # Hypotension:  - resolved     # Hyperkalemia    - resolved      DVT PPX: lovenox         Pt medically optimized and cleared by cardio for right ankle fx orhto surgery.

## 2020-02-24 ENCOUNTER — RESULT REVIEW (OUTPATIENT)
Age: 60
End: 2020-02-24

## 2020-02-24 DIAGNOSIS — I51.81 TAKOTSUBO SYNDROME: ICD-10-CM

## 2020-02-24 LAB
HCG UR QL: NEGATIVE — SIGNIFICANT CHANGE UP
HCT VFR BLD CALC: 33.9 % — LOW (ref 34.5–45)
HGB BLD-MCNC: 11 G/DL — LOW (ref 11.5–15.5)
MCHC RBC-ENTMCNC: 31.5 PG — SIGNIFICANT CHANGE UP (ref 27–34)
MCHC RBC-ENTMCNC: 32.4 GM/DL — SIGNIFICANT CHANGE UP (ref 32–36)
MCV RBC AUTO: 97.1 FL — SIGNIFICANT CHANGE UP (ref 80–100)
PLATELET # BLD AUTO: 352 K/UL — SIGNIFICANT CHANGE UP (ref 150–400)
RBC # BLD: 3.49 M/UL — LOW (ref 3.8–5.2)
RBC # FLD: 13.9 % — SIGNIFICANT CHANGE UP (ref 10.3–14.5)
WBC # BLD: 10.79 K/UL — HIGH (ref 3.8–10.5)
WBC # FLD AUTO: 10.79 K/UL — HIGH (ref 3.8–10.5)

## 2020-02-24 PROCEDURE — 88305 TISSUE EXAM BY PATHOLOGIST: CPT | Mod: 26

## 2020-02-24 PROCEDURE — 99231 SBSQ HOSP IP/OBS SF/LOW 25: CPT

## 2020-02-24 PROCEDURE — 99233 SBSQ HOSP IP/OBS HIGH 50: CPT

## 2020-02-24 PROCEDURE — 88312 SPECIAL STAINS GROUP 1: CPT | Mod: 26

## 2020-02-24 PROCEDURE — 99232 SBSQ HOSP IP/OBS MODERATE 35: CPT

## 2020-02-24 RX ORDER — PROCHLORPERAZINE MALEATE 5 MG
5 TABLET ORAL ONCE
Refills: 0 | Status: DISCONTINUED | OUTPATIENT
Start: 2020-02-24 | End: 2020-02-25

## 2020-02-24 RX ORDER — ALPRAZOLAM 0.25 MG
0.25 TABLET ORAL EVERY 6 HOURS
Refills: 0 | Status: DISCONTINUED | OUTPATIENT
Start: 2020-02-24 | End: 2020-02-25

## 2020-02-24 RX ORDER — SODIUM CHLORIDE 9 MG/ML
1000 INJECTION, SOLUTION INTRAVENOUS
Refills: 0 | Status: DISCONTINUED | OUTPATIENT
Start: 2020-02-24 | End: 2020-02-24

## 2020-02-24 RX ORDER — FUROSEMIDE 40 MG
20 TABLET ORAL DAILY
Refills: 0 | Status: DISCONTINUED | OUTPATIENT
Start: 2020-02-24 | End: 2020-02-25

## 2020-02-24 RX ORDER — METOCLOPRAMIDE HCL 10 MG
10 TABLET ORAL EVERY 6 HOURS
Refills: 0 | Status: DISCONTINUED | OUTPATIENT
Start: 2020-02-24 | End: 2020-02-24

## 2020-02-24 RX ORDER — HYDROMORPHONE HYDROCHLORIDE 2 MG/ML
1 INJECTION INTRAMUSCULAR; INTRAVENOUS; SUBCUTANEOUS ONCE
Refills: 0 | Status: DISCONTINUED | OUTPATIENT
Start: 2020-02-24 | End: 2020-02-24

## 2020-02-24 RX ORDER — METOCLOPRAMIDE HCL 10 MG
10 TABLET ORAL EVERY 6 HOURS
Refills: 0 | Status: DISCONTINUED | OUTPATIENT
Start: 2020-02-24 | End: 2020-02-25

## 2020-02-24 RX ORDER — SODIUM CHLORIDE 9 MG/ML
1000 INJECTION, SOLUTION INTRAVENOUS
Refills: 0 | Status: DISCONTINUED | OUTPATIENT
Start: 2020-02-24 | End: 2020-02-25

## 2020-02-24 RX ADMIN — HYDROMORPHONE HYDROCHLORIDE 1.5 MILLIGRAM(S): 2 INJECTION INTRAMUSCULAR; INTRAVENOUS; SUBCUTANEOUS at 01:14

## 2020-02-24 RX ADMIN — HYDROMORPHONE HYDROCHLORIDE 1.5 MILLIGRAM(S): 2 INJECTION INTRAMUSCULAR; INTRAVENOUS; SUBCUTANEOUS at 21:56

## 2020-02-24 RX ADMIN — HYDROMORPHONE HYDROCHLORIDE 1.5 MILLIGRAM(S): 2 INJECTION INTRAMUSCULAR; INTRAVENOUS; SUBCUTANEOUS at 15:23

## 2020-02-24 RX ADMIN — HYDROMORPHONE HYDROCHLORIDE 1 MILLIGRAM(S): 2 INJECTION INTRAMUSCULAR; INTRAVENOUS; SUBCUTANEOUS at 16:22

## 2020-02-24 RX ADMIN — HYDROMORPHONE HYDROCHLORIDE 1.5 MILLIGRAM(S): 2 INJECTION INTRAMUSCULAR; INTRAVENOUS; SUBCUTANEOUS at 04:56

## 2020-02-24 RX ADMIN — Medication 75 MILLIGRAM(S): at 05:02

## 2020-02-24 RX ADMIN — HYDROMORPHONE HYDROCHLORIDE 1.5 MILLIGRAM(S): 2 INJECTION INTRAMUSCULAR; INTRAVENOUS; SUBCUTANEOUS at 10:00

## 2020-02-24 RX ADMIN — ATORVASTATIN CALCIUM 40 MILLIGRAM(S): 80 TABLET, FILM COATED ORAL at 22:24

## 2020-02-24 RX ADMIN — Medication 60 MILLIGRAM(S): at 10:48

## 2020-02-24 RX ADMIN — HYDROMORPHONE HYDROCHLORIDE 1.5 MILLIGRAM(S): 2 INJECTION INTRAMUSCULAR; INTRAVENOUS; SUBCUTANEOUS at 21:41

## 2020-02-24 RX ADMIN — LIDOCAINE 1 PATCH: 4 CREAM TOPICAL at 12:11

## 2020-02-24 RX ADMIN — Medication 3 MILLILITER(S): at 20:04

## 2020-02-24 RX ADMIN — GABAPENTIN 300 MILLIGRAM(S): 400 CAPSULE ORAL at 22:31

## 2020-02-24 RX ADMIN — Medication 0.25 MILLIGRAM(S): at 10:37

## 2020-02-24 RX ADMIN — LISINOPRIL 20 MILLIGRAM(S): 2.5 TABLET ORAL at 05:03

## 2020-02-24 RX ADMIN — PANTOPRAZOLE SODIUM 40 MILLIGRAM(S): 20 TABLET, DELAYED RELEASE ORAL at 05:03

## 2020-02-24 RX ADMIN — HYDROMORPHONE HYDROCHLORIDE 1.5 MILLIGRAM(S): 2 INJECTION INTRAMUSCULAR; INTRAVENOUS; SUBCUTANEOUS at 16:08

## 2020-02-24 RX ADMIN — SODIUM CHLORIDE 3 MILLILITER(S): 9 INJECTION INTRAMUSCULAR; INTRAVENOUS; SUBCUTANEOUS at 04:39

## 2020-02-24 RX ADMIN — Medication 975 MILLIGRAM(S): at 23:20

## 2020-02-24 RX ADMIN — HYDROMORPHONE HYDROCHLORIDE 1.5 MILLIGRAM(S): 2 INJECTION INTRAMUSCULAR; INTRAVENOUS; SUBCUTANEOUS at 18:40

## 2020-02-24 RX ADMIN — SODIUM CHLORIDE 3 MILLILITER(S): 9 INJECTION INTRAMUSCULAR; INTRAVENOUS; SUBCUTANEOUS at 21:34

## 2020-02-24 RX ADMIN — Medication 0.25 MILLIGRAM(S): at 00:51

## 2020-02-24 RX ADMIN — SODIUM CHLORIDE 3 MILLILITER(S): 9 INJECTION INTRAMUSCULAR; INTRAVENOUS; SUBCUTANEOUS at 16:06

## 2020-02-24 RX ADMIN — ONDANSETRON 4 MILLIGRAM(S): 8 TABLET, FILM COATED ORAL at 09:12

## 2020-02-24 RX ADMIN — ENOXAPARIN SODIUM 40 MILLIGRAM(S): 100 INJECTION SUBCUTANEOUS at 09:14

## 2020-02-24 RX ADMIN — HYDROMORPHONE HYDROCHLORIDE 1.5 MILLIGRAM(S): 2 INJECTION INTRAMUSCULAR; INTRAVENOUS; SUBCUTANEOUS at 12:16

## 2020-02-24 RX ADMIN — Medication 20 MILLIGRAM(S): at 12:10

## 2020-02-24 RX ADMIN — Medication 3 MILLILITER(S): at 14:04

## 2020-02-24 RX ADMIN — HYDROMORPHONE HYDROCHLORIDE 1.5 MILLIGRAM(S): 2 INJECTION INTRAMUSCULAR; INTRAVENOUS; SUBCUTANEOUS at 09:08

## 2020-02-24 RX ADMIN — HYDROMORPHONE HYDROCHLORIDE 1.5 MILLIGRAM(S): 2 INJECTION INTRAMUSCULAR; INTRAVENOUS; SUBCUTANEOUS at 12:05

## 2020-02-24 RX ADMIN — HYDROMORPHONE HYDROCHLORIDE 1 MILLIGRAM(S): 2 INJECTION INTRAMUSCULAR; INTRAVENOUS; SUBCUTANEOUS at 17:10

## 2020-02-24 RX ADMIN — GABAPENTIN 300 MILLIGRAM(S): 400 CAPSULE ORAL at 10:36

## 2020-02-24 RX ADMIN — Medication 81 MILLIGRAM(S): at 09:12

## 2020-02-24 RX ADMIN — ONDANSETRON 4 MILLIGRAM(S): 8 TABLET, FILM COATED ORAL at 16:07

## 2020-02-24 RX ADMIN — HYDROMORPHONE HYDROCHLORIDE 1.5 MILLIGRAM(S): 2 INJECTION INTRAMUSCULAR; INTRAVENOUS; SUBCUTANEOUS at 02:00

## 2020-02-24 RX ADMIN — Medication 10 MILLIGRAM(S): at 12:10

## 2020-02-24 RX ADMIN — Medication 975 MILLIGRAM(S): at 22:25

## 2020-02-24 RX ADMIN — Medication 150 MILLIGRAM(S): at 22:25

## 2020-02-24 NOTE — PROGRESS NOTE ADULT - PROBLEM SELECTOR PLAN 1
Possible TakoTsubo cardiomyopathy; currently euvolemic Cont. lasix to 40 po daily, cont. metoprolol, lisinopril. Doing well and improved clinically. Decrease diuretic. Continue rest of her cardiac meds.

## 2020-02-24 NOTE — PROGRESS NOTE ADULT - SUBJECTIVE AND OBJECTIVE BOX
HPI: 59/F with PMHx of anxiety/ depression, asthma,  HTN on enalapril, spinal stenosis, osteoarthritis, seasonal allergies who was sent to the ED by Dr. Knight for ankle injury requiring surgery.  Pt  c/o right ankle pain after a fall on 2/12, for which she was splinted at [facility] ED.   She states that  she was NPO for planned epidural procedure for pain control .  At 11 am she was getting dressed  and while putting on earrings she had unwitnessed LOC and  fell from standing , injuring her right ankle.  She does not remember the incident. She states that she was standing and next thing she found was that she was on floor with her ankle was twisted. She denies preceding cpain/SOB/palpitations/dizziness.  She does not recall tripping over anything.        Pt also had 5 episodes of diarrhea 2 days ago with LLQ pain which improved.     Pt reports she stopped taking motrin 600mg BID , approximately 5 days  ago for the planned epidural procedure for pain control.  She was on mobic until January 2020.      She has noticed increasingly dark stools over the last 1-2 weeks.    She also reports some difficulty swallowing solids for 2-3 weeks.        2/21: seen and eval. Hemodynamically stable. Denies any HA, CP, SOB. No fevers, chills or shakes. Patient continues to complaint about pain (ankle pain), also has N/V this am with abdominal pain.     2/22: c/o pain in right ankle and asking for more pain meds    2/23: condition same  EGD on 2/24 2/24: normal EGD   no new complaints  lasix decreased as per cardio    PHYSICAL EXAM:    Vital Signs Last 24 Hrs  T(C): 36.6 (24 Feb 2020 05:28), Max: 36.6 (23 Feb 2020 20:58)  T(F): 97.8 (24 Feb 2020 05:28), Max: 97.9 (23 Feb 2020 20:58)  HR: 82 (24 Feb 2020 05:28) (80 - 82)  BP: 114/65 (24 Feb 2020 05:28) (114/65 - 116/62)  BP(mean): --  RR: 16 (24 Feb 2020 05:28) (16 - 18)  SpO2: 95% (24 Feb 2020 05:28) (95% - 96%)    Constitutional: Well appearing   HEENT: Atraumatic, DEREK, Normal, No congestion  Respiratory: B/l CTA  Cardiovascular: N S1S2;   Gastrointestinal: abdominal pain, + tenderness on deep palpation  Extremities: No edema, peripheral pulses present; right ankle dressed/ex fix  Neurological: AAO x 3, no gross focal motor deficits  Skin: Non cellulitic, no rash, ulcers  Lymph Nodes: No lymphadenopathy noted  Back: No CVA tenderness   Musculoskeletal: non tender  Breasts: Deferred  Genitourinary: deferred  Rectal: Deferred      Labs:                  11.0   10.79  )----------(  352       ( 24 Feb 2020 07:13 )               33.9                                                    < from: CT Chest No Cont (02.16.20 @ 16:51) >  Impression:    Patchy infiltrates in both lungs with nodular feature, right greater than left, suggestive of pneumonia. Follow-up chest CT may be pursued in 4-6 weeks to ensure resolution. Attention should be directed to the small 6 mm left lower lobe lung nodule on the follow-up chestCT to ensure stability.    No pneumothorax.    Interlobular septal thickening in both lungs, suggestive of interstitial pulmonary edema.    New mild peribronchial cuffing, suggestive of reactive airway disease.    Peripancreatic stranding, new since the previous abdominal CT dated 2/14/2020, suggestive of acute pancreatitis. Clinical correlation with pancreatic enzymes is recommended.    Stable 0.8 cm splenic artery aneurysm with mural calcification.    Small bilateral pleural effusions.    Stable small pericardial effusion.    < end of copied text >    < from: CT Head No Cont (02.16.20 @ 16:51) >  Impression:     No intracranial hemorrhage, mass effect or CT evidence of acute infarct      < end of copied text >        MEDICATIONS  (STANDING):  acetaminophen   Tablet .. 975 milliGRAM(s) Oral every 8 hours  albuterol/ipratropium for Nebulization 3 milliLiter(s) Nebulizer every 6 hours  aspirin enteric coated 81 milliGRAM(s) Oral daily  atorvastatin 40 milliGRAM(s) Oral at bedtime  furosemide    Tablet 20 milliGRAM(s) Oral daily  gabapentin 300 milliGRAM(s) Oral three times a day  influenza   Vaccine 0.5 milliLiter(s) IntraMuscular once  lidocaine   Patch 1 Patch Transdermal daily  lisinopril 20 milliGRAM(s) Oral daily  metoprolol succinate ER 75 milliGRAM(s) Oral daily  pantoprazole  Injectable 40 milliGRAM(s) IV Push every 12 hours  PARoxetine 60 milliGRAM(s) Oral daily  polyethylene glycol 3350 17 Gram(s) Oral every 12 hours  polyethylene glycol 3350 17 Gram(s) Oral daily  sodium chloride 0.9% lock flush 3 milliLiter(s) IV Push every 8 hours  traZODone 150 milliGRAM(s) Oral at bedtime HPI: 59/F with PMHx of anxiety/ depression, asthma,  HTN on enalapril, spinal stenosis, osteoarthritis, seasonal allergies who was sent to the ED by Dr. Knight for ankle injury requiring surgery.  Pt  c/o right ankle pain after a fall on 2/12, for which she was splinted at [facility] ED.   She states that  she was NPO for planned epidural procedure for pain control .  At 11 am she was getting dressed  and while putting on earrings she had unwitnessed LOC and  fell from standing , injuring her right ankle.  She does not remember the incident. She states that she was standing and next thing she found was that she was on floor with her ankle was twisted. She denies preceding cpain/SOB/palpitations/dizziness.  She does not recall tripping over anything.        Pt also had 5 episodes of diarrhea 2 days ago with LLQ pain which improved.     Pt reports she stopped taking motrin 600mg BID , approximately 5 days  ago for the planned epidural procedure for pain control.  She was on mobic until January 2020.      She has noticed increasingly dark stools over the last 1-2 weeks.    She also reports some difficulty swallowing solids for 2-3 weeks.        2/21: seen and eval. Hemodynamically stable. Denies any HA, CP, SOB. No fevers, chills or shakes. Patient continues to complaint about pain (ankle pain), also has N/V this am with abdominal pain.     2/22: c/o pain in right ankle and asking for more pain meds    2/23: condition same  EGD on 2/24 2/24: normal EGD   no new complaints  lasix decreased as per cardio  multiple vomiting later today    PHYSICAL EXAM:    Vital Signs Last 24 Hrs  T(C): 36.6 (24 Feb 2020 05:28), Max: 36.6 (23 Feb 2020 20:58)  T(F): 97.8 (24 Feb 2020 05:28), Max: 97.9 (23 Feb 2020 20:58)  HR: 82 (24 Feb 2020 05:28) (80 - 82)  BP: 114/65 (24 Feb 2020 05:28) (114/65 - 116/62)  BP(mean): --  RR: 16 (24 Feb 2020 05:28) (16 - 18)  SpO2: 95% (24 Feb 2020 05:28) (95% - 96%)    Constitutional: Well appearing   HEENT: Atraumatic, DEREK, Normal, No congestion  Respiratory: B/l CTA  Cardiovascular: N S1S2;   Gastrointestinal: abdominal pain, + tenderness on deep palpation  Extremities: No edema, peripheral pulses present; right ankle dressed/ex fix  Neurological: AAO x 3, no gross focal motor deficits  Skin: Non cellulitic, no rash, ulcers  Lymph Nodes: No lymphadenopathy noted  Back: No CVA tenderness   Musculoskeletal: non tender  Breasts: Deferred  Genitourinary: deferred  Rectal: Deferred      Labs:                  11.0   10.79  )----------(  352       ( 24 Feb 2020 07:13 )               33.9                                                    < from: CT Chest No Cont (02.16.20 @ 16:51) >  Impression:    Patchy infiltrates in both lungs with nodular feature, right greater than left, suggestive of pneumonia. Follow-up chest CT may be pursued in 4-6 weeks to ensure resolution. Attention should be directed to the small 6 mm left lower lobe lung nodule on the follow-up chestCT to ensure stability.    No pneumothorax.    Interlobular septal thickening in both lungs, suggestive of interstitial pulmonary edema.    New mild peribronchial cuffing, suggestive of reactive airway disease.    Peripancreatic stranding, new since the previous abdominal CT dated 2/14/2020, suggestive of acute pancreatitis. Clinical correlation with pancreatic enzymes is recommended.    Stable 0.8 cm splenic artery aneurysm with mural calcification.    Small bilateral pleural effusions.    Stable small pericardial effusion.    < end of copied text >    < from: CT Head No Cont (02.16.20 @ 16:51) >  Impression:     No intracranial hemorrhage, mass effect or CT evidence of acute infarct      < end of copied text >        MEDICATIONS  (STANDING):  acetaminophen   Tablet .. 975 milliGRAM(s) Oral every 8 hours  albuterol/ipratropium for Nebulization 3 milliLiter(s) Nebulizer every 6 hours  aspirin enteric coated 81 milliGRAM(s) Oral daily  atorvastatin 40 milliGRAM(s) Oral at bedtime  furosemide    Tablet 20 milliGRAM(s) Oral daily  gabapentin 300 milliGRAM(s) Oral three times a day  influenza   Vaccine 0.5 milliLiter(s) IntraMuscular once  lidocaine   Patch 1 Patch Transdermal daily  lisinopril 20 milliGRAM(s) Oral daily  metoprolol succinate ER 75 milliGRAM(s) Oral daily  pantoprazole  Injectable 40 milliGRAM(s) IV Push every 12 hours  PARoxetine 60 milliGRAM(s) Oral daily  polyethylene glycol 3350 17 Gram(s) Oral every 12 hours  polyethylene glycol 3350 17 Gram(s) Oral daily  sodium chloride 0.9% lock flush 3 milliLiter(s) IV Push every 8 hours  traZODone 150 milliGRAM(s) Oral at bedtime

## 2020-02-24 NOTE — PROGRESS NOTE ADULT - SUBJECTIVE AND OBJECTIVE BOX
Patient seen and examined at bedside. Reports no acute complaints at this time. Pain is well controlled. No acute events overnight. Plan for EGD today.    PE:  Vital Signs Last 24 Hrs  T(C): 36.6 (24 Feb 2020 05:28), Max: 36.6 (23 Feb 2020 20:58)  T(F): 97.8 (24 Feb 2020 05:28), Max: 97.9 (23 Feb 2020 20:58)  HR: 82 (24 Feb 2020 05:28) (80 - 82)  BP: 114/65 (24 Feb 2020 05:28) (114/65 - 116/62)  BP(mean): --  RR: 16 (24 Feb 2020 05:28) (16 - 18)  SpO2: 95% (24 Feb 2020 05:28) (95% - 96%)    General: NAD, resting comfortably in bed  RLE:   Ex fix in place C/D/I  SCD in place contralaterally  No calf tenderness contralterally  Able to grossly wiggle toes  Gross sesnsation to toes intact  Cap refill <2 seconds             A/P:  59y f s/p ex fix Tuesday for ORIF of right ankle  -PT/OT NWB RLE in trilam splint  -Pain Control  - NPO @ [facility]  - IVF while NPO  -Hold chemical DVT ppx @ [facility] for OR  -FU AM Labs  -Rest, ice, compress and elevate the extremity as we needed  -Incentive Spirometry  -Medical management appreciated  -Plesae continue to document medical clearance  -FU EGD today  - Will discuss with attending and advise if plan changes

## 2020-02-24 NOTE — PROGRESS NOTE ADULT - SUBJECTIVE AND OBJECTIVE BOX
SUBJECTIVE       PAST MEDICAL & SURGICAL HISTORY:  Hypertension  Anxiety and depression  Seasonal allergies  Asthma  Osteoarthritis  S/P hip replacement: left  S/P tubal ligation  S/P cholecystectomy    OBJECTIVE   Vital Signs Last 24 Hrs  T(C): 36.6 (24 Feb 2020 05:28), Max: 36.6 (23 Feb 2020 20:58)  T(F): 97.8 (24 Feb 2020 05:28), Max: 97.9 (23 Feb 2020 20:58)  HR: 82 (24 Feb 2020 05:28) (80 - 82)  BP: 114/65 (24 Feb 2020 05:28) (114/65 - 116/62)  BP(mean): --  RR: 16 (24 Feb 2020 05:28) (16 - 18)  SpO2: 95% (24 Feb 2020 05:28) (95% - 96%)    PHYSICAL EXAM:  Constitutional: , awake and alert, not in distress.  HEENT: Normo cephalic atraumatic  Neck: Soft and supple, No J.V.D   Respiratory: vesicular breathing , No wheezing, rales or rhonchi.   Cardiovascular: S1 and S2, regular rate .   Gastrointestinal:  soft, nontender,   Extremities: No  edema or calf tenderness .  Neurological: No new  focal deficits.    MEDICATIONS  (STANDING):  acetaminophen   Tablet .. 975 milliGRAM(s) Oral every 8 hours  albuterol/ipratropium for Nebulization 3 milliLiter(s) Nebulizer every 6 hours  aspirin enteric coated 81 milliGRAM(s) Oral daily  atorvastatin 40 milliGRAM(s) Oral at bedtime  enoxaparin Injectable 40 milliGRAM(s) SubCutaneous daily  furosemide    Tablet 40 milliGRAM(s) Oral daily  gabapentin 300 milliGRAM(s) Oral three times a day  influenza   Vaccine 0.5 milliLiter(s) IntraMuscular once  lactated ringers. 1000 milliLiter(s) (50 mL/Hr) IV Continuous <Continuous>  lidocaine   Patch 1 Patch Transdermal daily  lisinopril 20 milliGRAM(s) Oral daily  metoprolol succinate ER 75 milliGRAM(s) Oral daily  pantoprazole  Injectable 40 milliGRAM(s) IV Push every 12 hours  PARoxetine 60 milliGRAM(s) Oral daily  polyethylene glycol 3350 17 Gram(s) Oral every 12 hours  polyethylene glycol 3350 17 Gram(s) Oral daily  sodium chloride 0.9% lock flush 3 milliLiter(s) IV Push every 8 hours  traZODone 150 milliGRAM(s) Oral at bedtime                            11.0   10.79 )-----------( 352      ( 24 Feb 2020 07:13 )             33.9     02-23    140  |  101  |  18  ----------------------------<  106<H>  3.6   |  33<H>  |  1.02    Ca    8.7      23 Feb 2020 07:29 SUBJECTIVE      patient seen in the morning and offers no new complaints with no shortness of breath or cough or wheezing   follow-up chest x-ray shows resolution of the CHF   she is on oral Lasix now   she do not feel the need for the oxygen   still has discomfort in the right ankle    PAST MEDICAL & SURGICAL HISTORY:  Hypertension  Anxiety and depression  Seasonal allergies  Asthma  Osteoarthritis  S/P hip replacement: left  S/P tubal ligation  S/P cholecystectomy    OBJECTIVE   Vital Signs Last 24 Hrs  T(C): 36.6 (24 Feb 2020 05:28), Max: 36.6 (23 Feb 2020 20:58)  T(F): 97.8 (24 Feb 2020 05:28), Max: 97.9 (23 Feb 2020 20:58)  HR: 82 (24 Feb 2020 05:28) (80 - 82)  BP: 114/65 (24 Feb 2020 05:28) (114/65 - 116/62)  BP(mean): --  RR: 16 (24 Feb 2020 05:28) (16 - 18)  SpO2: 95% (24 Feb 2020 05:28) (95% - 96%)    PHYSICAL EXAM:  Constitutional: , awake and alert, not in distress.  HEENT: Normo cephalic atraumatic  Neck: Soft and supple, No J.V.D   Respiratory: vesicular breathing , No wheezing, rales or rhonchi.   Cardiovascular: S1 and S2, regular rate .   Gastrointestinal:  soft, nontender,   Extremities:  right leg with external fixation with recent ankle fracture  Neurological: No new  focal deficits.    MEDICATIONS  (STANDING):  acetaminophen   Tablet .. 975 milliGRAM(s) Oral every 8 hours  albuterol/ipratropium for Nebulization 3 milliLiter(s) Nebulizer every 6 hours  aspirin enteric coated 81 milliGRAM(s) Oral daily  atorvastatin 40 milliGRAM(s) Oral at bedtime  enoxaparin Injectable 40 milliGRAM(s) SubCutaneous daily  furosemide    Tablet 40 milliGRAM(s) Oral daily  gabapentin 300 milliGRAM(s) Oral three times a day  influenza   Vaccine 0.5 milliLiter(s) IntraMuscular once  lactated ringers. 1000 milliLiter(s) (50 mL/Hr) IV Continuous <Continuous>  lidocaine   Patch 1 Patch Transdermal daily  lisinopril 20 milliGRAM(s) Oral daily  metoprolol succinate ER 75 milliGRAM(s) Oral daily  pantoprazole  Injectable 40 milliGRAM(s) IV Push every 12 hours  PARoxetine 60 milliGRAM(s) Oral daily  polyethylene glycol 3350 17 Gram(s) Oral every 12 hours  polyethylene glycol 3350 17 Gram(s) Oral daily  sodium chloride 0.9% lock flush 3 milliLiter(s) IV Push every 8 hours  traZODone 150 milliGRAM(s) Oral at bedtime                            11.0   10.79 )-----------( 352      ( 24 Feb 2020 07:13 )             33.9     02-23    140  |  101  |  18  ----------------------------<  106<H>  3.6   |  33<H>  |  1.02    Ca    8.7      23 Feb 2020 07:29

## 2020-02-24 NOTE — PROGRESS NOTE ADULT - PROBLEM SELECTOR PLAN 4
Pt is scheduled for colonoscopy on Mon & orthopedic surgery on tues.  She has no obstructive CAD on recent cath.  She is euvolemic.  She is optimized from a cardiac standpoint for both procedures & may proceed w/o further cardiac testing.  Low risk for cardiac events for low risk procedures. Low risk procedure and low risk patient.  Plan for OR wilber. (as per orthopedics).

## 2020-02-24 NOTE — PROGRESS NOTE ADULT - SUBJECTIVE AND OBJECTIVE BOX
HPI: Pt is a 60 y/o female with a PMHx of anxiety/depression, asthma, HTN on enalapril, spinal stenosis, osteoarthritis, seasonal allergies who was sent to the ED by Dr. Knight today for ankle injury requiring surgery.  Pt c/o right ankle pain after a fall yesterday, for which she was splinted at [facility] ED. Pt also had 5 episodes of diarrhea 2 days ago with LLQ pain which improved yesterday.  day prior to admission pt was NPO for planned epidural procedure for pain control. At 11 am she was getting dressed yesterday and while putting on earrings she had unwitnessed LOC and  fell from standing , injuring her right ankle sustaining a left ankle fracture She denies preceding cpain/SOB/palpitations/dizziness.  She does not recall tripping over anything. Pt reports she stopped taking motrin 600mg BID , approximately 5 days  ago for the planned epidural procedure for pain control.  She was on mobic until 2020.  She has noticed increasingly dark stools over the last 1-2 weeks. She also reports some difficulty swallowing solids for 2-3 weeks.        In the ED , pt is found to have ARF, hyperkalemia, anemia and hypotension to 85/38.  Pt reported she may have accidentally taken an extra dose of her Clonidine today.     INTERVAL HISTORY:  she is now on 3 north s/p Right ankleplacement of ext-fix  : seen at bedside,  anxious, vomiting, states nausea form pain meds  2020: Pt seen at bedside in SICU with  at side. She reports she s/p cardiac cath, no abnormal findings per , and off of UFH per primary RN. Pt with VTE risk factors.   : seen at bedside, anxious, c/o lower back pain, not new  : seen at bedside, c/o n/v still anxious.  : Pt seen at bedside c/o leg pain with movement, pain medication received  2020 Pt seen at bedside on 3 east, c/o pain to ankle, Discussed his anticoagulation with lovenox.  Benefits and risks discussed. Pt states she has no concerns.  2020: Pt seen at bedside, no issues with bleeding.  2020: Pt seen at bedside, with friend at side. No issues with lovenox. Pending EGD tomorrow and ex-fix ORIF either Tu vs. Wed per ortho  2020 Pt seen at bedside on 2north. S/P egd this a.m. neg,  discussed with pt the use of Lovenox for her anticoagulation  No concerns.  States she is waiting for them to remove the external fixator.   Pt state she has a lot of stress.     Fam HX:  Father DM, had CVa  Mother COPD  Brother  at 65 of alcoholism  Sister has lung ca (2020 18:56)    Consulted by Dr. Rogers for VTE prophylaxis, risk stratification, and anticoagulation management.    PAST MEDICAL & SURGICAL HISTORY:  Hypertension  Anxiety and depression  Seasonal allergies  Asthma  Osteoarthritis  S/P hip replacement: left  S/P tubal ligation  S/P cholecystectomy    CrCl: 69.7    Caprini VTE Risk Score: CAPRINI SCORE  AGE RELATED RISK FACTORS                                                       MOBILITY RELATED FACTORS  [x ] Age 41-60 years                                            (1 Point)                  [ ] Bed rest                                                        (1 Point)  [ ] Age: 61-74 years                                           (2 Points)                [ ] Plaster cast                                                   (2 Points)  [ ] Age= 75 years                                              (3 Points)                 [ ] Bed bound for more than 72 hours                   (2 Points)    DISEASE RELATED RISK FACTORS                                               GENDER SPECIFIC FACTORS  [ ] Edema in the lower extremities                       (1 Point)           [ ] Pregnancy                                                            (1 Point)  [ ] Varicose veins                                               (1 Point)                  [ ] Post-partum < 6 weeks                                      (1 Point)             x[ ] BMI > 25 Kg/m2                                            (1 Point)                  [ ] Hormonal therapy or oral contraception       (1 Point)                 [ ] Sepsis (in the previous month)                        (1 Point)             [ ] History of pregnancy complications                (1Point)  [ ] Pneumonia or serious lung disease                                             [ ] Unexplained or recurrent  (>/= 3), premature                                 (In the previous month)                               (1 Point)                birth with toxemia or growth-restricted infant (1 Point)  [ ] Abnormal pulmonary function test            (1 Point)                                   SURGERY RELATED RISK FACTORS  [ ] Acute myocardial infarction                       (1 Point)                  [ ]  Section                                         (1 Point)  [ ] Congestive heart failure (in the previous month) (1 Point)   [ ] Minor surgery   lasting <45 minutes       (1 Point)   [ ] Inflammatory bowel disease                             (1 Point)          [ ] Arthroscopic surgery                                  (2 Points)  [ ] Central venous access                                    (2 Points)            [x ] General surgery lasting >45 minutes      (2 Points)       [ ] Stroke (in the previous month)                  (5 Points)            [ ] Elective major lower extremity arthroplasty (5 Points)                                   [  ] Malignancy (present or past include skin melanoma                                          but exclude  basal skin cell)    (2 points)                                      TRAUMA RELATED RISK FACTORS                HEMATOLOGY RELATED FACTORS                                  [x ] Fracture of the hip, pelvis, or leg                       (5 Points)  [ ] Prior episodes of VTE                                     (3 Points)          [ ] Acute spinal cord injury (in the previous month)  (5 Points)  [ ] Positive family history for VTE                         (3 Points)       [ ] Paralysis (less than 1 month)                          (5 Points)  [ ] Prothrombin 09180 A                                      (3 Points)         [ ] Multiple Trauma (within 1month)                 (5Points)                                                                                                                                                                [ ] Factor V Leiden                                          (3 Points)                                OTHER RISK FACTORS                          [ ] Lupus anticoagulants                                     (3 Points)                       [ ] BMI > 40                          (1 Point)                                                         [ ] Anticardiolipin antibodies                                (3 Points)                   [ ] Smoking                              (1Point)                                                [ ] High homocysteine in the blood                      (3 Points)                [  ] Diabetes requiring insulin (1point)                         [ ] Other congenital or acquired thrombophilia       (3 Points)          [  ] Chemotherapy                   (1 Point)  [ ] Heparin induced thrombocytopenia                  (3 Points)             [  ] Blood Transfusion                (1 point)                                                                                                         Total Score [    9      ]                                                                                                                                                                                                                 IMPROVE Bleeding Risk Score 1    Falls Risk:   High ( x )  Mod (  )  Low (  )      FAMILY HISTORY:  Family history of COPD (chronic obstructive pulmonary disease)  Family history of CVA  Denies any personal or familial history of clotting or bleeding disorders.    Allergies  No Known Allergies  Intolerances    REVIEW OF SYSTEMS    (  )Fever	     (  )Constipation	(  )SOB				(  )Headache	(  )Dysuria  (  )Chills	     (  )Melena	(  )Dyspnea present on exertion	                    (  )Dizziness                    (  )Polyuria  (  )Nausea	     (  )Hematochezia	(  )Cough			                    (  )Syncope   	(  )Hematuria  (  )Vomiting    (  )Chest Pain	(  )Wheezing			(  )Weakness  (  )Diarrhea     (  )Palpitations	(  )Anorexia			( x )joint pain    All  other review of systems negative: Yes    Vital Signs Last 24 Hrs  T(C): 36.6 (20 @ 06:36), Max: 36.6 (20 @ 22:09)  T(F): 97.8 (20 @ 06:36), Max: 97.9 (20 @ 22:09)  HR: 81 (20 @ 06:36) (75 - 88)  BP: 108/68 (20 @ 06:36) (96/59 - 108/68)  BP(mean): --  RR: 16 (20 @ 06:36) (16 - 18)  SpO2: 95% (20 @ 06:36) (92% - 96%)    Constitutional: Appears Well    Neurological: A& O x 4    Skin: Warm    Respiratory and Thorax: normal effort; Breath sounds: normal; No rales/wheezing/rhonchi  	  Cardiovascular: S1, S2, regular, NMBR	    Gastrointestinal: BS + x 4Q, nontender	    Genitourinary:  Bladder nondistended, nontender    Musculoskeletal:   General Right:   + muscle/joint tenderness,   normal tone, no joint swelling,   ROM: limited	    General Left:   no muscle/joint tenderness,   normal tone, no joint swelling,   ROM: full    Right ankle: Drsing CDI; ext fix in place, tos warm and mobile Cap refill good; +Sensation intact                      Lower extrems:   Right: no calf tenderness              negative melba's sign               + pedal pulses    Left:   no calf tenderness              negative melba's sign               + pedal pulses    LABS:                        11.1   10.73 )-----------( 354      ( 2020 07:29 )             33.0           140  |  101  |  18  ----------------------------<  106<H>  3.6   |  33<H>  |  1.02    Ca    8.7      2020 07:29                     11.5   10.37 )-----------( 382      ( 2020 07:45 )             34.5       RADIOLOGY:  EXAM:  CT ANKLE ONLY RT                        EXAM:  CT 3D RECONSTRUCT WO PABLITOANDERSON                          PROCEDURE DATE:  2020      IMPRESSION: Trimalleolar fracture as above. No significant change in fracture appearance or alignment compared to previous plain film exams.    DVT Prophylaxis:  LMWH                   ( x )  Heparin SQ           (  )  Coumadin             (  )  Xarelto                  (  )  Eliquis                   (  )  Venodynes           (x  )  Ambulation          ( x )  UFH                       (  )  Contraindicated  (  )  EC ASPIRIN       (  )

## 2020-02-24 NOTE — PROGRESS NOTE ADULT - NSHPATTENDINGPLANDISCUSS_GEN_ALL_CORE
nursing, Dr. Lowe, pt, pt's daughter.
pt., staff and Dr. Atkins,.
patient , follow up with OP cardiologist

## 2020-02-24 NOTE — PROGRESS NOTE ADULT - SUBJECTIVE AND OBJECTIVE BOX
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    20  Patient is feeling nauseous on oxycodone , constipated , denies any chest pain , her back is down the lumbar area  , patient had cardiac cath showed issac coronaries ,  possible  takotsubo cardiomyopathy . on low dose BB ,   20  still somewhat dyspneic, on 2L O2, no chest pain.  2020:  Feels better with less dyspnea; R ankle pain  20: still w/ dyspnea.  tele w/ short run SVT no other arrhythmias.  20: less dyspnea today. no events on tele.  2/3/20: brief episode of palps short run SVT on tele this AM.      MEDICATIONS:    MEDICATIONS  (STANDING):  acetaminophen   Tablet .. 975 milliGRAM(s) Oral every 8 hours  albuterol/ipratropium for Nebulization 3 milliLiter(s) Nebulizer every 6 hours  aspirin enteric coated 81 milliGRAM(s) Oral daily  atorvastatin 40 milliGRAM(s) Oral at bedtime  enoxaparin Injectable 40 milliGRAM(s) SubCutaneous daily  furosemide    Tablet 40 milliGRAM(s) Oral daily  gabapentin 300 milliGRAM(s) Oral three times a day  influenza   Vaccine 0.5 milliLiter(s) IntraMuscular once  lidocaine   Patch 1 Patch Transdermal daily  lisinopril 20 milliGRAM(s) Oral daily  metoprolol succinate ER 75 milliGRAM(s) Oral daily  pantoprazole  Injectable 40 milliGRAM(s) IV Push every 12 hours  PARoxetine 60 milliGRAM(s) Oral daily  polyethylene glycol 3350 17 Gram(s) Oral every 12 hours  polyethylene glycol 3350 17 Gram(s) Oral daily  sodium chloride 0.9% lock flush 3 milliLiter(s) IV Push every 8 hours  traZODone 150 milliGRAM(s) Oral at bedtime    MEDICATIONS  (PRN):  ALPRAZolam 0.25 milliGRAM(s) Oral every 8 hours PRN anxiety  aluminum hydroxide/magnesium hydroxide/simethicone Suspension 30 milliLiter(s) Oral four times a day PRN Indigestion  bisacodyl Suppository 10 milliGRAM(s) Rectal daily PRN If no bowel movement by postoperative day #2  cyclobenzaprine 5 milliGRAM(s) Oral three times a day PRN Muscle Spasm  hydrALAZINE Injectable 10 milliGRAM(s) IV Push every 6 hours PRN for SBP more than 160  HYDROmorphone  Injectable 1.5 milliGRAM(s) IV Push every 3 hours PRN Pain  magnesium hydroxide Suspension 30 milliLiter(s) Oral daily PRN Constipation  ondansetron Injectable 4 milliGRAM(s) IV Push every 6 hours PRN Nausea and/or Vomiting  senna 2 Tablet(s) Oral at bedtime PRN Constipation        Vital Signs Last 24 Hrs  T(C): 36.6 (2020 06:36), Max: 36.6 (2020 22:09)  T(F): 97.8 (2020 06:36), Max: 97.9 (2020 22:09)  HR: 81 (2020 06:36) (75 - 85)  BP: 108/68 (2020 06:36) (108/56 - 108/68)  BP(mean): --  RR: 16 (2020 06:36) (16 - 18)  SpO2: 95% (2020 06:36) (95% - 96%)Daily     Daily Weight in k.9 (2020 10:46)I&O's Summary      PHYSICAL EXAM:  Constitutional: NAD, awake and alert  Respiratory: Nonlabored, no crackles  Cardiovascular: S1 and S2, regular  Gastrointestinal: Bowel Sounds present, soft, nontender.   Skin: No rashes.  Ext: R ankle hardware      LABS: All Labs Reviewed:                        11.1   10.73 )-----------( 354      ( 2020 07:29 )             33.0                         11.5   10.37 )-----------( 382      ( 2020 07:45 )             34.5                         11.9   9.79  )-----------( 369      ( 2020 08:34 )             35.6     2020 07:29    140    |  101    |  18     ----------------------------<  106    3.6     |  33     |  1.02     Ca    8.7        2020 07:29    Cardiac Cath Lab - Adult (20 @ 11:44):   Normal coronary arteries.  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   Moderately reduced left ventricular systolic function with segmental wall motion abnormalities.  Normal LV systolic function. Estimated LV ejection fraction is 35 %.  Elevated left ventricular end-diastolic pressure.    Transthoracic Echocardiogram (20 @ 10:13): Left ventricle systolic function appears moderately reduced. Apical segments are hyperkinetic, basal and mid segments are hypokinetic. Visual estimation of left ventricle ejection fraction is 40%. 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    20  Patient is feeling nauseous on oxycodone , constipated , denies any chest pain , her back is down the lumbar area  , patient had cardiac cath showed issac coronaries ,  possible  takotsubo cardiomyopathy . on low dose BB ,   20  still somewhat dyspneic, on 2L O2, no chest pain.  2020:  Feels better with less dyspnea; R ankle pain  20: still w/ dyspnea.  tele w/ short run SVT no other arrhythmias.  20: less dyspnea today. no events on tele.  20: brief episode of palps short run SVT on tele this AM.  20: Had EGD this AM and negative for bleeding.  no further SVT.  no cp or SOB.    MEDICATIONS  (STANDING):  acetaminophen   Tablet .. 975 milliGRAM(s) Oral every 8 hours  albuterol/ipratropium for Nebulization 3 milliLiter(s) Nebulizer every 6 hours  aspirin enteric coated 81 milliGRAM(s) Oral daily  atorvastatin 40 milliGRAM(s) Oral at bedtime  enoxaparin Injectable 40 milliGRAM(s) SubCutaneous daily  furosemide    Tablet 40 milliGRAM(s) Oral daily  gabapentin 300 milliGRAM(s) Oral three times a day  influenza   Vaccine 0.5 milliLiter(s) IntraMuscular once  lactated ringers. 1000 milliLiter(s) (50 mL/Hr) IV Continuous <Continuous>  lidocaine   Patch 1 Patch Transdermal daily  lisinopril 20 milliGRAM(s) Oral daily  metoprolol succinate ER 75 milliGRAM(s) Oral daily  pantoprazole  Injectable 40 milliGRAM(s) IV Push every 12 hours  PARoxetine 60 milliGRAM(s) Oral daily  polyethylene glycol 3350 17 Gram(s) Oral every 12 hours  polyethylene glycol 3350 17 Gram(s) Oral daily  sodium chloride 0.9% lock flush 3 milliLiter(s) IV Push every 8 hours  traZODone 150 milliGRAM(s) Oral at bedtime    MEDICATIONS  (PRN):  aluminum hydroxide/magnesium hydroxide/simethicone Suspension 30 milliLiter(s) Oral four times a day PRN Indigestion  bisacodyl Suppository 10 milliGRAM(s) Rectal daily PRN If no bowel movement by postoperative day #2  cyclobenzaprine 5 milliGRAM(s) Oral three times a day PRN Muscle Spasm  hydrALAZINE Injectable 10 milliGRAM(s) IV Push every 6 hours PRN for SBP more than 160  HYDROmorphone  Injectable 1.5 milliGRAM(s) IV Push every 3 hours PRN Pain  magnesium hydroxide Suspension 30 milliLiter(s) Oral daily PRN Constipation  ondansetron Injectable 4 milliGRAM(s) IV Push every 6 hours PRN Nausea and/or Vomiting  senna 2 Tablet(s) Oral at bedtime PRN Constipation      Vital Signs Last 24 Hrs  T(C): 36.6 (2020 05:28), Max: 36.6 (2020 20:58)  T(F): 97.8 (2020 05:28), Max: 97.9 (2020 20:58)  HR: 82 (2020 05:28) (80 - 82)  BP: 114/65 (2020 05:28) (114/65 - 116/62)  BP(mean): --  RR: 16 (2020 05:28) (16 - 18)  SpO2: 95% (2020 05:28) (95% - 96%)    I&O's Detail      Daily     Daily Weight in k.9 (2020 10:46)    PHYSICAL EXAM:  Constitutional: NAD, awake and alert  Respiratory: Nonlabored, no crackles  Cardiovascular: S1 and S2, RRR, No murmurs or rubs or gallops.  Gastrointestinal: Bowel Sounds present, soft, nontender.   Skin: No rashes.  Ext: R ankle hardware in place and not infected/      LABS: All Labs Reviewed:                        11.0   1079 )-----------( 352      ( 2020 07:13 )             33.9     02-23    140  |  101  |  18  ----------------------------<  106<H>  3.6   |  33<H>  |  1.02    Ca    8.7      2020 07:29    Cardiac Cath Lab - Adult (20 @ 11:44):   Normal coronary arteries.  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   Moderately reduced left ventricular systolic function with segmental wall motion abnormalities.  Normal LV systolic function. Estimated LV ejection fraction is 35 %.  Elevated left ventricular end-diastolic pressure.    Transthoracic Echocardiogram (20 @ 10:13): Left ventricle systolic function appears moderately reduced. Apical segments are hyperkinetic, basal and mid segments are hypokinetic. Visual estimation of left ventricle ejection fraction is 40%.

## 2020-02-24 NOTE — PROGRESS NOTE ADULT - ASSESSMENT
A 58 y/o female with syncopal episode falling fracturing right ankle, adm with ARF, hyperkalemia, now resolved, poss upper GIB from increased intake of NSAIDs, hgb now stable, no further black stool. CT Abd R/O  Pancreatitis, Consulted by Dr Knight for DVT/PE prophylaxis, risk stratification and Anticoagulation Management.   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  PLAN:  - hold lovenox tomorrow for procedure. then resume at least 12 after procedure for VTE prophylaxis  - Monitor daily CBC, BMP  - Encourage mobilization and maintain venodynes    Will continue to follow

## 2020-02-24 NOTE — PROGRESS NOTE ADULT - ASSESSMENT
59/F admitted with     # Syncope: can be secondary to orthostatic causes in combination with cardiac cause  - tele monitoring   - new low EF with recent cath 2/17 with normal coronaries  - elevated trops      # Ankle pain  - care discussed with ortho  - anticipated ankle surgery on Tuesday    # N/V  -endoscopy on Monday; normal    # Acute hypoxic respiratory failure in the setting of acute on chronic systolic CHF    lasix decreased as per cardio  - now off O2,    # NSTEMI:   - s/p cardiac cath 2/17:  Normal coronary arteries.  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 Moderately reduced left ventricular systolic function with segmental wall   motion abnormalities.  Normal LV systolic function. Estimated LV ejection fraction is 35 %.  Elevated left ventricular end-diastolic pressure.   - cardiology following .    # Peripancreatic stranding on CT chest:  - CT: Peripancreatic stranding, new since the previous abdominal CT dated 2/14/2020, suggestive of acute pancreatitis. Clinical correlation with pancreatic enzymes is recommended.  - patient's abdominal pain could be also secondary to (L) sided colitis noted on 2/14 CT scan  - tolerating po diet    # Right Ankle Fx:   - s/p right ankle External fixator application  - pain meds prn  Sx on Tuesday    # ARF:   - likely sec to NSAIDS; resolved    # Melena and Dark colored stools + Acute Anemia of acte hemorrhage from GI bleed:  resolved  - likely from NSAIDS    - d/c NSAIDS  - cont Protonix  - s/p PRBC 2/15  EGD on Monday; normal    # Hypotension:  - resolved     # Hyperkalemia    - resolved      DVT PPX: lovenox         Pt medically optimized and cleared by medicine and cardio for right ankle fx ortho surgery. 59/F admitted with     # Syncope: can be secondary to orthostatic causes in combination with cardiac cause  - tele monitoring   - new low EF with recent cath 2/17 with normal coronaries  - elevated trops      # Ankle pain  - care discussed with ortho  - anticipated ankle surgery on Tuesday    # N/V  -endoscopy on Monday; normal    # Acute hypoxic respiratory failure in the setting of acute on chronic systolic CHF    lasix decreased as per cardio  - now off O2,    # NSTEMI:   - s/p cardiac cath 2/17:  Normal coronary arteries.  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 Moderately reduced left ventricular systolic function with segmental wall   motion abnormalities.  Normal LV systolic function. Estimated LV ejection fraction is 35 %.  Elevated left ventricular end-diastolic pressure.   - cardiology following .    # Peripancreatic stranding on CT chest:  - CT: Peripancreatic stranding, new since the previous abdominal CT dated 2/14/2020, suggestive of acute pancreatitis. Clinical correlation with pancreatic enzymes is recommended.  - patient's abdominal pain could be also secondary to (L) sided colitis noted on 2/14 CT scan  - tolerating po diet    # Right Ankle Fx:   - s/p right ankle External fixator application  - pain meds prn  Sx on Tuesday    # ARF:   - likely sec to NSAIDS; resolved    # Melena and Dark colored stools + Acute Anemia of acte hemorrhage from GI bleed:  resolved  - likely from NSAIDS    - d/c NSAIDS  - cont Protonix  - s/p PRBC 2/15  EGD on Monday; normal    # Hypotension:  - resolved     # Hyperkalemia    - resolved    Vomiting:    zofran, reglan prn    DVT PPX: lovenox         Pt medically optimized and cleared by medicine and cardio for right ankle fx ortho surgery.

## 2020-02-24 NOTE — PROGRESS NOTE ADULT - ASSESSMENT
- hypoxemic respiratory failure from acute pulmonary edema  which is resolved with follow-up chest x-ray  - takotsubu cardiomyopthy   - history of mild asthma  no further wheezing with treatment of underlying pulmonary edema  - status post fall ankle fracture and repair   - anemia with the G.I bleeding with the stable hb now   - acute renal failure resolved   - spinal stenosis       PLAN   - continue to keep on low dose diuretics as tolerated by the B.P and renal function   -  patient pulmonary edema and CHF seems to be improved with follow-up chest x-ray  - use of nebs p.r.n. for wheezing  for her asthma  - follow-up of K and magnesium while on diuretics  - DVT prophylaxis .  - ortho follow-up for ankle fracture

## 2020-02-25 LAB
ANION GAP SERPL CALC-SCNC: 4 MMOL/L — LOW (ref 5–17)
ANION GAP SERPL CALC-SCNC: 7 MMOL/L — SIGNIFICANT CHANGE UP (ref 5–17)
APTT BLD: 26.8 SEC — LOW (ref 27.5–36.3)
BUN SERPL-MCNC: 16 MG/DL — SIGNIFICANT CHANGE UP (ref 7–23)
BUN SERPL-MCNC: 18 MG/DL — SIGNIFICANT CHANGE UP (ref 7–23)
CALCIUM SERPL-MCNC: 8.5 MG/DL — SIGNIFICANT CHANGE UP (ref 8.5–10.1)
CALCIUM SERPL-MCNC: 9 MG/DL — SIGNIFICANT CHANGE UP (ref 8.5–10.1)
CHLORIDE SERPL-SCNC: 102 MMOL/L — SIGNIFICANT CHANGE UP (ref 96–108)
CHLORIDE SERPL-SCNC: 103 MMOL/L — SIGNIFICANT CHANGE UP (ref 96–108)
CO2 SERPL-SCNC: 25 MMOL/L — SIGNIFICANT CHANGE UP (ref 22–31)
CO2 SERPL-SCNC: 31 MMOL/L — SIGNIFICANT CHANGE UP (ref 22–31)
CREAT SERPL-MCNC: 1.02 MG/DL — SIGNIFICANT CHANGE UP (ref 0.5–1.3)
CREAT SERPL-MCNC: 1.16 MG/DL — SIGNIFICANT CHANGE UP (ref 0.5–1.3)
GLUCOSE SERPL-MCNC: 106 MG/DL — HIGH (ref 70–99)
GLUCOSE SERPL-MCNC: 167 MG/DL — HIGH (ref 70–99)
HCG UR QL: NEGATIVE — SIGNIFICANT CHANGE UP
HCT VFR BLD CALC: 31.5 % — LOW (ref 34.5–45)
HCT VFR BLD CALC: 35.5 % — SIGNIFICANT CHANGE UP (ref 34.5–45)
HGB BLD-MCNC: 10.3 G/DL — LOW (ref 11.5–15.5)
HGB BLD-MCNC: 11.4 G/DL — LOW (ref 11.5–15.5)
INR BLD: 1.1 RATIO — SIGNIFICANT CHANGE UP (ref 0.88–1.16)
MCHC RBC-ENTMCNC: 31.1 PG — SIGNIFICANT CHANGE UP (ref 27–34)
MCHC RBC-ENTMCNC: 31.3 PG — SIGNIFICANT CHANGE UP (ref 27–34)
MCHC RBC-ENTMCNC: 32.1 GM/DL — SIGNIFICANT CHANGE UP (ref 32–36)
MCHC RBC-ENTMCNC: 32.7 GM/DL — SIGNIFICANT CHANGE UP (ref 32–36)
MCV RBC AUTO: 95.7 FL — SIGNIFICANT CHANGE UP (ref 80–100)
MCV RBC AUTO: 97 FL — SIGNIFICANT CHANGE UP (ref 80–100)
PLATELET # BLD AUTO: 206 K/UL — SIGNIFICANT CHANGE UP (ref 150–400)
PLATELET # BLD AUTO: 325 K/UL — SIGNIFICANT CHANGE UP (ref 150–400)
POTASSIUM SERPL-MCNC: 3.8 MMOL/L — SIGNIFICANT CHANGE UP (ref 3.5–5.3)
POTASSIUM SERPL-MCNC: 4.4 MMOL/L — SIGNIFICANT CHANGE UP (ref 3.5–5.3)
POTASSIUM SERPL-SCNC: 3.8 MMOL/L — SIGNIFICANT CHANGE UP (ref 3.5–5.3)
POTASSIUM SERPL-SCNC: 4.4 MMOL/L — SIGNIFICANT CHANGE UP (ref 3.5–5.3)
PROTHROM AB SERPL-ACNC: 12.2 SEC — SIGNIFICANT CHANGE UP (ref 10–12.9)
RBC # BLD: 3.29 M/UL — LOW (ref 3.8–5.2)
RBC # BLD: 3.66 M/UL — LOW (ref 3.8–5.2)
RBC # FLD: 13.4 % — SIGNIFICANT CHANGE UP (ref 10.3–14.5)
RBC # FLD: 13.5 % — SIGNIFICANT CHANGE UP (ref 10.3–14.5)
SODIUM SERPL-SCNC: 134 MMOL/L — LOW (ref 135–145)
SODIUM SERPL-SCNC: 138 MMOL/L — SIGNIFICANT CHANGE UP (ref 135–145)
WBC # BLD: 12.9 K/UL — HIGH (ref 3.8–10.5)
WBC # BLD: 21.36 K/UL — HIGH (ref 3.8–10.5)
WBC # FLD AUTO: 12.9 K/UL — HIGH (ref 3.8–10.5)
WBC # FLD AUTO: 21.36 K/UL — HIGH (ref 3.8–10.5)

## 2020-02-25 PROCEDURE — 99231 SBSQ HOSP IP/OBS SF/LOW 25: CPT

## 2020-02-25 PROCEDURE — 99233 SBSQ HOSP IP/OBS HIGH 50: CPT

## 2020-02-25 PROCEDURE — 99232 SBSQ HOSP IP/OBS MODERATE 35: CPT | Mod: 57

## 2020-02-25 PROCEDURE — 20694 RMVL EXT FIXJ SYS UNDER ANES: CPT | Mod: RT

## 2020-02-25 PROCEDURE — 99024 POSTOP FOLLOW-UP VISIT: CPT

## 2020-02-25 PROCEDURE — 27823 TREATMENT OF ANKLE FRACTURE: CPT | Mod: RT

## 2020-02-25 PROCEDURE — 97608 NEG PRS WND THER NDME>50SQCM: CPT | Mod: 59,RT

## 2020-02-25 RX ORDER — ACETAMINOPHEN 500 MG
975 TABLET ORAL EVERY 8 HOURS
Refills: 0 | Status: DISCONTINUED | OUTPATIENT
Start: 2020-02-25 | End: 2020-02-28

## 2020-02-25 RX ORDER — ASPIRIN/CALCIUM CARB/MAGNESIUM 324 MG
81 TABLET ORAL DAILY
Refills: 0 | Status: DISCONTINUED | OUTPATIENT
Start: 2020-02-25 | End: 2020-02-28

## 2020-02-25 RX ORDER — SODIUM CHLORIDE 9 MG/ML
1000 INJECTION, SOLUTION INTRAVENOUS
Refills: 0 | Status: DISCONTINUED | OUTPATIENT
Start: 2020-02-25 | End: 2020-02-28

## 2020-02-25 RX ORDER — POLYETHYLENE GLYCOL 3350 17 G/17G
17 POWDER, FOR SOLUTION ORAL EVERY 12 HOURS
Refills: 0 | Status: DISCONTINUED | OUTPATIENT
Start: 2020-02-25 | End: 2020-02-28

## 2020-02-25 RX ORDER — LISINOPRIL 2.5 MG/1
20 TABLET ORAL DAILY
Refills: 0 | Status: DISCONTINUED | OUTPATIENT
Start: 2020-02-25 | End: 2020-02-26

## 2020-02-25 RX ORDER — FUROSEMIDE 40 MG
20 TABLET ORAL DAILY
Refills: 0 | Status: DISCONTINUED | OUTPATIENT
Start: 2020-02-25 | End: 2020-02-27

## 2020-02-25 RX ORDER — OXYCODONE HYDROCHLORIDE 5 MG/1
10 TABLET ORAL ONCE
Refills: 0 | Status: DISCONTINUED | OUTPATIENT
Start: 2020-02-25 | End: 2020-02-25

## 2020-02-25 RX ORDER — PANTOPRAZOLE SODIUM 20 MG/1
40 TABLET, DELAYED RELEASE ORAL EVERY 12 HOURS
Refills: 0 | Status: DISCONTINUED | OUTPATIENT
Start: 2020-02-25 | End: 2020-02-26

## 2020-02-25 RX ORDER — CEFAZOLIN SODIUM 1 G
2000 VIAL (EA) INJECTION EVERY 8 HOURS
Refills: 0 | Status: COMPLETED | OUTPATIENT
Start: 2020-02-25 | End: 2020-02-26

## 2020-02-25 RX ORDER — OXYCODONE HYDROCHLORIDE 5 MG/1
5 TABLET ORAL EVERY 4 HOURS
Refills: 0 | Status: DISCONTINUED | OUTPATIENT
Start: 2020-02-25 | End: 2020-02-25

## 2020-02-25 RX ORDER — SODIUM CHLORIDE 9 MG/ML
1000 INJECTION INTRAMUSCULAR; INTRAVENOUS; SUBCUTANEOUS
Refills: 0 | Status: DISCONTINUED | OUTPATIENT
Start: 2020-02-25 | End: 2020-02-25

## 2020-02-25 RX ORDER — ONDANSETRON 8 MG/1
4 TABLET, FILM COATED ORAL EVERY 6 HOURS
Refills: 0 | Status: DISCONTINUED | OUTPATIENT
Start: 2020-02-25 | End: 2020-02-28

## 2020-02-25 RX ORDER — FENTANYL CITRATE 50 UG/ML
50 INJECTION INTRAVENOUS
Refills: 0 | Status: DISCONTINUED | OUTPATIENT
Start: 2020-02-25 | End: 2020-02-25

## 2020-02-25 RX ORDER — GABAPENTIN 400 MG/1
300 CAPSULE ORAL THREE TIMES A DAY
Refills: 0 | Status: DISCONTINUED | OUTPATIENT
Start: 2020-02-25 | End: 2020-02-28

## 2020-02-25 RX ORDER — ONDANSETRON 8 MG/1
4 TABLET, FILM COATED ORAL ONCE
Refills: 0 | Status: DISCONTINUED | OUTPATIENT
Start: 2020-02-25 | End: 2020-02-25

## 2020-02-25 RX ORDER — METOPROLOL TARTRATE 50 MG
75 TABLET ORAL DAILY
Refills: 0 | Status: DISCONTINUED | OUTPATIENT
Start: 2020-02-25 | End: 2020-02-27

## 2020-02-25 RX ORDER — SODIUM CHLORIDE 9 MG/ML
1000 INJECTION, SOLUTION INTRAVENOUS
Refills: 0 | Status: DISCONTINUED | OUTPATIENT
Start: 2020-02-25 | End: 2020-02-25

## 2020-02-25 RX ORDER — OXYCODONE HYDROCHLORIDE 5 MG/1
5 TABLET ORAL ONCE
Refills: 0 | Status: DISCONTINUED | OUTPATIENT
Start: 2020-02-25 | End: 2020-02-25

## 2020-02-25 RX ORDER — OXYCODONE HYDROCHLORIDE 5 MG/1
10 TABLET ORAL EVERY 4 HOURS
Refills: 0 | Status: DISCONTINUED | OUTPATIENT
Start: 2020-02-25 | End: 2020-02-25

## 2020-02-25 RX ORDER — HYDROMORPHONE HYDROCHLORIDE 2 MG/ML
0.5 INJECTION INTRAMUSCULAR; INTRAVENOUS; SUBCUTANEOUS ONCE
Refills: 0 | Status: DISCONTINUED | OUTPATIENT
Start: 2020-02-25 | End: 2020-02-25

## 2020-02-25 RX ORDER — ALPRAZOLAM 0.25 MG
0.25 TABLET ORAL EVERY 6 HOURS
Refills: 0 | Status: DISCONTINUED | OUTPATIENT
Start: 2020-02-25 | End: 2020-02-28

## 2020-02-25 RX ORDER — ACETAMINOPHEN 500 MG
1000 TABLET ORAL ONCE
Refills: 0 | Status: COMPLETED | OUTPATIENT
Start: 2020-02-25 | End: 2020-02-25

## 2020-02-25 RX ORDER — TRAZODONE HCL 50 MG
150 TABLET ORAL AT BEDTIME
Refills: 0 | Status: DISCONTINUED | OUTPATIENT
Start: 2020-02-25 | End: 2020-02-28

## 2020-02-25 RX ORDER — HYDROMORPHONE HYDROCHLORIDE 2 MG/ML
0.5 INJECTION INTRAMUSCULAR; INTRAVENOUS; SUBCUTANEOUS
Refills: 0 | Status: DISCONTINUED | OUTPATIENT
Start: 2020-02-25 | End: 2020-02-25

## 2020-02-25 RX ORDER — ATORVASTATIN CALCIUM 80 MG/1
40 TABLET, FILM COATED ORAL AT BEDTIME
Refills: 0 | Status: DISCONTINUED | OUTPATIENT
Start: 2020-02-25 | End: 2020-02-28

## 2020-02-25 RX ORDER — CYCLOBENZAPRINE HYDROCHLORIDE 10 MG/1
5 TABLET, FILM COATED ORAL THREE TIMES A DAY
Refills: 0 | Status: DISCONTINUED | OUTPATIENT
Start: 2020-02-25 | End: 2020-02-28

## 2020-02-25 RX ORDER — PROCHLORPERAZINE MALEATE 5 MG
10 TABLET ORAL ONCE
Refills: 0 | Status: DISCONTINUED | OUTPATIENT
Start: 2020-02-25 | End: 2020-02-25

## 2020-02-25 RX ORDER — ENOXAPARIN SODIUM 100 MG/ML
40 INJECTION SUBCUTANEOUS EVERY 24 HOURS
Refills: 0 | Status: DISCONTINUED | OUTPATIENT
Start: 2020-02-26 | End: 2020-02-28

## 2020-02-25 RX ORDER — HYDROMORPHONE HYDROCHLORIDE 2 MG/ML
0.5 INJECTION INTRAMUSCULAR; INTRAVENOUS; SUBCUTANEOUS EVERY 6 HOURS
Refills: 0 | Status: DISCONTINUED | OUTPATIENT
Start: 2020-02-25 | End: 2020-02-25

## 2020-02-25 RX ORDER — ONDANSETRON 8 MG/1
4 TABLET, FILM COATED ORAL EVERY 6 HOURS
Refills: 0 | Status: DISCONTINUED | OUTPATIENT
Start: 2020-02-25 | End: 2020-02-25

## 2020-02-25 RX ADMIN — HYDROMORPHONE HYDROCHLORIDE 0.5 MILLIGRAM(S): 2 INJECTION INTRAMUSCULAR; INTRAVENOUS; SUBCUTANEOUS at 17:52

## 2020-02-25 RX ADMIN — GABAPENTIN 300 MILLIGRAM(S): 400 CAPSULE ORAL at 21:19

## 2020-02-25 RX ADMIN — SODIUM CHLORIDE 3 MILLILITER(S): 9 INJECTION INTRAMUSCULAR; INTRAVENOUS; SUBCUTANEOUS at 21:45

## 2020-02-25 RX ADMIN — HYDROMORPHONE HYDROCHLORIDE 1.5 MILLIGRAM(S): 2 INJECTION INTRAMUSCULAR; INTRAVENOUS; SUBCUTANEOUS at 01:17

## 2020-02-25 RX ADMIN — Medication 81 MILLIGRAM(S): at 10:29

## 2020-02-25 RX ADMIN — ONDANSETRON 4 MILLIGRAM(S): 8 TABLET, FILM COATED ORAL at 04:46

## 2020-02-25 RX ADMIN — PANTOPRAZOLE SODIUM 40 MILLIGRAM(S): 20 TABLET, DELAYED RELEASE ORAL at 05:32

## 2020-02-25 RX ADMIN — HYDROMORPHONE HYDROCHLORIDE 0.5 MILLIGRAM(S): 2 INJECTION INTRAMUSCULAR; INTRAVENOUS; SUBCUTANEOUS at 17:30

## 2020-02-25 RX ADMIN — HYDROMORPHONE HYDROCHLORIDE 0.5 MILLIGRAM(S): 2 INJECTION INTRAMUSCULAR; INTRAVENOUS; SUBCUTANEOUS at 23:51

## 2020-02-25 RX ADMIN — OXYCODONE HYDROCHLORIDE 10 MILLIGRAM(S): 5 TABLET ORAL at 17:14

## 2020-02-25 RX ADMIN — SODIUM CHLORIDE 50 MILLILITER(S): 9 INJECTION, SOLUTION INTRAVENOUS at 00:00

## 2020-02-25 RX ADMIN — Medication 20 MILLIGRAM(S): at 10:29

## 2020-02-25 RX ADMIN — HYDROMORPHONE HYDROCHLORIDE 0.5 MILLIGRAM(S): 2 INJECTION INTRAMUSCULAR; INTRAVENOUS; SUBCUTANEOUS at 17:10

## 2020-02-25 RX ADMIN — HYDROMORPHONE HYDROCHLORIDE 1.5 MILLIGRAM(S): 2 INJECTION INTRAMUSCULAR; INTRAVENOUS; SUBCUTANEOUS at 04:27

## 2020-02-25 RX ADMIN — HYDROMORPHONE HYDROCHLORIDE 1.5 MILLIGRAM(S): 2 INJECTION INTRAMUSCULAR; INTRAVENOUS; SUBCUTANEOUS at 01:32

## 2020-02-25 RX ADMIN — Medication 0.5 MILLIGRAM(S): at 18:16

## 2020-02-25 RX ADMIN — HYDROMORPHONE HYDROCHLORIDE 0.5 MILLIGRAM(S): 2 INJECTION INTRAMUSCULAR; INTRAVENOUS; SUBCUTANEOUS at 12:00

## 2020-02-25 RX ADMIN — Medication 1000 MILLIGRAM(S): at 21:33

## 2020-02-25 RX ADMIN — HYDROMORPHONE HYDROCHLORIDE 0.5 MILLIGRAM(S): 2 INJECTION INTRAMUSCULAR; INTRAVENOUS; SUBCUTANEOUS at 17:36

## 2020-02-25 RX ADMIN — Medication 100 MILLIGRAM(S): at 21:20

## 2020-02-25 RX ADMIN — HYDROMORPHONE HYDROCHLORIDE 0.5 MILLIGRAM(S): 2 INJECTION INTRAMUSCULAR; INTRAVENOUS; SUBCUTANEOUS at 17:49

## 2020-02-25 RX ADMIN — HYDROMORPHONE HYDROCHLORIDE 0.5 MILLIGRAM(S): 2 INJECTION INTRAMUSCULAR; INTRAVENOUS; SUBCUTANEOUS at 17:50

## 2020-02-25 RX ADMIN — HYDROMORPHONE HYDROCHLORIDE 0.5 MILLIGRAM(S): 2 INJECTION INTRAMUSCULAR; INTRAVENOUS; SUBCUTANEOUS at 17:14

## 2020-02-25 RX ADMIN — Medication 0.25 MILLIGRAM(S): at 10:29

## 2020-02-25 RX ADMIN — GABAPENTIN 300 MILLIGRAM(S): 400 CAPSULE ORAL at 18:31

## 2020-02-25 RX ADMIN — FENTANYL CITRATE 50 MICROGRAM(S): 50 INJECTION INTRAVENOUS at 19:40

## 2020-02-25 RX ADMIN — HYDROMORPHONE HYDROCHLORIDE 1.5 MILLIGRAM(S): 2 INJECTION INTRAMUSCULAR; INTRAVENOUS; SUBCUTANEOUS at 10:27

## 2020-02-25 RX ADMIN — Medication 60 MILLIGRAM(S): at 10:30

## 2020-02-25 RX ADMIN — LIDOCAINE 1 PATCH: 4 CREAM TOPICAL at 10:30

## 2020-02-25 RX ADMIN — HYDROMORPHONE HYDROCHLORIDE 1.5 MILLIGRAM(S): 2 INJECTION INTRAMUSCULAR; INTRAVENOUS; SUBCUTANEOUS at 04:42

## 2020-02-25 RX ADMIN — Medication 975 MILLIGRAM(S): at 06:30

## 2020-02-25 RX ADMIN — LISINOPRIL 20 MILLIGRAM(S): 2.5 TABLET ORAL at 10:33

## 2020-02-25 RX ADMIN — HYDROMORPHONE HYDROCHLORIDE 1.5 MILLIGRAM(S): 2 INJECTION INTRAMUSCULAR; INTRAVENOUS; SUBCUTANEOUS at 11:05

## 2020-02-25 RX ADMIN — Medication 10 MILLIGRAM(S): at 10:26

## 2020-02-25 RX ADMIN — HYDROMORPHONE HYDROCHLORIDE 30 MILLILITER(S): 2 INJECTION INTRAMUSCULAR; INTRAVENOUS; SUBCUTANEOUS at 23:52

## 2020-02-25 RX ADMIN — Medication 0.25 MILLIGRAM(S): at 19:40

## 2020-02-25 RX ADMIN — Medication 75 MILLIGRAM(S): at 10:33

## 2020-02-25 RX ADMIN — ATORVASTATIN CALCIUM 40 MILLIGRAM(S): 80 TABLET, FILM COATED ORAL at 21:20

## 2020-02-25 RX ADMIN — GABAPENTIN 300 MILLIGRAM(S): 400 CAPSULE ORAL at 05:32

## 2020-02-25 RX ADMIN — SODIUM CHLORIDE 3 MILLILITER(S): 9 INJECTION INTRAMUSCULAR; INTRAVENOUS; SUBCUTANEOUS at 05:15

## 2020-02-25 RX ADMIN — SODIUM CHLORIDE 75 MILLILITER(S): 9 INJECTION INTRAMUSCULAR; INTRAVENOUS; SUBCUTANEOUS at 18:33

## 2020-02-25 RX ADMIN — Medication 975 MILLIGRAM(S): at 05:32

## 2020-02-25 RX ADMIN — Medication 150 MILLIGRAM(S): at 23:10

## 2020-02-25 RX ADMIN — OXYCODONE HYDROCHLORIDE 10 MILLIGRAM(S): 5 TABLET ORAL at 17:10

## 2020-02-25 RX ADMIN — Medication 400 MILLIGRAM(S): at 21:18

## 2020-02-25 RX ADMIN — HYDROMORPHONE HYDROCHLORIDE 0.5 MILLIGRAM(S): 2 INJECTION INTRAMUSCULAR; INTRAVENOUS; SUBCUTANEOUS at 17:40

## 2020-02-25 NOTE — PROGRESS NOTE ADULT - SUBJECTIVE AND OBJECTIVE BOX
HPI: Pt is a 58 y/o female with a PMHx of anxiety/depression, asthma, HTN on enalapril, spinal stenosis, osteoarthritis, seasonal allergies who was sent to the ED by Dr. Knight today for ankle injury requiring surgery.  Pt c/o right ankle pain after a fall yesterday, for which she was splinted at [facility] ED. Pt also had 5 episodes of diarrhea 2 days ago with LLQ pain which improved yesterday.  day prior to admission pt was NPO for planned epidural procedure for pain control. At 11 am she was getting dressed yesterday and while putting on earrings she had unwitnessed LOC and  fell from standing , injuring her right ankle sustaining a left ankle fracture She denies preceding cpain/SOB/palpitations/dizziness.  She does not recall tripping over anything. Pt reports she stopped taking motrin 600mg BID , approximately 5 days  ago for the planned epidural procedure for pain control.  She was on mobic until 2020.  She has noticed increasingly dark stools over the last 1-2 weeks. She also reports some difficulty swallowing solids for 2-3 weeks.        In the ED , pt is found to have ARF, hyperkalemia, anemia and hypotension to 85/38.  Pt reported she may have accidentally taken an extra dose of her Clonidine today.     INTERVAL HISTORY:  she is now on 3 north s/p Right ankleplacement of ext-fix  : seen at bedside,  anxious, vomiting, states nausea form pain meds  2020: Pt seen at bedside in SICU with  at side. She reports she s/p cardiac cath, no abnormal findings per , and off of UFH per primary RN. Pt with VTE risk factors.   : seen at bedside, anxious, c/o lower back pain, not new  : seen at bedside, c/o n/v still anxious.  : Pt seen at bedside c/o leg pain with movement, pain medication received  2020 Pt seen at bedside on 3 east, c/o pain to ankle, Discussed his anticoagulation with lovenox.  Benefits and risks discussed. Pt states she has no concerns.  2020: Pt seen at bedside, no issues with bleeding.  2020: Pt seen at bedside, with friend at side. No issues with lovenox. Pending EGD tomorrow and ex-fix ORIF either Tu vs. Wed per ortho  2020 Pt seen at bedside on 3north. S/P egd this a.m. neg,  discussed with pt the use of Lovenox for her anticoagulation  No concerns.  States she is waiting for them to remove the external fixator.   Pt state she has a lot of stress.   2020 Pt seen at bedside on 3north.  Pt awaitng ortho procedure removal of external fixator today.  Lovenox on  hold.   Fam HX:  Father DM, had CVa  Mother COPD  Brother  at 65 of alcoholism  Sister has lung ca (2020 18:56)    Consulted by Dr. Rogers for VTE prophylaxis, risk stratification, and anticoagulation management.    PAST MEDICAL & SURGICAL HISTORY:  Hypertension  Anxiety and depression  Seasonal allergies  Asthma  Osteoarthritis  S/P hip replacement: left  S/P tubal ligation  S/P cholecystectomy    CrCl: 69.7    Caprini VTE Risk Score: CAPRINI SCORE  AGE RELATED RISK FACTORS                                                       MOBILITY RELATED FACTORS  [x ] Age 41-60 years                                            (1 Point)                  [ ] Bed rest                                                        (1 Point)  [ ] Age: 61-74 years                                           (2 Points)                [ ] Plaster cast                                                   (2 Points)  [ ] Age= 75 years                                              (3 Points)                 [ ] Bed bound for more than 72 hours                   (2 Points)    DISEASE RELATED RISK FACTORS                                               GENDER SPECIFIC FACTORS  [ ] Edema in the lower extremities                       (1 Point)           [ ] Pregnancy                                                            (1 Point)  [ ] Varicose veins                                               (1 Point)                  [ ] Post-partum < 6 weeks                                      (1 Point)             x[ ] BMI > 25 Kg/m2                                            (1 Point)                  [ ] Hormonal therapy or oral contraception       (1 Point)                 [ ] Sepsis (in the previous month)                        (1 Point)             [ ] History of pregnancy complications                (1Point)  [ ] Pneumonia or serious lung disease                                             [ ] Unexplained or recurrent  (>/= 3), premature                                 (In the previous month)                               (1 Point)                birth with toxemia or growth-restricted infant (1 Point)  [ ] Abnormal pulmonary function test            (1 Point)                                   SURGERY RELATED RISK FACTORS  [ ] Acute myocardial infarction                       (1 Point)                  [ ]  Section                                         (1 Point)  [ ] Congestive heart failure (in the previous month) (1 Point)   [ ] Minor surgery   lasting <45 minutes       (1 Point)   [ ] Inflammatory bowel disease                             (1 Point)          [ ] Arthroscopic surgery                                  (2 Points)  [ ] Central venous access                                    (2 Points)            [x ] General surgery lasting >45 minutes      (2 Points)       [ ] Stroke (in the previous month)                  (5 Points)            [ ] Elective major lower extremity arthroplasty (5 Points)                                   [  ] Malignancy (present or past include skin melanoma                                          but exclude  basal skin cell)    (2 points)                                      TRAUMA RELATED RISK FACTORS                HEMATOLOGY RELATED FACTORS                                  [x ] Fracture of the hip, pelvis, or leg                       (5 Points)  [ ] Prior episodes of VTE                                     (3 Points)          [ ] Acute spinal cord injury (in the previous month)  (5 Points)  [ ] Positive family history for VTE                         (3 Points)       [ ] Paralysis (less than 1 month)                          (5 Points)  [ ] Prothrombin 13486 A                                      (3 Points)         [ ] Multiple Trauma (within 1month)                 (5Points)                                                                                                                                                                [ ] Factor V Leiden                                          (3 Points)                                OTHER RISK FACTORS                          [ ] Lupus anticoagulants                                     (3 Points)                       [ ] BMI > 40                          (1 Point)                                                         [ ] Anticardiolipin antibodies                                (3 Points)                   [ ] Smoking                              (1Point)                                                [ ] High homocysteine in the blood                      (3 Points)                [  ] Diabetes requiring insulin (1point)                         [ ] Other congenital or acquired thrombophilia       (3 Points)          [  ] Chemotherapy                   (1 Point)  [ ] Heparin induced thrombocytopenia                  (3 Points)             [  ] Blood Transfusion                (1 point)                                                                                                         Total Score [    9      ]                                                                                                                                                                                                                 IMPROVE Bleeding Risk Score 1    Falls Risk:   High ( x )  Mod (  )  Low (  )      FAMILY HISTORY:  Family history of COPD (chronic obstructive pulmonary disease)  Family history of CVA  Denies any personal or familial history of clotting or bleeding disorders.    Allergies  No Known Allergies  Intolerances    REVIEW OF SYSTEMS    (  )Fever	     (  )Constipation	(  )SOB				(  )Headache	(  )Dysuria  (  )Chills	     (  )Melena	(  )Dyspnea present on exertion	                    (  )Dizziness                    (  )Polyuria  (  )Nausea	     (  )Hematochezia	(  )Cough			                    (  )Syncope   	(  )Hematuria  (  )Vomiting    (  )Chest Pain	(  )Wheezing			(  )Weakness  (  )Diarrhea     (  )Palpitations	(  )Anorexia			( x )joint pain    All  other review of systems negative: Yes    Vital Signs Last 24 Hrs  T(C): 36.7 (20 @ 13:30), Max: 37 (20 @ 11:40)  T(F): 98 (20 @ 13:30), Max: 98.6 (20 @ 11:40)  HR: 67 (20 @ 13:30) (67 - 86)  BP: 122/68 (20 @ 13:30) (94/57 - 122/68)  BP(mean): --  RR: 18 (20 @ 13:30) (16 - 18)  SpO2: 98% (20 @ 13:30) (94% - 98%)    Constitutional: Appears Well    Neurological: A& O x 4    Skin: Warm    Respiratory and Thorax: normal effort; Breath sounds: normal; No rales/wheezing/rhonchi  	  Cardiovascular: S1, S2, regular, NMBR	    Gastrointestinal: BS + x 4Q, nontender	    Genitourinary:  Bladder nondistended, nontender    Musculoskeletal:   General Right:   + muscle/joint tenderness,   normal tone, no joint swelling,   ROM: limited	    General Left:   no muscle/joint tenderness,   normal tone, no joint swelling,   ROM: full    Right ankle: Drsing CDI; ext fix in place, tos warm and mobile Cap refill good; +Sensation intact                      Lower extrems:   Right: no calf tenderness              negative melba's sign               + pedal pulses    Left:   no calf tenderness              negative melba's sign               + pedal pulses    LABS:                           10.3   12.90 )-----------( 325      ( 2020 05:52 )             31.5       02-    138  |  103  |  16  ----------------------------<  106<H>  3.8   |  31  |  1.02    Ca    8.5      2020 05:52        PT/INR - ( 2020 05:52 )   PT: 12.2 sec;   INR: 1.10 ratio         PTT - ( 2020 05:52 )  PTT:26.8 sec                   11.1   10.73 )-----------( 354      ( 2020 07:29 )             33.0       02-    140  |  101  |  18  ----------------------------<  106<H>  3.6   |  33<H>  |  1.02    Ca    8.7      2020 07:29                     11.5   10.37 )-----------( 382      ( 2020 07:45 )             34.5       RADIOLOGY:  EXAM:  CT ANKLE ONLY RT                        EXAM:  CT 3D RECONSTRUCT MAGDY ROCHA                          PROCEDURE DATE:  2020      IMPRESSION: Trimalleolar fracture as above. No significant change in fracture appearance or alignment compared to previous plain film exams.    DVT Prophylaxis:  LMWH                   ( hold )  Heparin SQ           (  )  Coumadin             (  )  Xarelto                  (  )  Eliquis                   (  )  Venodynes           (x  )  Ambulation          ( x )  UFH                       (  )  Contraindicated  (  )  EC ASPIRIN       (  )

## 2020-02-25 NOTE — PROGRESS NOTE ADULT - SUBJECTIVE AND OBJECTIVE BOX
Orthopedics    POD 0 s/p Ex Fix removal and Right ankle ORIF. Received post op Fem N. block  Feeling well. Pain controlled. No n/v.     Vital Signs Last 24 Hrs  T(C): 36.7 (02-25-20 @ 16:50), Max: 37 (02-25-20 @ 11:40)  T(F): 98.1 (02-25-20 @ 16:50), Max: 98.6 (02-25-20 @ 11:40)  HR: 71 (02-25-20 @ 18:14) (65 - 86)  BP: 106/58 (02-25-20 @ 18:14) (94/57 - 122/68)  BP(mean): --  RR: 10 (02-25-20 @ 18:14) (10 - 18)  SpO2: 100% (02-25-20 @ 18:14) (94% - 100%)                        10.3   12.90 )-----------( 325      ( 25 Feb 2020 05:52 )             31.5     25 Feb 2020 05:52    138    |  103    |  16     ----------------------------<  106    3.8     |  31     |  1.02     Ca    8.5        25 Feb 2020 05:52      PT/INR - ( 25 Feb 2020 05:52 )   PT: 12.2 sec;   INR: 1.10 ratio         PTT - ( 25 Feb 2020 05:52 )  PTT:26.8 sec    Exam:  NAD AAOx3  Dressing dry and intact  Able to wiggle toes  SILT to digits  Splint intact

## 2020-02-25 NOTE — PROGRESS NOTE ADULT - ASSESSMENT
59/F admitted with     # Syncope: can be secondary to orthostatic causes in combination with cardiac cause  - tele monitoring done  - new low EF with recent cath 2/17 with normal coronaries  - elevated trops        # N/V  -s/p endoscopy on Monday;   Bx shows some lymphoid aggregate; no H pylori    # Acute hypoxic respiratory failure in the setting of acute on chronic systolic CHF    lasix decreased as per cardio  - now off O2,    # NSTEMI:   - s/p cardiac cath 2/17:  Normal coronary arteries.  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 Moderately reduced left ventricular systolic function with segmental wall   motion abnormalities.  Normal LV systolic function. Estimated LV ejection fraction is 35 %.  Elevated left ventricular end-diastolic pressure.   - cardiology following .    # Peripancreatic stranding on CT chest:  - CT: Peripancreatic stranding, new since the previous abdominal CT dated 2/14/2020, suggestive of acute pancreatitis. Clinical correlation with pancreatic enzymes is recommended.  - patient's abdominal pain could be also secondary to (L) sided colitis noted on 2/14 CT scan  - tolerating po diet    # Right Ankle Fx:   - s/p right ankle External fixator application  - pain meds prn  Sx on Tuesday    # ARF:   - likely sec to NSAIDS; resolved    # Melena and Dark colored stools + Acute Anemia of acte hemorrhage from GI bleed:  resolved  - likely from NSAIDS    - d/c NSAIDS  - cont Protonix  - s/p PRBC 2/15  EGD on Monday; normal    # Hypotension:  - resolved     # Hyperkalemia    - resolved    Vomiting:    zofran, reglan prn    DVT PPX: lovenox         Pt medically optimized and cleared by medicine and cardio for right ankle fx ortho surgery.

## 2020-02-25 NOTE — BRIEF OPERATIVE NOTE - NSICDXBRIEFPROCEDURE_GEN_ALL_CORE_FT
PROCEDURES:  ORIF, fracture, medial malleolus 25-Feb-2020 16:55:11 Right ankle Navya Lynch  Open reduction and internal fixation (ORIF) of posterior lip of trimalleolar fracture of ankle 25-Feb-2020 16:54:03 Right ankle Navya Lynch  Removal of external fixator 25-Feb-2020 16:50:53 Right ankle Navya Lynch

## 2020-02-25 NOTE — PROGRESS NOTE ADULT - SUBJECTIVE AND OBJECTIVE BOX
Patient seen and examined at bedside. No acute events over night. No acute complaints at this time. Pain well controlled. Denies chest pain, shortness of breath, nausea or vomiting.     PE:  Vital Signs Last 24 Hrs  T(C): 36.5 (02-24-20 @ 19:57), Max: 36.7 (02-24-20 @ 13:04)  T(F): 97.7 (02-24-20 @ 19:57), Max: 98 (02-24-20 @ 13:04)  HR: 83 (02-25-20 @ 04:26) (73 - 86)  BP: 110/68 (02-25-20 @ 04:26) (100/60 - 110/68)  BP(mean): --  RR: 16 (02-25-20 @ 04:26) (16 - 18)  SpO2: 95% (02-25-20 @ 04:26) (94% - 98%)    General: NAD, resting comfortably in bed  R LE:   Ex fix in place wit dressing C/D/I  SCD in place contralaterally  No calf tenderness contralterally  Able to grossly wiggle toes  Gross sesnsation to toes intact  Cap refill <2 seconds                          11.0   10.79 )-----------( 352      ( 24 Feb 2020 07:13 )             33.9     23 Feb 2020 07:29    140    |  101    |  18     ----------------------------<  106    3.6     |  33     |  1.02     Ca    8.7        23 Feb 2020 07:29          A/P:  59y f w/right ankle fx, to OR today for ORIF with DR. Knight  -PT/OT - NWB RLE  -Pain Control  -DVT ppx on hold  -NPO/IVF  -FU AM Labs  -Rest, ice, compress and elevate the extremity as we needed  -Incentive Spirometry  -Medical management appreciated    Ortho

## 2020-02-25 NOTE — BRIEF OPERATIVE NOTE - OPERATION/FINDINGS
ORIF right trimalleolar ankle fracture performed intra-op. See operative report for complete details.
right ankle External fixator application

## 2020-02-25 NOTE — PROGRESS NOTE ADULT - ASSESSMENT
- hypoxemic respiratory failure from acute pulmonary edema  which is resolved with follow-up chest x-ray  - takotsubu cardiomyopthy   - history of mild asthma  no further wheezing with treatment of underlying pulmonary edema  - status post fall ankle fracture and repair   - anemia with the G.I bleeding with the stable hb now   - acute renal failure resolved   - spinal stenosis       PLAN   - continue to keep on low dose diuretics as tolerated by the B.P and renal function   -  patient pulmonary edema and CHF seems to be improved with follow-up chest x-ray  - use of nebs p.r.n. for wheezing  for her asthma  - follow-up of K and magnesium while on diuretics  - DVT prophylaxis .  - ortho follow-up for ankle fracture  and patient is being planned for operative intervention and kept NPO for the procedure

## 2020-02-25 NOTE — PROGRESS NOTE ADULT - ASSESSMENT
A/P:  POD 0 s/p Ex Fix removal and Right ankle ORIF. Received post op Fem N. block  -Analgesia  -NWB RLE  -AC Consult  -DVT PE ppx  -OOB PT Rolling walker or crutches  -Elevation to extremity  -Keep splint dry

## 2020-02-25 NOTE — BRIEF OPERATIVE NOTE - NSICDXBRIEFPREOP_GEN_ALL_CORE_FT
PRE-OP DIAGNOSIS:  Trimalleolar fracture of right ankle 15-Feb-2020 16:16:10  Deion Gomez
PRE-OP DIAGNOSIS:  Trimalleolar fracture of right ankle 15-Feb-2020 16:16:10  Deion Gomez

## 2020-02-25 NOTE — PROGRESS NOTE ADULT - ASSESSMENT
A 58 y/o female with syncopal episode falling fracturing right ankle, adm with ARF, hyperkalemia, now resolved, poss upper GIB from increased intake of NSAIDs, hgb now stable, no further black stool. CT Abd R/O  Pancreatitis, Consulted by Dr Knight for DVT/PE prophylaxis, risk stratification and Anticoagulation Management.   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  PLAN:  - hold lovenox for procedure today. then resume at least 12 after procedure for VTE prophylaxis  - Monitor daily CBC, BMP  - Encourage mobilization and maintain venodynes    Will continue to follow

## 2020-02-25 NOTE — PROGRESS NOTE ADULT - SUBJECTIVE AND OBJECTIVE BOX
SUBJECTIVE       PAST MEDICAL & SURGICAL HISTORY:  Hypertension  Anxiety and depression  Seasonal allergies  Asthma  Osteoarthritis  S/P hip replacement: left  S/P tubal ligation  S/P cholecystectomy    OBJECTIVE   Vital Signs Last 24 Hrs  T(C): 36.6 (25 Feb 2020 05:09), Max: 36.7 (24 Feb 2020 13:04)  T(F): 97.9 (25 Feb 2020 05:09), Max: 98 (24 Feb 2020 13:04)  HR: 77 (25 Feb 2020 05:09) (73 - 86)  BP: 94/57 (25 Feb 2020 05:09) (94/57 - 110/68)  BP(mean): --  RR: 16 (25 Feb 2020 05:09) (16 - 18)  SpO2: 95% (25 Feb 2020 05:09) (94% - 98%)    PHYSICAL EXAM:  Constitutional: , awake and alert, not in distress.  HEENT: Normo cephalic atraumatic  Neck: Soft and supple, No J.V.D   Respiratory: vesicular breathing , No wheezing, rales or rhonchi.   Cardiovascular: S1 and S2, regular rate .   Gastrointestinal:  soft, nontender,   Extremities: No  edema or calf tenderness .  Neurological: No new  focal deficits.    MEDICATIONS  (STANDING):  acetaminophen   Tablet .. 975 milliGRAM(s) Oral every 8 hours  albuterol/ipratropium for Nebulization 3 milliLiter(s) Nebulizer every 6 hours  aspirin enteric coated 81 milliGRAM(s) Oral daily  atorvastatin 40 milliGRAM(s) Oral at bedtime  furosemide    Tablet 20 milliGRAM(s) Oral daily  gabapentin 300 milliGRAM(s) Oral three times a day  influenza   Vaccine 0.5 milliLiter(s) IntraMuscular once  lactated ringers. 1000 milliLiter(s) (50 mL/Hr) IV Continuous <Continuous>  lidocaine   Patch 1 Patch Transdermal daily  lisinopril 20 milliGRAM(s) Oral daily  metoprolol succinate ER 75 milliGRAM(s) Oral daily  pantoprazole  Injectable 40 milliGRAM(s) IV Push every 12 hours  PARoxetine 60 milliGRAM(s) Oral daily  polyethylene glycol 3350 17 Gram(s) Oral every 12 hours  polyethylene glycol 3350 17 Gram(s) Oral daily  sodium chloride 0.9% lock flush 3 milliLiter(s) IV Push every 8 hours  traZODone 150 milliGRAM(s) Oral at bedtime                            10.3   12.90 )-----------( 325      ( 25 Feb 2020 05:52 )             31.5     02-25    138  |  103  |  16  ----------------------------<  106<H>  3.8   |  31  |  1.02    Ca    8.5      25 Feb 2020 05:52             < from: Xray Chest 1 View- PORTABLE-Routine (02.21.20 @ 11:44) >    EXAM:  XR CHEST PORTABLE ROUTINE 1V                            PROCEDURE DATE:  02/21/2020          INTERPRETATION:  Chest one view    HISTORY: CHF    COMPARISON STUDY: 2/19/2020    Frontal expiratory view of the chest shows the heart to be normal in size. The lungs are clear and there is no evidence of pneumothorax nor pleural effusion.    IMPRESSION:  No evidence of CHF.    Thank you for the courtesy of this referral.      < end of copied text > SUBJECTIVE      patient is seen in the morning offers no new pulmonary symptoms like cough or wheezing or shortness of breath chest pain breathing comfortably on room air   getting IV fluids while kept NPO for the procedure   comfortable with ankle pain    PAST MEDICAL & SURGICAL HISTORY:  Hypertension  Anxiety and depression  Seasonal allergies  Asthma  Osteoarthritis  S/P hip replacement: left  S/P tubal ligation  S/P cholecystectomy    OBJECTIVE   Vital Signs Last 24 Hrs  T(C): 36.6 (25 Feb 2020 05:09), Max: 36.7 (24 Feb 2020 13:04)  T(F): 97.9 (25 Feb 2020 05:09), Max: 98 (24 Feb 2020 13:04)  HR: 77 (25 Feb 2020 05:09) (73 - 86)  BP: 94/57 (25 Feb 2020 05:09) (94/57 - 110/68)  BP(mean): --  RR: 16 (25 Feb 2020 05:09) (16 - 18)  SpO2: 95% (25 Feb 2020 05:09) (94% - 98%)    PHYSICAL EXAM:  Constitutional: , awake and alert, not in distress. not on oxygen  HEENT: Normo cephalic atraumatic  Neck: Soft and supple, No J.V.D   Respiratory: vesicular breathing , No wheezing, rales or rhonchi.   Cardiovascular: S1 and S2, regular rate .   Gastrointestinal:  soft, nontender,   Extremities:  right ankle with dressing and screws  Neurological: No new  focal deficits.    MEDICATIONS  (STANDING):  acetaminophen   Tablet .. 975 milliGRAM(s) Oral every 8 hours  albuterol/ipratropium for Nebulization 3 milliLiter(s) Nebulizer every 6 hours  aspirin enteric coated 81 milliGRAM(s) Oral daily  atorvastatin 40 milliGRAM(s) Oral at bedtime  furosemide    Tablet 20 milliGRAM(s) Oral daily  gabapentin 300 milliGRAM(s) Oral three times a day  influenza   Vaccine 0.5 milliLiter(s) IntraMuscular once  lactated ringers. 1000 milliLiter(s) (50 mL/Hr) IV Continuous <Continuous>  lidocaine   Patch 1 Patch Transdermal daily  lisinopril 20 milliGRAM(s) Oral daily  metoprolol succinate ER 75 milliGRAM(s) Oral daily  pantoprazole  Injectable 40 milliGRAM(s) IV Push every 12 hours  PARoxetine 60 milliGRAM(s) Oral daily  polyethylene glycol 3350 17 Gram(s) Oral every 12 hours  polyethylene glycol 3350 17 Gram(s) Oral daily  sodium chloride 0.9% lock flush 3 milliLiter(s) IV Push every 8 hours  traZODone 150 milliGRAM(s) Oral at bedtime                            10.3   12.90 )-----------( 325      ( 25 Feb 2020 05:52 )             31.5     02-25    138  |  103  |  16  ----------------------------<  106<H>  3.8   |  31  |  1.02    Ca    8.5      25 Feb 2020 05:52             < from: Xray Chest 1 View- PORTABLE-Routine (02.21.20 @ 11:44) >    EXAM:  XR CHEST PORTABLE ROUTINE 1V                            PROCEDURE DATE:  02/21/2020          INTERPRETATION:  Chest one view    HISTORY: CHF    COMPARISON STUDY: 2/19/2020    Frontal expiratory view of the chest shows the heart to be normal in size. The lungs are clear and there is no evidence of pneumothorax nor pleural effusion.    IMPRESSION:  No evidence of CHF.    Thank you for the courtesy of this referral.      < end of copied text >

## 2020-02-25 NOTE — PROGRESS NOTE ADULT - SUBJECTIVE AND OBJECTIVE BOX
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    20  Patient is feeling nauseous on oxycodone , constipated , denies any chest pain , her back is down the lumbar area  , patient had cardiac cath showed issac coronaries ,  possible  takotsubo cardiomyopathy . on low dose BB ,   20  still somewhat dyspneic, on 2L O2, no chest pain.  2020:  Feels better with less dyspnea; R ankle pain  20: still w/ dyspnea.  tele w/ short run SVT no other arrhythmias.  20: less dyspnea today. no events on tele.  20: brief episode of palps short run SVT on tele this AM.  20: Had EGD this AM and negative for bleeding.  no further SVT.  no cp or SOB.  20: scheduled for OR today no events on tele    MEDICATIONS:    MEDICATIONS  (STANDING):  acetaminophen   Tablet .. 975 milliGRAM(s) Oral every 8 hours  albuterol/ipratropium for Nebulization 3 milliLiter(s) Nebulizer every 6 hours  aspirin enteric coated 81 milliGRAM(s) Oral daily  atorvastatin 40 milliGRAM(s) Oral at bedtime  furosemide    Tablet 20 milliGRAM(s) Oral daily  gabapentin 300 milliGRAM(s) Oral three times a day  influenza   Vaccine 0.5 milliLiter(s) IntraMuscular once  lactated ringers. 1000 milliLiter(s) (50 mL/Hr) IV Continuous <Continuous>  lisinopril 20 milliGRAM(s) Oral daily  metoprolol succinate ER 75 milliGRAM(s) Oral daily  pantoprazole  Injectable 40 milliGRAM(s) IV Push every 12 hours  PARoxetine 60 milliGRAM(s) Oral daily  polyethylene glycol 3350 17 Gram(s) Oral every 12 hours  polyethylene glycol 3350 17 Gram(s) Oral daily  sodium chloride 0.9% lock flush 3 milliLiter(s) IV Push every 8 hours  traZODone 150 milliGRAM(s) Oral at bedtime    MEDICATIONS  (PRN):  ALPRAZolam 0.25 milliGRAM(s) Oral every 6 hours PRN anxiety  aluminum hydroxide/magnesium hydroxide/simethicone Suspension 30 milliLiter(s) Oral four times a day PRN Indigestion  bisacodyl Suppository 10 milliGRAM(s) Rectal daily PRN If no bowel movement by postoperative day #2  cyclobenzaprine 5 milliGRAM(s) Oral three times a day PRN Muscle Spasm  hydrALAZINE Injectable 10 milliGRAM(s) IV Push every 6 hours PRN for SBP more than 160  HYDROmorphone  Injectable 1.5 milliGRAM(s) IV Push every 3 hours PRN Pain  magnesium hydroxide Suspension 30 milliLiter(s) Oral daily PRN Constipation  metoclopramide Injectable 10 milliGRAM(s) IV Push every 6 hours PRN nausea,vomiting  ondansetron Injectable 4 milliGRAM(s) IV Push every 6 hours PRN Nausea and/or Vomiting  prochlorperazine   Injectable 5 milliGRAM(s) IV Push once PRN vomiting  senna 2 Tablet(s) Oral at bedtime PRN Constipation      Vital Signs Last 24 Hrs  T(C): 36.6 (2020 05:09), Max: 36.7 (2020 13:04)  T(F): 97.9 (2020 05:09), Max: 98 (2020 13:04)  HR: 77 (2020 05:09) (73 - 86)  BP: 94/57 (2020 05:09) (94/57 - 110/68)  BP(mean): --  RR: 16 (2020 05:09) (16 - 18)  SpO2: 95% (2020 05:09) (94% - 98%)Daily     Daily Weight in k (2020 05:09)I&O's Summary    2020 07:01  -  2020 07:00  --------------------------------------------------------  IN: 350 mL / OUT: 0 mL / NET: 350 mL        PHYSICAL EXAM:    Constitutional: NAD, awake and alert  Respiratory: Nonlabored, no crackles  Cardiovascular: S1 and S2, RRR, No murmurs or rubs or gallops.  Gastrointestinal: Bowel Sounds present, soft, nontender.   Skin: No rashes.  Ext: R ankle hardware in place and not infected      LABS: All Labs Reviewed:                        10.3   12.90 )-----------( 325      ( 2020 05:52 )             31.5                         11.0   10.79 )-----------( 352      ( 2020 07:13 )             33.9                         11.1   10.73 )-----------( 354      ( 2020 07:29 )             33.0     2020 05:52    138    |  103    |  16     ----------------------------<  106    3.8     |  31     |  1.02   2020 07:29    140    |  101    |  18     ----------------------------<  106    3.6     |  33     |  1.02     Ca    8.5        2020 05:52  Ca    8.7        2020 07:29      PT/INR - ( 2020 05:52 )   PT: 12.2 sec;   INR: 1.10 ratio         PTT - ( 2020 05:52 )  PTT:26.8 sec    Cardiac Cath Lab - Adult (20 @ 11:44):   Normal coronary arteries.  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   Moderately reduced left ventricular systolic function with segmental wall motion abnormalities.  Normal LV systolic function. Estimated LV ejection fraction is 35 %.  Elevated left ventricular end-diastolic pressure.    Transthoracic Echocardiogram (20 @ 10:13): Left ventricle systolic function appears moderately reduced. Apical segments are hyperkinetic, basal and mid segments are hypokinetic. Visual estimation of left ventricle ejection fraction is 40%.

## 2020-02-25 NOTE — PROGRESS NOTE ADULT - SUBJECTIVE AND OBJECTIVE BOX
HPI: 59/F with PMHx of anxiety/ depression, asthma,  HTN on enalapril, spinal stenosis, osteoarthritis, seasonal allergies who was sent to the ED by Dr. Knight for ankle injury requiring surgery.  Pt  c/o right ankle pain after a fall on 2/12, for which she was splinted at [facility] ED.   She states that  she was NPO for planned epidural procedure for pain control .  At 11 am she was getting dressed  and while putting on earrings she had unwitnessed LOC and  fell from standing , injuring her right ankle.  She does not remember the incident. She states that she was standing and next thing she found was that she was on floor with her ankle was twisted. She denies preceding cpain/SOB/palpitations/dizziness.  She does not recall tripping over anything.        Pt also had 5 episodes of diarrhea 2 days ago with LLQ pain which improved.     Pt reports she stopped taking motrin 600mg BID , approximately 5 days  ago for the planned epidural procedure for pain control.  She was on mobic until January 2020.      She has noticed increasingly dark stools over the last 1-2 weeks.    She also reports some difficulty swallowing solids for 2-3 weeks.        2/21: seen and eval. Hemodynamically stable. Denies any HA, CP, SOB. No fevers, chills or shakes. Patient continues to complaint about pain (ankle pain), also has N/V this am with abdominal pain.     2/22: c/o pain in right ankle and asking for more pain meds    2/23: condition same  EGD on 2/24 2/24: normal EGD   no new complaints  lasix decreased as per cardio  multiple vomiting later today    2/25: Bx shows some lymphoid aggregate; no H pylori  for ortho sx today  vomiting better  asking for dilaudid    PHYSICAL EXAM:    Vital Signs Last 24 Hrs  T(C): 37 (25 Feb 2020 11:40), Max: 37 (25 Feb 2020 11:40)  T(F): 98.6 (25 Feb 2020 11:40), Max: 98.6 (25 Feb 2020 11:40)  HR: 75 (25 Feb 2020 11:40) (73 - 86)  BP: 109/67 (25 Feb 2020 11:40) (94/57 - 110/68)  BP(mean): --  RR: 16 (25 Feb 2020 11:40) (16 - 18)  SpO2: 97% (25 Feb 2020 11:40) (94% - 98%)    Constitutional: Well appearing   HEENT: Atraumatic, DEREK, Normal, No congestion  Respiratory: B/l CTA  Cardiovascular: N S1S2;   Gastrointestinal: abdominal pain, + tenderness on deep palpation  Extremities: No edema, peripheral pulses present; right ankle dressed/ex fix  Neurological: AAO x 3, no gross focal motor deficits  Skin: Non cellulitic, no rash, ulcers  Lymph Nodes: No lymphadenopathy noted  Back: No CVA tenderness   Musculoskeletal: non tender  Breasts: Deferred  Genitourinary: deferred  Rectal: Deferred      Labs:                    10.3   12.90  )----------(  325       ( 25 Feb 2020 05:52 )               31.5      138    |  103    |  16     ----------------------------<  106        ( 25 Feb 2020 05:52 )  3.8     |  31     |  1.02     Ca    8.5        ( 25 Feb 2020 05:52 )        PT/INR -  12.2 sec / 1.10 ratio   ( 25 Feb 2020 05:52 )       PTT -  26.8 sec   ( 25 Feb 2020 05:52 )                                              < from: CT Chest No Cont (02.16.20 @ 16:51) >  Impression:    Patchy infiltrates in both lungs with nodular feature, right greater than left, suggestive of pneumonia. Follow-up chest CT may be pursued in 4-6 weeks to ensure resolution. Attention should be directed to the small 6 mm left lower lobe lung nodule on the follow-up chestCT to ensure stability.    No pneumothorax.    Interlobular septal thickening in both lungs, suggestive of interstitial pulmonary edema.    New mild peribronchial cuffing, suggestive of reactive airway disease.    Peripancreatic stranding, new since the previous abdominal CT dated 2/14/2020, suggestive of acute pancreatitis. Clinical correlation with pancreatic enzymes is recommended.    Stable 0.8 cm splenic artery aneurysm with mural calcification.    Small bilateral pleural effusions.    Stable small pericardial effusion.    < end of copied text >    < from: CT Head No Cont (02.16.20 @ 16:51) >  Impression:     No intracranial hemorrhage, mass effect or CT evidence of acute infarct      < end of copied text >      MEDICATIONS  (STANDING):  acetaminophen   Tablet .. 975 milliGRAM(s) Oral every 8 hours  albuterol/ipratropium for Nebulization 3 milliLiter(s) Nebulizer every 6 hours  aspirin enteric coated 81 milliGRAM(s) Oral daily  atorvastatin 40 milliGRAM(s) Oral at bedtime  furosemide    Tablet 20 milliGRAM(s) Oral daily  gabapentin 300 milliGRAM(s) Oral three times a day  HYDROmorphone  Injectable 0.5 milliGRAM(s) IV Push once  influenza   Vaccine 0.5 milliLiter(s) IntraMuscular once  lactated ringers. 1000 milliLiter(s) (50 mL/Hr) IV Continuous <Continuous>  lisinopril 20 milliGRAM(s) Oral daily  metoprolol succinate ER 75 milliGRAM(s) Oral daily  pantoprazole  Injectable 40 milliGRAM(s) IV Push every 12 hours  PARoxetine 60 milliGRAM(s) Oral daily  polyethylene glycol 3350 17 Gram(s) Oral every 12 hours  polyethylene glycol 3350 17 Gram(s) Oral daily  sodium chloride 0.9% lock flush 3 milliLiter(s) IV Push every 8 hours  traZODone 150 milliGRAM(s) Oral at bedtime

## 2020-02-25 NOTE — PROGRESS NOTE ADULT - PROBLEM SELECTOR PROBLEM 2
Kidney failure, acute
NSTEMI (non-ST elevated myocardial infarction)
Syncope
Syncope
NSTEMI (non-ST elevated myocardial infarction)
Congestive heart failure

## 2020-02-26 LAB
ANION GAP SERPL CALC-SCNC: 3 MMOL/L — LOW (ref 5–17)
BUN SERPL-MCNC: 13 MG/DL — SIGNIFICANT CHANGE UP (ref 7–23)
CALCIUM SERPL-MCNC: 8.6 MG/DL — SIGNIFICANT CHANGE UP (ref 8.5–10.1)
CHLORIDE SERPL-SCNC: 104 MMOL/L — SIGNIFICANT CHANGE UP (ref 96–108)
CO2 SERPL-SCNC: 31 MMOL/L — SIGNIFICANT CHANGE UP (ref 22–31)
CREAT SERPL-MCNC: 0.91 MG/DL — SIGNIFICANT CHANGE UP (ref 0.5–1.3)
GLUCOSE SERPL-MCNC: 101 MG/DL — HIGH (ref 70–99)
HCT VFR BLD CALC: 31.4 % — LOW (ref 34.5–45)
HGB BLD-MCNC: 10.2 G/DL — LOW (ref 11.5–15.5)
MCHC RBC-ENTMCNC: 31.2 PG — SIGNIFICANT CHANGE UP (ref 27–34)
MCHC RBC-ENTMCNC: 32.5 GM/DL — SIGNIFICANT CHANGE UP (ref 32–36)
MCV RBC AUTO: 96 FL — SIGNIFICANT CHANGE UP (ref 80–100)
PLATELET # BLD AUTO: 321 K/UL — SIGNIFICANT CHANGE UP (ref 150–400)
POTASSIUM SERPL-MCNC: 4.4 MMOL/L — SIGNIFICANT CHANGE UP (ref 3.5–5.3)
POTASSIUM SERPL-SCNC: 4.4 MMOL/L — SIGNIFICANT CHANGE UP (ref 3.5–5.3)
RBC # BLD: 3.27 M/UL — LOW (ref 3.8–5.2)
RBC # FLD: 13.5 % — SIGNIFICANT CHANGE UP (ref 10.3–14.5)
SODIUM SERPL-SCNC: 138 MMOL/L — SIGNIFICANT CHANGE UP (ref 135–145)
WBC # BLD: 19.76 K/UL — HIGH (ref 3.8–10.5)
WBC # FLD AUTO: 19.76 K/UL — HIGH (ref 3.8–10.5)

## 2020-02-26 PROCEDURE — 99024 POSTOP FOLLOW-UP VISIT: CPT

## 2020-02-26 PROCEDURE — 99232 SBSQ HOSP IP/OBS MODERATE 35: CPT

## 2020-02-26 PROCEDURE — 93010 ELECTROCARDIOGRAM REPORT: CPT

## 2020-02-26 PROCEDURE — 99231 SBSQ HOSP IP/OBS SF/LOW 25: CPT

## 2020-02-26 RX ORDER — IPRATROPIUM/ALBUTEROL SULFATE 18-103MCG
3 AEROSOL WITH ADAPTER (GRAM) INHALATION EVERY 6 HOURS
Refills: 0 | Status: DISCONTINUED | OUTPATIENT
Start: 2020-02-26 | End: 2020-02-28

## 2020-02-26 RX ORDER — LISINOPRIL 2.5 MG/1
10 TABLET ORAL DAILY
Refills: 0 | Status: DISCONTINUED | OUTPATIENT
Start: 2020-02-27 | End: 2020-02-27

## 2020-02-26 RX ORDER — PANTOPRAZOLE SODIUM 20 MG/1
40 TABLET, DELAYED RELEASE ORAL EVERY 12 HOURS
Refills: 0 | Status: DISCONTINUED | OUTPATIENT
Start: 2020-02-26 | End: 2020-02-28

## 2020-02-26 RX ORDER — SODIUM CHLORIDE 9 MG/ML
250 INJECTION INTRAMUSCULAR; INTRAVENOUS; SUBCUTANEOUS ONCE
Refills: 0 | Status: COMPLETED | OUTPATIENT
Start: 2020-02-26 | End: 2020-02-26

## 2020-02-26 RX ORDER — SENNA PLUS 8.6 MG/1
2 TABLET ORAL AT BEDTIME
Refills: 0 | Status: DISCONTINUED | OUTPATIENT
Start: 2020-02-26 | End: 2020-02-28

## 2020-02-26 RX ORDER — ACETAMINOPHEN 500 MG
1000 TABLET ORAL ONCE
Refills: 0 | Status: COMPLETED | OUTPATIENT
Start: 2020-02-26 | End: 2020-02-27

## 2020-02-26 RX ADMIN — SODIUM CHLORIDE 250 MILLILITER(S): 9 INJECTION INTRAMUSCULAR; INTRAVENOUS; SUBCUTANEOUS at 11:55

## 2020-02-26 RX ADMIN — Medication 81 MILLIGRAM(S): at 12:34

## 2020-02-26 RX ADMIN — Medication 975 MILLIGRAM(S): at 21:11

## 2020-02-26 RX ADMIN — GABAPENTIN 300 MILLIGRAM(S): 400 CAPSULE ORAL at 13:24

## 2020-02-26 RX ADMIN — Medication 60 MILLIGRAM(S): at 12:34

## 2020-02-26 RX ADMIN — POLYETHYLENE GLYCOL 3350 17 GRAM(S): 17 POWDER, FOR SOLUTION ORAL at 17:35

## 2020-02-26 RX ADMIN — HYDROMORPHONE HYDROCHLORIDE 30 MILLILITER(S): 2 INJECTION INTRAMUSCULAR; INTRAVENOUS; SUBCUTANEOUS at 19:53

## 2020-02-26 RX ADMIN — Medication 975 MILLIGRAM(S): at 13:25

## 2020-02-26 RX ADMIN — GABAPENTIN 300 MILLIGRAM(S): 400 CAPSULE ORAL at 05:37

## 2020-02-26 RX ADMIN — GABAPENTIN 300 MILLIGRAM(S): 400 CAPSULE ORAL at 21:11

## 2020-02-26 RX ADMIN — PANTOPRAZOLE SODIUM 40 MILLIGRAM(S): 20 TABLET, DELAYED RELEASE ORAL at 05:37

## 2020-02-26 RX ADMIN — SENNA PLUS 2 TABLET(S): 8.6 TABLET ORAL at 21:12

## 2020-02-26 RX ADMIN — ATORVASTATIN CALCIUM 40 MILLIGRAM(S): 80 TABLET, FILM COATED ORAL at 21:12

## 2020-02-26 RX ADMIN — Medication 975 MILLIGRAM(S): at 21:12

## 2020-02-26 RX ADMIN — Medication 150 MILLIGRAM(S): at 21:11

## 2020-02-26 RX ADMIN — SODIUM CHLORIDE 3 MILLILITER(S): 9 INJECTION INTRAMUSCULAR; INTRAVENOUS; SUBCUTANEOUS at 21:12

## 2020-02-26 RX ADMIN — SODIUM CHLORIDE 3 MILLILITER(S): 9 INJECTION INTRAMUSCULAR; INTRAVENOUS; SUBCUTANEOUS at 12:01

## 2020-02-26 RX ADMIN — Medication 75 MILLIGRAM(S): at 05:37

## 2020-02-26 RX ADMIN — PANTOPRAZOLE SODIUM 40 MILLIGRAM(S): 20 TABLET, DELAYED RELEASE ORAL at 17:35

## 2020-02-26 RX ADMIN — Medication 100 MILLIGRAM(S): at 05:35

## 2020-02-26 RX ADMIN — SODIUM CHLORIDE 3 MILLILITER(S): 9 INJECTION INTRAMUSCULAR; INTRAVENOUS; SUBCUTANEOUS at 05:21

## 2020-02-26 RX ADMIN — CYCLOBENZAPRINE HYDROCHLORIDE 5 MILLIGRAM(S): 10 TABLET, FILM COATED ORAL at 05:35

## 2020-02-26 RX ADMIN — ENOXAPARIN SODIUM 40 MILLIGRAM(S): 100 INJECTION SUBCUTANEOUS at 08:31

## 2020-02-26 RX ADMIN — Medication 975 MILLIGRAM(S): at 13:24

## 2020-02-26 NOTE — PROGRESS NOTE ADULT - SUBJECTIVE AND OBJECTIVE BOX
Patient seen and examined at bedside. Reports no acute complaints at this time. Pain is well controlled. No acute events overnight.    PHYSICAL EXAM:  Vital Signs Last 24 Hrs  T(C): 36.8 (26 Feb 2020 05:40), Max: 37 (25 Feb 2020 11:40)  T(F): 98.2 (26 Feb 2020 05:40), Max: 98.6 (25 Feb 2020 11:40)  HR: 77 (26 Feb 2020 05:40) (65 - 79)  BP: 106/63 (26 Feb 2020 05:40) (99/64 - 128/75)  BP(mean): --  RR: 18 (26 Feb 2020 05:40) (10 - 18)  SpO2: 95% (26 Feb 2020 05:40) (94% - 100%)      Gen: NAD, AAOx3    Right Lower Extremity:  Trilam in place  Dressing clean dry intact  +EHL/FHL/TA/GS  SILT L3-S1  +DP/PT Pulses  Compartments soft  No calf TTP B/L

## 2020-02-26 NOTE — PHYSICAL THERAPY INITIAL EVALUATION ADULT - CRITERIA FOR SKILLED THERAPEUTIC INTERVENTIONS
impairments found/rehab potential/risk reduction/prevention/therapy frequency/anticipated discharge recommendation/predicted duration of therapy intervention/anticipated equipment needs at discharge/functional limitations in following categories
impairments found

## 2020-02-26 NOTE — PROGRESS NOTE ADULT - SUBJECTIVE AND OBJECTIVE BOX
Orthopedics Progress Note:    This is a 60y/o Female s/p Right Ankle Removal of Ex-Fix and ORIF POD 1.  Called from covering floor nurse reporting pts LIONEL device is flashing yellow.  Pt evaluated on the floor. Pain is controlled; on PCA. No nausea or vomiting.     Vital Signs Last 24 Hrs  T(C): 36.7 (02-26-20 @ 11:11), Max: 36.8 (02-26-20 @ 00:15)  T(F): 98 (02-26-20 @ 11:11), Max: 98.2 (02-26-20 @ 00:15)  HR: 63 (02-26-20 @ 12:14) (63 - 79)  BP: 101/68 (02-26-20 @ 12:14) (82/74 - 128/75)  BP(mean): 76 (02-26-20 @ 12:14) (76 - 76)  RR: 19 (02-26-20 @ 12:14) (10 - 19)  SpO2: 100% (02-26-20 @ 12:14) (94% - 100%)                        10.2   19.76 )-----------( 321      ( 26 Feb 2020 07:07 )             31.4     26 Feb 2020 07:07    138    |  104    |  13     ----------------------------<  101    4.4     |  31     |  0.91     Ca    8.6        26 Feb 2020 07:07      PT/INR - ( 25 Feb 2020 05:52 )   PT: 12.2 sec;   INR: 1.10 ratio         PTT - ( 25 Feb 2020 05:52 )  PTT:26.8 sec    Exam:  NAD AAOx3.  Trilam in place; removed.  LIONEL dressing in place over medial ankle with mild-moderate blood saturation; removed.  Surgical incision is clean, dry and intact with sutures in place; no active drainage or bleeding.  Calves are soft and nontender.  Wiggling all toes.   SILT throughout.  DP and PT pulses 2+.    A/P:  Stable POD 1 Right Ankle Removal of Ex-Fix and ORIF  -New LIONEL dressing placed using sterile technique.  -New Trilam splint applied; pt tolerated well.  -Analgesia; Pt would like to continue PCA today and switch to PO pain meds tomorrow AM.  -RLE elevation  -Ice application  -DVT PE ppx  -Incentive spirometry  -OOB PT NWB RLE    Case discussed with and plan as per Dr. Knight.

## 2020-02-26 NOTE — PHYSICAL THERAPY INITIAL EVALUATION ADULT - MANUAL MUSCLE TESTING RESULTS, REHAB EVAL
grossly assessed due to/RLE NT, other extremities: 4-
no strength deficits were identified/R LE NT able to wiggle toes

## 2020-02-26 NOTE — PROGRESS NOTE ADULT - SUBJECTIVE AND OBJECTIVE BOX
SUBJECTIVE     Patient has surgery yesterday   no sob or cough and wheezing   has high WBC   on pain medications   no fever or localizing symptoms     PAST MEDICAL & SURGICAL HISTORY:  Hypertension  Anxiety and depression  Seasonal allergies  Asthma  Osteoarthritis  S/P hip replacement: left  S/P tubal ligation  S/P cholecystectomy    OBJECTIVE   Vital Signs Last 24 Hrs  T(C): 36.8 (26 Feb 2020 05:40), Max: 37 (25 Feb 2020 11:40)  T(F): 98.2 (26 Feb 2020 05:40), Max: 98.6 (25 Feb 2020 11:40)  HR: 77 (26 Feb 2020 05:40) (65 - 79)  BP: 106/63 (26 Feb 2020 05:40) (99/64 - 128/75)  BP(mean): --  RR: 18 (26 Feb 2020 05:40) (10 - 18)  SpO2: 95% (26 Feb 2020 05:40) (94% - 100%)    PHYSICAL EXAM:  Constitutional: , awake and alert, not in distress and not on the 02   HEENT: Normo cephalic atraumatic  Neck: Soft and supple, No J.V.D   Respiratory: vesicular breathing , No wheezing, rales or rhonchi.   Cardiovascular: S1 and S2, regular rate .   Gastrointestinal:  soft, nontender,   Extremities: status post surgery for the ankle on the right side   Neurological: No new  focal deficits.    MEDICATIONS  (STANDING):  acetaminophen   Tablet .. 975 milliGRAM(s) Oral every 8 hours  acetaminophen  IVPB .. 1000 milliGRAM(s) IV Intermittent once  albuterol/ipratropium for Nebulization 3 milliLiter(s) Nebulizer every 6 hours  aspirin enteric coated 81 milliGRAM(s) Oral daily  atorvastatin 40 milliGRAM(s) Oral at bedtime  enoxaparin Injectable 40 milliGRAM(s) SubCutaneous every 24 hours  furosemide    Tablet 20 milliGRAM(s) Oral daily  gabapentin 300 milliGRAM(s) Oral three times a day  HYDROmorphone PCA (1 mG/mL) 30 milliLiter(s) PCA Continuous PCA Continuous  lactated ringers. 1000 milliLiter(s) (75 mL/Hr) IV Continuous <Continuous>  lisinopril 20 milliGRAM(s) Oral daily  metoprolol succinate ER 75 milliGRAM(s) Oral daily  pantoprazole  Injectable 40 milliGRAM(s) IV Push every 12 hours  PARoxetine 60 milliGRAM(s) Oral daily  polyethylene glycol 3350 17 Gram(s) Oral every 12 hours  sodium chloride 0.9% lock flush 3 milliLiter(s) IV Push every 8 hours  traZODone 150 milliGRAM(s) Oral at bedtime                            10.2   19.76 )-----------( 321      ( 26 Feb 2020 07:07 )             31.4     02-26    138  |  104  |  13  ----------------------------<  101<H>  4.4   |  31  |  0.91    Ca    8.6      26 Feb 2020 07:07

## 2020-02-26 NOTE — PROGRESS NOTE ADULT - ASSESSMENT
A 58 y/o female with syncopal episode falling fracturing right ankle, adm with ARF, hyperkalemia, now resolved, poss upper GIB from increased intake of NSAIDs, hgb now stable, no further black stool. CT Abd R/O  Pancreatitis, Consulted by Dr Knight for DVT/PE prophylaxis, risk stratification and Anticoagulation Management.   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  2- Pt s/p  PLAN:  - lovenox 40mg sq daily for VTE prophylaxis  - Monitor daily CBC, BMP  - Encourage mobilization and maintain venodynes    Will continue to follow A 58 y/o female with syncopal episode falling fracturing right ankle, adm with ARF, hyperkalemia, now resolved, poss upper GIB from increased intake of NSAIDs, hgb now stable, no further black stool. CT Abd R/O  Pancreatitis, Consulted by Dr Knight for DVT/PE prophylaxis, risk stratification and Anticoagulation Management.   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  2- Pt s/p Open reduction and internal fixation (ORIF) of posterior lip of trimalleolar fracture of right ankle and   Removal of external fixator of Right ankle on 2-.  PLAN:  - lovenox 40mg sq daily for VTE prophylaxis  - Monitor daily CBC, BMP  - Encourage mobilization and maintain venodynes  - dispo home  Will continue to follow

## 2020-02-26 NOTE — PHYSICAL THERAPY INITIAL EVALUATION ADULT - ACTIVE RANGE OF MOTION EXAMINATION, REHAB EVAL
R LE NT/bilateral upper extremity Active ROM was WFL (within functional limits)/bilateral  lower extremity Active ROM was WFL (within functional limits)

## 2020-02-26 NOTE — PHYSICAL THERAPY INITIAL EVALUATION ADULT - PERTINENT HX OF CURRENT PROBLEM, REHAB EVAL
Pt is a 60 y/o F, ARF,  Left sided Colitis with diarrhea, dehydration, ankle fx after syncope and fall, Increasing anemia,  Melena/GI bleed,  s/p Ex Fix removal and Right ankle ORIF Pt is a 60 y/o F, This is a re-eval after surgery this hospital stay, see PT IE from 2/22 for further details.   ARF,  Left sided Colitis with diarrhea, dehydration, ankle fx after syncope and fall (2/12 ) , Increasing anemia,  Melena/GI bleed,  s/p Ex Fix removal and Right ankle ORIF

## 2020-02-26 NOTE — PROGRESS NOTE ADULT - ASSESSMENT
- hypoxemic respiratory failure from acute pulmonary edema  which is resolved with follow-up chest x-ray  - takotsubu cardiomyopthy   - history of mild asthma  no further wheezing with treatment of underlying pulmonary edema  - status post fall ankle fracture and repair   - anemia with the G.I bleeding with the stable hb now   - acute renal failure resolved   - spinal stenosis       PLAN   - continue to keep on low dose diuretics as tolerated by the B.P and renal function   -  patient pulmonary edema and CHF seems to be improved with follow-up chest x-ray  - use of nebs p.r.n. for wheezing  for her asthma  - follow-up of K and magnesium while on diuretics  - DVT prophylaxis with the lovenox

## 2020-02-26 NOTE — PROGRESS NOTE ADULT - SUBJECTIVE AND OBJECTIVE BOX
cc - right ankle pain         59 female /F with PMHx of anxiety/ depression, asthma,  HTN on enalapril, spinal stenosis, osteoarthritis, seasonal allergies who admitted on 2/14/20  by Dr. Knight for ankle injury requiring surgery.  Pt  c/o right ankle pain after a fall on 2/12, for which she was splinted at [facility] ED.   She states that  she was NPO for planned epidural procedure for pain control .  At 11 am she was getting dressed  and while putting on earrings she had unwitnessed LOC and  fell from standing , injuring her right ankle.  She does not remember the incident. She states that she was standing and next thing she found was that she was on floor with her ankle was twisted. She denies preceding cpain/SOB/palpitations/dizziness.  She does not recall tripping over anything.   Pt also had 5 episodes of diarrhea 2 days ago with LLQ pain which improved.   Pt reports she stopped taking motrin 600mg BID , approximately 5 days  ago for the planned epidural procedure for pain control.  She was on mobic until January 2020.    She has noticed increasingly dark stools over the last 1-2 weeks.    She also reports some difficulty swallowing solids for 2-3 weeks.           Hospital course :   2/15 - s/p PRBC 1 unit  s/p right ankle External fixator application	  2/17 - s/p LHC - normal coronaries, EF 35%  2/18 - 2 d echo - EF 40%, basal and mid segments hypokinetic  2/24 - s/p EGD - normal , lasix decreased as per cardio, multiple vomiting later today  2/25: Bx shows some lymphoid aggregate; no H pylori , pain uncontrolled asking for dilaudid  s/p ORIF right trimalleolar ankle fracture performed intra-op.     2/26 - pt seen and examined, noted low BP in am, denies cp, dyspnea, cough, reports severe right ankle pain 8/10, better on Dilaudid drip, afebrile, no BM , POC discussed     Review of system- Rest of the review of system are negative except mentioned in HPI    PHYSICAL EXAM:    T(C): 36.7 (02-26-20 @ 11:11), Max: 36.8 (02-26-20 @ 00:15)  T(F): 98 (02-26-20 @ 11:11), Max: 98.2 (02-26-20 @ 00:15)  HR: 63 (02-26-20 @ 12:14) (63 - 79)  BP: 101/68 (02-26-20 @ 12:14) (82/74 - 128/75)  RR: 19 (02-26-20 @ 12:14) (10 - 19)  SpO2: 100% (02-26-20 @ 12:14) (94% - 100%)  Wt(kg): --      Constitutional: Well appearing   HEENT: Atraumatic, DEREK, Normal, No congestion  Respiratory: B/l CTA  Cardiovascular: N S1S2;   Gastrointestinal: abdominal pain, + tenderness on deep palpation  Extremities: No edema, peripheral pulses present; right ankle dressed/ex fix  Neurological: AAO x 3, no gross focal motor deficits  Skin: Non cellulitic, no rash, ulcers  Lymph Nodes: No lymphadenopathy noted  Back: No CVA tenderness   Musculoskeletal: non tender  Breasts: Deferred  Genitourinary: deferred  Rectal: Deferred      Labs:     02-26    138  |  104  |  13  ----------------------------<  101<H>  4.4   |  31  |  0.91    Ca    8.6      26 Feb 2020 07:07                          10.2   19.76 )-----------( 321      ( 26 Feb 2020 07:07 )             31.4     Serum Pro-Brain Natriuretic Peptide (02.18.20 @ 13:21)    Serum Pro-Brain Natriuretic Peptide: 97801 pg/mL        PT/INR - ( 25 Feb 2020 05:52 )   PT: 12.2 sec;   INR: 1.10 ratio         PTT - ( 25 Feb 2020 05:52 )  PTT:26.8 sec                     10.3   12.90  )----------(  325       ( 25 Feb 2020 05:52 )               31.5      138    |  103    |  16     ----------------------------<  106        ( 25 Feb 2020 05:52 )  3.8     |  31     |  1.02     Ca    8.5        ( 25 Feb 2020 05:52 )        PT/INR -  12.2 sec / 1.10 ratio   ( 25 Feb 2020 05:52 )       PTT -  26.8 sec   ( 25 Feb 2020 05:52 )        Troponin I, Serum (02.18.20 @ 13:21)    Troponin I, Serum: 1.550: High Sensitivity Troponin and new reference  range effective 7/6/2016 ng/mL    Troponin I, Serum Repeat 3 hours x 2 occurrences (02.18.20 @ 09:54)    Troponin I, Serum: 1.720: 02/18/20 10:34 Called to Nurse/Doctor.ana cristina faulkner  High Sensitivity Troponin and new reference  range effective 7/6/2016 ng/mL    Troponin I, Serum (02.17.20 @ 08:15)    Troponin I, Serum: 4.690: High Sensitivity Troponin and new reference  range effective 7/6/2016 ng/mL      < from: Transthoracic Echocardiogram (02.18.20 @ 10:13) >   Left ventricle systolic function appears moderately reduced. Apical   segments are hyperkinetic, basal and mid segments are hypokinetic.   Visual estimation of left ventricle ejection fraction is 40%.      < end of copied text >  Culture - Urine (02.14.20 @ 22:21)    Specimen Source: .Urine None    Culture Results:   >=3 organisms. Probable collection contamination.    Culture - Blood (02.14.20 @ 17:48)    Specimen Source: .Blood None    Culture Results:   No growth at 5 days.    Culture - Blood (02.14.20 @ 17:48)    Specimen Source: .Blood None    Culture Results:   No growth at 5 days.    Lipase, Serum (02.17.20 @ 15:31)    Lipase, Serum: 72 U/L    < from: Xray Chest 1 View- PORTABLE-Routine (02.21.20 @ 11:44) >  Frontal expiratory view of the chest shows the heart to be normal in size. The lungs are clear and there is no evidence of pneumothorax nor pleural effusion.    IMPRESSION:  No evidence of CHF.    < end of copied text >  < from: CT Angio Chest PE Protocol w/ IV Cont (02.16.20 @ 22:13) >  FINDINGS:   Pulmonary arteries: No pulmonary emboli.   Aorta:  No aortic aneurysm.   Lungs: Interlobular septal thickening with patchy groundglass opacities suggesting pulmonary edema. Additional multifocal airspace disease throughout the lungs most prominent in the right upper lobe and bilateral lower lobes may be a component of pulmonary edema however multifocal pneumonia cannot be excluded. Bilateral lower lobe compressive atelectasis left greater than right.  Pleural space: Small bilateral pleural effusions.   Heart: No cardiomegaly. No pericardial effusion.   Gallbladder and bile ducts: Status post cholecystectomy.   Bones/joints: No acute fracture. Degenerative changes.  Upper abdomen: Findings compatible with acute pancreatitis partially visualized as seen on CT of prior day. Prior cholecystectomy. Mild elevation the right hemidiaphragm.    IMPRESSION:     1. No evidence of acute pulmonary artery embolism.   2. Pulmonary edema with bilateral pleural effusions and bilateral compressive atelectasis. Additional findings which may be associated with the pulmonary edema versus representing multifocal pneumonia, clinically correlate and follow-up to resolution.    A preliminary report was given by the overnight radiologist      < end of copied text >        < from: CT Chest No Cont (02.16.20 @ 16:51) >  Impression:    Patchy infiltrates in both lungs with nodular feature, right greater than left, suggestive of pneumonia. Follow-up chest CT may be pursued in 4-6 weeks to ensure resolution. Attention should be directed to the small 6 mm left lower lobe lung nodule on the follow-up chestCT to ensure stability.    No pneumothorax.    Interlobular septal thickening in both lungs, suggestive of interstitial pulmonary edema.    New mild peribronchial cuffing, suggestive of reactive airway disease.    Peripancreatic stranding, new since the previous abdominal CT dated 2/14/2020, suggestive of acute pancreatitis. Clinical correlation with pancreatic enzymes is recommended.    Stable 0.8 cm splenic artery aneurysm with mural calcification.    Small bilateral pleural effusions.    Stable small pericardial effusion.    < end of copied text >    < from: CT Head No Cont (02.16.20 @ 16:51) >  Impression:     No intracranial hemorrhage, mass effect or CT evidence of acute infarct      < end of copied text >  Home Medications:  Centrum Women&#x27;s oral tablet: 1 tab(s) orally once a day (14 Feb 2020 22:40)  cloNIDine 0.1 mg oral tablet: 1 tab(s) orally 3 times a day (14 Feb 2020 22:40)  diclofenac sodium 50 mg oral delayed release tablet: 1 tab(s) orally 2 times a day (14 Feb 2020 22:40)  enalapril 10 mg oral tablet: 1 tab(s) orally once a day (14 Feb 2020 22:40)  gabapentin 300 mg oral capsule: 1 cap(s) orally 3 times a day (14 Feb 2020 22:40)  PARoxetine 30 mg oral tablet: 2 tab(s) orally once a day (14 Feb 2020 22:40)  traZODone 150 mg oral tablet: 1 tab(s) orally once a day (at bedtime) (14 Feb 2020 22:40)    MEDICATIONS  (STANDING):  acetaminophen   Tablet .. 975 milliGRAM(s) Oral every 8 hours  acetaminophen  IVPB .. 1000 milliGRAM(s) IV Intermittent once  aspirin enteric coated 81 milliGRAM(s) Oral daily  atorvastatin 40 milliGRAM(s) Oral at bedtime  enoxaparin Injectable 40 milliGRAM(s) SubCutaneous every 24 hours  furosemide    Tablet 20 milliGRAM(s) Oral daily  gabapentin 300 milliGRAM(s) Oral three times a day  HYDROmorphone PCA (1 mG/mL) 30 milliLiter(s) PCA Continuous PCA Continuous  lactated ringers. 1000 milliLiter(s) (75 mL/Hr) IV Continuous <Continuous>  metoprolol succinate ER 75 milliGRAM(s) Oral daily  pantoprazole  Injectable 40 milliGRAM(s) IV Push every 12 hours  PARoxetine 60 milliGRAM(s) Oral daily  polyethylene glycol 3350 17 Gram(s) Oral every 12 hours  sodium chloride 0.9% lock flush 3 milliLiter(s) IV Push every 8 hours  traZODone 150 milliGRAM(s) Oral at bedtime    MEDICATIONS  (PRN):  albuterol/ipratropium for Nebulization 3 milliLiter(s) Nebulizer every 6 hours PRN Wheezing  ALPRAZolam 0.25 milliGRAM(s) Oral every 6 hours PRN anxiety  aluminum hydroxide/magnesium hydroxide/simethicone Suspension 30 milliLiter(s) Oral four times a day PRN Indigestion  bisacodyl Suppository 10 milliGRAM(s) Rectal daily PRN If no bowel movement by postoperative day #2  cyclobenzaprine 5 milliGRAM(s) Oral three times a day PRN Muscle Spasm  hydrALAZINE Injectable 10 milliGRAM(s) IV Push every 6 hours PRN for SBP more than 160  HYDROmorphone PCA (1 mG/mL) Rescue Clinician Bolus 0.5 milliGRAM(s) IV Push every 15 minutes PRN for Pain Scale GREATER THAN 6  magnesium hydroxide Suspension 30 milliLiter(s) Oral daily PRN Constipation  naloxone Injectable 0.1 milliGRAM(s) IV Push every 3 minutes PRN For ANY of the following changes in patient status:  A. RR LESS THAN 10 breaths per minute, B. Oxygen saturation LESS THAN 90%, C. Sedation score of 6  ondansetron Injectable 4 milliGRAM(s) IV Push every 6 hours PRN Nausea and/or Vomiting  senna 2 Tablet(s) Oral at bedtime PRN Constipation

## 2020-02-26 NOTE — PHYSICAL THERAPY INITIAL EVALUATION ADULT - GENERAL OBSERVATIONS, REHAB EVAL
Pt was found lying in bed crying in pain, on tele, on Ext Fixator on RLE resting on pillow
Pt received on 2N, NAD, willing and cooperative but stating she was anxious, crying at times during session and able to console. R LE ace wrap+, +Drain, NWB maintained, +IV +PCA, pt pushed pump several times and several times during session. Pt reports 7/10 pain at start of session and 10/10 after session. RN aware

## 2020-02-26 NOTE — PROGRESS NOTE ADULT - ASSESSMENT
A/P: 59F s/p R Ankle ÁLVARO ExFix, ORIF POD 1  Analgesia  DVT ppx  NWB RLE  Ice and elevation  PT/OT  Encourage incentive spirometry  DC planning  Will discuss with attending and advise if plan changes

## 2020-02-26 NOTE — PHYSICAL THERAPY INITIAL EVALUATION ADULT - MODALITIES TREATMENT COMMENTS
Pt OOB in chair, R LE elevated on a stool, +alarm, Tray table in front, NAD, denies dizziness, Reports pain R LE 10/10, tried to reposition,  pt very anxious trying to calm, RN Mayra aware, CM aware

## 2020-02-26 NOTE — PROGRESS NOTE ADULT - ASSESSMENT
59 female /F with PMHx of anxiety/ depression, asthma,  HTN on enalapril, spinal stenosis, osteoarthritis, seasonal allergies who admitted on 2/14/20  by Dr. Knight for ankle injury requiring surgery.  Pt  c/o right ankle pain after a fall on 2/12, for which she was splinted at [facility] ED.   She states that  she was NPO for planned epidural procedure for pain control .  At 11 am she was getting dressed  and while putting on earrings she had unwitnessed LOC and  fell from standing , injuring her right ankle.  Pt also had 5 episodes of diarrhea 2 days ago with LLQ pain which improved.  Pt reports she stopped taking motrin 600mg BID , approximately 5 days  ago for the planned epidural procedure for pain control.  She was on mobic until January 2020.   She has noticed increasingly dark stools over the last 1-2 weeks.    She also reports some difficulty swallowing solids for 2-3 weeks.           Hospital course :   2/15 - s/p PRBC 1 unit  s/p right ankle External fixator application	  2/17 - s/p LHC - normal coronaries, EF 35%  2/18 - 2 d echo - EF 40%, basal and mid segments hypokinetic  2/24 - s/p EGD - normal , lasix decreased as per cardio, multiple vomiting later today  2/25: Bx shows some lymphoid aggregate; no H pylori , pain uncontrolled asking for dilaudid  s/p ORIF right trimalleolar ankle fracture performed intra-op.     * Fall, syncope due to orthostatic hypotension cardiac cause ( NSTEMI, Takotsubo cardiomyopathy)   * Elevated troponin due to NSTEMI  * Acute hypoxic respiratory failure with Bilateral pleural effusions  * Acute systolic heart failure due to Takotsubo cardiomyopathy  - s/p tele monitoring  - cardiology work-up      *  Anemia, Nausea, vomiting   -s/p EGD   - GI consult  - c/w IV PPI   -   Bx shows some lymphoid aggregate; no H pylori    # Acute hypoxic respiratory failure in the setting of acute on chronic systolic CHF    lasix decreased as per cardio  - now off O2,    # NSTEMI:   - s/p cardiac cath 2/17:  Normal coronary arteries.  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 Moderately reduced left ventricular systolic function with segmental wall   motion abnormalities.  Normal LV systolic function. Estimated LV ejection fraction is 35 %.  Elevated left ventricular end-diastolic pressure.   - cardiology following .    # Peripancreatic stranding on CT chest:  - CT: Peripancreatic stranding, new since the previous abdominal CT dated 2/14/2020, suggestive of acute pancreatitis. Clinical correlation with pancreatic enzymes is recommended.  - patient's abdominal pain could be also secondary to (L) sided colitis noted on 2/14 CT scan  - tolerating po diet    # Right Ankle Fx:   - s/p right ankle External fixator application  - pain meds prn  Sx on Tuesday    # ARF:   - likely sec to NSAIDS; resolved    # Melena and Dark colored stools + Acute Anemia of acte hemorrhage from GI bleed:  resolved  - likely from NSAIDS    - d/c NSAIDS  - cont Protonix  - s/p PRBC 2/15  EGD on Monday; normal    # Hypotension:  - resolved     # Hyperkalemia    - resolved    Vomiting:    zofran, reglan prn    DVT PPX: lovenox         Pt medically optimized and cleared by medicine and cardio for right ankle fx ortho surgery. 59 female /F with PMHx of anxiety/ depression, asthma,  HTN on enalapril, spinal stenosis, osteoarthritis, seasonal allergies who admitted on 2/14/20  by Dr. Knight for ankle injury requiring surgery.  Pt  c/o right ankle pain after a fall on 2/12, for which she was splinted at [facility] ED.   She states that  she was NPO for planned epidural procedure for pain control .  At 11 am she was getting dressed  and while putting on earrings she had unwitnessed LOC and  fell from standing , injuring her right ankle.  Pt also had 5 episodes of diarrhea 2 days ago with LLQ pain which improved.  Pt reports she stopped taking motrin 600mg BID , approximately 5 days  ago for the planned epidural procedure for pain control.  She was on mobic until January 2020.   She has noticed increasingly dark stools over the last 1-2 weeks.    She also reports some difficulty swallowing solids for 2-3 weeks.           Hospital course :   2/15 - s/p PRBC 1 unit  s/p right ankle External fixator application	  2/17 - s/p LHC - normal coronaries, EF 35%  2/18 - 2 d echo - EF 40%, basal and mid segments hypokinetic  2/24 - s/p EGD - normal , lasix decreased as per cardio, multiple vomiting later today  2/25: Bx shows some lymphoid aggregate; no H pylori , pain uncontrolled asking for dilaudid  s/p ORIF right trimalleolar ankle fracture performed intra-op.     * Fall, syncope due to orthostatic hypotension cardiac cause ( NSTEMI, Takotsubo cardiomyopathy)   * Elevated troponin due to NSTEMI  * Acute hypoxic respiratory failure with Bilateral pleural effusions  * Acute systolic heart failure due to Takotsubo cardiomyopathy  - s/p tele monitoring  - cardiology work-up    - c/w ASA 81, statin, BB toprol 75, lisinopril 20--> 10 ( due to low BP in am)   - lasix 20 - held today due to low BP   2/17 - s/p LHC - normal coronaries, EF 35%    * Right ankle  trimalleolar fracture  s/p ORIF  and removal external fixator   as per ortho team  - on PCA pump  - transition to po pain management   - c/w gabapentin   -OOB PT NWB RLE    *  Anemia, Nausea, vomiting   * Melena, resolved  * Acute blood loss anemia suspected upper GI bleed, resolved , NSAIDs induced   -s/p EGD  - small lymphoid aggregate  no H pylori  - GI consult  - c/w IV PPI   - s/p PRBC 2/15  - check iron studies, B12, folate     * BRIGITTE  due to ATN ( NSAIDs use, hypotension)   - Cr normalized  - monitor while on diuresis    * Pancreatic stranding on CT   - lipase wnl  - asymptomatic, doubt pancreatitis     * Hyperkalemia, resolved  - lower dose of ACEI    * Depression  - c/w SSRI   - trazadone hs      DVT PPX: lovenox      Dispo - inpatient monitoring, d/c planning once stable

## 2020-02-26 NOTE — PROGRESS NOTE ADULT - SUBJECTIVE AND OBJECTIVE BOX
HPI: Pt is a 58 y/o female with a PMHx of anxiety/depression, asthma, HTN on enalapril, spinal stenosis, osteoarthritis, seasonal allergies who was sent to the ED by Dr. Knight today for ankle injury requiring surgery.  Pt c/o right ankle pain after a fall yesterday, for which she was splinted at [facility] ED. Pt also had 5 episodes of diarrhea 2 days ago with LLQ pain which improved yesterday.  day prior to admission pt was NPO for planned epidural procedure for pain control. At 11 am she was getting dressed yesterday and while putting on earrings she had unwitnessed LOC and  fell from standing , injuring her right ankle sustaining a left ankle fracture She denies preceding cpain/SOB/palpitations/dizziness.  She does not recall tripping over anything. Pt reports she stopped taking motrin 600mg BID , approximately 5 days  ago for the planned epidural procedure for pain control.  She was on mobic until 2020.  She has noticed increasingly dark stools over the last 1-2 weeks. She also reports some difficulty swallowing solids for 2-3 weeks.        In the ED , pt is found to have ARF, hyperkalemia, anemia and hypotension to 85/38.  Pt reported she may have accidentally taken an extra dose of her Clonidine today.     INTERVAL HISTORY:  she is now on 3 north s/p Right ankleplacement of ext-fix  : seen at bedside,  anxious, vomiting, states nausea form pain meds  2020: Pt seen at bedside in SICU with  at side. She reports she s/p cardiac cath, no abnormal findings per , and off of UFH per primary RN. Pt with VTE risk factors.   : seen at bedside, anxious, c/o lower back pain, not new  : seen at bedside, c/o n/v still anxious.  : Pt seen at bedside c/o leg pain with movement, pain medication received  2020 Pt seen at bedside on 3 east, c/o pain to ankle, Discussed his anticoagulation with lovenox.  Benefits and risks discussed. Pt states she has no concerns.  2020: Pt seen at bedside, no issues with bleeding.  2020: Pt seen at bedside, with friend at side. No issues with lovenox. Pending EGD tomorrow and ex-fix ORIF either Tu vs. Wed per ortho  2020 Pt seen at bedside on 3north. S/P egd this a.m. neg,  discussed with pt the use of Lovenox for her anticoagulation  No concerns.  States she is waiting for them to remove the external fixator.   Pt state she has a lot of stress.   2020 Pt seen at bedside on 3north.  Pt awaitng ortho procedure removal of external fixator today.  Lovenox on  hold.    Pt seen at bedside on 2north, asleep but easily aroused. States she is resting.  s/p external fixator removed yesterday. Discussed her lovenox inj.  No concerns.     Fam HX:  Father DM, had CVa  Mother COPD  Brother  at 65 of alcoholism  Sister has lung ca (2020 18:56)    Consulted by Dr. Rogers for VTE prophylaxis, risk stratification, and anticoagulation management.    PAST MEDICAL & SURGICAL HISTORY:  Hypertension  Anxiety and depression  Seasonal allergies  Asthma  Osteoarthritis  S/P hip replacement: left  S/P tubal ligation  S/P cholecystectomy    CrCl: 69.7    Caprini VTE Risk Score: CAPRINI SCORE  AGE RELATED RISK FACTORS                                                       MOBILITY RELATED FACTORS  [x ] Age 41-60 years                                            (1 Point)                  [ ] Bed rest                                                        (1 Point)  [ ] Age: 61-74 years                                           (2 Points)                [ ] Plaster cast                                                   (2 Points)  [ ] Age= 75 years                                              (3 Points)                 [ ] Bed bound for more than 72 hours                   (2 Points)    DISEASE RELATED RISK FACTORS                                               GENDER SPECIFIC FACTORS  [ ] Edema in the lower extremities                       (1 Point)           [ ] Pregnancy                                                            (1 Point)  [ ] Varicose veins                                               (1 Point)                  [ ] Post-partum < 6 weeks                                      (1 Point)             x[ ] BMI > 25 Kg/m2                                            (1 Point)                  [ ] Hormonal therapy or oral contraception       (1 Point)                 [ ] Sepsis (in the previous month)                        (1 Point)             [ ] History of pregnancy complications                (1Point)  [ ] Pneumonia or serious lung disease                                             [ ] Unexplained or recurrent  (>/= 3), premature                                 (In the previous month)                               (1 Point)                birth with toxemia or growth-restricted infant (1 Point)  [ ] Abnormal pulmonary function test            (1 Point)                                   SURGERY RELATED RISK FACTORS  [ ] Acute myocardial infarction                       (1 Point)                  [ ]  Section                                         (1 Point)  [ ] Congestive heart failure (in the previous month) (1 Point)   [ ] Minor surgery   lasting <45 minutes       (1 Point)   [ ] Inflammatory bowel disease                             (1 Point)          [ ] Arthroscopic surgery                                  (2 Points)  [ ] Central venous access                                    (2 Points)            [x ] General surgery lasting >45 minutes      (2 Points)       [ ] Stroke (in the previous month)                  (5 Points)            [ ] Elective major lower extremity arthroplasty (5 Points)                                   [  ] Malignancy (present or past include skin melanoma                                          but exclude  basal skin cell)    (2 points)                                      TRAUMA RELATED RISK FACTORS                HEMATOLOGY RELATED FACTORS                                  [x ] Fracture of the hip, pelvis, or leg                       (5 Points)  [ ] Prior episodes of VTE                                     (3 Points)          [ ] Acute spinal cord injury (in the previous month)  (5 Points)  [ ] Positive family history for VTE                         (3 Points)       [ ] Paralysis (less than 1 month)                          (5 Points)  [ ] Prothrombin 93129 A                                      (3 Points)         [ ] Multiple Trauma (within 1month)                 (5Points)                                                                                                                                                                [ ] Factor V Leiden                                          (3 Points)                                OTHER RISK FACTORS                          [ ] Lupus anticoagulants                                     (3 Points)                       [ ] BMI > 40                          (1 Point)                                                         [ ] Anticardiolipin antibodies                                (3 Points)                   [ ] Smoking                              (1Point)                                                [ ] High homocysteine in the blood                      (3 Points)                [  ] Diabetes requiring insulin (1point)                         [ ] Other congenital or acquired thrombophilia       (3 Points)          [  ] Chemotherapy                   (1 Point)  [ ] Heparin induced thrombocytopenia                  (3 Points)             [  ] Blood Transfusion                (1 point)                                                                                                         Total Score [    9      ]                                                                                                                                                                                                                 IMPROVE Bleeding Risk Score 1    Falls Risk:   High ( x )  Mod (  )  Low (  )      FAMILY HISTORY:  Family history of COPD (chronic obstructive pulmonary disease)  Family history of CVA  Denies any personal or familial history of clotting or bleeding disorders.    Allergies  No Known Allergies  Intolerances    REVIEW OF SYSTEMS    (  )Fever	     (  )Constipation	(  )SOB				(  )Headache	(  )Dysuria  (  )Chills	     (  )Melena	(  )Dyspnea present on exertion	                    (  )Dizziness                    (  )Polyuria  (  )Nausea	     (  )Hematochezia	(  )Cough			                    (  )Syncope   	(  )Hematuria  (  )Vomiting    (  )Chest Pain	(  )Wheezing			(  )Weakness  (  )Diarrhea     (  )Palpitations	(  )Anorexia			( x )joint pain    All  other review of systems negative: Yes    Vital Signs Last 24 Hrs  T(C): 36.8 (20 @ 05:40), Max: 37 (20 @ 11:40)  T(F): 98.2 (20 @ 05:40), Max: 98.6 (20 @ 11:40)  HR: 77 (20 @ 05:40) (65 - 79)  BP: 106/63 (20 @ 05:40) (99/64 - 128/75)  BP(mean): --  RR: 18 (20 @ 05:40) (10 - 18)  SpO2: 95% (20 @ 05:40) (94% - 100%)    Constitutional: Appears Well    Neurological: A& O x 4    Skin: Warm    Respiratory and Thorax: normal effort; Breath sounds: normal; No rales/wheezing/rhonchi  	  Cardiovascular: S1, S2, regular, NMBR	    Gastrointestinal: BS + x 4Q, nontender	    Genitourinary:  Bladder nondistended, nontender    Musculoskeletal:   General Right:   + muscle/joint tenderness,   normal tone, no joint swelling,   ROM: limited	    General Left:   no muscle/joint tenderness,   normal tone, no joint swelling,   ROM: full    Right ankle: Drsing CDI; ext fix in place, tos warm and mobile Cap refill good; +Sensation intact                      Lower extrems:   Right: no calf tenderness              negative melba's sign               + pedal pulses    Left:   no calf tenderness              negative melba's sign               + pedal pulses    LABS:                          10.2   19.76 )-----------( 321      ( 2020 07:07 )             31.4           138  |  104  |  13  ----------------------------<  101<H>  4.4   |  31  |  0.91    Ca    8.6      2020 07:07        PT/INR - ( 2020 05:52 )   PT: 12.2 sec;   INR: 1.10 ratio         PTT - ( 2020 05:52 )  PTT:26.8 sec                       10.3   12.90 )-----------( 325      ( 2020 05:52 )             31.5           138  |  103  |  16  ----------------------------<  106<H>  3.8   |  31  |  1.02    Ca    8.5      2020 05:52        PT/INR - ( 2020 05:52 )   PT: 12.2 sec;   INR: 1.10 ratio         PTT - ( 2020 05:52 )  PTT:26.8 sec                   11.1   10.73 )-----------( 354      ( 2020 07:29 )             33.0       02-23    140  |  101  |  18  ----------------------------<  106<H>  3.6   |  33<H>  |  1.02    Ca    8.7      2020 07:29                     11.5   10.37 )-----------( 382      ( 2020 07:45 )             34.5       RADIOLOGY:  EXAM:  CT ANKLE ONLY RT                        EXAM:  CT 3D RECONSTRUCT WO PABLITOANDERSON                          PROCEDURE DATE:  2020      IMPRESSION: Trimalleolar fracture as above. No significant change in fracture appearance or alignment compared to previous plain film exams.  MEDICATIONS  (STANDING):  acetaminophen   Tablet .. 975 milliGRAM(s) Oral every 8 hours  acetaminophen  IVPB .. 1000 milliGRAM(s) IV Intermittent once  aspirin enteric coated 81 milliGRAM(s) Oral daily  atorvastatin 40 milliGRAM(s) Oral at bedtime  enoxaparin Injectable 40 milliGRAM(s) SubCutaneous every 24 hours  furosemide    Tablet 20 milliGRAM(s) Oral daily  gabapentin 300 milliGRAM(s) Oral three times a day  HYDROmorphone PCA (1 mG/mL) 30 milliLiter(s) PCA Continuous PCA Continuous  lactated ringers. 1000 milliLiter(s) IV Continuous <Continuous>  lisinopril 20 milliGRAM(s) Oral daily  metoprolol succinate ER 75 milliGRAM(s) Oral daily  pantoprazole  Injectable 40 milliGRAM(s) IV Push every 12 hours  PARoxetine 60 milliGRAM(s) Oral daily  polyethylene glycol 3350 17 Gram(s) Oral every 12 hours  sodium chloride 0.9% lock flush 3 milliLiter(s) IV Push every 8 hours  traZODone 150 milliGRAM(s) Oral at bedtime    DVT Prophylaxis:  LMWH                   ( x )  Heparin SQ           (  )  Coumadin             (  )  Xarelto                  (  )  Eliquis                   (  )  Venodynes           (x  )  Ambulation          ( x )  UFH                       (  )  Contraindicated  (  )  EC ASPIRIN       (  )

## 2020-02-26 NOTE — PHYSICAL THERAPY INITIAL EVALUATION ADULT - DISCHARGE DISPOSITION, PT EVAL
rehabilitation facility/Pt participated in bed exs but refusing to sit at eob due to severe pain, refusing PT until after her Anticipated Surgery is complete next week, COREY Patricia aware, Pt is being taken to some procedure after PT session, Please Reorder PT as appropriate
BRIAN at this time

## 2020-02-27 LAB
ADD ON TEST-SPECIMEN IN LAB: SIGNIFICANT CHANGE UP
ANION GAP SERPL CALC-SCNC: 3 MMOL/L — LOW (ref 5–17)
BUN SERPL-MCNC: 11 MG/DL — SIGNIFICANT CHANGE UP (ref 7–23)
CALCIUM SERPL-MCNC: 8.6 MG/DL — SIGNIFICANT CHANGE UP (ref 8.5–10.1)
CHLORIDE SERPL-SCNC: 107 MMOL/L — SIGNIFICANT CHANGE UP (ref 96–108)
CO2 SERPL-SCNC: 32 MMOL/L — HIGH (ref 22–31)
CREAT SERPL-MCNC: 0.79 MG/DL — SIGNIFICANT CHANGE UP (ref 0.5–1.3)
FERRITIN SERPL-MCNC: 119 NG/ML — SIGNIFICANT CHANGE UP (ref 15–150)
FOLATE SERPL-MCNC: 7.1 NG/ML — SIGNIFICANT CHANGE UP
GLUCOSE SERPL-MCNC: 119 MG/DL — HIGH (ref 70–99)
HCT VFR BLD CALC: 28.2 % — LOW (ref 34.5–45)
HGB BLD-MCNC: 9.2 G/DL — LOW (ref 11.5–15.5)
IRON SATN MFR SERPL: 20 UG/DL — LOW (ref 30–160)
IRON SATN MFR SERPL: 8 % — LOW (ref 14–50)
MCHC RBC-ENTMCNC: 31.8 PG — SIGNIFICANT CHANGE UP (ref 27–34)
MCHC RBC-ENTMCNC: 32.6 GM/DL — SIGNIFICANT CHANGE UP (ref 32–36)
MCV RBC AUTO: 97.6 FL — SIGNIFICANT CHANGE UP (ref 80–100)
PLATELET # BLD AUTO: 278 K/UL — SIGNIFICANT CHANGE UP (ref 150–400)
POTASSIUM SERPL-MCNC: 4.1 MMOL/L — SIGNIFICANT CHANGE UP (ref 3.5–5.3)
POTASSIUM SERPL-SCNC: 4.1 MMOL/L — SIGNIFICANT CHANGE UP (ref 3.5–5.3)
RBC # BLD: 2.89 M/UL — LOW (ref 3.8–5.2)
RBC # FLD: 13.7 % — SIGNIFICANT CHANGE UP (ref 10.3–14.5)
SODIUM SERPL-SCNC: 142 MMOL/L — SIGNIFICANT CHANGE UP (ref 135–145)
TIBC SERPL-MCNC: 260 UG/DL — SIGNIFICANT CHANGE UP (ref 220–430)
TSH SERPL-MCNC: 2.3 UU/ML — SIGNIFICANT CHANGE UP (ref 0.34–4.82)
UIBC SERPL-MCNC: 240 UG/DL — SIGNIFICANT CHANGE UP (ref 110–370)
VIT B12 SERPL-MCNC: 839 PG/ML — SIGNIFICANT CHANGE UP (ref 232–1245)
WBC # BLD: 11.45 K/UL — HIGH (ref 3.8–10.5)
WBC # FLD AUTO: 11.45 K/UL — HIGH (ref 3.8–10.5)

## 2020-02-27 PROCEDURE — 99232 SBSQ HOSP IP/OBS MODERATE 35: CPT

## 2020-02-27 PROCEDURE — 99231 SBSQ HOSP IP/OBS SF/LOW 25: CPT

## 2020-02-27 RX ORDER — HYDROMORPHONE HYDROCHLORIDE 2 MG/ML
1 INJECTION INTRAMUSCULAR; INTRAVENOUS; SUBCUTANEOUS EVERY 4 HOURS
Refills: 0 | Status: DISCONTINUED | OUTPATIENT
Start: 2020-02-27 | End: 2020-02-28

## 2020-02-27 RX ORDER — HYDROMORPHONE HYDROCHLORIDE 2 MG/ML
4 INJECTION INTRAMUSCULAR; INTRAVENOUS; SUBCUTANEOUS EVERY 4 HOURS
Refills: 0 | Status: DISCONTINUED | OUTPATIENT
Start: 2020-02-27 | End: 2020-02-28

## 2020-02-27 RX ORDER — HYDROMORPHONE HYDROCHLORIDE 2 MG/ML
1 INJECTION INTRAMUSCULAR; INTRAVENOUS; SUBCUTANEOUS EVERY 4 HOURS
Refills: 0 | Status: DISCONTINUED | OUTPATIENT
Start: 2020-02-27 | End: 2020-02-27

## 2020-02-27 RX ORDER — HYDROMORPHONE HYDROCHLORIDE 2 MG/ML
0.5 INJECTION INTRAMUSCULAR; INTRAVENOUS; SUBCUTANEOUS ONCE
Refills: 0 | Status: DISCONTINUED | OUTPATIENT
Start: 2020-02-27 | End: 2020-02-27

## 2020-02-27 RX ORDER — HYDROMORPHONE HYDROCHLORIDE 2 MG/ML
2 INJECTION INTRAMUSCULAR; INTRAVENOUS; SUBCUTANEOUS EVERY 4 HOURS
Refills: 0 | Status: DISCONTINUED | OUTPATIENT
Start: 2020-02-27 | End: 2020-02-28

## 2020-02-27 RX ORDER — ENOXAPARIN SODIUM 100 MG/ML
40 INJECTION SUBCUTANEOUS
Qty: 0 | Refills: 0 | DISCHARGE
Start: 2020-02-27

## 2020-02-27 RX ORDER — METOPROLOL TARTRATE 50 MG
25 TABLET ORAL DAILY
Refills: 0 | Status: DISCONTINUED | OUTPATIENT
Start: 2020-02-27 | End: 2020-02-28

## 2020-02-27 RX ORDER — OXYCODONE HYDROCHLORIDE 5 MG/1
5 TABLET ORAL EVERY 4 HOURS
Refills: 0 | Status: DISCONTINUED | OUTPATIENT
Start: 2020-02-27 | End: 2020-02-27

## 2020-02-27 RX ORDER — FUROSEMIDE 40 MG
20 TABLET ORAL DAILY
Refills: 0 | Status: DISCONTINUED | OUTPATIENT
Start: 2020-02-28 | End: 2020-02-28

## 2020-02-27 RX ORDER — FERROUS SULFATE 325(65) MG
325 TABLET ORAL DAILY
Refills: 0 | Status: DISCONTINUED | OUTPATIENT
Start: 2020-02-27 | End: 2020-02-28

## 2020-02-27 RX ORDER — HYDROMORPHONE HYDROCHLORIDE 2 MG/ML
0.5 INJECTION INTRAMUSCULAR; INTRAVENOUS; SUBCUTANEOUS EVERY 4 HOURS
Refills: 0 | Status: DISCONTINUED | OUTPATIENT
Start: 2020-02-27 | End: 2020-02-27

## 2020-02-27 RX ORDER — OXYCODONE HYDROCHLORIDE 5 MG/1
10 TABLET ORAL EVERY 4 HOURS
Refills: 0 | Status: DISCONTINUED | OUTPATIENT
Start: 2020-02-27 | End: 2020-02-27

## 2020-02-27 RX ADMIN — HYDROMORPHONE HYDROCHLORIDE 1 MILLIGRAM(S): 2 INJECTION INTRAMUSCULAR; INTRAVENOUS; SUBCUTANEOUS at 19:51

## 2020-02-27 RX ADMIN — Medication 400 MILLIGRAM(S): at 20:54

## 2020-02-27 RX ADMIN — GABAPENTIN 300 MILLIGRAM(S): 400 CAPSULE ORAL at 20:51

## 2020-02-27 RX ADMIN — SENNA PLUS 2 TABLET(S): 8.6 TABLET ORAL at 22:21

## 2020-02-27 RX ADMIN — Medication 975 MILLIGRAM(S): at 23:04

## 2020-02-27 RX ADMIN — HYDROMORPHONE HYDROCHLORIDE 0.5 MILLIGRAM(S): 2 INJECTION INTRAMUSCULAR; INTRAVENOUS; SUBCUTANEOUS at 16:22

## 2020-02-27 RX ADMIN — GABAPENTIN 300 MILLIGRAM(S): 400 CAPSULE ORAL at 12:53

## 2020-02-27 RX ADMIN — SODIUM CHLORIDE 3 MILLILITER(S): 9 INJECTION INTRAMUSCULAR; INTRAVENOUS; SUBCUTANEOUS at 21:03

## 2020-02-27 RX ADMIN — PANTOPRAZOLE SODIUM 40 MILLIGRAM(S): 20 TABLET, DELAYED RELEASE ORAL at 06:11

## 2020-02-27 RX ADMIN — PANTOPRAZOLE SODIUM 40 MILLIGRAM(S): 20 TABLET, DELAYED RELEASE ORAL at 17:03

## 2020-02-27 RX ADMIN — HYDROMORPHONE HYDROCHLORIDE 4 MILLIGRAM(S): 2 INJECTION INTRAMUSCULAR; INTRAVENOUS; SUBCUTANEOUS at 18:28

## 2020-02-27 RX ADMIN — SODIUM CHLORIDE 3 MILLILITER(S): 9 INJECTION INTRAMUSCULAR; INTRAVENOUS; SUBCUTANEOUS at 06:27

## 2020-02-27 RX ADMIN — GABAPENTIN 300 MILLIGRAM(S): 400 CAPSULE ORAL at 06:11

## 2020-02-27 RX ADMIN — Medication 975 MILLIGRAM(S): at 06:13

## 2020-02-27 RX ADMIN — HYDROMORPHONE HYDROCHLORIDE 0.5 MILLIGRAM(S): 2 INJECTION INTRAMUSCULAR; INTRAVENOUS; SUBCUTANEOUS at 16:07

## 2020-02-27 RX ADMIN — OXYCODONE HYDROCHLORIDE 10 MILLIGRAM(S): 5 TABLET ORAL at 15:14

## 2020-02-27 RX ADMIN — HYDROMORPHONE HYDROCHLORIDE 4 MILLIGRAM(S): 2 INJECTION INTRAMUSCULAR; INTRAVENOUS; SUBCUTANEOUS at 18:58

## 2020-02-27 RX ADMIN — Medication 975 MILLIGRAM(S): at 06:12

## 2020-02-27 RX ADMIN — Medication 975 MILLIGRAM(S): at 22:21

## 2020-02-27 RX ADMIN — Medication 975 MILLIGRAM(S): at 15:47

## 2020-02-27 RX ADMIN — SODIUM CHLORIDE 3 MILLILITER(S): 9 INJECTION INTRAMUSCULAR; INTRAVENOUS; SUBCUTANEOUS at 13:40

## 2020-02-27 RX ADMIN — ENOXAPARIN SODIUM 40 MILLIGRAM(S): 100 INJECTION SUBCUTANEOUS at 06:10

## 2020-02-27 RX ADMIN — HYDROMORPHONE HYDROCHLORIDE 0.5 MILLIGRAM(S): 2 INJECTION INTRAMUSCULAR; INTRAVENOUS; SUBCUTANEOUS at 17:03

## 2020-02-27 RX ADMIN — Medication 81 MILLIGRAM(S): at 12:53

## 2020-02-27 RX ADMIN — POLYETHYLENE GLYCOL 3350 17 GRAM(S): 17 POWDER, FOR SOLUTION ORAL at 06:11

## 2020-02-27 RX ADMIN — HYDROMORPHONE HYDROCHLORIDE 0.5 MILLIGRAM(S): 2 INJECTION INTRAMUSCULAR; INTRAVENOUS; SUBCUTANEOUS at 17:33

## 2020-02-27 RX ADMIN — HYDROMORPHONE HYDROCHLORIDE 4 MILLIGRAM(S): 2 INJECTION INTRAMUSCULAR; INTRAVENOUS; SUBCUTANEOUS at 23:04

## 2020-02-27 RX ADMIN — Medication 0.25 MILLIGRAM(S): at 20:51

## 2020-02-27 RX ADMIN — Medication 150 MILLIGRAM(S): at 22:21

## 2020-02-27 RX ADMIN — Medication 60 MILLIGRAM(S): at 15:13

## 2020-02-27 RX ADMIN — ATORVASTATIN CALCIUM 40 MILLIGRAM(S): 80 TABLET, FILM COATED ORAL at 22:21

## 2020-02-27 RX ADMIN — OXYCODONE HYDROCHLORIDE 10 MILLIGRAM(S): 5 TABLET ORAL at 15:44

## 2020-02-27 RX ADMIN — HYDROMORPHONE HYDROCHLORIDE 4 MILLIGRAM(S): 2 INJECTION INTRAMUSCULAR; INTRAVENOUS; SUBCUTANEOUS at 22:34

## 2020-02-27 RX ADMIN — Medication 20 MILLIGRAM(S): at 12:53

## 2020-02-27 RX ADMIN — HYDROMORPHONE HYDROCHLORIDE 1 MILLIGRAM(S): 2 INJECTION INTRAMUSCULAR; INTRAVENOUS; SUBCUTANEOUS at 19:36

## 2020-02-27 RX ADMIN — Medication 975 MILLIGRAM(S): at 15:17

## 2020-02-27 RX ADMIN — Medication 1000 MILLIGRAM(S): at 21:09

## 2020-02-27 NOTE — PROGRESS NOTE ADULT - ATTENDING COMMENTS
I am assuming care of this patient from Dr. Rogers. She is s/p ex-fix placement of the right ankle.  She has had cardiac events as well as EGD.  Medical and cardiac optimization appreciated.  All RBA regarding her medical condition and the planned surgery and ankle status discussed at length. She is aware of the bone quality and the risk for future pathology and arthritis to the ankle. She may require additional surgery. There is a risk of further cardiac damage with this surgery. Patient aware of all RBA.  Plan to proceed with surgery.
Pt seen and examined.  Agree with above  Doing well.  Improvement with pain and overall wellbeing  Surgery discussed  Prognosis discussed  POstop plan and recovery reviewed and agreed upon.  Postop instructions provided  Anticoagulation
patient was seen with NP agree with assessment and plan

## 2020-02-27 NOTE — PROGRESS NOTE ADULT - SUBJECTIVE AND OBJECTIVE BOX
Patient seen and examined at bedside. No acute events over night. No acute complaints at this time. Pain well controlled. Denies chest pain, shortness of breath, nausea or vomiting.     PE:  Vital Signs Last 24 Hrs  T(C): 36.6 (02-27-20 @ 06:02), Max: 36.7 (02-26-20 @ 11:11)  T(F): 97.8 (02-27-20 @ 06:02), Max: 98.1 (02-26-20 @ 23:30)  HR: 75 (02-27-20 @ 06:02) (63 - 78)  BP: 101/51 (02-27-20 @ 06:02) (82/74 - 101/68)  BP(mean): 76 (02-26-20 @ 12:14) (76 - 76)  RR: 18 (02-27-20 @ 06:02) (16 - 19)  SpO2: 94% (02-27-20 @ 06:02) (92% - 100%)    General: NAD, resting comfortably in bed  R LE:   Trilam splint in place C/D/I  SCD in place contralaterally  No calf tenderness contralterally  Able to grossly wiggle toes  Gross sesnsation to toes intact  Cap refill <2 seconds                          10.2   19.76 )-----------( 321      ( 26 Feb 2020 07:07 )             31.4     26 Feb 2020 07:07    138    |  104    |  13     ----------------------------<  101    4.4     |  31     |  0.91     Ca    8.6        26 Feb 2020 07:07          A/P:  59y f s/p R ankle ORIF  -PT/OT - NWB RLE  -Pain Control  -DVT ppx w/ a81 and lovenox  -FU d/c PCA this morning and transition to po pain control  -FU AM Labs  -Rest, ice, compress and elevate the extremity as we needed  -Incentive Spirometry  -Medical management appreciated  -dispo: BRIAN

## 2020-02-27 NOTE — PROGRESS NOTE ADULT - SUBJECTIVE AND OBJECTIVE BOX
HPI: Pt is a 60 y/o female with a PMHx of anxiety/depression, asthma, HTN on enalapril, spinal stenosis, osteoarthritis, seasonal allergies who was sent to the ED by Dr. Knight today for ankle injury requiring surgery.  Pt c/o right ankle pain after a fall yesterday, for which she was splinted at [facility] ED. Pt also had 5 episodes of diarrhea 2 days ago with LLQ pain which improved yesterday.  day prior to admission pt was NPO for planned epidural procedure for pain control. At 11 am she was getting dressed yesterday and while putting on earrings she had unwitnessed LOC and  fell from standing , injuring her right ankle sustaining a left ankle fracture She denies preceding cpain/SOB/palpitations/dizziness.  She does not recall tripping over anything. Pt reports she stopped taking motrin 600mg BID , approximately 5 days  ago for the planned epidural procedure for pain control.  She was on mobic until 2020.  She has noticed increasingly dark stools over the last 1-2 weeks. She also reports some difficulty swallowing solids for 2-3 weeks.        In the ED , pt is found to have ARF, hyperkalemia, anemia and hypotension to 85/38.  Pt reported she may have accidentally taken an extra dose of her Clonidine today.     INTERVAL HISTORY:  she is now on 3 north s/p Right ankleplacement of ext-fix  : seen at bedside,  anxious, vomiting, states nausea form pain meds  2020: Pt seen at bedside in SICU with  at side. She reports she s/p cardiac cath, no abnormal findings per , and off of UFH per primary RN. Pt with VTE risk factors.   : seen at bedside, anxious, c/o lower back pain, not new  : seen at bedside, c/o n/v still anxious.  : Pt seen at bedside c/o leg pain with movement, pain medication received  2020 Pt seen at bedside on 3 east, c/o pain to ankle, Discussed his anticoagulation with lovenox.  Benefits and risks discussed. Pt states she has no concerns.  2020: Pt seen at bedside, no issues with bleeding.  2020: Pt seen at bedside, with friend at side. No issues with lovenox. Pending EGD tomorrow and ex-fix ORIF either Tu vs. Wed per ortho  2020 Pt seen at bedside on 3north. S/P egd this a.m. neg,  discussed with pt the use of Lovenox for her anticoagulation  No concerns.  States she is waiting for them to remove the external fixator.   Pt state she has a lot of stress.   2020 Pt seen at bedside on 3north.  Pt awaitng ortho procedure removal of external fixator today.  Lovenox on  hold.    Pt seen at bedside on 2north, asleep but easily aroused. States she is resting.  s/p external fixator removed yesterday. Discussed her lovenox inj.  No concerns.   2020: Pt seen at bedside, still on Dilaudid PCA for pain control, no s/s of bleeding Dispo Rehab    Fam HX:  Father DM, had CVa  Mother COPD  Brother  at 65 of alcoholism  Sister has lung ca (2020 18:56)    Consulted by Dr. Rogers for VTE prophylaxis, risk stratification, and anticoagulation management.    PAST MEDICAL & SURGICAL HISTORY:  Hypertension  Anxiety and depression  Seasonal allergies  Asthma  Osteoarthritis  S/P hip replacement: left  S/P tubal ligation  S/P cholecystectomy    CrCl: 69.7    Caprini VTE Risk Score: CAPRINI SCORE  AGE RELATED RISK FACTORS                                                       MOBILITY RELATED FACTORS  [x ] Age 41-60 years                                            (1 Point)                  [ ] Bed rest                                                        (1 Point)  [ ] Age: 61-74 years                                           (2 Points)                [ ] Plaster cast                                                   (2 Points)  [ ] Age= 75 years                                              (3 Points)                 [ ] Bed bound for more than 72 hours                   (2 Points)    DISEASE RELATED RISK FACTORS                                               GENDER SPECIFIC FACTORS  [ ] Edema in the lower extremities                       (1 Point)           [ ] Pregnancy                                                            (1 Point)  [ ] Varicose veins                                               (1 Point)                  [ ] Post-partum < 6 weeks                                      (1 Point)             x[ ] BMI > 25 Kg/m2                                            (1 Point)                  [ ] Hormonal therapy or oral contraception       (1 Point)                 [ ] Sepsis (in the previous month)                        (1 Point)             [ ] History of pregnancy complications                (1Point)  [ ] Pneumonia or serious lung disease                                             [ ] Unexplained or recurrent  (>/= 3), premature                                 (In the previous month)                               (1 Point)                birth with toxemia or growth-restricted infant (1 Point)  [ ] Abnormal pulmonary function test            (1 Point)                                   SURGERY RELATED RISK FACTORS  [ ] Acute myocardial infarction                       (1 Point)                  [ ]  Section                                         (1 Point)  [ ] Congestive heart failure (in the previous month) (1 Point)   [ ] Minor surgery   lasting <45 minutes       (1 Point)   [ ] Inflammatory bowel disease                             (1 Point)          [ ] Arthroscopic surgery                                  (2 Points)  [ ] Central venous access                                    (2 Points)            [x ] General surgery lasting >45 minutes      (2 Points)       [ ] Stroke (in the previous month)                  (5 Points)            [ ] Elective major lower extremity arthroplasty (5 Points)                                   [  ] Malignancy (present or past include skin melanoma                                          but exclude  basal skin cell)    (2 points)                                      TRAUMA RELATED RISK FACTORS                HEMATOLOGY RELATED FACTORS                                  [x ] Fracture of the hip, pelvis, or leg                       (5 Points)  [ ] Prior episodes of VTE                                     (3 Points)          [ ] Acute spinal cord injury (in the previous month)  (5 Points)  [ ] Positive family history for VTE                         (3 Points)       [ ] Paralysis (less than 1 month)                          (5 Points)  [ ] Prothrombin 35765 A                                      (3 Points)         [ ] Multiple Trauma (within 1month)                 (5Points)                                                                                                                                                                [ ] Factor V Leiden                                          (3 Points)                                OTHER RISK FACTORS                          [ ] Lupus anticoagulants                                     (3 Points)                       [ ] BMI > 40                          (1 Point)                                                         [ ] Anticardiolipin antibodies                                (3 Points)                   [ ] Smoking                              (1Point)                                                [ ] High homocysteine in the blood                      (3 Points)                [  ] Diabetes requiring insulin (1point)                         [ ] Other congenital or acquired thrombophilia       (3 Points)          [  ] Chemotherapy                   (1 Point)  [ ] Heparin induced thrombocytopenia                  (3 Points)             [  ] Blood Transfusion                (1 point)                                                                                                         Total Score [    9      ]                                                                                                                                                                                                                 IMPROVE Bleeding Risk Score 1    Falls Risk:   High ( x )  Mod (  )  Low (  )      FAMILY HISTORY:  Family history of COPD (chronic obstructive pulmonary disease)  Family history of CVA  Denies any personal or familial history of clotting or bleeding disorders.    Allergies  No Known Allergies  Intolerances    REVIEW OF SYSTEMS    (  )Fever	     (  )Constipation	(  )SOB				(  )Headache	(  )Dysuria  (  )Chills	     (  )Melena	(  )Dyspnea present on exertion	                    (  )Dizziness                    (  )Polyuria  (  )Nausea	     (  )Hematochezia	(  )Cough			                    (  )Syncope   	(  )Hematuria  (  )Vomiting    (  )Chest Pain	(  )Wheezing			(  )Weakness  (  )Diarrhea     (  )Palpitations	(  )Anorexia			( x )joint pain    All  other review of systems negative: Yes    Vital Signs Last 24 Hrs  T(C): 36.6 (2020 06:02), Max: 36.7 (2020 11:11)  T(F): 97.8 (2020 06:02), Max: 98.1 (2020 23:30)  HR: 75 (2020 06:02) (63 - 78)  BP: 101/51 (2020 06:02) (82/74 - 101/68)  BP(mean): 76 (2020 12:14) (76 - 76)  RR: 18 (2020 06:02) (16 - 19)  SpO2: 94% (2020 06:02) (92% - 100%)  Constitutional: Appears Well    Neurological: A& O x 4    Skin: Warm    Respiratory and Thorax: normal effort; Breath sounds: normal; No rales/wheezing/rhonchi  	  Cardiovascular: S1, S2, regular, NMBR	    Gastrointestinal: BS + x 4Q, nontender	    Genitourinary:  Bladder nondistended, nontender    Musculoskeletal:   General Right:   + muscle/joint tenderness,   normal tone, no joint swelling,   ROM: limited	    General Left:   no muscle/joint tenderness,   normal tone, no joint swelling,   ROM: full    Right ankle: Drsing CDI; ext fix in place, tos warm and mobile Cap refill good; +Sensation intact                      Lower extrems:   Right: no calf tenderness              negative melba's sign               + pedal pulses    Left:   no calf tenderness              negative melba's sign               + pedal pulses    LABS:                          9.2    11.45 )-----------( 278      ( 2020 06:43 )             28.2           142  |  107  |  11  ----------------------------<  119<H>  4.1   |  32<H>  |  0.79    Ca    8.6      2020 06:43                                10.2   19.76 )-----------( 321      ( 2020 07:07 )             31.4       02-    138  |  104  |  13  ----------------------------<  101<H>  4.4   |  31  |  0.91    Ca    8.6      2020 07:07        PT/INR - ( 2020 05:52 )   PT: 12.2 sec;   INR: 1.10 ratio         PTT - ( 2020 05:52 )  PTT:26.8 sec                       10.3   12.90 )-----------( 325      ( 2020 05:52 )             31.5       02-    138  |  103  |  16  ----------------------------<  106<H>  3.8   |  31  |  1.02    Ca    8.5      2020 05:52        PT/INR - ( 2020 05:52 )   PT: 12.2 sec;   INR: 1.10 ratio         PTT - ( 2020 05:52 )  PTT:26.8 sec                   11.1   10.73 )-----------( 354      ( 2020 07:29 )             33.0       02    140  |  101  |  18  ----------------------------<  106<H>  3.6   |  33<H>  |  1.02    Ca    8.7      2020 07:29                     11.5   10.37 )-----------( 382      ( 2020 07:45 )             34.5       RADIOLOGY:  EXAM:  CT ANKLE ONLY RT                        [facility]  [facility]                          PROCEDURE DATE:  2020      IMPRESSION: Trimalleolar fracture as above. No significant change in fracture appearance or alignment compared to previous plain film exams.  MEDICATIONS  (STANDING):  acetaminophen   Tablet .. 975 milliGRAM(s) Oral every 8 hours  acetaminophen  IVPB .. 1000 milliGRAM(s) IV Intermittent once  aspirin enteric coated 81 milliGRAM(s) Oral daily  atorvastatin 40 milliGRAM(s) Oral at bedtime  enoxaparin Injectable 40 milliGRAM(s) SubCutaneous every 24 hours  furosemide    Tablet 20 milliGRAM(s) Oral daily  gabapentin 300 milliGRAM(s) Oral three times a day  HYDROmorphone PCA (1 mG/mL) 30 milliLiter(s) PCA Continuous PCA Continuous  lactated ringers. 1000 milliLiter(s) IV Continuous <Continuous>  lisinopril 20 milliGRAM(s) Oral daily  metoprolol succinate ER 75 milliGRAM(s) Oral daily  pantoprazole  Injectable 40 milliGRAM(s) IV Push every 12 hours  PARoxetine 60 milliGRAM(s) Oral daily  polyethylene glycol 3350 17 Gram(s) Oral every 12 hours  sodium chloride 0.9% lock flush 3 milliLiter(s) IV Push every 8 hours  traZODone 150 milliGRAM(s) Oral at bedtime    DVT Prophylaxis:  LMWH                   ( x )  Heparin SQ           (  )  Coumadin             (  )  Xarelto                  (  )  Eliquis                   (  )  Venodynes           (x  )  Ambulation          ( x )  UFH                       (  )  Contraindicated  (  )  EC ASPIRIN       (  )

## 2020-02-27 NOTE — PROGRESS NOTE ADULT - SUBJECTIVE AND OBJECTIVE BOX
cc - right ankle pain         59 female /F with PMHx of anxiety/ depression, asthma,  HTN on enalapril, spinal stenosis, osteoarthritis, seasonal allergies who admitted on 2/14/20  by Dr. Knight for ankle injury requiring surgery.  Pt  c/o right ankle pain after a fall on 2/12, for which she was splinted at [facility] ED.   She states that  she was NPO for planned epidural procedure for pain control .  At 11 am she was getting dressed  and while putting on earrings she had unwitnessed LOC and  fell from standing , injuring her right ankle.  She does not remember the incident. She states that she was standing and next thing she found was that she was on floor with her ankle was twisted. She denies preceding cpain/SOB/palpitations/dizziness.  She does not recall tripping over anything.   Pt also had 5 episodes of diarrhea 2 days ago with LLQ pain which improved.   Pt reports she stopped taking motrin 600mg BID , approximately 5 days  ago for the planned epidural procedure for pain control.  She was on mobic until January 2020.    She has noticed increasingly dark stools over the last 1-2 weeks.    She also reports some difficulty swallowing solids for 2-3 weeks.           Hospital course :   2/15 - s/p PRBC 1 unit  s/p right ankle External fixator application	  2/17 - s/p LHC - normal coronaries, EF 35%  2/18 - 2 d echo - EF 40%, basal and mid segments hypokinetic  2/24 - s/p EGD - normal , lasix decreased as per cardio, multiple vomiting later today  2/25: Bx shows some lymphoid aggregate; no H pylori , pain uncontrolled asking for dilaudid  s/p ORIF right trimalleolar ankle fracture performed intra-op.     2/26 - pt seen and examined, noted low BP in am, denies cp, dyspnea, cough, reports severe right ankle pain 8/10, better on Dilaudid drip, afebrile, no BM , POC discussed   2/27 - pt seen and examined, low BP ( toprol, lisinopril held in am) , patient drinking minimal fluids, + BM, pain better controlled, POC discussed     Review of system- Rest of the review of system are negative except mentioned in HPI    PHYSICAL EXAM:    T(C): 36.6 (02-27-20 @ 17:09), Max: 36.8 (02-27-20 @ 16:07)  T(F): 97.8 (02-27-20 @ 17:09), Max: 98.2 (02-27-20 @ 16:07)  HR: 83 (02-27-20 @ 17:09) (72 - 92)  BP: 108/64 (02-27-20 @ 17:09) (93/56 - 114/59)  RR: 16 (02-27-20 @ 17:09) (16 - 18)  SpO2: 100% (02-27-20 @ 17:09) (92% - 100%)  Wt(kg): --    Constitutional: Well appearing   HEENT: Atraumatic, DEREK, Normal, No congestion  Respiratory: B/l CTA  Cardiovascular: N S1S2;   Gastrointestinal: abdominal pain, + tenderness on deep palpation  Extremities: No edema, peripheral pulses present; right ankle dressed/ex fix  Neurological: AAO x 3, no gross focal motor deficits  Skin: Non cellulitic, no rash, ulcers  Lymph Nodes: No lymphadenopathy noted  Back: No CVA tenderness   Musculoskeletal: non tender  Breasts: Deferred  Genitourinary: deferred  Rectal: Deferred      Labs:   02-27    142  |  107  |  11  ----------------------------<  119<H>  4.1   |  32<H>  |  0.79    Ca    8.6      27 Feb 2020 06:43                        9.2    11.45 )-----------( 278      ( 27 Feb 2020 06:43 )             28.2       02-26    138  |  104  |  13  ----------------------------<  101<H>  4.4   |  31  |  0.91    Ca    8.6      26 Feb 2020 07:07                          10.2   19.76 )-----------( 321      ( 26 Feb 2020 07:07 )             31.4     Serum Pro-Brain Natriuretic Peptide (02.18.20 @ 13:21)    Serum Pro-Brain Natriuretic Peptide: 27292 pg/mL    Serum Pro-Brain Natriuretic Peptide (02.27.20 @ 06:43)    Serum Pro-Brain Natriuretic Peptide: 3303 pg/mL        PT/INR - ( 25 Feb 2020 05:52 )   PT: 12.2 sec;   INR: 1.10 ratio         PTT - ( 25 Feb 2020 05:52 )  PTT:26.8 sec                     10.3   12.90  )----------(  325       ( 25 Feb 2020 05:52 )               31.5      138    |  103    |  16     ----------------------------<  106        ( 25 Feb 2020 05:52 )  3.8     |  31     |  1.02     Ca    8.5        ( 25 Feb 2020 05:52 )        PT/INR -  12.2 sec / 1.10 ratio   ( 25 Feb 2020 05:52 )       PTT -  26.8 sec   ( 25 Feb 2020 05:52 )        Troponin I, Serum (02.18.20 @ 13:21)    Troponin I, Serum: 1.550: High Sensitivity Troponin and new reference  range effective 7/6/2016 ng/mL    Troponin I, Serum Repeat 3 hours x 2 occurrences (02.18.20 @ 09:54)    Troponin I, Serum: 1.720: 02/18/20 10:34 Called to Nurse/Doctor.ana cristina faulkner  High Sensitivity Troponin and new reference  range effective 7/6/2016 ng/mL    Troponin I, Serum (02.17.20 @ 08:15)    Troponin I, Serum: 4.690: High Sensitivity Troponin and new reference  range effective 7/6/2016 ng/mL      < from: Transthoracic Echocardiogram (02.18.20 @ 10:13) >   Left ventricle systolic function appears moderately reduced. Apical   segments are hyperkinetic, basal and mid segments are hypokinetic.   Visual estimation of left ventricle ejection fraction is 40%.      < end of copied text >  Culture - Urine (02.14.20 @ 22:21)    Specimen Source: .Urine None    Culture Results:   >=3 organisms. Probable collection contamination.    Culture - Blood (02.14.20 @ 17:48)    Specimen Source: .Blood None    Culture Results:   No growth at 5 days.    Culture - Blood (02.14.20 @ 17:48)    Specimen Source: .Blood None    Culture Results:   No growth at 5 days.    Lipase, Serum (02.17.20 @ 15:31)    Lipase, Serum: 72 U/L    < from: Xray Chest 1 View- PORTABLE-Routine (02.21.20 @ 11:44) >  Frontal expiratory view of the chest shows the heart to be normal in size. The lungs are clear and there is no evidence of pneumothorax nor pleural effusion.    IMPRESSION:  No evidence of CHF.    < end of copied text >  < from: CT Angio Chest PE Protocol w/ IV Cont (02.16.20 @ 22:13) >  FINDINGS:   Pulmonary arteries: No pulmonary emboli.   Aorta:  No aortic aneurysm.   Lungs: Interlobular septal thickening with patchy groundglass opacities suggesting pulmonary edema. Additional multifocal airspace disease throughout the lungs most prominent in the right upper lobe and bilateral lower lobes may be a component of pulmonary edema however multifocal pneumonia cannot be excluded. Bilateral lower lobe compressive atelectasis left greater than right.  Pleural space: Small bilateral pleural effusions.   Heart: No cardiomegaly. No pericardial effusion.   Gallbladder and bile ducts: Status post cholecystectomy.   Bones/joints: No acute fracture. Degenerative changes.  Upper abdomen: Findings compatible with acute pancreatitis partially visualized as seen on CT of prior day. Prior cholecystectomy. Mild elevation the right hemidiaphragm.    IMPRESSION:     1. No evidence of acute pulmonary artery embolism.   2. Pulmonary edema with bilateral pleural effusions and bilateral compressive atelectasis. Additional findings which may be associated with the pulmonary edema versus representing multifocal pneumonia, clinically correlate and follow-up to resolution.    A preliminary report was given by the overnight radiologist      < end of copied text >        < from: CT Chest No Cont (02.16.20 @ 16:51) >  Impression:    Patchy infiltrates in both lungs with nodular feature, right greater than left, suggestive of pneumonia. Follow-up chest CT may be pursued in 4-6 weeks to ensure resolution. Attention should be directed to the small 6 mm left lower lobe lung nodule on the follow-up chestCT to ensure stability.    No pneumothorax.    Interlobular septal thickening in both lungs, suggestive of interstitial pulmonary edema.    New mild peribronchial cuffing, suggestive of reactive airway disease.    Peripancreatic stranding, new since the previous abdominal CT dated 2/14/2020, suggestive of acute pancreatitis. Clinical correlation with pancreatic enzymes is recommended.    Stable 0.8 cm splenic artery aneurysm with mural calcification.    Small bilateral pleural effusions.    Stable small pericardial effusion.    < end of copied text >    < from: CT Head No Cont (02.16.20 @ 16:51) >  Impression:     No intracranial hemorrhage, mass effect or CT evidence of acute infarct      < end of copied text >  Home Medications:  Centrum Women&#x27;s oral tablet: 1 tab(s) orally once a day (14 Feb 2020 22:40)  cloNIDine 0.1 mg oral tablet: 1 tab(s) orally 3 times a day (14 Feb 2020 22:40)  diclofenac sodium 50 mg oral delayed release tablet: 1 tab(s) orally 2 times a day (14 Feb 2020 22:40)  enalapril 10 mg oral tablet: 1 tab(s) orally once a day (14 Feb 2020 22:40)  gabapentin 300 mg oral capsule: 1 cap(s) orally 3 times a day (14 Feb 2020 22:40)  PARoxetine 30 mg oral tablet: 2 tab(s) orally once a day (14 Feb 2020 22:40)  traZODone 150 mg oral tablet: 1 tab(s) orally once a day (at bedtime) (14 Feb 2020 22:40)    MEDICATIONS  (STANDING):  acetaminophen   Tablet .. 975 milliGRAM(s) Oral every 8 hours  acetaminophen  IVPB .. 1000 milliGRAM(s) IV Intermittent once  aspirin enteric coated 81 milliGRAM(s) Oral daily  atorvastatin 40 milliGRAM(s) Oral at bedtime  enoxaparin Injectable 40 milliGRAM(s) SubCutaneous every 24 hours  furosemide    Tablet 20 milliGRAM(s) Oral daily  gabapentin 300 milliGRAM(s) Oral three times a day  HYDROmorphone PCA (1 mG/mL) 30 milliLiter(s) PCA Continuous PCA Continuous  lactated ringers. 1000 milliLiter(s) (75 mL/Hr) IV Continuous <Continuous>  metoprolol succinate ER 75 milliGRAM(s) Oral daily  pantoprazole  Injectable 40 milliGRAM(s) IV Push every 12 hours  PARoxetine 60 milliGRAM(s) Oral daily  polyethylene glycol 3350 17 Gram(s) Oral every 12 hours  sodium chloride 0.9% lock flush 3 milliLiter(s) IV Push every 8 hours  traZODone 150 milliGRAM(s) Oral at bedtime    MEDICATIONS  (PRN):  albuterol/ipratropium for Nebulization 3 milliLiter(s) Nebulizer every 6 hours PRN Wheezing  ALPRAZolam 0.25 milliGRAM(s) Oral every 6 hours PRN anxiety  aluminum hydroxide/magnesium hydroxide/simethicone Suspension 30 milliLiter(s) Oral four times a day PRN Indigestion  bisacodyl Suppository 10 milliGRAM(s) Rectal daily PRN If no bowel movement by postoperative day #2  cyclobenzaprine 5 milliGRAM(s) Oral three times a day PRN Muscle Spasm  hydrALAZINE Injectable 10 milliGRAM(s) IV Push every 6 hours PRN for SBP more than 160  HYDROmorphone PCA (1 mG/mL) Rescue Clinician Bolus 0.5 milliGRAM(s) IV Push every 15 minutes PRN for Pain Scale GREATER THAN 6  magnesium hydroxide Suspension 30 milliLiter(s) Oral daily PRN Constipation  naloxone Injectable 0.1 milliGRAM(s) IV Push every 3 minutes PRN For ANY of the following changes in patient status:  A. RR LESS THAN 10 breaths per minute, B. Oxygen saturation LESS THAN 90%, C. Sedation score of 6  ondansetron Injectable 4 milliGRAM(s) IV Push every 6 hours PRN Nausea and/or Vomiting  senna 2 Tablet(s) Oral at bedtime PRN Constipation

## 2020-02-27 NOTE — PROGRESS NOTE ADULT - SUBJECTIVE AND OBJECTIVE BOX
SUBJECTIVE       PAST MEDICAL & SURGICAL HISTORY:  Hypertension  Anxiety and depression  Seasonal allergies  Asthma  Osteoarthritis  S/P hip replacement: left  S/P tubal ligation  S/P cholecystectomy    OBJECTIVE   Vital Signs Last 24 Hrs  T(C): 36.6 (27 Feb 2020 06:02), Max: 36.7 (26 Feb 2020 11:11)  T(F): 97.8 (27 Feb 2020 06:02), Max: 98.1 (26 Feb 2020 23:30)  HR: 75 (27 Feb 2020 06:02) (63 - 78)  BP: 101/51 (27 Feb 2020 06:02) (82/74 - 101/68)  BP(mean): 76 (26 Feb 2020 12:14) (76 - 76)  RR: 18 (27 Feb 2020 06:02) (16 - 19)  SpO2: 94% (27 Feb 2020 06:02) (92% - 100%)    PHYSICAL EXAM:  Constitutional: , awake and alert, not in distress.  HEENT: Normo cephalic atraumatic  Neck: Soft and supple, No J.V.D   Respiratory: vesicular breathing , No wheezing, rales or rhonchi.   Cardiovascular: S1 and S2, regular rate .   Gastrointestinal:  soft, nontender,   Extremities: No  edema or calf tenderness .  Neurological: No new  focal deficits.    MEDICATIONS  (STANDING):  acetaminophen   Tablet .. 975 milliGRAM(s) Oral every 8 hours  acetaminophen  IVPB .. 1000 milliGRAM(s) IV Intermittent once  aspirin enteric coated 81 milliGRAM(s) Oral daily  atorvastatin 40 milliGRAM(s) Oral at bedtime  enoxaparin Injectable 40 milliGRAM(s) SubCutaneous every 24 hours  furosemide    Tablet 20 milliGRAM(s) Oral daily  gabapentin 300 milliGRAM(s) Oral three times a day  HYDROmorphone PCA (1 mG/mL) 30 milliLiter(s) PCA Continuous PCA Continuous  lactated ringers. 1000 milliLiter(s) (75 mL/Hr) IV Continuous <Continuous>  lisinopril 10 milliGRAM(s) Oral daily  metoprolol succinate ER 75 milliGRAM(s) Oral daily  pantoprazole    Tablet 40 milliGRAM(s) Oral every 12 hours  PARoxetine 60 milliGRAM(s) Oral daily  polyethylene glycol 3350 17 Gram(s) Oral every 12 hours  senna 2 Tablet(s) Oral at bedtime  sodium chloride 0.9% lock flush 3 milliLiter(s) IV Push every 8 hours  traZODone 150 milliGRAM(s) Oral at bedtime                            9.2    11.45 )-----------( 278      ( 27 Feb 2020 06:43 )             28.2     02-27    142  |  107  |  11  ----------------------------<  119<H>  4.1   |  32<H>  |  0.79    Ca    8.6      27 Feb 2020 06:43 SUBJECTIVE      patient breathing comfortably without shortness of breath still getting IV fluids 75 mL/hour no wheezing, fever spikes  WBC trending down      PAST MEDICAL & SURGICAL HISTORY:  Hypertension  Anxiety and depression  Seasonal allergies  Asthma  Osteoarthritis  S/P hip replacement: left  S/P tubal ligation  S/P cholecystectomy    OBJECTIVE   Vital Signs Last 24 Hrs  T(C): 36.6 (27 Feb 2020 06:02), Max: 36.7 (26 Feb 2020 11:11)  T(F): 97.8 (27 Feb 2020 06:02), Max: 98.1 (26 Feb 2020 23:30)  HR: 75 (27 Feb 2020 06:02) (63 - 78)  BP: 101/51 (27 Feb 2020 06:02) (82/74 - 101/68)  BP(mean): 76 (26 Feb 2020 12:14) (76 - 76)  RR: 18 (27 Feb 2020 06:02) (16 - 19)  SpO2: 94% (27 Feb 2020 06:02) (92% - 100%)    PHYSICAL EXAM:  Constitutional: , awake and alert, not in distress. not on oxygen by nasal cannula  HEENT: Normo cephalic atraumatic  Neck: Soft and supple, No J.V.D   Respiratory: vesicular breathing , No wheezing, rales or rhonchi.   Cardiovascular: S1 and S2, regular rate .   Gastrointestinal:  soft, nontender,   Extremities:  leg under cast with recent ankle surgery  Neurological: No new  focal deficits.    MEDICATIONS  (STANDING):  acetaminophen   Tablet .. 975 milliGRAM(s) Oral every 8 hours  acetaminophen  IVPB .. 1000 milliGRAM(s) IV Intermittent once  aspirin enteric coated 81 milliGRAM(s) Oral daily  atorvastatin 40 milliGRAM(s) Oral at bedtime  enoxaparin Injectable 40 milliGRAM(s) SubCutaneous every 24 hours  furosemide    Tablet 20 milliGRAM(s) Oral daily  gabapentin 300 milliGRAM(s) Oral three times a day  HYDROmorphone PCA (1 mG/mL) 30 milliLiter(s) PCA Continuous PCA Continuous  lactated ringers. 1000 milliLiter(s) (75 mL/Hr) IV Continuous <Continuous>  lisinopril 10 milliGRAM(s) Oral daily  metoprolol succinate ER 75 milliGRAM(s) Oral daily  pantoprazole    Tablet 40 milliGRAM(s) Oral every 12 hours  PARoxetine 60 milliGRAM(s) Oral daily  polyethylene glycol 3350 17 Gram(s) Oral every 12 hours  senna 2 Tablet(s) Oral at bedtime  sodium chloride 0.9% lock flush 3 milliLiter(s) IV Push every 8 hours  traZODone 150 milliGRAM(s) Oral at bedtime                            9.2    11.45 )-----------( 278      ( 27 Feb 2020 06:43 )             28.2     02-27    142  |  107  |  11  ----------------------------<  119<H>  4.1   |  32<H>  |  0.79    Ca    8.6      27 Feb 2020 06:43

## 2020-02-27 NOTE — PROGRESS NOTE ADULT - ASSESSMENT
A 60 y/o female with syncopal episode falling fracturing right ankle, adm with ARF, hyperkalemia, now resolved, poss upper GIB from increased intake of NSAIDs, hgb now stable, no further black stool. CT Abd R/O  Pancreatitis, Consulted by Dr Knight for DVT/PE prophylaxis, risk stratification and Anticoagulation Management.   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  2- Pt s/p Open reduction and internal fixation (ORIF) of posterior lip of trimalleolar fracture of right ankle and   Removal of external fixator of Right ankle on 2-.  PLAN:  - lovenox 40mg sq daily for VTE prophylaxis  - Monitor daily CBC, BMP  - Encourage mobilization and maintain venodynes  - dispo Rehab  Will continue to follow

## 2020-02-27 NOTE — PROGRESS NOTE ADULT - ASSESSMENT
59 female /F with PMHx of anxiety/ depression, asthma,  HTN on enalapril, spinal stenosis, osteoarthritis, seasonal allergies who admitted on 2/14/20  by Dr. Knight for ankle injury requiring surgery.  Pt  c/o right ankle pain after a fall on 2/12, for which she was splinted at [facility] ED.   She states that  she was NPO for planned epidural procedure for pain control .  At 11 am she was getting dressed  and while putting on earrings she had unwitnessed LOC and  fell from standing , injuring her right ankle.  Pt also had 5 episodes of diarrhea 2 days ago with LLQ pain which improved.  Pt reports she stopped taking motrin 600mg BID , approximately 5 days  ago for the planned epidural procedure for pain control.  She was on mobic until January 2020.   She has noticed increasingly dark stools over the last 1-2 weeks.    She also reports some difficulty swallowing solids for 2-3 weeks.           Hospital course :   2/15 - s/p PRBC 1 unit  s/p right ankle External fixator application	  2/17 - s/p LHC - normal coronaries, EF 35%  2/18 - 2 d echo - EF 40%, basal and mid segments hypokinetic  2/24 - s/p EGD - normal , lasix decreased as per cardio, multiple vomiting later today  2/25: Bx shows some lymphoid aggregate; no H pylori , pain uncontrolled asking for dilaudid  s/p ORIF right trimalleolar ankle fracture performed intra-op.     * Fall, syncope due to orthostatic hypotension cardiac cause ( NSTEMI, Takotsubo cardiomyopathy)   * Elevated troponin due to NSTEMI  * Acute hypoxic respiratory failure with Bilateral pleural effusions  * Acute systolic heart failure due to Takotsubo cardiomyopathy  - s/p tele monitoring  - cardiology work-up    - c/w ASA 81, statin, BB toprol 75--> 25 due to low BP  -  lisinopril 20--> 10 ( due to low BP in am) --> stop for now   - lasix 20    2/17 - s/p LHC - normal coronaries, EF 35%    * Right ankle  trimalleolar fracture  s/p ORIF  and removal external fixator   as per ortho team  - on PCA pump  - transition to po pain management   - c/w gabapentin   -OOB PT NWB RLE    *  Anemia, Nausea, vomiting   * Melena, resolved  * Acute blood loss anemia suspected upper GI bleed, resolved , NSAIDs induced   -s/p EGD  - small lymphoid aggregate  no H pylori  - GI consult  - c/w IV PPI   - s/p PRBC 2/15  - check iron studies, B12, folate     * BRIGITTE  due to ATN ( NSAIDs use, hypotension)   - Cr normalized  - monitor while on diuresis    * Pancreatic stranding on CT   - lipase wnl  - asymptomatic, doubt pancreatitis     * Hyperkalemia, resolved  - lower dose of ACEI    * Depression  - c/w SSRI   - trazadone hs      DVT PPX: lovenox 40 daily      Dispo - inpatient monitoring, d/c planning once stable , need BRIAN

## 2020-02-27 NOTE — PROGRESS NOTE ADULT - ASSESSMENT
- hypoxemic respiratory failure from acute pulmonary edema  which is resolved with follow-up chest x-ray  - takotsubu cardiomyopthy   - history of mild asthma  no further wheezing with treatment of underlying pulmonary edema  - status post fall ankle fracture and repair  on pain pump  - anemia with the G.I bleeding with the stable hb now   - acute renal failure resolved   - spinal stenosis   -  anemia with history of blood transfusions with stable renal function      PLAN   - continue to keep on low dose diuretics as tolerated by the B.P and renal function   -  patient pulmonary edema and CHF seems to be improved with follow-up chest x-ray  - use of nebs p.r.n. for wheezing  for her asthma  - follow-up of K and magnesium while on diuretics  - DVT prophylaxis with the lovenox   -  would avoid further IV fluids with recent pulmonary edema is so far no clinical evidence of recurrence of pulmonary edema.

## 2020-02-28 ENCOUNTER — TRANSCRIPTION ENCOUNTER (OUTPATIENT)
Age: 60
End: 2020-02-28

## 2020-02-28 VITALS
OXYGEN SATURATION: 100 % | RESPIRATION RATE: 20 BRPM | HEART RATE: 99 BPM | DIASTOLIC BLOOD PRESSURE: 90 MMHG | TEMPERATURE: 98 F | SYSTOLIC BLOOD PRESSURE: 122 MMHG

## 2020-02-28 LAB
ANION GAP SERPL CALC-SCNC: 2 MMOL/L — LOW (ref 5–17)
BUN SERPL-MCNC: 11 MG/DL — SIGNIFICANT CHANGE UP (ref 7–23)
CALCIUM SERPL-MCNC: 9.1 MG/DL — SIGNIFICANT CHANGE UP (ref 8.5–10.1)
CHLORIDE SERPL-SCNC: 105 MMOL/L — SIGNIFICANT CHANGE UP (ref 96–108)
CO2 SERPL-SCNC: 33 MMOL/L — HIGH (ref 22–31)
CREAT SERPL-MCNC: 0.8 MG/DL — SIGNIFICANT CHANGE UP (ref 0.5–1.3)
GLUCOSE SERPL-MCNC: 109 MG/DL — HIGH (ref 70–99)
HCT VFR BLD CALC: 29.6 % — LOW (ref 34.5–45)
HGB BLD-MCNC: 9.9 G/DL — LOW (ref 11.5–15.5)
MCHC RBC-ENTMCNC: 31.8 PG — SIGNIFICANT CHANGE UP (ref 27–34)
MCHC RBC-ENTMCNC: 33.4 GM/DL — SIGNIFICANT CHANGE UP (ref 32–36)
MCV RBC AUTO: 95.2 FL — SIGNIFICANT CHANGE UP (ref 80–100)
NT-PROBNP SERPL-SCNC: 4269 PG/ML — HIGH (ref 0–125)
PLATELET # BLD AUTO: 305 K/UL — SIGNIFICANT CHANGE UP (ref 150–400)
POTASSIUM SERPL-MCNC: 3.9 MMOL/L — SIGNIFICANT CHANGE UP (ref 3.5–5.3)
POTASSIUM SERPL-SCNC: 3.9 MMOL/L — SIGNIFICANT CHANGE UP (ref 3.5–5.3)
RBC # BLD: 3.11 M/UL — LOW (ref 3.8–5.2)
RBC # FLD: 13.3 % — SIGNIFICANT CHANGE UP (ref 10.3–14.5)
SODIUM SERPL-SCNC: 140 MMOL/L — SIGNIFICANT CHANGE UP (ref 135–145)
WBC # BLD: 8.94 K/UL — SIGNIFICANT CHANGE UP (ref 3.8–10.5)
WBC # FLD AUTO: 8.94 K/UL — SIGNIFICANT CHANGE UP (ref 3.8–10.5)

## 2020-02-28 PROCEDURE — 99231 SBSQ HOSP IP/OBS SF/LOW 25: CPT

## 2020-02-28 PROCEDURE — 99239 HOSP IP/OBS DSCHRG MGMT >30: CPT

## 2020-02-28 RX ORDER — HYDROMORPHONE HYDROCHLORIDE 2 MG/ML
2 INJECTION INTRAMUSCULAR; INTRAVENOUS; SUBCUTANEOUS EVERY 4 HOURS
Refills: 0 | Status: DISCONTINUED | OUTPATIENT
Start: 2020-02-28 | End: 2020-02-28

## 2020-02-28 RX ORDER — LISINOPRIL 2.5 MG/1
5 TABLET ORAL DAILY
Refills: 0 | Status: DISCONTINUED | OUTPATIENT
Start: 2020-02-28 | End: 2020-02-28

## 2020-02-28 RX ORDER — FUROSEMIDE 40 MG
1 TABLET ORAL
Qty: 0 | Refills: 0 | DISCHARGE
Start: 2020-02-28

## 2020-02-28 RX ORDER — CYCLOBENZAPRINE HYDROCHLORIDE 10 MG/1
1 TABLET, FILM COATED ORAL
Qty: 0 | Refills: 0 | DISCHARGE
Start: 2020-02-28

## 2020-02-28 RX ORDER — POLYETHYLENE GLYCOL 3350 17 G/17G
17 POWDER, FOR SOLUTION ORAL
Qty: 0 | Refills: 0 | DISCHARGE
Start: 2020-02-28

## 2020-02-28 RX ORDER — HYDROMORPHONE HYDROCHLORIDE 2 MG/ML
0.5 INJECTION INTRAMUSCULAR; INTRAVENOUS; SUBCUTANEOUS
Refills: 0 | Status: DISCONTINUED | OUTPATIENT
Start: 2020-02-28 | End: 2020-02-28

## 2020-02-28 RX ORDER — ASPIRIN/CALCIUM CARB/MAGNESIUM 324 MG
1 TABLET ORAL
Qty: 0 | Refills: 0 | DISCHARGE
Start: 2020-02-28

## 2020-02-28 RX ORDER — DICLOFENAC SODIUM 75 MG/1
1 TABLET, DELAYED RELEASE ORAL
Qty: 0 | Refills: 0 | DISCHARGE

## 2020-02-28 RX ORDER — ATORVASTATIN CALCIUM 80 MG/1
1 TABLET, FILM COATED ORAL
Qty: 0 | Refills: 0 | DISCHARGE
Start: 2020-02-28

## 2020-02-28 RX ORDER — HYDROMORPHONE HYDROCHLORIDE 2 MG/ML
1 INJECTION INTRAMUSCULAR; INTRAVENOUS; SUBCUTANEOUS
Qty: 0 | Refills: 0 | DISCHARGE
Start: 2020-02-28

## 2020-02-28 RX ORDER — PANTOPRAZOLE SODIUM 20 MG/1
1 TABLET, DELAYED RELEASE ORAL
Qty: 0 | Refills: 0 | DISCHARGE
Start: 2020-02-28

## 2020-02-28 RX ORDER — SENNA PLUS 8.6 MG/1
2 TABLET ORAL
Qty: 0 | Refills: 0 | DISCHARGE
Start: 2020-02-28

## 2020-02-28 RX ORDER — ALPRAZOLAM 0.25 MG
1 TABLET ORAL
Qty: 0 | Refills: 0 | DISCHARGE
Start: 2020-02-28

## 2020-02-28 RX ORDER — LISINOPRIL 2.5 MG/1
1 TABLET ORAL
Qty: 0 | Refills: 0 | DISCHARGE
Start: 2020-02-28

## 2020-02-28 RX ORDER — IPRATROPIUM/ALBUTEROL SULFATE 18-103MCG
3 AEROSOL WITH ADAPTER (GRAM) INHALATION
Qty: 0 | Refills: 0 | DISCHARGE
Start: 2020-02-28

## 2020-02-28 RX ORDER — HYDROMORPHONE HYDROCHLORIDE 2 MG/ML
4 INJECTION INTRAMUSCULAR; INTRAVENOUS; SUBCUTANEOUS
Refills: 0 | Status: DISCONTINUED | OUTPATIENT
Start: 2020-02-28 | End: 2020-02-28

## 2020-02-28 RX ORDER — FERROUS SULFATE 325(65) MG
1 TABLET ORAL
Qty: 0 | Refills: 0 | DISCHARGE
Start: 2020-02-28

## 2020-02-28 RX ORDER — MAGNESIUM HYDROXIDE 400 MG/1
30 TABLET, CHEWABLE ORAL
Qty: 0 | Refills: 0 | DISCHARGE
Start: 2020-02-28

## 2020-02-28 RX ORDER — ONDANSETRON 8 MG/1
4 TABLET, FILM COATED ORAL
Qty: 0 | Refills: 0 | DISCHARGE
Start: 2020-02-28

## 2020-02-28 RX ORDER — METOPROLOL TARTRATE 50 MG
1 TABLET ORAL
Qty: 0 | Refills: 0 | DISCHARGE
Start: 2020-02-28

## 2020-02-28 RX ADMIN — GABAPENTIN 300 MILLIGRAM(S): 400 CAPSULE ORAL at 13:44

## 2020-02-28 RX ADMIN — HYDROMORPHONE HYDROCHLORIDE 4 MILLIGRAM(S): 2 INJECTION INTRAMUSCULAR; INTRAVENOUS; SUBCUTANEOUS at 10:31

## 2020-02-28 RX ADMIN — Medication 20 MILLIGRAM(S): at 05:07

## 2020-02-28 RX ADMIN — ENOXAPARIN SODIUM 40 MILLIGRAM(S): 100 INJECTION SUBCUTANEOUS at 10:27

## 2020-02-28 RX ADMIN — Medication 25 MILLIGRAM(S): at 05:07

## 2020-02-28 RX ADMIN — Medication 60 MILLIGRAM(S): at 11:39

## 2020-02-28 RX ADMIN — Medication 81 MILLIGRAM(S): at 11:38

## 2020-02-28 RX ADMIN — Medication 975 MILLIGRAM(S): at 05:09

## 2020-02-28 RX ADMIN — CYCLOBENZAPRINE HYDROCHLORIDE 5 MILLIGRAM(S): 10 TABLET, FILM COATED ORAL at 03:18

## 2020-02-28 RX ADMIN — HYDROMORPHONE HYDROCHLORIDE 4 MILLIGRAM(S): 2 INJECTION INTRAMUSCULAR; INTRAVENOUS; SUBCUTANEOUS at 03:48

## 2020-02-28 RX ADMIN — Medication 325 MILLIGRAM(S): at 11:38

## 2020-02-28 RX ADMIN — HYDROMORPHONE HYDROCHLORIDE 4 MILLIGRAM(S): 2 INJECTION INTRAMUSCULAR; INTRAVENOUS; SUBCUTANEOUS at 03:18

## 2020-02-28 RX ADMIN — SODIUM CHLORIDE 3 MILLILITER(S): 9 INJECTION INTRAMUSCULAR; INTRAVENOUS; SUBCUTANEOUS at 04:17

## 2020-02-28 RX ADMIN — Medication 975 MILLIGRAM(S): at 13:44

## 2020-02-28 RX ADMIN — GABAPENTIN 300 MILLIGRAM(S): 400 CAPSULE ORAL at 05:07

## 2020-02-28 RX ADMIN — HYDROMORPHONE HYDROCHLORIDE 2 MILLIGRAM(S): 2 INJECTION INTRAMUSCULAR; INTRAVENOUS; SUBCUTANEOUS at 05:48

## 2020-02-28 RX ADMIN — PANTOPRAZOLE SODIUM 40 MILLIGRAM(S): 20 TABLET, DELAYED RELEASE ORAL at 05:08

## 2020-02-28 RX ADMIN — HYDROMORPHONE HYDROCHLORIDE 4 MILLIGRAM(S): 2 INJECTION INTRAMUSCULAR; INTRAVENOUS; SUBCUTANEOUS at 11:19

## 2020-02-28 RX ADMIN — Medication 0.25 MILLIGRAM(S): at 11:38

## 2020-02-28 RX ADMIN — Medication 0.25 MILLIGRAM(S): at 05:08

## 2020-02-28 RX ADMIN — HYDROMORPHONE HYDROCHLORIDE 2 MILLIGRAM(S): 2 INJECTION INTRAMUSCULAR; INTRAVENOUS; SUBCUTANEOUS at 05:18

## 2020-02-28 RX ADMIN — Medication 975 MILLIGRAM(S): at 14:30

## 2020-02-28 RX ADMIN — HYDROMORPHONE HYDROCHLORIDE 4 MILLIGRAM(S): 2 INJECTION INTRAMUSCULAR; INTRAVENOUS; SUBCUTANEOUS at 13:43

## 2020-02-28 RX ADMIN — HYDROMORPHONE HYDROCHLORIDE 4 MILLIGRAM(S): 2 INJECTION INTRAMUSCULAR; INTRAVENOUS; SUBCUTANEOUS at 14:30

## 2020-02-28 RX ADMIN — Medication 975 MILLIGRAM(S): at 05:48

## 2020-02-28 NOTE — PROGRESS NOTE ADULT - SUBJECTIVE AND OBJECTIVE BOX
Patient seen and examined at bedside. No acute events over night. No acute complaints at this time. Pain well controlled. Denies chest pain, shortness of breath, nausea or vomiting.     PE:  Vital Signs Last 24 Hrs  T(C): 36.8 (28 Feb 2020 05:15), Max: 36.8 (27 Feb 2020 16:07)  T(F): 98.2 (28 Feb 2020 05:15), Max: 98.2 (27 Feb 2020 16:07)  HR: 75 (28 Feb 2020 05:15) (75 - 92)  BP: 123/65 (28 Feb 2020 05:15) (95/57 - 124/55)  BP(mean): --  RR: 16 (28 Feb 2020 05:15) (16 - 18)  SpO2: 100% (28 Feb 2020 05:15) (96% - 100%)    General: NAD, resting comfortably in bed  R LE:   Trilam splint in place C/D/I  SCD in place contralaterally  No calf tenderness contralterally  Able to grossly wiggle toes  Gross sesnsation to toes intact  Cap refill <2 seconds               LABS:                        9.2    11.45 )-----------( 278      ( 27 Feb 2020 06:43 )             28.2     27 Feb 2020 06:43    142    |  107    |  11     ----------------------------<  119    4.1     |  32     |  0.79     Ca    8.6        27 Feb 2020 06:43          A/P:  59y f s/p R ankle ORIF  -PT/OT - NWB RLE  -Pain Control  -DVT ppx w/ a81 and lovenox  -FU AM Labs  -Rest, ice, compress and elevate the extremity as we needed  -Incentive Spirometry  -Medical management appreciated  -dispo: BRIAN

## 2020-02-28 NOTE — PROGRESS NOTE ADULT - ASSESSMENT
- hypoxemic respiratory failure from acute pulmonary edema  which is resolved with follow-up chest x-ray  - takotsubu cardiomyopthy   - history of mild asthma  no further wheezing with treatment of underlying pulmonary edema  - status post fall ankle fracture and repair  off the pump  - anemia with the G.I bleeding with the stable hb now   - acute renal failure resolved   - spinal stenosis   -  anemia with history of blood transfusions with stable renal function      PLAN     patient remained stable from the pulmonary perceptive with out no new symptoms   use of nebs prn for the asthma   optimization of cardiac issues as per the cardiology   diuretics can be discontinued with the raising HCO3   provide adequate dvt prophylaxis untill the patient is ambulatory  as the patient is stable would sign off the case consult prn    -

## 2020-02-28 NOTE — DISCHARGE NOTE NURSING/CASE MANAGEMENT/SOCIAL WORK - PATIENT PORTAL LINK FT
You can access the FollowMyHealth Patient Portal offered by [facility] by registering at the following website: [email or web address]. By joining [facility]’s FollowMyHealth portal, you will also be able to view your health information using other applications (apps) compatible with our system.

## 2020-02-28 NOTE — PROGRESS NOTE ADULT - SUBJECTIVE AND OBJECTIVE BOX
HPI: Pt is a 58 y/o female with a PMHx of anxiety/depression, asthma, HTN on enalapril, spinal stenosis, osteoarthritis, seasonal allergies who was sent to the ED by Dr. Knight today for ankle injury requiring surgery.  Pt c/o right ankle pain after a fall yesterday, for which she was splinted at [facility] ED. Pt also had 5 episodes of diarrhea 2 days ago with LLQ pain which improved yesterday.  day prior to admission pt was NPO for planned epidural procedure for pain control. At 11 am she was getting dressed yesterday and while putting on earrings she had unwitnessed LOC and  fell from standing , injuring her right ankle sustaining a left ankle fracture She denies preceding cpain/SOB/palpitations/dizziness.  She does not recall tripping over anything. Pt reports she stopped taking motrin 600mg BID , approximately 5 days  ago for the planned epidural procedure for pain control.  She was on mobic until 2020.  She has noticed increasingly dark stools over the last 1-2 weeks. She also reports some difficulty swallowing solids for 2-3 weeks.        In the ED , pt is found to have ARF, hyperkalemia, anemia and hypotension to 85/38.  Pt reported she may have accidentally taken an extra dose of her Clonidine today.     INTERVAL HISTORY:  she is now on 3 north s/p Right ankleplacement of ext-fix  : seen at bedside,  anxious, vomiting, states nausea form pain meds  2020: Pt seen at bedside in SICU with  at side. She reports she s/p cardiac cath, no abnormal findings per , and off of UFH per primary RN. Pt with VTE risk factors.   : seen at bedside, anxious, c/o lower back pain, not new  : seen at bedside, c/o n/v still anxious.  : Pt seen at bedside c/o leg pain with movement, pain medication received  2020 Pt seen at bedside on 3 east, c/o pain to ankle, Discussed his anticoagulation with lovenox.  Benefits and risks discussed. Pt states she has no concerns.  2020: Pt seen at bedside, no issues with bleeding.  2020: Pt seen at bedside, with friend at side. No issues with lovenox. Pending EGD tomorrow and ex-fix ORIF either Tu vs. Wed per ortho  2020 Pt seen at bedside on 3north. S/P egd this a.m. neg,  discussed with pt the use of Lovenox for her anticoagulation  No concerns.  States she is waiting for them to remove the external fixator.   Pt state she has a lot of stress.   2020 Pt seen at bedside on 3north.  Pt awaitng ortho procedure removal of external fixator today.  Lovenox on  hold.    Pt seen at bedside on 2north, asleep but easily aroused. States she is resting.  s/p external fixator removed yesterday. Discussed her lovenox inj.  No concerns.   2020: Pt seen at bedside, still on Dilaudid PCA for pain control, no s/s of bleeding Dispo Rehab  : seen at bedside, sleeping    Fam HX:  Father DM, had CVa  Mother COPD  Brother  at 65 of alcoholism  Sister has lung ca (2020 18:56)    Consulted by Dr. Rogers for VTE prophylaxis, risk stratification, and anticoagulation management.    PAST MEDICAL & SURGICAL HISTORY:  Hypertension  Anxiety and depression  Seasonal allergies  Asthma  Osteoarthritis  S/P hip replacement: left  S/P tubal ligation  S/P cholecystectomy    CrCl: 69.7    Caprini VTE Risk Score: CAPRINI SCORE  AGE RELATED RISK FACTORS                                                       MOBILITY RELATED FACTORS  [x ] Age 41-60 years                                            (1 Point)                  [ ] Bed rest                                                        (1 Point)  [ ] Age: 61-74 years                                           (2 Points)                [ ] Plaster cast                                                   (2 Points)  [ ] Age= 75 years                                              (3 Points)                 [ ] Bed bound for more than 72 hours                   (2 Points)    DISEASE RELATED RISK FACTORS                                               GENDER SPECIFIC FACTORS  [ ] Edema in the lower extremities                       (1 Point)           [ ] Pregnancy                                                            (1 Point)  [ ] Varicose veins                                               (1 Point)                  [ ] Post-partum < 6 weeks                                      (1 Point)             x[ ] BMI > 25 Kg/m2                                            (1 Point)                  [ ] Hormonal therapy or oral contraception       (1 Point)                 [ ] Sepsis (in the previous month)                        (1 Point)             [ ] History of pregnancy complications                (1Point)  [ ] Pneumonia or serious lung disease                                             [ ] Unexplained or recurrent  (>/= 3), premature                                 (In the previous month)                               (1 Point)                birth with toxemia or growth-restricted infant (1 Point)  [ ] Abnormal pulmonary function test            (1 Point)                                   SURGERY RELATED RISK FACTORS  [ ] Acute myocardial infarction                       (1 Point)                  [ ]  Section                                         (1 Point)  [ ] Congestive heart failure (in the previous month) (1 Point)   [ ] Minor surgery   lasting <45 minutes       (1 Point)   [ ] Inflammatory bowel disease                             (1 Point)          [ ] Arthroscopic surgery                                  (2 Points)  [ ] Central venous access                                    (2 Points)            [x ] General surgery lasting >45 minutes      (2 Points)       [ ] Stroke (in the previous month)                  (5 Points)            [ ] Elective major lower extremity arthroplasty (5 Points)                                   [  ] Malignancy (present or past include skin melanoma                                          but exclude  basal skin cell)    (2 points)                                      TRAUMA RELATED RISK FACTORS                HEMATOLOGY RELATED FACTORS                                  [x ] Fracture of the hip, pelvis, or leg                       (5 Points)  [ ] Prior episodes of VTE                                     (3 Points)          [ ] Acute spinal cord injury (in the previous month)  (5 Points)  [ ] Positive family history for VTE                         (3 Points)       [ ] Paralysis (less than 1 month)                          (5 Points)  [ ] Prothrombin 73566 A                                      (3 Points)         [ ] Multiple Trauma (within 1month)                 (5Points)                                                                                                                                                                [ ] Factor V Leiden                                          (3 Points)                                OTHER RISK FACTORS                          [ ] Lupus anticoagulants                                     (3 Points)                       [ ] BMI > 40                          (1 Point)                                                         [ ] Anticardiolipin antibodies                                (3 Points)                   [ ] Smoking                              (1Point)                                                [ ] High homocysteine in the blood                      (3 Points)                [  ] Diabetes requiring insulin (1point)                         [ ] Other congenital or acquired thrombophilia       (3 Points)          [  ] Chemotherapy                   (1 Point)  [ ] Heparin induced thrombocytopenia                  (3 Points)             [  ] Blood Transfusion                (1 point)                                                                                                         Total Score [    9      ]                                                                                                                                                                                                                 IMPROVE Bleeding Risk Score 1    Falls Risk:   High ( x )  Mod (  )  Low (  )      FAMILY HISTORY:  Family history of COPD (chronic obstructive pulmonary disease)  Family history of CVA  Denies any personal or familial history of clotting or bleeding disorders.    Allergies  No Known Allergies  Intolerances    REVIEW OF SYSTEMS    (  )Fever	     (  )Constipation	(  )SOB				(  )Headache	(  )Dysuria  (  )Chills	     (  )Melena	(  )Dyspnea present on exertion	                    (  )Dizziness                    (  )Polyuria  (  )Nausea	     (  )Hematochezia	(  )Cough			                    (  )Syncope   	(  )Hematuria  (  )Vomiting    (  )Chest Pain	(  )Wheezing			(  )Weakness  (  )Diarrhea     (  )Palpitations	(  )Anorexia			( x )joint pain    All  other review of systems negative: Yes        Vital Signs Last 24 Hrs  T(C): 36.6 (20 @ 11:43), Max: 36.8 (20 @ 16:07)  T(F): 97.9 (20 @ 11:43), Max: 98.2 (20 @ 16:07)  HR: 99 (20 @ :43) (75 - 99)  BP: 122/90 (20 @ 11:43) (108/64 - 124/55)  BP(mean): --  RR: 20 (20 @ 11:43) (16 - 20)  SpO2: 100% (20 @ 11:43) (96% - 100%)          Constitutional: Appears Well    Neurological: A& O x 4    Skin: Warm    Respiratory and Thorax: normal effort; Breath sounds: normal; No rales/wheezing/rhonchi  	  Cardiovascular: S1, S2, regular, NMBR	    Gastrointestinal: BS + x 4Q, nontender	    Genitourinary:  Bladder nondistended, nontender    Musculoskeletal:   General Right:   + muscle/joint tenderness,   normal tone, no joint swelling,   ROM: limited	    General Left:   no muscle/joint tenderness,   normal tone, no joint swelling,   ROM: full    Right ankle: Drsing CDI; ext fix in place, tos warm and mobile Cap refill good; +Sensation intact                      Lower extrems:   Right: no calf tenderness              negative melba's sign               + pedal pulses    Left:   no calf tenderness              negative melba's sign               + pedal pulses    LABS:                            9.9    8.94  )-----------( 305      ( 2020 06:38 )             29.6           140  |  105  |  11  ----------------------------<  109<H>  3.9   |  33<H>  |  0.80    Ca    9.1      2020 06:38                            9.2    11.45 )-----------( 278      ( 2020 06:43 )             28.2           142  |  107  |  11  ----------------------------<  119<H>  4.1   |  32<H>  |  0.79    Ca    8.6      2020 06:43                                10.2   19.76 )-----------( 321      ( 2020 07:07 )             31.4           138  |  104  |  13  ----------------------------<  101<H>  4.4   |  31  |  0.91    Ca    8.6      2020 07:07        PT/INR - ( 2020 05:52 )   PT: 12.2 sec;   INR: 1.10 ratio         PTT - ( 2020 05:52 )  PTT:26.8 sec                       10.3   12.90 )-----------( 325      ( 2020 05:52 )             31.5           138  |  103  |  16  ----------------------------<  106<H>  3.8   |  31  |  1.02    Ca    8.5      2020 05:52        PT/INR - ( 2020 05:52 )   PT: 12.2 sec;   INR: 1.10 ratio         PTT - ( 2020 05:52 )  PTT:26.8 sec                   11.1   10.73 )-----------( 354      ( 2020 07:29 )             33.0           140  |  101  |  18  ----------------------------<  106<H>  3.6   |  33<H>  |  1.02    Ca    8.7      2020 07:29                     11.5   10.37 )-----------( 382      ( 2020 07:45 )             34.5       RADIOLOGY:  EXAM:  CT ANKLE ONLY RT                        EXAM:  CT 3D RECONSTRUCT WO PABLITOCLIFFMASON                          PROCEDURE DATE:  2020      IMPRESSION: Trimalleolar fracture as above. No significant change in fracture appearance or alignment compared to previous plain film exams.  MEDICATIONS  (STANDING):    MEDICATIONS  (STANDING):  acetaminophen   Tablet .. 975 milliGRAM(s) Oral every 8 hours  aspirin enteric coated 81 milliGRAM(s) Oral daily  atorvastatin 40 milliGRAM(s) Oral at bedtime  enoxaparin Injectable 40 milliGRAM(s) SubCutaneous every 24 hours  ferrous    sulfate 325 milliGRAM(s) Oral daily  furosemide    Tablet 20 milliGRAM(s) Oral daily  gabapentin 300 milliGRAM(s) Oral three times a day  metoprolol succinate ER 25 milliGRAM(s) Oral daily  pantoprazole    Tablet 40 milliGRAM(s) Oral every 12 hours  PARoxetine 60 milliGRAM(s) Oral daily  polyethylene glycol 3350 17 Gram(s) Oral every 12 hours  senna 2 Tablet(s) Oral at bedtime  sodium chloride 0.9% lock flush 3 milliLiter(s) IV Push every 8 hours  traZODone 150 milliGRAM(s) Oral at bedtime    DVT Prophylaxis:  LMWH                   ( x )  Heparin SQ           (  )  Coumadin             (  )  Xarelto                  (  )  Eliquis                   (  )  Venodynes           (x  )  Ambulation          ( x )  UFH                       (  )  Contraindicated  (  )  EC ASPIRIN       (  )

## 2020-02-28 NOTE — PROGRESS NOTE ADULT - SUBJECTIVE AND OBJECTIVE BOX
SUBJECTIVE     Patient denies any sob or cough and wheezing   off the fluids   no new pulmonary symptoms   on the room air breathing comfortably     PAST MEDICAL & SURGICAL HISTORY:  Hypertension  Anxiety and depression  Seasonal allergies  Asthma  Osteoarthritis  S/P hip replacement: left  S/P tubal ligation  S/P cholecystectomy    OBJECTIVE   Vital Signs Last 24 Hrs  T(C): 36.8 (28 Feb 2020 05:15), Max: 36.8 (27 Feb 2020 16:07)  T(F): 98.2 (28 Feb 2020 05:15), Max: 98.2 (27 Feb 2020 16:07)  HR: 75 (28 Feb 2020 05:15) (75 - 92)  BP: 123/65 (28 Feb 2020 05:15) (95/57 - 124/55)  BP(mean): --  RR: 16 (28 Feb 2020 05:15) (16 - 18)  SpO2: 100% (28 Feb 2020 05:15) (96% - 100%)    PHYSICAL EXAM:  Constitutional: , awake and alert, not in distress. and resting in the bed and falls back to sleep easily   HEENT: Normo cephalic atraumatic  Neck: Soft and supple, No J.V.D   Respiratory: vesicular breathing , No wheezing, rales or rhonchi.   Cardiovascular: S1 and S2, regular rate .   Gastrointestinal:  soft, nontender,   Extremities: statu post ankle surgery with the soft cast in place   Neurological: No new  focal deficits.    MEDICATIONS  (STANDING):  acetaminophen   Tablet .. 975 milliGRAM(s) Oral every 8 hours  aspirin enteric coated 81 milliGRAM(s) Oral daily  atorvastatin 40 milliGRAM(s) Oral at bedtime  enoxaparin Injectable 40 milliGRAM(s) SubCutaneous every 24 hours  ferrous    sulfate 325 milliGRAM(s) Oral daily  furosemide    Tablet 20 milliGRAM(s) Oral daily  gabapentin 300 milliGRAM(s) Oral three times a day  lactated ringers. 1000 milliLiter(s) (75 mL/Hr) IV Continuous <Continuous>  metoprolol succinate ER 25 milliGRAM(s) Oral daily  pantoprazole    Tablet 40 milliGRAM(s) Oral every 12 hours  PARoxetine 60 milliGRAM(s) Oral daily  polyethylene glycol 3350 17 Gram(s) Oral every 12 hours  senna 2 Tablet(s) Oral at bedtime  sodium chloride 0.9% lock flush 3 milliLiter(s) IV Push every 8 hours  traZODone 150 milliGRAM(s) Oral at bedtime                            9.9    8.94  )-----------( 305      ( 28 Feb 2020 06:38 )             29.6     02-28    140  |  105  |  11  ----------------------------<  109<H>  3.9   |  33<H>  |  0.80    Ca    9.1      28 Feb 2020 06:38

## 2020-02-28 NOTE — PROGRESS NOTE ADULT - ASSESSMENT
A 60 y/o female with syncopal episode falling fracturing right ankle, adm with ARF, hyperkalemia, now resolved, poss upper GIB from increased intake of NSAIDs, hgb now stable, no further black stool. CT Abd R/O  Pancreatitis, Consulted by Dr Knight for DVT/PE prophylaxis, risk stratification and Anticoagulation Management.       2- noted cardiologist note pt has Takotsubo cardiomyopathy, noted  EGD neg today, possible orthopedic procedure tomorrow removal of external fixator.  2- Pt s/p Open reduction and internal fixation (ORIF) of posterior lip of trimalleolar fracture of right ankle and   Removal of external fixator of Right ankle on 2-.        PLAN:  - lovenox 40mg sq daily for VTE prophylaxis  - Monitor daily CBC, BMP  - Encourage mobilization and maintain venodynes  - dispo Rehab awaiting auth  Will continue to follow

## 2020-02-28 NOTE — PROGRESS NOTE ADULT - REASON FOR ADMISSION
ARF  concern for ATN  Hyperkalemia  Chronic back pain  Increasing anemia  Melena/GI bleed  Left sided Colitis with diarrhea, dehydration  ankle fx after syncope and fall yesterday
Syncope  ARF    Hyperkalemia  Chronic back pain  Increasing anemia  Melena/GI bleed  Left sided Colitis with diarrhea, dehydration  ankle fx after syncope and fall yesterday
ankle fx after syncope and fall
ARF  concern for ATN  Hyperkalemia  Chronic back pain  Increasing anemia  Melena/GI bleed  Left sided Colitis with diarrhea, dehydration  ankle fx after syncope and fall yesterday

## 2020-03-03 ENCOUNTER — RX RENEWAL (OUTPATIENT)
Age: 60
End: 2020-03-03

## 2020-03-03 DIAGNOSIS — E86.0 DEHYDRATION: ICD-10-CM

## 2020-03-03 DIAGNOSIS — I10 ESSENTIAL (PRIMARY) HYPERTENSION: ICD-10-CM

## 2020-03-03 DIAGNOSIS — M48.00 SPINAL STENOSIS, SITE UNSPECIFIED: ICD-10-CM
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
## 2020-03-03 DIAGNOSIS — R11.2 NAUSEA WITH VOMITING, UNSPECIFIED: ICD-10-CM

## 2020-03-03 DIAGNOSIS — F32.9 MAJOR DEPRESSIVE DISORDER, SINGLE EPISODE, UNSPECIFIED: ICD-10-CM

## 2020-03-03 DIAGNOSIS — I21.4 NON-ST ELEVATION (NSTEMI) MYOCARDIAL INFARCTION: ICD-10-CM

## 2020-03-03 DIAGNOSIS — J45.909 UNSPECIFIED ASTHMA, UNCOMPLICATED: ICD-10-CM

## 2020-03-03 DIAGNOSIS — I51.81 TAKOTSUBO SYNDROME: ICD-10-CM

## 2020-03-03 DIAGNOSIS — I50.23 ACUTE ON CHRONIC SYSTOLIC (CONGESTIVE) HEART FAILURE: ICD-10-CM
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
## 2020-03-03 DIAGNOSIS — F41.9 ANXIETY DISORDER, UNSPECIFIED: ICD-10-CM

## 2020-03-03 DIAGNOSIS — M51.26 OTHER INTERVERTEBRAL DISC DISPLACEMENT, LUMBAR REGION: ICD-10-CM

## 2020-03-03 DIAGNOSIS — I95.1 ORTHOSTATIC HYPOTENSION: ICD-10-CM

## 2020-03-03 DIAGNOSIS — M19.90 UNSPECIFIED OSTEOARTHRITIS, UNSPECIFIED SITE: ICD-10-CM

## 2020-03-03 DIAGNOSIS — E61.1 IRON DEFICIENCY: ICD-10-CM

## 2020-03-03 DIAGNOSIS — S82.851A DISPLACED TRIMALLEOLAR FRACTURE OF RIGHT LOWER LEG, INITIAL ENCOUNTER FOR CLOSED FRACTURE: ICD-10-CM

## 2020-03-03 DIAGNOSIS — T39.395A ADVERSE EFFECT OF OTHER NONSTEROIDAL ANTI-INFLAMMATORY DRUGS [NSAID], INITIAL ENCOUNTER: ICD-10-CM

## 2020-03-03 DIAGNOSIS — Z96.642 PRESENCE OF LEFT ARTIFICIAL HIP JOINT: ICD-10-CM

## 2020-03-03 DIAGNOSIS — D62 ACUTE POSTHEMORRHAGIC ANEMIA: ICD-10-CM

## 2020-03-03 DIAGNOSIS — J96.01 ACUTE RESPIRATORY FAILURE WITH HYPOXIA: ICD-10-CM

## 2020-03-03 DIAGNOSIS — E87.5 HYPERKALEMIA: ICD-10-CM

## 2020-03-03 DIAGNOSIS — F10.21 ALCOHOL DEPENDENCE, IN REMISSION: ICD-10-CM

## 2020-03-03 DIAGNOSIS — J69.0 PNEUMONITIS DUE TO INHALATION OF FOOD AND VOMIT: ICD-10-CM

## 2020-03-03 DIAGNOSIS — K27.4 CHRONIC OR UNSPECIFIED PEPTIC ULCER, SITE UNSPECIFIED, WITH HEMORRHAGE: ICD-10-CM
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
## 2020-03-03 DIAGNOSIS — K51.50 LEFT SIDED COLITIS WITHOUT COMPLICATIONS: ICD-10-CM

## 2020-03-03 DIAGNOSIS — N17.0 ACUTE KIDNEY FAILURE WITH TUBULAR NECROSIS: ICD-10-CM

## 2020-03-03 DIAGNOSIS — R55 SYNCOPE AND COLLAPSE: ICD-10-CM

## 2020-03-03 DIAGNOSIS — K85.90 ACUTE PANCREATITIS WITHOUT NECROSIS OR INFECTION, UNSPECIFIED: ICD-10-CM

## 2020-03-04 ENCOUNTER — APPOINTMENT (OUTPATIENT)
Dept: ORTHOPEDIC SURGERY | Facility: CLINIC | Age: 60
End: 2020-03-04

## 2020-03-04 ENCOUNTER — APPOINTMENT (OUTPATIENT)
Dept: ORTHOPEDIC SURGERY | Facility: CLINIC | Age: 60
End: 2020-03-04
Payer: COMMERCIAL

## 2020-03-04 PROCEDURE — 29405 APPL SHORT LEG CAST: CPT | Mod: 58,RT

## 2020-03-04 PROCEDURE — 99024 POSTOP FOLLOW-UP VISIT: CPT

## 2020-03-04 NOTE — ADDENDUM
[FreeTextEntry1] : I, Jason Veronica, acted solely as a scribe for Dr. Mohamud Knight on this date 03/04/2020 .\par All medical record entries made by the Scribe were at my, Dr. Mohamud Knight, direction and personally dictated by me on 03/04/2020 . I have reviewed the chart and agree that the record accurately reflects my personal performance of the history, physical exam, assessment and plan. I have also personally directed, reviewed, and agreed with the chart.

## 2020-03-13 ENCOUNTER — APPOINTMENT (OUTPATIENT)
Dept: ORTHOPEDIC SURGERY | Facility: CLINIC | Age: 60
End: 2020-03-13
Payer: COMMERCIAL

## 2020-03-13 VITALS — SYSTOLIC BLOOD PRESSURE: 94 MMHG | HEART RATE: 76 BPM | DIASTOLIC BLOOD PRESSURE: 68 MMHG | HEIGHT: 66 IN

## 2020-03-13 PROCEDURE — 29405 APPL SHORT LEG CAST: CPT | Mod: 58,RT

## 2020-03-13 PROCEDURE — 73610 X-RAY EXAM OF ANKLE: CPT | Mod: RT

## 2020-03-13 PROCEDURE — 99024 POSTOP FOLLOW-UP VISIT: CPT

## 2020-03-13 NOTE — ADDENDUM
[FreeTextEntry1] : I, Lawson Van, acted solely as a scribe for Dr. Mohamud Knight on this date 03/13/2020. \par \par All medical record entries made by the Scribe were at my, Dr. Mohamud Knight, direction and personally dictated by me on 03/13/2020 . I have reviewed the chart and agree that the record accurately reflects my personal performance of the history, physical exam, assessment and plan. I have also personally directed, reviewed, and agreed with the chart.

## 2020-03-13 NOTE — HISTORY OF PRESENT ILLNESS
[___ Weeks Post Op] : [unfilled] weeks post op [Clean/Dry/Intact] : clean, dry and intact [Healed] : healed [Neuro Intact] : an unremarkable neurological exam [Vascular Intact] : ~T peripheral vascular exam normal [Negative Sheng's] : maneuvers demonstrated a negative Sheng's sign [Doing Well] : is doing well [Excellent Pain Control] : has excellent pain control [No Sign of Infection] : is showing no signs of infection [Chills] : no chills [Fever] : no fever [Nausea] : no nausea [Vomiting] : no vomiting [Erythema] : not erythematous [Discharge] : absent of discharge [Swelling] : not swollen [Dehiscence] : not dehisced [de-identified] : s/p Removal of external fixator, ORIF of right trimalleolar fracture with posterior malleolus fixation. \par DOS 2/25/2020 [de-identified] : 59 year old female present in the office 2 weeks post op from s/p Removal of external fixator, ORIF of right trimalleolar fracture with posterior malleolus fixation. The patient's pain is noted to be a 7/10. The pain is noted to be worse compared to the previous visit. She notes that she fell while getting up from her wheelchair since her previous visit. The patient presents ambulating with a wheelchair. She is currently taking pain medication. She has been on Lovenox for blood thinning purposes. The patient is currently staying at a rehab facility and is receiving physical therapy. No other complaints at this time. [de-identified] : General: Alert and oriented x3. In no acute distress. Pleasant in nature with a normal affect. No apparent respiratory distress. The wound is intact. No evidence of any diastases or dehiscence. No fluctuance. The area is warm and well perfused. The patient is able to wiggle their toes. Neurovascularly intact.\par Inspection: erythema over medial malleolus wound\par Tenderness: no calf pain [de-identified] : 3V of the right ankle were ordered obtained and reviewed by me today, 03/13/2020, revealed: medial hardware in place, fracture healing [de-identified] : Patient is a 59 year old female present in the office today 2 weeks s/p s/p Removal of external fixator, ORIF of right trimalleolar fracture with posterior malleolus fixation. She should continue the DVT prophylaxis until further notice. I recommend that the patient be fitted for a cast. The patient was fitted for the cast in the office today. She should be non-weight bearing until further notice. If the patient notices any loosening of the cast, she should call the office as soon as possible. I recommend that the patient take Keflex antibiotics. A prescription was provided in the office today. I would like to see the patient back in the office in 10-14 days to reassess her condition. I have addressed all the patient's concerns surrounding the pathology of their condition. The patient understood and verbally agreed to the treatment plan. All of their questions were answered and they were satisfied with the visit. The patient should call the office if they have any questions or experience worsening symptoms.

## 2020-03-23 ENCOUNTER — APPOINTMENT (OUTPATIENT)
Dept: ORTHOPEDIC SURGERY | Facility: CLINIC | Age: 60
End: 2020-03-23
Payer: COMMERCIAL

## 2020-03-23 PROCEDURE — 73610 X-RAY EXAM OF ANKLE: CPT | Mod: RT

## 2020-03-23 PROCEDURE — 29405 APPL SHORT LEG CAST: CPT | Mod: 58,RT

## 2020-03-23 PROCEDURE — 99024 POSTOP FOLLOW-UP VISIT: CPT

## 2020-03-23 NOTE — HISTORY OF PRESENT ILLNESS
[___ Weeks Post Op] : [unfilled] weeks post op [Clean/Dry/Intact] : clean, dry and intact [Healed] : healed [Neuro Intact] : an unremarkable neurological exam [Vascular Intact] : ~T peripheral vascular exam normal [Negative Sheng's] : maneuvers demonstrated a negative Sheng's sign [Doing Well] : is doing well [Excellent Pain Control] : has excellent pain control [No Sign of Infection] : is showing no signs of infection [Chills] : no chills [Fever] : no fever [Nausea] : no nausea [Vomiting] : no vomiting [Erythema] : not erythematous [Discharge] : absent of discharge [Swelling] : not swollen [Dehiscence] : not dehisced [de-identified] : s/p 1. Removal of external fixator, 2. ORIF of right trimalleolar fracture with posterior malleolus fixation. \par DOS 2/25/2020 [de-identified] : 59 year old female present in the office 4 weeks post op from 1. Removal of external fixator, 2. ORIF of right trimalleolar fracture with posterior malleolus fixation. The patient's pain is noted to be a 8/10. The pain is noted to be 2% improved compared to the previous visit. The patient presents ambulating with a wheelchair and wearing a cast. She is currently taking OxyContin every 4 hours. The patient was discharged from the nursing home 3 days ago and states that she was not prescribed an anticoagulation medication. She has not taken an anticoagulation medication since being discharged. No other complaints at this time. [de-identified] : General: Alert and oriented x3. In no acute distress. Pleasant in nature with a normal affect. No apparent respiratory distress. The wound is intact. No evidence of any diastases or dehiscence. No surrounding erythema noted. No fluctuance. The area is warm and well perfused. The patient is able to wiggle their toes. Neurovascularly intact.\par Tenderness: Calf is soft and nontender. No pain with passive dorsiflexion. [de-identified] : 3V of the right ankle were ordered obtained and reviewed by me today, 03/23/2020, revealed: hardware on medial side in good position with good alignment, lateral side stable [de-identified] : Patient is a 59 year old female present in the office today 4 weeks s/p 1. Removal of external fixator, 2. ORIF of right trimalleolar fracture with posterior malleolus fixation. The patient's cast was removed in the office today. In order to provide the patient with a better understanding of their ailment, I educated them about the anatomy, physiology and lifespan of their condition using a foot model. I advised the patient to utilize 325 mg of Aspirin BID as instructed for DVT prophylaxis starting immediately. The prescription for the Aspirin was provided for the patient in the office today. I recommend that the patient utilize Percocet 5/325 for pain relief. The prescription for the Percocet was given in the office today. I recommend that the patient be fitted for a cast. The patient was fitted for the cast in the office today. She should be non-weight bearing until further notice. If the patient notices any loosening of the cast, she should call the office as soon as possible. I would like to see the patient back in the office in 2-3 weeks to reassess her condition. I have addressed all the patient's concerns surrounding the pathology of their condition. The patient understood and verbally agreed to the treatment plan. All of their questions were answered and they were satisfied with the visit. The patient should call the office if they have any questions or experience worsening symptoms.\par \par  BID after discharge from NH \par NWB\par Cast\par Analgesia\par Pain control discussed\par F/U 2-3 weeks.

## 2020-03-23 NOTE — ADDENDUM
[FreeTextEntry1] : I, Lawson Van, acted solely as a scribe for Dr. Mohamud Knight on this date 03/23/2020. \par \par All medical record entries made by the Scribe were at my, Dr. Mohamud Knight, direction and personally dictated by me on 03/23/2020 . I have reviewed the chart and agree that the record accurately reflects my personal performance of the history, physical exam, assessment and plan. I have also personally directed, reviewed, and agreed with the chart.

## 2020-04-10 ENCOUNTER — APPOINTMENT (OUTPATIENT)
Dept: ORTHOPEDIC SURGERY | Facility: CLINIC | Age: 60
End: 2020-04-10
Payer: COMMERCIAL

## 2020-04-10 PROCEDURE — 97760 ORTHOTIC MGMT&TRAING 1ST ENC: CPT | Mod: 58

## 2020-04-10 PROCEDURE — 73610 X-RAY EXAM OF ANKLE: CPT | Mod: RT

## 2020-04-10 PROCEDURE — 99024 POSTOP FOLLOW-UP VISIT: CPT

## 2020-04-10 NOTE — HISTORY OF PRESENT ILLNESS
[___ Weeks Post Op] : [unfilled] weeks post op [Clean/Dry/Intact] : clean, dry and intact [Healed] : healed [Neuro Intact] : an unremarkable neurological exam [Vascular Intact] : ~T peripheral vascular exam normal [Negative Sheng's] : maneuvers demonstrated a negative Sheng's sign [Doing Well] : is doing well [Excellent Pain Control] : has excellent pain control [No Sign of Infection] : is showing no signs of infection [Chills] : no chills [Fever] : no fever [Nausea] : no nausea [Vomiting] : no vomiting [Erythema] : not erythematous [Discharge] : absent of discharge [Swelling] : not swollen [Dehiscence] : not dehisced [de-identified] : s/p 1. Removal of external fixator, 2. ORIF of right trimalleolar fracture with posterior malleolus fixation. \par DOS 2/25/2020 [de-identified] : 59 year old female present in the office 6 weeks post op from s/p 1. Removal of external fixator, 2. ORIF of right trimalleolar fracture with posterior malleolus fixation. The patient's pain is noted to be a 7-8/10. The pain is noted to be 2% improved compared to the previous visit. The patient presents ambulating with a wheelchair and wearing a cast. She is currently taking Percocet 5/325 2-3 times daily. The patient has been taking aspirin 325 mg twice daily for blood thinning purposes. The patient is accompanied by her . No other complaints at this time. [de-identified] : General: Alert and oriented x3. In no acute distress. Pleasant in nature with a normal affect. No apparent respiratory distress. The wound is intact. No evidence of any diastases or dehiscence. No surrounding erythema noted. No fluctuance. The area is warm and well perfused. The patient is able to wiggle their toes. Neurovascularly intact.\par Inspection: Improved swelling. [de-identified] : 3V of the right ankle were ordered obtained and reviewed by me today, 04/10/2020, revealed: hardware in good position with good alignment [de-identified] : Patient is a 59 year old female present in the office today 6 weeks s/p 1. Removal of external fixator, 2. ORIF of right trimalleolar fracture with posterior malleolus fixation. The patient's cast was removed in the office today. I recommend that the patient should continue to take aspirin 325 mg twice daily for DVT prophylaxis until further notice. I recommend that the patient utilize a CAM boot. The patient was fitted for a long CAM boot in the office today. The patient was educated about the boot wear pattern and utilization, as well as the timeframe to come out of the boot. She was also given full instructions for using the boot. The patient can slowly begin weight bearing as tolerated with crutches and the CAM boot in 2 weeks from today. I recommend that the patient utilize ice and heat. I recommend the patient undergo a course of physical therapy for the right ankle 2-3 times a week for a total of 6-8 weeks. A prescription was given for the physical therapy today. The patient was cautioned against the use of anti-inflammatory medications during this coronavirus pandemic. I  have encouraged that she utilizes Tylenol instead. I would like to see the patient back in the office in 4-6 weeks to reassess her condition. I have addressed all the patient's concerns surrounding the pathology of their condition. The patient understood and verbally agreed to the treatment plan. All of their questions were answered and they were satisfied with the visit. The patient should call the office if they have any questions or experience worsening symptoms.

## 2020-05-08 ENCOUNTER — APPOINTMENT (OUTPATIENT)
Dept: ORTHOPEDIC SURGERY | Facility: CLINIC | Age: 60
End: 2020-05-08
Payer: COMMERCIAL

## 2020-05-08 PROCEDURE — 99024 POSTOP FOLLOW-UP VISIT: CPT

## 2020-05-08 PROCEDURE — 73610 X-RAY EXAM OF ANKLE: CPT | Mod: RT

## 2020-05-08 NOTE — ADDENDUM
[FreeTextEntry1] : I, Lawson Van, acted solely as a scribe for Dr. Mohamud Knight on this date 05/08/2020. \par \par All medical record entries made by the Scribe were at my, Dr. Mohamud Knight, direction and personally dictated by me on 05/08/2020 . I have reviewed the chart and agree that the record accurately reflects my personal performance of the history, physical exam, assessment and plan. I have also personally directed, reviewed, and agreed with the chart.

## 2020-05-08 NOTE — HISTORY OF PRESENT ILLNESS
[___ Weeks Post Op] : [unfilled] weeks post op [Clean/Dry/Intact] : clean, dry and intact [Healed] : healed [Neuro Intact] : an unremarkable neurological exam [Vascular Intact] : ~T peripheral vascular exam normal [Doing Well] : is doing well [Negative Sheng's] : maneuvers demonstrated a negative Sheng's sign [Excellent Pain Control] : has excellent pain control [No Sign of Infection] : is showing no signs of infection [Chills] : no chills [Fever] : no fever [Vomiting] : no vomiting [Nausea] : no nausea [Erythema] : not erythematous [Swelling] : not swollen [Discharge] : absent of discharge [de-identified] : s/p 1. Removal of external fixator, 2. ORIF of right trimalleolar fracture with posterior malleolus fixation. \par DOS 2/25/2020 [Dehiscence] : not dehisced [de-identified] : 59 year old female present in the office 10 weeks post op from 1. Removal of external fixator, 2. ORIF of right trimalleolar fracture with posterior malleolus fixation. The patient's pain is noted to be a 6/10. The pain is noted to be improved and the swelling is noted to be the same compared to the previous visit. The patient presents ambulating with a wheelchair. The patient presents wearing a long CAM boot. She has been performing ROM and strengthening exercises on her own. She is currently taking Percocet 1-3 times per day as needed. The patient has been taking aspirin 325 mg twice daily for blood thinning purposes. No other complaints at this time. [de-identified] : 3V of the right ankle were ordered obtained and reviewed by me today, 05/08/2020, revealed: hardware in good position with good alignment [de-identified] : Patient is a 59 year old female present in the office today 10 weeks s/p 1. Removal of external fixator, 2. ORIF of right trimalleolar fracture with posterior malleolus fixation. I recommend that the patient should continue to take aspirin 325 mg twice daily for DVT prophylaxis until she has weaned out of the CAM boot and is fully weight bearing. I recommend the patient undergo a course of physical therapy for the right ankle 2-3 times a week for a total of 6-8 weeks. A prescription was given for the physical therapy today. I recommend that the patient wean out of their CAM boot and into an ASO brace as tolerated. The patient was provided with the ASO brace in the office today. I would like to see the patient back in the office in 6 weeks to reassess her condition. I have addressed all the patient's concerns surrounding the pathology of their condition. The patient understood and verbally agreed to the treatment plan. All of their questions were answered and they were satisfied with the visit. The patient should call the office if they have any questions or experience worsening symptoms.\par \par ROM\par Strengthening\par Boot WBAT\par Transition to ASO\par ASA [de-identified] : General: Alert and oriented x3. In no acute distress. Pleasant in nature with a normal affect. No apparent respiratory distress. The wound is intact. No evidence of any diastases or dehiscence. No surrounding erythema noted. No fluctuance. The area is warm and well perfused. The patient is able to wiggle their toes. Neurovascularly intact.

## 2020-05-20 ENCOUNTER — APPOINTMENT (OUTPATIENT)
Dept: FAMILY MEDICINE | Facility: CLINIC | Age: 60
End: 2020-05-20
Payer: COMMERCIAL

## 2020-05-20 DIAGNOSIS — J18.9 PNEUMONIA, UNSPECIFIED ORGANISM: ICD-10-CM

## 2020-05-20 PROCEDURE — 99214 OFFICE O/P EST MOD 30 MIN: CPT | Mod: 95

## 2020-05-20 RX ORDER — ACETAMINOPHEN AND CODEINE 300; 30 MG/1; MG/1
300-30 TABLET ORAL
Qty: 60 | Refills: 0 | Status: DISCONTINUED | COMMUNITY
Start: 2020-01-06 | End: 2020-05-20

## 2020-05-20 RX ORDER — LAMOTRIGINE 100 MG/1
100 TABLET ORAL
Refills: 0 | Status: DISCONTINUED | COMMUNITY
Start: 2019-09-25 | End: 2020-05-20

## 2020-05-20 RX ORDER — ENALAPRIL MALEATE 10 MG/1
10 TABLET ORAL
Qty: 90 | Refills: 0 | Status: DISCONTINUED | COMMUNITY
Start: 2019-01-15 | End: 2020-05-20

## 2020-05-20 RX ORDER — OXYCODONE AND ACETAMINOPHEN 5; 325 MG/1; MG/1
5-325 TABLET ORAL
Qty: 6 | Refills: 0 | Status: DISCONTINUED | COMMUNITY
Start: 2020-02-13 | End: 2020-05-20

## 2020-05-20 RX ORDER — FLUTICASONE PROPIONATE 50 UG/1
50 SPRAY, METERED NASAL
Qty: 1 | Refills: 2 | Status: DISCONTINUED | COMMUNITY
Start: 2019-01-25 | End: 2020-05-20

## 2020-05-20 RX ORDER — METHOCARBAMOL 750 MG/1
750 TABLET, FILM COATED ORAL
Qty: 60 | Refills: 0 | Status: DISCONTINUED | COMMUNITY
Start: 2019-12-20 | End: 2020-05-20

## 2020-05-20 RX ORDER — ASPIRIN 325 MG/1
325 TABLET ORAL
Qty: 30 | Refills: 2 | Status: DISCONTINUED | COMMUNITY
Start: 2020-03-23 | End: 2020-05-20

## 2020-05-20 RX ORDER — CLONIDINE HYDROCHLORIDE 0.1 MG/1
0.1 TABLET ORAL
Qty: 90 | Refills: 0 | Status: DISCONTINUED | COMMUNITY
Start: 2018-08-13 | End: 2020-05-20

## 2020-05-20 RX ORDER — CYCLOBENZAPRINE HYDROCHLORIDE 10 MG/1
10 TABLET, FILM COATED ORAL
Qty: 90 | Refills: 0 | Status: DISCONTINUED | COMMUNITY
Start: 2019-11-15 | End: 2020-05-20

## 2020-05-20 RX ORDER — ASPIRIN 325 MG/1
325 TABLET ORAL
Qty: 60 | Refills: 0 | Status: DISCONTINUED | COMMUNITY
Start: 2020-03-23 | End: 2020-05-20

## 2020-05-20 RX ORDER — SODIUM FLUORIDE 1.1 G/100G
1.1 GEL, DENTIFRICE ORAL
Qty: 102 | Refills: 0 | Status: DISCONTINUED | COMMUNITY
Start: 2019-12-19 | End: 2020-05-20

## 2020-05-20 RX ORDER — DICLOFENAC SODIUM 50 MG/1
50 TABLET, DELAYED RELEASE ORAL
Qty: 60 | Refills: 0 | Status: DISCONTINUED | COMMUNITY
Start: 2020-02-09 | End: 2020-05-20

## 2020-05-20 RX ORDER — CEPHALEXIN 500 MG/1
500 CAPSULE ORAL 3 TIMES DAILY
Qty: 21 | Refills: 0 | Status: DISCONTINUED | COMMUNITY
Start: 2020-03-13 | End: 2020-05-20

## 2020-05-20 RX ORDER — ASPIRIN 325 MG/1
325 TABLET ORAL
Qty: 60 | Refills: 0 | Status: DISCONTINUED | COMMUNITY
Start: 2020-02-14 | End: 2020-05-20

## 2020-05-20 RX ORDER — DIAZEPAM 5 MG/1
5 TABLET ORAL
Qty: 60 | Refills: 0 | Status: DISCONTINUED | COMMUNITY
Start: 2019-05-21 | End: 2020-05-20

## 2020-05-20 RX ORDER — TIZANIDINE 4 MG/1
4 TABLET ORAL
Qty: 30 | Refills: 0 | Status: DISCONTINUED | COMMUNITY
Start: 2020-01-29 | End: 2020-05-20

## 2020-05-22 PROBLEM — J18.9 PNEUMONIA: Status: ACTIVE | Noted: 2019-09-23

## 2020-05-22 NOTE — HISTORY OF PRESENT ILLNESS
[FreeTextEntry1] : HOSPITAL DISCHARGE/ MEDICATION MANAGEMENT [de-identified] : came [facility] on 03/[facility]- \par fainted and had ankle fracture/ also had colitis/ had kidney infection/ had heart attack\par pneumonia/ broke left and right and tibia- had suregery and she may need another surgery\par confined to bed still and she is in wheelchair/ has boot to walk around house and she will start PT .\par meds\par paxil/ gabapentin/ patricia aspirin 81 mg twice/day\par  no enalapril\par came in contact with somebody with COVID\par clonidine was given Psych and they want her to stop \par takes trazodone\par

## 2020-05-22 NOTE — PLAN
[FreeTextEntry1] : MULTIPLRE ISSUES AND FRACTURES \par RECOVERING SLOWLY\par MAY NEED ANOTHER ANKLE SURGERY.\par \par ADVISE TO DECREASE CLONIDINE OVER 1 WEKK SLOWLY AND THEN STOP HER PSYCH IS NO LONGER NEEDS HER TO TAKE THAT.\par \par FOLLOW UP AS NEEDED.

## 2020-05-22 NOTE — PHYSICAL EXAM
[Normal] : no acute distress, well nourished, well developed and well-appearing [de-identified] : IN BED

## 2020-06-17 ENCOUNTER — APPOINTMENT (OUTPATIENT)
Dept: ORTHOPEDIC SURGERY | Facility: CLINIC | Age: 60
End: 2020-06-17

## 2020-06-22 ENCOUNTER — APPOINTMENT (OUTPATIENT)
Dept: ORTHOPEDIC SURGERY | Facility: CLINIC | Age: 60
End: 2020-06-22
Payer: COMMERCIAL

## 2020-06-22 PROCEDURE — 99213 OFFICE O/P EST LOW 20 MIN: CPT

## 2020-06-22 PROCEDURE — 73610 X-RAY EXAM OF ANKLE: CPT | Mod: RT

## 2020-06-22 NOTE — DISCUSSION/SUMMARY
[de-identified] : At this point, I did great details about her right ankle x-rays and what we saw. Most of her pain is on the medial side of the ankle. 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 I do believe that the medial hardware might be causing her the pain she is experiencing on the medial side of her ankle. She is only 4 months out from surgery. I do believe that in the near future we will have to go and remove the hardware and possibly place one screw into the medial malleolus to support the fracture if necessary. I explained to her that this would consist of a second surgery and she understood this. In the interim she will continue with ankle range of motion and strength. She can slowly come out of the boot as tolerated. She can weight-bear as tolerated. The risks and benefits of surgery versus nonsurgical treatment for the hardware and fracture was discussed with the patient. She understands the treatment course at this time. She will speak to my surgical coordinator to get on the schedule in the near future for right ankle removal of hardware possible screw fixation to the medial malleolus.  I ordered a CT scan without contrast of the right ankle to evaluate the fractures and the hardware. She will follow up with CT scan.\par \par All risks, benefits and alternatives to the recommended surgical procedure were discussed which include but are not limited to bleeding, infection, nerve damage, vascular damage, failure of the wound to heal, the need for further surgery, loss of limb, DVT, PE, loss of life as well as the risks associated with general anesthesia. The patient verbalized understanding and provided informed consent to move forward with surgery.\par \par Possible hardware removal.

## 2020-06-22 NOTE — REASON FOR VISIT
[Follow-Up Visit] : a follow-up visit for [FreeTextEntry2] : s/p 1. Removal of external fixator, 2. ORIF of right trimalleolar fracture with posterior malleolus fixation

## 2020-06-22 NOTE — PHYSICAL EXAM
[de-identified] : Right Ankle Physical Examination:\par \par General: Alert and oriented x3.  In no acute distress.  Pleasant in nature with a normal affect.  No apparent respiratory distress. \par Erythema, Warmth, Rubor: Negative\par Swelling: Positive. Incision is healed.\par \par ROM:\par 1. Dorsiflexion: 0 degrees\par 2. Plantarflexion: 40 degrees\par 3. Inversion: 10 degrees\par 4. Eversion: 10 degrees\par \par Tenderness to Palpation: \par 1. Lateral Malleolus: Negative\par 2. Medial Malleolus: Positive over the hardware and incision.\par 3. Proximal Fibular Pain: Negative\par 4. Heel Pain: Negative\par 5. Cuboid: Negative\par 6. Navicular: Negative\par 7. Tibiotalar Joint: Negative\par 8. Subtalar Joint: Negative\par 9. Posterior Recess: Negative\par \par Tendon Pain:\par 1. Achilles: Negative\par 2. Peroneals: Negative\par 3. Posterior Tibialis: Negative\par 4. Tibialis Anterior: Negative\par \par Ligament Pain:\par 1. ATFL: Negative\par 2. CFL: Negative \par 3. PTFL: Negative\par 4. Deltoid Ligaments: Negative\par 5. Lis Franc Ligament: Negative\par \par Stability: \par 1. Anterior Drawer: Negative\par 2. Posterior Drawer: Negative\par \par Strength: 5/5 TA/GS/EHL\par \par Pulses: 2+ DP/PT Pulses\par \par Neuro: Intact motor and sensory\par \par Additional Test:\par 1. Calcaneal Squeeze Test: Negative\par 2. Syndesmosis Squeeze Test: Negative\par  [de-identified] : X-rays of the right ankle taken in office today and reviewed with the patient showed: Fracture lines with healing. Hardware present. Medial malleoli hardware no evidence of loosening but proud and not flush against the medial malleolus.

## 2020-06-22 NOTE — HISTORY OF PRESENT ILLNESS
[FreeTextEntry1] : Alisa is a 59-year-old presents to the office with her  for evaluation of her right ankle status post right ankle ORIF. She continues to have most of her pain on the medial side of the ankle where the hook plate is.  Her pain scale today in the office is 6/10. She continues to wear her boot. She continues OxyContin as for pain. She is in outpatient physical therapy. She denies fevers, chills, night sweats, shortness of breath.  She has no other issues today in the office.

## 2020-06-24 ENCOUNTER — RX RENEWAL (OUTPATIENT)
Age: 60
End: 2020-06-24

## 2020-07-07 ENCOUNTER — APPOINTMENT (OUTPATIENT)
Dept: FAMILY MEDICINE | Facility: CLINIC | Age: 60
End: 2020-07-07

## 2020-07-10 ENCOUNTER — APPOINTMENT (OUTPATIENT)
Dept: CT IMAGING | Facility: CLINIC | Age: 60
End: 2020-07-10
Payer: COMMERCIAL

## 2020-07-10 ENCOUNTER — OUTPATIENT (OUTPATIENT)
Dept: OUTPATIENT SERVICES | Facility: HOSPITAL | Age: 60
LOS: 1 days | End: 2020-07-10
Payer: COMMERCIAL

## 2020-07-10 ENCOUNTER — RESULT REVIEW (OUTPATIENT)
Age: 60
End: 2020-07-10

## 2020-07-10 DIAGNOSIS — S82.851D DISPLACED TRIMALLEOLAR FRACTURE OF RIGHT LOWER LEG, SUBSEQUENT ENCOUNTER FOR CLOSED FRACTURE WITH ROUTINE HEALING: ICD-10-CM

## 2020-07-10 DIAGNOSIS — Z00.00 ENCOUNTER FOR GENERAL ADULT MEDICAL EXAMINATION WITHOUT ABNORMAL FINDINGS: ICD-10-CM

## 2020-07-10 DIAGNOSIS — Z96.60 PRESENCE OF UNSPECIFIED ORTHOPEDIC JOINT IMPLANT: Chronic | ICD-10-CM

## 2020-07-10 DIAGNOSIS — Z90.49 ACQUIRED ABSENCE OF OTHER SPECIFIED PARTS OF DIGESTIVE TRACT: Chronic | ICD-10-CM

## 2020-07-10 DIAGNOSIS — Z98.51 TUBAL LIGATION STATUS: Chronic | ICD-10-CM

## 2020-07-10 PROCEDURE — 73700 CT LOWER EXTREMITY W/O DYE: CPT

## 2020-07-10 PROCEDURE — 76376 3D RENDER W/INTRP POSTPROCES: CPT

## 2020-07-10 PROCEDURE — 73700 CT LOWER EXTREMITY W/O DYE: CPT | Mod: 26,RT

## 2020-07-10 PROCEDURE — 73610 X-RAY EXAM OF ANKLE: CPT

## 2020-07-10 PROCEDURE — 76376 3D RENDER W/INTRP POSTPROCES: CPT | Mod: 26

## 2020-07-10 PROCEDURE — 73610 X-RAY EXAM OF ANKLE: CPT | Mod: 26,RT

## 2020-07-14 ENCOUNTER — APPOINTMENT (OUTPATIENT)
Dept: FAMILY MEDICINE | Facility: CLINIC | Age: 60
End: 2020-07-14
Payer: COMMERCIAL

## 2020-07-14 VITALS
WEIGHT: 208 LBS | HEART RATE: 78 BPM | SYSTOLIC BLOOD PRESSURE: 118 MMHG | DIASTOLIC BLOOD PRESSURE: 70 MMHG | BODY MASS INDEX: 33.43 KG/M2 | HEIGHT: 66 IN

## 2020-07-14 DIAGNOSIS — J30.9 ALLERGIC RHINITIS, UNSPECIFIED: ICD-10-CM

## 2020-07-14 PROCEDURE — 99213 OFFICE O/P EST LOW 20 MIN: CPT

## 2020-07-14 RX ORDER — OXYCODONE AND ACETAMINOPHEN 5; 325 MG/1; MG/1
5-325 TABLET ORAL
Qty: 30 | Refills: 0 | Status: DISCONTINUED | COMMUNITY
Start: 2020-03-19 | End: 2020-07-14

## 2020-07-14 RX ORDER — OXYCODONE AND ACETAMINOPHEN 5; 325 MG/1; MG/1
5-325 TABLET ORAL
Qty: 30 | Refills: 0 | Status: DISCONTINUED | COMMUNITY
Start: 2020-02-14 | End: 2020-07-14

## 2020-07-14 RX ORDER — OXYCODONE AND ACETAMINOPHEN 5; 325 MG/1; MG/1
5-325 TABLET ORAL
Qty: 30 | Refills: 0 | Status: DISCONTINUED | COMMUNITY
Start: 2020-03-20 | End: 2020-07-14

## 2020-07-14 RX ORDER — OXYCODONE AND ACETAMINOPHEN 5; 325 MG/1; MG/1
5-325 TABLET ORAL
Qty: 30 | Refills: 0 | Status: DISCONTINUED | COMMUNITY
Start: 2020-04-01 | End: 2020-07-14

## 2020-07-14 NOTE — PLAN
[FreeTextEntry1] : Allergic rhinitis- flonase \par \par insomnia- will naun to Psych\par \par back pain- gabapentin renewed.\par \par follow up for Physical

## 2020-07-14 NOTE — PHYSICAL EXAM
[No Acute Distress] : no acute distress [Well Developed] : well developed [Well Nourished] : well nourished [Well-Appearing] : well-appearing [Normal Sclera/Conjunctiva] : normal sclera/conjunctiva [PERRL] : pupils equal round and reactive to light [EOMI] : extraocular movements intact [Normal Outer Ear/Nose] : the outer ears and nose were normal in appearance [Normal Oropharynx] : the oropharynx was normal [No Lymphadenopathy] : no lymphadenopathy [No JVD] : no jugular venous distention [Thyroid Normal, No Nodules] : the thyroid was normal and there were no nodules present [Supple] : supple [No Respiratory Distress] : no respiratory distress  [No Accessory Muscle Use] : no accessory muscle use [Clear to Auscultation] : lungs were clear to auscultation bilaterally [Normal Rate] : normal rate  [Normal S1, S2] : normal S1 and S2 [Regular Rhythm] : with a regular rhythm [No Murmur] : no murmur heard [No Carotid Bruits] : no carotid bruits [No Abdominal Bruit] : a ~M bruit was not heard ~T in the abdomen [No Varicosities] : no varicosities [No Palpable Aorta] : no palpable aorta [Pedal Pulses Present] : the pedal pulses are present [No Edema] : there was no peripheral edema [No Extremity Clubbing/Cyanosis] : no extremity clubbing/cyanosis [Soft] : abdomen soft [Non Tender] : non-tender [Non-distended] : non-distended [No Masses] : no abdominal mass palpated [No HSM] : no HSM [Normal Posterior Cervical Nodes] : no posterior cervical lymphadenopathy [Normal Bowel Sounds] : normal bowel sounds [Normal Anterior Cervical Nodes] : no anterior cervical lymphadenopathy [No CVA Tenderness] : no CVA  tenderness [No Spinal Tenderness] : no spinal tenderness [No Joint Swelling] : no joint swelling [Grossly Normal Strength/Tone] : grossly normal strength/tone [No Rash] : no rash [Coordination Grossly Intact] : coordination grossly intact [No Focal Deficits] : no focal deficits [Normal Gait] : normal gait [Deep Tendon Reflexes (DTR)] : deep tendon reflexes were 2+ and symmetric [Normal Insight/Judgement] : insight and judgment were intact [Normal Affect] : the affect was normal

## 2020-07-14 NOTE — HISTORY OF PRESENT ILLNESS
[FreeTextEntry1] : patient here for follow up [de-identified] : c/o nasal congestiona nd wants flonase. no fever or cough .\par she is having difficulty sleeping and she gets her trazodone throughe [facility]press script so it takes time.\par she is about to get her foot surgery again, on Oxy for pain

## 2020-07-17 ENCOUNTER — APPOINTMENT (OUTPATIENT)
Dept: ORTHOPEDIC SURGERY | Facility: CLINIC | Age: 60
End: 2020-07-17

## 2020-07-22 ENCOUNTER — APPOINTMENT (OUTPATIENT)
Dept: ORTHOPEDIC SURGERY | Facility: CLINIC | Age: 60
End: 2020-07-22

## 2020-07-29 ENCOUNTER — APPOINTMENT (OUTPATIENT)
Dept: ORTHOPEDIC SURGERY | Facility: CLINIC | Age: 60
End: 2020-07-29
Payer: COMMERCIAL

## 2020-07-29 DIAGNOSIS — M25.462 EFFUSION, LEFT KNEE: ICD-10-CM

## 2020-07-29 DIAGNOSIS — M25.562 PAIN IN LEFT KNEE: ICD-10-CM

## 2020-07-29 PROCEDURE — 99214 OFFICE O/P EST MOD 30 MIN: CPT | Mod: 25

## 2020-07-29 PROCEDURE — 73600 X-RAY EXAM OF ANKLE: CPT | Mod: RT

## 2020-07-29 PROCEDURE — 20610 DRAIN/INJ JOINT/BURSA W/O US: CPT | Mod: LT

## 2020-07-29 NOTE — DISCUSSION/SUMMARY
[de-identified] : Assessment: Left Knee Osteoarthritis/Pain\par Plan:\par 1. RICE Therapy.\par 2. Antiinflammatories/Tylenol as needed for pain of discomfort. \par 3. The patient was advised to rest the knee for 24-48 hours post injection.  The patient is able to resume normal activities in 24-48 hours post injection if the knee feels ok.\par 4. Continue with a home exercise/stretching program. \par 5. All the patients questions were answered.  If the patient is experiencing any problems or has concerns they were advised to call the office or make an appointment to come in to be evaluated.\par \par *As for the left ankle we are going to get her an US guided cortisone injection into the tibiotalar joint for relief.  \par \par

## 2020-07-29 NOTE — PHYSICAL EXAM
[de-identified] : Right Ankle Physical Examination:\par \par General: Alert and oriented x3.  In no acute distress.  Pleasant in nature with a normal affect.  No apparent respiratory distress. \par Erythema, Warmth, Rubor: Negative\par Swelling: Positive. Incision is healed.\par \par ROM:\par 1. Dorsiflexion: 0 degrees\par 2. Plantarflexion: 40 degrees\par 3. Inversion: 10 degrees\par 4. Eversion: 10 degrees\par \par Tenderness to Palpation: \par 1. Lateral Malleolus: Negative\par 2. Medial Malleolus: Positive over the hardware and incision.\par 3. Proximal Fibular Pain: Negative\par 4. Heel Pain: Negative\par 5. Cuboid: Negative\par 6. Navicular: Negative\par 7. Tibiotalar Joint: Negative\par 8. Subtalar Joint: Negative\par 9. Posterior Recess: Negative\par \par Tendon Pain:\par 1. Achilles: Negative\par 2. Peroneals: Negative\par 3. Posterior Tibialis: Negative\par 4. Tibialis Anterior: Negative\par \par Ligament Pain:\par 1. ATFL: Negative\par 2. CFL: Negative \par 3. PTFL: Negative\par 4. Deltoid Ligaments: Negative\par 5. Lis Franc Ligament: Negative\par \par Stability: \par 1. Anterior Drawer: Negative\par 2. Posterior Drawer: Negative\par \par Strength: 5/5 TA/GS/EHL\par \par Pulses: 2+ DP/PT Pulses\par \par Neuro: Intact motor and sensory\par \par Additional Test:\par 1. Calcaneal Squeeze Test: Negative\par 2. Syndesmosis Squeeze Test: Negative\par \par Left Knee Physical Examination:\par \par General: Alert and oriented x3.  In no acute distress.  Pleasant in nature with a normal affect.  No apparent respiratory distress. \par \par Erythema, Warmth, Rubor: Negative\par Swelling/Edema: Positive\par ROM: 0-120 degrees\par Jess's Test: Negative \par Medial Joint Line TTP: Positive\par Lateral Joint Line TTP: Negative\par Lachman Exam/Anterior Drawer/Pivot Shift Test: Negative \par MCL Pain: Negative\par LCL Pain: Negative\par Iliotibial Band Pain: Negative\par Patellofemoral Joint Pain: Negative\par Patellar Tendon TTP: Negative\par Pes Anserinus TTP: Negative\par Homans Sign: Negative\par Posterior Knee Pain: Negative\par Neuro: Intact with no sensory or motor deficits\par \par  [de-identified] : X-rays of the right ankle taken in office today and reviewed with the patient showed: Fracture lines with healing. Hardware present. Medial malleoli hardware no evidence of loosening but proud and not flush against the medial malleolus.

## 2020-07-29 NOTE — HISTORY OF PRESENT ILLNESS
[FreeTextEntry1] : Alisa is a 59-year-old presents to the office with her  for evaluation of her right ankle status post right ankle ORIF. She continues to have most of her pain on the medial side of the ankle where the hook plate is.  Her pain scale today in the office is 8/10. She wears regular sneakers with ASO brace.  She is in outpatient physical therapy. She denies fevers, chills, night sweats, shortness of breath.  She has no other issues today in the office.\par \par The patient also has a new complaint of new onset of swelling and pain left knee. She denies locking or catching of the left knee. Her pain scale left knee is 6/10. She is here seeking a cortisone injection for the left knee. She has no other issues.

## 2020-07-29 NOTE — PROCEDURE
[FreeTextEntry1] : Laterality: Left Knee\par \par The risks and benefits of the injection were reviewed with the patient today in office and all their questions were answered.  The injection site was the lateral aspect of the knee.  Prior to giving the injection the injection site was prepped with Chloraprep and a sterile field was created.  Sterile technique was carried out throughout the procedure.  After verbal consent from the patient we went ahead and aspirated 25 mL's of normal colored joint fluid  and then injected the knee with 2 ml 40 mg Depo-Medrol, 4 ml 1% lidocaine and 4 ml of .5% Bupivacaine for a total of 10 ml with a 22 laura 1.5" needle.  The medication was placed into the suprapatellar pouch without complication.  Post injection the patient reported no pain, had a normal gait and good motion of the knee.  The patient denied numbness and tingling going down their leg.  There were no complications during the procedure.

## 2020-08-05 NOTE — ED PROVIDER NOTE - NS ED ATTENDING STATEMENT MOD
You can access the FollowMyHealth Patient Portal offered by [facility] by registering at the following website: [email or web address]. By joining [employer]’s FollowMyHealth portal, you will also be able to view your health information using other applications (apps) compatible with our system.
I have personally performed a face to face diagnostic evaluation on this patient. I have reviewed the ACP note and agree with the history, exam and plan of care, except as noted.

## 2020-08-10 ENCOUNTER — RESULT REVIEW (OUTPATIENT)
Age: 60
End: 2020-08-10

## 2020-08-10 ENCOUNTER — OUTPATIENT (OUTPATIENT)
Dept: OUTPATIENT SERVICES | Facility: HOSPITAL | Age: 60
LOS: 1 days | End: 2020-08-10

## 2020-08-10 ENCOUNTER — APPOINTMENT (OUTPATIENT)
Dept: ULTRASOUND IMAGING | Facility: CLINIC | Age: 60
End: 2020-08-10
Payer: COMMERCIAL

## 2020-08-10 DIAGNOSIS — Z96.60 PRESENCE OF UNSPECIFIED ORTHOPEDIC JOINT IMPLANT: Chronic | ICD-10-CM

## 2020-08-10 DIAGNOSIS — Z90.49 ACQUIRED ABSENCE OF OTHER SPECIFIED PARTS OF DIGESTIVE TRACT: Chronic | ICD-10-CM

## 2020-08-10 DIAGNOSIS — S82.891A OTHER FRACTURE OF RIGHT LOWER LEG, INITIAL ENCOUNTER FOR CLOSED FRACTURE: ICD-10-CM

## 2020-08-10 DIAGNOSIS — Z98.51 TUBAL LIGATION STATUS: Chronic | ICD-10-CM

## 2020-08-10 PROCEDURE — 20606 DRAIN/INJ JOINT/BURSA W/US: CPT | Mod: RT

## 2020-08-24 ENCOUNTER — TRANSCRIPTION ENCOUNTER (OUTPATIENT)
Age: 60
End: 2020-08-24

## 2020-08-24 NOTE — ED ADULT TRIAGE NOTE - NS ED TRIAGE AVPU SCALE
Alert-The patient is alert, awake and responds to voice. The patient is oriented to time, place, and person. The triage nurse is able to obtain subjective information.
Universal Safety Interventions

## 2020-09-06 ENCOUNTER — EMERGENCY (EMERGENCY)
Facility: HOSPITAL | Age: 60
LOS: 1 days | Discharge: HOME | End: 2020-09-06
Attending: EMERGENCY MEDICINE | Admitting: EMERGENCY MEDICINE
Payer: MEDICAID

## 2020-09-06 VITALS
DIASTOLIC BLOOD PRESSURE: 71 MMHG | WEIGHT: 179.9 LBS | HEART RATE: 97 BPM | SYSTOLIC BLOOD PRESSURE: 110 MMHG | HEIGHT: 67 IN | TEMPERATURE: 98 F | RESPIRATION RATE: 16 BRPM | OXYGEN SATURATION: 95 %

## 2020-09-06 DIAGNOSIS — Z98.51 TUBAL LIGATION STATUS: Chronic | ICD-10-CM

## 2020-09-06 DIAGNOSIS — Z96.60 PRESENCE OF UNSPECIFIED ORTHOPEDIC JOINT IMPLANT: Chronic | ICD-10-CM

## 2020-09-06 DIAGNOSIS — Z90.49 ACQUIRED ABSENCE OF OTHER SPECIFIED PARTS OF DIGESTIVE TRACT: Chronic | ICD-10-CM

## 2020-09-06 LAB
ALBUMIN SERPL ELPH-MCNC: 3.5 G/DL — SIGNIFICANT CHANGE UP (ref 3.3–5.2)
ALP SERPL-CCNC: 86 U/L — SIGNIFICANT CHANGE UP (ref 40–120)
ALT FLD-CCNC: 16 U/L — SIGNIFICANT CHANGE UP
ANION GAP SERPL CALC-SCNC: 11 MMOL/L — SIGNIFICANT CHANGE UP (ref 5–17)
APTT BLD: 33.5 SEC — SIGNIFICANT CHANGE UP (ref 27.5–35.5)
AST SERPL-CCNC: 25 U/L — SIGNIFICANT CHANGE UP
BASOPHILS # BLD AUTO: 0.02 K/UL — SIGNIFICANT CHANGE UP (ref 0–0.2)
BASOPHILS NFR BLD AUTO: 0.3 % — SIGNIFICANT CHANGE UP (ref 0–2)
BILIRUB SERPL-MCNC: <0.2 MG/DL — LOW (ref 0.4–2)
BLD GP AB SCN SERPL QL: SIGNIFICANT CHANGE UP
BUN SERPL-MCNC: 13 MG/DL — SIGNIFICANT CHANGE UP (ref 8–20)
CALCIUM SERPL-MCNC: 8.5 MG/DL — LOW (ref 8.6–10.2)
CHLORIDE SERPL-SCNC: 103 MMOL/L — SIGNIFICANT CHANGE UP (ref 98–107)
CO2 SERPL-SCNC: 31 MMOL/L — HIGH (ref 22–29)
CREAT SERPL-MCNC: 1.19 MG/DL — SIGNIFICANT CHANGE UP (ref 0.5–1.3)
EOSINOPHIL # BLD AUTO: 0.13 K/UL — SIGNIFICANT CHANGE UP (ref 0–0.5)
EOSINOPHIL NFR BLD AUTO: 1.9 % — SIGNIFICANT CHANGE UP (ref 0–6)
GLUCOSE SERPL-MCNC: 124 MG/DL — HIGH (ref 70–99)
HCT VFR BLD CALC: 32.3 % — LOW (ref 34.5–45)
HGB BLD-MCNC: 10 G/DL — LOW (ref 11.5–15.5)
IMM GRANULOCYTES NFR BLD AUTO: 0.4 % — SIGNIFICANT CHANGE UP (ref 0–1.5)
INR BLD: 0.97 RATIO — SIGNIFICANT CHANGE UP (ref 0.88–1.16)
LYMPHOCYTES # BLD AUTO: 0.96 K/UL — LOW (ref 1–3.3)
LYMPHOCYTES # BLD AUTO: 14 % — SIGNIFICANT CHANGE UP (ref 13–44)
MCHC RBC-ENTMCNC: 31 GM/DL — LOW (ref 32–36)
MCHC RBC-ENTMCNC: 31.1 PG — SIGNIFICANT CHANGE UP (ref 27–34)
MCV RBC AUTO: 100.3 FL — HIGH (ref 80–100)
MONOCYTES # BLD AUTO: 0.62 K/UL — SIGNIFICANT CHANGE UP (ref 0–0.9)
MONOCYTES NFR BLD AUTO: 9.1 % — SIGNIFICANT CHANGE UP (ref 2–14)
NEUTROPHILS # BLD AUTO: 5.09 K/UL — SIGNIFICANT CHANGE UP (ref 1.8–7.4)
NEUTROPHILS NFR BLD AUTO: 74.3 % — SIGNIFICANT CHANGE UP (ref 43–77)
PLATELET # BLD AUTO: 145 K/UL — LOW (ref 150–400)
POTASSIUM SERPL-MCNC: 3.8 MMOL/L — SIGNIFICANT CHANGE UP (ref 3.5–5.3)
POTASSIUM SERPL-SCNC: 3.8 MMOL/L — SIGNIFICANT CHANGE UP (ref 3.5–5.3)
PROT SERPL-MCNC: 6.2 G/DL — LOW (ref 6.6–8.7)
PROTHROM AB SERPL-ACNC: 11.3 SEC — SIGNIFICANT CHANGE UP (ref 10.6–13.6)
RBC # BLD: 3.22 M/UL — LOW (ref 3.8–5.2)
RBC # FLD: 13.1 % — SIGNIFICANT CHANGE UP (ref 10.3–14.5)
SODIUM SERPL-SCNC: 145 MMOL/L — SIGNIFICANT CHANGE UP (ref 135–145)
WBC # BLD: 6.85 K/UL — SIGNIFICANT CHANGE UP (ref 3.8–10.5)
WBC # FLD AUTO: 6.85 K/UL — SIGNIFICANT CHANGE UP (ref 3.8–10.5)

## 2020-09-06 PROCEDURE — 27818 TREATMENT OF ANKLE FRACTURE: CPT | Mod: LT

## 2020-09-06 PROCEDURE — 99220: CPT

## 2020-09-06 PROCEDURE — 70450 CT HEAD/BRAIN W/O DYE: CPT | Mod: 26

## 2020-09-06 PROCEDURE — 73700 CT LOWER EXTREMITY W/O DYE: CPT | Mod: 26,LT

## 2020-09-06 PROCEDURE — 93010 ELECTROCARDIOGRAM REPORT: CPT

## 2020-09-06 PROCEDURE — 73630 X-RAY EXAM OF FOOT: CPT | Mod: 26,LT

## 2020-09-06 PROCEDURE — 72170 X-RAY EXAM OF PELVIS: CPT | Mod: 26

## 2020-09-06 PROCEDURE — 71045 X-RAY EXAM CHEST 1 VIEW: CPT | Mod: 26

## 2020-09-06 PROCEDURE — 73590 X-RAY EXAM OF LOWER LEG: CPT | Mod: 26,LT

## 2020-09-06 PROCEDURE — 73610 X-RAY EXAM OF ANKLE: CPT | Mod: 26,LT

## 2020-09-06 RX ORDER — GABAPENTIN 400 MG/1
300 CAPSULE ORAL EVERY 8 HOURS
Refills: 0 | Status: DISCONTINUED | OUTPATIENT
Start: 2020-09-06 | End: 2020-09-11

## 2020-09-06 RX ORDER — IBUPROFEN 200 MG
800 TABLET ORAL THREE TIMES A DAY
Refills: 0 | Status: DISCONTINUED | OUTPATIENT
Start: 2020-09-06 | End: 2020-09-11

## 2020-09-06 RX ORDER — KETOROLAC TROMETHAMINE 30 MG/ML
15 SYRINGE (ML) INJECTION ONCE
Refills: 0 | Status: DISCONTINUED | OUTPATIENT
Start: 2020-09-06 | End: 2020-09-06

## 2020-09-06 RX ORDER — SODIUM CHLORIDE 9 MG/ML
1000 INJECTION INTRAMUSCULAR; INTRAVENOUS; SUBCUTANEOUS ONCE
Refills: 0 | Status: COMPLETED | OUTPATIENT
Start: 2020-09-06 | End: 2020-09-06

## 2020-09-06 RX ORDER — FAMOTIDINE 10 MG/ML
20 INJECTION INTRAVENOUS DAILY
Refills: 0 | Status: DISCONTINUED | OUTPATIENT
Start: 2020-09-06 | End: 2020-09-11

## 2020-09-06 RX ORDER — ONDANSETRON 8 MG/1
4 TABLET, FILM COATED ORAL ONCE
Refills: 0 | Status: COMPLETED | OUTPATIENT
Start: 2020-09-06 | End: 2020-09-06

## 2020-09-06 RX ORDER — BUPROPION HYDROCHLORIDE 150 MG/1
300 TABLET, EXTENDED RELEASE ORAL DAILY
Refills: 0 | Status: DISCONTINUED | OUTPATIENT
Start: 2020-09-06 | End: 2020-09-11

## 2020-09-06 RX ORDER — MORPHINE SULFATE 50 MG/1
4 CAPSULE, EXTENDED RELEASE ORAL ONCE
Refills: 0 | Status: DISCONTINUED | OUTPATIENT
Start: 2020-09-06 | End: 2020-09-06

## 2020-09-06 RX ORDER — TRAZODONE HCL 50 MG
150 TABLET ORAL AT BEDTIME
Refills: 0 | Status: DISCONTINUED | OUTPATIENT
Start: 2020-09-06 | End: 2020-09-11

## 2020-09-06 RX ADMIN — Medication 150 MILLIGRAM(S): at 21:08

## 2020-09-06 RX ADMIN — Medication 15 MILLIGRAM(S): at 13:17

## 2020-09-06 RX ADMIN — Medication 800 MILLIGRAM(S): at 23:00

## 2020-09-06 RX ADMIN — SODIUM CHLORIDE 1000 MILLILITER(S): 9 INJECTION INTRAMUSCULAR; INTRAVENOUS; SUBCUTANEOUS at 13:18

## 2020-09-06 RX ADMIN — SODIUM CHLORIDE 1000 MILLILITER(S): 9 INJECTION INTRAMUSCULAR; INTRAVENOUS; SUBCUTANEOUS at 14:18

## 2020-09-06 RX ADMIN — SODIUM CHLORIDE 1000 MILLILITER(S): 9 INJECTION INTRAMUSCULAR; INTRAVENOUS; SUBCUTANEOUS at 23:57

## 2020-09-06 RX ADMIN — SODIUM CHLORIDE 1000 MILLILITER(S): 9 INJECTION INTRAMUSCULAR; INTRAVENOUS; SUBCUTANEOUS at 18:11

## 2020-09-06 RX ADMIN — Medication 800 MILLIGRAM(S): at 21:09

## 2020-09-06 RX ADMIN — GABAPENTIN 300 MILLIGRAM(S): 400 CAPSULE ORAL at 21:09

## 2020-09-06 RX ADMIN — Medication 15 MILLIGRAM(S): at 14:18

## 2020-09-06 NOTE — ED PROVIDER NOTE - MUSCULOSKELETAL MINIMAL EXAM
RANGE OF MOTION LIMITED/left ankle is deformed and hasbone sticking out medial aspect but no ski break, pt has good dp pusle, good cap refill in foot,

## 2020-09-06 NOTE — ED ADULT TRIAGE NOTE - CHIEF COMPLAINT QUOTE
pt arrive by ambulance after a fall with left ankle deformity. received 100mcg fentanyl by EMS PTA. pt appears intermittently sleepy, on 2L NC. as per EMS she was not sleepy before fentanyl

## 2020-09-06 NOTE — ED PROVIDER NOTE - OBJECTIVE STATEMENT
61 y/o F with h/o anxiety/ depression, asthma,  HTN , spinal stenosis, osteoarthritis, seasonal allergies, rt ankle ORIF earlier this year p/w fall when she tripped on door and twisted her left ankle. Pt bibems as she has deformed ankle and was splinted and given fentanyl en route. Pt is no sleepy post medication and only complains of left ankle pain. Pt denies nay other complaints, denies hit to the head or loc

## 2020-09-06 NOTE — PROGRESS NOTE ADULT - SUBJECTIVE AND OBJECTIVE BOX
Pt Name: CHERYL MIR  MRN: [id number]      Patient is a 60y Female presenting to the emergency department with a chief complaint of left ankle pain after falling today. Patient states she tripped on door today and fell, injuring her left ankle. Patient now endorsing pain to left ankle. As per ED attending, patient got fentanyl in ambulance on way to ED. No other orthopedic complaints.       REVIEW OF SYSTEMS    General: Alert, responsive, in NAD    Respiratory and Thorax: No difficulty breathing. No cough.  	   Cardiovascular:	No chest pain. No palpitations.    Musculoskeletal: SEE HPI.    Neurological: No sensory or motor changes.     Hematology/Lymphatics: No swelling.    ROS is otherwise negative.      PAST MEDICAL & SURGICAL HISTORY:  Hypertension  Anxiety and depression  Seasonal allergies  Asthma  Osteoarthritis  S/P hip replacement: left  S/P tubal ligation  S/P cholecystectomy      Allergies: No Known Allergies      Medications:     FAMILY HISTORY:  Family history of COPD (chronic obstructive pulmonary disease)  Family history of CVA  : non-contributory    Social History:     Ambulation: Walking independently [x] With Cane [ ] With Walker [ ]  Bedbound [ ]                           10.0   6.85  )-----------( 145      ( 06 Sep 2020 13:42 )             32.3       09-06    145  |  103  |  13.0  ----------------------------<  124<H>  3.8   |  31.0<H>  |  1.19    Ca    8.5<L>      06 Sep 2020 13:42    TPro  6.2<L>  /  Alb  3.5  /  TBili  <0.2<L>  /  DBili  x   /  AST  25  /  ALT  16  /  AlkPhos  86  09-06      Vital Signs Last 24 Hrs  T(C): 36.7 (06 Sep 2020 12:28), Max: 36.7 (06 Sep 2020 12:28)  T(F): 98.1 (06 Sep 2020 12:28), Max: 98.1 (06 Sep 2020 12:28)  HR: 97 (06 Sep 2020 12:28) (97 - 97)  BP: 110/71 (06 Sep 2020 12:28) (110/71 - 110/71)  BP(mean): --  RR: 16 (06 Sep 2020 12:28) (16 - 16)  SpO2: 95% (06 Sep 2020 12:28) (95% - 95%)    Daily Height in cm: 170.18 (06 Sep 2020 12:28)    Daily       PHYSICAL EXAM:    Appearance: Alert, responsive, in no acute distress. Sleepy    Musculoskeletal:         Left Upper Extremity: atraumatic. NROM. nontender.       Right Upper Extremity: atraumatic. NROM. nontender.       Right Lower Extremity: atraumatic. NROM. nontender.       Left Lower Extremity: +left ankle deformity. Skin is clean, dry, and intact. No abrasions, ecchymosis, or erythema. No bleeding. Sensation is grossly intact to light touch, unable to dorsi/plantarflex secondary to injury. + EHL/FHL. DP pulses 2+. Cap refill < 2 seconds. No cyanosis. No signs of venous insufficiency or stasis.         Imaging Studies:  X-ray left ankle done shows left trimalleolar fracture/dislocation. Official read pending      A/P:  Pt is a  60y Female with left trimalleolar fracture/dislocation.    PLAN:   * Ankle reduced and splinted  * Elevation, ice  * CT left ankle ordered  * Weight bearing status: NWB LLE  * Continue care as per primary team  * Further plan upon discussion with attending Pt Name: CHERYL MIR  MRN: [id number]      Patient is a 60y Female presenting to the emergency department with a chief complaint of left ankle pain after falling today. Patient states she tripped on door today and fell, injuring her left ankle. Patient now endorsing pain to left ankle. As per ED attending, patient got fentanyl in ambulance on way to ED. No other orthopedic complaints.       REVIEW OF SYSTEMS    General: Alert, responsive, in NAD    Respiratory and Thorax: No difficulty breathing. No cough.  	   Cardiovascular:	No chest pain. No palpitations.    Musculoskeletal: SEE HPI.    Neurological: No sensory or motor changes.     Hematology/Lymphatics: No swelling.    ROS is otherwise negative.      PAST MEDICAL & SURGICAL HISTORY:  Hypertension  Anxiety and depression  Seasonal allergies  Asthma  Osteoarthritis  S/P hip replacement: left  S/P tubal ligation  S/P cholecystectomy      Allergies: No Known Allergies      Medications:     FAMILY HISTORY:  Family history of COPD (chronic obstructive pulmonary disease)  Family history of CVA  : non-contributory    Social History:     Ambulation: Walking independently [x] With Cane [ ] With Walker [ ]  Bedbound [ ]                           10.0   6.85  )-----------( 145      ( 06 Sep 2020 13:42 )             32.3       09-06    145  |  103  |  13.0  ----------------------------<  124<H>  3.8   |  31.0<H>  |  1.19    Ca    8.5<L>      06 Sep 2020 13:42    TPro  6.2<L>  /  Alb  3.5  /  TBili  <0.2<L>  /  DBili  x   /  AST  25  /  ALT  16  /  AlkPhos  86  09-06      Vital Signs Last 24 Hrs  T(C): 36.7 (06 Sep 2020 12:28), Max: 36.7 (06 Sep 2020 12:28)  T(F): 98.1 (06 Sep 2020 12:28), Max: 98.1 (06 Sep 2020 12:28)  HR: 97 (06 Sep 2020 12:28) (97 - 97)  BP: 110/71 (06 Sep 2020 12:28) (110/71 - 110/71)  BP(mean): --  RR: 16 (06 Sep 2020 12:28) (16 - 16)  SpO2: 95% (06 Sep 2020 12:28) (95% - 95%)    Daily Height in cm: 170.18 (06 Sep 2020 12:28)    Daily       PHYSICAL EXAM:    Appearance: Alert, responsive, in no acute distress. Sleepy    Musculoskeletal:         Left Upper Extremity: atraumatic. NROM. nontender.       Right Upper Extremity: atraumatic. NROM. nontender.       Right Lower Extremity: atraumatic. NROM. nontender.       Left Lower Extremity: +left ankle deformity. Skin is clean, dry, and intact. No abrasions, ecchymosis, or erythema. No bleeding. Sensation is grossly intact to light touch, unable to dorsi/plantarflex secondary to injury. + EHL/FHL. DP pulses 2+. Cap refill < 2 seconds. No cyanosis. No signs of venous insufficiency or stasis.         Imaging Studies:  X-ray left ankle done shows left trimalleolar fracture/dislocation. Official read pending      A/P:  Pt is a  60y Female with left trimalleolar fracture/dislocation.    PLAN:   * Ankle reduced and splinted  * Elevation, ice  * CT left ankle ordered  * Recommend PT eval  * Weight bearing status: NWB LLE  * Continue care as per primary team  * Further plan upon discussion with attending

## 2020-09-06 NOTE — ED CDU PROVIDER INITIAL DAY NOTE - MEDICAL DECISION MAKING DETAILS
61 yo female PMHx anxiety, depression, asthma, HTN, spinal stenosis, osteoarthritis, seasonal allergies, right ankle ORIF earlier this year presents to ED BIBA c/o ankle pain s/p mechanical fall. Patient splinted and reduced, NWB. Patient pending PT evaluation.

## 2020-09-06 NOTE — ED CDU PROVIDER INITIAL DAY NOTE - OBJECTIVE STATEMENT
59 yo female PMHx anxiety, depression, asthma, HTN, spinal stenosis, osteoarthritis, seasonal allergies, right ankle ORIF earlier this year presents to ED BIBA c/o ankle pain s/p fall when she tripped on door and twisted her left ankle. Patient presented with deformed ankle and was splinted and given Fentanyl en route. Patient reports increase in number of falls. As per , patient at baseline.   Pt denies nay other complaints, denies hit to the head or loc.

## 2020-09-06 NOTE — ED PROVIDER NOTE - PROGRESS NOTE DETAILS
spoke to kavin Jones over phoen who states that he was entering the door and he witnessed her fall, She was fine speech wise and mental status wise until fentanyl was given to her. Per  she is aox3, with clear speech at abseline, will do ct head to assess for persisetn chnages in speech. ortho at bedside for reduction,  aware of condition spoke to  Lio Jones over phone who states that he was entering the door and he witnessed her fall, She was fine speech wise and mental status wise until fentanyl was given to her. Per  she is aox3, with clear speech at abseline, will do ct head to assess for persisetn chnages in speech. ortho at bedside for reduction,  aware of condition  confirmed pt at [facility] aox3, pt ate food,hydrated, placed in obs for PT eval

## 2020-09-06 NOTE — ED ADULT NURSE NOTE - IS THE PATIENT ABLE TO BE SCREENED?
Sski Pregnancy And Lactation Text: This medication is Pregnancy Category D and isn't considered safe during pregnancy. It is excreted in breast milk. Yes

## 2020-09-06 NOTE — ED PROVIDER NOTE - CLINICAL SUMMARY MEDICAL DECISION MAKING FREE TEXT BOX
pt likely has acute displaced fx of ankle- bimalleolar eric, will check xrays and call ortho for reduction and likely surgical repair, control pain

## 2020-09-06 NOTE — ED CDU PROVIDER INITIAL DAY NOTE - ATTENDING CONTRIBUTION TO CARE
Willem: I performed a face to face bedside interview with patient regarding history of present illness, review of symptoms and past medical history. I completed an independent physical exam.  I have discussed patient's plan of care with advanced care provider.   I agree with note as stated above including HISTORY OF PRESENT ILLNESS, HIV, PAST MEDICAL/SURGICAL/FAMILY/SOCIAL HISTORY, ALLERGIES AND HOME MEDICATIONS, REVIEW OF SYSTEMS, PHYSICAL EXAM, MEDICAL DECISION MAKING and any PROGRESS NOTES during the time I functioned as the attending physician for this patient  unless otherwise noted. My brief assessment is as follows: 60F p/w mechanical fall with L ankle fx s/p reduction. At baseline mental status as per . Pending PT eval.

## 2020-09-06 NOTE — ED PROVIDER NOTE - ENMT NEGATIVE STATEMENT, MLM
FB was removed at the slit lamp. Ears: no ear pain and no hearing problems.Nose: no nasal congestion and no nasal drainage.Mouth/Throat: no dysphagia, no hoarseness and no throat pain.Neck: no lumps, no pain, no stiffness and no swollen glands.

## 2020-09-07 LAB
AMPHET UR-MCNC: POSITIVE
APPEARANCE UR: CLEAR — SIGNIFICANT CHANGE UP
BARBITURATES UR SCN-MCNC: NEGATIVE — SIGNIFICANT CHANGE UP
BENZODIAZ UR-MCNC: POSITIVE
BILIRUB UR-MCNC: NEGATIVE — SIGNIFICANT CHANGE UP
COCAINE METAB.OTHER UR-MCNC: NEGATIVE — SIGNIFICANT CHANGE UP
COLOR SPEC: YELLOW — SIGNIFICANT CHANGE UP
DIFF PNL FLD: ABNORMAL
EPI CELLS # UR: SIGNIFICANT CHANGE UP
GLUCOSE UR QL: NEGATIVE MG/DL — SIGNIFICANT CHANGE UP
KETONES UR-MCNC: NEGATIVE — SIGNIFICANT CHANGE UP
LEUKOCYTE ESTERASE UR-ACNC: NEGATIVE — SIGNIFICANT CHANGE UP
METHADONE UR-MCNC: NEGATIVE — SIGNIFICANT CHANGE UP
NITRITE UR-MCNC: NEGATIVE — SIGNIFICANT CHANGE UP
OPIATES UR-MCNC: NEGATIVE — SIGNIFICANT CHANGE UP
PCP SPEC-MCNC: SIGNIFICANT CHANGE UP
PCP UR-MCNC: NEGATIVE — SIGNIFICANT CHANGE UP
PH UR: 6 — SIGNIFICANT CHANGE UP (ref 5–8)
PROT UR-MCNC: 15 MG/DL
RBC CASTS # UR COMP ASSIST: SIGNIFICANT CHANGE UP /HPF (ref 0–4)
SARS-COV-2 RNA SPEC QL NAA+PROBE: SIGNIFICANT CHANGE UP
SP GR SPEC: 1.01 — SIGNIFICANT CHANGE UP (ref 1.01–1.02)
THC UR QL: NEGATIVE — SIGNIFICANT CHANGE UP
UROBILINOGEN FLD QL: NEGATIVE MG/DL — SIGNIFICANT CHANGE UP
WBC UR QL: SIGNIFICANT CHANGE UP

## 2020-09-07 PROCEDURE — 99226: CPT

## 2020-09-07 RX ORDER — OXYCODONE AND ACETAMINOPHEN 5; 325 MG/1; MG/1
1 TABLET ORAL EVERY 6 HOURS
Refills: 0 | Status: DISCONTINUED | OUTPATIENT
Start: 2020-09-07 | End: 2020-09-08

## 2020-09-07 RX ADMIN — BUPROPION HYDROCHLORIDE 300 MILLIGRAM(S): 150 TABLET, EXTENDED RELEASE ORAL at 05:21

## 2020-09-07 RX ADMIN — Medication 100 MILLIGRAM(S): at 15:03

## 2020-09-07 RX ADMIN — GABAPENTIN 300 MILLIGRAM(S): 400 CAPSULE ORAL at 05:20

## 2020-09-07 RX ADMIN — OXYCODONE AND ACETAMINOPHEN 1 TABLET(S): 5; 325 TABLET ORAL at 13:57

## 2020-09-07 RX ADMIN — OXYCODONE AND ACETAMINOPHEN 1 TABLET(S): 5; 325 TABLET ORAL at 13:19

## 2020-09-07 RX ADMIN — OXYCODONE AND ACETAMINOPHEN 1 TABLET(S): 5; 325 TABLET ORAL at 19:39

## 2020-09-07 RX ADMIN — Medication 200 MILLIGRAM(S): at 17:49

## 2020-09-07 RX ADMIN — OXYCODONE AND ACETAMINOPHEN 1 TABLET(S): 5; 325 TABLET ORAL at 20:39

## 2020-09-07 RX ADMIN — GABAPENTIN 300 MILLIGRAM(S): 400 CAPSULE ORAL at 22:18

## 2020-09-07 RX ADMIN — FAMOTIDINE 20 MILLIGRAM(S): 10 INJECTION INTRAVENOUS at 05:21

## 2020-09-07 RX ADMIN — BUPROPION HYDROCHLORIDE 300 MILLIGRAM(S): 150 TABLET, EXTENDED RELEASE ORAL at 12:16

## 2020-09-07 RX ADMIN — Medication 150 MILLIGRAM(S): at 22:18

## 2020-09-07 RX ADMIN — Medication 800 MILLIGRAM(S): at 07:20

## 2020-09-07 RX ADMIN — Medication 800 MILLIGRAM(S): at 05:20

## 2020-09-07 RX ADMIN — GABAPENTIN 300 MILLIGRAM(S): 400 CAPSULE ORAL at 12:16

## 2020-09-07 NOTE — PHYSICAL THERAPY INITIAL EVALUATION ADULT - ADDITIONAL COMMENTS
Pt reports living in a house with no steps to enter/inside with her  who is retired as well.  able to assist if needed. Pt owns RW, crutches, shower chair and wheel chair. Pt independent prior to admission.

## 2020-09-07 NOTE — PROVIDER CONTACT NOTE (OTHER) - ACTION/TREATMENT ORDERED:
PT Goals: (1 Week)  Bed mobility: pt will be independent  Transfers: Pt will required minAx1 for STS maintaining NWB LLE  Gait: Pt will ambulate 10 ft with RW maintaining NWB LLE with minAx1

## 2020-09-07 NOTE — ED ADULT NURSE REASSESSMENT NOTE - NURSING MUSC EXTREMITY NORMAL ROM
left upper extremity/right upper extremity/right lower extremity
left upper extremity/right upper extremity

## 2020-09-07 NOTE — PHYSICAL THERAPY INITIAL EVALUATION ADULT - LEVEL OF INDEPENDENCE: GAIT, REHAB EVAL
unable to perform/Pt demonstrating difficulty maintaining NWB status of Left LE. Pt requires modAx1 for stand pivot transfers and max verbal cues to keep Left LE off from floor.

## 2020-09-07 NOTE — CHART NOTE - NSCHARTNOTEFT_GEN_A_CORE
SOCIAL WORK NOTE:  MET WITH PATIENT TO DISCUSS TRANSITIONAL PLAN.  PATIENT MADE ME AWARE THAT SHE WAS IN [facility] ON MARCH 28TH AND STAYED THERE X 2 WEEKS AFTER BREAKING ANKLE. [facility] TRANSFERRED HER TO [facility] SECONDARY TO SEVERITY OF BREAK.  PATIENT WAS SENT HOME FROM [facility] SECONDARY TO COVID CONCERNS. PATIENT NOW PRESENTING WITH ADDITIONAL BREAK.  PHYSICAL THERAPY EVAL COMPLETED AND RECOMMENDATIONS ARE FOR BRIAN. PATIENT IN AGREEMENT.  PATIENT REPORTS SHE HAS [employer] [place] [employer].  AS PER REGISTRATION, PATIENT IS COMING UP AS INACTIVE SINCE 8/31/2020. SPOKE WITH  AT BEDSIDE ABOUT IT. HE HAS NOT RECEIVED ANY LETTERS REGARDING INACTIVE INSURANCE AND REPORTS THAT THEY DEDUCT THE COST OF THE INSURANCE FROM BANK ACCOUNT.  MADE PATIENT AND  AWARE UNABLE TO INQUIRE FURTHER SECONDARY TO [employer]. MADE THEM AWARE THAT SOCIAL WORK HAS ALREADY EMAILED MEDICAID BUSINESS OFFICE FOR FURTHER ASSISTANCE ON THE MATTER TOMORROW AND ALSO EMAILED A MESSAGE TO [employer] IN ORDER TO OBTAIN FURTHER KNOWLEDGE OF WHAT HAPPENED WITH POLICY.  MADE PATIENT AND SPOUSE AWARE THAT NO FURTHER INFORMATION WILL BE AVAILABLE UNTIL TOMORROW.  SPOUSE CAN BE REACHED AT TOMORROW ON HIS CELL PHONE # [phone] TO UPDATE.

## 2020-09-07 NOTE — PHYSICAL THERAPY INITIAL EVALUATION ADULT - RANGE OF MOTION EXAMINATION, REHAB EVAL
Right LE ROM was WNL (within normal limits)/Left Ankle ROM unable to be assessed at this time secondary to reduction/bilateral upper extremity ROM was WNL (within normal limits)

## 2020-09-08 VITALS
SYSTOLIC BLOOD PRESSURE: 116 MMHG | OXYGEN SATURATION: 95 % | TEMPERATURE: 98 F | RESPIRATION RATE: 18 BRPM | DIASTOLIC BLOOD PRESSURE: 80 MMHG | HEART RATE: 69 BPM

## 2020-09-08 PROCEDURE — 73610 X-RAY EXAM OF ANKLE: CPT

## 2020-09-08 PROCEDURE — 71045 X-RAY EXAM CHEST 1 VIEW: CPT

## 2020-09-08 PROCEDURE — 86850 RBC ANTIBODY SCREEN: CPT

## 2020-09-08 PROCEDURE — G0378: CPT

## 2020-09-08 PROCEDURE — 99284 EMERGENCY DEPT VISIT MOD MDM: CPT

## 2020-09-08 PROCEDURE — 93005 ELECTROCARDIOGRAM TRACING: CPT

## 2020-09-08 PROCEDURE — 82553 CREATINE MB FRACTION: CPT

## 2020-09-08 PROCEDURE — 73700 CT LOWER EXTREMITY W/O DYE: CPT

## 2020-09-08 PROCEDURE — 84484 ASSAY OF TROPONIN QUANT: CPT

## 2020-09-08 PROCEDURE — 72170 X-RAY EXAM OF PELVIS: CPT

## 2020-09-08 PROCEDURE — 99217: CPT

## 2020-09-08 PROCEDURE — 70450 CT HEAD/BRAIN W/O DYE: CPT

## 2020-09-08 PROCEDURE — 86901 BLOOD TYPING SEROLOGIC RH(D): CPT

## 2020-09-08 PROCEDURE — 96374 THER/PROPH/DIAG INJ IV PUSH: CPT | Mod: XU

## 2020-09-08 PROCEDURE — 99285 EMERGENCY DEPT VISIT HI MDM: CPT | Mod: 25

## 2020-09-08 PROCEDURE — 80053 COMPREHEN METABOLIC PANEL: CPT

## 2020-09-08 PROCEDURE — 36415 COLL VENOUS BLD VENIPUNCTURE: CPT

## 2020-09-08 PROCEDURE — 85610 PROTHROMBIN TIME: CPT

## 2020-09-08 PROCEDURE — 85027 COMPLETE CBC AUTOMATED: CPT

## 2020-09-08 PROCEDURE — 81001 URINALYSIS AUTO W/SCOPE: CPT

## 2020-09-08 PROCEDURE — 73630 X-RAY EXAM OF FOOT: CPT

## 2020-09-08 PROCEDURE — U0003: CPT

## 2020-09-08 PROCEDURE — 73590 X-RAY EXAM OF LOWER LEG: CPT

## 2020-09-08 PROCEDURE — 86900 BLOOD TYPING SEROLOGIC ABO: CPT

## 2020-09-08 PROCEDURE — 82550 ASSAY OF CK (CPK): CPT

## 2020-09-08 PROCEDURE — 73600 X-RAY EXAM OF ANKLE: CPT

## 2020-09-08 PROCEDURE — 96361 HYDRATE IV INFUSION ADD-ON: CPT

## 2020-09-08 PROCEDURE — 80307 DRUG TEST PRSMV CHEM ANLYZR: CPT

## 2020-09-08 PROCEDURE — 27810 TREATMENT OF ANKLE FRACTURE: CPT

## 2020-09-08 PROCEDURE — 85730 THROMBOPLASTIN TIME PARTIAL: CPT

## 2020-09-08 RX ORDER — OXYCODONE AND ACETAMINOPHEN 5; 325 MG/1; MG/1
2 TABLET ORAL EVERY 4 HOURS
Refills: 0 | Status: DISCONTINUED | OUTPATIENT
Start: 2020-09-08 | End: 2020-09-08

## 2020-09-08 RX ORDER — ALPRAZOLAM 0.25 MG
0.5 TABLET ORAL ONCE
Refills: 0 | Status: DISCONTINUED | OUTPATIENT
Start: 2020-09-08 | End: 2020-09-08

## 2020-09-08 RX ORDER — LORATADINE 10 MG/1
10 TABLET ORAL ONCE
Refills: 0 | Status: COMPLETED | OUTPATIENT
Start: 2020-09-08 | End: 2020-09-08

## 2020-09-08 RX ADMIN — GABAPENTIN 300 MILLIGRAM(S): 400 CAPSULE ORAL at 14:20

## 2020-09-08 RX ADMIN — OXYCODONE AND ACETAMINOPHEN 1 TABLET(S): 5; 325 TABLET ORAL at 09:10

## 2020-09-08 RX ADMIN — OXYCODONE AND ACETAMINOPHEN 1 TABLET(S): 5; 325 TABLET ORAL at 09:40

## 2020-09-08 RX ADMIN — BUPROPION HYDROCHLORIDE 300 MILLIGRAM(S): 150 TABLET, EXTENDED RELEASE ORAL at 12:13

## 2020-09-08 RX ADMIN — OXYCODONE AND ACETAMINOPHEN 2 TABLET(S): 5; 325 TABLET ORAL at 12:13

## 2020-09-08 RX ADMIN — FAMOTIDINE 20 MILLIGRAM(S): 10 INJECTION INTRAVENOUS at 12:13

## 2020-09-08 RX ADMIN — OXYCODONE AND ACETAMINOPHEN 1 TABLET(S): 5; 325 TABLET ORAL at 04:55

## 2020-09-08 RX ADMIN — LORATADINE 10 MILLIGRAM(S): 10 TABLET ORAL at 06:19

## 2020-09-08 RX ADMIN — OXYCODONE AND ACETAMINOPHEN 2 TABLET(S): 5; 325 TABLET ORAL at 13:00

## 2020-09-08 RX ADMIN — GABAPENTIN 300 MILLIGRAM(S): 400 CAPSULE ORAL at 04:55

## 2020-09-08 RX ADMIN — Medication 0.5 MILLIGRAM(S): at 14:20

## 2020-09-08 NOTE — ED CDU PROVIDER SUBSEQUENT DAY NOTE - MUSCULOSKELETAL NECK EXAM
supple/no deformity, pain or tenderness. no restriction of movement
supple/no deformity, pain or tenderness. no restriction of movement

## 2020-09-08 NOTE — ED CDU PROVIDER DISPOSITION NOTE - NSFOLLOWUPINSTRUCTIONS_ED_ALL_ED_FT
Fracture    A fracture is a break in one of your bones. This can occur from a variety of injuries, especially traumatic ones. Symptoms include pain, bruising, or swelling. Do not use the injured limb. If a fracture is in one of the bones below your waist, do not put weight on that limb unless instructed to do so by your healthcare provider. Crutches or a cane may have been provided. A splint or cast may have been applied by your health care provider. Make sure to keep it dry and follow up with an orthopedist as instructed.    SEEK IMMEDIATE MEDICAL CARE IF YOU HAVE ANY OF THE FOLLOWING SYMPTOMS: numbness, tingling, increasing pain, or weakness in any part of the injured limb.      Please rest, take pain medication as prescribed, follow up with orthopedist.

## 2020-09-08 NOTE — ED ADULT NURSE REASSESSMENT NOTE - COMFORT CARE
meal provided/plan of care explained/po fluids offered/repositioned/side rails up/wait time explained

## 2020-09-08 NOTE — ED ADULT NURSE REASSESSMENT NOTE - NURSING MUSC STRENGTH
limitations in strength
limitations in strength
hand grasp, leg strength strong and equal bilaterally
hand grasp, leg strength strong and equal bilaterally

## 2020-09-08 NOTE — ED ADULT NURSE REASSESSMENT NOTE - NS ED NURSE REASSESS COMMENT FT1
Assumed care from previous RN. Plan of care reviewed. Chief complaint of left ankle pain. Denies numbness, tingling, pain, swelling to left ankle/foot. Remains on cm and . Pt alert and oriented x3, lethargic, respirations even and unlabored, skin warm and dry, color appropriate for ethnicity, slurred speech (as per  patient at baseline), moving extremities. Updated pt on plan of care. Will continue to monitor.
Patient in bed resting comfortably. No c/o pain at this time. Percocet 1 tab given with positive effect. Vitals signs WNL. Call bell within reach, safety maintained. Will continue to monitor.
Patient in bed resting comfortably. Vital signs this AM WNL. Patient c/o cough, Claritin 10 mg ordered as per PA. Patient has no s/s of distress or discomfort. Safety maintained, call bell within reach. Will continue to monitor.
pt a+xo3, sitting up in bed c/o pain to right ankle. MD orders noted and initiated, pt positioned for comfort. call bell in reach, will continue to monitor,.
pt care assumed 1530, report received from offgoing RN GL. Per offgoing RN, pt with slurred speech and lethargic from prior medication administration. Pt is awake resting in bed comfortably at this time, no apparent distress noted at this time. pt safety maintained. Pt denies any complaints at this time. pt educated on plan of care, plan of care taught back to RN. plan of care education deemed proficient through successful teach back. will continue to reeducate pt regarding plan of care.
pt is awake, alert and oriented, working with PT; pt assisted to bathroom
Assumed care of the patient @1681. Pt A&Ox4. Patient in understanding of plan of care. Patient with no further questions for the RN. Resting in comfort. Call bell within reach and encouraged to use when assistance needed. Will continue to monitor.
Pt awake and alert, no respiratory distress, VSS afebrile. Pt resting comfortably at this time. Safety maintained. Will continue to monitor.
Pt c/o nonproductive cough PA made aware.  Patient in understanding of plan of care. Patient with no further questions for the RN. Resting in comfort. Call bell within reach and encouraged to use when assistance needed. Will continue to monitor.
Received patient @7pm. Patient AX4. Patients vital signs are stable at this time. Resp even and unlabored, and afebrile.#20 IV to LAC c/d/i, no s/s of infiltration or redness. Patient has no c/o pain to left ankle. Percocet 1 tab to be administered as per MD order. Patient resting in bed, safety maintained. Will continue to monitor and reevaluate pain.
pt sleepy, but arousable.  breakfast tray provided and set up for pt at bedside for when she is more awake.  warm blanket provided for pt.  pt maintained on CM; RSR.  LLE with cast in place; toes warm and mobile

## 2020-09-08 NOTE — ED CDU PROVIDER SUBSEQUENT DAY NOTE - FAMILY HISTORY
Father  Still living? Unknown  Family history of CVA, Age at diagnosis: Age Unknown  Family history of COPD (chronic obstructive pulmonary disease), Age at diagnosis: Age Unknown
Father  Still living? Unknown  Family history of CVA, Age at diagnosis: Age Unknown  Family history of COPD (chronic obstructive pulmonary disease), Age at diagnosis: Age Unknown

## 2020-09-08 NOTE — ED CDU PROVIDER SUBSEQUENT DAY NOTE - PSH
S/P cholecystectomy    S/P hip replacement  left  S/P tubal ligation
S/P cholecystectomy    S/P hip replacement  left  S/P tubal ligation

## 2020-09-08 NOTE — ED CDU PROVIDER DISPOSITION NOTE - ATTENDING CONTRIBUTION TO CARE
I agree with the PA's note and was available for any issues/concerns. I was directly involved in patient care. My brief overall assessment is as follows: left ankle fracture, reduced and in splint, pain controlled,  to help at home, no other injury or complaint.

## 2020-09-08 NOTE — ED CDU PROVIDER SUBSEQUENT DAY NOTE - NEURO NEGATIVE STATEMENT, MLM
no loss of consciousness, no gait abnormality, no headache, no sensory deficits, and no weakness.
no loss of consciousness, no gait abnormality, no headache, no sensory deficits, and no weakness.

## 2020-09-08 NOTE — ED CDU PROVIDER DISPOSITION NOTE - CLINICAL COURSE
61 yo female PMHx anxiety, depression, asthma, HTN, spinal stenosis, osteoarthritis, seasonal allergies, right ankle ORIF earlier this year presents to ED BIBA c/o ankle pain s/p fall when she tripped on door and twisted her left ankle. Patient presented with deformed ankle and was splinted and given Fentanyl en route. Patient reports increase in number of falls. As per , patient at baseline.   Pt denies nay other complaints, denies hit to the head or loc.  Patient found in ED to have L ankle FX, splinted and reduced and seen by ortho and PT and PMNR, has rolling walker at home.  RX percocet.  Given copies of all results, understands and agrees to proceed.

## 2020-09-08 NOTE — ED CDU PROVIDER SUBSEQUENT DAY NOTE - NS CPE EDP MUSC SPINE EXAM
no deformity, pain or tenderness. no restriction of movement
no deformity, pain or tenderness. no restriction of movement

## 2020-09-08 NOTE — ED CDU PROVIDER SUBSEQUENT DAY NOTE - MEDICAL DECISION MAKING DETAILS
61 yo female PMHx anxiety, depression, asthma, HTN, spinal stenosis, osteoarthritis, seasonal allergies, right ankle ORIF earlier this year presents to ED BIBA c/o ankle pain s/p mechanical fall. Patient splinted and reduced, NWB. Patient pending PT evaluation.
Pending CM/ SW for BRIAN placement.

## 2020-09-08 NOTE — CHART NOTE - NSCHARTNOTEFT_GEN_A_CORE
SOCIAL WORK NOTE:  CCC AND SW MET WITH PATIENT AND HER  AT BEDSIDE.  MADE THEM AWARE THAT MEDICAID BUSINESS OFFICE HAS MADE MULTIPLE ATTEMPTS TO CALL THEM AND MESSAGES WERE LEFT. AS PER SPOUSE, HE HAS BEEN ON THE PHONE TALKING TO [employer] SECOND[employer] TO THE DISCREPENCY WITH HIS RECEIVING OF MEDICARE RECENTLY DUE TO DISABILITY.  HE REPORTS THAT [facility] BEEN WITH[facility] THE MONTHLY PAYENTS FROM THEIR ACCOUNT BUT INACTIVATED THE [employer] POLICY. PATIENT STILL REMAINS AS SELF PAY. CCC AND SW WORKING ON DUAL PLAN AT THIS TIME BUT NO INSURANCE IS A MAJOR BARRIER AT THIS TIME.

## 2020-09-08 NOTE — ED CDU PROVIDER SUBSEQUENT DAY NOTE - EYES NEGATIVE STATEMENT, MLM
no discharge, no irritation, no pain, no redness, and no visual changes.
no discharge, no irritation, no pain, no redness, and no visual changes.

## 2020-09-08 NOTE — CHART NOTE - NSCHARTNOTEFT_GEN_A_CORE
SOCIAL WORK NOTE:  AS PER THE PATIENT'S AND SPOUSE INVESTIGATIONS, THEY WERE ABLE TO CONFIRM THAT [employer] INADVERTANTLY INACTIVATED THE PATIENT'S INSURANCE POLICY UPON HER  RECEIVING HIS MEDICARE CARD LAST MONTH.  PATIENT DOES NOT HAVE INSURANCE AT THIS TIME.  CCC AND SW WORKING ON ALTERNATE PLANS AT THIS TIME.

## 2020-09-08 NOTE — ED CDU PROVIDER SUBSEQUENT DAY NOTE - ENMT NEGATIVE STATEMENT, MLM
Ears: no ear pain and no hearing problems.Nose: no nasal congestion and no nasal drainage.Mouth/Throat: no dysphagia, no hoarseness and no throat pain.Neck: no lumps, no pain, no stiffness and no swollen glands
Ears: no ear pain and no hearing problems.Nose: no nasal congestion and no nasal drainage.Mouth/Throat: no dysphagia, no hoarseness and no throat pain.Neck: no lumps, no pain, no stiffness and no swollen glands

## 2020-09-08 NOTE — ED CDU PROVIDER DISPOSITION NOTE - CARE PROVIDER_API CALL
Jose Mcnair (DO)  Orthopedics  [address] [place] [id number]  Phone: [phone]  Fax: [phone]  Follow Up Time:

## 2020-09-08 NOTE — ED CDU PROVIDER DISPOSITION NOTE - PATIENT PORTAL LINK FT
You can access the FollowMyHealth Patient Portal offered by [facility] by registering at the following website: [email or web address]. By joining [employer]’s FollowMyHealth portal, you will also be able to view your health information using other applications (apps) compatible with our system.

## 2020-09-08 NOTE — ED CDU PROVIDER SUBSEQUENT DAY NOTE - SKIN NEGATIVE STATEMENT, MLM
no abrasions, no jaundice, no lesions, no pruritis, and no rashes.
no abrasions, no jaundice, no lesions, no pruritis, and no rashes.

## 2020-09-08 NOTE — ED CDU PROVIDER SUBSEQUENT DAY NOTE - PMH
Anxiety and depression    Asthma    Hypertension    Osteoarthritis    Seasonal allergies
Anxiety and depression    Asthma    Hypertension    Osteoarthritis    Seasonal allergies

## 2020-09-08 NOTE — ED CDU PROVIDER SUBSEQUENT DAY NOTE - ATTENDING CONTRIBUTION TO CARE
60 YOF PMHx anxiety, depression, asthma, HTN, spinal stenosis, osteoarthritis, seasonal allergies, right ankle ORIF earlier this year presents to ED BIBA c/o ankle pain s/p mechanical fall. Found to have trimalleolar fractur Patient splinted and reduced by ortho. Obs for PT eval
I agree with the PA's note and was available for any issues/concerns. I was directly involved in patient care. My brief overall assessment is as follows:    no events overnight; patient awaiting BRIAN placement

## 2020-09-08 NOTE — CONSULT NOTE ADULT - SUBJECTIVE AND OBJECTIVE BOX
60yF was seen in the ED on  for a trip and fall. UTOX positive for amphetamines and benzos.     LEFT ANKLE CT   Bones/joints: Comminuted mildly displaced fractures of the posterior and medial  malleoli. Several fracture fragments at the medial tibiotalar joint adjacent to the medial malleolar fracture. Comminuted fracture distal fibula above the ankle joint. No dislocation. Alignment at the tibiotalar joint anatomic. Soft tissues: Diffuse soft tissue edema at the ankle.    RIGHT ANKLE CT 7/10  1.  ORIF of a healing distal tibial fracture as detailed above. 2.  Medial malleolus plate not flush to the distal tibia 3.  Healing distal fibular fracture 4.  Osteochondral defect at the lateral talar dome. 5.  Osseous jeronimo along the anterior tibiotalar joint capsule may reflect small fracture fragment. 6.  Several intra-articular lucencies contacting the distal tibial plafond more prevalent on the current study possibly secondary to technique.    HEAD CT - No acute findings    RIGHT ANKLE XR - Status post right ankle fracture ORIF with intact distal medial fracture fixation plate and screws stabilizing a medial malleolar fracture and a posterior approach cannulated threaded screw stabilizing a posterior malleolar fracture. Anatomic alignment of these regions maintained.  Nonfixated obliquely oriented distal fibular fracture with bridging ossification apparent along the lateral aspect of the fracture. Still visible remaining distal fibular fracture lucency extending to joint line level. Residual slight medial clear space widening and slightly shortened appearing distal fibular length. Surrounding soft tissue swelling. No tracking gas collections or gross radiographic evidence of osteomyelitis. Chronic small ossicle adjacent to medial malleolar tip. No dislocations or acute appearing fractures. Preserved remaining visualized midfoot and hindfoot joint spaces and no joint margin erosions. Thick plantar calcaneal enthesophyte. No discrete lytic or blastic lesions. Also correlate with findings on concurrently performed ankle CT.    Patient reports allodynia of the right ankle. Continues to have pain with WB and AROM.     REVIEW OF SYSTEMS  Constitutional - No fever, No weight loss, No fatigue  HEENT - No eye pain, No visual disturbances, No difficulty hearing, No tinnitus, No vertigo, No neck pain  Respiratory - No cough, No wheezing, No shortness of breath  Cardiovascular - No chest pain, No palpitations  Gastrointestinal - No abdominal pain, No nausea, No vomiting, No diarrhea, No constipation  Genitourinary - No dysuria, No frequency, No hematuria, No incontinence  Neurological - No headaches, No memory loss, +loss of strength, +numbness, No tremors  Skin - No itching, No rashes, No lesions   Endocrine - No temperature intolerance  Musculoskeletal - +joint pain, +joint swelling, +muscle pain  Psychiatric - No depression, No anxiety    VITALS  T(C): 36.7 (20 @ 12:05), Max: 36.9 (20 @ 15:18)  HR: 77 (20 @ 12:05) (69 - 83)  BP: 122/71 (20 @ 12:05) (113/74 - 154/71)  RR: 16 (20 @ 12:05) (16 - 20)  SpO2: 96% (20 @ 12:05) (92% - 97%)  Wt(kg): --    PAST MEDICAL & SURGICAL HISTORY  Hypertension  Anxiety and depression  Seasonal allergies  Asthma  Osteoarthritis  S/P hip replacement  S/P tubal ligation  S/P cholecystectomy      SOCIAL HISTORY - as per documentation/history  Smoking - None  EtOH - None  Drugs - ++    FUNCTIONAL HISTORY  Lives with spouse in apartment, 0 JADE  WC dependent, ambulates 20 feet with RW    CURRENT FUNCTIONAL STATUS    Bed Mobility: Sit to Supine:     · Level of [place]	supervision	  · Physical Assist/Nonphysical Assist	supervision; verbal cues	    Bed Mobility: Supine to Sit:     · Level of [place]	supervision	  · Physical Assist/Nonphysical Assist	supervision; verbal cues	    Bed Mobility Analysis:     · Impairments Contributing to Impaired Bed Mobility	pain; decreased ROM; decreased strength	    Transfer: Sit to Stand:     · Level of [place]	moderate assist (50% patients effort); x 4 reps (assisted pt to toilet)	  · Physical Assist/Nonphysical Assist	1 person assist; verbal cues	  · Weight-Bearing Restrictions	nonweight-bearing; Left LE	  · Assistive Device	rolling walker	    Transfer: Stand to Sit:     · Level of [place]	moderate assist (50% patients effort)	  · Physical Assist/Nonphysical Assist	1 person assist; verbal cues	  · Weight-Bearing Restrictions	nonweight-bearing; Left LE	  · Assistive Device	rolling walker	    Sit/Stand Transfer Safety Analysis:     · Transfer Safety Concerns Noted	inability to maintain weight-bearing restrictions w/o assist; losing balance	  · Impairments Contributing to Impaired Transfers	impaired balance; pain; decreased ROM; decreased strength	    Gait Skills:     · Level of [place]	unable to perform; Pt demonstrating difficulty maintaining NWB status of Left LE. Pt requires modAx1 for stand pivot transfers and max verbal cues to keep Left LE off from floor.	      FAMILY HISTORY   Family history of COPD (chronic obstructive pulmonary disease) (Father)  Family history of CVA (Father)      RECENT LABS - Reviewed          Urinalysis Basic - ( 07 Sep 2020 16:13 )    Color: Yellow / Appearance: Clear / S.015 / pH: x  Gluc: x / Ketone: Negative  / Bili: Negative / Urobili: Negative mg/dL   Blood: x / Protein: 15 mg/dL / Nitrite: Negative   Leuk Esterase: Negative / RBC: 0-2 /HPF / WBC 0-2   Sq Epi: x / Non Sq Epi: Occasional / Bacteria: x        ALLERGIES  No Known Allergies      MEDICATIONS   buPROPion  milliGRAM(s) Oral daily  famotidine    Tablet 20 milliGRAM(s) Oral daily  gabapentin 300 milliGRAM(s) Oral every 8 hours  ibuprofen  Tablet. 800 milliGRAM(s) Oral three times a day  oxycodone    5 mG/acetaminophen 325 mG 2 Tablet(s) Oral every 4 hours PRN  traZODone 150 milliGRAM(s) Oral at bedtime      ----------------------------------------------------------------------------------------  PHYSICAL EXAM  Constitutional - NAD, Comfortable  HEENT - NCAT, EOMI  Neck - Supple, No limited ROM  Chest - Breathing comfortably, No wheezing  Cardiovascular - S1S2   Abdomen - Soft   Extremities - Left ankle casted, Old surgical incision of right ankle  Neurologic Exam -                    Cognitive - Awake, Alert, AAO to self, place, date, year, situation     Communication - Fluent, No dysarthria     Cranial Nerves - CN 2-12 intact     Motor -                     LEFT    UE - ShAB 5/5, EF 5/5, EE 5/5, WE 5/5,  5/5                    RIGHT UE - ShAB 5/5, EF 5/5, EE 5/5, WE 5/5,  5/5                    LEFT    LE - HF 5/5, KE 5/5, DF -/5, EHL 3/5                    RIGHT LE - HF 5/5, KE 5/5, DF 1/5, PF 15        Sensory - Impaired to LT to right foot  Psychiatric - Mood stable, Affect WNL  ----------------------------------------------------------------------------------------  ASSESSMENT/PLAN  60yFemale with functional deficits after a fall fracturing left ankle  Left Ankle fracture - NWB  Old right ankle fracture - WBAT  Pain - Tylenol, Percocet, Motrin  Mood  - Wellbutrin, Trazodone  DVT PPX - SCDs  Rehab - Patient to speak to . Will need WC for ongoing ambulation as she will not be able to tolerate full WB to the right LE and therapy at this time would best serve to assist right LE. Recommend DC HOME with OUTPATIENT. Patient to discuss with spouse on HOME vs. BRIAN.

## 2020-09-09 NOTE — PROVIDER CONTACT NOTE (OTHER) - SITUATION
DC NEEDS
Chart reviewed, contents noted, orders received. Per RN pt okay to be seen. PT eval performed see note for details.  Pt left with CBWR in NAD in stretcher.  Pt with 9/10 pain pre/post evaluation.

## 2020-09-09 NOTE — PROVIDER CONTACT NOTE (OTHER) - ASSESSMENT
LATE ENTRY - After many phone calls to [facility], CMS and [facility], CM and [facility] office determined that pt already opened a ticket w/ [facility] earlier in the day and that it would take approx one week to rectify the situation. CM informed the pt that she could not stay in the hospital during that time frame.  CM, pt and  determined that it was safe for the pt to return home, she had all DME needed (wc, rw, 3:1).  CM informed pt /  that transportation would be private pay -  agreed to take pt home himself.
chart reviewed and noted. Discussion c GENIA Suarez who discussion c orthopedics, no weight bearing restrictions on right LE(previous right ankle surgery in Feb 2020), can wear right ankle brace prn, keep left LE NWB. pt received semi rutledge position in bed,  present agreeable to PT. see below for functional assessment. pt left as received, will follow back and left ankle pain 4/10 throughout, pt pre-medicated, NAD,  present    supine<->sit: Mindy  sit<->stand: modA(NWB left LE, use of right ankle ace brace), difficulties maintaining standing due to fatigue and pain, verbal cues for sequencing, assist for safety, unable to hope due to weight bearing restrictions, pain and fatigue  therex: Hip flexion and LAQ
Pt is supervision with bed mobility, requires modAx1 with RW and max verbal cues for sit to stand pivot transfers to in order to maintain Left LE NWB. Unable to safely ambulate at this time due to difficulty maintaining NWB status.

## 2020-09-09 NOTE — PROVIDER CONTACT NOTE (OTHER) - RECOMMENDATIONS
D/C recommendation: acute vs subacute rehab
Pt safe for dc home from CM standpoint.  NO skilled nursing or PT intervention needed as pt is NWB at this time.  PMR rec out pt therapy.
D/C Recommendations: BRIAN for strengthening, balance, transfers and gait training.

## 2020-09-21 ENCOUNTER — APPOINTMENT (OUTPATIENT)
Dept: ORTHOPEDIC SURGERY | Facility: CLINIC | Age: 60
End: 2020-09-21
Payer: MEDICAID

## 2020-09-21 VITALS
BODY MASS INDEX: 33.43 KG/M2 | SYSTOLIC BLOOD PRESSURE: 87 MMHG | WEIGHT: 208 LBS | HEIGHT: 66 IN | HEART RATE: 58 BPM | DIASTOLIC BLOOD PRESSURE: 65 MMHG

## 2020-09-21 PROCEDURE — 73610 X-RAY EXAM OF ANKLE: CPT | Mod: LT

## 2020-09-21 PROCEDURE — 99214 OFFICE O/P EST MOD 30 MIN: CPT | Mod: 25

## 2020-09-21 PROCEDURE — 29515 APPLICATION SHORT LEG SPLINT: CPT | Mod: LT

## 2020-09-21 NOTE — PROCEDURE
[de-identified] : A short leg splint was applied to the left lower extremity with manipulation of the ankle fracture.  The fracture showed improved alignment after splint placement.  She tolerated it well.  No neurovascular issues post reduction.

## 2020-09-21 NOTE — HISTORY OF PRESENT ILLNESS
[Bending] : worsened by bending [Lifting] : worsened by lifting [Recumbency] : relieved by recumbency [Rest] : relieved by rest [de-identified] : The patient is a 60-year-old female who presents today with her  for evaluation of left ankle pain.  She suffered a fall earlier this month and was seen in [facility] emergency department.  A reduction was performed as well as a splint placed.  She has a history of multiple ankle surgeries on the right by Dr. Knight.  She has some significant wear on the splint.  She states she was trying to move the ankle to make it look more like the other ankle.  The patient states the pain is made worse with activity and relieved with rest. aching, 3/10

## 2020-09-21 NOTE — PHYSICAL EXAM
[de-identified] : Physical Exam:\par General: Well appearing, no acute distress, A&O\par Neurologic: A&Ox3, No focal deficits\par Head: NCAT without abrasions, lacerations, or ecchymosis to head, face, or scalp\par Respiratory: Equal chest wall expansion bilaterally, no accessory muscle use\par Lymphatic: No lymphadenopathy palpated\par Skin: Warm and dry\par Psychiatric: Normal mood and affect\par \par LLE:\par SILT s/s/sp/dp/t\par Fires EHL/FHL/GS/TA\par 2+ DP/PT pulse\par brisk capillary refill\par splint removed, positive ecchymosis [de-identified] : 3 views of the left ankle were obtained today followed by 3 additional views after repeat splinting.  The initial set of x-rays shows a trimalleolar ankle fracture with valgus displacement.  Repeat x-rays show improved alignment of the fracture with the talus under the tibia.

## 2020-09-21 NOTE — DISCUSSION/SUMMARY
[de-identified] : 60-year-old female with left trimalleolar ankle fracture.  We discussed that her fracture is quite unstable as evidenced by the displacement from her weightbearing.  I would recommend surgical intervention for treatment.  She does have some significant medical comorbidities as evidenced by her previous heart attack and other medical issues.  She will need medical optimization prior to surgery.  She should remain nonweightbearing.  She should elevate the ankle at all times.  We will work on planning for surgery when she is medically optimized likely in a week or 2.  It was explained to the patient that any surgical procedure carries with it the risks of loss of limb or loss of life. Medical complications include but are not limited to death or disability from a heart attack, stroke, GI bleeding, thrombophlebitis and pulmonary embolism, sepsis, adverse reactions including death due to blood transfusions, allergy or adverse drug reaction. There are other rare, unknown and uncommon systemic conditions that could also adversely affect the systemic outcome. Local complications include but are not limited to wound dehiscence, deep infection, failure of fixation or reconstruction, damage to nerves and vessels which could be temporary or permanent, as well as other rare, uncommon and unknown local complications that may necessitate re-operation, more complex orthopaedic reconstructions or amputation.\par \par The patient was given the opportunity to ask questions and all questions were answered to their satisfaction.\par \par Navneet Chapa MD\par Orthopaedic Trauma Surgeon\par [facility] [place]\par Director Orthopaedic Trauma, [facility]\par \par \par \par \par \par

## 2020-09-25 RX ORDER — LAMOTRIGINE 150 MG/1
150 TABLET ORAL
Qty: 30 | Refills: 0 | Status: ACTIVE | COMMUNITY
Start: 2020-07-29

## 2020-09-29 ENCOUNTER — OUTPATIENT (OUTPATIENT)
Dept: OUTPATIENT SERVICES | Facility: HOSPITAL | Age: 60
LOS: 1 days | End: 2020-09-29
Payer: MEDICAID

## 2020-09-29 VITALS
HEIGHT: 66 IN | HEART RATE: 58 BPM | TEMPERATURE: 97 F | DIASTOLIC BLOOD PRESSURE: 83 MMHG | WEIGHT: 189.82 LBS | RESPIRATION RATE: 19 BRPM | SYSTOLIC BLOOD PRESSURE: 141 MMHG

## 2020-09-29 DIAGNOSIS — Z01.818 ENCOUNTER FOR OTHER PREPROCEDURAL EXAMINATION: ICD-10-CM

## 2020-09-29 DIAGNOSIS — Z90.49 ACQUIRED ABSENCE OF OTHER SPECIFIED PARTS OF DIGESTIVE TRACT: Chronic | ICD-10-CM

## 2020-09-29 DIAGNOSIS — Z96.60 PRESENCE OF UNSPECIFIED ORTHOPEDIC JOINT IMPLANT: Chronic | ICD-10-CM

## 2020-09-29 DIAGNOSIS — Z29.9 ENCOUNTER FOR PROPHYLACTIC MEASURES, UNSPECIFIED: ICD-10-CM

## 2020-09-29 DIAGNOSIS — Z98.890 OTHER SPECIFIED POSTPROCEDURAL STATES: Chronic | ICD-10-CM

## 2020-09-29 DIAGNOSIS — Z98.51 TUBAL LIGATION STATUS: Chronic | ICD-10-CM

## 2020-09-29 DIAGNOSIS — Z13.89 ENCOUNTER FOR SCREENING FOR OTHER DISORDER: ICD-10-CM

## 2020-09-29 DIAGNOSIS — S82.892A OTHER FRACTURE OF LEFT LOWER LEG, INITIAL ENCOUNTER FOR CLOSED FRACTURE: ICD-10-CM

## 2020-09-29 LAB
A1C WITH ESTIMATED AVERAGE GLUCOSE RESULT: 5.3 % — SIGNIFICANT CHANGE UP (ref 4–5.6)
ALBUMIN SERPL ELPH-MCNC: 3.9 G/DL — SIGNIFICANT CHANGE UP (ref 3.3–5.2)
ALP SERPL-CCNC: 98 U/L — SIGNIFICANT CHANGE UP (ref 40–120)
ALT FLD-CCNC: 11 U/L — SIGNIFICANT CHANGE UP
ANION GAP SERPL CALC-SCNC: 12 MMOL/L — SIGNIFICANT CHANGE UP (ref 5–17)
APTT BLD: 36.2 SEC — HIGH (ref 27.5–35.5)
AST SERPL-CCNC: 20 U/L — SIGNIFICANT CHANGE UP
BASOPHILS # BLD AUTO: 0.03 K/UL — SIGNIFICANT CHANGE UP (ref 0–0.2)
BASOPHILS NFR BLD AUTO: 0.6 % — SIGNIFICANT CHANGE UP (ref 0–2)
BILIRUB SERPL-MCNC: <0.2 MG/DL — LOW (ref 0.4–2)
BLD GP AB SCN SERPL QL: SIGNIFICANT CHANGE UP
BUN SERPL-MCNC: 16 MG/DL — SIGNIFICANT CHANGE UP (ref 8–20)
CALCIUM SERPL-MCNC: 9.6 MG/DL — SIGNIFICANT CHANGE UP (ref 8.6–10.2)
CHLORIDE SERPL-SCNC: 100 MMOL/L — SIGNIFICANT CHANGE UP (ref 98–107)
CO2 SERPL-SCNC: 31 MMOL/L — HIGH (ref 22–29)
CREAT SERPL-MCNC: 1.02 MG/DL — SIGNIFICANT CHANGE UP (ref 0.5–1.3)
EOSINOPHIL # BLD AUTO: 0.19 K/UL — SIGNIFICANT CHANGE UP (ref 0–0.5)
EOSINOPHIL NFR BLD AUTO: 3.7 % — SIGNIFICANT CHANGE UP (ref 0–6)
ESTIMATED AVERAGE GLUCOSE: 105 MG/DL — SIGNIFICANT CHANGE UP (ref 68–114)
GLUCOSE SERPL-MCNC: 97 MG/DL — SIGNIFICANT CHANGE UP (ref 70–99)
HCT VFR BLD CALC: 34.3 % — LOW (ref 34.5–45)
HGB BLD-MCNC: 10.7 G/DL — LOW (ref 11.5–15.5)
IMM GRANULOCYTES NFR BLD AUTO: 0.2 % — SIGNIFICANT CHANGE UP (ref 0–1.5)
INR BLD: 1 RATIO — SIGNIFICANT CHANGE UP (ref 0.88–1.16)
LYMPHOCYTES # BLD AUTO: 1.95 K/UL — SIGNIFICANT CHANGE UP (ref 1–3.3)
LYMPHOCYTES # BLD AUTO: 38.3 % — SIGNIFICANT CHANGE UP (ref 13–44)
MCHC RBC-ENTMCNC: 30.7 PG — SIGNIFICANT CHANGE UP (ref 27–34)
MCHC RBC-ENTMCNC: 31.2 GM/DL — LOW (ref 32–36)
MCV RBC AUTO: 98.6 FL — SIGNIFICANT CHANGE UP (ref 80–100)
MONOCYTES # BLD AUTO: 0.41 K/UL — SIGNIFICANT CHANGE UP (ref 0–0.9)
MONOCYTES NFR BLD AUTO: 8.1 % — SIGNIFICANT CHANGE UP (ref 2–14)
NEUTROPHILS # BLD AUTO: 2.5 K/UL — SIGNIFICANT CHANGE UP (ref 1.8–7.4)
NEUTROPHILS NFR BLD AUTO: 49.1 % — SIGNIFICANT CHANGE UP (ref 43–77)
PLATELET # BLD AUTO: 234 K/UL — SIGNIFICANT CHANGE UP (ref 150–400)
POTASSIUM SERPL-MCNC: 4.9 MMOL/L — SIGNIFICANT CHANGE UP (ref 3.5–5.3)
POTASSIUM SERPL-SCNC: 4.9 MMOL/L — SIGNIFICANT CHANGE UP (ref 3.5–5.3)
PROT SERPL-MCNC: 6.6 G/DL — SIGNIFICANT CHANGE UP (ref 6.6–8.7)
PROTHROM AB SERPL-ACNC: 11.6 SEC — SIGNIFICANT CHANGE UP (ref 10.6–13.6)
RBC # BLD: 3.48 M/UL — LOW (ref 3.8–5.2)
RBC # FLD: 13.2 % — SIGNIFICANT CHANGE UP (ref 10.3–14.5)
SODIUM SERPL-SCNC: 143 MMOL/L — SIGNIFICANT CHANGE UP (ref 135–145)
WBC # BLD: 5.09 K/UL — SIGNIFICANT CHANGE UP (ref 3.8–10.5)
WBC # FLD AUTO: 5.09 K/UL — SIGNIFICANT CHANGE UP (ref 3.8–10.5)

## 2020-09-29 PROCEDURE — 93005 ELECTROCARDIOGRAM TRACING: CPT

## 2020-09-29 PROCEDURE — 93010 ELECTROCARDIOGRAM REPORT: CPT

## 2020-09-29 PROCEDURE — G0463: CPT

## 2020-09-29 NOTE — ASU PATIENT PROFILE, ADULT - FALL HARM RISK CONCLUSION
Fall with Harm Risk Regular rate and rhythm, Heart sounds S1 S2 present, no murmurs, rubs or gallops

## 2020-09-29 NOTE — H&P PST ADULT - ASSESSMENT
61 y/o female scheduled for open reduction internal fixation left ankle versus hind foot fusion 10/5/2020    OPIOID RISK TOOL    YOUNG EACH BOX THAT APPLIES AND ADD TOTALS AT THE END    FAMILY HISTORY OF SUBSTANCE ABUSE                 FEMALE         MALE                                                Alcohol                             [  ]1 pt          [  ]3pts                                               Illegal Durgs                     [x  ]2 pts        [  ]3pts                                               Rx Drugs                           [  ]4 pts        [  ]4 pts    PERSONAL HISTORY OF SUBSTANCE ABUSE                                                                                          Alcohol                             [  ]3 pts       [  ]3 pts                                               Illegal Drugs                     [  ]4 pts        [  ]4 pts                                               Rx Drugs                           [  ]5 pts        [  ]5 pts    AGE BETWEEN 16-45 YEARS                                      [  ]1 pt         [  ]1 pt    HISTORY OF PREADOLESCENT   SEXUAL ABUSE                                                             [  ]3 pts        [  ]0pts    PSYCHOLOGICAL DISEASE                     ADD, OCD, Bipolar, Schizophrenia        [  ]2 pts         [  ]2 pts                      Depression                                               [x  ]1 pt           [  ]1 pt           SCORING TOTAL   (add numbers and type here)              (3)                                     A score of 3 or lower indicated LOW risk for future opioid abuse  A score of 4 to 7 indicated moderate risk for future opioid abuse  A score of 8 or higher indicates a high risk for opioid abuse    CAPRINI SCORE [CLOT]    AGE RELATED RISK FACTORS                                                       MOBILITY RELATED FACTORS  [ x] Age 41-60 years                                            (1 Point)                  [ ] Bed rest                                                        (1 Point)  [ ] Age: 61-74 years                                           (2 Points)                 [ ] Plaster cast                                                   (2 Points)  [ ] Age= 75 years                                              (3 Points)                 [ ] Bed bound for more than 72 hours                 (2 Points)    DISEASE RELATED RISK FACTORS                                               GENDER SPECIFIC FACTORS  [ ] Edema in the lower extremities                       (1 Point)                  [ ] Pregnancy                                                     (1 Point)  [ ] Varicose veins                                               (1 Point)                  [ ] Post-partum < 6 weeks                                   (1 Point)             [ x] BMI > 25 Kg/m2                                            (1 Point)                  [ ] Hormonal therapy  or oral contraception          (1 Point)                 [ ] Sepsis (in the previous month)                        (1 Point)                  [ ] History of pregnancy complications                 (1 point)  [ ] Pneumonia or serious lung disease                                               [ ] Unexplained or recurrent                     (1 Point)           (in the previous month)                               (1 Point)  [ ] Abnormal pulmonary function test                     (1 Point)                 SURGERY RELATED RISK FACTORS  [ ] Acute myocardial infarction                              (1 Point)                 [ ]  Section                                             (1 Point)  [ ] Congestive heart failure (in the previous month)  (1 Point)               [ ] Minor surgery                                                  (1 Point)   [ ] Inflammatory bowel disease                             (1 Point)                 [ ] Arthroscopic surgery                                        (2 Points)  [ ] Central venous access                                      (2 Points)                [x ] General surgery lasting more than 45 minutes   (2 Points)       [ ] Stroke (in the previous month)                          (5 Points)               [ ] Elective arthroplasty                                         (5 Points)                                                                                                                                               HEMATOLOGY RELATED FACTORS                                                 TRAUMA RELATED RISK FACTORS  [ ] Prior episodes of VTE                                     (3 Points)                [x ] Fracture of the hip, pelvis, or leg                       (5 Points)  [ ] Positive family history for VTE                         (3 Points)                 [ ] Acute spinal cord injury (in the previous month)  (5 Points)  [ ] Prothrombin 78034 A                                     (3 Points)                 [ ] Paralysis  (less than 1 month)                             (5 Points)  [ ] Factor V Leiden                                             (3 Points)                  [ ] Multiple Trauma within 1 month                        (5 Points)  [ ] Lupus anticoagulants                                     (3 Points)                                                           [ ] Anticardiolipin antibodies                               (3 Points)                                                       [ ] High homocysteine in the blood                      (3 Points)                                             [ ] Other congenital or acquired thrombophilia      (3 Points)                                                [ ] Heparin induced thrombocytopenia                  (3 Points)                                          Total Score [     9     ]    Caprini Score 0 - 2:  Low Risk, No VTE Prophylaxis required for most patients, encourage ambulation  Caprini Score 3 - 6:  At Risk, pharmacologic VTE prophylaxis is indicated for most patients (in the absence of a contraindication)  Caprini Score Greater than or = 7:  High Risk, pharmacologic VTE prophylaxis is indicated for most patients (in the absence of a contraindication)

## 2020-09-29 NOTE — H&P PST ADULT - NSICDXFAMILYHX_GEN_ALL_CORE_FT
FAMILY HISTORY:  Father  Still living? Unknown  Family history of COPD (chronic obstructive pulmonary disease), Age at diagnosis: Age Unknown  Family history of CVA, Age at diagnosis: Age Unknown  FH: heart disease, Age at diagnosis: Age Unknown    Mother  Still living? No  FH: heart disease, Age at diagnosis: Age Unknown

## 2020-09-29 NOTE — H&P PST ADULT - HISTORY OF PRESENT ILLNESS
61 y/o female with hx of        scheduled for open reduction internal fixation left ankle versus hind foot fusion 10/5/2020 59 y/o female with hx of  fall September 2020 presents to PST today. Patient states she sustained left ankle fracture after fall. Currently uses a wheelchair to move around. Left foot currently is supported by a cast. Takes oxycodone for pain with some relief. Denies any fever, chills, chest pain, SOB. Scheduled for open reduction internal fixation left ankle versus hind foot fusion 10/5/2020

## 2020-09-29 NOTE — H&P PST ADULT - NSICDXPASTMEDICALHX_GEN_ALL_CORE_FT
PAST MEDICAL HISTORY:  Anxiety and depression     Asthma Has not been admitted for have any asthma exacerbation for 10 years    Hypertension Have not taken medications for about 2 years    Osteoarthritis     Seasonal allergies

## 2020-09-29 NOTE — H&P PST ADULT - NSICDXPROBLEM_GEN_ALL_CORE_FT
PROBLEM DIAGNOSES  Problem: Other fracture of left lower leg, initial encounter for closed fracture  Assessment and Plan: Scheduled for open reduction internal fixation left ankle versus hind foot fusion nail 10  Medical clearance    Problem: Screening for substance abuse  Assessment and Plan: ORT 3    Problem: Need for prophylactic measure  Assessment and Plan: Aneta 9- high  Primary team please assess need for DVT prophykaxis       PROBLEM DIAGNOSES  Problem: Other fracture of left lower leg, initial encounter for closed fracture  Assessment and Plan: Scheduled for open reduction internal fixation left ankle versus hind foot fusion nail 10/5/2020  Medical clearance    Problem: Screening for substance abuse  Assessment and Plan: ORT 3    Problem: Need for prophylactic measure  Assessment and Plan: Aneta 9- high  Primary team please assess need for DVT prophykaxis

## 2020-09-30 PROBLEM — J45.909 UNSPECIFIED ASTHMA, UNCOMPLICATED: Chronic | Status: ACTIVE | Noted: 2020-09-29

## 2020-09-30 PROBLEM — I10 ESSENTIAL (PRIMARY) HYPERTENSION: Chronic | Status: ACTIVE | Noted: 2018-12-31

## 2020-10-02 ENCOUNTER — APPOINTMENT (OUTPATIENT)
Dept: ORTHOPEDIC SURGERY | Facility: CLINIC | Age: 60
End: 2020-10-02

## 2020-10-02 ENCOUNTER — APPOINTMENT (OUTPATIENT)
Dept: FAMILY MEDICINE | Facility: CLINIC | Age: 60
End: 2020-10-02
Payer: MEDICAID

## 2020-10-02 ENCOUNTER — APPOINTMENT (OUTPATIENT)
Dept: DISASTER EMERGENCY | Facility: CLINIC | Age: 60
End: 2020-10-02

## 2020-10-02 VITALS
SYSTOLIC BLOOD PRESSURE: 120 MMHG | DIASTOLIC BLOOD PRESSURE: 60 MMHG | WEIGHT: 208 LBS | BODY MASS INDEX: 33.43 KG/M2 | HEART RATE: 82 BPM | OXYGEN SATURATION: 98 % | TEMPERATURE: 98.3 F | HEIGHT: 66 IN

## 2020-10-02 PROCEDURE — 99215 OFFICE O/P EST HI 40 MIN: CPT

## 2020-10-02 NOTE — ASSESSMENT
[Patient Optimized for Surgery] : Patient optimized for surgery [No Further Testing Recommended] : no further testing recommended [As per surgery] : as per surgery [FreeTextEntry4] : take meds with sip of water morning of surgery.\par should go for DEXA after this operation

## 2020-10-02 NOTE — HISTORY OF PRESENT ILLNESS
[No Pertinent Cardiac History] : no history of aortic stenosis, atrial fibrillation, coronary artery disease, recent myocardial infarction, or implantable device/pacemaker [No Pertinent Pulmonary History] : no history of asthma, COPD, sleep apnea, or smoking [No Adverse Anesthesia Reaction] : no adverse anesthesia reaction in self or family member [(Patient denies any chest pain, claudication, dyspnea on exertion, orthopnea, palpitations or syncope)] : Patient denies any chest pain, claudication, dyspnea on exertion, orthopnea, palpitations or syncope [Moderate (4-6 METs)] : Moderate (4-6 METs) [Chronic Anticoagulation] : no chronic anticoagulation [Chronic Kidney Disease] : no chronic kidney disease [Diabetes] : no diabetes [FreeTextEntry1] : ORIF left ankle [FreeTextEntry2] : 10/05/2020 [FreeTextEntry3] : Dr Chapa [FreeTextEntry4] : pt in office for medical clearance.For ORIF on 10/05/20 after left ankle fracture.she is on multiple mental health/ Bipolar/ stable on meds.\par

## 2020-10-02 NOTE — PHYSICAL EXAM
[No Acute Distress] : no acute distress [Well Nourished] : well nourished [Well Developed] : well developed [Well-Appearing] : well-appearing [Normal Sclera/Conjunctiva] : normal sclera/conjunctiva [PERRL] : pupils equal round and reactive to light [EOMI] : extraocular movements intact [Normal Outer Ear/Nose] : the outer ears and nose were normal in appearance [Normal Oropharynx] : the oropharynx was normal [No JVD] : no jugular venous distention [No Lymphadenopathy] : no lymphadenopathy [Supple] : supple [Thyroid Normal, No Nodules] : the thyroid was normal and there were no nodules present [No Respiratory Distress] : no respiratory distress  [No Accessory Muscle Use] : no accessory muscle use [Clear to Auscultation] : lungs were clear to auscultation bilaterally [Normal Rate] : normal rate  [Regular Rhythm] : with a regular rhythm [Normal S1, S2] : normal S1 and S2 [No Murmur] : no murmur heard [No Carotid Bruits] : no carotid bruits [No Abdominal Bruit] : a ~M bruit was not heard ~T in the abdomen [No Varicosities] : no varicosities [Pedal Pulses Present] : the pedal pulses are present [No Edema] : there was no peripheral edema [No Palpable Aorta] : no palpable aorta [No Extremity Clubbing/Cyanosis] : no extremity clubbing/cyanosis [Soft] : abdomen soft [Non Tender] : non-tender [Non-distended] : non-distended [No Masses] : no abdominal mass palpated [No HSM] : no HSM [Normal Bowel Sounds] : normal bowel sounds [Normal Posterior Cervical Nodes] : no posterior cervical lymphadenopathy [Normal Anterior Cervical Nodes] : no anterior cervical lymphadenopathy [No CVA Tenderness] : no CVA  tenderness [No Spinal Tenderness] : no spinal tenderness [No Joint Swelling] : no joint swelling [Grossly Normal Strength/Tone] : grossly normal strength/tone [No Rash] : no rash [Coordination Grossly Intact] : coordination grossly intact [No Focal Deficits] : no focal deficits [Normal Gait] : normal gait [Normal Affect] : the affect was normal [Normal Insight/Judgement] : insight and judgment were intact [de-identified] : cast on left and brace on right ankle

## 2020-10-03 LAB — SARS-COV-2 N GENE NPH QL NAA+PROBE: NOT DETECTED

## 2020-10-04 ENCOUNTER — TRANSCRIPTION ENCOUNTER (OUTPATIENT)
Age: 60
End: 2020-10-04

## 2020-10-05 ENCOUNTER — INPATIENT (INPATIENT)
Facility: HOSPITAL | Age: 60
LOS: 1 days | Discharge: NURSING, REHAB OR LONG-TERM CARE | DRG: 494 | End: 2020-10-07
Attending: ORTHOPAEDIC SURGERY | Admitting: ORTHOPAEDIC SURGERY
Payer: COMMERCIAL

## 2020-10-05 ENCOUNTER — APPOINTMENT (OUTPATIENT)
Dept: ORTHOPEDIC SURGERY | Facility: HOSPITAL | Age: 60
End: 2020-10-05

## 2020-10-05 ENCOUNTER — RESULT REVIEW (OUTPATIENT)
Age: 60
End: 2020-10-05

## 2020-10-05 ENCOUNTER — TRANSCRIPTION ENCOUNTER (OUTPATIENT)
Age: 60
End: 2020-10-05

## 2020-10-05 VITALS
OXYGEN SATURATION: 97 % | SYSTOLIC BLOOD PRESSURE: 118 MMHG | WEIGHT: 189.82 LBS | DIASTOLIC BLOOD PRESSURE: 85 MMHG | HEART RATE: 76 BPM | RESPIRATION RATE: 16 BRPM | HEIGHT: 66 IN | TEMPERATURE: 97 F

## 2020-10-05 DIAGNOSIS — Z98.51 TUBAL LIGATION STATUS: Chronic | ICD-10-CM

## 2020-10-05 DIAGNOSIS — Z90.49 ACQUIRED ABSENCE OF OTHER SPECIFIED PARTS OF DIGESTIVE TRACT: Chronic | ICD-10-CM

## 2020-10-05 DIAGNOSIS — M79.672 PAIN IN LEFT FOOT: ICD-10-CM

## 2020-10-05 DIAGNOSIS — Z98.890 OTHER SPECIFIED POSTPROCEDURAL STATES: Chronic | ICD-10-CM

## 2020-10-05 DIAGNOSIS — S82.892A OTHER FRACTURE OF LEFT LOWER LEG, INITIAL ENCOUNTER FOR CLOSED FRACTURE: ICD-10-CM

## 2020-10-05 DIAGNOSIS — Z96.60 PRESENCE OF UNSPECIFIED ORTHOPEDIC JOINT IMPLANT: Chronic | ICD-10-CM

## 2020-10-05 DIAGNOSIS — Z79.891 LONG TERM (CURRENT) USE OF OPIATE ANALGESIC: ICD-10-CM

## 2020-10-05 PROCEDURE — 27870 FUSION OF ANKLE JOINT OPEN: CPT | Mod: LT

## 2020-10-05 PROCEDURE — 73610 X-RAY EXAM OF ANKLE: CPT | Mod: 26,LT

## 2020-10-05 PROCEDURE — 27822 TREATMENT OF ANKLE FRACTURE: CPT | Mod: LT

## 2020-10-05 PROCEDURE — 28725 ARTHRODESIS SUBTALAR: CPT | Mod: LT

## 2020-10-05 RX ORDER — ACETAMINOPHEN 500 MG
975 TABLET ORAL EVERY 8 HOURS
Refills: 0 | Status: DISCONTINUED | OUTPATIENT
Start: 2020-10-05 | End: 2020-10-07

## 2020-10-05 RX ORDER — OXYCODONE HYDROCHLORIDE 5 MG/1
10 TABLET ORAL
Refills: 0 | Status: DISCONTINUED | OUTPATIENT
Start: 2020-10-05 | End: 2020-10-05

## 2020-10-05 RX ORDER — HYDROMORPHONE HYDROCHLORIDE 2 MG/ML
4 INJECTION INTRAMUSCULAR; INTRAVENOUS; SUBCUTANEOUS
Refills: 0 | Status: DISCONTINUED | OUTPATIENT
Start: 2020-10-05 | End: 2020-10-07

## 2020-10-05 RX ORDER — KETOROLAC TROMETHAMINE 30 MG/ML
15 SYRINGE (ML) INJECTION EVERY 6 HOURS
Refills: 0 | Status: DISCONTINUED | OUTPATIENT
Start: 2020-10-05 | End: 2020-10-06

## 2020-10-05 RX ORDER — TRAZODONE HCL 50 MG
150 TABLET ORAL AT BEDTIME
Refills: 0 | Status: DISCONTINUED | OUTPATIENT
Start: 2020-10-05 | End: 2020-10-07

## 2020-10-05 RX ORDER — SODIUM CHLORIDE 9 MG/ML
1000 INJECTION, SOLUTION INTRAVENOUS
Refills: 0 | Status: DISCONTINUED | OUTPATIENT
Start: 2020-10-05 | End: 2020-10-07

## 2020-10-05 RX ORDER — INFLUENZA VIRUS VACCINE 15; 15; 15; 15 UG/.5ML; UG/.5ML; UG/.5ML; UG/.5ML
0.5 SUSPENSION INTRAMUSCULAR ONCE
Refills: 0 | Status: DISCONTINUED | OUTPATIENT
Start: 2020-10-05 | End: 2020-10-07

## 2020-10-05 RX ORDER — ENOXAPARIN SODIUM 100 MG/ML
40 INJECTION SUBCUTANEOUS EVERY 24 HOURS
Refills: 0 | Status: DISCONTINUED | OUTPATIENT
Start: 2020-10-06 | End: 2020-10-07

## 2020-10-05 RX ORDER — ONDANSETRON 8 MG/1
4 TABLET, FILM COATED ORAL EVERY 6 HOURS
Refills: 0 | Status: DISCONTINUED | OUTPATIENT
Start: 2020-10-05 | End: 2020-10-07

## 2020-10-05 RX ORDER — FENTANYL CITRATE 50 UG/ML
50 INJECTION INTRAVENOUS
Refills: 0 | Status: DISCONTINUED | OUTPATIENT
Start: 2020-10-05 | End: 2020-10-05

## 2020-10-05 RX ORDER — OXYCODONE HYDROCHLORIDE 5 MG/1
5 TABLET ORAL
Refills: 0 | Status: DISCONTINUED | OUTPATIENT
Start: 2020-10-05 | End: 2020-10-05

## 2020-10-05 RX ORDER — MAGNESIUM HYDROXIDE 400 MG/1
30 TABLET, CHEWABLE ORAL DAILY
Refills: 0 | Status: DISCONTINUED | OUTPATIENT
Start: 2020-10-05 | End: 2020-10-07

## 2020-10-05 RX ORDER — ONDANSETRON 8 MG/1
4 TABLET, FILM COATED ORAL ONCE
Refills: 0 | Status: DISCONTINUED | OUTPATIENT
Start: 2020-10-05 | End: 2020-10-05

## 2020-10-05 RX ORDER — METHOCARBAMOL 500 MG/1
750 TABLET, FILM COATED ORAL THREE TIMES A DAY
Refills: 0 | Status: DISCONTINUED | OUTPATIENT
Start: 2020-10-05 | End: 2020-10-07

## 2020-10-05 RX ORDER — HYDROMORPHONE HYDROCHLORIDE 2 MG/ML
0.5 INJECTION INTRAMUSCULAR; INTRAVENOUS; SUBCUTANEOUS EVERY 6 HOURS
Refills: 0 | Status: DISCONTINUED | OUTPATIENT
Start: 2020-10-05 | End: 2020-10-07

## 2020-10-05 RX ORDER — SODIUM CHLORIDE 9 MG/ML
500 INJECTION INTRAMUSCULAR; INTRAVENOUS; SUBCUTANEOUS ONCE
Refills: 0 | Status: COMPLETED | OUTPATIENT
Start: 2020-10-05 | End: 2020-10-05

## 2020-10-05 RX ORDER — SODIUM CHLORIDE 9 MG/ML
3 INJECTION INTRAMUSCULAR; INTRAVENOUS; SUBCUTANEOUS ONCE
Refills: 0 | Status: DISCONTINUED | OUTPATIENT
Start: 2020-10-05 | End: 2020-10-05

## 2020-10-05 RX ORDER — SODIUM CHLORIDE 9 MG/ML
1000 INJECTION, SOLUTION INTRAVENOUS
Refills: 0 | Status: DISCONTINUED | OUTPATIENT
Start: 2020-10-05 | End: 2020-10-05

## 2020-10-05 RX ORDER — TIZANIDINE 4 MG/1
0 TABLET ORAL
Qty: 0 | Refills: 0 | DISCHARGE

## 2020-10-05 RX ORDER — HYDROMORPHONE HYDROCHLORIDE 2 MG/ML
2 INJECTION INTRAMUSCULAR; INTRAVENOUS; SUBCUTANEOUS
Refills: 0 | Status: DISCONTINUED | OUTPATIENT
Start: 2020-10-05 | End: 2020-10-05

## 2020-10-05 RX ORDER — ALPRAZOLAM 0.25 MG
0.5 TABLET ORAL
Refills: 0 | Status: DISCONTINUED | OUTPATIENT
Start: 2020-10-05 | End: 2020-10-07

## 2020-10-05 RX ORDER — CEFAZOLIN SODIUM 1 G
2000 VIAL (EA) INJECTION
Refills: 0 | Status: COMPLETED | OUTPATIENT
Start: 2020-10-05 | End: 2020-10-05

## 2020-10-05 RX ORDER — GABAPENTIN 400 MG/1
300 CAPSULE ORAL THREE TIMES A DAY
Refills: 0 | Status: DISCONTINUED | OUTPATIENT
Start: 2020-10-05 | End: 2020-10-07

## 2020-10-05 RX ORDER — CEFAZOLIN SODIUM 1 G
2000 VIAL (EA) INJECTION ONCE
Refills: 0 | Status: DISCONTINUED | OUTPATIENT
Start: 2020-10-05 | End: 2020-10-05

## 2020-10-05 RX ORDER — HYDROMORPHONE HYDROCHLORIDE 2 MG/ML
2 INJECTION INTRAMUSCULAR; INTRAVENOUS; SUBCUTANEOUS
Refills: 0 | Status: DISCONTINUED | OUTPATIENT
Start: 2020-10-05 | End: 2020-10-07

## 2020-10-05 RX ORDER — HYDROMORPHONE HYDROCHLORIDE 2 MG/ML
1 INJECTION INTRAMUSCULAR; INTRAVENOUS; SUBCUTANEOUS
Refills: 0 | Status: DISCONTINUED | OUTPATIENT
Start: 2020-10-05 | End: 2020-10-05

## 2020-10-05 RX ADMIN — Medication 15 MILLIGRAM(S): at 22:46

## 2020-10-05 RX ADMIN — SODIUM CHLORIDE 75 MILLILITER(S): 9 INJECTION, SOLUTION INTRAVENOUS at 10:54

## 2020-10-05 RX ADMIN — HYDROMORPHONE HYDROCHLORIDE 1 MILLIGRAM(S): 2 INJECTION INTRAMUSCULAR; INTRAVENOUS; SUBCUTANEOUS at 11:13

## 2020-10-05 RX ADMIN — HYDROMORPHONE HYDROCHLORIDE 1 MILLIGRAM(S): 2 INJECTION INTRAMUSCULAR; INTRAVENOUS; SUBCUTANEOUS at 11:26

## 2020-10-05 RX ADMIN — HYDROMORPHONE HYDROCHLORIDE 4 MILLIGRAM(S): 2 INJECTION INTRAMUSCULAR; INTRAVENOUS; SUBCUTANEOUS at 20:30

## 2020-10-05 RX ADMIN — Medication 15 MILLIGRAM(S): at 23:30

## 2020-10-05 RX ADMIN — GABAPENTIN 300 MILLIGRAM(S): 400 CAPSULE ORAL at 11:34

## 2020-10-05 RX ADMIN — HYDROMORPHONE HYDROCHLORIDE 4 MILLIGRAM(S): 2 INJECTION INTRAMUSCULAR; INTRAVENOUS; SUBCUTANEOUS at 22:46

## 2020-10-05 RX ADMIN — Medication 1 MILLIGRAM(S): at 11:16

## 2020-10-05 RX ADMIN — Medication 975 MILLIGRAM(S): at 20:48

## 2020-10-05 RX ADMIN — HYDROMORPHONE HYDROCHLORIDE 1 MILLIGRAM(S): 2 INJECTION INTRAMUSCULAR; INTRAVENOUS; SUBCUTANEOUS at 11:16

## 2020-10-05 RX ADMIN — Medication 150 MILLIGRAM(S): at 20:48

## 2020-10-05 RX ADMIN — HYDROMORPHONE HYDROCHLORIDE 1 MILLIGRAM(S): 2 INJECTION INTRAMUSCULAR; INTRAVENOUS; SUBCUTANEOUS at 11:04

## 2020-10-05 RX ADMIN — Medication 15 MILLIGRAM(S): at 17:17

## 2020-10-05 RX ADMIN — HYDROMORPHONE HYDROCHLORIDE 4 MILLIGRAM(S): 2 INJECTION INTRAMUSCULAR; INTRAVENOUS; SUBCUTANEOUS at 17:04

## 2020-10-05 RX ADMIN — HYDROMORPHONE HYDROCHLORIDE 0.5 MILLIGRAM(S): 2 INJECTION INTRAMUSCULAR; INTRAVENOUS; SUBCUTANEOUS at 17:16

## 2020-10-05 RX ADMIN — HYDROMORPHONE HYDROCHLORIDE 1 MILLIGRAM(S): 2 INJECTION INTRAMUSCULAR; INTRAVENOUS; SUBCUTANEOUS at 10:54

## 2020-10-05 RX ADMIN — HYDROMORPHONE HYDROCHLORIDE 4 MILLIGRAM(S): 2 INJECTION INTRAMUSCULAR; INTRAVENOUS; SUBCUTANEOUS at 16:13

## 2020-10-05 RX ADMIN — OXYCODONE HYDROCHLORIDE 10 MILLIGRAM(S): 5 TABLET ORAL at 11:49

## 2020-10-05 RX ADMIN — HYDROMORPHONE HYDROCHLORIDE 4 MILLIGRAM(S): 2 INJECTION INTRAMUSCULAR; INTRAVENOUS; SUBCUTANEOUS at 19:40

## 2020-10-05 RX ADMIN — Medication 100 MILLIGRAM(S): at 13:15

## 2020-10-05 RX ADMIN — OXYCODONE HYDROCHLORIDE 10 MILLIGRAM(S): 5 TABLET ORAL at 11:19

## 2020-10-05 RX ADMIN — SODIUM CHLORIDE 100 MILLILITER(S): 9 INJECTION, SOLUTION INTRAVENOUS at 22:46

## 2020-10-05 RX ADMIN — Medication 15 MILLIGRAM(S): at 12:04

## 2020-10-05 RX ADMIN — SODIUM CHLORIDE 75 MILLILITER(S): 9 INJECTION, SOLUTION INTRAVENOUS at 11:03

## 2020-10-05 RX ADMIN — Medication 15 MILLIGRAM(S): at 18:04

## 2020-10-05 RX ADMIN — Medication 975 MILLIGRAM(S): at 21:30

## 2020-10-05 RX ADMIN — SODIUM CHLORIDE 500 MILLILITER(S): 9 INJECTION INTRAMUSCULAR; INTRAVENOUS; SUBCUTANEOUS at 11:00

## 2020-10-05 RX ADMIN — GABAPENTIN 300 MILLIGRAM(S): 400 CAPSULE ORAL at 20:48

## 2020-10-05 RX ADMIN — HYDROMORPHONE HYDROCHLORIDE 4 MILLIGRAM(S): 2 INJECTION INTRAMUSCULAR; INTRAVENOUS; SUBCUTANEOUS at 23:30

## 2020-10-05 RX ADMIN — Medication 100 MILLIGRAM(S): at 19:40

## 2020-10-05 RX ADMIN — Medication 15 MILLIGRAM(S): at 11:34

## 2020-10-05 RX ADMIN — HYDROMORPHONE HYDROCHLORIDE 0.5 MILLIGRAM(S): 2 INJECTION INTRAMUSCULAR; INTRAVENOUS; SUBCUTANEOUS at 18:04

## 2020-10-05 NOTE — DISCHARGE NOTE PROVIDER - NSDCMRMEDTOKEN_GEN_ALL_CORE_FT
Centrum Women&#x27;s oral tablet: 1 tab(s) orally once a day  gabapentin 300 mg oral capsule: 1 cap(s) orally 3 times a day  oxycodone-acetaminophen 5 mg-325 mg oral tablet: 1 tab(s) orally 3 times a day  PARoxetine mesylate: 60 milligram(s) orally once a day  traZODone 150 mg oral tablet: 1 tab(s) orally once a day (at bedtime)  Tylenol 8 Hour 650 mg oral tablet, extended release: 1 tab(s) orally every 8 hours, As Needed for mild pain  Vitamin B-12: 5000 milligram(s) orally once a day  Vitamin D3: orally once a day  Xanax 0.5 mg oral tablet: 1 tab(s) orally 3 times a day

## 2020-10-05 NOTE — DISCHARGE NOTE PROVIDER - CARE PROVIDER_API CALL
Navneet Chaap)  Orthopaedic Surgery  [address] [place] [address]  Phone: [phone]  Fax: [phone]  Follow Up Time:

## 2020-10-05 NOTE — DISCHARGE NOTE PROVIDER - NSDCCPCAREPLAN_GEN_ALL_CORE_FT
PRINCIPAL DISCHARGE DIAGNOSIS  Diagnosis: Closed trimalleolar fracture of ankle  Assessment and Plan of Treatment:

## 2020-10-05 NOTE — PROGRESS NOTE ADULT - PROBLEM SELECTOR PLAN 1
1. Tylenol 975mg PO q8hrs for mild pain  2. Dilaudid 2mg PO q3hrs PRN moderate pain  - Dilaudid 4mg PO q3hrs PRN severe pain  - IV Dilaudid 0.5mg q6hrs PRN severe pain 1 hour after pills

## 2020-10-05 NOTE — PROGRESS NOTE ADULT - ASSESSMENT
60y old opioid tolerant Female admitted with LLE foot fracture, pending operative intervention. She was found AA&Ox3, NAD, sitting on stretcher in the pre-operative area. Home medications reviewed. discussed rotation to oral Dilaudid PRN postoperative, with non-opioid adjuncts. A baseline opioid may also be utilized to cover her home medications. Patient verbalizes understanding.

## 2020-10-05 NOTE — DISCHARGE NOTE PROVIDER - NSDCCPTREATMENT_GEN_ALL_CORE_FT
PRINCIPAL PROCEDURE  Procedure: Open reduction, fracture, ankle, trimalleolar  Findings and Treatment:

## 2020-10-05 NOTE — PROGRESS NOTE ADULT - SUBJECTIVE AND OBJECTIVE BOX
HPI:  Patient is a 60y old  Female who presents with a chief complaint of LLE foot fracture.    Pain Service:  She is known to [facility] for outpatient management of chronic back pain. She is prescribed 1 Percocet TID PRN for pain. The surgical team requests assistance with postoperative pain management     VERBAL REPORT: "The Percocet doesn't help."  The patient describes a cracking pain in her left foot and a sensation of having an open sore on the bottom of her foot. She reports better relief of her pain with Dilaudid tablets following her last surgery compared to Percocet.  PAIN SCORE:  4/10 at rest                 SCALE USED: VNRS    Allergies  No Known Drug Allergies  Seasonal allergies (Rhinorrhea)      PAST MEDICAL & SURGICAL HISTORY:  Asthma  Has not been admitted for have any asthma exacerbation for 10 years  Hypertension  Have not taken medications for about 2 years  Anxiety and depression  Seasonal allergies  Osteoarthritis  History of ankle surgery  right ankle 2/2020  S/P hip replacement  left and right  S/P tubal ligation  S/P cholecystectomy      MEDICATIONS  (STANDING):  acetaminophen   Tablet .. 975 milliGRAM(s) Oral every 8 hours  ceFAZolin   IVPB 2000 milliGRAM(s) IV Intermittent <User Schedule>  gabapentin 300 milliGRAM(s) Oral three times a day  ketorolac   Injectable 15 milliGRAM(s) IV Push every 6 hours  lactated ringers. 1000 milliLiter(s) (75 mL/Hr) IV Continuous <Continuous>  lactated ringers. 1000 milliLiter(s) (100 mL/Hr) IV Continuous <Continuous>  PARoxetine 60 milliGRAM(s) Oral daily  sodium chloride 0.9% Bolus 500 milliLiter(s) IV Bolus once  traZODone 150 milliGRAM(s) Oral at bedtime    MEDICATIONS  (PRN):  ALPRAZolam 0.5 milliGRAM(s) Oral two times a day PRN Anxiety  aluminum hydroxide/magnesium hydroxide/simethicone Suspension 30 milliLiter(s) Oral four times a day PRN Indigestion  fentaNYL    Injectable 50 MICROGram(s) IV Push every 5 minutes PRN Severe Pain (7 - 10)  HYDROmorphone   Tablet 2 milliGRAM(s) Oral every 3 hours PRN Severe Pain (7 - 10)  HYDROmorphone  Injectable 0.5 milliGRAM(s) IV Push every 6 hours PRN Breakthrough  HYDROmorphone  Injectable 1 milliGRAM(s) IV Push every 10 minutes PRN Severe Pain (7 - 10)  magnesium hydroxide Suspension 30 milliLiter(s) Oral daily PRN Constipation  ondansetron Injectable 4 milliGRAM(s) IV Push every 6 hours PRN Nausea and/or Vomiting  ondansetron Injectable 4 milliGRAM(s) IV Push once PRN Nausea and/or Vomiting  oxyCODONE    IR 10 milliGRAM(s) Oral every 3 hours PRN Moderate Pain (4 - 6)  oxyCODONE    IR 5 milliGRAM(s) Oral every 3 hours PRN Mild Pain (1 - 3)      PHYSICAL EXAM:  GENERAL: NAD, well-developed  HEAD:  Atraumatic, Normocephalic  EYES: EOMI, PERRLA, conjunctiva and sclera clear  NERVOUS SYSTEM:  Alert & Oriented X3, Good concentration; Motor Strength 5/5 B/L upper and lower extremities  CHEST/LUNG: Clear speech, no SOB evident at rest  ABDOMEN: Soft, Nontender, Nondistended  EXTREMITIES:  LLE in boot      Vital Signs Last 24 Hrs  T(C): 36.3 (05 Oct 2020 06:44), Max: 36.3 (05 Oct 2020 06:44)  T(F): 97.4 (05 Oct 2020 06:44), Max: 97.4 (05 Oct 2020 06:44)  HR: 76 (05 Oct 2020 06:44) (76 - 76)  BP: 118/85 (05 Oct 2020 06:44) (118/85 - 118/85)  BP(mean): --  RR: 16 (05 Oct 2020 06:44) (16 - 16)  SpO2: 97% (05 Oct 2020 06:44) (97% - 97%)      Pain Service   Text Page: [phone]

## 2020-10-05 NOTE — DISCHARGE NOTE PROVIDER - NSDCFUADDINST_GEN_ALL_CORE_FT
ORTHOPEDIC FOLLOW UP RECOMMENDATIONS: The patient will be seen in the office IN 3 weeks for splint removal and wound check. Sutures/Staples will be removed at that time. Patient may NOT shower until after re-evaluation in the office. The patient will continue with splint as applied in hospital and not remove until re-evaluation in the office. The patient will contact the office if the wound becomes red, has increasing pain, develops bleeding or discharge, an injury occurs, or has other concerns. The patient will continue LOVENOX for 2 weeks, then ASPIRIN 325mg twice daily x 4 weeks for DVTP.  The patient is NON-weight bearing on the LEFT LOWER extremity. The patient is recommended to elevated the affected extremity to reduce swelling. If the splint/cast  becomes too tight, the patient is to immediately elevate and contact the office to discuss further management or immediately proceed to the office if unable to make contact with the office. The patient will be seen in the office IN 3 weeks for splint removal and wound check. Sutures/Staples will be removed at that time. Patient may NOT shower until after re-evaluation in the office. The patient will continue with splint as applied in hospital and not remove until re-evaluation in the office. The patient will contact the office if the wound becomes red, has increasing pain, develops bleeding or discharge, an injury occurs, or has other concerns. The patient will continue LOVENOX for 2 weeks, then ASPIRIN 325mg twice daily x 4 weeks for DVTP.  The patient is NON-weight bearing on the LEFT LOWER extremity. The patient is recommended to elevated the affected extremity to reduce swelling. If the splint/cast  becomes too tight, the patient is to immediately elevate and contact the office to discuss further management or immediately proceed to the office if unable to make contact with the office. The patient will be seen in the office IN 3 weeks for ace removal and wound check. Patient may NOT shower until after re-evaluation in the office. The patient will continue with splint as applied in hospital and not remove until re-evaluation in the office. The patient will contact the office if the wound becomes red, has increasing pain, develops bleeding or discharge, an injury occurs, or has other concerns. The patient will continue  ASPIRIN 325mg twice daily x 4 weeks for DVTP.  The patient is NON-weight bearing on the LEFT LOWER extremity. The patient is recommended to elevated the affected extremity to reduce swelling.

## 2020-10-05 NOTE — PROGRESS NOTE ADULT - SUBJECTIVE AND OBJECTIVE BOX
Orthopedic PA Postop Note  Patient S/P LEFT ANKLE ORIF  Patient in bed comfortable   LEFT Leg  Dressing C/D/I - ACE wrap without staining  DP Pulse intact   Calf Soft NT  Dorsi/Plantar Flexion/EHL/FHL intact   Sensation intact to light touch    Vital Signs Last 24 Hrs  T(C): 36.9 (05 Oct 2020 19:14), Max: 36.9 (05 Oct 2020 19:14)  T(F): 98.5 (05 Oct 2020 19:14), Max: 98.5 (05 Oct 2020 19:14)  HR: 87 (05 Oct 2020 19:14) (65 - 95)  BP: 142/68 (05 Oct 2020 19:14) (106/71 - 156/80)  BP(mean): --  RR: 18 (05 Oct 2020 19:14) (11 - 18)  SpO2: 97% (05 Oct 2020 19:14) (93% - 100%)    < from: Xray Ankle Complete 3 Views, Left (10.05.20 @ 11:21) >     EXAM:  ANKLE-LEFT                          PROCEDURE DATE:  10/05/2020          INTERPRETATION:  Radiographs of the LEFT ankle    CLINICAL INFORMATION: Postoperative radiographs.    TECHNIQUE:   Frontal, oblique and lateral views of the ankle wereobtained.    FINDINGS:   No prior examinations are available for review.    Status post surgical fixation of a complex fracture dislocation reported on 9/6/2020.  Intramedullary mecca transverses and transfixes the talus calcaneus and distal tibia stabilized by a transverse calcaneal screw and fixation nail. The tibia talus and calcaneus in proper anatomic alignment. Tri malleolus fracture deformities persist.  IMPRESSION:   Status post ORIF as described                JOSE VARNER M.D., ATTENDING RADIOLOGIST  This document has been electronically signed. Oct  5 2020  2:16PM    < end of copied text >      A/P:  S/P LEFT ANKLE ORIF  1. DVTP - LOVENOX  2. Physical Therapy   3. Pain Control as clinically indicated

## 2020-10-05 NOTE — PROGRESS NOTE ADULT - PROBLEM SELECTOR PLAN 2
1. Maximize use of non-opioid adjuncts  - Continue Gabapentin 300mg PO TID  - Offer Robaxin 750mg PO TID for spasms  2. Patient will follow up with her outpatient pain management provider after hospital discharge.

## 2020-10-05 NOTE — DISCHARGE NOTE PROVIDER - HOSPITAL COURSE
The patient underwent a LEFT OPEN REDUCTION AND INTERNAL FIXATION on 10/5/20 for treatment of a left trimalleolar ankle fracture. The patient received antibiotics consistent with SCIP guidelines. The patient was medically cleared and underwent the procedure and had no intra-operative complications. Post-operatively, the patient was seen by medicine and PT. The patient received LOVENOX for DVTP. The patient received pain medications per orthopedic pain managment protocol and the pain was appropriately controlled. Patient was evaluated by PT and instructed on gait training. The patient was NON-weight bearing. The patient did not have any post-operative medical complications. The patient was discharged in stable condition.

## 2020-10-06 LAB
ANION GAP SERPL CALC-SCNC: 9 MMOL/L — SIGNIFICANT CHANGE UP (ref 5–17)
BASOPHILS # BLD AUTO: 0.01 K/UL — SIGNIFICANT CHANGE UP (ref 0–0.2)
BASOPHILS NFR BLD AUTO: 0.1 % — SIGNIFICANT CHANGE UP (ref 0–2)
BUN SERPL-MCNC: 13 MG/DL — SIGNIFICANT CHANGE UP (ref 8–20)
CALCIUM SERPL-MCNC: 9 MG/DL — SIGNIFICANT CHANGE UP (ref 8.6–10.2)
CHLORIDE SERPL-SCNC: 103 MMOL/L — SIGNIFICANT CHANGE UP (ref 98–107)
CO2 SERPL-SCNC: 30 MMOL/L — HIGH (ref 22–29)
CREAT SERPL-MCNC: 0.9 MG/DL — SIGNIFICANT CHANGE UP (ref 0.5–1.3)
EOSINOPHIL # BLD AUTO: 0.01 K/UL — SIGNIFICANT CHANGE UP (ref 0–0.5)
EOSINOPHIL NFR BLD AUTO: 0.1 % — SIGNIFICANT CHANGE UP (ref 0–6)
GLUCOSE SERPL-MCNC: 104 MG/DL — HIGH (ref 70–99)
HCT VFR BLD CALC: 31.1 % — LOW (ref 34.5–45)
HGB BLD-MCNC: 10.2 G/DL — LOW (ref 11.5–15.5)
IMM GRANULOCYTES NFR BLD AUTO: 0.2 % — SIGNIFICANT CHANGE UP (ref 0–1.5)
LYMPHOCYTES # BLD AUTO: 1.99 K/UL — SIGNIFICANT CHANGE UP (ref 1–3.3)
LYMPHOCYTES # BLD AUTO: 22.7 % — SIGNIFICANT CHANGE UP (ref 13–44)
MCHC RBC-ENTMCNC: 31.1 PG — SIGNIFICANT CHANGE UP (ref 27–34)
MCHC RBC-ENTMCNC: 32.8 GM/DL — SIGNIFICANT CHANGE UP (ref 32–36)
MCV RBC AUTO: 94.8 FL — SIGNIFICANT CHANGE UP (ref 80–100)
MONOCYTES # BLD AUTO: 0.67 K/UL — SIGNIFICANT CHANGE UP (ref 0–0.9)
MONOCYTES NFR BLD AUTO: 7.6 % — SIGNIFICANT CHANGE UP (ref 2–14)
NEUTROPHILS # BLD AUTO: 6.07 K/UL — SIGNIFICANT CHANGE UP (ref 1.8–7.4)
NEUTROPHILS NFR BLD AUTO: 69.3 % — SIGNIFICANT CHANGE UP (ref 43–77)
PLATELET # BLD AUTO: 201 K/UL — SIGNIFICANT CHANGE UP (ref 150–400)
POTASSIUM SERPL-MCNC: 3.9 MMOL/L — SIGNIFICANT CHANGE UP (ref 3.5–5.3)
POTASSIUM SERPL-SCNC: 3.9 MMOL/L — SIGNIFICANT CHANGE UP (ref 3.5–5.3)
RBC # BLD: 3.28 M/UL — LOW (ref 3.8–5.2)
RBC # FLD: 13.2 % — SIGNIFICANT CHANGE UP (ref 10.3–14.5)
SARS-COV-2 RNA SPEC QL NAA+PROBE: SIGNIFICANT CHANGE UP
SODIUM SERPL-SCNC: 142 MMOL/L — SIGNIFICANT CHANGE UP (ref 135–145)
WBC # BLD: 8.77 K/UL — SIGNIFICANT CHANGE UP (ref 3.8–10.5)
WBC # FLD AUTO: 8.77 K/UL — SIGNIFICANT CHANGE UP (ref 3.8–10.5)

## 2020-10-06 RX ORDER — POLYETHYLENE GLYCOL 3350 17 G/17G
17 POWDER, FOR SOLUTION ORAL DAILY
Refills: 0 | Status: DISCONTINUED | OUTPATIENT
Start: 2020-10-06 | End: 2020-10-07

## 2020-10-06 RX ORDER — SENNA PLUS 8.6 MG/1
2 TABLET ORAL AT BEDTIME
Refills: 0 | Status: DISCONTINUED | OUTPATIENT
Start: 2020-10-06 | End: 2020-10-07

## 2020-10-06 RX ADMIN — Medication 975 MILLIGRAM(S): at 21:30

## 2020-10-06 RX ADMIN — HYDROMORPHONE HYDROCHLORIDE 4 MILLIGRAM(S): 2 INJECTION INTRAMUSCULAR; INTRAVENOUS; SUBCUTANEOUS at 10:30

## 2020-10-06 RX ADMIN — HYDROMORPHONE HYDROCHLORIDE 4 MILLIGRAM(S): 2 INJECTION INTRAMUSCULAR; INTRAVENOUS; SUBCUTANEOUS at 09:59

## 2020-10-06 RX ADMIN — Medication 15 MILLIGRAM(S): at 06:25

## 2020-10-06 RX ADMIN — HYDROMORPHONE HYDROCHLORIDE 4 MILLIGRAM(S): 2 INJECTION INTRAMUSCULAR; INTRAVENOUS; SUBCUTANEOUS at 05:35

## 2020-10-06 RX ADMIN — HYDROMORPHONE HYDROCHLORIDE 0.5 MILLIGRAM(S): 2 INJECTION INTRAMUSCULAR; INTRAVENOUS; SUBCUTANEOUS at 08:35

## 2020-10-06 RX ADMIN — Medication 975 MILLIGRAM(S): at 06:25

## 2020-10-06 RX ADMIN — HYDROMORPHONE HYDROCHLORIDE 4 MILLIGRAM(S): 2 INJECTION INTRAMUSCULAR; INTRAVENOUS; SUBCUTANEOUS at 18:50

## 2020-10-06 RX ADMIN — Medication 975 MILLIGRAM(S): at 20:55

## 2020-10-06 RX ADMIN — Medication 150 MILLIGRAM(S): at 20:55

## 2020-10-06 RX ADMIN — HYDROMORPHONE HYDROCHLORIDE 4 MILLIGRAM(S): 2 INJECTION INTRAMUSCULAR; INTRAVENOUS; SUBCUTANEOUS at 22:22

## 2020-10-06 RX ADMIN — HYDROMORPHONE HYDROCHLORIDE 0.5 MILLIGRAM(S): 2 INJECTION INTRAMUSCULAR; INTRAVENOUS; SUBCUTANEOUS at 00:42

## 2020-10-06 RX ADMIN — Medication 15 MILLIGRAM(S): at 05:35

## 2020-10-06 RX ADMIN — GABAPENTIN 300 MILLIGRAM(S): 400 CAPSULE ORAL at 20:55

## 2020-10-06 RX ADMIN — Medication 0.5 MILLIGRAM(S): at 22:34

## 2020-10-06 RX ADMIN — HYDROMORPHONE HYDROCHLORIDE 4 MILLIGRAM(S): 2 INJECTION INTRAMUSCULAR; INTRAVENOUS; SUBCUTANEOUS at 14:45

## 2020-10-06 RX ADMIN — ENOXAPARIN SODIUM 40 MILLIGRAM(S): 100 INJECTION SUBCUTANEOUS at 05:35

## 2020-10-06 RX ADMIN — GABAPENTIN 300 MILLIGRAM(S): 400 CAPSULE ORAL at 05:35

## 2020-10-06 RX ADMIN — Medication 975 MILLIGRAM(S): at 14:45

## 2020-10-06 RX ADMIN — HYDROMORPHONE HYDROCHLORIDE 4 MILLIGRAM(S): 2 INJECTION INTRAMUSCULAR; INTRAVENOUS; SUBCUTANEOUS at 06:25

## 2020-10-06 RX ADMIN — HYDROMORPHONE HYDROCHLORIDE 4 MILLIGRAM(S): 2 INJECTION INTRAMUSCULAR; INTRAVENOUS; SUBCUTANEOUS at 23:20

## 2020-10-06 RX ADMIN — GABAPENTIN 300 MILLIGRAM(S): 400 CAPSULE ORAL at 14:13

## 2020-10-06 RX ADMIN — Medication 60 MILLIGRAM(S): at 11:29

## 2020-10-06 RX ADMIN — SODIUM CHLORIDE 100 MILLILITER(S): 9 INJECTION, SOLUTION INTRAVENOUS at 08:21

## 2020-10-06 RX ADMIN — HYDROMORPHONE HYDROCHLORIDE 0.5 MILLIGRAM(S): 2 INJECTION INTRAMUSCULAR; INTRAVENOUS; SUBCUTANEOUS at 16:45

## 2020-10-06 RX ADMIN — HYDROMORPHONE HYDROCHLORIDE 0.5 MILLIGRAM(S): 2 INJECTION INTRAMUSCULAR; INTRAVENOUS; SUBCUTANEOUS at 16:29

## 2020-10-06 RX ADMIN — Medication 975 MILLIGRAM(S): at 05:35

## 2020-10-06 RX ADMIN — HYDROMORPHONE HYDROCHLORIDE 0.5 MILLIGRAM(S): 2 INJECTION INTRAMUSCULAR; INTRAVENOUS; SUBCUTANEOUS at 00:55

## 2020-10-06 RX ADMIN — HYDROMORPHONE HYDROCHLORIDE 4 MILLIGRAM(S): 2 INJECTION INTRAMUSCULAR; INTRAVENOUS; SUBCUTANEOUS at 14:13

## 2020-10-06 RX ADMIN — Medication 975 MILLIGRAM(S): at 14:13

## 2020-10-06 RX ADMIN — SENNA PLUS 2 TABLET(S): 8.6 TABLET ORAL at 20:55

## 2020-10-06 RX ADMIN — HYDROMORPHONE HYDROCHLORIDE 0.5 MILLIGRAM(S): 2 INJECTION INTRAMUSCULAR; INTRAVENOUS; SUBCUTANEOUS at 08:21

## 2020-10-06 RX ADMIN — Medication 0.5 MILLIGRAM(S): at 11:29

## 2020-10-06 NOTE — PHYSICAL THERAPY INITIAL EVALUATION ADULT - ACTIVE RANGE OF MOTION EXAMINATION, REHAB EVAL
L ankle and knee NT/deficits as listed below/bilateral  lower extremity Active ROM was WFL (within functional limits)/bilateral upper extremity Active ROM was WFL (within functional limits)

## 2020-10-06 NOTE — PROGRESS NOTE ADULT - ASSESSMENT
60y old opioid tolerant Female admitted with LLE foot fracture. She's now POD #1 ORIF for trimalleolar ankle fracture. She achieves analgesia with Dilaudid tablets; displays preference for IV opioids. Will continue the current regimen and reevaluate after PT is performed. A baseline opioid may be utilized to cover her home medications, if needed.

## 2020-10-06 NOTE — PROGRESS NOTE ADULT - PROBLEM SELECTOR PLAN 1
1. Continue Tylenol 975mg PO q8hrs for mild pain  2. Continue Dilaudid 2/4mg PO q3hrs PRN moderate/severe pain, respectively  - IV Dilaudid 0.5mg q6hrs PRN severe pain 1 hour after pills.

## 2020-10-06 NOTE — PROGRESS NOTE ADULT - SUBJECTIVE AND OBJECTIVE BOX
HPI:  Patient is a 60y old  Female who presents with a chief complaint of LLE foot fracture.    Pain Service:  She is known to [facility] for outpatient management of chronic back pain. She is prescribed 1 Percocet TID PRN for pain. The surgical team requests assistance with postoperative pain management. Dilaudid suggested PRN with non-opioid adjuncts. Per MAR she required 4 doses of Dilaudid tablets and 3 doses IV.      SUBJECTIVE REPORT:  Per RN, the patient appears to respond well with the current regimen. Still, she asks for IV opioids each time she's offered pain medications. She slept through the night until this morning, following PM medications.  PAIN SCORE:                   SCALE USED: VNRS    Allergies  No Known Drug Allergies  Seasonal allergies (Rhinorrhea)      PAST MEDICAL & SURGICAL HISTORY:  Asthma  Has not been admitted for have any asthma exacerbation for 10 years  Hypertension  Have not taken medications for about 2 years  Anxiety and depression  Seasonal allergies  Osteoarthritis  History of ankle surgery  right ankle 2/2020  S/P hip replacement  left and right  S/P tubal ligation  S/P cholecystectomy      MEDICATIONS  (STANDING):  acetaminophen   Tablet .. 975 milliGRAM(s) Oral every 8 hours  enoxaparin Injectable 40 milliGRAM(s) SubCutaneous every 24 hours  gabapentin 300 milliGRAM(s) Oral three times a day  influenza   Vaccine 0.5 milliLiter(s) IntraMuscular once  lactated ringers. 1000 milliLiter(s) (100 mL/Hr) IV Continuous <Continuous>  PARoxetine 60 milliGRAM(s) Oral daily  senna 2 Tablet(s) Oral at bedtime  traZODone 150 milliGRAM(s) Oral at bedtime    MEDICATIONS  (PRN):  ALPRAZolam 0.5 milliGRAM(s) Oral two times a day PRN Anxiety  aluminum hydroxide/magnesium hydroxide/simethicone Suspension 30 milliLiter(s) Oral four times a day PRN Indigestion  HYDROmorphone   Tablet 2 milliGRAM(s) Oral every 3 hours PRN Moderate Pain (4 - 6)  HYDROmorphone   Tablet 4 milliGRAM(s) Oral every 3 hours PRN Severe Pain (7 - 10)  HYDROmorphone  Injectable 0.5 milliGRAM(s) IV Push every 6 hours PRN Breakthrough  magnesium hydroxide Suspension 30 milliLiter(s) Oral daily PRN Constipation  methocarbamol 750 milliGRAM(s) Oral three times a day PRN spasm  ondansetron Injectable 4 milliGRAM(s) IV Push every 6 hours PRN Nausea and/or Vomiting  polyethylene glycol 3350 17 Gram(s) Oral daily PRN Constipation      PHYSICAL EXAM:  GENERAL: NAD, well-developed  HEAD:  Atraumatic, Normocephalic  EYES: EOMI, PERRLA, conjunctiva and sclera clear  NERVOUS SYSTEM:  Alert & Oriented X3, Good concentration; Motor Strength 5/5 B/L upper and lower extremities  CHEST/LUNG: Clear speech, no SOB evident at rest  ABDOMEN: Soft, Nontender, Nondistended      Vital Signs Last 24 Hrs  T(C): 36.5 (06 Oct 2020 07:37), Max: 36.9 (05 Oct 2020 19:14)  T(F): 97.7 (06 Oct 2020 07:37), Max: 98.5 (05 Oct 2020 19:14)  HR: 60 (06 Oct 2020 07:37) (60 - 95)  BP: 107/70 (06 Oct 2020 07:37) (106/71 - 156/80)  BP(mean): --  RR: 18 (06 Oct 2020 07:37) (11 - 18)  SpO2: 99% (06 Oct 2020 07:37) (93% - 100%)                          10.2   8.77  )-----------( 201      ( 06 Oct 2020 06:00 )             31.1       Pain Service   Text Page: [phone]

## 2020-10-06 NOTE — PHYSICAL THERAPY INITIAL EVALUATION ADULT - ADDITIONAL COMMENTS
Pt lives in an apartment with no steps to enter and no steps inside. Pt owns a RW, transport w/c, shower chair from previous surgeries. Spouse is unable to assist pt as he has his own medical conditions.

## 2020-10-06 NOTE — PHYSICAL THERAPY INITIAL EVALUATION ADULT - PERTINENT HX OF CURRENT PROBLEM, REHAB EVAL
Pt is a 59 y/o female who presented from home with trimalleolar fx. Pt has hx/o right ankle fx in February 2020.

## 2020-10-06 NOTE — PROGRESS NOTE ADULT - SUBJECTIVE AND OBJECTIVE BOX
CHERYL MIR    [id number]    History:  The patient is status post left ankle orif, POD # 1. Patient is doing well. The patient's pain is controlled using the prescribed pain medications. She complains of moderate ankle pain with relief after dilaudid IV. She is comfortable with dilaudid po and IV regimen but not controlled on po alone yet. She has not been up with PT but will attempt today.  Denies nausea, vomiting, chest pain, shortness of breath, abdominal pain or dizziness. No new complaints. No acute motor or sensory changes are reported.    Vital Signs Last 24 Hrs  T(C): 36.5 (06 Oct 2020 07:37), Max: 36.9 (05 Oct 2020 19:14)  T(F): 97.7 (06 Oct 2020 07:37), Max: 98.5 (05 Oct 2020 19:14)  HR: 60 (06 Oct 2020 07:37) (60 - 95)  BP: 107/70 (06 Oct 2020 07:37) (106/71 - 156/80)  BP(mean): --  RR: 18 (06 Oct 2020 07:37) (11 - 18)  SpO2: 99% (06 Oct 2020 07:37) (93% - 100%)  I&O's Summary    05 Oct 2020 07:01  -  06 Oct 2020 07:00  --------------------------------------------------------  IN: 5170 mL / OUT: 2050 mL / NET: 3120 mL                              10.2   8.77  )-----------( 201      ( 06 Oct 2020 06:00 )             31.1     10-06    142  |  103  |  13.0  ----------------------------<  104<H>  3.9   |  30.0<H>  |  0.90    Ca    9.0      06 Oct 2020 06:00        MEDICATIONS  (STANDING):  acetaminophen   Tablet .. 975 milliGRAM(s) Oral every 8 hours  enoxaparin Injectable 40 milliGRAM(s) SubCutaneous every 24 hours  gabapentin 300 milliGRAM(s) Oral three times a day  influenza   Vaccine 0.5 milliLiter(s) IntraMuscular once  lactated ringers. 1000 milliLiter(s) (100 mL/Hr) IV Continuous <Continuous>  PARoxetine 60 milliGRAM(s) Oral daily  traZODone 150 milliGRAM(s) Oral at bedtime    MEDICATIONS  (PRN):  ALPRAZolam 0.5 milliGRAM(s) Oral two times a day PRN Anxiety  aluminum hydroxide/magnesium hydroxide/simethicone Suspension 30 milliLiter(s) Oral four times a day PRN Indigestion  HYDROmorphone   Tablet 2 milliGRAM(s) Oral every 3 hours PRN Moderate Pain (4 - 6)  HYDROmorphone   Tablet 4 milliGRAM(s) Oral every 3 hours PRN Severe Pain (7 - 10)  HYDROmorphone  Injectable 0.5 milliGRAM(s) IV Push every 6 hours PRN Breakthrough  magnesium hydroxide Suspension 30 milliLiter(s) Oral daily PRN Constipation  methocarbamol 750 milliGRAM(s) Oral three times a day PRN spasm  ondansetron Injectable 4 milliGRAM(s) IV Push every 6 hours PRN Nausea and/or Vomiting      Physical exam: Lying in bed in NAD, awake and alert  Left lower extremity- Well padded splint is in good position. No draiange noted. No calf tenderness. Sensation to light touch is grossly intact distally. +EHL/FHL. Capillary refill is less than 2 seconds. No cyanosis.    Primary Orthopedic Assessment:  • S/P Left ankle ORIF, POD#1    Plan:   • DVT prophylaxis as prescribed- Lovenox, including use of compression devices and ankle pumps  • Continue physical therapy  • Non-weight bearing of the splinted extremity  • Continue splint as applied  • Elevation of the splinted extremity  • Pain control as clinically indicated- pain management note appreciated and recs followed  • Incentive spirometry encouraged  • Discharge planning – anticipated discharge is likely subacute rehabilitation

## 2020-10-07 ENCOUNTER — TRANSCRIPTION ENCOUNTER (OUTPATIENT)
Age: 60
End: 2020-10-07

## 2020-10-07 VITALS
TEMPERATURE: 98 F | OXYGEN SATURATION: 94 % | SYSTOLIC BLOOD PRESSURE: 126 MMHG | HEART RATE: 80 BPM | DIASTOLIC BLOOD PRESSURE: 82 MMHG | RESPIRATION RATE: 18 BRPM

## 2020-10-07 LAB
ANION GAP SERPL CALC-SCNC: 8 MMOL/L — SIGNIFICANT CHANGE UP (ref 5–17)
BUN SERPL-MCNC: 14 MG/DL — SIGNIFICANT CHANGE UP (ref 8–20)
CALCIUM SERPL-MCNC: 8.8 MG/DL — SIGNIFICANT CHANGE UP (ref 8.6–10.2)
CHLORIDE SERPL-SCNC: 103 MMOL/L — SIGNIFICANT CHANGE UP (ref 98–107)
CO2 SERPL-SCNC: 31 MMOL/L — HIGH (ref 22–29)
CREAT SERPL-MCNC: 0.81 MG/DL — SIGNIFICANT CHANGE UP (ref 0.5–1.3)
GLUCOSE SERPL-MCNC: 101 MG/DL — HIGH (ref 70–99)
POTASSIUM SERPL-MCNC: 4 MMOL/L — SIGNIFICANT CHANGE UP (ref 3.5–5.3)
POTASSIUM SERPL-SCNC: 4 MMOL/L — SIGNIFICANT CHANGE UP (ref 3.5–5.3)
SODIUM SERPL-SCNC: 142 MMOL/L — SIGNIFICANT CHANGE UP (ref 135–145)

## 2020-10-07 RX ORDER — METHOCARBAMOL 500 MG/1
1 TABLET, FILM COATED ORAL
Qty: 0 | Refills: 0 | DISCHARGE
Start: 2020-10-07

## 2020-10-07 RX ORDER — HYDROMORPHONE HYDROCHLORIDE 2 MG/ML
1 INJECTION INTRAMUSCULAR; INTRAVENOUS; SUBCUTANEOUS
Qty: 0 | Refills: 0 | DISCHARGE
Start: 2020-10-07

## 2020-10-07 RX ORDER — OXYCODONE HYDROCHLORIDE 5 MG/1
1 TABLET ORAL
Qty: 0 | Refills: 0 | DISCHARGE
Start: 2020-10-07

## 2020-10-07 RX ORDER — GABAPENTIN 400 MG/1
300 CAPSULE ORAL
Refills: 0 | Status: DISCONTINUED | OUTPATIENT
Start: 2020-10-07 | End: 2020-10-07

## 2020-10-07 RX ORDER — OXYCODONE HYDROCHLORIDE 5 MG/1
10 TABLET ORAL EVERY 8 HOURS
Refills: 0 | Status: DISCONTINUED | OUTPATIENT
Start: 2020-10-07 | End: 2020-10-07

## 2020-10-07 RX ORDER — GABAPENTIN 400 MG/1
600 CAPSULE ORAL AT BEDTIME
Refills: 0 | Status: DISCONTINUED | OUTPATIENT
Start: 2020-10-07 | End: 2020-10-07

## 2020-10-07 RX ORDER — METHOCARBAMOL 500 MG/1
750 TABLET, FILM COATED ORAL THREE TIMES A DAY
Refills: 0 | Status: DISCONTINUED | OUTPATIENT
Start: 2020-10-07 | End: 2020-10-07

## 2020-10-07 RX ADMIN — Medication 0.5 MILLIGRAM(S): at 11:26

## 2020-10-07 RX ADMIN — HYDROMORPHONE HYDROCHLORIDE 4 MILLIGRAM(S): 2 INJECTION INTRAMUSCULAR; INTRAVENOUS; SUBCUTANEOUS at 16:53

## 2020-10-07 RX ADMIN — HYDROMORPHONE HYDROCHLORIDE 0.5 MILLIGRAM(S): 2 INJECTION INTRAMUSCULAR; INTRAVENOUS; SUBCUTANEOUS at 00:07

## 2020-10-07 RX ADMIN — GABAPENTIN 300 MILLIGRAM(S): 400 CAPSULE ORAL at 13:02

## 2020-10-07 RX ADMIN — HYDROMORPHONE HYDROCHLORIDE 0.5 MILLIGRAM(S): 2 INJECTION INTRAMUSCULAR; INTRAVENOUS; SUBCUTANEOUS at 00:20

## 2020-10-07 RX ADMIN — HYDROMORPHONE HYDROCHLORIDE 4 MILLIGRAM(S): 2 INJECTION INTRAMUSCULAR; INTRAVENOUS; SUBCUTANEOUS at 09:26

## 2020-10-07 RX ADMIN — Medication 975 MILLIGRAM(S): at 05:14

## 2020-10-07 RX ADMIN — OXYCODONE HYDROCHLORIDE 10 MILLIGRAM(S): 5 TABLET ORAL at 08:10

## 2020-10-07 RX ADMIN — Medication 975 MILLIGRAM(S): at 13:03

## 2020-10-07 RX ADMIN — HYDROMORPHONE HYDROCHLORIDE 4 MILLIGRAM(S): 2 INJECTION INTRAMUSCULAR; INTRAVENOUS; SUBCUTANEOUS at 16:18

## 2020-10-07 RX ADMIN — Medication 975 MILLIGRAM(S): at 06:00

## 2020-10-07 RX ADMIN — HYDROMORPHONE HYDROCHLORIDE 4 MILLIGRAM(S): 2 INJECTION INTRAMUSCULAR; INTRAVENOUS; SUBCUTANEOUS at 05:00

## 2020-10-07 RX ADMIN — Medication 975 MILLIGRAM(S): at 13:02

## 2020-10-07 RX ADMIN — HYDROMORPHONE HYDROCHLORIDE 4 MILLIGRAM(S): 2 INJECTION INTRAMUSCULAR; INTRAVENOUS; SUBCUTANEOUS at 09:58

## 2020-10-07 RX ADMIN — HYDROMORPHONE HYDROCHLORIDE 4 MILLIGRAM(S): 2 INJECTION INTRAMUSCULAR; INTRAVENOUS; SUBCUTANEOUS at 04:12

## 2020-10-07 RX ADMIN — Medication 60 MILLIGRAM(S): at 11:24

## 2020-10-07 RX ADMIN — ENOXAPARIN SODIUM 40 MILLIGRAM(S): 100 INJECTION SUBCUTANEOUS at 05:14

## 2020-10-07 RX ADMIN — GABAPENTIN 300 MILLIGRAM(S): 400 CAPSULE ORAL at 05:14

## 2020-10-07 RX ADMIN — OXYCODONE HYDROCHLORIDE 10 MILLIGRAM(S): 5 TABLET ORAL at 07:52

## 2020-10-07 RX ADMIN — METHOCARBAMOL 750 MILLIGRAM(S): 500 TABLET, FILM COATED ORAL at 13:02

## 2020-10-07 NOTE — PROGRESS NOTE ADULT - PROBLEM SELECTOR PLAN 2
1. Maximize use of non-opioid adjuncts  - Modify: Gabapentin 300mg PO BID; Gabapentin 600mg PO at bedtime  - Offer Robaxin 750mg PO TID, ATCx3 days, PRN spasms thereafter  2. Patient will follow up with her outpatient pain management provider after hospital discharge. 1. Add Oxycontin 10mg PO TID for baseline analgesia   2. Maximize use of non-opioid adjuncts  - Modify: Gabapentin 300mg PO BID; Gabapentin 600mg PO at bedtime  - Offer Robaxin 750mg PO TID, ATCx3 days, PRN spasms thereafter  3. Patient will follow up with her outpatient pain management provider after hospital discharge.

## 2020-10-07 NOTE — PROGRESS NOTE ADULT - SUBJECTIVE AND OBJECTIVE BOX
CHERYL MIR    [id number]    History:  The patient is status post left ankle orif, POD # 2. Patient is doing well. The patient is still having moderate pain with prescribed pain medications. States that Dilaudid is not sufficient. States that she needs IV to help control pain. She has not been up with PT but will attempt today.  Denies nausea, vomiting, chest pain, shortness of breath, abdominal pain or dizziness. No new complaints. No acute motor or sensory changes are reported.    Vital Signs Last 24 Hrs  T(C): 37 (07 Oct 2020 05:20), Max: 37 (07 Oct 2020 05:20)  T(F): 98.6 (07 Oct 2020 05:20), Max: 98.6 (07 Oct 2020 05:20)  HR: 68 (07 Oct 2020 05:20) (60 - 85)  BP: 129/69 (07 Oct 2020 05:20) (107/70 - 138/89)  BP(mean): --  RR: 18 (07 Oct 2020 05:20) (18 - 18)  SpO2: 98% (07 Oct 2020 05:20) (93% - 99%)                          10.2   8.77  )-----------( 201      ( 06 Oct 2020 06:00 )             31.1   10-07    142  |  103  |  14.0  ----------------------------<  101<H>  4.0   |  31.0<H>  |  0.81    Ca    8.8      07 Oct 2020 06:00        Physical exam: Lying in bed in NAD, awake and alert  Left lower extremity- No drainage noted. No calf tenderness. Sensation to light touch is grossly intact distally. +EHL/FHL. Capillary refill is less than 2 seconds. No cyanosis.    Primary Orthopedic Assessment:  • S/P Left ankle ORIF, POD#2    Plan:   • DVT prophylaxis as prescribed- Lovenox, including use of compression devices and ankle pumps  • Continue physical therapy  • Non-weight bearing of the splinted extremity  • Continue splint as applied  • Elevation of the splinted extremity  • Pain control as clinically indicated- pain management note appreciated and recs followed  • Incentive spirometry encouraged  • Discharge planning – anticipated discharge is likely subacute rehabilitation       CHERYL MIR    [id number]    History:  The patient is status post left ankle fusion, POD # 2. Patient is doing well. The patient is still having moderate pain with prescribed pain medications. States that Dilaudid is not sufficient. States that she needs IV to help control pain. She has not been up with PT but will attempt today.  Denies nausea, vomiting, chest pain, shortness of breath, abdominal pain or dizziness. No new complaints. No acute motor or sensory changes are reported.    Vital Signs Last 24 Hrs  T(C): 37 (07 Oct 2020 05:20), Max: 37 (07 Oct 2020 05:20)  T(F): 98.6 (07 Oct 2020 05:20), Max: 98.6 (07 Oct 2020 05:20)  HR: 68 (07 Oct 2020 05:20) (60 - 85)  BP: 129/69 (07 Oct 2020 05:20) (107/70 - 138/89)  BP(mean): --  RR: 18 (07 Oct 2020 05:20) (18 - 18)  SpO2: 98% (07 Oct 2020 05:20) (93% - 99%)                          10.2   8.77  )-----------( 201      ( 06 Oct 2020 06:00 )             31.1   10-07    142  |  103  |  14.0  ----------------------------<  101<H>  4.0   |  31.0<H>  |  0.81    Ca    8.8      07 Oct 2020 06:00        Physical exam: Lying in bed in NAD, awake and alert  Left lower extremity- No drainage noted. No calf tenderness. Sensation to light touch is grossly intact distally. +EHL/FHL. Capillary refill is less than 2 seconds. No cyanosis.  Dressing removed for wound check. C/D/I. No active draining. Redressed.     Primary Orthopedic Assessment:  • S/P Left ankle fusion, POD#2    Plan:   • DVT prophylaxis as prescribed- Lovenox, including use of compression devices and ankle pumps  • Continue physical therapy  • Non-weight bearing of Left lower extremity  • Continue splint as applied  • Elevation of the splinted extremity  • Pain control as clinically indicated- pain management note appreciated and recs followed  • Incentive spirometry encouraged  • Discharge planning – anticipated discharge is likely subacute rehabilitation    covid negativev      CHERYL MIR    [id number]    History:  The patient is status post left ankle fusion, POD # 2. Patient is doing well. The patient is still having moderate pain with prescribed pain medications. States that Dilaudid is not sufficient. States that she needs IV to help control pain.  Denies nausea, vomiting, chest pain, shortness of breath, abdominal pain or dizziness. No new complaints. No acute motor or sensory changes are reported.    Vital Signs Last 24 Hrs  T(C): 37 (07 Oct 2020 05:20), Max: 37 (07 Oct 2020 05:20)  T(F): 98.6 (07 Oct 2020 05:20), Max: 98.6 (07 Oct 2020 05:20)  HR: 68 (07 Oct 2020 05:20) (60 - 85)  BP: 129/69 (07 Oct 2020 05:20) (107/70 - 138/89)  BP(mean): --  RR: 18 (07 Oct 2020 05:20) (18 - 18)  SpO2: 98% (07 Oct 2020 05:20) (93% - 99%)                          10.2   8.77  )-----------( 201      ( 06 Oct 2020 06:00 )             31.1   10-07    142  |  103  |  14.0  ----------------------------<  101<H>  4.0   |  31.0<H>  |  0.81    Ca    8.8      07 Oct 2020 06:00        Physical exam: Lying in bed in NAD, awake and alert  Left lower extremity- No drainage noted. No calf tenderness. Sensation to light touch is grossly intact distally. +EHL/FHL. Capillary refill is less than 2 seconds. No cyanosis.  Dressing removed for wound check. C/D/I. No active draining. Redressed.     Primary Orthopedic Assessment:  • S/P Left ankle fusion, POD#2    Plan:   • DVT prophylaxis as prescribed- Lovenox, including use of compression devices and ankle pumps  • Continue physical therapy  • Non-weight bearing of Left lower extremity  • Continue splint as applied  • Elevation of the splinted extremity  • Pain control as clinically indicated- pain management note appreciated and recs followed  • Incentive spirometry encouraged  • Discharge planning – anticipated discharge is likely subacute rehabilitation    covid negativev

## 2020-10-07 NOTE — DISCHARGE NOTE NURSING/CASE MANAGEMENT/SOCIAL WORK - PATIENT PORTAL LINK FT
You can access the FollowMyHealth Patient Portal offered by [facility] by registering at the following website: [email or web address]. By joining [facility]’s FollowMyHealth portal, you will also be able to view your health information using other applications (apps) compatible with our system.

## 2020-10-07 NOTE — PROGRESS NOTE ADULT - PROBLEM SELECTOR PLAN 1
1. Continue Tylenol 975mg PO q8hrs for mild pain  2. Continue Dilaudid 2/4mg PO q3hrs PRN moderate/severe pain, respectively  - IV Dilaudid 0.5mg q6hrs PRN severe pain 1 hour after pills.  3. Celebrex 200mg PO daily for inflammation at discretion of surgical team  4. Continue to encourage PT

## 2020-10-07 NOTE — PROGRESS NOTE ADULT - SUBJECTIVE AND OBJECTIVE BOX
HPI:  Patient is a 60y old  Female who presents with a chief complaint of LLE foot fracture.    Pain Service:  She is now POD #2 left ankle ORIF. The patient is known to [facility] for outpatient management of chronic back pain. She is prescribed 1 Percocet TID PRN for pain. The surgical team requests assistance with postoperative pain management. Dilaudid suggested PRN with non-opioid adjuncts. Per MAR she required 6 doses of Dilaudid tablets with ongoing reports of inadequate analgesia.      VERBAL REPORT: "The pills do not relieve my pain enough."  The patient describes ongoing knife-like, stabbing sensation in her operative foot. she explains that if her pain is 9/10, the Dilaudid brings it down to 7/10. This is inadequate relief for her to participate in PT as she desires.  PAIN SCORE: 6-7/10                  SCALE USED: VNRS    Allergies  No Known Drug Allergies  Seasonal allergies (Rhinorrhea)    PAST MEDICAL & SURGICAL HISTORY:  Asthma  Has not been admitted for have any asthma exacerbation for 10 years  Hypertension  Have not taken medications for about 2 years  Anxiety and depression  Seasonal allergies  Osteoarthritis  History of ankle surgery  right ankle 2/2020  S/P hip replacement  left and right  S/P tubal ligation  S/P cholecystectomy      MEDICATIONS  (STANDING):  acetaminophen   Tablet .. 975 milliGRAM(s) Oral every 8 hours  enoxaparin Injectable 40 milliGRAM(s) SubCutaneous every 24 hours  gabapentin 300 milliGRAM(s) Oral three times a day  influenza   Vaccine 0.5 milliLiter(s) IntraMuscular once  lactated ringers. 1000 milliLiter(s) (100 mL/Hr) IV Continuous <Continuous>  methocarbamol 750 milliGRAM(s) Oral three times a day  oxyCODONE  ER Tablet 10 milliGRAM(s) Oral every 8 hours  PARoxetine 60 milliGRAM(s) Oral daily  senna 2 Tablet(s) Oral at bedtime  traZODone 150 milliGRAM(s) Oral at bedtime    MEDICATIONS  (PRN):  ALPRAZolam 0.5 milliGRAM(s) Oral two times a day PRN Anxiety  aluminum hydroxide/magnesium hydroxide/simethicone Suspension 30 milliLiter(s) Oral four times a day PRN Indigestion  bisacodyl Suppository 10 milliGRAM(s) Rectal daily PRN If no bowel movement  HYDROmorphone   Tablet 2 milliGRAM(s) Oral every 3 hours PRN Moderate Pain (4 - 6)  HYDROmorphone   Tablet 4 milliGRAM(s) Oral every 3 hours PRN Severe Pain (7 - 10)  HYDROmorphone  Injectable 0.5 milliGRAM(s) IV Push every 6 hours PRN Breakthrough  magnesium hydroxide Suspension 30 milliLiter(s) Oral daily PRN Constipation  ondansetron Injectable 4 milliGRAM(s) IV Push every 6 hours PRN Nausea and/or Vomiting  polyethylene glycol 3350 17 Gram(s) Oral daily PRN Constipation      PHYSICAL EXAM:  GENERAL: NAD, well-developed  HEAD:  Atraumatic, Normocephalic  EYES: EOMI, PERRLA, conjunctiva and sclera clear  NERVOUS SYSTEM:  Alert & Oriented X3, Good concentration; Motor Strength 5/5 B/L upper and R lower extremities  CHEST/LUNG: Clear speech, no SOB evident at rest  ABDOMEN: Soft, Nontender, Nondistended  EXTREMITIES: LLE in operative dressing, elevate don pillow      Vital Signs Last 24 Hrs  T(C): 36.7 (07 Oct 2020 08:33), Max: 37 (07 Oct 2020 05:20)  T(F): 98 (07 Oct 2020 08:33), Max: 98.6 (07 Oct 2020 05:20)  HR: 72 (07 Oct 2020 08:33) (68 - 85)  BP: 146/91 (07 Oct 2020 08:33) (114/75 - 146/91)  BP(mean): --  RR: 18 (07 Oct 2020 08:33) (18 - 18)  SpO2: 95% (07 Oct 2020 08:33) (93% - 98%)                          10.2   8.77  )-----------( 201      ( 06 Oct 2020 06:00 )             31.1       Pain Service   Text Page: [phone]

## 2020-10-07 NOTE — PROGRESS NOTE ADULT - ASSESSMENT
60y old opioid tolerant Female admitted with LLE foot fracture. She's now POD #2 ORIF for trimalleolar ankle fracture. Found AA&Ox3, NAD, supine in bed conversing with ortho PA. Ongoing pain appreciated; she is not oversedated. Discussed adding a baseline opioid and maximizing use of non-opioid analgesics. Continue to encourage pt; potential discharge to rehab today. Patient agreeable to plan.

## 2020-10-20 NOTE — CONSULT NOTE ADULT - REASON FOR ADMISSION
Choledocholithiasis
Follow up with Dr. Daniel as scheduled. Alternate taking Tylenol and Motrin every 3 hours. Only take Oxycodone for breakthrough pain. Nothing in the vagina (intercourse, tampons, tub baths) until you see your doctor. Call your doctor if you experience fevers >100.4, pain uncontrolled with medications, heavy vaginal bleeding, or inability to void.

## 2020-10-21 ENCOUNTER — APPOINTMENT (OUTPATIENT)
Dept: ORTHOPEDIC SURGERY | Facility: CLINIC | Age: 60
End: 2020-10-21
Payer: MEDICAID

## 2020-10-21 VITALS — TEMPERATURE: 97.1 F

## 2020-10-21 PROCEDURE — 73590 X-RAY EXAM OF LOWER LEG: CPT | Mod: LT

## 2020-10-21 PROCEDURE — 99024 POSTOP FOLLOW-UP VISIT: CPT

## 2020-10-21 PROCEDURE — 99072 ADDL SUPL MATRL&STAF TM PHE: CPT

## 2020-10-21 NOTE — HISTORY OF PRESENT ILLNESS
[Clean/Dry/Intact] : clean, dry and intact [Healed] : healed [Erythema] : not erythematous [Discharge] : absent of discharge [Swelling] : swollen [Dehiscence] : not dehisced [Neuro Intact] : an unremarkable neurological exam [Vascular Intact] : ~T peripheral vascular exam normal [Doing Well] : is doing well [No Sign of Infection] : is showing no signs of infection [Adequate Pain Control] : has adequate pain control [de-identified] : s/p open reduction with internal fixation, left trimalleolar ankle fracture. 10/5/2020 [de-identified] : 60-year-old female returns today for postoperative follow-up s/p open reduction with internal fixation, left trimalleolar ankle fracture. 10/5/2020.  She is currently in a rehab. [de-identified] : Physical Exam:\par General: Well appearing, no acute distress, A&O\par Neurologic: A&Ox3, No focal deficits\par Head: NCAT without abrasions, lacerations, or ecchymosis to head, face, or scalp\par Respiratory: Equal chest wall expansion bilaterally, no accessory muscle use\par Lymphatic: No lymphadenopathy palpated\par Skin: Warm and dry\par Psychiatric: Normal mood and affect [de-identified] : Plain films of the left ankle were previously obtained but new films of the left tibia were obtained today.  They show stable hindfoot fusion nail placement for a trimalleolar ankle fracture. [de-identified] : Continue with a standard plan.  As the wounds are healing well, she may weight-bear as tolerated.  Cam boot for protection.  Repeat x-rays in 6 weeks and then may discontinue the cam boot.  She may follow-up either in office or via telehealth depending on where she is living.  The patient was given the opportunity to ask questions and all questions were answered to their satisfaction.\par \par The question of when to drive is impossible to generalize to everyone because it is largely dependent on the individual.  Importantly, doctors do not have a license with the DMV to "clear you" or "release you" to return to driving.  There are 3 primary criteria that must be met.  You need to be off of narcotic pain medicines (otherwise you are driving under the influence).  You need to be able to get in and out of the 's seat comfortably.  And you must have regained your normal reflexes / strength.  Also, return to driving depends partly on what side had surgery (ie. Right leg operates the pedals; people with Left side surgery can generally get back to driving much sooner unless you have a clutch).  The average time to return to driving is around 2 weeks after you return to normal walking for the right side and usually sooner for the left.  We recommend 'testing' yourself with another licensed  in an empty parking lot or quiet street first in order to check your reflexes moving your foot from pedal to pedal.

## 2020-10-25 NOTE — ED CDU PROVIDER SUBSEQUENT DAY NOTE - MUSCULOSKELETAL MINIMAL EXAM
19:45
left ankle is deformed and hasbone sticking out medial aspect but no ski break, pt has good dp pusle, good cap refill in foot,/RANGE OF MOTION LIMITED
RANGE OF MOTION LIMITED/left ankle is deformed and hasbone sticking out medial aspect but no ski break, pt has good dp pusle, good cap refill in foot,

## 2020-10-29 PROCEDURE — C1713: CPT

## 2020-10-29 PROCEDURE — U0003: CPT

## 2020-10-29 PROCEDURE — 80048 BASIC METABOLIC PNL TOTAL CA: CPT

## 2020-10-29 PROCEDURE — 85025 COMPLETE CBC W/AUTO DIFF WBC: CPT

## 2020-10-29 PROCEDURE — 76000 FLUOROSCOPY <1 HR PHYS/QHP: CPT

## 2020-10-29 PROCEDURE — C1769: CPT

## 2020-10-29 PROCEDURE — 36415 COLL VENOUS BLD VENIPUNCTURE: CPT

## 2020-10-29 PROCEDURE — 73610 X-RAY EXAM OF ANKLE: CPT

## 2020-10-29 PROCEDURE — C1889: CPT

## 2020-11-06 ENCOUNTER — APPOINTMENT (OUTPATIENT)
Dept: ORTHOPEDIC SURGERY | Facility: CLINIC | Age: 60
End: 2020-11-06
Payer: MEDICAID

## 2020-11-06 PROCEDURE — 73610 X-RAY EXAM OF ANKLE: CPT | Mod: RT

## 2020-11-06 PROCEDURE — 99072 ADDL SUPL MATRL&STAF TM PHE: CPT

## 2020-11-06 PROCEDURE — 99213 OFFICE O/P EST LOW 20 MIN: CPT | Mod: 24

## 2020-11-06 NOTE — PHYSICAL EXAM
[de-identified] : Right Ankle Physical Examination:\par \par General: Alert and oriented x3.  In no acute distress.  Pleasant in nature with a normal affect.  No apparent respiratory distress. \par Erythema, Warmth, Rubor: Negative\par Swelling: Positive. Incision is healed.\par \par ROM:\par 1. Dorsiflexion: 0 degrees\par 2. Plantarflexion: 40 degrees\par 3. Inversion: 10 degrees\par 4. Eversion: 10 degrees\par \par Tenderness to Palpation: \par 1. Lateral Malleolus: Negative\par 2. Medial Malleolus: Positive over the hardware and incision.\par 3. Proximal Fibular Pain: Negative\par 4. Heel Pain: Negative\par 5. Cuboid: Negative\par 6. Navicular: Negative\par 7. Tibiotalar Joint: Negative\par 8. Subtalar Joint: Negative\par 9. Posterior Recess: Negative\par \par Tendon Pain:\par 1. Achilles: Negative\par 2. Peroneals: Negative\par 3. Posterior Tibialis: Negative\par 4. Tibialis Anterior: Negative\par \par Ligament Pain:\par 1. ATFL: Negative\par 2. CFL: Negative \par 3. PTFL: Negative\par 4. Deltoid Ligaments: Negative\par 5. Lis Franc Ligament: Negative\par \par Stability: \par 1. Anterior Drawer: Negative\par 2. Posterior Drawer: Negative\par \par Strength: 5/5 TA/GS/EHL\par \par Pulses: 2+ DP/PT Pulses\par \par Neuro: Intact motor and sensory\par \par Additional Test:\par 1. Calcaneal Squeeze Test: Negative\par 2. Syndesmosis Squeeze Test: Negative [de-identified] : 3V of the right ankle were ordered obtained and reviewed by me today, 11/06/2020 , revealed: Posttraumatic arthritis.

## 2020-11-06 NOTE — DISCUSSION/SUMMARY
NURSE NOTES:

AM labs drawn via PICC Line; sent down to lab. [de-identified] : Today I had a lengthy discussion with the patient regarding their right ankle pain. I have addressed all the patient's concerns surrounding the pathology of their condition. A discussion was had about a possible fusion surgery vs hardware removal and possible ankle replacement surgery. I advised the patient she can continue utilizing the ASO brace. At this time I would like to obtain advanced imaging of the patient's right ankle. A CT scan was ordered so I can find out more about the etiology of the patient's condition and evaluate bone healing. The patient should follow up with the office after obtaining the CT scan. The patient understood and verbally agreed to the treatment plan. All of their questions were answered and they were satisfied with the visit. The patient should call the office if they have any questions or experience worsening symptoms.

## 2020-11-06 NOTE — ADDENDUM
[FreeTextEntry1] : I, Jason Veronica, acted solely as a scribe for Dr. Mohamud Knight on this date 11/06/2020 .\par All medical record entries made by the Scribe were at my, Dr. Mohamud Knight, direction and personally dictated by me on 11/06/2020 . I have reviewed the chart and agree that the record accurately reflects my personal performance of the history, physical exam, assessment and plan. I have also personally directed, reviewed, and agreed with the chart.

## 2020-11-06 NOTE — HISTORY OF PRESENT ILLNESS
[FreeTextEntry1] : 60 year old female presenting with right ankle pain. The patient’s pain is noted to be a 8/10. The pain is noted to be 10% improved, and the swelling is noted to be worse. The patient presents ambulating with a walker. The patient presents with an ASO brace. The patient has been attending physical therapy for this issue. The patient recently fractured her left ankle as well, and had surgery for this at [place]. She is currently taking Percocet as needed. No other complaints at this time.

## 2020-11-11 ENCOUNTER — RESULT REVIEW (OUTPATIENT)
Age: 60
End: 2020-11-11

## 2020-11-11 ENCOUNTER — OUTPATIENT (OUTPATIENT)
Dept: OUTPATIENT SERVICES | Facility: HOSPITAL | Age: 60
LOS: 1 days | End: 2020-11-11
Payer: MEDICAID

## 2020-11-11 ENCOUNTER — APPOINTMENT (OUTPATIENT)
Dept: CT IMAGING | Facility: CLINIC | Age: 60
End: 2020-11-11
Payer: MEDICAID

## 2020-11-11 DIAGNOSIS — S82.891A OTHER FRACTURE OF RIGHT LOWER LEG, INITIAL ENCOUNTER FOR CLOSED FRACTURE: ICD-10-CM

## 2020-11-11 DIAGNOSIS — Z90.49 ACQUIRED ABSENCE OF OTHER SPECIFIED PARTS OF DIGESTIVE TRACT: Chronic | ICD-10-CM

## 2020-11-11 DIAGNOSIS — Z98.890 OTHER SPECIFIED POSTPROCEDURAL STATES: Chronic | ICD-10-CM

## 2020-11-11 DIAGNOSIS — Z96.60 PRESENCE OF UNSPECIFIED ORTHOPEDIC JOINT IMPLANT: Chronic | ICD-10-CM

## 2020-11-11 DIAGNOSIS — Z98.51 TUBAL LIGATION STATUS: Chronic | ICD-10-CM

## 2020-11-11 DIAGNOSIS — Z00.8 ENCOUNTER FOR OTHER GENERAL EXAMINATION: ICD-10-CM

## 2020-11-11 PROCEDURE — 73700 CT LOWER EXTREMITY W/O DYE: CPT

## 2020-11-11 PROCEDURE — 73700 CT LOWER EXTREMITY W/O DYE: CPT | Mod: 26,RT

## 2020-11-11 PROCEDURE — 76376 3D RENDER W/INTRP POSTPROCES: CPT | Mod: 26

## 2020-11-11 PROCEDURE — 76376 3D RENDER W/INTRP POSTPROCES: CPT

## 2020-11-13 ENCOUNTER — APPOINTMENT (OUTPATIENT)
Dept: ORTHOPEDIC SURGERY | Facility: CLINIC | Age: 60
End: 2020-11-13
Payer: MEDICAID

## 2020-11-13 PROCEDURE — 99213 OFFICE O/P EST LOW 20 MIN: CPT

## 2020-11-13 PROCEDURE — 99072 ADDL SUPL MATRL&STAF TM PHE: CPT

## 2020-11-13 NOTE — HISTORY OF PRESENT ILLNESS
[FreeTextEntry1] : 11/13/20: The patient returns for a followup of her right ankle and to review the CT scan of her right ankle. The patient is in a wheelchair at this time for her left ankle and right ankle. Her pain scale right ankle is 8/10. She has no other complaints. \par \par 11/6/20: 60 year old female presenting with right ankle pain. The patient’s pain is noted to be a 8/10. The pain is noted to be 10% improved, and the swelling is noted to be worse. The patient presents ambulating with a walker. The patient presents with an ASO brace. The patient has been attending physical therapy for this issue. The patient recently fractured her left ankle as well, and had surgery for this at [place]. She is currently taking Percocet as needed. No other complaints at this time.

## 2020-11-13 NOTE — PHYSICAL EXAM
[de-identified] : Right Ankle Physical Examination:\par \par General: Alert and oriented x3.  In no acute distress.  Pleasant in nature with a normal affect.  No apparent respiratory distress. \par Erythema, Warmth, Rubor: Negative\par Swelling: Positive. Incision is healed.\par \par ROM:\par 1. Dorsiflexion: 0 degrees\par 2. Plantarflexion: 40 degrees\par 3. Inversion: 10 degrees\par 4. Eversion: 10 degrees\par \par Tenderness to Palpation: \par 1. Lateral Malleolus: Negative\par 2. Medial Malleolus: Positive over the hardware and incision.\par 3. Proximal Fibular Pain: Negative\par 4. Heel Pain: Negative\par 5. Cuboid: Negative\par 6. Navicular: Negative\par 7. Tibiotalar Joint: Negative\par 8. Subtalar Joint: Negative\par 9. Posterior Recess: Negative\par \par Tendon Pain:\par 1. Achilles: Negative\par 2. Peroneals: Negative\par 3. Posterior Tibialis: Negative\par 4. Tibialis Anterior: Negative\par \par Ligament Pain:\par 1. ATFL: Negative\par 2. CFL: Negative \par 3. PTFL: Negative\par 4. Deltoid Ligaments: Negative\par 5. Lis Franc Ligament: Negative\par \par Stability: \par 1. Anterior Drawer: Negative\par 2. Posterior Drawer: Negative\par \par Strength: 5/5 TA/GS/EHL\par \par Pulses: 2+ DP/PT Pulses\par \par Neuro: Intact motor and sensory\par \par Additional Test:\par 1. Calcaneal Squeeze Test: Negative\par 2. Syndesmosis Squeeze Test: Negative [de-identified] : 3V of the right ankle were ordered obtained and reviewed, revealed: Posttraumatic arthritis. \par \par CT scan of the right ankle reviewed on 11/11/2020: status post trimalleolar fracture and ORIF as seen on 7/10/2020.  Mixed lucency and sclerosis an osseous fragmentation at the tibial plafond suspicious for avascular necrosis. New lucency and minimal flattening at the anterolateral talar dome with abutment osseous overgrowth at the anterior tibial plafond and which could be related to osteoarthrosis and/or osteochondral injury and less likely avascular necrosis.

## 2020-11-13 NOTE — DISCUSSION/SUMMARY
[de-identified] : At this point in time I would like to obtain a metal subtracting MRI without contrast right ankle to evaluate for avascular necrosis of the tibial plafond possibly seen on CT scan of the right ankle. The patient will continue to be nonweightbearing. She has the ASO brace for the right ankle. I answered all her questions as well as her 's questions in regards to the CT scan. Once the MRI of the right ankle is completed she will followup with Dr. Knight to discuss the next steps. She understood the treatment course at this time.

## 2020-11-23 ENCOUNTER — APPOINTMENT (OUTPATIENT)
Dept: ORTHOPEDIC SURGERY | Facility: CLINIC | Age: 60
End: 2020-11-23
Payer: MEDICAID

## 2020-11-23 ENCOUNTER — APPOINTMENT (OUTPATIENT)
Dept: MRI IMAGING | Facility: CLINIC | Age: 60
End: 2020-11-23
Payer: MEDICAID

## 2020-11-23 VITALS — TEMPERATURE: 94.8 F

## 2020-11-23 PROCEDURE — 73610 X-RAY EXAM OF ANKLE: CPT | Mod: LT

## 2020-11-23 PROCEDURE — 99024 POSTOP FOLLOW-UP VISIT: CPT

## 2020-11-23 NOTE — HISTORY OF PRESENT ILLNESS
[Chills] : no chills [Fever] : no fever [de-identified] : s/p open reduction with internal fixation, left trimalleolar ankle fracture.. Left ankle fusion. Left subtalar fusion. 10/5/2020.  Now 7 weeks postop [de-identified] : 60-year-old female returns today for postoperative follow-up s/p open reduction with internal fixation, left trimalleolar ankle fracture. 10/5/2020. She is 7 weeks postop, and doing well.  [de-identified] : Physical Exam:\par General: Well appearing, no acute distress, A&O\par Neurologic: A&Ox3, No focal deficits\par Head: NCAT without abrasions, lacerations, or ecchymosis to head, face, or scalp\par Respiratory: Equal chest wall expansion bilaterally, no accessory muscle use\par Lymphatic: No lymphadenopathy palpated\par Skin: Warm and dry\par Psychiatric: Normal mood and affect.\par \par  The surgical incision site is clean, dry and intact, healed, not erythematous, absent of discharge and not dehisced. Additional findings included an unremarkable neurological exam and peripheral vascular exam normal.  Pt has mild ttp to plantar heel incision with weight bearing.  [de-identified] :  New films of the left tibia were obtained today. They show stable hindfoot fusion nail placement for a trimalleolar ankle fracture, hardware intact with alignment maintained.  [de-identified] : Postoperatively, the patient is doing well, is showing no signs of infection and has adequate pain control. [de-identified] : Continue with a standard plan.  Pt may weight bear as tolerated, and has no restrictions at this time.  She has been using the CAM boot for perhaps 2 weeks at this time, and so our goal is to gradually wean her out of her  CAM boot, continuing with full weight bearing left foot.  She may follow up as needed if she is progressing well with improving pain, otherwise she may follow up in 6 weeks for repeat XR/evaluation. The patient was given the opportunity to ask questions and all questions were answered to their satisfaction.\par \par The question of when to drive is impossible to generalize to everyone because it is largely dependent on the individual. Importantly, doctors do not have a license with the DMV to "clear you" or "release you" to return to driving. There are 3 primary criteria that must be met. You need to be off of narcotic pain medicines (otherwise you are driving under the influence). You need to be able to get in and out of the 's seat comfortably. And you must have regained your normal reflexes / strength. Also, return to driving depends partly on what side had surgery (ie. Right leg operates the pedals; people with Left side surgery can generally get back to driving much sooner unless you have a clutch). The average time to return to driving is around 2 weeks after you return to normal walking for the right side and usually sooner for the left. We recommend 'testing' yourself with another licensed  in an empty parking lot or quiet street first in order to check your reflexes moving your foot from pedal to pedal. \par \par Orthopaedic Trauma Surgeon Addendum:\par \par I agree with the above resident physician note.  \par Chart was reviewed and patient examined in the orthopedic office. I agree with the assessment and plan of the resident.\par \par I was physically present for the key portions of the evaluation and management service provided. I agree with the above history, physical and plan. Appropriate imaging has been reviewed and the plan adjusted as needed.\par \par Navneet Chapa MD\par Orthopaedic Trauma Surgeon\par [facility]\par [facility] [place]

## 2020-11-25 ENCOUNTER — APPOINTMENT (OUTPATIENT)
Dept: MRI IMAGING | Facility: CLINIC | Age: 60
End: 2020-11-25
Payer: MEDICAID

## 2020-11-25 ENCOUNTER — OUTPATIENT (OUTPATIENT)
Dept: OUTPATIENT SERVICES | Facility: HOSPITAL | Age: 60
LOS: 1 days | End: 2020-11-25
Payer: MEDICAID

## 2020-11-25 DIAGNOSIS — Z90.49 ACQUIRED ABSENCE OF OTHER SPECIFIED PARTS OF DIGESTIVE TRACT: Chronic | ICD-10-CM

## 2020-11-25 DIAGNOSIS — M87.073: ICD-10-CM

## 2020-11-25 DIAGNOSIS — Z98.890 OTHER SPECIFIED POSTPROCEDURAL STATES: Chronic | ICD-10-CM

## 2020-11-25 DIAGNOSIS — Z96.60 PRESENCE OF UNSPECIFIED ORTHOPEDIC JOINT IMPLANT: Chronic | ICD-10-CM

## 2020-11-25 DIAGNOSIS — Z98.51 TUBAL LIGATION STATUS: Chronic | ICD-10-CM

## 2020-11-25 PROCEDURE — 73721 MRI JNT OF LWR EXTRE W/O DYE: CPT

## 2020-11-25 PROCEDURE — 73721 MRI JNT OF LWR EXTRE W/O DYE: CPT | Mod: 26,RT

## 2020-11-30 ENCOUNTER — APPOINTMENT (OUTPATIENT)
Dept: ORTHOPEDIC SURGERY | Facility: CLINIC | Age: 60
End: 2020-11-30
Payer: MEDICAID

## 2020-11-30 PROCEDURE — 99214 OFFICE O/P EST MOD 30 MIN: CPT

## 2020-11-30 PROCEDURE — 99072 ADDL SUPL MATRL&STAF TM PHE: CPT

## 2020-11-30 RX ORDER — OXYCODONE AND ACETAMINOPHEN 10; 325 MG/1; MG/1
10-325 TABLET ORAL
Qty: 28 | Refills: 0 | Status: ACTIVE | COMMUNITY
Start: 2020-11-25

## 2020-12-02 NOTE — DIETITIAN INITIAL EVALUATION ADULT. - ETIOLOGY
related to inadequate protein-energy intake in setting of acute pancreatitis with poor tolerance of diet
GYN MD Moya

## 2020-12-21 PROBLEM — Z87.09 HISTORY OF ACUTE SINUSITIS: Status: RESOLVED | Noted: 2019-01-25 | Resolved: 2020-12-21

## 2021-01-01 ENCOUNTER — OUTPATIENT (OUTPATIENT)
Dept: OUTPATIENT SERVICES | Facility: HOSPITAL | Age: 61
LOS: 1 days | End: 2021-01-01
Payer: MEDICAID

## 2021-01-01 ENCOUNTER — APPOINTMENT (OUTPATIENT)
Dept: CT IMAGING | Facility: CLINIC | Age: 61
End: 2021-01-01

## 2021-01-01 ENCOUNTER — TRANSCRIPTION ENCOUNTER (OUTPATIENT)
Age: 61
End: 2021-01-01

## 2021-01-01 ENCOUNTER — RX RENEWAL (OUTPATIENT)
Age: 61
End: 2021-01-01

## 2021-01-01 ENCOUNTER — APPOINTMENT (OUTPATIENT)
Dept: ORTHOPEDIC SURGERY | Facility: CLINIC | Age: 61
End: 2021-01-01
Payer: MEDICAID

## 2021-01-01 ENCOUNTER — APPOINTMENT (OUTPATIENT)
Dept: ORTHOPEDIC SURGERY | Facility: CLINIC | Age: 61
End: 2021-01-01

## 2021-01-01 ENCOUNTER — OUTPATIENT (OUTPATIENT)
Dept: OUTPATIENT SERVICES | Facility: HOSPITAL | Age: 61
LOS: 1 days | End: 2021-01-01
Payer: COMMERCIAL

## 2021-01-01 ENCOUNTER — APPOINTMENT (OUTPATIENT)
Dept: CT IMAGING | Facility: CLINIC | Age: 61
End: 2021-01-01
Payer: MEDICAID

## 2021-01-01 ENCOUNTER — INPATIENT (INPATIENT)
Facility: HOSPITAL | Age: 61
LOS: 3 days | Discharge: HOME | DRG: 194 | End: 2021-10-04
Attending: HOSPITALIST | Admitting: HOSPITALIST
Payer: COMMERCIAL

## 2021-01-01 ENCOUNTER — APPOINTMENT (OUTPATIENT)
Dept: FAMILY MEDICINE | Facility: CLINIC | Age: 61
End: 2021-01-01
Payer: MEDICAID

## 2021-01-01 ENCOUNTER — APPOINTMENT (OUTPATIENT)
Dept: PULMONOLOGY | Facility: CLINIC | Age: 61
End: 2021-01-01
Payer: MEDICAID

## 2021-01-01 ENCOUNTER — APPOINTMENT (OUTPATIENT)
Dept: FAMILY MEDICINE | Facility: CLINIC | Age: 61
End: 2021-01-01

## 2021-01-01 ENCOUNTER — APPOINTMENT (OUTPATIENT)
Age: 61
End: 2021-01-01
Payer: MEDICAID

## 2021-01-01 ENCOUNTER — NON-APPOINTMENT (OUTPATIENT)
Age: 61
End: 2021-01-01

## 2021-01-01 VITALS
HEIGHT: 66 IN | DIASTOLIC BLOOD PRESSURE: 74 MMHG | OXYGEN SATURATION: 98 % | HEART RATE: 86 BPM | BODY MASS INDEX: 29.09 KG/M2 | WEIGHT: 181 LBS | SYSTOLIC BLOOD PRESSURE: 130 MMHG

## 2021-01-01 VITALS
HEART RATE: 78 BPM | WEIGHT: 185 LBS | DIASTOLIC BLOOD PRESSURE: 82 MMHG | HEIGHT: 66 IN | BODY MASS INDEX: 29.73 KG/M2 | OXYGEN SATURATION: 96 % | SYSTOLIC BLOOD PRESSURE: 130 MMHG

## 2021-01-01 VITALS
HEART RATE: 68 BPM | WEIGHT: 184 LBS | HEIGHT: 66 IN | DIASTOLIC BLOOD PRESSURE: 72 MMHG | BODY MASS INDEX: 29.57 KG/M2 | RESPIRATION RATE: 16 BRPM | OXYGEN SATURATION: 98 % | SYSTOLIC BLOOD PRESSURE: 120 MMHG

## 2021-01-01 VITALS
DIASTOLIC BLOOD PRESSURE: 68 MMHG | HEART RATE: 66 BPM | OXYGEN SATURATION: 94 % | SYSTOLIC BLOOD PRESSURE: 114 MMHG | TEMPERATURE: 98 F | RESPIRATION RATE: 19 BRPM

## 2021-01-01 VITALS
OXYGEN SATURATION: 89 % | HEIGHT: 66 IN | HEART RATE: 78 BPM | TEMPERATURE: 98 F | SYSTOLIC BLOOD PRESSURE: 117 MMHG | WEIGHT: 179.9 LBS | DIASTOLIC BLOOD PRESSURE: 80 MMHG | RESPIRATION RATE: 18 BRPM

## 2021-01-01 VITALS
HEART RATE: 82 BPM | DIASTOLIC BLOOD PRESSURE: 60 MMHG | OXYGEN SATURATION: 97 % | SYSTOLIC BLOOD PRESSURE: 120 MMHG | BODY MASS INDEX: 29.21 KG/M2 | WEIGHT: 181 LBS

## 2021-01-01 DIAGNOSIS — G47.33 OBSTRUCTIVE SLEEP APNEA (ADULT) (PEDIATRIC): ICD-10-CM

## 2021-01-01 DIAGNOSIS — Z98.890 OTHER SPECIFIED POSTPROCEDURAL STATES: Chronic | ICD-10-CM

## 2021-01-01 DIAGNOSIS — M87.073: ICD-10-CM

## 2021-01-01 DIAGNOSIS — F32.9 MAJOR DEPRESSIVE DISORDER, SINGLE EPISODE, UNSPECIFIED: ICD-10-CM

## 2021-01-01 DIAGNOSIS — Z90.49 ACQUIRED ABSENCE OF OTHER SPECIFIED PARTS OF DIGESTIVE TRACT: Chronic | ICD-10-CM

## 2021-01-01 DIAGNOSIS — Z98.51 TUBAL LIGATION STATUS: Chronic | ICD-10-CM

## 2021-01-01 DIAGNOSIS — D50.9 IRON DEFICIENCY ANEMIA, UNSPECIFIED: ICD-10-CM

## 2021-01-01 DIAGNOSIS — G89.29 OTHER CHRONIC PAIN: ICD-10-CM

## 2021-01-01 DIAGNOSIS — Z96.60 PRESENCE OF UNSPECIFIED ORTHOPEDIC JOINT IMPLANT: Chronic | ICD-10-CM

## 2021-01-01 DIAGNOSIS — S82.851D DISPLACED TRIMALLEOLAR FRACTURE OF RIGHT LOWER LEG, SUBSEQUENT ENCOUNTER FOR CLOSED FRACTURE WITH ROUTINE HEALING: ICD-10-CM

## 2021-01-01 DIAGNOSIS — S82.841A DISPLACED BIMALLEOLAR FRACTURE OF RIGHT LOWER LEG, INITIAL ENCOUNTER FOR CLOSED FRACTURE: ICD-10-CM

## 2021-01-01 DIAGNOSIS — J18.9 PNEUMONIA, UNSPECIFIED ORGANISM: ICD-10-CM
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
## 2021-01-01 DIAGNOSIS — Z86.59 PERSONAL HISTORY OF OTHER MENTAL AND BEHAVIORAL DISORDERS: ICD-10-CM

## 2021-01-01 DIAGNOSIS — D64.9 ANEMIA, UNSPECIFIED: ICD-10-CM

## 2021-01-01 DIAGNOSIS — S82.899A OTHER FRACTURE OF UNSPECIFIED LOWER LEG, INITIAL ENCOUNTER FOR CLOSED FRACTURE: ICD-10-CM

## 2021-01-01 DIAGNOSIS — M79.671 PAIN IN RIGHT FOOT: ICD-10-CM

## 2021-01-01 DIAGNOSIS — F41.9 ANXIETY DISORDER, UNSPECIFIED: ICD-10-CM

## 2021-01-01 DIAGNOSIS — R09.02 HYPOXEMIA: ICD-10-CM

## 2021-01-01 DIAGNOSIS — M19.071 PRIMARY OSTEOARTHRITIS, RIGHT ANKLE AND FOOT: ICD-10-CM

## 2021-01-01 DIAGNOSIS — Z09 ENCOUNTER FOR FOLLOW-UP EXAMINATION AFTER COMPLETED TREATMENT FOR CONDITIONS OTHER THAN MALIGNANT NEOPLASM: ICD-10-CM

## 2021-01-01 DIAGNOSIS — I10 ESSENTIAL (PRIMARY) HYPERTENSION: ICD-10-CM

## 2021-01-01 DIAGNOSIS — M10.9 GOUT, UNSPECIFIED: ICD-10-CM

## 2021-01-01 DIAGNOSIS — M54.5 LOW BACK PAIN: ICD-10-CM

## 2021-01-01 DIAGNOSIS — F32.A DEPRESSION, UNSPECIFIED: ICD-10-CM

## 2021-01-01 DIAGNOSIS — M79.672 PAIN IN RIGHT FOOT: ICD-10-CM

## 2021-01-01 DIAGNOSIS — S22.39XA FRACTURE OF ONE RIB, UNSPECIFIED SIDE, INITIAL ENCOUNTER FOR CLOSED FRACTURE: ICD-10-CM

## 2021-01-01 DIAGNOSIS — S82.891A OTHER FRACTURE OF RIGHT LOWER LEG, INITIAL ENCOUNTER FOR CLOSED FRACTURE: ICD-10-CM

## 2021-01-01 DIAGNOSIS — Z00.8 ENCOUNTER FOR OTHER GENERAL EXAMINATION: ICD-10-CM

## 2021-01-01 LAB
ALBUMIN SERPL ELPH-MCNC: 3.9 G/DL — SIGNIFICANT CHANGE UP (ref 3.3–5.2)
ALBUMIN SERPL ELPH-MCNC: 4.6 G/DL
ALP BLD-CCNC: 99 U/L
ALP SERPL-CCNC: 113 U/L — SIGNIFICANT CHANGE UP (ref 40–120)
ALT FLD-CCNC: 34 U/L — HIGH
ALT SERPL-CCNC: 10 U/L
ANION GAP SERPL CALC-SCNC: 10 MMOL/L — SIGNIFICANT CHANGE UP (ref 5–17)
ANION GAP SERPL CALC-SCNC: 11 MMOL/L — SIGNIFICANT CHANGE UP (ref 5–17)
ANION GAP SERPL CALC-SCNC: 14 MMOL/L
ANION GAP SERPL CALC-SCNC: 14 MMOL/L — SIGNIFICANT CHANGE UP (ref 5–17)
ANION GAP SERPL CALC-SCNC: 8 MMOL/L — SIGNIFICANT CHANGE UP (ref 5–17)
ANISOCYTOSIS BLD QL: SLIGHT — SIGNIFICANT CHANGE UP
APPEARANCE UR: CLEAR — SIGNIFICANT CHANGE UP
APTT BLD: 32.4 SEC — SIGNIFICANT CHANGE UP (ref 27.5–35.5)
AST SERPL-CCNC: 12 U/L
AST SERPL-CCNC: 47 U/L — HIGH
BACTERIA # UR AUTO: ABNORMAL
BASOPHILS # BLD AUTO: 0.01 K/UL — SIGNIFICANT CHANGE UP (ref 0–0.2)
BASOPHILS # BLD AUTO: 0.02 K/UL — SIGNIFICANT CHANGE UP (ref 0–0.2)
BASOPHILS # BLD AUTO: 0.02 K/UL — SIGNIFICANT CHANGE UP (ref 0–0.2)
BASOPHILS # BLD AUTO: 0.03 K/UL
BASOPHILS NFR BLD AUTO: 0.2 % — SIGNIFICANT CHANGE UP (ref 0–2)
BASOPHILS NFR BLD AUTO: 0.3 % — SIGNIFICANT CHANGE UP (ref 0–2)
BASOPHILS NFR BLD AUTO: 0.3 % — SIGNIFICANT CHANGE UP (ref 0–2)
BASOPHILS NFR BLD AUTO: 0.4 %
BILIRUB SERPL-MCNC: 0.2 MG/DL
BILIRUB SERPL-MCNC: <0.2 MG/DL — LOW (ref 0.4–2)
BILIRUB UR-MCNC: NEGATIVE — SIGNIFICANT CHANGE UP
BLD GP AB SCN SERPL QL: SIGNIFICANT CHANGE UP
BUN SERPL-MCNC: 10.7 MG/DL — SIGNIFICANT CHANGE UP (ref 8–20)
BUN SERPL-MCNC: 12.6 MG/DL — SIGNIFICANT CHANGE UP (ref 8–20)
BUN SERPL-MCNC: 14 MG/DL
BUN SERPL-MCNC: 15.4 MG/DL — SIGNIFICANT CHANGE UP (ref 8–20)
BUN SERPL-MCNC: 15.5 MG/DL — SIGNIFICANT CHANGE UP (ref 8–20)
CALCIUM SERPL-MCNC: 10 MG/DL
CALCIUM SERPL-MCNC: 8.8 MG/DL — SIGNIFICANT CHANGE UP (ref 8.6–10.2)
CALCIUM SERPL-MCNC: 9.1 MG/DL — SIGNIFICANT CHANGE UP (ref 8.6–10.2)
CALCIUM SERPL-MCNC: 9.1 MG/DL — SIGNIFICANT CHANGE UP (ref 8.6–10.2)
CALCIUM SERPL-MCNC: 9.4 MG/DL — SIGNIFICANT CHANGE UP (ref 8.6–10.2)
CHLORIDE SERPL-SCNC: 100 MMOL/L — SIGNIFICANT CHANGE UP (ref 98–107)
CHLORIDE SERPL-SCNC: 101 MMOL/L — SIGNIFICANT CHANGE UP (ref 98–107)
CHLORIDE SERPL-SCNC: 104 MMOL/L — SIGNIFICANT CHANGE UP (ref 98–107)
CHLORIDE SERPL-SCNC: 105 MMOL/L
CHLORIDE SERPL-SCNC: 105 MMOL/L — SIGNIFICANT CHANGE UP (ref 98–107)
CO2 SERPL-SCNC: 24 MMOL/L
CO2 SERPL-SCNC: 26 MMOL/L — SIGNIFICANT CHANGE UP (ref 22–29)
CO2 SERPL-SCNC: 27 MMOL/L — SIGNIFICANT CHANGE UP (ref 22–29)
CO2 SERPL-SCNC: 29 MMOL/L — SIGNIFICANT CHANGE UP (ref 22–29)
CO2 SERPL-SCNC: 31 MMOL/L — HIGH (ref 22–29)
COLOR SPEC: YELLOW — SIGNIFICANT CHANGE UP
COVID-19 SPIKE DOMAIN AB INTERP: POSITIVE
COVID-19 SPIKE DOMAIN ANTIBODY RESULT: >250 U/ML — HIGH
CREAT SERPL-MCNC: 0.77 MG/DL — SIGNIFICANT CHANGE UP (ref 0.5–1.3)
CREAT SERPL-MCNC: 0.87 MG/DL — SIGNIFICANT CHANGE UP (ref 0.5–1.3)
CREAT SERPL-MCNC: 0.91 MG/DL
CREAT SERPL-MCNC: 0.95 MG/DL — SIGNIFICANT CHANGE UP (ref 0.5–1.3)
CREAT SERPL-MCNC: 0.95 MG/DL — SIGNIFICANT CHANGE UP (ref 0.5–1.3)
CRP SERPL-MCNC: 68 MG/L — HIGH
CULTURE RESULTS: SIGNIFICANT CHANGE UP
D DIMER BLD IA.RAPID-MCNC: 237 NG/ML DDU — HIGH
DIFF PNL FLD: ABNORMAL
EOSINOPHIL # BLD AUTO: 0.11 K/UL
EOSINOPHIL # BLD AUTO: 0.15 K/UL — SIGNIFICANT CHANGE UP (ref 0–0.5)
EOSINOPHIL # BLD AUTO: 0.17 K/UL — SIGNIFICANT CHANGE UP (ref 0–0.5)
EOSINOPHIL # BLD AUTO: 0.18 K/UL — SIGNIFICANT CHANGE UP (ref 0–0.5)
EOSINOPHIL NFR BLD AUTO: 1.5 %
EOSINOPHIL NFR BLD AUTO: 2.4 % — SIGNIFICANT CHANGE UP (ref 0–6)
EOSINOPHIL NFR BLD AUTO: 2.4 % — SIGNIFICANT CHANGE UP (ref 0–6)
EOSINOPHIL NFR BLD AUTO: 2.9 % — SIGNIFICANT CHANGE UP (ref 0–6)
EPI CELLS # UR: SIGNIFICANT CHANGE UP
FERRITIN SERPL-MCNC: 50 NG/ML — SIGNIFICANT CHANGE UP (ref 15–150)
FERRITIN SERPL-MCNC: 58 NG/ML — SIGNIFICANT CHANGE UP (ref 15–150)
FLUAV AG NPH QL: SIGNIFICANT CHANGE UP
FLUBV AG NPH QL: SIGNIFICANT CHANGE UP
GLUCOSE SERPL-MCNC: 105 MG/DL — HIGH (ref 70–99)
GLUCOSE SERPL-MCNC: 106 MG/DL — HIGH (ref 70–99)
GLUCOSE SERPL-MCNC: 113 MG/DL — HIGH (ref 70–99)
GLUCOSE SERPL-MCNC: 93 MG/DL
GLUCOSE SERPL-MCNC: 98 MG/DL — SIGNIFICANT CHANGE UP (ref 70–99)
GLUCOSE UR QL: NEGATIVE MG/DL — SIGNIFICANT CHANGE UP
GRAM STN FLD: SIGNIFICANT CHANGE UP
HCT VFR BLD CALC: 21.8 % — LOW (ref 34.5–45)
HCT VFR BLD CALC: 26.8 % — LOW (ref 34.5–45)
HCT VFR BLD CALC: 26.9 % — LOW (ref 34.5–45)
HCT VFR BLD CALC: 26.9 % — LOW (ref 34.5–45)
HCT VFR BLD CALC: 31.8 % — LOW (ref 34.5–45)
HCT VFR BLD CALC: 35.8 %
HGB BLD-MCNC: 10.1 G/DL — LOW (ref 11.5–15.5)
HGB BLD-MCNC: 10.6 G/DL
HGB BLD-MCNC: 7.1 G/DL — LOW (ref 11.5–15.5)
HGB BLD-MCNC: 8.4 G/DL — LOW (ref 11.5–15.5)
HGB BLD-MCNC: 8.5 G/DL — LOW (ref 11.5–15.5)
HGB BLD-MCNC: 8.6 G/DL — LOW (ref 11.5–15.5)
IMM GRANULOCYTES NFR BLD AUTO: 0.1 %
IMM GRANULOCYTES NFR BLD AUTO: 0.3 % — SIGNIFICANT CHANGE UP (ref 0–1.5)
IMM GRANULOCYTES NFR BLD AUTO: 0.3 % — SIGNIFICANT CHANGE UP (ref 0–1.5)
IMM GRANULOCYTES NFR BLD AUTO: 0.5 % — SIGNIFICANT CHANGE UP (ref 0–1.5)
INR BLD: 1.1 RATIO — SIGNIFICANT CHANGE UP (ref 0.88–1.16)
IRON SATN MFR SERPL: 20 UG/DL — LOW (ref 37–145)
IRON SATN MFR SERPL: 7 % — LOW (ref 14–50)
KETONES UR-MCNC: NEGATIVE — SIGNIFICANT CHANGE UP
LACTATE BLDV-MCNC: 0.6 MMOL/L — SIGNIFICANT CHANGE UP (ref 0.5–2)
LEGIONELLA AG UR QL: NEGATIVE — SIGNIFICANT CHANGE UP
LEUKOCYTE ESTERASE UR-ACNC: NEGATIVE — SIGNIFICANT CHANGE UP
LYMPHOCYTES # BLD AUTO: 1.12 K/UL — SIGNIFICANT CHANGE UP (ref 1–3.3)
LYMPHOCYTES # BLD AUTO: 1.51 K/UL — SIGNIFICANT CHANGE UP (ref 1–3.3)
LYMPHOCYTES # BLD AUTO: 1.65 K/UL — SIGNIFICANT CHANGE UP (ref 1–3.3)
LYMPHOCYTES # BLD AUTO: 18.3 % — SIGNIFICANT CHANGE UP (ref 13–44)
LYMPHOCYTES # BLD AUTO: 2 K/UL
LYMPHOCYTES # BLD AUTO: 23.3 % — SIGNIFICANT CHANGE UP (ref 13–44)
LYMPHOCYTES # BLD AUTO: 24 % — SIGNIFICANT CHANGE UP (ref 13–44)
LYMPHOCYTES NFR BLD AUTO: 28 %
MACROCYTES BLD QL: SLIGHT — SIGNIFICANT CHANGE UP
MAGNESIUM SERPL-MCNC: 2.2 MG/DL — SIGNIFICANT CHANGE UP (ref 1.8–2.6)
MAN DIFF?: NORMAL
MANUAL SMEAR VERIFICATION: SIGNIFICANT CHANGE UP
MCHC RBC-ENTMCNC: 29 PG
MCHC RBC-ENTMCNC: 29.4 PG — SIGNIFICANT CHANGE UP (ref 27–34)
MCHC RBC-ENTMCNC: 29.6 GM/DL
MCHC RBC-ENTMCNC: 29.8 PG — SIGNIFICANT CHANGE UP (ref 27–34)
MCHC RBC-ENTMCNC: 29.9 PG — SIGNIFICANT CHANGE UP (ref 27–34)
MCHC RBC-ENTMCNC: 30.3 PG — SIGNIFICANT CHANGE UP (ref 27–34)
MCHC RBC-ENTMCNC: 30.9 PG — SIGNIFICANT CHANGE UP (ref 27–34)
MCHC RBC-ENTMCNC: 31.2 GM/DL — LOW (ref 32–36)
MCHC RBC-ENTMCNC: 31.7 GM/DL — LOW (ref 32–36)
MCHC RBC-ENTMCNC: 31.8 GM/DL — LOW (ref 32–36)
MCHC RBC-ENTMCNC: 32 GM/DL — SIGNIFICANT CHANGE UP (ref 32–36)
MCHC RBC-ENTMCNC: 32.6 GM/DL — SIGNIFICANT CHANGE UP (ref 32–36)
MCV RBC AUTO: 93.8 FL — SIGNIFICANT CHANGE UP (ref 80–100)
MCV RBC AUTO: 94.1 FL — SIGNIFICANT CHANGE UP (ref 80–100)
MCV RBC AUTO: 94.4 FL — SIGNIFICANT CHANGE UP (ref 80–100)
MCV RBC AUTO: 94.7 FL — SIGNIFICANT CHANGE UP (ref 80–100)
MCV RBC AUTO: 94.8 FL — SIGNIFICANT CHANGE UP (ref 80–100)
MCV RBC AUTO: 98.1 FL
MONOCYTES # BLD AUTO: 0.51 K/UL — SIGNIFICANT CHANGE UP (ref 0–0.9)
MONOCYTES # BLD AUTO: 0.52 K/UL — SIGNIFICANT CHANGE UP (ref 0–0.9)
MONOCYTES # BLD AUTO: 0.53 K/UL
MONOCYTES # BLD AUTO: 0.63 K/UL — SIGNIFICANT CHANGE UP (ref 0–0.9)
MONOCYTES NFR BLD AUTO: 7.4 %
MONOCYTES NFR BLD AUTO: 8.3 % — SIGNIFICANT CHANGE UP (ref 2–14)
MONOCYTES NFR BLD AUTO: 8.3 % — SIGNIFICANT CHANGE UP (ref 2–14)
MONOCYTES NFR BLD AUTO: 8.9 % — SIGNIFICANT CHANGE UP (ref 2–14)
NEUTROPHILS # BLD AUTO: 4.05 K/UL — SIGNIFICANT CHANGE UP (ref 1.8–7.4)
NEUTROPHILS # BLD AUTO: 4.3 K/UL — SIGNIFICANT CHANGE UP (ref 1.8–7.4)
NEUTROPHILS # BLD AUTO: 4.46 K/UL
NEUTROPHILS # BLD AUTO: 4.58 K/UL — SIGNIFICANT CHANGE UP (ref 1.8–7.4)
NEUTROPHILS NFR BLD AUTO: 62.6 %
NEUTROPHILS NFR BLD AUTO: 64.3 % — SIGNIFICANT CHANGE UP (ref 43–77)
NEUTROPHILS NFR BLD AUTO: 64.8 % — SIGNIFICANT CHANGE UP (ref 43–77)
NEUTROPHILS NFR BLD AUTO: 70.2 % — SIGNIFICANT CHANGE UP (ref 43–77)
NITRITE UR-MCNC: NEGATIVE — SIGNIFICANT CHANGE UP
OB PNL STL: NEGATIVE — SIGNIFICANT CHANGE UP
OVALOCYTES BLD QL SMEAR: SLIGHT — SIGNIFICANT CHANGE UP
PH UR: 6 — SIGNIFICANT CHANGE UP (ref 5–8)
PLAT MORPH BLD: NORMAL — SIGNIFICANT CHANGE UP
PLATELET # BLD AUTO: 192 K/UL — SIGNIFICANT CHANGE UP (ref 150–400)
PLATELET # BLD AUTO: 202 K/UL — SIGNIFICANT CHANGE UP (ref 150–400)
PLATELET # BLD AUTO: 245 K/UL — SIGNIFICANT CHANGE UP (ref 150–400)
PLATELET # BLD AUTO: 248 K/UL — SIGNIFICANT CHANGE UP (ref 150–400)
PLATELET # BLD AUTO: 296 K/UL — SIGNIFICANT CHANGE UP (ref 150–400)
PLATELET # BLD AUTO: 300 K/UL
POIKILOCYTOSIS BLD QL AUTO: SLIGHT — SIGNIFICANT CHANGE UP
POLYCHROMASIA BLD QL SMEAR: SLIGHT — SIGNIFICANT CHANGE UP
POTASSIUM SERPL-MCNC: 4.1 MMOL/L — SIGNIFICANT CHANGE UP (ref 3.5–5.3)
POTASSIUM SERPL-MCNC: 4.2 MMOL/L — SIGNIFICANT CHANGE UP (ref 3.5–5.3)
POTASSIUM SERPL-MCNC: 4.3 MMOL/L — SIGNIFICANT CHANGE UP (ref 3.5–5.3)
POTASSIUM SERPL-MCNC: 4.4 MMOL/L — SIGNIFICANT CHANGE UP (ref 3.5–5.3)
POTASSIUM SERPL-MCNC: 5.7 MMOL/L — HIGH (ref 3.5–5.3)
POTASSIUM SERPL-SCNC: 4.1 MMOL/L — SIGNIFICANT CHANGE UP (ref 3.5–5.3)
POTASSIUM SERPL-SCNC: 4.2 MMOL/L — SIGNIFICANT CHANGE UP (ref 3.5–5.3)
POTASSIUM SERPL-SCNC: 4.3 MMOL/L
POTASSIUM SERPL-SCNC: 4.3 MMOL/L — SIGNIFICANT CHANGE UP (ref 3.5–5.3)
POTASSIUM SERPL-SCNC: 4.4 MMOL/L — SIGNIFICANT CHANGE UP (ref 3.5–5.3)
POTASSIUM SERPL-SCNC: 5.7 MMOL/L — HIGH (ref 3.5–5.3)
PROCALCITONIN SERPL-MCNC: 0.07 NG/ML — SIGNIFICANT CHANGE UP (ref 0.02–0.1)
PROT SERPL-MCNC: 6.7 G/DL
PROT SERPL-MCNC: 7.1 G/DL — SIGNIFICANT CHANGE UP (ref 6.6–8.7)
PROT UR-MCNC: 15 MG/DL
PROTHROM AB SERPL-ACNC: 12.7 SEC — SIGNIFICANT CHANGE UP (ref 10.6–13.6)
RBC # BLD: 2.3 M/UL — LOW (ref 3.8–5.2)
RBC # BLD: 2.81 M/UL — LOW (ref 3.8–5.2)
RBC # BLD: 2.84 M/UL — LOW (ref 3.8–5.2)
RBC # BLD: 2.84 M/UL — LOW (ref 3.8–5.2)
RBC # BLD: 2.86 M/UL — LOW (ref 3.8–5.2)
RBC # BLD: 3.39 M/UL — LOW (ref 3.8–5.2)
RBC # BLD: 3.65 M/UL
RBC # FLD: 13.6 % — SIGNIFICANT CHANGE UP (ref 10.3–14.5)
RBC # FLD: 13.7 % — SIGNIFICANT CHANGE UP (ref 10.3–14.5)
RBC # FLD: 13.8 % — SIGNIFICANT CHANGE UP (ref 10.3–14.5)
RBC # FLD: 13.9 % — SIGNIFICANT CHANGE UP (ref 10.3–14.5)
RBC # FLD: 14.2 % — SIGNIFICANT CHANGE UP (ref 10.3–14.5)
RBC # FLD: 14.4 %
RBC BLD AUTO: ABNORMAL
RBC CASTS # UR COMP ASSIST: SIGNIFICANT CHANGE UP /HPF (ref 0–4)
RETICS #: 50.6 K/UL — SIGNIFICANT CHANGE UP (ref 25–125)
RETICS/RBC NFR: 1.8 % — SIGNIFICANT CHANGE UP (ref 0.5–2.5)
RSV RNA NPH QL NAA+NON-PROBE: SIGNIFICANT CHANGE UP
S PNEUM AG UR QL: NEGATIVE — SIGNIFICANT CHANGE UP
SARS-COV-2 IGG+IGM SERPL QL IA: >250 U/ML — HIGH
SARS-COV-2 IGG+IGM SERPL QL IA: POSITIVE
SARS-COV-2 RNA SPEC QL NAA+PROBE: SIGNIFICANT CHANGE UP
SODIUM SERPL-SCNC: 138 MMOL/L — SIGNIFICANT CHANGE UP (ref 135–145)
SODIUM SERPL-SCNC: 140 MMOL/L — SIGNIFICANT CHANGE UP (ref 135–145)
SODIUM SERPL-SCNC: 142 MMOL/L
SODIUM SERPL-SCNC: 142 MMOL/L — SIGNIFICANT CHANGE UP (ref 135–145)
SODIUM SERPL-SCNC: 146 MMOL/L — HIGH (ref 135–145)
SP GR SPEC: 1.01 — SIGNIFICANT CHANGE UP (ref 1.01–1.02)
SPECIMEN SOURCE: SIGNIFICANT CHANGE UP
T4 AB SER-ACNC: 6.2 UG/DL — SIGNIFICANT CHANGE UP (ref 4.5–12)
TIBC SERPL-MCNC: 285 UG/DL — SIGNIFICANT CHANGE UP (ref 220–430)
TRANSFERRIN SERPL-MCNC: 199 MG/DL — SIGNIFICANT CHANGE UP (ref 192–382)
TROPONIN T SERPL-MCNC: 0.02 NG/ML — SIGNIFICANT CHANGE UP (ref 0–0.06)
TSH SERPL-ACNC: 0.83 UIU/ML
TSH SERPL-MCNC: 1.01 UIU/ML — SIGNIFICANT CHANGE UP (ref 0.27–4.2)
URATE SERPL-MCNC: 3.5 MG/DL
UROBILINOGEN FLD QL: NEGATIVE MG/DL — SIGNIFICANT CHANGE UP
WBC # BLD: 5.55 K/UL — SIGNIFICANT CHANGE UP (ref 3.8–10.5)
WBC # BLD: 6.13 K/UL — SIGNIFICANT CHANGE UP (ref 3.8–10.5)
WBC # BLD: 6.13 K/UL — SIGNIFICANT CHANGE UP (ref 3.8–10.5)
WBC # BLD: 6.29 K/UL — SIGNIFICANT CHANGE UP (ref 3.8–10.5)
WBC # BLD: 7.07 K/UL — SIGNIFICANT CHANGE UP (ref 3.8–10.5)
WBC # FLD AUTO: 5.55 K/UL — SIGNIFICANT CHANGE UP (ref 3.8–10.5)
WBC # FLD AUTO: 6.13 K/UL — SIGNIFICANT CHANGE UP (ref 3.8–10.5)
WBC # FLD AUTO: 6.13 K/UL — SIGNIFICANT CHANGE UP (ref 3.8–10.5)
WBC # FLD AUTO: 6.29 K/UL — SIGNIFICANT CHANGE UP (ref 3.8–10.5)
WBC # FLD AUTO: 7.07 K/UL — SIGNIFICANT CHANGE UP (ref 3.8–10.5)
WBC # FLD AUTO: 7.14 K/UL
WBC UR QL: SIGNIFICANT CHANGE UP

## 2021-01-01 PROCEDURE — 83550 IRON BINDING TEST: CPT

## 2021-01-01 PROCEDURE — 83540 ASSAY OF IRON: CPT

## 2021-01-01 PROCEDURE — 99213 OFFICE O/P EST LOW 20 MIN: CPT

## 2021-01-01 PROCEDURE — 84132 ASSAY OF SERUM POTASSIUM: CPT

## 2021-01-01 PROCEDURE — 71250 CT THORAX DX C-: CPT

## 2021-01-01 PROCEDURE — 99233 SBSQ HOSP IP/OBS HIGH 50: CPT

## 2021-01-01 PROCEDURE — 83605 ASSAY OF LACTIC ACID: CPT

## 2021-01-01 PROCEDURE — 87040 BLOOD CULTURE FOR BACTERIA: CPT

## 2021-01-01 PROCEDURE — 85025 COMPLETE CBC W/AUTO DIFF WBC: CPT

## 2021-01-01 PROCEDURE — 81001 URINALYSIS AUTO W/SCOPE: CPT

## 2021-01-01 PROCEDURE — 80048 BASIC METABOLIC PNL TOTAL CA: CPT

## 2021-01-01 PROCEDURE — 71275 CT ANGIOGRAPHY CHEST: CPT | Mod: 26

## 2021-01-01 PROCEDURE — 84443 ASSAY THYROID STIM HORMONE: CPT

## 2021-01-01 PROCEDURE — G1004: CPT

## 2021-01-01 PROCEDURE — 99284 EMERGENCY DEPT VISIT MOD MDM: CPT

## 2021-01-01 PROCEDURE — 85045 AUTOMATED RETICULOCYTE COUNT: CPT

## 2021-01-01 PROCEDURE — 71250 CT THORAX DX C-: CPT | Mod: 26

## 2021-01-01 PROCEDURE — 95810 POLYSOM 6/> YRS 4/> PARAM: CPT | Mod: 26

## 2021-01-01 PROCEDURE — 87449 NOS EACH ORGANISM AG IA: CPT

## 2021-01-01 PROCEDURE — 99204 OFFICE O/P NEW MOD 45 MIN: CPT

## 2021-01-01 PROCEDURE — 73610 X-RAY EXAM OF ANKLE: CPT | Mod: LT,RT

## 2021-01-01 PROCEDURE — 71045 X-RAY EXAM CHEST 1 VIEW: CPT | Mod: 26

## 2021-01-01 PROCEDURE — G0444 DEPRESSION SCREEN ANNUAL: CPT | Mod: 59

## 2021-01-01 PROCEDURE — 83735 ASSAY OF MAGNESIUM: CPT

## 2021-01-01 PROCEDURE — 87637 SARSCOV2&INF A&B&RSV AMP PRB: CPT

## 2021-01-01 PROCEDURE — 93005 ELECTROCARDIOGRAM TRACING: CPT

## 2021-01-01 PROCEDURE — 99232 SBSQ HOSP IP/OBS MODERATE 35: CPT

## 2021-01-01 PROCEDURE — 71045 X-RAY EXAM CHEST 1 VIEW: CPT

## 2021-01-01 PROCEDURE — 85610 PROTHROMBIN TIME: CPT

## 2021-01-01 PROCEDURE — 99214 OFFICE O/P EST MOD 30 MIN: CPT

## 2021-01-01 PROCEDURE — 71275 CT ANGIOGRAPHY CHEST: CPT | Mod: ME

## 2021-01-01 PROCEDURE — 85027 COMPLETE CBC AUTOMATED: CPT

## 2021-01-01 PROCEDURE — 82728 ASSAY OF FERRITIN: CPT

## 2021-01-01 PROCEDURE — 94640 AIRWAY INHALATION TREATMENT: CPT

## 2021-01-01 PROCEDURE — 87899 AGENT NOS ASSAY W/OPTIC: CPT

## 2021-01-01 PROCEDURE — 86769 SARS-COV-2 COVID-19 ANTIBODY: CPT

## 2021-01-01 PROCEDURE — 96374 THER/PROPH/DIAG INJ IV PUSH: CPT

## 2021-01-01 PROCEDURE — 86901 BLOOD TYPING SEROLOGIC RH(D): CPT

## 2021-01-01 PROCEDURE — 80053 COMPREHEN METABOLIC PANEL: CPT

## 2021-01-01 PROCEDURE — 99285 EMERGENCY DEPT VISIT HI MDM: CPT

## 2021-01-01 PROCEDURE — 99215 OFFICE O/P EST HI 40 MIN: CPT

## 2021-01-01 PROCEDURE — 84484 ASSAY OF TROPONIN QUANT: CPT

## 2021-01-01 PROCEDURE — 86140 C-REACTIVE PROTEIN: CPT

## 2021-01-01 PROCEDURE — 96375 TX/PRO/DX INJ NEW DRUG ADDON: CPT

## 2021-01-01 PROCEDURE — 99222 1ST HOSP IP/OBS MODERATE 55: CPT

## 2021-01-01 PROCEDURE — 85379 FIBRIN DEGRADATION QUANT: CPT

## 2021-01-01 PROCEDURE — 93010 ELECTROCARDIOGRAM REPORT: CPT

## 2021-01-01 PROCEDURE — 99024 POSTOP FOLLOW-UP VISIT: CPT

## 2021-01-01 PROCEDURE — 99239 HOSP IP/OBS DSCHRG MGMT >30: CPT

## 2021-01-01 PROCEDURE — 36415 COLL VENOUS BLD VENIPUNCTURE: CPT

## 2021-01-01 PROCEDURE — 84466 ASSAY OF TRANSFERRIN: CPT

## 2021-01-01 PROCEDURE — 86850 RBC ANTIBODY SCREEN: CPT

## 2021-01-01 PROCEDURE — 82272 OCCULT BLD FECES 1-3 TESTS: CPT

## 2021-01-01 PROCEDURE — 87070 CULTURE OTHR SPECIMN AEROBIC: CPT

## 2021-01-01 PROCEDURE — 95810 POLYSOM 6/> YRS 4/> PARAM: CPT

## 2021-01-01 PROCEDURE — 99215 OFFICE O/P EST HI 40 MIN: CPT | Mod: 25

## 2021-01-01 PROCEDURE — 84145 PROCALCITONIN (PCT): CPT

## 2021-01-01 PROCEDURE — 85730 THROMBOPLASTIN TIME PARTIAL: CPT

## 2021-01-01 PROCEDURE — 99214 OFFICE O/P EST MOD 30 MIN: CPT | Mod: 25

## 2021-01-01 PROCEDURE — 86900 BLOOD TYPING SEROLOGIC ABO: CPT

## 2021-01-01 PROCEDURE — 73630 X-RAY EXAM OF FOOT: CPT | Mod: LT,RT

## 2021-01-01 PROCEDURE — 84436 ASSAY OF TOTAL THYROXINE: CPT

## 2021-01-01 RX ORDER — SODIUM CHLORIDE 9 MG/ML
1000 INJECTION INTRAMUSCULAR; INTRAVENOUS; SUBCUTANEOUS
Refills: 0 | Status: DISCONTINUED | OUTPATIENT
Start: 2021-01-01 | End: 2021-01-01

## 2021-01-01 RX ORDER — SENNA PLUS 8.6 MG/1
2 TABLET ORAL AT BEDTIME
Refills: 0 | Status: DISCONTINUED | OUTPATIENT
Start: 2021-01-01 | End: 2021-01-01

## 2021-01-01 RX ORDER — ALPRAZOLAM 2 MG/1
TABLET ORAL
Refills: 0 | Status: DISCONTINUED | COMMUNITY
End: 2021-01-01

## 2021-01-01 RX ORDER — HEPARIN SODIUM 5000 [USP'U]/ML
5000 INJECTION INTRAVENOUS; SUBCUTANEOUS EVERY 12 HOURS
Refills: 0 | Status: DISCONTINUED | OUTPATIENT
Start: 2021-01-01 | End: 2021-01-01

## 2021-01-01 RX ORDER — IPRATROPIUM/ALBUTEROL SULFATE 18-103MCG
3 AEROSOL WITH ADAPTER (GRAM) INHALATION EVERY 6 HOURS
Refills: 0 | Status: DISCONTINUED | OUTPATIENT
Start: 2021-01-01 | End: 2021-01-01

## 2021-01-01 RX ORDER — OXYCODONE AND ACETAMINOPHEN 5; 325 MG/1; MG/1
5-325 TABLET ORAL EVERY 8 HOURS
Qty: 9 | Refills: 0 | Status: DISCONTINUED | COMMUNITY
Start: 2021-07-13 | End: 2021-01-01

## 2021-01-01 RX ORDER — HYDROMORPHONE HYDROCHLORIDE 2 MG/ML
4 INJECTION INTRAMUSCULAR; INTRAVENOUS; SUBCUTANEOUS ONCE
Refills: 0 | Status: DISCONTINUED | OUTPATIENT
Start: 2021-01-01 | End: 2021-01-01

## 2021-01-01 RX ORDER — PANTOPRAZOLE SODIUM 20 MG/1
40 TABLET, DELAYED RELEASE ORAL
Refills: 0 | Status: DISCONTINUED | OUTPATIENT
Start: 2021-01-01 | End: 2021-01-01

## 2021-01-01 RX ORDER — CEFTRIAXONE 500 MG/1
1000 INJECTION, POWDER, FOR SOLUTION INTRAMUSCULAR; INTRAVENOUS ONCE
Refills: 0 | Status: COMPLETED | OUTPATIENT
Start: 2021-01-01 | End: 2021-01-01

## 2021-01-01 RX ORDER — OXYCODONE AND ACETAMINOPHEN 5; 325 MG/1; MG/1
5-325 TABLET ORAL
Qty: 21 | Refills: 0 | Status: DISCONTINUED | COMMUNITY
Start: 2020-09-25 | End: 2021-01-01

## 2021-01-01 RX ORDER — PIPERACILLIN AND TAZOBACTAM 4; .5 G/20ML; G/20ML
3.38 INJECTION, POWDER, LYOPHILIZED, FOR SOLUTION INTRAVENOUS ONCE
Refills: 0 | Status: COMPLETED | OUTPATIENT
Start: 2021-01-01 | End: 2021-01-01

## 2021-01-01 RX ORDER — METAXALONE 800 MG/1
800 TABLET ORAL
Qty: 90 | Refills: 0 | Status: ACTIVE | COMMUNITY
Start: 2021-08-13

## 2021-01-01 RX ORDER — AZITHROMYCIN 500 MG/1
500 TABLET, FILM COATED ORAL EVERY 24 HOURS
Refills: 0 | Status: DISCONTINUED | OUTPATIENT
Start: 2021-01-01 | End: 2021-01-01

## 2021-01-01 RX ORDER — ALBUTEROL 90 UG/1
2.5 AEROSOL, METERED ORAL
Refills: 0 | Status: DISCONTINUED | OUTPATIENT
Start: 2021-01-01 | End: 2021-01-01

## 2021-01-01 RX ORDER — PIPERACILLIN AND TAZOBACTAM 4; .5 G/20ML; G/20ML
3.38 INJECTION, POWDER, LYOPHILIZED, FOR SOLUTION INTRAVENOUS EVERY 8 HOURS
Refills: 0 | Status: DISCONTINUED | OUTPATIENT
Start: 2021-01-01 | End: 2021-01-01

## 2021-01-01 RX ORDER — ACETAMINOPHEN 500 MG
650 TABLET ORAL EVERY 6 HOURS
Refills: 0 | Status: DISCONTINUED | OUTPATIENT
Start: 2021-01-01 | End: 2021-01-01

## 2021-01-01 RX ORDER — POLYETHYLENE GLYCOL 3350 17 G/17G
17 POWDER, FOR SOLUTION ORAL DAILY
Refills: 0 | Status: DISCONTINUED | OUTPATIENT
Start: 2021-01-01 | End: 2021-01-01

## 2021-01-01 RX ORDER — GABAPENTIN 400 MG/1
100 CAPSULE ORAL THREE TIMES A DAY
Refills: 0 | Status: DISCONTINUED | OUTPATIENT
Start: 2021-01-01 | End: 2021-01-01

## 2021-01-01 RX ORDER — KETOROLAC TROMETHAMINE 30 MG/ML
15 SYRINGE (ML) INJECTION ONCE
Refills: 0 | Status: DISCONTINUED | OUTPATIENT
Start: 2021-01-01 | End: 2021-01-01

## 2021-01-01 RX ORDER — INFLUENZA VIRUS VACCINE 15; 15; 15; 15 UG/.5ML; UG/.5ML; UG/.5ML; UG/.5ML
0.5 SUSPENSION INTRAMUSCULAR ONCE
Refills: 0 | Status: DISCONTINUED | OUTPATIENT
Start: 2021-01-01 | End: 2021-01-01

## 2021-01-01 RX ORDER — HYDROMORPHONE HYDROCHLORIDE 2 MG/ML
1 INJECTION INTRAMUSCULAR; INTRAVENOUS; SUBCUTANEOUS EVERY 4 HOURS
Refills: 0 | Status: DISCONTINUED | OUTPATIENT
Start: 2021-01-01 | End: 2021-01-01

## 2021-01-01 RX ORDER — ALBUTEROL 90 UG/1
2 AEROSOL, METERED ORAL
Qty: 1 | Refills: 0
Start: 2021-01-01 | End: 2021-01-01

## 2021-01-01 RX ORDER — FERROUS GLUCONATE 324(38)MG
324 (38 FE) TABLET ORAL
Qty: 30 | Refills: 5 | Status: ACTIVE | COMMUNITY
Start: 2021-07-21 | End: 1900-01-01

## 2021-01-01 RX ORDER — AZITHROMYCIN 500 MG/1
500 TABLET, FILM COATED ORAL ONCE
Refills: 0 | Status: COMPLETED | OUTPATIENT
Start: 2021-01-01 | End: 2021-01-01

## 2021-01-01 RX ORDER — ALPRAZOLAM 0.25 MG
1 TABLET ORAL
Qty: 0 | Refills: 0 | DISCHARGE

## 2021-01-01 RX ORDER — HYDROMORPHONE HYDROCHLORIDE 2 MG/ML
2 INJECTION INTRAMUSCULAR; INTRAVENOUS; SUBCUTANEOUS EVERY 4 HOURS
Refills: 0 | Status: DISCONTINUED | OUTPATIENT
Start: 2021-01-01 | End: 2021-01-01

## 2021-01-01 RX ORDER — ALPRAZOLAM 0.25 MG
0.5 TABLET ORAL THREE TIMES A DAY
Refills: 0 | Status: DISCONTINUED | OUTPATIENT
Start: 2021-01-01 | End: 2021-01-01

## 2021-01-01 RX ORDER — ACETAMINOPHEN 500 MG
975 TABLET ORAL ONCE
Refills: 0 | Status: COMPLETED | OUTPATIENT
Start: 2021-01-01 | End: 2021-01-01

## 2021-01-01 RX ORDER — HYDROMORPHONE HYDROCHLORIDE 2 MG/ML
2 INJECTION INTRAMUSCULAR; INTRAVENOUS; SUBCUTANEOUS ONCE
Refills: 0 | Status: DISCONTINUED | OUTPATIENT
Start: 2021-01-01 | End: 2021-01-01

## 2021-01-01 RX ORDER — AZITHROMYCIN 500 MG/1
250 TABLET, FILM COATED ORAL DAILY
Refills: 0 | Status: COMPLETED | OUTPATIENT
Start: 2021-01-01 | End: 2021-01-01

## 2021-01-01 RX ORDER — TRAZODONE HCL 50 MG
150 TABLET ORAL AT BEDTIME
Refills: 0 | Status: DISCONTINUED | OUTPATIENT
Start: 2021-01-01 | End: 2021-01-01

## 2021-01-01 RX ORDER — OXYCODONE AND ACETAMINOPHEN 5; 325 MG/1; MG/1
5-325 TABLET ORAL
Qty: 30 | Refills: 0 | Status: DISCONTINUED | COMMUNITY
Start: 2021-07-16 | End: 2021-01-01

## 2021-01-01 RX ORDER — LAMOTRIGINE 25 MG/1
150 TABLET, ORALLY DISINTEGRATING ORAL DAILY
Refills: 0 | Status: DISCONTINUED | OUTPATIENT
Start: 2021-01-01 | End: 2021-01-01

## 2021-01-01 RX ORDER — OXYCODONE AND ACETAMINOPHEN 5; 325 MG/1; MG/1
5-325 TABLET ORAL
Qty: 30 | Refills: 0 | Status: DISCONTINUED | COMMUNITY
Start: 2021-01-12 | End: 2021-01-01

## 2021-01-01 RX ORDER — CEFTRIAXONE 500 MG/1
1000 INJECTION, POWDER, FOR SOLUTION INTRAMUSCULAR; INTRAVENOUS EVERY 24 HOURS
Refills: 0 | Status: DISCONTINUED | OUTPATIENT
Start: 2021-01-01 | End: 2021-01-01

## 2021-01-01 RX ORDER — LACTOBACILLUS ACIDOPHILUS 100MM CELL
1 CAPSULE ORAL
Refills: 0 | Status: DISCONTINUED | OUTPATIENT
Start: 2021-01-01 | End: 2021-01-01

## 2021-01-01 RX ORDER — OXYCODONE AND ACETAMINOPHEN 5; 325 MG/1; MG/1
5-325 TABLET ORAL
Qty: 40 | Refills: 0 | Status: DISCONTINUED | COMMUNITY
Start: 2020-03-23 | End: 2021-01-01

## 2021-01-01 RX ORDER — ALBUTEROL 90 UG/1
2 AEROSOL, METERED ORAL
Qty: 1 | Refills: 0
Start: 2021-01-01

## 2021-01-01 RX ADMIN — HYDROMORPHONE HYDROCHLORIDE 1 MILLIGRAM(S): 2 INJECTION INTRAMUSCULAR; INTRAVENOUS; SUBCUTANEOUS at 17:34

## 2021-01-01 RX ADMIN — GABAPENTIN 100 MILLIGRAM(S): 400 CAPSULE ORAL at 15:27

## 2021-01-01 RX ADMIN — Medication 1 TABLET(S): at 05:45

## 2021-01-01 RX ADMIN — HYDROMORPHONE HYDROCHLORIDE 2 MILLIGRAM(S): 2 INJECTION INTRAMUSCULAR; INTRAVENOUS; SUBCUTANEOUS at 23:00

## 2021-01-01 RX ADMIN — HEPARIN SODIUM 5000 UNIT(S): 5000 INJECTION INTRAVENOUS; SUBCUTANEOUS at 18:47

## 2021-01-01 RX ADMIN — Medication 150 MILLIGRAM(S): at 22:54

## 2021-01-01 RX ADMIN — HEPARIN SODIUM 5000 UNIT(S): 5000 INJECTION INTRAVENOUS; SUBCUTANEOUS at 05:27

## 2021-01-01 RX ADMIN — Medication 1 TABLET(S): at 05:28

## 2021-01-01 RX ADMIN — LAMOTRIGINE 150 MILLIGRAM(S): 25 TABLET, ORALLY DISINTEGRATING ORAL at 12:40

## 2021-01-01 RX ADMIN — Medication 150 MILLIGRAM(S): at 22:37

## 2021-01-01 RX ADMIN — Medication 1 TABLET(S): at 17:59

## 2021-01-01 RX ADMIN — HYDROMORPHONE HYDROCHLORIDE 2 MILLIGRAM(S): 2 INJECTION INTRAMUSCULAR; INTRAVENOUS; SUBCUTANEOUS at 12:15

## 2021-01-01 RX ADMIN — Medication 3 MILLILITER(S): at 20:28

## 2021-01-01 RX ADMIN — Medication 50 MILLIGRAM(S): at 10:59

## 2021-01-01 RX ADMIN — HYDROMORPHONE HYDROCHLORIDE 4 MILLIGRAM(S): 2 INJECTION INTRAMUSCULAR; INTRAVENOUS; SUBCUTANEOUS at 23:13

## 2021-01-01 RX ADMIN — SENNA PLUS 2 TABLET(S): 8.6 TABLET ORAL at 21:20

## 2021-01-01 RX ADMIN — Medication 0.5 MILLIGRAM(S): at 21:35

## 2021-01-01 RX ADMIN — Medication 3 MILLILITER(S): at 20:50

## 2021-01-01 RX ADMIN — Medication 1 TABLET(S): at 05:33

## 2021-01-01 RX ADMIN — PIPERACILLIN AND TAZOBACTAM 25 GRAM(S): 4; .5 INJECTION, POWDER, LYOPHILIZED, FOR SOLUTION INTRAVENOUS at 21:30

## 2021-01-01 RX ADMIN — LAMOTRIGINE 150 MILLIGRAM(S): 25 TABLET, ORALLY DISINTEGRATING ORAL at 11:37

## 2021-01-01 RX ADMIN — Medication 150 MILLIGRAM(S): at 22:40

## 2021-01-01 RX ADMIN — Medication 1 TABLET(S): at 08:30

## 2021-01-01 RX ADMIN — Medication 975 MILLIGRAM(S): at 18:58

## 2021-01-01 RX ADMIN — GABAPENTIN 100 MILLIGRAM(S): 400 CAPSULE ORAL at 22:37

## 2021-01-01 RX ADMIN — GABAPENTIN 100 MILLIGRAM(S): 400 CAPSULE ORAL at 05:28

## 2021-01-01 RX ADMIN — HYDROMORPHONE HYDROCHLORIDE 2 MILLIGRAM(S): 2 INJECTION INTRAMUSCULAR; INTRAVENOUS; SUBCUTANEOUS at 22:36

## 2021-01-01 RX ADMIN — GABAPENTIN 100 MILLIGRAM(S): 400 CAPSULE ORAL at 06:32

## 2021-01-01 RX ADMIN — HYDROMORPHONE HYDROCHLORIDE 1 MILLIGRAM(S): 2 INJECTION INTRAMUSCULAR; INTRAVENOUS; SUBCUTANEOUS at 08:13

## 2021-01-01 RX ADMIN — Medication 3 MILLILITER(S): at 15:10

## 2021-01-01 RX ADMIN — Medication 0.5 MILLIGRAM(S): at 22:36

## 2021-01-01 RX ADMIN — Medication 3 MILLILITER(S): at 09:30

## 2021-01-01 RX ADMIN — PIPERACILLIN AND TAZOBACTAM 25 GRAM(S): 4; .5 INJECTION, POWDER, LYOPHILIZED, FOR SOLUTION INTRAVENOUS at 15:26

## 2021-01-01 RX ADMIN — GABAPENTIN 100 MILLIGRAM(S): 400 CAPSULE ORAL at 21:35

## 2021-01-01 RX ADMIN — Medication 3 MILLILITER(S): at 14:29

## 2021-01-01 RX ADMIN — HYDROMORPHONE HYDROCHLORIDE 2 MILLIGRAM(S): 2 INJECTION INTRAMUSCULAR; INTRAVENOUS; SUBCUTANEOUS at 21:35

## 2021-01-01 RX ADMIN — LAMOTRIGINE 150 MILLIGRAM(S): 25 TABLET, ORALLY DISINTEGRATING ORAL at 11:51

## 2021-01-01 RX ADMIN — HYDROMORPHONE HYDROCHLORIDE 2 MILLIGRAM(S): 2 INJECTION INTRAMUSCULAR; INTRAVENOUS; SUBCUTANEOUS at 16:45

## 2021-01-01 RX ADMIN — Medication 3 MILLILITER(S): at 21:02

## 2021-01-01 RX ADMIN — HYDROMORPHONE HYDROCHLORIDE 1 MILLIGRAM(S): 2 INJECTION INTRAMUSCULAR; INTRAVENOUS; SUBCUTANEOUS at 22:00

## 2021-01-01 RX ADMIN — HEPARIN SODIUM 5000 UNIT(S): 5000 INJECTION INTRAVENOUS; SUBCUTANEOUS at 06:47

## 2021-01-01 RX ADMIN — HYDROMORPHONE HYDROCHLORIDE 2 MILLIGRAM(S): 2 INJECTION INTRAMUSCULAR; INTRAVENOUS; SUBCUTANEOUS at 21:06

## 2021-01-01 RX ADMIN — GABAPENTIN 100 MILLIGRAM(S): 400 CAPSULE ORAL at 21:30

## 2021-01-01 RX ADMIN — PIPERACILLIN AND TAZOBACTAM 25 GRAM(S): 4; .5 INJECTION, POWDER, LYOPHILIZED, FOR SOLUTION INTRAVENOUS at 06:32

## 2021-01-01 RX ADMIN — HEPARIN SODIUM 5000 UNIT(S): 5000 INJECTION INTRAVENOUS; SUBCUTANEOUS at 17:34

## 2021-01-01 RX ADMIN — PANTOPRAZOLE SODIUM 40 MILLIGRAM(S): 20 TABLET, DELAYED RELEASE ORAL at 06:32

## 2021-01-01 RX ADMIN — AZITHROMYCIN 250 MILLIGRAM(S): 500 TABLET, FILM COATED ORAL at 15:26

## 2021-01-01 RX ADMIN — AZITHROMYCIN 255 MILLIGRAM(S): 500 TABLET, FILM COATED ORAL at 18:58

## 2021-01-01 RX ADMIN — HYDROMORPHONE HYDROCHLORIDE 2 MILLIGRAM(S): 2 INJECTION INTRAMUSCULAR; INTRAVENOUS; SUBCUTANEOUS at 12:35

## 2021-01-01 RX ADMIN — Medication 3 MILLILITER(S): at 14:23

## 2021-01-01 RX ADMIN — HYDROMORPHONE HYDROCHLORIDE 1 MILLIGRAM(S): 2 INJECTION INTRAMUSCULAR; INTRAVENOUS; SUBCUTANEOUS at 15:26

## 2021-01-01 RX ADMIN — GABAPENTIN 100 MILLIGRAM(S): 400 CAPSULE ORAL at 06:47

## 2021-01-01 RX ADMIN — AZITHROMYCIN 250 MILLIGRAM(S): 500 TABLET, FILM COATED ORAL at 11:51

## 2021-01-01 RX ADMIN — CEFTRIAXONE 100 MILLIGRAM(S): 500 INJECTION, POWDER, FOR SOLUTION INTRAMUSCULAR; INTRAVENOUS at 18:58

## 2021-01-01 RX ADMIN — PANTOPRAZOLE SODIUM 40 MILLIGRAM(S): 20 TABLET, DELAYED RELEASE ORAL at 05:33

## 2021-01-01 RX ADMIN — Medication 0.5 MILLIGRAM(S): at 21:20

## 2021-01-01 RX ADMIN — Medication 3 MILLILITER(S): at 14:59

## 2021-01-01 RX ADMIN — Medication 50 MILLIGRAM(S): at 12:40

## 2021-01-01 RX ADMIN — Medication 1 TABLET(S): at 17:35

## 2021-01-01 RX ADMIN — AZITHROMYCIN 250 MILLIGRAM(S): 500 TABLET, FILM COATED ORAL at 11:36

## 2021-01-01 RX ADMIN — GABAPENTIN 100 MILLIGRAM(S): 400 CAPSULE ORAL at 22:51

## 2021-01-01 RX ADMIN — SODIUM CHLORIDE 125 MILLILITER(S): 9 INJECTION INTRAMUSCULAR; INTRAVENOUS; SUBCUTANEOUS at 06:36

## 2021-01-01 RX ADMIN — Medication 150 MILLIGRAM(S): at 21:36

## 2021-01-01 RX ADMIN — GABAPENTIN 100 MILLIGRAM(S): 400 CAPSULE ORAL at 05:45

## 2021-01-01 RX ADMIN — Medication 0.5 MILLIGRAM(S): at 08:12

## 2021-01-01 RX ADMIN — Medication 1 TABLET(S): at 09:27

## 2021-01-01 RX ADMIN — Medication 0.5 MILLIGRAM(S): at 08:30

## 2021-01-01 RX ADMIN — Medication 50 MILLIGRAM(S): at 11:51

## 2021-01-01 RX ADMIN — GABAPENTIN 100 MILLIGRAM(S): 400 CAPSULE ORAL at 11:51

## 2021-01-01 RX ADMIN — HYDROMORPHONE HYDROCHLORIDE 4 MILLIGRAM(S): 2 INJECTION INTRAMUSCULAR; INTRAVENOUS; SUBCUTANEOUS at 00:15

## 2021-01-01 RX ADMIN — HYDROMORPHONE HYDROCHLORIDE 2 MILLIGRAM(S): 2 INJECTION INTRAMUSCULAR; INTRAVENOUS; SUBCUTANEOUS at 05:33

## 2021-01-01 RX ADMIN — HYDROMORPHONE HYDROCHLORIDE 1 MILLIGRAM(S): 2 INJECTION INTRAMUSCULAR; INTRAVENOUS; SUBCUTANEOUS at 09:06

## 2021-01-01 RX ADMIN — POLYETHYLENE GLYCOL 3350 17 GRAM(S): 17 POWDER, FOR SOLUTION ORAL at 10:59

## 2021-01-01 RX ADMIN — Medication 650 MILLIGRAM(S): at 10:57

## 2021-01-01 RX ADMIN — Medication 650 MILLIGRAM(S): at 02:00

## 2021-01-01 RX ADMIN — HEPARIN SODIUM 5000 UNIT(S): 5000 INJECTION INTRAVENOUS; SUBCUTANEOUS at 06:31

## 2021-01-01 RX ADMIN — PANTOPRAZOLE SODIUM 40 MILLIGRAM(S): 20 TABLET, DELAYED RELEASE ORAL at 06:47

## 2021-01-01 RX ADMIN — Medication 1 TABLET(S): at 10:57

## 2021-01-01 RX ADMIN — AZITHROMYCIN 250 MILLIGRAM(S): 500 TABLET, FILM COATED ORAL at 12:38

## 2021-01-01 RX ADMIN — PIPERACILLIN AND TAZOBACTAM 25 GRAM(S): 4; .5 INJECTION, POWDER, LYOPHILIZED, FOR SOLUTION INTRAVENOUS at 06:47

## 2021-01-01 RX ADMIN — Medication 50 MILLIGRAM(S): at 11:35

## 2021-01-01 RX ADMIN — Medication 0.5 MILLIGRAM(S): at 05:47

## 2021-01-01 RX ADMIN — HYDROMORPHONE HYDROCHLORIDE 2 MILLIGRAM(S): 2 INJECTION INTRAMUSCULAR; INTRAVENOUS; SUBCUTANEOUS at 16:04

## 2021-01-01 RX ADMIN — HEPARIN SODIUM 5000 UNIT(S): 5000 INJECTION INTRAVENOUS; SUBCUTANEOUS at 05:45

## 2021-01-01 RX ADMIN — Medication 15 MILLIGRAM(S): at 18:59

## 2021-01-01 RX ADMIN — HYDROMORPHONE HYDROCHLORIDE 2 MILLIGRAM(S): 2 INJECTION INTRAMUSCULAR; INTRAVENOUS; SUBCUTANEOUS at 18:02

## 2021-01-01 RX ADMIN — PANTOPRAZOLE SODIUM 40 MILLIGRAM(S): 20 TABLET, DELAYED RELEASE ORAL at 05:45

## 2021-01-01 RX ADMIN — LAMOTRIGINE 150 MILLIGRAM(S): 25 TABLET, ORALLY DISINTEGRATING ORAL at 10:58

## 2021-01-01 RX ADMIN — PIPERACILLIN AND TAZOBACTAM 25 GRAM(S): 4; .5 INJECTION, POWDER, LYOPHILIZED, FOR SOLUTION INTRAVENOUS at 11:52

## 2021-01-01 RX ADMIN — Medication 1 TABLET(S): at 18:47

## 2021-01-01 RX ADMIN — HEPARIN SODIUM 5000 UNIT(S): 5000 INJECTION INTRAVENOUS; SUBCUTANEOUS at 17:59

## 2021-01-01 RX ADMIN — SENNA PLUS 2 TABLET(S): 8.6 TABLET ORAL at 22:52

## 2021-01-01 RX ADMIN — HYDROMORPHONE HYDROCHLORIDE 1 MILLIGRAM(S): 2 INJECTION INTRAMUSCULAR; INTRAVENOUS; SUBCUTANEOUS at 01:48

## 2021-01-01 RX ADMIN — Medication 3 MILLILITER(S): at 10:09

## 2021-01-01 RX ADMIN — Medication 1 TABLET(S): at 17:33

## 2021-01-01 RX ADMIN — HYDROMORPHONE HYDROCHLORIDE 2 MILLIGRAM(S): 2 INJECTION INTRAMUSCULAR; INTRAVENOUS; SUBCUTANEOUS at 11:37

## 2021-01-01 RX ADMIN — GABAPENTIN 100 MILLIGRAM(S): 400 CAPSULE ORAL at 14:03

## 2021-01-01 RX ADMIN — HYDROMORPHONE HYDROCHLORIDE 1 MILLIGRAM(S): 2 INJECTION INTRAMUSCULAR; INTRAVENOUS; SUBCUTANEOUS at 02:00

## 2021-01-01 RX ADMIN — Medication 650 MILLIGRAM(S): at 00:56

## 2021-01-01 RX ADMIN — HYDROMORPHONE HYDROCHLORIDE 1 MILLIGRAM(S): 2 INJECTION INTRAMUSCULAR; INTRAVENOUS; SUBCUTANEOUS at 21:21

## 2021-01-01 RX ADMIN — GABAPENTIN 100 MILLIGRAM(S): 400 CAPSULE ORAL at 15:10

## 2021-01-01 RX ADMIN — SENNA PLUS 2 TABLET(S): 8.6 TABLET ORAL at 21:35

## 2021-01-09 ENCOUNTER — APPOINTMENT (OUTPATIENT)
Dept: DISASTER EMERGENCY | Facility: CLINIC | Age: 61
End: 2021-01-09

## 2021-01-11 LAB — SARS-COV-2 N GENE NPH QL NAA+PROBE: NOT DETECTED

## 2021-01-11 RX ORDER — FENTANYL CITRATE 50 UG/ML
50 INJECTION INTRAVENOUS
Refills: 0 | Status: DISCONTINUED | OUTPATIENT
Start: 2021-01-12 | End: 2021-01-12

## 2021-01-11 RX ORDER — MEPERIDINE HYDROCHLORIDE 50 MG/ML
12.5 INJECTION INTRAMUSCULAR; INTRAVENOUS; SUBCUTANEOUS
Refills: 0 | Status: DISCONTINUED | OUTPATIENT
Start: 2021-01-12 | End: 2021-01-12

## 2021-01-11 RX ORDER — ONDANSETRON 8 MG/1
4 TABLET, FILM COATED ORAL ONCE
Refills: 0 | Status: DISCONTINUED | OUTPATIENT
Start: 2021-01-12 | End: 2021-01-12

## 2021-01-11 RX ORDER — SODIUM CHLORIDE 9 MG/ML
3 INJECTION INTRAMUSCULAR; INTRAVENOUS; SUBCUTANEOUS EVERY 8 HOURS
Refills: 0 | Status: DISCONTINUED | OUTPATIENT
Start: 2021-01-12 | End: 2021-01-12

## 2021-01-11 RX ORDER — FENTANYL CITRATE 50 UG/ML
25 INJECTION INTRAVENOUS
Refills: 0 | Status: DISCONTINUED | OUTPATIENT
Start: 2021-01-12 | End: 2021-01-12

## 2021-01-11 RX ORDER — SODIUM CHLORIDE 9 MG/ML
1000 INJECTION, SOLUTION INTRAVENOUS
Refills: 0 | Status: DISCONTINUED | OUTPATIENT
Start: 2021-01-12 | End: 2021-01-12

## 2021-01-11 NOTE — PHYSICAL EXAM
[de-identified] : Right Ankle Physical Examination:\par \par General: Alert and oriented x3. In no acute distress. Pleasant in nature with a normal affect. No apparent respiratory distress. \par Erythema, Warmth, Rubor: Negative\par Swelling: Positive. Incision is healed.\par \par ROM:\par 1. Dorsiflexion: 0 degrees\par 2. Plantarflexion: 40 degrees\par 3. Inversion: 10 degrees\par 4. Eversion: 10 degrees\par \par Tenderness to Palpation: \par 1. Lateral Malleolus: Negative\par 2. Medial Malleolus: Positive over the hardware and incision.\par 3. Proximal Fibular Pain: Negative\par 4. Heel Pain: Negative\par 5. Cuboid: Negative\par 6. Navicular: Negative\par 7. Tibiotalar Joint: Negative\par 8. Subtalar Joint: Negative\par 9. Posterior Recess: Negative\par \par Tendon Pain:\par 1. Achilles: Negative\par 2. Peroneals: Negative\par 3. Posterior Tibialis: Negative\par 4. Tibialis Anterior: Negative\par \par Ligament Pain:\par 1. ATFL: Negative\par 2. CFL: Negative \par 3. PTFL: Negative\par 4. Deltoid Ligaments: Negative\par 5. Lis Franc Ligament: Negative\par \par Stability: \par 1. Anterior Drawer: Negative\par 2. Posterior Drawer: Negative\par \par Strength: 5/5 TA/GS/EHL\par \par Pulses: 2+ DP/PT Pulses\par \par Neuro: Intact motor and sensory\par \par Additional Test:\par 1. Calcaneal Squeeze Test: Negative\par 2. Syndesmosis Squeeze Test: Negative  [de-identified] : An MRI was obtained of the right ankle from an outside facility on 11/25/2020 and reviewed by me today 11/30/2020  in the office. Revealed: Status post open reduction internal fixation of the distal tibia. Susceptibility artifact related to orthopedic hardware obscures evaluation of the distal tibial plafond. This area cannot be well evaluated.\par \par Severe tibiotalar arthrosis.\par \par Synovial scarring. Scarring and granulation tissue about the expected regions of both the medial and lateral ligamentous structures.\par \par Mild to moderate insertional tendinosis of the posterior tibialis tendon. Mild tendinosis of the peroneus longus and brevis tendons at the lateral wall the calcaneus. Mild Achilles tendinosis.

## 2021-01-11 NOTE — HISTORY OF PRESENT ILLNESS
[FreeTextEntry1] : 60 year old female presenting with right ankle pain. The patient’s pain is noted to be a 7/10. The pain and swelling are noted to be the same compared to the previous visit. The patient presents ambulating with a walker. The patient presents wearing an ASO brace. She presents today to discuss surgery and to review her MRI results. She is currently taking Percocet and Tylenol. No other complaints at this time.

## 2021-01-11 NOTE — ADDENDUM
[FreeTextEntry1] : I, Jason Veronica, acted solely as a scribe for Dr. Mohamud Knight on this date 11/30/2020 .\par All medical record entries made by the Scribe were at my, Dr. Mohamud Knight, direction and personally dictated by me on 11/30/2020 . I have reviewed the chart and agree that the record accurately reflects my personal performance of the history, physical exam, assessment and plan. I have also personally directed, reviewed, and agreed with the chart.

## 2021-01-11 NOTE — DISCUSSION/SUMMARY
[de-identified] : Today I had a lengthy discussion with the patient regarding their right ankle pain. I have addressed all the patient's concerns surrounding the pathology of their condition. The MRI results were reviewed with the patient today. A discussion was had about custom bracing options. A discussion was also had about a possible fusion surgery vs hardware removal and ankle replacement surgery. A lengthy discussion was had about the surgery. All risks, benefits and alternatives to the recommended surgical procedure were discussed which include but are not limited to bleeding, infection, nerve damage, vascular damage, failure of the wound to heal, the need for further surgery, loss of limb, DVT, PE, loss of life as well as the risks associated with general anesthesia. The patient verbalized understanding and provided informed consent to move forward with the hardware removal surgery. The patient understood and verbally agreed to the treatment plan. All of their questions were answered and they were satisfied with the visit. The patient should call the office if they have any questions or experience worsening symptoms. 
S/P IVC filter

## 2021-01-12 ENCOUNTER — RESULT REVIEW (OUTPATIENT)
Age: 61
End: 2021-01-12

## 2021-01-12 ENCOUNTER — OUTPATIENT (OUTPATIENT)
Dept: INPATIENT UNIT | Facility: HOSPITAL | Age: 61
LOS: 1 days | Discharge: HOME | End: 2021-01-12
Payer: MEDICAID

## 2021-01-12 ENCOUNTER — APPOINTMENT (OUTPATIENT)
Dept: ORTHOPEDIC SURGERY | Facility: HOSPITAL | Age: 61
End: 2021-01-12

## 2021-01-12 VITALS
HEART RATE: 62 BPM | HEIGHT: 66 IN | DIASTOLIC BLOOD PRESSURE: 73 MMHG | OXYGEN SATURATION: 97 % | SYSTOLIC BLOOD PRESSURE: 114 MMHG | RESPIRATION RATE: 16 BRPM | WEIGHT: 200.4 LBS | TEMPERATURE: 98 F

## 2021-01-12 VITALS
RESPIRATION RATE: 16 BRPM | TEMPERATURE: 97 F | HEART RATE: 94 BPM | OXYGEN SATURATION: 97 % | DIASTOLIC BLOOD PRESSURE: 67 MMHG | SYSTOLIC BLOOD PRESSURE: 119 MMHG

## 2021-01-12 DIAGNOSIS — T84.84XA PAIN DUE TO INTERNAL ORTHOPEDIC PROSTHETIC DEVICES, IMPLANTS AND GRAFTS, INITIAL ENCOUNTER: ICD-10-CM

## 2021-01-12 DIAGNOSIS — Z96.60 PRESENCE OF UNSPECIFIED ORTHOPEDIC JOINT IMPLANT: Chronic | ICD-10-CM

## 2021-01-12 DIAGNOSIS — Z98.51 TUBAL LIGATION STATUS: Chronic | ICD-10-CM

## 2021-01-12 DIAGNOSIS — S82.891A OTHER FRACTURE OF RIGHT LOWER LEG, INITIAL ENCOUNTER FOR CLOSED FRACTURE: ICD-10-CM

## 2021-01-12 DIAGNOSIS — Z98.890 OTHER SPECIFIED POSTPROCEDURAL STATES: Chronic | ICD-10-CM

## 2021-01-12 DIAGNOSIS — Z90.49 ACQUIRED ABSENCE OF OTHER SPECIFIED PARTS OF DIGESTIVE TRACT: Chronic | ICD-10-CM

## 2021-01-12 PROCEDURE — 20680 REMOVAL OF IMPLANT DEEP: CPT | Mod: RT

## 2021-01-12 PROCEDURE — 76000 FLUOROSCOPY <1 HR PHYS/QHP: CPT

## 2021-01-12 RX ORDER — CELECOXIB 200 MG/1
200 CAPSULE ORAL ONCE
Refills: 0 | Status: COMPLETED | OUTPATIENT
Start: 2021-01-12 | End: 2021-01-12

## 2021-01-12 RX ORDER — SENNOSIDES/DOCUSATE SODIUM 8.6MG-50MG
2 TABLET ORAL
Qty: 0 | Refills: 0 | DISCHARGE

## 2021-01-12 RX ORDER — ACETAMINOPHEN 500 MG
975 TABLET ORAL ONCE
Refills: 0 | Status: COMPLETED | OUTPATIENT
Start: 2021-01-12 | End: 2021-01-12

## 2021-01-12 RX ORDER — OXYCODONE HYDROCHLORIDE 5 MG/1
1 TABLET ORAL
Qty: 21 | Refills: 0
Start: 2021-01-12 | End: 2021-01-18

## 2021-01-12 RX ORDER — OXYCODONE HYDROCHLORIDE 5 MG/1
1 TABLET ORAL
Qty: 20 | Refills: 0
Start: 2021-01-12 | End: 2021-01-18

## 2021-01-12 RX ORDER — FAMOTIDINE 10 MG/ML
20 INJECTION INTRAVENOUS ONCE
Refills: 0 | Status: COMPLETED | OUTPATIENT
Start: 2021-01-12 | End: 2021-01-12

## 2021-01-12 RX ORDER — OXYCODONE HYDROCHLORIDE 5 MG/1
10 TABLET ORAL ONCE
Refills: 0 | Status: DISCONTINUED | OUTPATIENT
Start: 2021-01-12 | End: 2021-01-12

## 2021-01-12 RX ORDER — ASPIRIN/CALCIUM CARB/MAGNESIUM 324 MG
1 TABLET ORAL
Qty: 60 | Refills: 0
Start: 2021-01-12 | End: 2021-02-10

## 2021-01-12 RX ADMIN — FENTANYL CITRATE 50 MICROGRAM(S): 50 INJECTION INTRAVENOUS at 09:14

## 2021-01-12 RX ADMIN — Medication 975 MILLIGRAM(S): at 07:26

## 2021-01-12 RX ADMIN — CELECOXIB 200 MILLIGRAM(S): 200 CAPSULE ORAL at 07:26

## 2021-01-12 RX ADMIN — FAMOTIDINE 20 MILLIGRAM(S): 10 INJECTION INTRAVENOUS at 07:26

## 2021-01-12 RX ADMIN — OXYCODONE HYDROCHLORIDE 10 MILLIGRAM(S): 5 TABLET ORAL at 10:01

## 2021-01-12 NOTE — BRIEF OPERATIVE NOTE - NSICDXBRIEFPROCEDURE_GEN_ALL_CORE_FT
PROCEDURES:  Removal of internal fixation hardware from ankle 12-Jan-2021 09:16:23 Removal of painful hardware of Right ankle Jason Lorenzo

## 2021-01-12 NOTE — ASU PATIENT PROFILE, ADULT - PMH
Anxiety and depression    Asthma  Has not been admitted for have any asthma exacerbation for 10 years  Diverticulosis    Female incontinence    H/O sinusitis    Hypertension  Have not taken medications for about 2 years  Lumbar herniated disc    Osteoarthritis    Seasonal allergies

## 2021-01-12 NOTE — ASU DISCHARGE PLAN (ADULT/PEDIATRIC) - CARE PROVIDER_API CALL
Mohamud Knight (DO)  Orthopaedic Surgery  [address] [place] [address]  Phone: [phone]  Fax: [phone]  Follow Up Time:

## 2021-01-12 NOTE — ASU DISCHARGE PLAN (ADULT/PEDIATRIC) - ASU DC SPECIAL INSTRUCTIONSFT
Follow up with Dr Knight in 10-14 days. Call office for appointment. Take medications as prescribed (sent from office) - take aspirin once a day as prescribed. Keep dressing clean, dry, and intact. Non weight bearing right lower extremity. Rest, ice, and elevate affected extremity.

## 2021-01-12 NOTE — ASU PATIENT PROFILE, ADULT - PSH
History of ankle surgery  right ankle 2/2020 left 2021  S/P cholecystectomy    S/P hip replacement  left and right  S/P tubal ligation

## 2021-01-13 ENCOUNTER — NON-APPOINTMENT (OUTPATIENT)
Age: 61
End: 2021-01-13

## 2021-01-15 DIAGNOSIS — T84.84XA PAIN DUE TO INTERNAL ORTHOPEDIC PROSTHETIC DEVICES, IMPLANTS AND GRAFTS, INITIAL ENCOUNTER: ICD-10-CM

## 2021-01-15 DIAGNOSIS — Z96.649 PRESENCE OF UNSPECIFIED ARTIFICIAL HIP JOINT: ICD-10-CM
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
## 2021-01-15 DIAGNOSIS — Z79.891 LONG TERM (CURRENT) USE OF OPIATE ANALGESIC: ICD-10-CM

## 2021-01-15 DIAGNOSIS — K21.9 GASTRO-ESOPHAGEAL REFLUX DISEASE WITHOUT ESOPHAGITIS: ICD-10-CM

## 2021-01-15 DIAGNOSIS — Z79.82 LONG TERM (CURRENT) USE OF ASPIRIN: ICD-10-CM

## 2021-01-15 DIAGNOSIS — M19.031 PRIMARY OSTEOARTHRITIS, RIGHT WRIST: ICD-10-CM

## 2021-01-15 DIAGNOSIS — I10 ESSENTIAL (PRIMARY) HYPERTENSION: ICD-10-CM

## 2021-01-21 PROBLEM — K57.90 DIVERTICULOSIS OF INTESTINE, PART UNSPECIFIED, WITHOUT PERFORATION OR ABSCESS WITHOUT BLEEDING: Chronic | Status: ACTIVE | Noted: 2021-01-12

## 2021-01-21 PROBLEM — R32 UNSPECIFIED URINARY INCONTINENCE: Chronic | Status: ACTIVE | Noted: 2021-01-12

## 2021-01-21 PROBLEM — M51.26 OTHER INTERVERTEBRAL DISC DISPLACEMENT, LUMBAR REGION: Chronic | Status: ACTIVE | Noted: 2021-01-12

## 2021-01-21 PROBLEM — Z87.09 PERSONAL HISTORY OF OTHER DISEASES OF THE RESPIRATORY SYSTEM: Chronic | Status: ACTIVE | Noted: 2021-01-12

## 2021-01-22 ENCOUNTER — RX RENEWAL (OUTPATIENT)
Age: 61
End: 2021-01-22

## 2021-01-25 ENCOUNTER — APPOINTMENT (OUTPATIENT)
Dept: ORTHOPEDIC SURGERY | Facility: CLINIC | Age: 61
End: 2021-01-25
Payer: MEDICAID

## 2021-01-25 PROCEDURE — 99024 POSTOP FOLLOW-UP VISIT: CPT

## 2021-01-25 PROCEDURE — 73610 X-RAY EXAM OF ANKLE: CPT | Mod: RT

## 2021-01-25 RX ORDER — OXYCODONE AND ACETAMINOPHEN 5; 325 MG/1; MG/1
5-325 TABLET ORAL
Qty: 30 | Refills: 0 | Status: ACTIVE | COMMUNITY
Start: 2021-01-25 | End: 1900-01-01

## 2021-01-25 NOTE — ADDENDUM
[FreeTextEntry1] : I, Jason Veronica, acted solely as a scribe for Dr. Mohamud Knight on this date 01/25/2021 .\par All medical record entries made by the Scribe were at my, Dr. Mohamud Knight, direction and personally dictated by me on 01/25/2021 . I have reviewed the chart and agree that the record accurately reflects my personal performance of the history, physical exam, assessment and plan. I have also personally directed, reviewed, and agreed with the chart.

## 2021-01-25 NOTE — HISTORY OF PRESENT ILLNESS
[___ Days Post Op] : post op day #[unfilled] [Clean/Dry/Intact] : clean, dry and intact [Healed] : healed [Neuro Intact] : an unremarkable neurological exam [Vascular Intact] : ~T peripheral vascular exam normal [Negative Sheng's] : maneuvers demonstrated a negative Sheng's sign [Doing Well] : is doing well [Excellent Pain Control] : has excellent pain control [No Sign of Infection] : is showing no signs of infection [Sutures Removed] : sutures were removed [Chills] : no chills [Fever] : no fever [Nausea] : no nausea [Vomiting] : no vomiting [Erythema] : not erythematous [Discharge] : absent of discharge [Swelling] : not swollen [Dehiscence] : not dehisced [de-identified] : s/p Right ankle removal of hardware\par DOS 1/12/2021 [de-identified] : 60 year old female present in the office 13 days post op from Right ankle removal of hardware. The patient's pain is noted to be a 8/10. The patient states she is in constant pain. She is not currently taking any pain medication. Patient is on DVT prophylaxis. No other complaints at this time.  [de-identified] : General: Alert and oriented x3. In no acute distress. Pleasant in nature with a normal affect. No apparent respiratory distress.\par The wound is intact. No evidence of any diastases or dehiscence. No surrounding erythema noted. No fluctuance. The area is warm and well perfused. The patient is able to wiggle their toes. Neurovascularly intact.  [de-identified] : 3V of the right ankle were ordered obtained and reviewed by me today, 01/25/2021 , revealed: Hardware removed. Stable x-rays. Posttraumatic changes concern for medial malleolus.  [de-identified] : Patient is a 60 year old female present in the office today 13 days s/p Right ankle removal of hardware. The sutures were removed in the office today and a dressing was provided.  Patient will continue DVT prophylaxis. I recommend that the patient utilize her CAM boot. A discussion was had about possible total ankle replacement, and obtaining a possible CT scan, but discussed further optimization first.  I recommend the patient undergo a course of physical therapy for the right ankle 2-3 times a week for a total of 6-8 weeks. A prescription was given for the physical therapy today. I would like to see the patient back in the office in 3 weeks to reassess their condition. I have addressed all the patient's concerns surrounding the pathology of their condition. The patient understood and verbally agreed to the treatment plan. All of their questions were answered and they were satisfied with the visit. The patient should call the office if they have any questions or experience worsening symptoms.

## 2021-02-17 ENCOUNTER — APPOINTMENT (OUTPATIENT)
Dept: FAMILY MEDICINE | Facility: CLINIC | Age: 61
End: 2021-02-17

## 2021-02-18 ENCOUNTER — APPOINTMENT (OUTPATIENT)
Dept: DERMATOLOGY | Facility: CLINIC | Age: 61
End: 2021-02-18

## 2021-02-24 ENCOUNTER — APPOINTMENT (OUTPATIENT)
Dept: ORTHOPEDIC SURGERY | Facility: CLINIC | Age: 61
End: 2021-02-24
Payer: MEDICAID

## 2021-02-24 PROCEDURE — 99024 POSTOP FOLLOW-UP VISIT: CPT

## 2021-02-24 NOTE — HISTORY OF PRESENT ILLNESS
[___ Weeks Post Op] : [unfilled] weeks post op [Clean/Dry/Intact] : clean, dry and intact [Healed] : healed [Neuro Intact] : an unremarkable neurological exam [Vascular Intact] : ~T peripheral vascular exam normal [Negative Sheng's] : maneuvers demonstrated a negative Sheng's sign [Chills] : no chills [Fever] : no fever [Nausea] : no nausea [Vomiting] : no vomiting [Erythema] : not erythematous [Discharge] : absent of discharge [Swelling] : not swollen [Dehiscence] : not dehisced [de-identified] : s/p Right ankle removal of hardware\par DOS 1/12/2021.  [de-identified] : 60 year old female present in the office 6 weeks post op from Right ankle removal of hardware. The patient's pain is noted to be a 5/10. The pain is noted to be 30% improved, and the swelling is noted to be 25% improved compared to the previous visit. The patient presents wearing a CAM boot. The patient has been attending physical therapy for this issue. She is currently taking Motrin/Tylenol. No other complaints at this time.  [de-identified] : General: Alert and oriented x3. In no acute distress. Pleasant in nature with a normal affect. No apparent respiratory distress.\par The wound is intact. No evidence of any diastases or dehiscence. No surrounding erythema noted. No fluctuance. The area is warm and well perfused. The patient is able to wiggle their toes. Neurovascularly intact.  [de-identified] : 3V of the right ankle were ordered obtained and reviewed by me today, 02/24/2021 , revealed: Hardware removed. Stable x-rays. Posttraumatic changes concern for medial malleolus.  [de-identified] : Patient is a 60 year old female present in the office today 6 weeks s/p Right ankle removal of hardware. A discussion was had about possible total ankle replacement surgery. At this time I would like to obtain advanced imaging of the patient's right lower extremity. A CT scan was ordered so I can find out more about the etiology of the patient's condition. The patient should follow up with the office after obtaining the CT scan. I have addressed all the patient's concerns surrounding the pathology of their condition. The patient understood and verbally agreed to the treatment plan. All of their questions were answered and they were satisfied with the visit. The patient should call the office if they have any questions or experience worsening symptoms.

## 2021-02-24 NOTE — ADDENDUM
[FreeTextEntry1] : I, Jason Veronica, acted solely as a scribe for Dr. Mohamud Knight on this date 02/24/2021 .\par All medical record entries made by the Scribe were at my, Dr. Mohamud Knight, direction and personally dictated by me on 02/24/2021 . I have reviewed the chart and agree that the record accurately reflects my personal performance of the history, physical exam, assessment and plan. I have also personally directed, reviewed, and agreed with the chart.

## 2021-03-02 ENCOUNTER — APPOINTMENT (OUTPATIENT)
Dept: FAMILY MEDICINE | Facility: CLINIC | Age: 61
End: 2021-03-02
Payer: MEDICAID

## 2021-03-02 PROCEDURE — 99213 OFFICE O/P EST LOW 20 MIN: CPT | Mod: 95

## 2021-03-03 NOTE — ASSESSMENT
[FreeTextEntry1] : ASSESSMENT: Ms. CHERYL MIR is a 60 year old female presenting to the clinic today via telehealth regarding follow up for ankle fractures.\par \par # Reviewed previous fractures

## 2021-03-03 NOTE — ADDENDUM
[FreeTextEntry1] : I, Teresa Serra, verify that that I acted solely as a scribe for Dr. Courtney Elam on this date, 03/02/2021.

## 2021-03-03 NOTE — END OF VISIT
[Time Spent: ___ minutes] : I have spent [unfilled] minutes of time on the encounter. [FreeTextEntry2] : This note was written by DAMIR JO on 03/02/2021, acting solely as a scribe for Dr. Courtney Elam MD. \par \par All medical record entries made by the scribe, DAMIR JO, were at my, Dr. Kaila Hay MD, direction and personally dictated by me on 03/02/2021. I have personally reviewed the chart and agree that the record accurately reflects my personal performance and care.

## 2021-03-03 NOTE — PLAN
[FreeTextEntry1] : # Refill - 3 months Gabapentin 300 MG, 1x/every 8 hours\par # Follow up for previous appointment for March 15th, sooner for MCA if needed

## 2021-03-03 NOTE — HISTORY OF PRESENT ILLNESS
[Home] : at home, [unfilled] , at the time of the visit. [Medical Office: ([facility])___] : at the medical office located in  [Other:____] : [unfilled] [Verbal consent obtained from patient] : the patient, [unfilled] [FreeTextEntry1] : patient here for follow up/medication management [de-identified] : Ms. CHERYL MIR is a 60 year old female presenting today for a telehealth consultation via phone/computer. Verbal consent was obtained prior to visit. Patient presents today for follow up regarding previous ankle fractures. States since fracturing right ankle, she has also fractured her left ankle and was not ambulatory for the past month. Attributes falls and subsequent ankle fractures to pain induced by lumbar spinal stenosis and herniated discs. Claims she has not fallen since starting Gabapentin due to decreased pain in the back and would like a refill on this medication. States she is anticipating R ankle replacement surgery due to previous fractures and will follow up medical clearance when sugery is scheduled.

## 2021-03-03 NOTE — PHYSICAL EXAM
[No Acute Distress] : no acute distress [Well Nourished] : well nourished [Well Developed] : well developed [Well-Appearing] : well-appearing [No Joint Swelling] : no joint swelling [No Rash] : no rash [No Skin Lesions] : no skin lesions [Coordination Grossly Intact] : coordination grossly intact [No Focal Deficits] : no focal deficits [Normal Gait] : normal gait [Deep Tendon Reflexes (DTR)] : deep tendon reflexes were 2+ and symmetric [Speech Grossly Normal] : speech grossly normal [Memory Grossly Normal] : memory grossly normal [Normal Affect] : the affect was normal [Normal Mood] : the mood was normal [Normal Insight/Judgement] : insight and judgment were intact [Normal] : affect was normal and insight and judgment were intact [Acne] : no acne

## 2021-03-04 ENCOUNTER — RESULT REVIEW (OUTPATIENT)
Age: 61
End: 2021-03-04

## 2021-03-04 ENCOUNTER — OUTPATIENT (OUTPATIENT)
Dept: OUTPATIENT SERVICES | Facility: HOSPITAL | Age: 61
LOS: 1 days | End: 2021-03-04

## 2021-03-04 ENCOUNTER — APPOINTMENT (OUTPATIENT)
Dept: CT IMAGING | Facility: CLINIC | Age: 61
End: 2021-03-04
Payer: MEDICAID

## 2021-03-04 DIAGNOSIS — Z90.49 ACQUIRED ABSENCE OF OTHER SPECIFIED PARTS OF DIGESTIVE TRACT: Chronic | ICD-10-CM

## 2021-03-04 DIAGNOSIS — Z98.51 TUBAL LIGATION STATUS: Chronic | ICD-10-CM

## 2021-03-04 DIAGNOSIS — Z98.890 OTHER SPECIFIED POSTPROCEDURAL STATES: Chronic | ICD-10-CM

## 2021-03-04 DIAGNOSIS — S82.891A OTHER FRACTURE OF RIGHT LOWER LEG, INITIAL ENCOUNTER FOR CLOSED FRACTURE: ICD-10-CM

## 2021-03-04 DIAGNOSIS — Z96.60 PRESENCE OF UNSPECIFIED ORTHOPEDIC JOINT IMPLANT: Chronic | ICD-10-CM

## 2021-03-04 PROCEDURE — 73700 CT LOWER EXTREMITY W/O DYE: CPT | Mod: 26,RT

## 2021-03-15 ENCOUNTER — APPOINTMENT (OUTPATIENT)
Dept: ORTHOPEDIC SURGERY | Facility: CLINIC | Age: 61
End: 2021-03-15
Payer: MEDICAID

## 2021-03-15 ENCOUNTER — APPOINTMENT (OUTPATIENT)
Dept: FAMILY MEDICINE | Facility: CLINIC | Age: 61
End: 2021-03-15
Payer: MEDICAID

## 2021-03-15 ENCOUNTER — NON-APPOINTMENT (OUTPATIENT)
Age: 61
End: 2021-03-15

## 2021-03-15 VITALS
HEIGHT: 66 IN | DIASTOLIC BLOOD PRESSURE: 70 MMHG | WEIGHT: 200 LBS | BODY MASS INDEX: 32.14 KG/M2 | HEART RATE: 77 BPM | SYSTOLIC BLOOD PRESSURE: 120 MMHG

## 2021-03-15 DIAGNOSIS — R09.81 NASAL CONGESTION: ICD-10-CM

## 2021-03-15 DIAGNOSIS — F41.1 GENERALIZED ANXIETY DISORDER: ICD-10-CM

## 2021-03-15 DIAGNOSIS — M54.9 DORSALGIA, UNSPECIFIED: ICD-10-CM

## 2021-03-15 DIAGNOSIS — Z13.820 ENCOUNTER FOR SCREENING FOR OSTEOPOROSIS: ICD-10-CM

## 2021-03-15 PROCEDURE — 99214 OFFICE O/P EST MOD 30 MIN: CPT

## 2021-03-15 PROCEDURE — 99072 ADDL SUPL MATRL&STAF TM PHE: CPT

## 2021-03-15 PROCEDURE — 99024 POSTOP FOLLOW-UP VISIT: CPT

## 2021-03-15 RX ORDER — BUPRENORPHINE HYDROCHLORIDE 150 UG/1
150 FILM, SOLUBLE BUCCAL
Qty: 60 | Refills: 0 | Status: DISCONTINUED | COMMUNITY
Start: 2020-08-25 | End: 2021-03-15

## 2021-03-15 RX ORDER — ASPIRIN 325 MG/1
325 TABLET, FILM COATED ORAL DAILY
Qty: 30 | Refills: 1 | Status: DISCONTINUED | COMMUNITY
Start: 2021-01-12 | End: 2021-03-15

## 2021-03-15 RX ORDER — ENOXAPARIN SODIUM 100 MG/ML
40 INJECTION SUBCUTANEOUS
Qty: 4 | Refills: 0 | Status: DISCONTINUED | COMMUNITY
Start: 2020-10-09 | End: 2021-03-15

## 2021-03-15 RX ORDER — DOCUSATE SODIUM 100 MG/1
100 CAPSULE ORAL 3 TIMES DAILY
Qty: 30 | Refills: 0 | Status: DISCONTINUED | COMMUNITY
Start: 2021-01-12 | End: 2021-03-15

## 2021-03-15 RX ORDER — HYDROMORPHONE HYDROCHLORIDE 4 MG/1
4 TABLET ORAL
Qty: 20 | Refills: 0 | Status: DISCONTINUED | COMMUNITY
Start: 2020-11-04 | End: 2021-03-15

## 2021-03-15 NOTE — ADDENDUM
[FreeTextEntry1] : I, Jason Veronica, acted solely as a scribe for Dr. Mohamud Knight on this date 03/15/2021 .\par All medical record entries made by the Scribe were at my, Dr. Mohamud Knight, direction and personally dictated by me on 03/15/2021 . I have reviewed the chart and agree that the record accurately reflects my personal performance of the history, physical exam, assessment and plan. I have also personally directed, reviewed, and agreed with the chart.

## 2021-03-15 NOTE — HISTORY OF PRESENT ILLNESS
[___ Weeks Post Op] : [unfilled] weeks post op [Clean/Dry/Intact] : clean, dry and intact [Healed] : healed [Neuro Intact] : an unremarkable neurological exam [Vascular Intact] : ~T peripheral vascular exam normal [Negative Sheng's] : maneuvers demonstrated a negative Sheng's sign [Doing Well] : is doing well [Excellent Pain Control] : has excellent pain control [No Sign of Infection] : is showing no signs of infection [Chills] : no chills [Fever] : no fever [Nausea] : no nausea [Vomiting] : no vomiting [Erythema] : not erythematous [Discharge] : absent of discharge [Swelling] : not swollen [Dehiscence] : not dehisced [de-identified] : s/p Right ankle removal of hardware\par DOS 1/12/2021.  [de-identified] : 60 year old female present in the office 9 weeks post op from Right ankle removal of hardware. The patient's pain is noted to be a 6/10. The pain and swelling are noted to be 20% improved compared to the previous visit. The patient presents wearing a CAM boot. The patient has been attending physical therapy for this issue. She presents today to review her CT scan results. She is currently taking Percocet. No other complaints at this time.  [de-identified] : General: Alert and oriented x3. In no acute distress. Pleasant in nature with a normal affect. No apparent respiratory distress.\par The wound is intact. No evidence of any diastases or dehiscence. No surrounding erythema noted. No fluctuance. The area is warm and well perfused. The patient is able to wiggle their toes. Neurovascularly intact.  [de-identified] : A CT scan was obtained of the right lower extremity from [facility] on 3/4/2021 and reviewed by me today 03/15/2021  in the office. Revealed: Severe osteoarthritis of the tibiotalar joint. [de-identified] : Patient is a 60 year old female present in the office today 9 weeks s/p Severe osteoarthritis of the tibiotalar joint. The patient was shown an ASO brace today. The CT scan results were reviewed with the patient today. A discussion was had about possible fusion vs possible ankle replacement surgery. I advised the patient she can follow up via telephone to further discuss results. I have addressed all the patient's concerns surrounding the pathology of their condition. The patient understood and verbally agreed to the treatment plan. All of their questions were answered and they were satisfied with the visit. The patient should call the office if they have any questions or experience worsening symptoms.

## 2021-03-17 NOTE — ASSESSMENT
[FreeTextEntry1] : CHERYL MIR is a 60 year year old female patient presenting to the clinic today for follow up.\par \par # HTN- controlled \par # back pain \par # discuss of Covid letter \par # incontinence increasing\par # ankle fracture and has upcoming surgery

## 2021-03-17 NOTE — PLAN
[FreeTextEntry1] : # Covid letter\par \par # Renew Rx Flonase \par # referral to urogynecology \par \par # will f/u for medical clearance

## 2021-03-17 NOTE — END OF VISIT
[FreeTextEntry3] : This note was written by Yue Malik on 03/15/2021, acting as a scribe for Dr. Papa MD.\par \par All medical record entries were at my, Dr. Courtney Elam MD, direction and personally dictated by me in 03/15/2021. I have personally reviewed the chart and agree that the record accurately reflects my personal performance of the history, physical exam, assessment, and plan.\par

## 2021-03-25 ENCOUNTER — APPOINTMENT (OUTPATIENT)
Dept: ORTHOPEDIC SURGERY | Facility: CLINIC | Age: 61
End: 2021-03-25
Payer: MEDICAID

## 2021-03-25 PROCEDURE — 99024 POSTOP FOLLOW-UP VISIT: CPT

## 2021-03-31 ENCOUNTER — NON-APPOINTMENT (OUTPATIENT)
Age: 61
End: 2021-03-31

## 2021-03-31 LAB
ALBUMIN SERPL ELPH-MCNC: 4.5 G/DL
ALP BLD-CCNC: 108 U/L
ALT SERPL-CCNC: 12 U/L
ANION GAP SERPL CALC-SCNC: 13 MMOL/L
AST SERPL-CCNC: 18 U/L
BASOPHILS # BLD AUTO: 0.03 K/UL
BASOPHILS NFR BLD AUTO: 0.4 %
BILIRUB SERPL-MCNC: 0.2 MG/DL
BUN SERPL-MCNC: 16 MG/DL
CALCIUM SERPL-MCNC: 10 MG/DL
CHLORIDE SERPL-SCNC: 105 MMOL/L
CO2 SERPL-SCNC: 26 MMOL/L
CREAT SERPL-MCNC: 1.08 MG/DL
CRP SERPL-MCNC: <3 MG/L
EOSINOPHIL # BLD AUTO: 0.08 K/UL
EOSINOPHIL NFR BLD AUTO: 1.1 %
ERYTHROCYTE [SEDIMENTATION RATE] IN BLOOD BY WESTERGREN METHOD: 36 MM/HR
GLUCOSE SERPL-MCNC: 98 MG/DL
HCT VFR BLD CALC: 34.9 %
HGB BLD-MCNC: 11.3 G/DL
IMM GRANULOCYTES NFR BLD AUTO: 0.4 %
LYMPHOCYTES # BLD AUTO: 2.24 K/UL
LYMPHOCYTES NFR BLD AUTO: 29.9 %
MAN DIFF?: NORMAL
MCHC RBC-ENTMCNC: 29.9 PG
MCHC RBC-ENTMCNC: 32.4 GM/DL
MCV RBC AUTO: 92.3 FL
MONOCYTES # BLD AUTO: 0.47 K/UL
MONOCYTES NFR BLD AUTO: 6.3 %
NEUTROPHILS # BLD AUTO: 4.63 K/UL
NEUTROPHILS NFR BLD AUTO: 61.9 %
PLATELET # BLD AUTO: 263 K/UL
POTASSIUM SERPL-SCNC: 4.5 MMOL/L
PROT SERPL-MCNC: 7.1 G/DL
RBC # BLD: 3.78 M/UL
RBC # FLD: 13.8 %
SODIUM SERPL-SCNC: 143 MMOL/L
WBC # FLD AUTO: 7.48 K/UL

## 2021-03-31 RX ORDER — ONDANSETRON 4 MG/1
4 TABLET ORAL
Qty: 20 | Refills: 0 | Status: DISCONTINUED | COMMUNITY
Start: 2021-01-12 | End: 2021-03-31

## 2021-03-31 RX ORDER — PSYLLIUM HUSK 0.4 G
CAPSULE ORAL
Refills: 0 | Status: ACTIVE | COMMUNITY

## 2021-03-31 RX ORDER — MULTIVITAMIN
TABLET ORAL
Refills: 0 | Status: ACTIVE | COMMUNITY

## 2021-04-02 ENCOUNTER — APPOINTMENT (OUTPATIENT)
Dept: UROGYNECOLOGY | Facility: CLINIC | Age: 61
End: 2021-04-02
Payer: MEDICAID

## 2021-04-02 DIAGNOSIS — N39.46 MIXED INCONTINENCE: ICD-10-CM

## 2021-04-02 DIAGNOSIS — R35.1 NOCTURIA: ICD-10-CM

## 2021-04-02 DIAGNOSIS — Z82.49 FAMILY HISTORY OF ISCHEMIC HEART DISEASE AND OTHER DISEASES OF THE CIRCULATORY SYSTEM: ICD-10-CM

## 2021-04-02 DIAGNOSIS — N95.2 POSTMENOPAUSAL ATROPHIC VAGINITIS: ICD-10-CM

## 2021-04-02 DIAGNOSIS — Z83.3 FAMILY HISTORY OF DIABETES MELLITUS: ICD-10-CM

## 2021-04-02 DIAGNOSIS — N36.41 HYPERMOBILITY OF URETHRA: ICD-10-CM

## 2021-04-02 DIAGNOSIS — Z87.19 PERSONAL HISTORY OF OTHER DISEASES OF THE DIGESTIVE SYSTEM: ICD-10-CM

## 2021-04-02 DIAGNOSIS — Z86.79 PERSONAL HISTORY OF OTHER DISEASES OF THE CIRCULATORY SYSTEM: ICD-10-CM

## 2021-04-02 DIAGNOSIS — R35.0 FREQUENCY OF MICTURITION: ICD-10-CM

## 2021-04-02 DIAGNOSIS — Z87.09 PERSONAL HISTORY OF OTHER DISEASES OF THE RESPIRATORY SYSTEM: ICD-10-CM

## 2021-04-02 DIAGNOSIS — I25.2 OLD MYOCARDIAL INFARCTION: ICD-10-CM

## 2021-04-02 DIAGNOSIS — Z87.828 PERSONAL HISTORY OF OTHER (HEALED) PHYSICAL INJURY AND TRAUMA: ICD-10-CM

## 2021-04-02 DIAGNOSIS — R39.15 URGENCY OF URINATION: ICD-10-CM

## 2021-04-02 DIAGNOSIS — E66.9 OBESITY, UNSPECIFIED: ICD-10-CM

## 2021-04-02 LAB
BILIRUB UR QL STRIP: NEGATIVE
CLARITY UR: CLEAR
COLLECTION METHOD: NORMAL
GLUCOSE UR-MCNC: NEGATIVE
HCG UR QL: 0.2 EU/DL
HGB UR QL STRIP.AUTO: NEGATIVE
KETONES UR-MCNC: NEGATIVE
LEUKOCYTE ESTERASE UR QL STRIP: NEGATIVE
NITRITE UR QL STRIP: NEGATIVE
PH UR STRIP: 7
PROT UR STRIP-MCNC: NEGATIVE
SP GR UR STRIP: 1.02

## 2021-04-02 PROCEDURE — 99204 OFFICE O/P NEW MOD 45 MIN: CPT | Mod: 25

## 2021-04-02 PROCEDURE — 99072 ADDL SUPL MATRL&STAF TM PHE: CPT

## 2021-04-02 PROCEDURE — 81003 URINALYSIS AUTO W/O SCOPE: CPT | Mod: QW

## 2021-04-02 PROCEDURE — 51701 INSERT BLADDER CATHETER: CPT

## 2021-04-02 NOTE — ASSESSMENT
[FreeTextEntry1] : Alisa is a pleasant 61 yo P4, PSHx includes cholecystectomy and multiple ortho procedures, with hx CHF and obesity, presents with JAMIR, OAB-wet > DOE. On exam, her empty supine CST was neg, however she had positive urethral hypermobility. Her straight-cath PVR volume was normal and the dip was neg. She had atrophic vaginal changes noted. On pelvic exam, she had a positive bulbo reflex. There were no appreciable masses, cysts, or lesions. Pelvic floor muscle contraction strength was present but weak. There was no levator or pelvic floor musculature banding, tightness, or tenderness. POPQ exam did not demonstrate clinically significant pelvic organ prolapse.\par \par The patient has symptoms consistent with stress urinary incontinence. The etiology of DOE was discussed. Management options including observation, behavioral modifications, medication, pessary, Impressa insert, periurethral bulking via cystoscopy, and surgery with midurethral sling were reviewed. Other anti-incontinence procedures such as a Bunch or fascial sling also reviewed.\par \par The patient has urinary symptoms consistent with overactive bladder. The etiology of OAB was discussed. Management options including observation, behavioral modifications (dietary changes, monitoring fluid intake, bladder training, timed voids, use of pads/protective garments), kegels, PT, medications, PTNS, SNS, and bladder Botox were all reviewed.\par \par We discussed weight loss to improve health and urinary symptoms, as little as 8% can improve symptoms. We discussed regular water is what she drinks mostly. We discussed behavioral modifications. She is interseted in procedural intervention, likely for OAB first, and will RTO for UDS to further eval JAMIR desiring procedural intervention. All ques asnwered.\par \par Plan:\par [] UDS \par [] triage JAMIR tx after - likely OAB-wet procedural intervention\par [] behav modifs including weight loss

## 2021-04-02 NOTE — HISTORY OF PRESENT ILLNESS
[FreeTextEntry1] : Years of bothersome leakage of urine since lower back accident suffered at work in 2019. Leakage with urgency moreso than with cough sneeze positional changes, but both present requiring multiple pad changes per day, bothersome. Urgency freq and nocturia 3-4 disrupting sleep. No gross hematuria, dysuria, UTIs, incomplete emptying or flank pain. No intervention as of yet. No bulge or pressure in vagina and no VB. , not SA at present. Normal bowel movements denies straining, on narcotics for ortho pain relief since recent procedure.\par \par PMH includes asthma, back injury and pain with spinal stenosis and multiple bulging lower spinal discs, CHF, depression, obesity BMI 32, diverticulosis\par NKDA\par PSH includes ortho surgeries and cholecystectomy\par Never a tobacco smoker\par Meds include oxycodone, lamotrigine, paxil, trazadone, metaxalone, gabapentin\par

## 2021-04-02 NOTE — OB HISTORY
[Abnormal Pap Smear] : normal pap smear [Taking Estrogens] : is not taking estrogen replacement [Abnormal Bleeding] : without bleeding [Sexually Active] : is not sexually active [FreeTextEntry1] : largest baby 8lbs 9oz

## 2021-04-02 NOTE — PHYSICAL EXAM
[Chaperone Present] : A chaperone was present in the examining room during all aspects of the physical examination [No Acute Distress] : in no acute distress [Oriented x3] : oriented to person, place, and time [No Edema] : ~T edema was not present [Symmetrical] : the neck was ~L symmetrical [Warm and Dry] : was warm and dry to touch [Normal Gait] : gait was normal [Exam Deferred] : was deferred [Soft, Nontender] : the abdomen was soft and nontender [None] : no CVA tenderness [Labia Majora] : were normal [Labia Minora] : were normal [Bartholin's Gland] : both Bartholin's glands were normal  [Atrophy] : atrophy [No Bleeding] : there was no active vaginal bleeding [2] : 2 [Aa ____] : Aa [unfilled] [Ba ____] : Ba [unfilled] [C ____] : C [unfilled] [GH ____] : GH [unfilled] [PB ____] : PB [unfilled] [TVL ____] : TVL  [unfilled] [Ap ____] : Ap [unfilled] [Bp ____] : Bp [unfilled] [D ____] : D [unfilled] [] : 0 [Normal] : normal [Soft] :  the cervix was soft [Post Void Residual ____ml] : post void residual was [unfilled] ml [Cough] : no cough [Tenderness] : ~T no ~M abdominal tenderness observed [Distended] : not distended [Inguinal LAD] : no adenopathy was noted in the inguinal lymph nodes [FreeTextEntry3] : empty supine cst neg, +urethral hyperm [FreeTextEntry4] : no mass cyst or lesion; distal posterior vag wall epithelial skin tag [de-identified] : nontender and no appreciable mass

## 2021-04-02 NOTE — REASON FOR VISIT
[Questionnaire Received] : Patient questionnaire received [Urine Frequency] : urine frequency [Urinary Urgency] : urinary urgency [Nocturia] : nocturia

## 2021-04-12 ENCOUNTER — APPOINTMENT (OUTPATIENT)
Dept: UROGYNECOLOGY | Facility: CLINIC | Age: 61
End: 2021-04-12
Payer: MEDICAID

## 2021-04-12 ENCOUNTER — APPOINTMENT (OUTPATIENT)
Dept: ORTHOPEDIC SURGERY | Facility: CLINIC | Age: 61
End: 2021-04-12
Payer: MEDICAID

## 2021-04-12 DIAGNOSIS — N39.41 URGE INCONTINENCE: ICD-10-CM

## 2021-04-12 PROCEDURE — 51729 CYSTOMETROGRAM W/VP&UP: CPT

## 2021-04-12 PROCEDURE — 51741 ELECTRO-UROFLOWMETRY FIRST: CPT

## 2021-04-12 PROCEDURE — 99072 ADDL SUPL MATRL&STAF TM PHE: CPT

## 2021-04-12 PROCEDURE — 51797 INTRAABDOMINAL PRESSURE TEST: CPT

## 2021-04-12 PROCEDURE — 51784 ANAL/URINARY MUSCLE STUDY: CPT

## 2021-04-12 PROCEDURE — 99024 POSTOP FOLLOW-UP VISIT: CPT

## 2021-04-13 ENCOUNTER — OUTPATIENT (OUTPATIENT)
Dept: OUTPATIENT SERVICES | Facility: HOSPITAL | Age: 61
LOS: 1 days | End: 2021-04-13
Payer: MEDICAID

## 2021-04-13 ENCOUNTER — APPOINTMENT (OUTPATIENT)
Dept: ULTRASOUND IMAGING | Facility: CLINIC | Age: 61
End: 2021-04-13
Payer: MEDICAID

## 2021-04-13 DIAGNOSIS — Z96.60 PRESENCE OF UNSPECIFIED ORTHOPEDIC JOINT IMPLANT: Chronic | ICD-10-CM

## 2021-04-13 DIAGNOSIS — S82.891A OTHER FRACTURE OF RIGHT LOWER LEG, INITIAL ENCOUNTER FOR CLOSED FRACTURE: ICD-10-CM

## 2021-04-13 DIAGNOSIS — S82.851D DISPLACED TRIMALLEOLAR FRACTURE OF RIGHT LOWER LEG, SUBSEQUENT ENCOUNTER FOR CLOSED FRACTURE WITH ROUTINE HEALING: ICD-10-CM

## 2021-04-13 DIAGNOSIS — Z90.49 ACQUIRED ABSENCE OF OTHER SPECIFIED PARTS OF DIGESTIVE TRACT: Chronic | ICD-10-CM

## 2021-04-13 DIAGNOSIS — Z98.890 OTHER SPECIFIED POSTPROCEDURAL STATES: Chronic | ICD-10-CM

## 2021-04-13 DIAGNOSIS — Z98.51 TUBAL LIGATION STATUS: Chronic | ICD-10-CM

## 2021-04-13 PROCEDURE — 20611 DRAIN/INJ JOINT/BURSA W/US: CPT | Mod: RT

## 2021-04-13 PROCEDURE — 20611 DRAIN/INJ JOINT/BURSA W/US: CPT

## 2021-04-20 ENCOUNTER — APPOINTMENT (OUTPATIENT)
Dept: ORTHOPEDIC SURGERY | Facility: HOSPITAL | Age: 61
End: 2021-04-20

## 2021-04-28 ENCOUNTER — APPOINTMENT (OUTPATIENT)
Dept: ORTHOPEDIC SURGERY | Facility: CLINIC | Age: 61
End: 2021-04-28

## 2021-05-21 ENCOUNTER — APPOINTMENT (OUTPATIENT)
Dept: UROGYNECOLOGY | Facility: CLINIC | Age: 61
End: 2021-05-21

## 2021-05-26 ENCOUNTER — APPOINTMENT (OUTPATIENT)
Dept: FAMILY MEDICINE | Facility: CLINIC | Age: 61
End: 2021-05-26
Payer: MEDICAID

## 2021-05-26 ENCOUNTER — OUTPATIENT (OUTPATIENT)
Dept: OUTPATIENT SERVICES | Facility: HOSPITAL | Age: 61
LOS: 1 days | End: 2021-05-26
Payer: MEDICAID

## 2021-05-26 VITALS
SYSTOLIC BLOOD PRESSURE: 122 MMHG | TEMPERATURE: 98 F | WEIGHT: 212.08 LBS | HEART RATE: 59 BPM | DIASTOLIC BLOOD PRESSURE: 73 MMHG | HEIGHT: 66 IN | RESPIRATION RATE: 16 BRPM | OXYGEN SATURATION: 98 %

## 2021-05-26 VITALS
DIASTOLIC BLOOD PRESSURE: 80 MMHG | SYSTOLIC BLOOD PRESSURE: 118 MMHG | WEIGHT: 212 LBS | HEART RATE: 77 BPM | BODY MASS INDEX: 34.07 KG/M2 | OXYGEN SATURATION: 97 % | HEIGHT: 66 IN | RESPIRATION RATE: 16 BRPM

## 2021-05-26 DIAGNOSIS — Z90.49 ACQUIRED ABSENCE OF OTHER SPECIFIED PARTS OF DIGESTIVE TRACT: Chronic | ICD-10-CM

## 2021-05-26 DIAGNOSIS — Z01.818 ENCOUNTER FOR OTHER PREPROCEDURAL EXAMINATION: ICD-10-CM

## 2021-05-26 DIAGNOSIS — Z98.890 OTHER SPECIFIED POSTPROCEDURAL STATES: Chronic | ICD-10-CM

## 2021-05-26 DIAGNOSIS — Z98.51 TUBAL LIGATION STATUS: Chronic | ICD-10-CM

## 2021-05-26 DIAGNOSIS — Z96.60 PRESENCE OF UNSPECIFIED ORTHOPEDIC JOINT IMPLANT: Chronic | ICD-10-CM

## 2021-05-26 DIAGNOSIS — M19.071 PRIMARY OSTEOARTHRITIS, RIGHT ANKLE AND FOOT: ICD-10-CM

## 2021-05-26 DIAGNOSIS — Z29.9 ENCOUNTER FOR PROPHYLACTIC MEASURES, UNSPECIFIED: ICD-10-CM

## 2021-05-26 DIAGNOSIS — M19.171 POST-TRAUMATIC OSTEOARTHRITIS, RIGHT ANKLE AND FOOT: ICD-10-CM

## 2021-05-26 LAB
A1C WITH ESTIMATED AVERAGE GLUCOSE RESULT: 5.3 % — SIGNIFICANT CHANGE UP (ref 4–5.6)
ALBUMIN SERPL ELPH-MCNC: 3.8 G/DL — SIGNIFICANT CHANGE UP (ref 3.3–5)
ANION GAP SERPL CALC-SCNC: 3 MMOL/L — LOW (ref 5–17)
APTT BLD: 35.3 SEC — SIGNIFICANT CHANGE UP (ref 27.5–35.5)
BASOPHILS # BLD AUTO: 0.02 K/UL — SIGNIFICANT CHANGE UP (ref 0–0.2)
BASOPHILS NFR BLD AUTO: 0.3 % — SIGNIFICANT CHANGE UP (ref 0–2)
BUN SERPL-MCNC: 21 MG/DL — SIGNIFICANT CHANGE UP (ref 7–23)
CALCIUM SERPL-MCNC: 9.7 MG/DL — SIGNIFICANT CHANGE UP (ref 8.5–10.1)
CHLORIDE SERPL-SCNC: 107 MMOL/L — SIGNIFICANT CHANGE UP (ref 96–108)
CO2 SERPL-SCNC: 30 MMOL/L — SIGNIFICANT CHANGE UP (ref 22–31)
CREAT SERPL-MCNC: 1.01 MG/DL — SIGNIFICANT CHANGE UP (ref 0.5–1.3)
EOSINOPHIL # BLD AUTO: 0.11 K/UL — SIGNIFICANT CHANGE UP (ref 0–0.5)
EOSINOPHIL NFR BLD AUTO: 1.7 % — SIGNIFICANT CHANGE UP (ref 0–6)
ESTIMATED AVERAGE GLUCOSE: 105 MG/DL — SIGNIFICANT CHANGE UP (ref 68–114)
GLUCOSE SERPL-MCNC: 97 MG/DL — SIGNIFICANT CHANGE UP (ref 70–99)
HCT VFR BLD CALC: 36.4 % — SIGNIFICANT CHANGE UP (ref 34.5–45)
HGB BLD-MCNC: 11.6 G/DL — SIGNIFICANT CHANGE UP (ref 11.5–15.5)
IMM GRANULOCYTES NFR BLD AUTO: 0.3 % — SIGNIFICANT CHANGE UP (ref 0–1.5)
INR BLD: 1.02 RATIO — SIGNIFICANT CHANGE UP (ref 0.88–1.16)
LYMPHOCYTES # BLD AUTO: 2.09 K/UL — SIGNIFICANT CHANGE UP (ref 1–3.3)
LYMPHOCYTES # BLD AUTO: 31.9 % — SIGNIFICANT CHANGE UP (ref 13–44)
MCHC RBC-ENTMCNC: 29.9 PG — SIGNIFICANT CHANGE UP (ref 27–34)
MCHC RBC-ENTMCNC: 31.9 GM/DL — LOW (ref 32–36)
MCV RBC AUTO: 93.8 FL — SIGNIFICANT CHANGE UP (ref 80–100)
MONOCYTES # BLD AUTO: 0.48 K/UL — SIGNIFICANT CHANGE UP (ref 0–0.9)
MONOCYTES NFR BLD AUTO: 7.3 % — SIGNIFICANT CHANGE UP (ref 2–14)
MRSA PCR RESULT.: SIGNIFICANT CHANGE UP
NEUTROPHILS # BLD AUTO: 3.84 K/UL — SIGNIFICANT CHANGE UP (ref 1.8–7.4)
NEUTROPHILS NFR BLD AUTO: 58.5 % — SIGNIFICANT CHANGE UP (ref 43–77)
PLATELET # BLD AUTO: 190 K/UL — SIGNIFICANT CHANGE UP (ref 150–400)
POTASSIUM SERPL-MCNC: 4.6 MMOL/L — SIGNIFICANT CHANGE UP (ref 3.5–5.3)
POTASSIUM SERPL-SCNC: 4.6 MMOL/L — SIGNIFICANT CHANGE UP (ref 3.5–5.3)
PROTHROM AB SERPL-ACNC: 11.8 SEC — SIGNIFICANT CHANGE UP (ref 10.6–13.6)
RBC # BLD: 3.88 M/UL — SIGNIFICANT CHANGE UP (ref 3.8–5.2)
RBC # FLD: 13.7 % — SIGNIFICANT CHANGE UP (ref 10.3–14.5)
S AUREUS DNA NOSE QL NAA+PROBE: DETECTED
SODIUM SERPL-SCNC: 140 MMOL/L — SIGNIFICANT CHANGE UP (ref 135–145)
WBC # BLD: 6.56 K/UL — SIGNIFICANT CHANGE UP (ref 3.8–10.5)
WBC # FLD AUTO: 6.56 K/UL — SIGNIFICANT CHANGE UP (ref 3.8–10.5)

## 2021-05-26 PROCEDURE — 82040 ASSAY OF SERUM ALBUMIN: CPT

## 2021-05-26 PROCEDURE — 85610 PROTHROMBIN TIME: CPT

## 2021-05-26 PROCEDURE — 85730 THROMBOPLASTIN TIME PARTIAL: CPT

## 2021-05-26 PROCEDURE — 86850 RBC ANTIBODY SCREEN: CPT

## 2021-05-26 PROCEDURE — 36415 COLL VENOUS BLD VENIPUNCTURE: CPT

## 2021-05-26 PROCEDURE — 93010 ELECTROCARDIOGRAM REPORT: CPT

## 2021-05-26 PROCEDURE — 80048 BASIC METABOLIC PNL TOTAL CA: CPT

## 2021-05-26 PROCEDURE — 99214 OFFICE O/P EST MOD 30 MIN: CPT

## 2021-05-26 PROCEDURE — 93005 ELECTROCARDIOGRAM TRACING: CPT

## 2021-05-26 PROCEDURE — 86900 BLOOD TYPING SEROLOGIC ABO: CPT

## 2021-05-26 PROCEDURE — 86901 BLOOD TYPING SEROLOGIC RH(D): CPT

## 2021-05-26 PROCEDURE — 87641 MR-STAPH DNA AMP PROBE: CPT

## 2021-05-26 PROCEDURE — G0463: CPT | Mod: 25

## 2021-05-26 PROCEDURE — 83036 HEMOGLOBIN GLYCOSYLATED A1C: CPT

## 2021-05-26 PROCEDURE — 87640 STAPH A DNA AMP PROBE: CPT

## 2021-05-26 PROCEDURE — 85025 COMPLETE CBC W/AUTO DIFF WBC: CPT

## 2021-05-26 RX ORDER — TRAZODONE HCL 50 MG
1 TABLET ORAL
Qty: 0 | Refills: 0 | DISCHARGE

## 2021-05-26 RX ORDER — ASPIRIN/CALCIUM CARB/MAGNESIUM 324 MG
1 TABLET ORAL
Qty: 0 | Refills: 0 | DISCHARGE

## 2021-05-26 NOTE — H&P PST ADULT - EXTREMITIES COMMENTS
right ankle well healed scar, foot warm, mobile, +sensation, limited ROM , mild ankle edema; left ankle well healed scars, mild ankle edema

## 2021-05-26 NOTE — H&P PST ADULT - NSICDXPASTMEDICALHX_GEN_ALL_CORE_FT
PAST MEDICAL HISTORY:  Anxiety and depression     Asthma Has not been admitted for have any asthma exacerbation for 10 years    COVID-19 vaccine series completed Pfizer--2nd vaccine 4/14/2021    Diverticulosis     Female incontinence     H/O sinusitis     H/O spinal stenosis     Hypertension Have not taken medications for about 2 years    Lumbar herniated disc     Osteoarthritis     Seasonal allergies      PAST MEDICAL HISTORY:  Anxiety and depression     Asthma Has not been admitted for have any asthma exacerbation for 10 years    COVID-19 vaccine series completed Pfizer--2nd vaccine 4/14/2021    Diverticulosis     Female incontinence     H/O sinusitis     H/O spinal stenosis     History of heart attack Hospitalized 2/2020 after left ankle fracture, surgery, states developed pneumonia, ??mild kidney failure, was told had mild heart attack, recovered , no intervention    Hypertension Have not taken medications for about 2 years    Lumbar herniated disc     Osteoarthritis     Panic attacks     Seasonal allergies

## 2021-05-26 NOTE — H&P PST ADULT - ASSESSMENT
60 y.o female scheduled for  60 y.o female scheduled for Right Total Ankle Replacement   Plan  1. Stop all NSAIDS, herbal supplements and vitamins for 7 days.  2. NPO at midnight.  3. Take the following medications Lamotrigine, Paxil, Gabapentin, Xanax with small sips of water on the morning of your procedure/surgery.  4. Use EZ sponges as directed  5. Use mupirocin as directed  6. Labs, EKG, CXR as per surgeon  7. PMD Dr Elam  visit for optimization prior to surgery as per surgeon.  8. COVID swab appt: 2021    Denies travel outside of state or country in the last 14 days.   Denies contact with known Coronavirus positive person.  Denies fever, chills, cough, congestion, runny nose, SOB or difficulty breathing, fatigue, muscle or body ache, headache. loss of taste or smell, N/V/D.    CAPRINI SCORE    AGE RELATED RISK FACTORS                                                       MOBILITY RELATED FACTORS  [ x] Age 41-60 years                                            (1 Point)                  [ ] Bed rest                                                        (1 Point)  [ ] Age: 61-74 years                                           (2 Points)                [ ] Plaster cast                                                   (2 Points)  [ ] Age= 75 years                                              (3 Points)                 [ ] Bed bound for more than 72 hours                   (2 Points)    DISEASE RELATED RISK FACTORS                                               GENDER SPECIFIC FACTORS  [x ] Edema in the lower extremities                       (1 Point)                  [ ] Pregnancy                                                     (1 Point)  [ ] Varicose veins                                               (1 Point)                  [ ] Post-partum < 6 weeks                                   (1 Point)             [x ] BMI > 25 Kg/m2                                            (1 Point)                  [ ] Hormonal therapy  or oral contraception            (1 Point)                 [ ] Sepsis (in the previous month)                        (1 Point)                  [ ] History of pregnancy complications  [ ] Pneumonia or serious lung disease                                               [ ] Unexplained or recurrent                       (1 Point)           (in the previous month)                               (1 Point)  [ ] Abnormal pulmonary function test                     (1 Point)                 SURGERY RELATED RISK FACTORS  [ ] Acute myocardial infarction                              (1 Point)                 [ ]  Section                                            (1 Point)  [ ] Congestive heart failure (in the previous month)  (1 Point)                 [ ] Minor surgery                                                 (1 Point)   [ ] Inflammatory bowel disease                             (1 Point)                 [ ] Arthroscopic surgery                                        (2 Points)  [ ] Central venous access                                    (2 Points)                [ ] General surgery lasting more than 45 minutes   (2 Points)       [ ] Stroke (in the previous month)                          (5 Points)               [ x] Elective arthroplasty                                        (5 Points)                                                                                                                                               HEMATOLOGY RELATED FACTORS                                                 TRAUMA RELATED RISK FACTORS  [ ] Prior episodes of VTE                                     (3 Points)                 [ ] Fracture of the hip, pelvis, or leg                       (5 Points)  [ ] Positive family history for VTE                         (3 Points)                 [ ] Acute spinal cord injury (in the previous month)  (5 Points)  [ ] Prothrombin 79104 A                                      (3 Points)                 [ ] Paralysis  (less than 1 month)                          (5 Points)  [ ] Factor V Leiden                                             (3 Points)                 [ ] Multiple Trauma within 1 month                         (5 Points)  [ ] Lupus anticoagulants                                     (3 Points)                                                           [ ] Anticardiolipin antibodies                                (3 Points)                                                       [ ] High homocysteine in the blood                      (3 Points)                                             [ ] Other congenital or acquired thrombophilia       (3 Points)                                                [ ] Heparin induced thrombocytopenia                  (3 Points)                                          Total Score [     8     ]    The Caprini score indicates that this patient is at high risk for a VTE event (score > = 6).    Ssurgical patients in this group will benefit from both pharmacologic prophylaxis and intermittent compression devices.    The surgical team will determine the balance between VTE risk and bleeding risk, and other clinical considerations.

## 2021-05-26 NOTE — H&P PST ADULT - NSICDXPASTSURGICALHX_GEN_ALL_CORE_FT
PAST SURGICAL HISTORY:  History of ankle surgery right ankle 2/2020 x2 with hardware , harware removed 1/12/2021  - left ankle fusion 10/2020    S/P cholecystectomy 2010    S/P hip replacement left---2015  right--2017    S/P tubal ligation 1995     PAST SURGICAL HISTORY:  History of ankle surgery right ankle 2/2020 x2 with hardware , harware removed 1/12/2021  - left ankle fusion 10/2020    History of cardiac cath 2/2020--normal coronary arteries    S/P cholecystectomy 2010    S/P hip replacement left---2015  right--2017    S/P tubal ligation 1995

## 2021-05-26 NOTE — H&P PST ADULT - NSICDXFAMILYHX_GEN_ALL_CORE_FT
FAMILY HISTORY:  FH: lung cancer, sister, living, age 65    Father  Still living? Unknown  Family history of COPD (chronic obstructive pulmonary disease), Age at diagnosis: Age Unknown  Family history of CVA, Age at diagnosis: Age Unknown  FH: heart disease, Age at diagnosis: Age Unknown    Mother  Still living? No  FH: heart disease, Age at diagnosis: Age Unknown

## 2021-05-26 NOTE — H&P PST ADULT - TEMPERATURE IN CELSIUS (DEGREES C)
Subjective   Patient ID: Mak is a 38 year old male.    Chief Complaint   Patient presents with   • Annual Exam    wants  Exam , last exam  2011  Hx obesity . Gained 30 lbs  Last yr. Hx eating out, or order out .   Work  at school .   Exercise - walk dog prn   Hx anxiety , depression . Worse . Stressed talk to people, going out , social phobia. Hx panic attack at party w people . Other days tired . Work going well.  Last tx in high school w meds.   No patience, frustration .   otc - allergy  meds    DTP 7 yrs ago.   Flu  2020   Social  Etoh.  No smoke .     HPI  Review of Systems   Constitutional: Positive for activity change and unexpected weight change. Negative for chills, diaphoresis and fever.   HENT: Negative.    Respiratory: Negative for cough, chest tightness, shortness of breath and wheezing.    Cardiovascular: Negative for chest pain, palpitations and leg swelling.   Gastrointestinal: Negative for abdominal pain, blood in stool, constipation, diarrhea and nausea.   Genitourinary: Negative.    Musculoskeletal: Negative.    Skin: Negative.    Neurological: Negative.    Hematological: Negative for adenopathy.   Psychiatric/Behavioral: Positive for behavioral problems and sleep disturbance. Negative for confusion, decreased concentration, dysphoric mood, self-injury and suicidal ideas. The patient is nervous/anxious.        ALLERGIES:   Allergen Reactions   • Penicillins Other (See Comments)     No current outpatient medications on file.     No current facility-administered medications for this visit.     Past Medical History:   Diagnosis Date   • Allergic rhinitis    • Anxiety      Past Surgical History:   Procedure Laterality Date   • No past surgeries       History reviewed. No pertinent family history.  Social History     Substance and Sexual Activity   Alcohol Use Yes    Comment: soc     Social History     Tobacco Use   Smoking Status Never Smoker   Smokeless Tobacco Never Used     Immunization  History   Administered Date(s) Administered   • COVID-19 Moderna Vaccine 02/14/2021, 03/15/2021   • Influenza, injectable, quadrivalent, preservative-free 11/12/2020         Objective   Visit Vitals  /82   Pulse 78   Temp 98.4 °F (36.9 °C) (Temporal)   Ht 6' (1.829 m)   Wt (!) 144.7 kg (319 lb)   SpO2 97%   BMI 43.26 kg/m²     Physical Exam  Vitals reviewed.   Constitutional:       Appearance: Normal appearance. He is well-developed. He is obese. He is not ill-appearing or diaphoretic.   HENT:      Head: Normocephalic and atraumatic.      Right Ear: Tympanic membrane normal. There is impacted cerumen.      Left Ear: Tympanic membrane, ear canal and external ear normal.   Eyes:      Extraocular Movements: Extraocular movements intact.      Conjunctiva/sclera: Conjunctivae normal.      Pupils: Pupils are equal, round, and reactive to light.   Neck:      Thyroid: No thyromegaly.      Vascular: No carotid bruit or JVD.   Cardiovascular:      Rate and Rhythm: Normal rate and regular rhythm.      Pulses: Normal pulses.      Heart sounds: Normal heart sounds. No murmur.   Pulmonary:      Effort: Pulmonary effort is normal. No respiratory distress.      Breath sounds: Normal breath sounds.   Abdominal:      General: Bowel sounds are normal. There is no distension.      Palpations: Abdomen is soft. There is no mass.      Tenderness: There is no abdominal tenderness. There is no guarding.      Comments: obese   Musculoskeletal:         General: Normal range of motion.      Cervical back: Neck supple.      Right lower leg: No edema.      Left lower leg: No edema.   Lymphadenopathy:      Cervical: No cervical adenopathy.   Skin:     General: Skin is warm and dry.      Findings: No bruising or rash.   Neurological:      General: No focal deficit present.      Mental Status: He is alert and oriented to person, place, and time.      Cranial Nerves: No cranial nerve deficit.      Sensory: No sensory deficit.      Motor: No  weakness.   Psychiatric:         Thought Content: Thought content normal.         Judgment: Judgment normal.      Comments: Flat affect       No visits with results within 1 Month(s) from this visit.   Latest known visit with results is:   Lab Services on 01/30/2017   Component Date Value Ref Range Status   • INFLUENZA TYPE A ANTIGEN 01/30/2017 NEGATIVE  NEGATIVE Final   • INFLUENZA TYPE B ANTIGEN 01/30/2017 NEGATIVE  NEGATIVE Final     Assessment and Plan  1. Routine general medical examination at a health care facility  New patient general exam.  Check baseline fasting labs.  - COMPREHENSIVE MET PNL (FAST); Future  - LIPID PANEL WITH REFLEX; Future  - CBC WITH DIFFERENTIAL; Future  - THYROID STIMULATING HORMONE; Future    2. Generalized anxiety disorder  Chronic anxiety  with underlying depression.  Start on low-dose SSRI.  Advised of side effects.  - citalopram (CeleXA) 10 MG tablet; Take 1 tablet by mouth daily.  Dispense: 30 tablet; Refill: 1  Follow-up in 6 weeks for reevaluation.  3. Morbid (severe) obesity due to excess calories (CMS/HCC)  Recommend caloric restriction, less than 15 susy/day, reduce carbs in diet.  Exercise program 30 minutes/day.  Check thyroid level  - COMPREHENSIVE MET PNL (FAST); Future  - CBC WITH DIFFERENTIAL; Future  - THYROID STIMULATING HORMONE; Future    4. Lipid screening  Check fasting lipid  - COMPREHENSIVE MET PNL (FAST); Future  - LIPID PANEL WITH REFLEX; Future      Medication changes: Yes     Follow-up in 6  weeks    Greater than 50% of the 25 minute appointment was spent counseling patient regarding disease management, and treatment plan.    Mg Domingo MD  05/18/21  4:35 PM     36.6

## 2021-05-27 DIAGNOSIS — M19.171 POST-TRAUMATIC OSTEOARTHRITIS, RIGHT ANKLE AND FOOT: ICD-10-CM

## 2021-05-27 DIAGNOSIS — M19.071 PRIMARY OSTEOARTHRITIS, RIGHT ANKLE AND FOOT: ICD-10-CM

## 2021-05-27 DIAGNOSIS — Z01.818 ENCOUNTER FOR OTHER PREPROCEDURAL EXAMINATION: ICD-10-CM

## 2021-05-27 NOTE — CHART NOTE - NSCHARTNOTEFT_GEN_A_CORE
MSSA detected from nasal swab done in [facility] 5/26/2021. Patient scheduled for surgery 6/1/2021. Instructed to start using mupirocin twice per day in nasal passages starting 5/27/2021. Questions answered related to instructions. She was able to verbalise instructions.

## 2021-05-28 DIAGNOSIS — Z01.818 ENCOUNTER FOR OTHER PREPROCEDURAL EXAMINATION: ICD-10-CM

## 2021-05-28 RX ORDER — OXYCODONE HYDROCHLORIDE 5 MG/1
5 TABLET ORAL ONCE
Refills: 0 | Status: DISCONTINUED | OUTPATIENT
Start: 2021-06-01 | End: 2021-06-01

## 2021-05-28 RX ORDER — SODIUM CHLORIDE 9 MG/ML
1000 INJECTION, SOLUTION INTRAVENOUS
Refills: 0 | Status: DISCONTINUED | OUTPATIENT
Start: 2021-06-01 | End: 2021-06-01

## 2021-05-28 RX ORDER — FAMOTIDINE 10 MG/ML
20 INJECTION INTRAVENOUS ONCE
Refills: 0 | Status: COMPLETED | OUTPATIENT
Start: 2021-06-01 | End: 2021-06-01

## 2021-05-28 RX ORDER — FENTANYL CITRATE 50 UG/ML
50 INJECTION INTRAVENOUS
Refills: 0 | Status: DISCONTINUED | OUTPATIENT
Start: 2021-06-01 | End: 2021-06-01

## 2021-05-28 RX ORDER — SODIUM CHLORIDE 9 MG/ML
3 INJECTION INTRAMUSCULAR; INTRAVENOUS; SUBCUTANEOUS EVERY 8 HOURS
Refills: 0 | Status: DISCONTINUED | OUTPATIENT
Start: 2021-06-01 | End: 2021-06-01

## 2021-05-28 RX ORDER — ACETAMINOPHEN 500 MG
975 TABLET ORAL ONCE
Refills: 0 | Status: COMPLETED | OUTPATIENT
Start: 2021-06-01 | End: 2021-06-01

## 2021-05-28 RX ORDER — ONDANSETRON 8 MG/1
4 TABLET, FILM COATED ORAL EVERY 6 HOURS
Refills: 0 | Status: DISCONTINUED | OUTPATIENT
Start: 2021-06-01 | End: 2021-06-01

## 2021-05-28 NOTE — ADDENDUM
[FreeTextEntry1] : I, Teresa Serra, verify that that I acted solely as a scribe for Dr. Courtney Elam on this date, 05/26/2021.

## 2021-05-28 NOTE — RESULTS/DATA
[] : results reviewed [de-identified] : normal [de-identified] : normal [de-identified] : normal [de-identified] : not needed/indicated [de-identified] : normal

## 2021-05-28 NOTE — END OF VISIT
[FreeTextEntry2] : This note was written by DAMIR JO on 05/26/2021, acting solely as a scribe for Dr. Courtney Elam MD. \par \par All medical record entries made by the scribe, DAMIR JO, were at my, Dr. Kaila Hay MD, direction and personally dictated by me on 05/26/2021. I have personally reviewed the chart and agree that the record accurately reflects my personal performance and care.

## 2021-05-28 NOTE — HISTORY OF PRESENT ILLNESS
[No Adverse Anesthesia Reaction] : no adverse anesthesia reaction in self or family member [Aortic Stenosis] : no aortic stenosis [Atrial Fibrillation] : no atrial fibrillation [Coronary Artery Disease] : no coronary artery disease [Recent Myocardial Infarction] : no recent myocardial infarction [Implantable Device/Pacemaker] : no implantable device/pacemaker [Asthma] : no asthma [COPD] : no COPD [Sleep Apnea] : no sleep apnea [Smoker] : not a smoker [Family Member] : no family member with adverse anesthesia reaction/sudden death [Self] : no previous adverse anesthesia reaction [Chronic Anticoagulation] : no chronic anticoagulation [Chronic Kidney Disease] : no chronic kidney disease [Diabetes] : no diabetes [FreeTextEntry1] : R Ankle Replacement  [FreeTextEntry3] : Dr. Knight  [FreeTextEntry2] : 06/01/2021 [FreeTextEntry4] : Ms. CHERYL MIR is a 60 year old female patient, presenting to the clinic today for a pre-operative medical clearance. Ms. MIR is to undergo aan ankle replacement surgical procedure on 06/01/2021, performed by specialist DR. Knight at [facility]. Mood is good, appears well. Patient denotes that she is excited for surgery and feels good health wise, but feels upset regarding the weight she has gained since surgery.

## 2021-05-28 NOTE — PLAN
[FreeTextEntry1] : # Patient advised what medications to refrain from using prior to surgery\par # Post-surgery will discussed weight loss intervention\par - Medications\par - Nutritionist support\par \par # Follow up after surgery when activity clearance is granted

## 2021-05-28 NOTE — ASSESSMENT
[Patient Optimized for Surgery] : Patient optimized for surgery [No Further Testing Recommended] : no further testing recommended [Modify medications prior to procedure] : Modify medications prior to procedure [FreeTextEntry4] : Ms. CHERYL MIR presents to the clinic today for observation/evaluation prior to surgery on 06/01/2021 for R ankle replacement with Orthopedist specialist Dr. Knight. At this time the patient is in good medical standing and there are no contraindications that may inhibit the procedure. Patient is to continue with current medication regimen with the following adjustments prior to the operation: No green tea/multivitamins/NSAIDs/Fish Oil. Any further work up will not change the risk of this patient. Benefits of surgery outweigh the risk.  [FreeTextEntry7] :  No green tea/multivitamins/NSAIDs/Fish Oil.

## 2021-05-29 ENCOUNTER — APPOINTMENT (OUTPATIENT)
Dept: DISASTER EMERGENCY | Facility: CLINIC | Age: 61
End: 2021-05-29

## 2021-05-30 LAB — SARS-COV-2 N GENE NPH QL NAA+PROBE: NOT DETECTED

## 2021-05-31 RX ORDER — PANTOPRAZOLE SODIUM 20 MG/1
40 TABLET, DELAYED RELEASE ORAL ONCE
Refills: 0 | Status: COMPLETED | OUTPATIENT
Start: 2021-06-01 | End: 2021-06-01

## 2021-06-01 ENCOUNTER — TRANSCRIPTION ENCOUNTER (OUTPATIENT)
Age: 61
End: 2021-06-01

## 2021-06-01 ENCOUNTER — APPOINTMENT (OUTPATIENT)
Dept: ORTHOPEDIC SURGERY | Facility: HOSPITAL | Age: 61
End: 2021-06-01

## 2021-06-01 ENCOUNTER — INPATIENT (INPATIENT)
Facility: HOSPITAL | Age: 61
LOS: 2 days | Discharge: HOME | DRG: 313 | End: 2021-06-04
Attending: ORTHOPAEDIC SURGERY | Admitting: ORTHOPAEDIC SURGERY
Payer: MEDICAID

## 2021-06-01 VITALS
HEIGHT: 66 IN | DIASTOLIC BLOOD PRESSURE: 103 MMHG | TEMPERATURE: 97 F | WEIGHT: 211.64 LBS | OXYGEN SATURATION: 98 % | RESPIRATION RATE: 16 BRPM | HEART RATE: 91 BPM | SYSTOLIC BLOOD PRESSURE: 105 MMHG

## 2021-06-01 DIAGNOSIS — Z98.51 TUBAL LIGATION STATUS: Chronic | ICD-10-CM

## 2021-06-01 DIAGNOSIS — Z90.49 ACQUIRED ABSENCE OF OTHER SPECIFIED PARTS OF DIGESTIVE TRACT: Chronic | ICD-10-CM

## 2021-06-01 DIAGNOSIS — Z98.890 OTHER SPECIFIED POSTPROCEDURAL STATES: Chronic | ICD-10-CM

## 2021-06-01 DIAGNOSIS — M19.171 POST-TRAUMATIC OSTEOARTHRITIS, RIGHT ANKLE AND FOOT: ICD-10-CM

## 2021-06-01 DIAGNOSIS — Z96.60 PRESENCE OF UNSPECIFIED ORTHOPEDIC JOINT IMPLANT: Chronic | ICD-10-CM

## 2021-06-01 DIAGNOSIS — M19.071 PRIMARY OSTEOARTHRITIS, RIGHT ANKLE AND FOOT: ICD-10-CM

## 2021-06-01 LAB
ANION GAP SERPL CALC-SCNC: 5 MMOL/L — SIGNIFICANT CHANGE UP (ref 5–17)
BUN SERPL-MCNC: 13 MG/DL — SIGNIFICANT CHANGE UP (ref 7–23)
CALCIUM SERPL-MCNC: 9.1 MG/DL — SIGNIFICANT CHANGE UP (ref 8.5–10.1)
CHLORIDE SERPL-SCNC: 108 MMOL/L — SIGNIFICANT CHANGE UP (ref 96–108)
CO2 SERPL-SCNC: 26 MMOL/L — SIGNIFICANT CHANGE UP (ref 22–31)
CREAT SERPL-MCNC: 1.11 MG/DL — SIGNIFICANT CHANGE UP (ref 0.5–1.3)
GLUCOSE SERPL-MCNC: 159 MG/DL — HIGH (ref 70–99)
HCT VFR BLD CALC: 38 % — SIGNIFICANT CHANGE UP (ref 34.5–45)
HGB BLD-MCNC: 12.3 G/DL — SIGNIFICANT CHANGE UP (ref 11.5–15.5)
MCHC RBC-ENTMCNC: 30.1 PG — SIGNIFICANT CHANGE UP (ref 27–34)
MCHC RBC-ENTMCNC: 32.4 GM/DL — SIGNIFICANT CHANGE UP (ref 32–36)
MCV RBC AUTO: 92.9 FL — SIGNIFICANT CHANGE UP (ref 80–100)
PLATELET # BLD AUTO: 332 K/UL — SIGNIFICANT CHANGE UP (ref 150–400)
POTASSIUM SERPL-MCNC: 4.4 MMOL/L — SIGNIFICANT CHANGE UP (ref 3.5–5.3)
POTASSIUM SERPL-SCNC: 4.4 MMOL/L — SIGNIFICANT CHANGE UP (ref 3.5–5.3)
RBC # BLD: 4.09 M/UL — SIGNIFICANT CHANGE UP (ref 3.8–5.2)
RBC # FLD: 13.9 % — SIGNIFICANT CHANGE UP (ref 10.3–14.5)
SODIUM SERPL-SCNC: 139 MMOL/L — SIGNIFICANT CHANGE UP (ref 135–145)
WBC # BLD: 14.39 K/UL — HIGH (ref 3.8–10.5)
WBC # FLD AUTO: 14.39 K/UL — HIGH (ref 3.8–10.5)

## 2021-06-01 RX ORDER — POLYETHYLENE GLYCOL 3350 17 G/17G
17 POWDER, FOR SOLUTION ORAL
Qty: 1 | Refills: 0
Start: 2021-06-01 | End: 2021-06-14

## 2021-06-01 RX ORDER — ALPRAZOLAM 0.25 MG
1 TABLET ORAL
Qty: 0 | Refills: 0 | DISCHARGE

## 2021-06-01 RX ORDER — PANTOPRAZOLE SODIUM 20 MG/1
1 TABLET, DELAYED RELEASE ORAL
Qty: 30 | Refills: 0
Start: 2021-06-01 | End: 2021-06-30

## 2021-06-01 RX ORDER — ACETAMINOPHEN 500 MG
1 TABLET ORAL
Qty: 0 | Refills: 0 | DISCHARGE

## 2021-06-01 RX ORDER — FERROUS SULFATE 325(65) MG
325 TABLET ORAL DAILY
Refills: 0 | Status: DISCONTINUED | OUTPATIENT
Start: 2021-06-01 | End: 2021-06-04

## 2021-06-01 RX ORDER — HYDROMORPHONE HYDROCHLORIDE 2 MG/ML
0.5 INJECTION INTRAMUSCULAR; INTRAVENOUS; SUBCUTANEOUS
Refills: 0 | Status: DISCONTINUED | OUTPATIENT
Start: 2021-06-01 | End: 2021-06-01

## 2021-06-01 RX ORDER — CYCLOBENZAPRINE HYDROCHLORIDE 10 MG/1
10 TABLET, FILM COATED ORAL THREE TIMES A DAY
Refills: 0 | Status: DISCONTINUED | OUTPATIENT
Start: 2021-06-01 | End: 2021-06-04

## 2021-06-01 RX ORDER — LAMOTRIGINE 25 MG/1
150 TABLET, ORALLY DISINTEGRATING ORAL DAILY
Refills: 0 | Status: DISCONTINUED | OUTPATIENT
Start: 2021-06-01 | End: 2021-06-04

## 2021-06-01 RX ORDER — OXYCODONE HYDROCHLORIDE 5 MG/1
10 TABLET ORAL ONCE
Refills: 0 | Status: DISCONTINUED | OUTPATIENT
Start: 2021-06-01 | End: 2021-06-01

## 2021-06-01 RX ORDER — SENNA PLUS 8.6 MG/1
2 TABLET ORAL AT BEDTIME
Refills: 0 | Status: DISCONTINUED | OUTPATIENT
Start: 2021-06-01 | End: 2021-06-04

## 2021-06-01 RX ORDER — SODIUM CHLORIDE 9 MG/ML
1000 INJECTION, SOLUTION INTRAVENOUS
Refills: 0 | Status: DISCONTINUED | OUTPATIENT
Start: 2021-06-01 | End: 2021-06-04

## 2021-06-01 RX ORDER — SENNA PLUS 8.6 MG/1
2 TABLET ORAL
Qty: 28 | Refills: 0
Start: 2021-06-01 | End: 2021-06-14

## 2021-06-01 RX ORDER — ENOXAPARIN SODIUM 100 MG/ML
40 INJECTION SUBCUTANEOUS DAILY
Refills: 0 | Status: DISCONTINUED | OUTPATIENT
Start: 2021-06-01 | End: 2021-06-04

## 2021-06-01 RX ORDER — ACETAMINOPHEN 500 MG
975 TABLET ORAL EVERY 8 HOURS
Refills: 0 | Status: DISCONTINUED | OUTPATIENT
Start: 2021-06-01 | End: 2021-06-02

## 2021-06-01 RX ORDER — HYDROMORPHONE HYDROCHLORIDE 2 MG/ML
0.5 INJECTION INTRAMUSCULAR; INTRAVENOUS; SUBCUTANEOUS ONCE
Refills: 0 | Status: DISCONTINUED | OUTPATIENT
Start: 2021-06-01 | End: 2021-06-01

## 2021-06-01 RX ORDER — FOLIC ACID 0.8 MG
1 TABLET ORAL DAILY
Refills: 0 | Status: DISCONTINUED | OUTPATIENT
Start: 2021-06-01 | End: 2021-06-04

## 2021-06-01 RX ORDER — ACETAMINOPHEN 500 MG
2 TABLET ORAL
Qty: 84 | Refills: 0
Start: 2021-06-01 | End: 2021-06-14

## 2021-06-01 RX ORDER — LANOLIN ALCOHOL/MO/W.PET/CERES
5 CREAM (GRAM) TOPICAL AT BEDTIME
Refills: 0 | Status: DISCONTINUED | OUTPATIENT
Start: 2021-06-01 | End: 2021-06-04

## 2021-06-01 RX ORDER — ALPRAZOLAM 0.25 MG
0.5 TABLET ORAL ONCE
Refills: 0 | Status: DISCONTINUED | OUTPATIENT
Start: 2021-06-01 | End: 2021-06-01

## 2021-06-01 RX ORDER — OXYCODONE HYDROCHLORIDE 5 MG/1
10 TABLET ORAL EVERY 12 HOURS
Refills: 0 | Status: DISCONTINUED | OUTPATIENT
Start: 2021-06-01 | End: 2021-06-02

## 2021-06-01 RX ORDER — ASCORBIC ACID 60 MG
500 TABLET,CHEWABLE ORAL
Refills: 0 | Status: DISCONTINUED | OUTPATIENT
Start: 2021-06-01 | End: 2021-06-01

## 2021-06-01 RX ORDER — PREGABALIN 225 MG/1
5000 CAPSULE ORAL
Qty: 0 | Refills: 0 | DISCHARGE

## 2021-06-01 RX ORDER — ONDANSETRON 8 MG/1
8 TABLET, FILM COATED ORAL EVERY 8 HOURS
Refills: 0 | Status: DISCONTINUED | OUTPATIENT
Start: 2021-06-01 | End: 2021-06-04

## 2021-06-01 RX ORDER — CEFAZOLIN SODIUM 1 G
2000 VIAL (EA) INJECTION EVERY 8 HOURS
Refills: 0 | Status: COMPLETED | OUTPATIENT
Start: 2021-06-01 | End: 2021-06-02

## 2021-06-01 RX ORDER — MULTIVIT-MIN/FERROUS GLUCONATE 9 MG/15 ML
1 LIQUID (ML) ORAL
Qty: 0 | Refills: 0 | DISCHARGE

## 2021-06-01 RX ORDER — OXYCODONE HYDROCHLORIDE 5 MG/1
1 TABLET ORAL
Qty: 30 | Refills: 0
Start: 2021-06-01 | End: 2021-06-05

## 2021-06-01 RX ORDER — FOLIC ACID 0.8 MG
1 TABLET ORAL DAILY
Refills: 0 | Status: DISCONTINUED | OUTPATIENT
Start: 2021-06-01 | End: 2021-06-01

## 2021-06-01 RX ORDER — OXYCODONE HYDROCHLORIDE 5 MG/1
10 TABLET ORAL EVERY 4 HOURS
Refills: 0 | Status: DISCONTINUED | OUTPATIENT
Start: 2021-06-01 | End: 2021-06-02

## 2021-06-01 RX ORDER — PROCHLORPERAZINE MALEATE 5 MG
10 TABLET ORAL EVERY 8 HOURS
Refills: 0 | Status: DISCONTINUED | OUTPATIENT
Start: 2021-06-01 | End: 2021-06-04

## 2021-06-01 RX ORDER — CELECOXIB 200 MG/1
200 CAPSULE ORAL EVERY 12 HOURS
Refills: 0 | Status: DISCONTINUED | OUTPATIENT
Start: 2021-06-02 | End: 2021-06-04

## 2021-06-01 RX ORDER — GABAPENTIN 400 MG/1
300 CAPSULE ORAL THREE TIMES A DAY
Refills: 0 | Status: DISCONTINUED | OUTPATIENT
Start: 2021-06-01 | End: 2021-06-04

## 2021-06-01 RX ORDER — LANOLIN ALCOHOL/MO/W.PET/CERES
20 CREAM (GRAM) TOPICAL AT BEDTIME
Refills: 0 | Status: DISCONTINUED | OUTPATIENT
Start: 2021-06-01 | End: 2021-06-01

## 2021-06-01 RX ORDER — IBUPROFEN 200 MG
0 TABLET ORAL
Qty: 0 | Refills: 0 | DISCHARGE

## 2021-06-01 RX ORDER — ALPRAZOLAM 0.25 MG
0.25 TABLET ORAL EVERY 12 HOURS
Refills: 0 | Status: DISCONTINUED | OUTPATIENT
Start: 2021-06-01 | End: 2021-06-02

## 2021-06-01 RX ORDER — CHOLECALCIFEROL (VITAMIN D3) 125 MCG
0 CAPSULE ORAL
Qty: 0 | Refills: 0 | DISCHARGE

## 2021-06-01 RX ORDER — POLYETHYLENE GLYCOL 3350 17 G/17G
17 POWDER, FOR SOLUTION ORAL AT BEDTIME
Refills: 0 | Status: DISCONTINUED | OUTPATIENT
Start: 2021-06-01 | End: 2021-06-04

## 2021-06-01 RX ORDER — OMEGA-3 ACID ETHYL ESTERS 1 G
0 CAPSULE ORAL
Qty: 0 | Refills: 0 | DISCHARGE

## 2021-06-01 RX ORDER — HYDROMORPHONE HYDROCHLORIDE 2 MG/ML
0.5 INJECTION INTRAMUSCULAR; INTRAVENOUS; SUBCUTANEOUS EVERY 4 HOURS
Refills: 0 | Status: DISCONTINUED | OUTPATIENT
Start: 2021-06-01 | End: 2021-06-02

## 2021-06-01 RX ORDER — OXYCODONE HYDROCHLORIDE 5 MG/1
5 TABLET ORAL EVERY 4 HOURS
Refills: 0 | Status: DISCONTINUED | OUTPATIENT
Start: 2021-06-01 | End: 2021-06-04

## 2021-06-01 RX ORDER — ASCORBIC ACID 60 MG
500 TABLET,CHEWABLE ORAL
Refills: 0 | Status: DISCONTINUED | OUTPATIENT
Start: 2021-06-01 | End: 2021-06-04

## 2021-06-01 RX ORDER — PANTOPRAZOLE SODIUM 20 MG/1
40 TABLET, DELAYED RELEASE ORAL
Refills: 0 | Status: DISCONTINUED | OUTPATIENT
Start: 2021-06-01 | End: 2021-06-04

## 2021-06-01 RX ORDER — TRAZODONE HCL 50 MG
150 TABLET ORAL AT BEDTIME
Refills: 0 | Status: DISCONTINUED | OUTPATIENT
Start: 2021-06-01 | End: 2021-06-04

## 2021-06-01 RX ADMIN — HYDROMORPHONE HYDROCHLORIDE 0.5 MILLIGRAM(S): 2 INJECTION INTRAMUSCULAR; INTRAVENOUS; SUBCUTANEOUS at 16:40

## 2021-06-01 RX ADMIN — Medication 150 MILLIGRAM(S): at 22:53

## 2021-06-01 RX ADMIN — SODIUM CHLORIDE 75 MILLILITER(S): 9 INJECTION, SOLUTION INTRAVENOUS at 22:53

## 2021-06-01 RX ADMIN — OXYCODONE HYDROCHLORIDE 10 MILLIGRAM(S): 5 TABLET ORAL at 22:57

## 2021-06-01 RX ADMIN — GABAPENTIN 300 MILLIGRAM(S): 400 CAPSULE ORAL at 21:36

## 2021-06-01 RX ADMIN — OXYCODONE HYDROCHLORIDE 10 MILLIGRAM(S): 5 TABLET ORAL at 14:08

## 2021-06-01 RX ADMIN — CYCLOBENZAPRINE HYDROCHLORIDE 10 MILLIGRAM(S): 10 TABLET, FILM COATED ORAL at 18:11

## 2021-06-01 RX ADMIN — HYDROMORPHONE HYDROCHLORIDE 0.5 MILLIGRAM(S): 2 INJECTION INTRAMUSCULAR; INTRAVENOUS; SUBCUTANEOUS at 16:16

## 2021-06-01 RX ADMIN — HYDROMORPHONE HYDROCHLORIDE 0.5 MILLIGRAM(S): 2 INJECTION INTRAMUSCULAR; INTRAVENOUS; SUBCUTANEOUS at 23:37

## 2021-06-01 RX ADMIN — Medication 100 MILLIGRAM(S): at 18:02

## 2021-06-01 RX ADMIN — Medication 975 MILLIGRAM(S): at 07:24

## 2021-06-01 RX ADMIN — PANTOPRAZOLE SODIUM 40 MILLIGRAM(S): 20 TABLET, DELAYED RELEASE ORAL at 07:24

## 2021-06-01 RX ADMIN — FENTANYL CITRATE 50 MICROGRAM(S): 50 INJECTION INTRAVENOUS at 12:55

## 2021-06-01 RX ADMIN — Medication 5 MILLIGRAM(S): at 22:52

## 2021-06-01 RX ADMIN — HYDROMORPHONE HYDROCHLORIDE 0.5 MILLIGRAM(S): 2 INJECTION INTRAMUSCULAR; INTRAVENOUS; SUBCUTANEOUS at 14:40

## 2021-06-01 RX ADMIN — SODIUM CHLORIDE 75 MILLILITER(S): 9 INJECTION, SOLUTION INTRAVENOUS at 12:57

## 2021-06-01 RX ADMIN — HYDROMORPHONE HYDROCHLORIDE 0.5 MILLIGRAM(S): 2 INJECTION INTRAMUSCULAR; INTRAVENOUS; SUBCUTANEOUS at 19:18

## 2021-06-01 RX ADMIN — OXYCODONE HYDROCHLORIDE 10 MILLIGRAM(S): 5 TABLET ORAL at 22:53

## 2021-06-01 RX ADMIN — Medication 975 MILLIGRAM(S): at 16:25

## 2021-06-01 RX ADMIN — FENTANYL CITRATE 50 MICROGRAM(S): 50 INJECTION INTRAVENOUS at 13:17

## 2021-06-01 RX ADMIN — CYCLOBENZAPRINE HYDROCHLORIDE 10 MILLIGRAM(S): 10 TABLET, FILM COATED ORAL at 21:36

## 2021-06-01 RX ADMIN — FAMOTIDINE 20 MILLIGRAM(S): 10 INJECTION INTRAVENOUS at 07:24

## 2021-06-01 RX ADMIN — OXYCODONE HYDROCHLORIDE 10 MILLIGRAM(S): 5 TABLET ORAL at 18:11

## 2021-06-01 RX ADMIN — Medication 0.5 MILLIGRAM(S): at 16:40

## 2021-06-01 RX ADMIN — OXYCODONE HYDROCHLORIDE 10 MILLIGRAM(S): 5 TABLET ORAL at 13:03

## 2021-06-01 RX ADMIN — ENOXAPARIN SODIUM 40 MILLIGRAM(S): 100 INJECTION SUBCUTANEOUS at 21:36

## 2021-06-01 RX ADMIN — HYDROMORPHONE HYDROCHLORIDE 0.5 MILLIGRAM(S): 2 INJECTION INTRAMUSCULAR; INTRAVENOUS; SUBCUTANEOUS at 20:48

## 2021-06-01 RX ADMIN — FENTANYL CITRATE 50 MICROGRAM(S): 50 INJECTION INTRAVENOUS at 14:08

## 2021-06-01 RX ADMIN — Medication 0.5 MILLIGRAM(S): at 13:41

## 2021-06-01 RX ADMIN — HYDROMORPHONE HYDROCHLORIDE 0.5 MILLIGRAM(S): 2 INJECTION INTRAMUSCULAR; INTRAVENOUS; SUBCUTANEOUS at 15:00

## 2021-06-01 RX ADMIN — OXYCODONE HYDROCHLORIDE 10 MILLIGRAM(S): 5 TABLET ORAL at 19:11

## 2021-06-01 RX ADMIN — HYDROMORPHONE HYDROCHLORIDE 0.5 MILLIGRAM(S): 2 INJECTION INTRAMUSCULAR; INTRAVENOUS; SUBCUTANEOUS at 21:18

## 2021-06-01 RX ADMIN — Medication 975 MILLIGRAM(S): at 07:26

## 2021-06-01 NOTE — DISCHARGE NOTE PROVIDER - NSDCCPCAREPLAN_GEN_ALL_CORE_FT
PRINCIPAL DISCHARGE DIAGNOSIS  Diagnosis: Osteoarthritis of right ankle  Assessment and Plan of Treatment:

## 2021-06-01 NOTE — ASU PATIENT PROFILE, ADULT - PMH
Anxiety and depression    Asthma  Has not been admitted for have any asthma exacerbation for 10 years  COVID-19 vaccine series completed  Pfizer--2nd vaccine 4/14/2021  Diverticulosis    Female incontinence    H/O sinusitis    H/O spinal stenosis    History of heart attack  Hospitalized 2/2020 after left ankle fracture, surgery, states developed pneumonia, ??mild kidney failure, was told had mild heart attack, recovered , no intervention  Hypertension  Have not taken medications for about 2 years  Lumbar herniated disc    Osteoarthritis    Panic attacks    Seasonal allergies

## 2021-06-01 NOTE — DISCHARGE NOTE PROVIDER - CARE PROVIDER_API CALL
Mohamud Knight (DO)  Orthopaedic Surgery  [address] [place] [address]  Phone: [phone]  Fax: [phone]  Follow Up Time:

## 2021-06-01 NOTE — DISCHARGE NOTE PROVIDER - NSDCACTIVITY_GEN_ALL_CORE
Stairs allowed/Walking - Indoors allowed/No heavy lifting/straining/Walking - Outdoors allowed Sex allowed/Showering allowed/Stairs allowed/Walking - Indoors allowed/No heavy lifting/straining/Walking - Outdoors allowed

## 2021-06-01 NOTE — DISCHARGE NOTE PROVIDER - HOSPITAL COURSE
H&P:  Pt is a60y Female   PAST MEDICAL & SURGICAL HISTORY:  Osteoarthritis    Seasonal allergies    Anxiety and depression    Hypertension  Have not taken medications for about 2 years    Asthma  Has not been admitted for have any asthma exacerbation for 10 years    Diverticulosis    H/O sinusitis    Lumbar herniated disc    Female incontinence    H/O spinal stenosis    COVID-19 vaccine series completed  [employer]--2nd vaccine 4/14/2021    Panic attacks    History of heart attack  Hospitalized 2/2020 after left ankle fracture, surgery, states developed pneumonia, ??mild kidney failure, was told had mild heart attack, recovered , no intervention    S/P cholecystectomy  2010    S/P tubal ligation  1995    S/P hip replacement  left---2015  right--2017    History of ankle surgery  right ankle 2/2020 x2 with hardware , harware removed 1/12/2021  - left ankle fusion 10/2020    History of cardiac cath  2/2020--normal coronary arteries         Now s/p Right Total Ankle Arthroplasty. Pt is afebrile with stable vital signs. Pain is controlled. Alert and Oriented. Exam reveals intact EHL FHL, cap refill <2secs. Trilam splint and dressing is clean, dry and intact.    Hospital Course:  Patient presented to [facility] medically cleared for elective Right Total Ankle Replacement Surgery, having failed outpatient conservative management. Prophylactic antibiotics were started before the procedure and continued for 24 hours. They were admitted after surgery to the orthopedic floor. There were no complications during the hospital stay. All home medications were continued.     **Pt received **U PRBC post op for Acute Blood loss Anemia.    Routine consults were obtained from the Anticoagulation Team for DVT/PE prophylaxis, from Physical Therapy for twice daily PT, and from the Hospitalist for Medical Co-management. Patient was placed on anticoagulation.  Pertinent home medications were continued.  Daily labs were followed.      On POD 0 pt was stable overnight. No major event POD1-2. Pt received twice daily PT. The plan is for DC to home with home PT** or to Rehab for ongoing PT**.  The orthopedic Attending is aware and agrees.     H&P:  Pt is a60y Female   PAST MEDICAL & SURGICAL HISTORY:  Osteoarthritis    Seasonal allergies    Anxiety and depression    Hypertension  Have not taken medications for about 2 years    Asthma  Has not been admitted for have any asthma exacerbation for 10 years    Diverticulosis    H/O sinusitis    Lumbar herniated disc    Female incontinence    H/O spinal stenosis    COVID-19 vaccine series completed  [employer]--2nd vaccine 4/14/2021    Panic attacks    History of heart attack  Hospitalized 2/2020 after left ankle fracture, surgery, states developed pneumonia, ??mild kidney failure, was told had mild heart attack, recovered , no intervention    S/P cholecystectomy  2010    S/P tubal ligation  1995    S/P hip replacement  left---2015  right--2017    History of ankle surgery  right ankle 2/2020 x2 with hardware , harware removed 1/12/2021  - left ankle fusion 10/2020    History of cardiac cath  2/2020--normal coronary arteries         Now s/p Right Total Ankle Arthroplasty. Pt is afebrile with stable vital signs. Pain is controlled. Alert and Oriented. Exam reveals intact EHL FHL, cap refill <2secs. Trilam splint and dressing is clean, dry and intact.    Hospital Course:  Patient presented to [facility] medically cleared for elective Right Total Ankle Replacement Surgery, having failed outpatient conservative management. Prophylactic antibiotics were started before the procedure and continued for 24 hours. They were admitted after surgery to the orthopedic floor. There were no complications during the hospital stay. All home medications were continued.     Routine consults were obtained from the Anticoagulation Team for DVT/PE prophylaxis, from Physical Therapy for twice daily PT, and from the Hospitalist for Medical Co-management. Patient was placed on anticoagulation.  Pertinent home medications were continued.  Daily labs were followed.      On POD 0 pt was stable overnight. No major event POD1-2. Pt received twice daily PT. The plan is for DC to Rehab for ongoing PT.  The orthopedic Attending is aware and agrees.

## 2021-06-01 NOTE — CHART NOTE - NSCHARTNOTEFT_GEN_A_CORE
Pt seen and examined at bedside.   In continued pain.   On oxycodone 10mg PO TID at home for the ~past year or two given chronic ankle pain.   Discussed options.   Will add oxycontin 10mg PO BID  for prn meds will continue oxy 5 for mild, oxy 10 for moderate, dilaudid 0.5 Q4 for severe.   Will adjust accordingly.   Nurse at bedside.   Patient, nurse and myself discussed risks of adding higher dose narcotics including respiratory depression. Will monitor closely.   Given her baseline oxy use at home, adding oxycontin 10mg po bid is warranted.   Will monitor response and adjust accordingly  DW Dr. Knight Pt seen and examined at bedside.   In continued pain.   On oxycodone 10mg PO TID at home for the ~past year or two given chronic ankle pain.   Also take xanax 0.5mg PO TID routinely.  Discussed options.   Will add oxycontin 10mg PO BID  Add Xanax 0.25mg BID (will decrease from her home dose)  for prn meds will continue oxy 5 for mild, oxy 10 for moderate, dilaudid 0.5 Q4 for severe.   Will adjust accordingly.   Nurse at bedside.   Patient, nurse and myself discussed risks of adding higher dose narcotics including respiratory depression. Will monitor closely.   Given her baseline oxy use at home, adding oxycontin 10mg po bid is warranted.   Will monitor response and adjust accordingly  CLAU Nurse at bedside, will monitor closely.   CLAU Knight

## 2021-06-01 NOTE — ASU PATIENT PROFILE, ADULT - ACCEPTABLE
12/10/2020         RE: [place]  [id number] [id number]        Dear Colleague,    Thank you for referring your patient, [place], to the [facility] [place]. Please see a copy of my visit note below.    [facility]    Diagnosis:   1. 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     Treatments:   1. 7+3 ([place]), 3/15/19, no response, Day 12 persistent disease with near packed marrow  2. Dec 10d+Ric 3/26/19, CR1 on 19 but with pos mutations (IDH1 17%, RUNX1 8%)  3. 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   4. Maintenance ALT-803 study, 1 dose, stopped due to profound fatigue, moderate to severe hypotension, and fever    PMH: HLP, BPH, DJD, RLS    Interval history: 10-point ROS negative.    Ph/E:  /72   Pulse 72   Temp 97.8  F (36.6  C) (Oral)   Resp 18   Wt 100.3 kg (221 lb 1.6 oz)   SpO2 96%   BMI 32.18 kg/m    ECO  General: NAD; H&N: no mucosal lesions; Lungs clear; Heart RRR; Abdomen; Soft, No organomegaly; Extremities: No edema; Skin: no rash; Neuro: Nonfocal; Mood/Affect: appropriate; Lymph: no LAD     Lab Results   Component Value Date    WBC 7.0 12/10/2020    HGB 13.5 12/10/2020    HCT 39.9 (L) 12/10/2020     12/10/2020     10/14/2020    POTASSIUM 3.9 10/14/2020    CHLORIDE 109 10/14/2020    CO2 24 10/14/2020    BUN 10 10/14/2020    CR 0.82 10/14/2020     (H) 10/14/2020    SED 88 (H) 2019    NTBNPI 2,031 (H) 2019    TROPI <0.015 2019    AST 19 10/14/2020    ALT 35 10/14/2020    ALKPHOS 90 10/14/2020    BILITOTAL 0.4 10/14/2020    INR 1.00 2019     Current Outpatient Medications   Medication     acetaminophen (TYLENOL) 325 MG tablet     cyclobenzaprine (FLEXERIL) 5 MG tablet     melatonin 3 MG tablet     ondansetron (ZOFRAN-ODT) 8 MG ODT tab     pramox-pe-glycerin-petrolatum  (PREPARATION H) 1-0.25-14.4-15 % CREA cream     prochlorperazine (COMPAZINE) 5 MG tablet     rOPINIRole (REQUIP) 0.25 MG tablet     triamcinolone (KENALOG) 0.1 % external cream     tamsulosin (FLOMAX) 0.4 MG capsule     No current facility-administered medications for this visit.      A&P:   1. AML: 1y6m post-haplo, CR. See us every 2 months here until 2y.    2. ID: off abx, flu shot today.    3. GVHD:  None, off IST.       Soto Brown MD     6

## 2021-06-01 NOTE — ASU PATIENT PROFILE, ADULT - PSH
History of ankle surgery  right ankle 2/2020 x2 with hardware , harware removed 1/12/2021  - left ankle fusion 10/2020  History of cardiac cath  2/2020--normal coronary arteries  S/P cholecystectomy  2010  S/P hip replacement  left---2015  right--2017  S/P tubal ligation  1995

## 2021-06-01 NOTE — PROGRESS NOTE ADULT - ASSESSMENT
A: 60F s/p Right ankle ORIF     P;  NWB RLE  therapy   DVt ppx   postop abx   venodynes  incentive spirometer   pain control   follow labs   elevation RLE    A: 60F s/p Right total ankle arthroplasty    P;  NWB RLE  therapy   DVt ppx   postop abx   venodynes  incentive spirometer   pain control   follow labs   elevation RLE

## 2021-06-01 NOTE — DISCHARGE NOTE PROVIDER - NSDCFUADDINST_GEN_ALL_CORE_FT
Discharge Instructions for Right Total Ankle Arthroplasty    1.  Diet: Resume previous diet  2. Activity: Non-Weight Bearing Right Lower Extremity with Trilam splint and assistive device/rolling walker. Be up and out of bed often during the day, do not lay around.  3. Analgesia: Pain medication has been sent to your pharmacy via eRx. Elevate the extremity as much as possible. Ice application to the ankle often.  4. Dressing: Keep bandage/Splint Clean and dry. Do not get it wet. Do not walk or put any body weight on splint because it will break.  5. Wound Care: Dr. Knight will remove sutures in the office in 2 weeks.  6. Call with: fever over 101, excessive bleeding/drainage, severe pain/swelling, numbness/tingling, or calf pain/calf swelling  7. DVT PE Prophylaxis: Managed by Anticoag Team. See Anticoagulation Instructions. See Med Rec.  8. Continue Protonix daily while on Anticoagulant. an eRx has been sent to your pharmacy.  9. Labs: Check H&H weekly while on Anticoagulation. Check INR if on Coumadin.  10. Follow Up: Dr. Knight in 14 days for wound check and suture removal; Call to schedule.   11.***Please Call the office if any of you medications are not covered under your insurance, especially if it is a BLOOD CLOT PREVENTION/anticoagulant medication.

## 2021-06-01 NOTE — DISCHARGE NOTE PROVIDER - NSDCMRMEDTOKEN_GEN_ALL_CORE_FT
gabapentin 300 mg oral capsule: 1 cap(s) orally 3 times a day  lamoTRIgine 150 mg oral tablet: 1  orally once a day  Melatonin: 20 milligram(s) orally once a day (at bedtime)  metaxalone 800 mg oral tablet: orally 2 times a day  MiraLax oral powder for reconstitution: 17 gram(s) orally once a day  as needed for constipation  oxyCODONE 5 mg oral tablet: 1 tab(s) orally every 4 hours as needed for severe pain; MDD:6  pantoprazole 40 mg oral delayed release tablet: 1 tab(s) orally once a day   PARoxetine mesylate: 60 milligram(s) orally once a day  senna oral tablet: 2 tab(s) orally once a day (at bedtime)   traZODone 150 mg oral tablet: 1 tab(s) orally once a day (at bedtime)  Tylenol Extra Strength 500 mg oral tablet: 2 tab(s) orally 3 times a day   Xanax 0.5 mg oral tablet: 1 tab(s) orally 3 times a day; DO NOT TAKE with Oxycodone/Narcotic Pain Medication   gabapentin 300 mg oral capsule: 1 cap(s) orally 3 times a day  lamoTRIgine 150 mg oral tablet: 1  orally once a day  Melatonin: 20 milligram(s) orally once a day (at bedtime)  metaxalone 800 mg oral tablet: orally 2 times a day  MiraLax oral powder for reconstitution: 17 gram(s) orally once a day  as needed for constipation  oxyCODONE 5 mg oral tablet: 1 tab(s) orally every 4 hours as needed for severe pain; MDD:6  pantoprazole 40 mg oral delayed release tablet: 1 tab(s) orally once a day   PARoxetine mesylate: 60 milligram(s) orally once a day  senna oral tablet: 2 tab(s) orally once a day (at bedtime)   traZODone 150 mg oral tablet: 1 tab(s) orally once a day (at bedtime)  Xanax 0.5 mg oral tablet: 1 tab(s) orally 3 times a day; DO NOT TAKE with Oxycodone/Narcotic Pain Medication   celecoxib 200 mg oral capsule: 1 cap(s) orally every 12 hours for 2 weeks  enoxaparin: 40 milligram(s) subcutaneous once a day until full weight bearing per Anticoagulation team  gabapentin 300 mg oral capsule: 1 cap(s) orally 3 times a day  HYDROmorphone: 2 milligram(s) orally every 4 hours, As Needed for mild to moderate pain  HYDROmorphone 4 mg oral tablet: 1 tab(s) orally every 4 hours, As needed for severe pain  lamoTRIgine 150 mg oral tablet: 1  orally once a day  Melatonin: 20 milligram(s) orally once a day (at bedtime)  metaxalone 800 mg oral tablet: orally 2 times a day  MiraLax oral powder for reconstitution: 17 gram(s) orally once a day  as needed for constipation  pantoprazole 40 mg oral delayed release tablet: 1 tab(s) orally once a day   PARoxetine mesylate: 60 milligram(s) orally once a day  senna oral tablet: 2 tab(s) orally once a day (at bedtime)   traZODone 150 mg oral tablet: 1 tab(s) orally once a day (at bedtime)  Tylenol Extra Strength 500 mg oral tablet: 2 tab(s) orally 3 times a day for 2 weeks  Xanax 0.5 mg oral tablet: 1 tab(s) orally 3 times a day; DO NOT TAKE with Oxycodone/Narcotic Pain Medication

## 2021-06-01 NOTE — CONSULT NOTE ADULT - SUBJECTIVE AND OBJECTIVE BOX
CC- RT total ankle replacement    HPI:  59yo/F with PMH anxiety/depression, HTN, non-obstructive CAD, OA with prior b/l THR presented for elective RT TAR. Patient had RT ankle fracture requiring surgery in 2020 with subsequent removal of the hardware, had LT ankle fusion surgery. SHe states she was balancing her weight bearing that she had put more on her RT ankle and eventually was getting worsening of her pain. SHe was followed p as outpatient and scheduled for elective RT TAR. Medical consult called for postop medical management    PMH- as above  PSH- RT ankle surgery, LT ankle fusion, RT THR 2017, LT THR 2015, cholecystectomy  Soc hx- denies smoking, alcohol socially  Fam hx- f had CAD, CVA, COPD, m had CAD    6/1/21- seen in PACU, had pain in the postop area    Review of system- All 10 systems reviewed and is as per HPI otherwise negative.     T(C): 36.3 (06-01-21 @ 07:14), Max: 36.3 (06-01-21 @ 07:14)  HR: 89 (06-01-21 @ 15:45) (89 - 130)  BP: 139/77 (06-01-21 @ 15:45) (103/75 - 142/76)  RR: 13 (06-01-21 @ 15:45) (13 - 24)  SpO2: 99% (06-01-21 @ 15:45) (96% - 100%)  Wt(kg): --    LABS:                        12.3   14.39 )-----------( 332      ( 01 Jun 2021 13:18 )             38.0     06-01    139  |  108  |  13  ----------------------------<  159<H>  4.4   |  26  |  1.11    Ca    9.1      01 Jun 2021 13:19    CAPILLARY BLOOD GLUCOSE  POCT Blood Glucose.: 149 mg/dL (01 Jun 2021 12:51)  POCT Blood Glucose.: 108 mg/dL (01 Jun 2021 06:58)    RADIOLOGY & ADDITIONAL TESTS:      PHYSICAL EXAM:  GENERAL: NAD, well-groomed, well-developed  HEAD:  Atraumatic, Normocephalic  EYES: EOMI, PERRLA, conjunctiva and sclera clear  HEENT: Moist mucous membranes  NECK: Supple, No JVD  NERVOUS SYSTEM:  Alert & Oriented X3, Motor Strength 5/5 B/L upper and lower extremities; DTRs 2+ intact and symmetric  CHEST/LUNG: Clear to auscultation bilaterally; No rales, rhonchi, wheezing, or rubs  HEART: Regular rate and rhythm; No murmurs, rubs, or gallops  ABDOMEN: Soft, Nontender, Nondistended; Bowel sounds present  GENITOURINARY- Voiding, no palpable bladder  EXTREMITIES:  2+ Peripheral Pulses, No clubbing, cyanosis, or edema  MUSCULOSKELTAL- RLE in splint, moving toes  SKIN-no rash, no lesion  CNS- alert, oriented X3, non focal     Daily Height in cm: 167.64 (01 Jun 2021 07:14)      MEDICATIONS  (STANDING):  enoxaparin Injectable 40 milliGRAM(s) SubCutaneous daily  lactated ringers. 1000 milliLiter(s) (75 mL/Hr) IV Continuous <Continuous>    MEDICATIONS  (PRN):  fentaNYL    Injectable 50 MICROGram(s) IV Push every 10 minutes PRN Severe Pain (7 - 10)  HYDROmorphone  Injectable 0.5 milliGRAM(s) IV Push every 10 minutes PRN Moderate Pain (4 - 6)  ondansetron Injectable 4 milliGRAM(s) IV Push every 6 hours PRN Nausea and/or Vomiting  oxyCODONE    IR 5 milliGRAM(s) Oral once PRN Moderate Pain (4 - 6)    Assessment/Plan  #S/p RT total ankle replacement  Ortho f/u appreciated  Pain meds prn  PT eval- NWB to RLE  Monitor HH postop  AC by Lovenox  Incentive spirometry  Bowel regimen    #Anxiety/depression  Cont home meds    #DVT proph- Lovenox SQ    #Dispo- thank you for consult, will follow with you. D/w pt and ortho team    
  HPI:      Patient is a 60y old  Female who presents with a chief complaint of Right  Ankle pain s/p Right ankle fracture  now had Right ankle Arthroplasty (2021 09:22). Consulted by     for VTE prophylaxis, risk stratification, and anticoagulation management.    PAST MEDICAL & SURGICAL HISTORY:  Osteoarthritis    Seasonal allergies    Anxiety and depression    Hypertension  Have not taken medications for about 2 years    Asthma  Has not been admitted for have any asthma exacerbation for 10 years    Diverticulosis    H/O sinusitis    Lumbar herniated disc    Female incontinence    H/O spinal stenosis    COVID-19 vaccine series completed  [employer]--2nd vaccine 2021    Panic attacks    History of heart attack  Hospitalized 2020 after left ankle fracture, surgery, states developed pneumonia, ??mild kidney failure, was told had mild heart attack, recovered , no intervention    S/P cholecystectomy      S/P tubal ligation      S/P hip replacement  left---  right--    History of ankle surgery  right ankle 2/2020 x2 with hardware , harware removed 2021  - left ankle fusion 10/2020    History of cardiac cath  2020--normal coronary arteries    Interval History  2021: Patient seen on PACU , sleep through the conversation, denies any h/o VTE,Stroke, cancer, or bleeding, Discussed the necessity of Lovenox while NWB and the risks and benefits with patient. Verbalized understanding and is in agreement with treatment plan.        CrCl:82.8  BMI:34.2  EBL:20ml    Caprini VTE Risk Score:CAPRINI SCORE  AGE RELATED RISK FACTORS                                                       MOBILITY RELATED FACTORS  [x ] Age 41-60 years                                            (1 Point)                  [x ] Bed rest /restricted mobility                             (1 Point)  [ ] Age: 61-74 years                                           (2 Points)                [ ] Plaster cast                                                   (2 Points)  [ ] Age= 75 years                                              (3 Points)                 [ ] Bed bound for more than 72 hours                   (2 Points)    DISEASE RELATED RISK FACTORS                                               GENDER SPECIFIC FACTORS  [ ] Edema in the lower extremities                       (1 Point)           [ ] Pregnancy                                                            (1 Point)  [ ] Varicose veins                                               (1 Point)                  [ ] Post-partum < 6 weeks                                      (1 Point)             [x ] BMI > 25 Kg/m2                                            (1 Point)                  [ ] Hormonal therapy or oral contraception       (1 Point)                 [ ] Sepsis (in the previous month)                        (1 Point)             [ ] History of pregnancy complications                (1Point)  [ ] Pneumonia or serious lung disease                                             [ ] Unexplained or recurrent  (=/>3), premature                                 (In the previous month)                               (1 Point)                birth with toxemia or growth-restricted infant (1 Point)  [ ] Abnormal pulmonary function test            (1 Point)                                   SURGERY RELATED RISK FACTORS  [ ] Acute myocardial infarction                       (1 Point)                  [ ]  Section                                         (1 Point)  [ ] Congestive heart failure (in the previous month) (1 Point)   [ ] Minor surgery   lasting <45 minutes       (1 Point)   [ ] Inflammatory bowel disease                             (1 Point)          [ ] Arthroscopic surgery                                  (2 Points)  [ ] Central venous access                                    (2 Points)            [ ] General surgery lasting >45 minutes      (2 Points)       [ ] Stroke (in the previous month)                  (5 Points)            [x ] Elective major lower extremity arthroplasty (5 Points)                                   [  ] Malignancy (present or past include skin melanoma                                          but exclude  basal skin cell)    (2 points)                                      TRAUMA RELATED RISK FACTORS                HEMATOLOGY RELATED FACTORS                                  [ ] Fracture of the hip, pelvis, or leg                       (5 Points)  [ ] Prior episodes of VTE                                     (3 Points)          [ ] Acute spinal cord injury (in the previous month)  (5 Points)  [ ] Positive family history for VTE                         (3 Points)       [ ] Paralysis (less than 1 month)                          (5 Points)  [ ] Prothrombin 39841 A                                      (3 Points)         [ ] Multiple Trauma (within 1month)                 (5Points)                                                                                                                                                                [ ] Factor V Leiden                                          (3 Points)                                OTHER RISK FACTORS                          [ ] Lupus anticoagulants                                     (3 Points)                       [ ] BMI > 40                          (1 Point)                                                         [ ] Anticardiolipin antibodies                                (3 Points)                   [ ] Smoking                              (1Point)                                                [ ] High homocysteine in the blood                      (3 Points)                [  ] Diabetes requiring insulin (1point)                         [ ] Other congenital or acquired thrombophilia       (3 Points)          [  ] Chemotherapy                   (1 Point)  [ ] Heparin induced thrombocytopenia                  (3 Points)             [  ] Blood Transfusion                (1 point)                                                                                                             Total Score [    8      ]                                                                                                                                                                                                                                     IMPROVE Bleeding Risk Score::2.5      Time In: 9:19  Time Out: 12:47    Falls Risk:   High ( x )  Mod (  )  Low (  )      FAMILY HISTORY:  Family history of CVA (Father)  , age 88    Family history of COPD (chronic obstructive pulmonary disease) (Father)  and mother,  age 87    FH: heart disease (Father, Mother)  Brother,  , age 65    FH: lung cancer  sister, living, age 65      Denies any personal or familial history of clotting or bleeding disorders.    Allergies    No Known Drug Allergies  Seasonal allergies (Rhinorrhea)    Intolerances        REVIEW OF SYSTEMS    (  )Fever	     (  )Constipation	(  )SOB				(  )Headache	(  )Dysuria  (  )Chills	     (  )Melena	(  )Dyspnea present on exertion	                    (  )Dizziness                    (  )Polyuria  (  )Nausea	     (  )Hematochezia	(  )Cough			                    (  )Syncope   	(  )Hematuria  (  )Vomiting    (  )Chest Pain	(  )Wheezing			(  )Weakness  (  )Diarrhea     (  )Palpitations	(  )Anorexia			( x )joint pain    All  other review of systems negative: Yes    Vital Signs Last 24 Hrs  T(C): 36.3 (2021 07:14), Max: 36.3 (2021 07:14)  T(F): 97.4 (2021 07:14), Max: 97.4 (2021 07:14)  HR: 92 (2021 14:45) (91 - 130)  BP: 118/81 (2021 14:45) (103/75 - 138/93)  BP(mean): --  RR: 17 (2021 14:30) (13 - 24)  SpO2: 97% (2021 14:45) (97% - 100%)    PHYSICAL EXAM:    Constitutional: Appears Well    Neurological: A& O x 3    Skin: Warm    Respiratory and Thorax: normal effort; Breath sounds: normal; No rales/wheezing/rhonchi  	  Cardiovascular: S1, S2, regular, NMBR	    Gastrointestinal: BS + x 4Q, nontender	    Genitourinary:  Bladder nondistended, nontender    Musculoskeletal:   General Right:   + muscle/joint tenderness,   normal tone, no joint swelling,   ROM: limited	    General Left:   no muscle/joint tenderness,   normal tone, no joint swelling,   ROM: full    Ankle:  Rt: Drsing CDI;  Cap refill good;  Sensation intact                       Lower extrems:   Right: no calf tenderness              negative melba's sign               + pedal pulses    Left:   no calf tenderness              negative melba's sign               + pedal pulses                          12.3   14.39 )-----------( 332      ( 2021 13:18 )             38.0       06-01    139  |  108  |  13  ----------------------------<  159<H>  4.4   |  26  |  1.11    Ca    9.1      2021 13:19        				    MEDICATIONS  (STANDING):  lactated ringers. 1000 milliLiter(s) IV Continuous <Continuous>          DVT Prophylaxis:  LMWH                   ( x )  Heparin SQ           (  )  Coumadin             (  )  Xarelto                  (  )  Eliquis                   (  )  Venodynes           (  x)  Ambulation          ( x )  UFH                       (  )  Contraindicated  (  )  EC ASPIRIN       (  )

## 2021-06-01 NOTE — CONSULT NOTE ADULT - ASSESSMENT
Patient is a 60y old  Female who presents with a chief complaint of Right  Ankle pain s/p Right ankle fracture 2/20 now had Right ankle Arthroplasty (01 Jun 2021 09:22). Consulted by     for VTE prophylaxis, risk stratification, and anticoagulation management.    Plan  :Lovenox 40mg sq daily until full weight bearing   :daily cbc/bmp  :LE Venodynes  : increase mobility as tolerated  :Thanks for consult will f/u

## 2021-06-02 LAB
ANION GAP SERPL CALC-SCNC: 3 MMOL/L — LOW (ref 5–17)
BUN SERPL-MCNC: 11 MG/DL — SIGNIFICANT CHANGE UP (ref 7–23)
CALCIUM SERPL-MCNC: 8.1 MG/DL — LOW (ref 8.5–10.1)
CHLORIDE SERPL-SCNC: 105 MMOL/L — SIGNIFICANT CHANGE UP (ref 96–108)
CO2 SERPL-SCNC: 30 MMOL/L — SIGNIFICANT CHANGE UP (ref 22–31)
CREAT SERPL-MCNC: 0.78 MG/DL — SIGNIFICANT CHANGE UP (ref 0.5–1.3)
GLUCOSE SERPL-MCNC: 118 MG/DL — HIGH (ref 70–99)
HCT VFR BLD CALC: 30 % — LOW (ref 34.5–45)
HGB BLD-MCNC: 9.8 G/DL — LOW (ref 11.5–15.5)
MCHC RBC-ENTMCNC: 30.2 PG — SIGNIFICANT CHANGE UP (ref 27–34)
MCHC RBC-ENTMCNC: 32.7 GM/DL — SIGNIFICANT CHANGE UP (ref 32–36)
MCV RBC AUTO: 92.6 FL — SIGNIFICANT CHANGE UP (ref 80–100)
PLATELET # BLD AUTO: 247 K/UL — SIGNIFICANT CHANGE UP (ref 150–400)
POTASSIUM SERPL-MCNC: 3.5 MMOL/L — SIGNIFICANT CHANGE UP (ref 3.5–5.3)
POTASSIUM SERPL-SCNC: 3.5 MMOL/L — SIGNIFICANT CHANGE UP (ref 3.5–5.3)
RBC # BLD: 3.24 M/UL — LOW (ref 3.8–5.2)
RBC # FLD: 13.9 % — SIGNIFICANT CHANGE UP (ref 10.3–14.5)
SODIUM SERPL-SCNC: 138 MMOL/L — SIGNIFICANT CHANGE UP (ref 135–145)
WBC # BLD: 9.39 K/UL — SIGNIFICANT CHANGE UP (ref 3.8–10.5)
WBC # FLD AUTO: 9.39 K/UL — SIGNIFICANT CHANGE UP (ref 3.8–10.5)

## 2021-06-02 PROCEDURE — 36415 COLL VENOUS BLD VENIPUNCTURE: CPT

## 2021-06-02 PROCEDURE — 97116 GAIT TRAINING THERAPY: CPT | Mod: GP

## 2021-06-02 PROCEDURE — 99232 SBSQ HOSP IP/OBS MODERATE 35: CPT

## 2021-06-02 PROCEDURE — 97162 PT EVAL MOD COMPLEX 30 MIN: CPT | Mod: GP

## 2021-06-02 PROCEDURE — U0003: CPT

## 2021-06-02 PROCEDURE — 99231 SBSQ HOSP IP/OBS SF/LOW 25: CPT

## 2021-06-02 PROCEDURE — 97530 THERAPEUTIC ACTIVITIES: CPT | Mod: GP

## 2021-06-02 PROCEDURE — U0005: CPT

## 2021-06-02 PROCEDURE — 86769 SARS-COV-2 COVID-19 ANTIBODY: CPT

## 2021-06-02 PROCEDURE — 99024 POSTOP FOLLOW-UP VISIT: CPT

## 2021-06-02 PROCEDURE — 85027 COMPLETE CBC AUTOMATED: CPT

## 2021-06-02 PROCEDURE — 80048 BASIC METABOLIC PNL TOTAL CA: CPT

## 2021-06-02 PROCEDURE — 82962 GLUCOSE BLOOD TEST: CPT

## 2021-06-02 RX ORDER — ACETAMINOPHEN 500 MG
975 TABLET ORAL EVERY 6 HOURS
Refills: 0 | Status: DISCONTINUED | OUTPATIENT
Start: 2021-06-02 | End: 2021-06-04

## 2021-06-02 RX ORDER — HYDROMORPHONE HYDROCHLORIDE 2 MG/ML
0.5 INJECTION INTRAMUSCULAR; INTRAVENOUS; SUBCUTANEOUS
Refills: 0 | Status: DISCONTINUED | OUTPATIENT
Start: 2021-06-02 | End: 2021-06-02

## 2021-06-02 RX ORDER — HYDROMORPHONE HYDROCHLORIDE 2 MG/ML
0.5 INJECTION INTRAMUSCULAR; INTRAVENOUS; SUBCUTANEOUS EVERY 4 HOURS
Refills: 0 | Status: DISCONTINUED | OUTPATIENT
Start: 2021-06-02 | End: 2021-06-02

## 2021-06-02 RX ORDER — HYDROMORPHONE HYDROCHLORIDE 2 MG/ML
4 INJECTION INTRAMUSCULAR; INTRAVENOUS; SUBCUTANEOUS
Refills: 0 | Status: DISCONTINUED | OUTPATIENT
Start: 2021-06-02 | End: 2021-06-04

## 2021-06-02 RX ORDER — OXYCODONE HYDROCHLORIDE 5 MG/1
10 TABLET ORAL EVERY 4 HOURS
Refills: 0 | Status: DISCONTINUED | OUTPATIENT
Start: 2021-06-02 | End: 2021-06-02

## 2021-06-02 RX ORDER — HYDROMORPHONE HYDROCHLORIDE 2 MG/ML
1 INJECTION INTRAMUSCULAR; INTRAVENOUS; SUBCUTANEOUS
Qty: 42 | Refills: 0
Start: 2021-06-02

## 2021-06-02 RX ORDER — ACETAMINOPHEN 500 MG
1000 TABLET ORAL ONCE
Refills: 0 | Status: COMPLETED | OUTPATIENT
Start: 2021-06-02 | End: 2021-06-02

## 2021-06-02 RX ORDER — HYDROMORPHONE HYDROCHLORIDE 2 MG/ML
1 INJECTION INTRAMUSCULAR; INTRAVENOUS; SUBCUTANEOUS EVERY 4 HOURS
Refills: 0 | Status: DISCONTINUED | OUTPATIENT
Start: 2021-06-02 | End: 2021-06-04

## 2021-06-02 RX ORDER — ACETAMINOPHEN 500 MG
650 TABLET ORAL EVERY 6 HOURS
Refills: 0 | Status: DISCONTINUED | OUTPATIENT
Start: 2021-06-02 | End: 2021-06-02

## 2021-06-02 RX ORDER — ALPRAZOLAM 0.25 MG
0.5 TABLET ORAL EVERY 8 HOURS
Refills: 0 | Status: DISCONTINUED | OUTPATIENT
Start: 2021-06-02 | End: 2021-06-04

## 2021-06-02 RX ORDER — OXYCODONE HYDROCHLORIDE 5 MG/1
1 TABLET ORAL
Qty: 30 | Refills: 0
Start: 2021-06-02 | End: 2021-06-06

## 2021-06-02 RX ADMIN — HYDROMORPHONE HYDROCHLORIDE 4 MILLIGRAM(S): 2 INJECTION INTRAMUSCULAR; INTRAVENOUS; SUBCUTANEOUS at 18:19

## 2021-06-02 RX ADMIN — Medication 1 MILLIGRAM(S): at 11:04

## 2021-06-02 RX ADMIN — Medication 500 MILLIGRAM(S): at 11:04

## 2021-06-02 RX ADMIN — SENNA PLUS 2 TABLET(S): 8.6 TABLET ORAL at 21:02

## 2021-06-02 RX ADMIN — HYDROMORPHONE HYDROCHLORIDE 0.5 MILLIGRAM(S): 2 INJECTION INTRAMUSCULAR; INTRAVENOUS; SUBCUTANEOUS at 00:07

## 2021-06-02 RX ADMIN — Medication 400 MILLIGRAM(S): at 10:54

## 2021-06-02 RX ADMIN — Medication 50 MILLIGRAM(S): at 13:23

## 2021-06-02 RX ADMIN — HYDROMORPHONE HYDROCHLORIDE 0.5 MILLIGRAM(S): 2 INJECTION INTRAMUSCULAR; INTRAVENOUS; SUBCUTANEOUS at 03:57

## 2021-06-02 RX ADMIN — CELECOXIB 200 MILLIGRAM(S): 200 CAPSULE ORAL at 21:02

## 2021-06-02 RX ADMIN — HYDROMORPHONE HYDROCHLORIDE 4 MILLIGRAM(S): 2 INJECTION INTRAMUSCULAR; INTRAVENOUS; SUBCUTANEOUS at 18:49

## 2021-06-02 RX ADMIN — OXYCODONE HYDROCHLORIDE 10 MILLIGRAM(S): 5 TABLET ORAL at 02:07

## 2021-06-02 RX ADMIN — GABAPENTIN 300 MILLIGRAM(S): 400 CAPSULE ORAL at 21:01

## 2021-06-02 RX ADMIN — CELECOXIB 200 MILLIGRAM(S): 200 CAPSULE ORAL at 11:04

## 2021-06-02 RX ADMIN — HYDROMORPHONE HYDROCHLORIDE 0.5 MILLIGRAM(S): 2 INJECTION INTRAMUSCULAR; INTRAVENOUS; SUBCUTANEOUS at 10:54

## 2021-06-02 RX ADMIN — Medication 0.5 MILLIGRAM(S): at 18:45

## 2021-06-02 RX ADMIN — HYDROMORPHONE HYDROCHLORIDE 4 MILLIGRAM(S): 2 INJECTION INTRAMUSCULAR; INTRAVENOUS; SUBCUTANEOUS at 14:21

## 2021-06-02 RX ADMIN — POLYETHYLENE GLYCOL 3350 17 GRAM(S): 17 POWDER, FOR SOLUTION ORAL at 21:02

## 2021-06-02 RX ADMIN — Medication 325 MILLIGRAM(S): at 11:04

## 2021-06-02 RX ADMIN — Medication 1000 MILLIGRAM(S): at 10:54

## 2021-06-02 RX ADMIN — Medication 150 MILLIGRAM(S): at 21:02

## 2021-06-02 RX ADMIN — CYCLOBENZAPRINE HYDROCHLORIDE 10 MILLIGRAM(S): 10 TABLET, FILM COATED ORAL at 15:44

## 2021-06-02 RX ADMIN — HYDROMORPHONE HYDROCHLORIDE 1 MILLIGRAM(S): 2 INJECTION INTRAMUSCULAR; INTRAVENOUS; SUBCUTANEOUS at 15:44

## 2021-06-02 RX ADMIN — HYDROMORPHONE HYDROCHLORIDE 0.5 MILLIGRAM(S): 2 INJECTION INTRAMUSCULAR; INTRAVENOUS; SUBCUTANEOUS at 09:50

## 2021-06-02 RX ADMIN — OXYCODONE HYDROCHLORIDE 10 MILLIGRAM(S): 5 TABLET ORAL at 01:37

## 2021-06-02 RX ADMIN — HYDROMORPHONE HYDROCHLORIDE 0.5 MILLIGRAM(S): 2 INJECTION INTRAMUSCULAR; INTRAVENOUS; SUBCUTANEOUS at 09:20

## 2021-06-02 RX ADMIN — Medication 400 MILLIGRAM(S): at 05:40

## 2021-06-02 RX ADMIN — Medication 5 MILLIGRAM(S): at 21:02

## 2021-06-02 RX ADMIN — GABAPENTIN 300 MILLIGRAM(S): 400 CAPSULE ORAL at 13:21

## 2021-06-02 RX ADMIN — HYDROMORPHONE HYDROCHLORIDE 1 MILLIGRAM(S): 2 INJECTION INTRAMUSCULAR; INTRAVENOUS; SUBCUTANEOUS at 20:43

## 2021-06-02 RX ADMIN — Medication 975 MILLIGRAM(S): at 18:18

## 2021-06-02 RX ADMIN — HYDROMORPHONE HYDROCHLORIDE 1 MILLIGRAM(S): 2 INJECTION INTRAMUSCULAR; INTRAVENOUS; SUBCUTANEOUS at 20:13

## 2021-06-02 RX ADMIN — CELECOXIB 200 MILLIGRAM(S): 200 CAPSULE ORAL at 21:08

## 2021-06-02 RX ADMIN — Medication 1 TABLET(S): at 11:04

## 2021-06-02 RX ADMIN — HYDROMORPHONE HYDROCHLORIDE 1 MILLIGRAM(S): 2 INJECTION INTRAMUSCULAR; INTRAVENOUS; SUBCUTANEOUS at 16:14

## 2021-06-02 RX ADMIN — Medication 10 MILLIGRAM(S): at 15:44

## 2021-06-02 RX ADMIN — LAMOTRIGINE 150 MILLIGRAM(S): 25 TABLET, ORALLY DISINTEGRATING ORAL at 13:22

## 2021-06-02 RX ADMIN — GABAPENTIN 300 MILLIGRAM(S): 400 CAPSULE ORAL at 05:39

## 2021-06-02 RX ADMIN — OXYCODONE HYDROCHLORIDE 10 MILLIGRAM(S): 5 TABLET ORAL at 06:09

## 2021-06-02 RX ADMIN — PANTOPRAZOLE SODIUM 40 MILLIGRAM(S): 20 TABLET, DELAYED RELEASE ORAL at 11:04

## 2021-06-02 RX ADMIN — HYDROMORPHONE HYDROCHLORIDE 4 MILLIGRAM(S): 2 INJECTION INTRAMUSCULAR; INTRAVENOUS; SUBCUTANEOUS at 13:21

## 2021-06-02 RX ADMIN — HYDROMORPHONE HYDROCHLORIDE 0.5 MILLIGRAM(S): 2 INJECTION INTRAMUSCULAR; INTRAVENOUS; SUBCUTANEOUS at 03:27

## 2021-06-02 RX ADMIN — Medication 100 MILLIGRAM(S): at 01:36

## 2021-06-02 RX ADMIN — OXYCODONE HYDROCHLORIDE 10 MILLIGRAM(S): 5 TABLET ORAL at 05:39

## 2021-06-02 RX ADMIN — Medication 1000 MILLIGRAM(S): at 05:47

## 2021-06-02 RX ADMIN — HYDROMORPHONE HYDROCHLORIDE 0.5 MILLIGRAM(S): 2 INJECTION INTRAMUSCULAR; INTRAVENOUS; SUBCUTANEOUS at 11:09

## 2021-06-02 RX ADMIN — ENOXAPARIN SODIUM 40 MILLIGRAM(S): 100 INJECTION SUBCUTANEOUS at 11:05

## 2021-06-02 RX ADMIN — Medication 0.25 MILLIGRAM(S): at 10:54

## 2021-06-02 NOTE — PHYSICAL THERAPY INITIAL EVALUATION ADULT - RANGE OF MOTION EXAMINATION, REHAB EVAL
right ankle NT, left ankle fused, lacking 5 deg to neutral./bilateral upper extremity ROM was WFL (within functional limits)/bilateral lower extremity ROM was WFL (within functional limits)

## 2021-06-02 NOTE — PHYSICAL THERAPY INITIAL EVALUATION ADULT - GENERAL OBSERVATIONS, REHAB EVAL
Pt rec'd supine in bed, RLE secured in trilam, elevated on pillows, pt endorses severe pain, very anxious.

## 2021-06-02 NOTE — PROGRESS NOTE ADULT - ASSESSMENT
A: 60F s/p Right total ankle arthroplasty POD #1    P;  NWB RLE in trilam  therapy   DVt ppx   postop abx   venodynes  incentive spirometer   pain control: oxycontin 10 BID added, monitor  follow labs   elevation RLE   dispo planning

## 2021-06-02 NOTE — PHYSICAL THERAPY INITIAL EVALUATION ADULT - PERTINENT HX OF CURRENT PROBLEM, REHAB EVAL
59yo/F with PMH anxiety/depression, HTN, non-obstructive CAD, OA with prior b/l THR presented for elective RT TAR. Patient had RT ankle fracture requiring surgery in 2020 with subsequent removal of the hardware, had LT ankle fusion surgery. SHe states she was balancing her weight bearing that she had put more on her RT ankle and eventually was getting worsening of her pain. SHe was followed up as outpatient and scheduled for elective RT TAR.

## 2021-06-02 NOTE — PHYSICAL THERAPY INITIAL EVALUATION ADULT - AMBULATION SKILLS, REHAB EVAL
Pt was using a SAC around the house and a scooter in the community prior to sx./independent/needs device

## 2021-06-03 LAB
ANION GAP SERPL CALC-SCNC: 3 MMOL/L — LOW (ref 5–17)
BUN SERPL-MCNC: 14 MG/DL — SIGNIFICANT CHANGE UP (ref 7–23)
CALCIUM SERPL-MCNC: 8.7 MG/DL — SIGNIFICANT CHANGE UP (ref 8.5–10.1)
CHLORIDE SERPL-SCNC: 109 MMOL/L — HIGH (ref 96–108)
CO2 SERPL-SCNC: 30 MMOL/L — SIGNIFICANT CHANGE UP (ref 22–31)
COVID-19 SPIKE DOMAIN AB INTERP: POSITIVE
COVID-19 SPIKE DOMAIN ANTIBODY RESULT: >250 U/ML — HIGH
CREAT SERPL-MCNC: 0.55 MG/DL — SIGNIFICANT CHANGE UP (ref 0.5–1.3)
GLUCOSE SERPL-MCNC: 109 MG/DL — HIGH (ref 70–99)
HCT VFR BLD CALC: 27.9 % — LOW (ref 34.5–45)
HGB BLD-MCNC: 9 G/DL — LOW (ref 11.5–15.5)
MCHC RBC-ENTMCNC: 30.4 PG — SIGNIFICANT CHANGE UP (ref 27–34)
MCHC RBC-ENTMCNC: 32.3 GM/DL — SIGNIFICANT CHANGE UP (ref 32–36)
MCV RBC AUTO: 94.3 FL — SIGNIFICANT CHANGE UP (ref 80–100)
PLATELET # BLD AUTO: 188 K/UL — SIGNIFICANT CHANGE UP (ref 150–400)
POTASSIUM SERPL-MCNC: 3.9 MMOL/L — SIGNIFICANT CHANGE UP (ref 3.5–5.3)
POTASSIUM SERPL-SCNC: 3.9 MMOL/L — SIGNIFICANT CHANGE UP (ref 3.5–5.3)
RBC # BLD: 2.96 M/UL — LOW (ref 3.8–5.2)
RBC # FLD: 14.2 % — SIGNIFICANT CHANGE UP (ref 10.3–14.5)
SARS-COV-2 IGG+IGM SERPL QL IA: >250 U/ML — HIGH
SARS-COV-2 IGG+IGM SERPL QL IA: POSITIVE
SARS-COV-2 RNA SPEC QL NAA+PROBE: SIGNIFICANT CHANGE UP
SODIUM SERPL-SCNC: 142 MMOL/L — SIGNIFICANT CHANGE UP (ref 135–145)
WBC # BLD: 5.22 K/UL — SIGNIFICANT CHANGE UP (ref 3.8–10.5)
WBC # FLD AUTO: 5.22 K/UL — SIGNIFICANT CHANGE UP (ref 3.8–10.5)

## 2021-06-03 PROCEDURE — 99232 SBSQ HOSP IP/OBS MODERATE 35: CPT

## 2021-06-03 PROCEDURE — 99231 SBSQ HOSP IP/OBS SF/LOW 25: CPT

## 2021-06-03 RX ORDER — ENOXAPARIN SODIUM 100 MG/ML
40 INJECTION SUBCUTANEOUS
Qty: 0 | Refills: 0 | DISCHARGE
Start: 2021-06-03

## 2021-06-03 RX ORDER — CELECOXIB 200 MG/1
1 CAPSULE ORAL
Qty: 0 | Refills: 0 | DISCHARGE
Start: 2021-06-03

## 2021-06-03 RX ORDER — HYDROMORPHONE HYDROCHLORIDE 2 MG/ML
1 INJECTION INTRAMUSCULAR; INTRAVENOUS; SUBCUTANEOUS
Qty: 0 | Refills: 0 | DISCHARGE
Start: 2021-06-03

## 2021-06-03 RX ORDER — ACETAMINOPHEN 500 MG
2 TABLET ORAL
Qty: 84 | Refills: 0
Start: 2021-06-03 | End: 2021-06-16

## 2021-06-03 RX ORDER — HYDROMORPHONE HYDROCHLORIDE 2 MG/ML
2 INJECTION INTRAMUSCULAR; INTRAVENOUS; SUBCUTANEOUS
Qty: 0 | Refills: 0 | DISCHARGE
Start: 2021-06-03

## 2021-06-03 RX ORDER — ACETAMINOPHEN 500 MG
1000 TABLET ORAL ONCE
Refills: 0 | Status: COMPLETED | OUTPATIENT
Start: 2021-06-03 | End: 2021-06-03

## 2021-06-03 RX ORDER — ACETAMINOPHEN 500 MG
3 TABLET ORAL
Qty: 126 | Refills: 0
Start: 2021-06-03 | End: 2021-06-16

## 2021-06-03 RX ORDER — OXYCODONE HYDROCHLORIDE 5 MG/1
1 TABLET ORAL
Qty: 84 | Refills: 0
Start: 2021-06-03 | End: 2021-06-16

## 2021-06-03 RX ADMIN — Medication 975 MILLIGRAM(S): at 06:34

## 2021-06-03 RX ADMIN — Medication 60 MILLIGRAM(S): at 09:53

## 2021-06-03 RX ADMIN — Medication 1000 MILLIGRAM(S): at 12:43

## 2021-06-03 RX ADMIN — Medication 975 MILLIGRAM(S): at 00:32

## 2021-06-03 RX ADMIN — Medication 975 MILLIGRAM(S): at 00:31

## 2021-06-03 RX ADMIN — CELECOXIB 200 MILLIGRAM(S): 200 CAPSULE ORAL at 09:54

## 2021-06-03 RX ADMIN — LAMOTRIGINE 150 MILLIGRAM(S): 25 TABLET, ORALLY DISINTEGRATING ORAL at 09:53

## 2021-06-03 RX ADMIN — Medication 1 TABLET(S): at 09:53

## 2021-06-03 RX ADMIN — HYDROMORPHONE HYDROCHLORIDE 4 MILLIGRAM(S): 2 INJECTION INTRAMUSCULAR; INTRAVENOUS; SUBCUTANEOUS at 18:19

## 2021-06-03 RX ADMIN — CELECOXIB 200 MILLIGRAM(S): 200 CAPSULE ORAL at 21:35

## 2021-06-03 RX ADMIN — Medication 975 MILLIGRAM(S): at 18:20

## 2021-06-03 RX ADMIN — GABAPENTIN 300 MILLIGRAM(S): 400 CAPSULE ORAL at 06:34

## 2021-06-03 RX ADMIN — ENOXAPARIN SODIUM 40 MILLIGRAM(S): 100 INJECTION SUBCUTANEOUS at 09:52

## 2021-06-03 RX ADMIN — CYCLOBENZAPRINE HYDROCHLORIDE 10 MILLIGRAM(S): 10 TABLET, FILM COATED ORAL at 19:49

## 2021-06-03 RX ADMIN — CELECOXIB 200 MILLIGRAM(S): 200 CAPSULE ORAL at 09:52

## 2021-06-03 RX ADMIN — Medication 5 MILLIGRAM(S): at 21:33

## 2021-06-03 RX ADMIN — SENNA PLUS 2 TABLET(S): 8.6 TABLET ORAL at 21:34

## 2021-06-03 RX ADMIN — Medication 0.5 MILLIGRAM(S): at 11:16

## 2021-06-03 RX ADMIN — Medication 325 MILLIGRAM(S): at 09:52

## 2021-06-03 RX ADMIN — HYDROMORPHONE HYDROCHLORIDE 4 MILLIGRAM(S): 2 INJECTION INTRAMUSCULAR; INTRAVENOUS; SUBCUTANEOUS at 10:50

## 2021-06-03 RX ADMIN — GABAPENTIN 300 MILLIGRAM(S): 400 CAPSULE ORAL at 21:33

## 2021-06-03 RX ADMIN — Medication 150 MILLIGRAM(S): at 21:34

## 2021-06-03 RX ADMIN — Medication 1 MILLIGRAM(S): at 09:52

## 2021-06-03 RX ADMIN — HYDROMORPHONE HYDROCHLORIDE 4 MILLIGRAM(S): 2 INJECTION INTRAMUSCULAR; INTRAVENOUS; SUBCUTANEOUS at 19:15

## 2021-06-03 RX ADMIN — PANTOPRAZOLE SODIUM 40 MILLIGRAM(S): 20 TABLET, DELAYED RELEASE ORAL at 09:53

## 2021-06-03 RX ADMIN — Medication 10 MILLIGRAM(S): at 14:16

## 2021-06-03 RX ADMIN — HYDROMORPHONE HYDROCHLORIDE 4 MILLIGRAM(S): 2 INJECTION INTRAMUSCULAR; INTRAVENOUS; SUBCUTANEOUS at 01:30

## 2021-06-03 RX ADMIN — Medication 500 MILLIGRAM(S): at 21:34

## 2021-06-03 RX ADMIN — Medication 500 MILLIGRAM(S): at 09:52

## 2021-06-03 RX ADMIN — HYDROMORPHONE HYDROCHLORIDE 4 MILLIGRAM(S): 2 INJECTION INTRAMUSCULAR; INTRAVENOUS; SUBCUTANEOUS at 00:31

## 2021-06-03 RX ADMIN — HYDROMORPHONE HYDROCHLORIDE 4 MILLIGRAM(S): 2 INJECTION INTRAMUSCULAR; INTRAVENOUS; SUBCUTANEOUS at 09:53

## 2021-06-03 RX ADMIN — HYDROMORPHONE HYDROCHLORIDE 4 MILLIGRAM(S): 2 INJECTION INTRAMUSCULAR; INTRAVENOUS; SUBCUTANEOUS at 15:15

## 2021-06-03 RX ADMIN — HYDROMORPHONE HYDROCHLORIDE 4 MILLIGRAM(S): 2 INJECTION INTRAMUSCULAR; INTRAVENOUS; SUBCUTANEOUS at 22:04

## 2021-06-03 RX ADMIN — Medication 400 MILLIGRAM(S): at 12:43

## 2021-06-03 RX ADMIN — HYDROMORPHONE HYDROCHLORIDE 4 MILLIGRAM(S): 2 INJECTION INTRAMUSCULAR; INTRAVENOUS; SUBCUTANEOUS at 21:34

## 2021-06-03 RX ADMIN — HYDROMORPHONE HYDROCHLORIDE 4 MILLIGRAM(S): 2 INJECTION INTRAMUSCULAR; INTRAVENOUS; SUBCUTANEOUS at 14:15

## 2021-06-03 NOTE — PROGRESS NOTE ADULT - ASSESSMENT
A: 60F s/p Right total ankle arthroplasty POD #2    P;  NWB RLE in trilam  PT/OT as tolerated  DVt ppx   venodynes  incentive spirometer   pain control: oxycontin 10 BID added, monitor  follow labs   elevation RLE   dispo planning: Likely [facility]RI[facility] today, COVID ordered.

## 2021-06-03 NOTE — PROGRESS NOTE ADULT - ASSESSMENT
Patient is a 60y old  Female who presents with a chief complaint of Right  Ankle pain s/p Right ankle fracture 2/20 now had Right ankle Arthroplasty (01 Jun 2021 09:22). Consulted by     for VTE prophylaxis, risk stratification, and anticoagulation management.    Plan  :Lovenox 40mg sq daily until full weight bearing   :daily cbc/bmp  :LE Venodynes  : increase mobility as tolerated  :will f/u  : dispo :rehab

## 2021-06-04 ENCOUNTER — TRANSCRIPTION ENCOUNTER (OUTPATIENT)
Age: 61
End: 2021-06-04

## 2021-06-04 VITALS
OXYGEN SATURATION: 97 % | SYSTOLIC BLOOD PRESSURE: 107 MMHG | TEMPERATURE: 98 F | RESPIRATION RATE: 16 BRPM | DIASTOLIC BLOOD PRESSURE: 74 MMHG | HEART RATE: 87 BPM

## 2021-06-04 LAB
HCT VFR BLD CALC: 28.1 % — LOW (ref 34.5–45)
HGB BLD-MCNC: 9.1 G/DL — LOW (ref 11.5–15.5)
MCHC RBC-ENTMCNC: 30.6 PG — SIGNIFICANT CHANGE UP (ref 27–34)
MCHC RBC-ENTMCNC: 32.4 GM/DL — SIGNIFICANT CHANGE UP (ref 32–36)
MCV RBC AUTO: 94.6 FL — SIGNIFICANT CHANGE UP (ref 80–100)
PLATELET # BLD AUTO: 220 K/UL — SIGNIFICANT CHANGE UP (ref 150–400)
RBC # BLD: 2.97 M/UL — LOW (ref 3.8–5.2)
RBC # FLD: 14.1 % — SIGNIFICANT CHANGE UP (ref 10.3–14.5)
WBC # BLD: 6.91 K/UL — SIGNIFICANT CHANGE UP (ref 3.8–10.5)
WBC # FLD AUTO: 6.91 K/UL — SIGNIFICANT CHANGE UP (ref 3.8–10.5)

## 2021-06-04 PROCEDURE — 99232 SBSQ HOSP IP/OBS MODERATE 35: CPT

## 2021-06-04 PROCEDURE — 99231 SBSQ HOSP IP/OBS SF/LOW 25: CPT

## 2021-06-04 RX ADMIN — Medication 500 MILLIGRAM(S): at 10:44

## 2021-06-04 RX ADMIN — Medication 975 MILLIGRAM(S): at 05:55

## 2021-06-04 RX ADMIN — HYDROMORPHONE HYDROCHLORIDE 4 MILLIGRAM(S): 2 INJECTION INTRAMUSCULAR; INTRAVENOUS; SUBCUTANEOUS at 09:17

## 2021-06-04 RX ADMIN — HYDROMORPHONE HYDROCHLORIDE 4 MILLIGRAM(S): 2 INJECTION INTRAMUSCULAR; INTRAVENOUS; SUBCUTANEOUS at 14:30

## 2021-06-04 RX ADMIN — HYDROMORPHONE HYDROCHLORIDE 4 MILLIGRAM(S): 2 INJECTION INTRAMUSCULAR; INTRAVENOUS; SUBCUTANEOUS at 16:58

## 2021-06-04 RX ADMIN — ENOXAPARIN SODIUM 40 MILLIGRAM(S): 100 INJECTION SUBCUTANEOUS at 10:44

## 2021-06-04 RX ADMIN — Medication 1 TABLET(S): at 10:44

## 2021-06-04 RX ADMIN — HYDROMORPHONE HYDROCHLORIDE 4 MILLIGRAM(S): 2 INJECTION INTRAMUSCULAR; INTRAVENOUS; SUBCUTANEOUS at 13:39

## 2021-06-04 RX ADMIN — HYDROMORPHONE HYDROCHLORIDE 4 MILLIGRAM(S): 2 INJECTION INTRAMUSCULAR; INTRAVENOUS; SUBCUTANEOUS at 05:58

## 2021-06-04 RX ADMIN — HYDROMORPHONE HYDROCHLORIDE 4 MILLIGRAM(S): 2 INJECTION INTRAMUSCULAR; INTRAVENOUS; SUBCUTANEOUS at 10:00

## 2021-06-04 RX ADMIN — CELECOXIB 200 MILLIGRAM(S): 200 CAPSULE ORAL at 10:44

## 2021-06-04 RX ADMIN — GABAPENTIN 300 MILLIGRAM(S): 400 CAPSULE ORAL at 13:39

## 2021-06-04 RX ADMIN — Medication 975 MILLIGRAM(S): at 13:38

## 2021-06-04 RX ADMIN — Medication 60 MILLIGRAM(S): at 10:45

## 2021-06-04 RX ADMIN — LAMOTRIGINE 150 MILLIGRAM(S): 25 TABLET, ORALLY DISINTEGRATING ORAL at 10:45

## 2021-06-04 RX ADMIN — Medication 0.5 MILLIGRAM(S): at 10:43

## 2021-06-04 RX ADMIN — Medication 1 MILLIGRAM(S): at 10:44

## 2021-06-04 RX ADMIN — PANTOPRAZOLE SODIUM 40 MILLIGRAM(S): 20 TABLET, DELAYED RELEASE ORAL at 10:45

## 2021-06-04 RX ADMIN — Medication 975 MILLIGRAM(S): at 17:01

## 2021-06-04 RX ADMIN — Medication 975 MILLIGRAM(S): at 13:00

## 2021-06-04 RX ADMIN — CELECOXIB 200 MILLIGRAM(S): 200 CAPSULE ORAL at 11:11

## 2021-06-04 RX ADMIN — GABAPENTIN 300 MILLIGRAM(S): 400 CAPSULE ORAL at 05:55

## 2021-06-04 RX ADMIN — HYDROMORPHONE HYDROCHLORIDE 4 MILLIGRAM(S): 2 INJECTION INTRAMUSCULAR; INTRAVENOUS; SUBCUTANEOUS at 06:28

## 2021-06-04 RX ADMIN — Medication 325 MILLIGRAM(S): at 10:43

## 2021-06-04 NOTE — PROGRESS NOTE ADULT - ASSESSMENT
Patient is a 60y old  Female who presents with a chief complaint of Right  Ankle pain s/p Right ankle fracture 2/20 now had Right ankle Arthroplasty (01 Jun 2021 09:22). Consulted by     for VTE prophylaxis, risk stratification, and anticoagulation management.    Plan  :Lovenox 40mg sq daily until full weight bearing   :daily cbc/bmp  :LE Venodynes  : increase mobility as tolerated  :will f/u  : dispo :rehab [facility]

## 2021-06-04 NOTE — PROGRESS NOTE ADULT - SUBJECTIVE AND OBJECTIVE BOX
HPI:      Patient is a 60y old  Female who presents with a chief complaint of Right  Ankle pain s/p Right ankle fracture  now had Right ankle Arthroplasty (2021 09:22). Consulted by     for VTE prophylaxis, risk stratification, and anticoagulation management.    PAST MEDICAL & SURGICAL HISTORY:  Osteoarthritis    Seasonal allergies    Anxiety and depression    Hypertension  Have not taken medications for about 2 years    Asthma  Has not been admitted for have any asthma exacerbation for 10 years    Diverticulosis    H/O sinusitis    Lumbar herniated disc    Female incontinence    H/O spinal stenosis    COVID-19 vaccine series completed  [employer]--2nd vaccine 2021    Panic attacks    History of heart attack  Hospitalized 2020 after left ankle fracture, surgery, states developed pneumonia, ??mild kidney failure, was told had mild heart attack, recovered , no intervention    S/P cholecystectomy      S/P tubal ligation      S/P hip replacement  left---  right--    History of ankle surgery  right ankle 2/2020 x2 with hardware , harware removed 2021  - left ankle fusion 10/2020    History of cardiac cath  2020--normal coronary arteries    Interval History  2021: Patient seen on PACU , sleep through the conversation, denies any h/o VTE,Stroke, cancer, or bleeding, Discussed the necessity of Lovenox while NWB and the risks and benefits with patient. Verbalized understanding and is in agreement with treatment plan.  t seen at bedside on [facility].  Discussed her anticoagulation with Lovenox daily until she can bear wt on her rt ankle. Pt v/u the need.  States she will go to rehab on discharge unable to walk without assistance.   6-3-2021 Pt seen at bedside on [facility], states her ankle hurts and she was unable to ambulate today with physucal therapist.  Pt made aware she needs to move around in bed and use incentive spirometer.  Pt states she will go to rehab.  Discussed her lovenox as her anticoagulant medication.  Pt states she understands the need and that she must try to move as much as possible.    CrCl:82.8  BMI:34.2  EBL:20ml    Caprini VTE Risk Score:CAPRINI SCORE  AGE RELATED RISK FACTORS                                                       MOBILITY RELATED FACTORS  [x ] Age 41-60 years                                            (1 Point)                  [x ] Bed rest /restricted mobility                             (1 Point)  [ ] Age: 61-74 years                                           (2 Points)                [ ] Plaster cast                                                   (2 Points)  [ ] Age= 75 years                                              (3 Points)                 [ ] Bed bound for more than 72 hours                   (2 Points)    DISEASE RELATED RISK FACTORS                                               GENDER SPECIFIC FACTORS  [ ] Edema in the lower extremities                       (1 Point)           [ ] Pregnancy                                                            (1 Point)  [ ] Varicose veins                                               (1 Point)                  [ ] Post-partum < 6 weeks                                      (1 Point)             [x ] BMI > 25 Kg/m2                                            (1 Point)                  [ ] Hormonal therapy or oral contraception       (1 Point)                 [ ] Sepsis (in the previous month)                        (1 Point)             [ ] History of pregnancy complications                (1Point)  [ ] Pneumonia or serious lung disease                                             [ ] Unexplained or recurrent  (=/>3), premature                                 (In the previous month)                               (1 Point)                birth with toxemia or growth-restricted infant (1 Point)  [ ] Abnormal pulmonary function test            (1 Point)                                   SURGERY RELATED RISK FACTORS  [ ] Acute myocardial infarction                       (1 Point)                  [ ]  Section                                         (1 Point)  [ ] Congestive heart failure (in the previous month) (1 Point)   [ ] Minor surgery   lasting <45 minutes       (1 Point)   [ ] Inflammatory bowel disease                             (1 Point)          [ ] Arthroscopic surgery                                  (2 Points)  [ ] Central venous access                                    (2 Points)            [ ] General surgery lasting >45 minutes      (2 Points)       [ ] Stroke (in the previous month)                  (5 Points)            [x ] Elective major lower extremity arthroplasty (5 Points)                                   [  ] Malignancy (present or past include skin melanoma                                          but exclude  basal skin cell)    (2 points)                                      TRAUMA RELATED RISK FACTORS                HEMATOLOGY RELATED FACTORS                                  [ ] Fracture of the hip, pelvis, or leg                       (5 Points)  [ ] Prior episodes of VTE                                     (3 Points)          [ ] Acute spinal cord injury (in the previous month)  (5 Points)  [ ] Positive family history for VTE                         (3 Points)       [ ] Paralysis (less than 1 month)                          (5 Points)  [ ] Prothrombin 39861 A                                      (3 Points)         [ ] Multiple Trauma (within 1month)                 (5Points)                                                                                                                                                                [ ] Factor V Leiden                                          (3 Points)                                OTHER RISK FACTORS                          [ ] Lupus anticoagulants                                     (3 Points)                       [ ] BMI > 40                          (1 Point)                                                         [ ] Anticardiolipin antibodies                                (3 Points)                   [ ] Smoking                              (1Point)                                                [ ] High homocysteine in the blood                      (3 Points)                [  ] Diabetes requiring insulin (1point)                         [ ] Other congenital or acquired thrombophilia       (3 Points)          [  ] Chemotherapy                   (1 Point)  [ ] Heparin induced thrombocytopenia                  (3 Points)             [  ] Blood Transfusion                (1 point)                                                                                                             Total Score [    8      ]                                                                                                                                                                                                                                     IMPROVE Bleeding Risk Score::2.5      Time In: 9:19  Time Out: 12:47    Falls Risk:   High ( x )  Mod (  )  Low (  )      FAMILY HISTORY:  Family history of CVA (Father)  , age 88    Family history of COPD (chronic obstructive pulmonary disease) (Father)  and mother,  age 87    FH: heart disease (Father, Mother)  Brother,  , age 65    FH: lung cancer  sister, living, age 65      Denies any personal or familial history of clotting or bleeding disorders.    Allergies    No Known Drug Allergies  Seasonal allergies (Rhinorrhea)    Intolerances        REVIEW OF SYSTEMS    (  )Fever	     (  )Constipation	(  )SOB				(  )Headache	(  )Dysuria  (  )Chills	     (  )Melena	(  )Dyspnea present on exertion	                    (  )Dizziness                    (  )Polyuria  (  )Nausea	     (  )Hematochezia	(  )Cough			                    (  )Syncope   	(  )Hematuria  (  )Vomiting    (  )Chest Pain	(  )Wheezing			(x  )Weakness  (  )Diarrhea     (  )Palpitations	(  )Anorexia			( x )joint pain    All  other review of systems negative: Yes    Vital Signs Last 24 Hrs  T(C): 36.8 (21 @ 00:18), Max: 36.8 (21 @ 20:08)  T(F): 98.2 (21 @ 00:18), Max: 98.3 (21 @ 20:08)  HR: 91 (21 @ 00:18) (91 - 102)  BP: 101/58 (21 @ 00:18) (101/58 - 143/60)  BP(mean): --  RR: 16 (21 @ 00:18) (16 - 16)  SpO2: 96% (21 @ 00:18) (94% - 96%)    PHYSICAL EXAM:    Constitutional: Appears Well    Neurological: A& O x 3    Skin: Warm    Respiratory and Thorax: normal effort; Breath sounds: normal; No rales/wheezing/rhonchi  	  Cardiovascular: S1, S2, regular, NMBR	    Gastrointestinal: BS + x 4Q, nontender	    Genitourinary:  Bladder nondistended, nontender    Musculoskeletal:   General Right:   + muscle/joint tenderness,   normal tone, no joint swelling,   ROM: limited	    General Left:   no muscle/joint tenderness,   normal tone, no joint swelling,   ROM: full    Ankle:  Rt: Drsing CDI;  Cap refill good;  Sensation intact                       Lower extrems:   Right: no calf tenderness              negative melba's sign               + pedal pulses    Left:   no calf tenderness              negative melba's sign               + pedal pulses                                   9.8    9.39  )-----------( 247      ( 2021 05:53 )             30.0       06-02    138  |  105  |  11  ----------------------------<  118<H>  3.5   |  30  |  0.78    Ca    8.1<L>      2021 05:53                     12.3   14.39 )-----------( 332      ( 2021 13:18 )             38.0       06-01    139  |  108  |  13  ----------------------------<  159<H>  4.4   |  26  |  1.11    Ca    9.1      2021 13:19        MEDICATIONS  (STANDING):  acetaminophen   Tablet .. 975 milliGRAM(s) Oral every 6 hours  ascorbic acid 500 milliGRAM(s) Oral two times a day  celecoxib 200 milliGRAM(s) Oral every 12 hours  enoxaparin Injectable 40 milliGRAM(s) SubCutaneous daily  ferrous    sulfate 325 milliGRAM(s) Oral daily  folic acid 1 milliGRAM(s) Oral daily  gabapentin 300 milliGRAM(s) Oral three times a day  lactated ringers. 1000 milliLiter(s) IV Continuous <Continuous>  lamoTRIgine 150 milliGRAM(s) Oral daily  melatonin 5 milliGRAM(s) Oral at bedtime  multivitamin 1 Tablet(s) Oral daily  pantoprazole    Tablet 40 milliGRAM(s) Oral before breakfast  PARoxetine 60 milliGRAM(s) Oral daily  polyethylene glycol 3350 17 Gram(s) Oral at bedtime  senna 2 Tablet(s) Oral at bedtime  traZODone 150 milliGRAM(s) Oral at bedtime            DVT Prophylaxis:  LMWH                   ( x )  Heparin SQ           (  )  Coumadin             (  )  Xarelto                  (  )  Eliquis                   (  )  Venodynes           (  x)  Ambulation          ( x )  UFH                       (  )  Contraindicated  (  )  EC ASPIRIN       (  )          
CC- RT total ankle replacement    HPI:  59yo/F with PMH anxiety/depression, HTN, non-obstructive CAD, OA with prior b/l THR presented for elective RT TAR. Patient had RT ankle fracture requiring surgery in 2020 with subsequent removal of the hardware, had LT ankle fusion surgery. SHe states she was balancing her weight bearing that she had put more on her RT ankle and eventually was getting worsening of her pain. SHe was followed  as outpatient and scheduled for elective RT TAR. Medical consult called for postop medical management.     6/3: pt seen and examined this am. Having a lot of pain. Felt iv tylenol helped in past. no sob/chest pain. Tolerating po.     Review of system- All 10 systems reviewed and is as per HPI otherwise negative.     Vital Signs Last 24 Hrs  T(C): 36.8 (03 Jun 2021 00:18), Max: 36.8 (02 Jun 2021 20:08)  T(F): 98.2 (03 Jun 2021 00:18), Max: 98.3 (02 Jun 2021 20:08)  HR: 91 (03 Jun 2021 00:18) (91 - 102)  BP: 101/58 (03 Jun 2021 00:18) (101/58 - 143/60)  RR: 16 (03 Jun 2021 00:18) (16 - 16)  SpO2: 96% (03 Jun 2021 00:18) (94% - 96%)    PHYSICAL EXAM:    GENERAL: uncomfortable, in pain.   HEAD:  Normocephalic, atraumatic  EYES: EOMI, PERRLA  HEENT: Moist mucous membranes  NECK: Supple, No JVD  NERVOUS SYSTEM:  Alert & Oriented X3, non focal.   CHEST/LUNG: Clear to auscultation bilaterally  HEART: Regular rate and rhythm  ABDOMEN: Soft, Nontender, Nondistended, Bowel sounds present  GENITOURINARY: Voiding, no palpable bladder  EXTREMITIES:   No clubbing, cyanosis, or edema  MUSCULOSKELETAL- right ankle in splint  SKIN-no rash    LABS:                        9.0    5.22  )-----------( 188      ( 03 Jun 2021 07:40 )             27.9     06-03    142  |  109<H>  |  14  ----------------------------<  109<H>  3.9   |  30  |  0.55    Ca    8.7      03 Jun 2021 07:40      MEDICATIONS  (STANDING):  acetaminophen   Tablet .. 975 milliGRAM(s) Oral every 6 hours  ascorbic acid 500 milliGRAM(s) Oral two times a day  celecoxib 200 milliGRAM(s) Oral every 12 hours  enoxaparin Injectable 40 milliGRAM(s) SubCutaneous daily  ferrous    sulfate 325 milliGRAM(s) Oral daily  folic acid 1 milliGRAM(s) Oral daily  gabapentin 300 milliGRAM(s) Oral three times a day  lactated ringers. 1000 milliLiter(s) (75 mL/Hr) IV Continuous <Continuous>  lamoTRIgine 150 milliGRAM(s) Oral daily  melatonin 5 milliGRAM(s) Oral at bedtime  multivitamin 1 Tablet(s) Oral daily  pantoprazole    Tablet 40 milliGRAM(s) Oral before breakfast  PARoxetine 60 milliGRAM(s) Oral daily  polyethylene glycol 3350 17 Gram(s) Oral at bedtime  senna 2 Tablet(s) Oral at bedtime  traZODone 150 milliGRAM(s) Oral at bedtime    MEDICATIONS  (PRN):  ALPRAZolam 0.5 milliGRAM(s) Oral every 8 hours PRN anxiety  bisacodyl Suppository 10 milliGRAM(s) Rectal once PRN Constipation  cyclobenzaprine 10 milliGRAM(s) Oral three times a day PRN Muscle Spasm  HYDROmorphone   Tablet 4 milliGRAM(s) Oral every 3 hours PRN Moderate Pain (4 - 6)  HYDROmorphone  Injectable 1 milliGRAM(s) SubCutaneous every 4 hours PRN Severe Pain (7 - 10)  ondansetron Injectable 8 milliGRAM(s) IV Push every 8 hours PRN Nausea and/or Vomiting  oxyCODONE    IR 5 milliGRAM(s) Oral every 4 hours PRN Mild Pain (1 - 3)  prochlorperazine   Injectable 10 milliGRAM(s) IV Push every 8 hours PRN Nausea and/or Vomiting    Assessment/Plan  #S/p RT total ankle replacement  #Anemia acute blood loss postop  -pain control  -physical therapy  -incentive spirometry  -bowel regimen.     #Anxiety/depression  -continue lamictal, paroxetine, trazodone.    #DVT proph- Lovenox SQ    
CC- RT total ankle replacement    HPI:  59yo/F with PMH anxiety/depression, HTN, non-obstructive CAD, OA with prior b/l THR presented for elective RT TAR. Patient had RT ankle fracture requiring surgery in 2020 with subsequent removal of the hardware, had LT ankle fusion surgery. SHe states she was balancing her weight bearing that she had put more on her RT ankle and eventually was getting worsening of her pain. SHe was followed p as outpatient and scheduled for elective RT TAR. Medical consult called for postop medical management    PMH- as above  PSH- RT ankle surgery, LT ankle fusion, RT THR 2017, LT THR 2015, cholecystectomy  Soc hx- denies smoking, alcohol socially  Fam hx- f had CAD, CVA, COPD, m had CAD    6/1/21- seen in PACU, had pain in the postop area  6/2/21 c/o a lot of pain in the postop area    Review of system- All 10 systems reviewed and is as per HPI otherwise negative.     Vital Signs Last 24 Hrs  T(C): 36.7 (02 Jun 2021 08:59), Max: 37.2 (01 Jun 2021 17:40)  T(F): 98 (02 Jun 2021 08:59), Max: 98.9 (01 Jun 2021 17:40)  HR: 92 (02 Jun 2021 08:59) (81 - 100)  BP: 98/55 (02 Jun 2021 08:59) (98/55 - 146/99)  BP(mean): --  RR: 16 (02 Jun 2021 08:59) (13 - 24)  SpO2: 98% (02 Jun 2021 08:59) (95% - 100%)    LABS:                        9.8    9.39  )-----------( 247      ( 02 Jun 2021 05:53 )             30.0     02 Jun 2021 05:53    138    |  105    |  11     ----------------------------<  118    3.5     |  30     |  0.78     Ca    8.1        02 Jun 2021 05:53    CAPILLARY BLOOD GLUCOSE  POCT Blood Glucose.: 134 mg/dL (01 Jun 2021 21:35)    RADIOLOGY & ADDITIONAL TESTS:      PHYSICAL EXAM:  GENERAL: NAD, well-groomed, well-developed  HEAD:  Atraumatic, Normocephalic  EYES: EOMI, PERRLA, conjunctiva and sclera clear  HEENT: Moist mucous membranes  NECK: Supple, No JVD  NERVOUS SYSTEM:  Alert & Oriented X3, Motor Strength 5/5 B/L upper and lower extremities; DTRs 2+ intact and symmetric  CHEST/LUNG: Clear to auscultation bilaterally; No rales, rhonchi, wheezing, or rubs  HEART: Regular rate and rhythm; No murmurs, rubs, or gallops  ABDOMEN: Soft, Nontender, Nondistended; Bowel sounds present  GENITOURINARY- Voiding, no palpable bladder  EXTREMITIES:  2+ Peripheral Pulses, No clubbing, cyanosis, or edema  MUSCULOSKELTAL- RLE in splint, moving toes  SKIN-no rash, no lesion  CNS- alert, oriented X3, non focal     Daily Height in cm: 167.64 (01 Jun 2021 07:14)      MEDICATIONS  (STANDING):  acetaminophen   Tablet .. 975 milliGRAM(s) Oral every 6 hours  ascorbic acid 500 milliGRAM(s) Oral two times a day  celecoxib 200 milliGRAM(s) Oral every 12 hours  enoxaparin Injectable 40 milliGRAM(s) SubCutaneous daily  ferrous    sulfate 325 milliGRAM(s) Oral daily  folic acid 1 milliGRAM(s) Oral daily  gabapentin 300 milliGRAM(s) Oral three times a day  lactated ringers. 1000 milliLiter(s) (75 mL/Hr) IV Continuous <Continuous>  lamoTRIgine 150 milliGRAM(s) Oral daily  melatonin 5 milliGRAM(s) Oral at bedtime  multivitamin 1 Tablet(s) Oral daily  pantoprazole    Tablet 40 milliGRAM(s) Oral before breakfast  PARoxetine 10 milliGRAM(s) Oral once  polyethylene glycol 3350 17 Gram(s) Oral at bedtime  senna 2 Tablet(s) Oral at bedtime  traZODone 150 milliGRAM(s) Oral at bedtime    MEDICATIONS  (PRN):  ALPRAZolam 0.25 milliGRAM(s) Oral every 12 hours PRN anxiety  cyclobenzaprine 10 milliGRAM(s) Oral three times a day PRN Muscle Spasm  HYDROmorphone   Tablet 4 milliGRAM(s) Oral every 3 hours PRN Moderate Pain (4 - 6)  HYDROmorphone  Injectable 1 milliGRAM(s) SubCutaneous every 4 hours PRN Severe Pain (7 - 10)  ondansetron Injectable 8 milliGRAM(s) IV Push every 8 hours PRN Nausea and/or Vomiting  oxyCODONE    IR 5 milliGRAM(s) Oral every 4 hours PRN Mild Pain (1 - 3)  prochlorperazine   Injectable 10 milliGRAM(s) IV Push every 8 hours PRN Nausea and/or Vomiting    Assessment/Plan  #S/p RT total ankle replacement  #Anemia acute blood loss postop  Ortho f/u appreciated  Pain meds prn- switched to PO dilaudid  PT eval- NWB to RLE  Monitor HH   AC by Lovenox  Incentive spirometry  Bowel regimen    #Anxiety/depression  Cont home meds    #DVT proph- Lovenox SQ    #Dispo- adjust pain regimen, mobilize with PT, likely home with home PT once pain is controlled. D/w pt and ortho team    
Orthopedics Post-op Check    POD 1 Total Ankle Arthroplasty  Pain is not well controlled. Pt has opiate tolerance from preop was taking 10mg  oxy IR at home.  No nausea or vomiting.     Vital Signs Last 24 Hrs  T(C): 36.7 (06-02-21 @ 08:59), Max: 37.2 (06-01-21 @ 17:40)  T(F): 98 (06-02-21 @ 08:59), Max: 98.9 (06-01-21 @ 17:40)  HR: 92 (06-02-21 @ 08:59) (81 - 130)  BP: 98/55 (06-02-21 @ 08:59) (98/55 - 146/99)  BP(mean): --  RR: 16 (06-02-21 @ 08:59) (13 - 24)  SpO2: 98% (06-02-21 @ 08:59) (95% - 100%)                        9.8    9.39  )-----------( 247      ( 02 Jun 2021 05:53 )             30.0     02 Jun 2021 05:53    138    |  105    |  11     ----------------------------<  118    3.5     |  30     |  0.78     Ca    8.1        02 Jun 2021 05:53          Exam:  NAD AAOx3  Splint/Dressing clean and dry  Toes warm to touch distally  +EHL FHL    A/P:  Stable POD 1 RIGHT Total Ankle Arthroplasty  -Webril was split and splint loosened rewrapped loosely with improved comfort  -Admit to ortho for injectable analgesia and PT   -NWB on surgical side  -Analgesia increase dilaudid and changed to SC. also changed OXy to po dilaudid  -Ppx ABX  -DVT PE ppx  -Incentive spirometry  -Above discussed with Dr Knight  -[facility] PT
  HPI:      Patient is a 60y old  Female who presents with a chief complaint of Right  Ankle pain s/p Right ankle fracture  now had Right ankle Arthroplasty (2021 09:22). Consulted by     for VTE prophylaxis, risk stratification, and anticoagulation management.    PAST MEDICAL & SURGICAL HISTORY:  Osteoarthritis    Seasonal allergies    Anxiety and depression    Hypertension  Have not taken medications for about 2 years    Asthma  Has not been admitted for have any asthma exacerbation for 10 years    Diverticulosis    H/O sinusitis    Lumbar herniated disc    Female incontinence    H/O spinal stenosis    COVID-19 vaccine series completed  [employer]--2nd vaccine 2021    Panic attacks    History of heart attack  Hospitalized 2020 after left ankle fracture, surgery, states developed pneumonia, ??mild kidney failure, was told had mild heart attack, recovered , no intervention    S/P cholecystectomy      S/P tubal ligation      S/P hip replacement  left---  right--    History of ankle surgery  right ankle 2/2020 x2 with hardware , harware removed 2021  - left ankle fusion 10/2020    History of cardiac cath  2020--normal coronary arteries    Interval History  2021: Patient seen on PACU , sleep through the conversation, denies any h/o VTE,Stroke, cancer, or bleeding, Discussed the necessity of Lovenox while NWB and the risks and benefits with patient. Verbalized understanding and is in agreement with treatment plan.  t seen at bedside on [facility].  Discussed her anticoagulation with Lovenox daily until she can bear wt on her rt ankle. Pt v/u the need.  States she will go to rehab on discharge unable to walk without assistance.   6-3-2021 Pt seen at bedside on [facility], states her ankle hurts and she was unable to ambulate today with physucal therapist.  Pt made aware she needs to move around in bed and use incentive spirometer.  Pt states she will go to rehab.  Discussed her lovenox as her anticoagulant medication.  Pt states she understands the need and that she must try to move as much as possible.  2021: Pt angélica t bedside on [facility], no changes still in a lot of pain unable to get oob.  reinforced need to get out of bed.  Discussed her anticoagulation with Lovenox  Pt awaiting rehab placement in [facility]    CrCl:82.8  BMI:34.2  EBL:20ml    Caprini VTE Risk Score:CAPRINI SCORE  AGE RELATED RISK FACTORS                                                       MOBILITY RELATED FACTORS  [x ] Age 41-60 years                                            (1 Point)                  [x ] Bed rest /restricted mobility                             (1 Point)  [ ] Age: 61-74 years                                           (2 Points)                [ ] Plaster cast                                                   (2 Points)  [ ] Age= 75 years                                              (3 Points)                 [ ] Bed bound for more than 72 hours                   (2 Points)    DISEASE RELATED RISK FACTORS                                               GENDER SPECIFIC FACTORS  [ ] Edema in the lower extremities                       (1 Point)           [ ] Pregnancy                                                            (1 Point)  [ ] Varicose veins                                               (1 Point)                  [ ] Post-partum < 6 weeks                                      (1 Point)             [x ] BMI > 25 Kg/m2                                            (1 Point)                  [ ] Hormonal therapy or oral contraception       (1 Point)                 [ ] Sepsis (in the previous month)                        (1 Point)             [ ] History of pregnancy complications                (1Point)  [ ] Pneumonia or serious lung disease                                             [ ] Unexplained or recurrent  (=/>3), premature                                 (In the previous month)                               (1 Point)                birth with toxemia or growth-restricted infant (1 Point)  [ ] Abnormal pulmonary function test            (1 Point)                                   SURGERY RELATED RISK FACTORS  [ ] Acute myocardial infarction                       (1 Point)                  [ ]  Section                                         (1 Point)  [ ] Congestive heart failure (in the previous month) (1 Point)   [ ] Minor surgery   lasting <45 minutes       (1 Point)   [ ] Inflammatory bowel disease                             (1 Point)          [ ] Arthroscopic surgery                                  (2 Points)  [ ] Central venous access                                    (2 Points)            [ ] General surgery lasting >45 minutes      (2 Points)       [ ] Stroke (in the previous month)                  (5 Points)            [x ] Elective major lower extremity arthroplasty (5 Points)                                   [  ] Malignancy (present or past include skin melanoma                                          but exclude  basal skin cell)    (2 points)                                      TRAUMA RELATED RISK FACTORS                HEMATOLOGY RELATED FACTORS                                  [ ] Fracture of the hip, pelvis, or leg                       (5 Points)  [ ] Prior episodes of VTE                                     (3 Points)          [ ] Acute spinal cord injury (in the previous month)  (5 Points)  [ ] Positive family history for VTE                         (3 Points)       [ ] Paralysis (less than 1 month)                          (5 Points)  [ ] Prothrombin 31347 A                                      (3 Points)         [ ] Multiple Trauma (within 1month)                 (5Points)                                                                                                                                                                [ ] Factor V Leiden                                          (3 Points)                                OTHER RISK FACTORS                          [ ] Lupus anticoagulants                                     (3 Points)                       [ ] BMI > 40                          (1 Point)                                                         [ ] Anticardiolipin antibodies                                (3 Points)                   [ ] Smoking                              (1Point)                                                [ ] High homocysteine in the blood                      (3 Points)                [  ] Diabetes requiring insulin (1point)                         [ ] Other congenital or acquired thrombophilia       (3 Points)          [  ] Chemotherapy                   (1 Point)  [ ] Heparin induced thrombocytopenia                  (3 Points)             [  ] Blood Transfusion                (1 point)                                                                                                             Total Score [    8      ]                                                                                                                                                                                                                                     IMPROVE Bleeding Risk Score::2.5      Time In: 9:19  Time Out: 12:47    Falls Risk:   High ( x )  Mod (  )  Low (  )      FAMILY HISTORY:  Family history of CVA (Father)  , age 88    Family history of COPD (chronic obstructive pulmonary disease) (Father)  and mother,  age 87    FH: heart disease (Father, Mother)  Brother,  , age 65    FH: lung cancer  sister, living, age 65      Denies any personal or familial history of clotting or bleeding disorders.    Allergies    No Known Drug Allergies  Seasonal allergies (Rhinorrhea)    Intolerances        REVIEW OF SYSTEMS    (  )Fever	     (  )Constipation	(  )SOB				(  )Headache	(  )Dysuria  (  )Chills	     (  )Melena	(  )Dyspnea present on exertion	                    (  )Dizziness                    (  )Polyuria  (  )Nausea	     (  )Hematochezia	(  )Cough			                    (  )Syncope   	(  )Hematuria  (  )Vomiting    (  )Chest Pain	(  )Wheezing			(x  )Weakness  (  )Diarrhea     (  )Palpitations	(  )Anorexia			( x )joint pain    All  other review of systems negative: Yes    Vital Signs Last 24 Hrs  T(C): 36.6 (21 @ 10:48), Max: 36.8 (21 @ 20:20)  T(F): 97.9 (21 @ 10:48), Max: 98.2 (21 @ 20:20)  HR: 87 (21 @ 10:48) (78 - 87)  BP: 107/74 (21 @ 10:48) (101/60 - 107/74)  BP(mean): --  RR: 16 (21 @ 10:48) (16 - 16)  SpO2: 97% (21 @ 10:48) (95% - 97%)    PHYSICAL EXAM:    Constitutional: Appears Well    Neurological: A& O x 3    Skin: Warm    Respiratory and Thorax: normal effort; Breath sounds: normal; No rales/wheezing/rhonchi  	  Cardiovascular: S1, S2, regular, NMBR	    Gastrointestinal: BS + x 4Q, nontender	    Genitourinary:  Bladder nondistended, nontender    Musculoskeletal:   General Right:   + muscle/joint tenderness,   normal tone, no joint swelling,   ROM: limited	    General Left:   no muscle/joint tenderness,   normal tone, no joint swelling,   ROM: full    Ankle:  Rt: Drsing CDI;  Cap refill good;  Sensation intact                       Lower extrems:   Right: no calf tenderness              negative melba's sign               + pedal pulses    Left:   no calf tenderness              negative melba's sign               + pedal pulses                                            9.1    6.91  )-----------( 220      ( 2021 07:27 )             28.1       06-03    142  |  109<H>  |  14  ----------------------------<  109<H>  3.9   |  30  |  0.55    Ca    8.7      2021 07:40                     9.8    9.39  )-----------( 247      ( 2021 05:53 )             30.0       06-02    138  |  105  |  11  ----------------------------<  118<H>  3.5   |  30  |  0.78    Ca    8.1<L>      2021 05:53                     12.3   14.39 )-----------( 332      ( 2021 13:18 )             38.0       06-01    139  |  108  |  13  ----------------------------<  159<H>  4.4   |  26  |  1.11    Ca    9.1      2021 13:19                    DVT Prophylaxis:  LMWH                   ( x )  Heparin SQ           (  )  Coumadin             (  )  Xarelto                  (  )  Eliquis                   (  )  Venodynes           (  x)  Ambulation          ( x )  UFH                       (  )  Contraindicated  (  )  EC ASPIRIN       (  )          
pt seen at bedside comfortable in NAD, pain to right ankle controlled with medication., denies N/T RLE no other complaints  no major overnight events  change in meds, adding oxy ER, helpful so far    Vital Signs Last 24 Hrs  T(C): 37 (02 Jun 2021 00:26), Max: 37.2 (01 Jun 2021 17:40)  T(F): 98.6 (02 Jun 2021 00:26), Max: 98.9 (01 Jun 2021 17:40)  HR: 88 (02 Jun 2021 00:26) (81 - 130)  BP: 123/83 (02 Jun 2021 00:26) (103/75 - 146/99)  BP(mean): --  RR: 16 (02 Jun 2021 00:26) (13 - 24)  SpO2: 98% (02 Jun 2021 00:26) (96% - 100%)    PE right ankle   splint intact  + ROM toes  cap refill brisk   SILT   toes warm   
CC- RT total ankle replacement    HPI:  61yo/F with PMH anxiety/depression, HTN, non-obstructive CAD, OA with prior b/l THR presented for elective RT TAR. Patient had RT ankle fracture requiring surgery in 2020 with subsequent removal of the hardware, had LT ankle fusion surgery. SHe states she was balancing her weight bearing that she had put more on her RT ankle and eventually was getting worsening of her pain. SHe was followed  as outpatient and scheduled for elective RT TAR. Medical consult called for postop medical management.     6/4: pt seen and examined this am. Felt a little better than yesterday in terms of pain. Didnt' sleep well d/t pain at surgical site. no sob/chest pain. no difficulty voiding.     Review of system- All 10 systems reviewed and is as per HPI otherwise negative.     Vital Signs Last 24 Hrs  T(C): 36.6 (04 Jun 2021 10:48), Max: 36.8 (03 Jun 2021 20:20)  T(F): 97.9 (04 Jun 2021 10:48), Max: 98.2 (03 Jun 2021 20:20)  HR: 87 (04 Jun 2021 10:48) (78 - 87)  BP: 107/74 (04 Jun 2021 10:48) (101/60 - 107/74)  RR: 16 (04 Jun 2021 10:48) (16 - 16)  SpO2: 97% (04 Jun 2021 10:48) (95% - 97%)    PHYSICAL EXAM:    GENERAL: comfortable, nad  HEAD:  Normocephalic, atraumatic  EYES: EOMI, PERRLA  HEENT: Moist mucous membranes  NECK: Supple, No JVD  NERVOUS SYSTEM:  Alert & Oriented X3, non focal.   CHEST/LUNG: Clear to auscultation bilaterally  HEART: Regular rate and rhythm  ABDOMEN: Soft, Nontender, Nondistended, Bowel sounds present  GENITOURINARY: Voiding, no palpable bladder  EXTREMITIES:   No clubbing, cyanosis, or edema  MUSCULOSKELETAL- right ankle in splint  SKIN-no rash    LABS:                        9.1    6.91  )-----------( 220      ( 04 Jun 2021 07:27 )             28.1     06-03    142  |  109<H>  |  14  ----------------------------<  109<H>  3.9   |  30  |  0.55    Ca    8.7      03 Jun 2021 07:40          MEDICATIONS  (STANDING):  acetaminophen   Tablet .. 975 milliGRAM(s) Oral every 6 hours  ascorbic acid 500 milliGRAM(s) Oral two times a day  celecoxib 200 milliGRAM(s) Oral every 12 hours  enoxaparin Injectable 40 milliGRAM(s) SubCutaneous daily  ferrous    sulfate 325 milliGRAM(s) Oral daily  folic acid 1 milliGRAM(s) Oral daily  gabapentin 300 milliGRAM(s) Oral three times a day  lactated ringers. 1000 milliLiter(s) (75 mL/Hr) IV Continuous <Continuous>  lamoTRIgine 150 milliGRAM(s) Oral daily  melatonin 5 milliGRAM(s) Oral at bedtime  multivitamin 1 Tablet(s) Oral daily  pantoprazole    Tablet 40 milliGRAM(s) Oral before breakfast  PARoxetine 60 milliGRAM(s) Oral daily  polyethylene glycol 3350 17 Gram(s) Oral at bedtime  senna 2 Tablet(s) Oral at bedtime  traZODone 150 milliGRAM(s) Oral at bedtime    MEDICATIONS  (PRN):  ALPRAZolam 0.5 milliGRAM(s) Oral every 8 hours PRN anxiety  bisacodyl Suppository 10 milliGRAM(s) Rectal once PRN Constipation  cyclobenzaprine 10 milliGRAM(s) Oral three times a day PRN Muscle Spasm  HYDROmorphone   Tablet 4 milliGRAM(s) Oral every 3 hours PRN Moderate Pain (4 - 6)  HYDROmorphone  Injectable 1 milliGRAM(s) SubCutaneous every 4 hours PRN Severe Pain (7 - 10)  ondansetron Injectable 8 milliGRAM(s) IV Push every 8 hours PRN Nausea and/or Vomiting  oxyCODONE    IR 5 milliGRAM(s) Oral every 4 hours PRN Mild Pain (1 - 3)  prochlorperazine   Injectable 10 milliGRAM(s) IV Push every 8 hours PRN Nausea and/or Vomiting    Assessment/Plan  #S/p RT total ankle replacement  #Anemia acute blood loss postop  -pain control  -physical therapy  -incentive spirometry  -bowel regimen.     #Anxiety/depression  -continue lamictal, paroxetine, trazodone.    #DVT proph- Lovenox SQ    #dispo: [facility] once auth obtained    
Orthopedics    pt seen at bedside comfortable in NAD, patient sleeping at the time of exam and in no obvious pain., denies N/T RLE no other complaints  no major overnight events  change in meds, oxy 10 and tylenol,    Vital Signs Last 24 Hrs  T(C): 36.8 (03 Jun 2021 00:18), Max: 36.8 (02 Jun 2021 20:08)  T(F): 98.2 (03 Jun 2021 00:18), Max: 98.3 (02 Jun 2021 20:08)  HR: 91 (03 Jun 2021 00:18) (91 - 102)  BP: 101/58 (03 Jun 2021 00:18) (98/55 - 143/60)  BP(mean): --  RR: 16 (03 Jun 2021 00:18) (16 - 16)  SpO2: 96% (03 Jun 2021 00:18) (94% - 98%)    PE right ankle   splint intact  + ROM toes  cap refill brisk   SILT   toes warm   
Orthopedics    pt seen at bedside comfortable in NAD, patient in no obvious pain., denies N/T RLE no other complaints  no major overnight events  change in meds, oxy 10 and tylenol, tolerating well.    Vital Signs Last 24 Hrs  T(C): 36.8 (03 Jun 2021 00:18), Max: 36.8 (02 Jun 2021 20:08)  T(F): 98.2 (03 Jun 2021 00:18), Max: 98.3 (02 Jun 2021 20:08)  HR: 91 (03 Jun 2021 00:18) (91 - 102)  BP: 101/58 (03 Jun 2021 00:18) (98/55 - 143/60)  BP(mean): --  RR: 16 (03 Jun 2021 00:18) (16 - 16)  SpO2: 96% (03 Jun 2021 00:18) (94% - 98%)    PE right ankle   splint intact  + ROM toes  cap refill brisk   SILT   toes warm   
pt seen at bedside comfortable in NAD, pain to right ankle controlled with medication., denies N/T RLE no other complaints    PE right ankle   splint intact  + ROM toes  cap refill brisk   SILT   toes warm   
  HPI:      Patient is a 60y old  Female who presents with a chief complaint of Right  Ankle pain s/p Right ankle fracture  now had Right ankle Arthroplasty (2021 09:22). Consulted by     for VTE prophylaxis, risk stratification, and anticoagulation management.    PAST MEDICAL & SURGICAL HISTORY:  Osteoarthritis    Seasonal allergies    Anxiety and depression    Hypertension  Have not taken medications for about 2 years    Asthma  Has not been admitted for have any asthma exacerbation for 10 years    Diverticulosis    H/O sinusitis    Lumbar herniated disc    Female incontinence    H/O spinal stenosis    COVID-19 vaccine series completed  [employer]--2nd vaccine 2021    Panic attacks    History of heart attack  Hospitalized 2020 after left ankle fracture, surgery, states developed pneumonia, ??mild kidney failure, was told had mild heart attack, recovered , no intervention    S/P cholecystectomy      S/P tubal ligation      S/P hip replacement  left---  right--    History of ankle surgery  right ankle 2/2020 x2 with hardware , harware removed 2021  - left ankle fusion 10/2020    History of cardiac cath  2020--normal coronary arteries    Interval History  2021: Patient seen on PACU , sleep through the conversation, denies any h/o VTE,Stroke, cancer, or bleeding, Discussed the necessity of Lovenox while NWB and the risks and benefits with patient. Verbalized understanding and is in agreement with treatment plan.  t seen at bedside on 2north.  Discussed her anticoagulation with Lovenox daily until she can bear wt on her rt ankle. Pt v/u the need.  States she will go to rehab on discharge unable to walk without assistance.     CrCl:82.8  BMI:34.2  EBL:20ml    Caprini VTE Risk Score:CAPRINI SCORE  AGE RELATED RISK FACTORS                                                       MOBILITY RELATED FACTORS  [x ] Age 41-60 years                                            (1 Point)                  [x ] Bed rest /restricted mobility                             (1 Point)  [ ] Age: 61-74 years                                           (2 Points)                [ ] Plaster cast                                                   (2 Points)  [ ] Age= 75 years                                              (3 Points)                 [ ] Bed bound for more than 72 hours                   (2 Points)    DISEASE RELATED RISK FACTORS                                               GENDER SPECIFIC FACTORS  [ ] Edema in the lower extremities                       (1 Point)           [ ] Pregnancy                                                            (1 Point)  [ ] Varicose veins                                               (1 Point)                  [ ] Post-partum < 6 weeks                                      (1 Point)             [x ] BMI > 25 Kg/m2                                            (1 Point)                  [ ] Hormonal therapy or oral contraception       (1 Point)                 [ ] Sepsis (in the previous month)                        (1 Point)             [ ] History of pregnancy complications                (1Point)  [ ] Pneumonia or serious lung disease                                             [ ] Unexplained or recurrent  (=/>3), premature                                 (In the previous month)                               (1 Point)                birth with toxemia or growth-restricted infant (1 Point)  [ ] Abnormal pulmonary function test            (1 Point)                                   SURGERY RELATED RISK FACTORS  [ ] Acute myocardial infarction                       (1 Point)                  [ ]  Section                                         (1 Point)  [ ] Congestive heart failure (in the previous month) (1 Point)   [ ] Minor surgery   lasting <45 minutes       (1 Point)   [ ] Inflammatory bowel disease                             (1 Point)          [ ] Arthroscopic surgery                                  (2 Points)  [ ] Central venous access                                    (2 Points)            [ ] General surgery lasting >45 minutes      (2 Points)       [ ] Stroke (in the previous month)                  (5 Points)            [x ] Elective major lower extremity arthroplasty (5 Points)                                   [  ] Malignancy (present or past include skin melanoma                                          but exclude  basal skin cell)    (2 points)                                      TRAUMA RELATED RISK FACTORS                HEMATOLOGY RELATED FACTORS                                  [ ] Fracture of the hip, pelvis, or leg                       (5 Points)  [ ] Prior episodes of VTE                                     (3 Points)          [ ] Acute spinal cord injury (in the previous month)  (5 Points)  [ ] Positive family history for VTE                         (3 Points)       [ ] Paralysis (less than 1 month)                          (5 Points)  [ ] Prothrombin 11513 A                                      (3 Points)         [ ] Multiple Trauma (within 1month)                 (5Points)                                                                                                                                                                [ ] Factor V Leiden                                          (3 Points)                                OTHER RISK FACTORS                          [ ] Lupus anticoagulants                                     (3 Points)                       [ ] BMI > 40                          (1 Point)                                                         [ ] Anticardiolipin antibodies                                (3 Points)                   [ ] Smoking                              (1Point)                                                [ ] High homocysteine in the blood                      (3 Points)                [  ] Diabetes requiring insulin (1point)                         [ ] Other congenital or acquired thrombophilia       (3 Points)          [  ] Chemotherapy                   (1 Point)  [ ] Heparin induced thrombocytopenia                  (3 Points)             [  ] Blood Transfusion                (1 point)                                                                                                             Total Score [    8      ]                                                                                                                                                                                                                                     IMPROVE Bleeding Risk Score::2.5      Time In: 9:19  Time Out: 12:47    Falls Risk:   High ( x )  Mod (  )  Low (  )      FAMILY HISTORY:  Family history of CVA (Father)  , age 88    Family history of COPD (chronic obstructive pulmonary disease) (Father)  and mother,  age 87    FH: heart disease (Father, Mother)  Brother,  , age 65    FH: lung cancer  sister, living, age 65      Denies any personal or familial history of clotting or bleeding disorders.    Allergies    No Known Drug Allergies  Seasonal allergies (Rhinorrhea)    Intolerances        REVIEW OF SYSTEMS    (  )Fever	     (  )Constipation	(  )SOB				(  )Headache	(  )Dysuria  (  )Chills	     (  )Melena	(  )Dyspnea present on exertion	                    (  )Dizziness                    (  )Polyuria  (  )Nausea	     (  )Hematochezia	(  )Cough			                    (  )Syncope   	(  )Hematuria  (  )Vomiting    (  )Chest Pain	(  )Wheezing			(x  )Weakness  (  )Diarrhea     (  )Palpitations	(  )Anorexia			( x )joint pain    All  other review of systems negative: Yes    Vital Signs Last 24 Hrs  T(C): 36.7 (21 @ 08:59), Max: 37.2 (21 @ 17:40)  T(F): 98 (21 @ 08:59), Max: 98.9 (21 @ 17:40)  HR: 92 (21 @ 08:59) (81 - 101)  BP: 98/55 (21 @ 08:59) (98/55 - 146/99)  BP(mean): --  RR: 16 (21 @ 08:59) (13 - 24)  SpO2: 98% (21 @ 08:59) (95% - 100%)    PHYSICAL EXAM:    Constitutional: Appears Well    Neurological: A& O x 3    Skin: Warm    Respiratory and Thorax: normal effort; Breath sounds: normal; No rales/wheezing/rhonchi  	  Cardiovascular: S1, S2, regular, NMBR	    Gastrointestinal: BS + x 4Q, nontender	    Genitourinary:  Bladder nondistended, nontender    Musculoskeletal:   General Right:   + muscle/joint tenderness,   normal tone, no joint swelling,   ROM: limited	    General Left:   no muscle/joint tenderness,   normal tone, no joint swelling,   ROM: full    Ankle:  Rt: Drsing CDI;  Cap refill good;  Sensation intact                       Lower extrems:   Right: no calf tenderness              negative melba's sign               + pedal pulses    Left:   no calf tenderness              negative melba's sign               + pedal pulses                                   9.8    9.39  )-----------( 247      ( 2021 05:53 )             30.0       06-02    138  |  105  |  11  ----------------------------<  118<H>  3.5   |  30  |  0.78    Ca    8.1<L>      2021 05:53                     12.3   14.39 )-----------( 332      ( 2021 13:18 )             38.0       06-01    139  |  108  |  13  ----------------------------<  159<H>  4.4   |  26  |  1.11    Ca    9.1      2021 13:19        MEDICATIONS  (STANDING):  acetaminophen   Tablet .. 975 milliGRAM(s) Oral every 6 hours  ascorbic acid 500 milliGRAM(s) Oral two times a day  celecoxib 200 milliGRAM(s) Oral every 12 hours  enoxaparin Injectable 40 milliGRAM(s) SubCutaneous daily  ferrous    sulfate 325 milliGRAM(s) Oral daily  folic acid 1 milliGRAM(s) Oral daily  gabapentin 300 milliGRAM(s) Oral three times a day  lactated ringers. 1000 milliLiter(s) IV Continuous <Continuous>  lamoTRIgine 150 milliGRAM(s) Oral daily  melatonin 5 milliGRAM(s) Oral at bedtime  multivitamin 1 Tablet(s) Oral daily  pantoprazole    Tablet 40 milliGRAM(s) Oral before breakfast  PARoxetine 10 milliGRAM(s) Oral once  polyethylene glycol 3350 17 Gram(s) Oral at bedtime  senna 2 Tablet(s) Oral at bedtime  traZODone 150 milliGRAM(s) Oral at bedtime          DVT Prophylaxis:  LMWH                   ( x )  Heparin SQ           (  )  Coumadin             (  )  Xarelto                  (  )  Eliquis                   (  )  Venodynes           (  x)  Ambulation          ( x )  UFH                       (  )  Contraindicated  (  )  EC ASPIRIN       (  )

## 2021-06-04 NOTE — PROGRESS NOTE ADULT - PROVIDER SPECIALTY LIST ADULT
Anticoag Management
Orthopedics
Orthopedics
Hospitalist
Hospitalist
Orthopedics
Orthopedics
Anticoag Management
Hospitalist
Anticoag Management
Orthopedics

## 2021-06-04 NOTE — PROGRESS NOTE ADULT - ASSESSMENT
A: 60F s/p Right total ankle arthroplasty POD #3    P;  NWB RLE in trilam  PT/OT as tolerated  DVt ppx   venodynes  incentive spirometer   pain control   follow labs   elevation RLE   dispo planning: [facility] today, COVID neg.

## 2021-06-04 NOTE — PROGRESS NOTE ADULT - NSICDXPILOT_GEN_ALL_CORE
[place]

## 2021-06-04 NOTE — DISCHARGE NOTE NURSING/CASE MANAGEMENT/SOCIAL WORK - PATIENT PORTAL LINK FT
You can access the FollowMyHealth Patient Portal offered by [facility] by registering at the following website: [email or web address]. By joining [employer]’s FollowMyHealth portal, you will also be able to view your health information using other applications (apps) compatible with our system.

## 2021-06-07 ENCOUNTER — NON-APPOINTMENT (OUTPATIENT)
Age: 61
End: 2021-06-07

## 2021-06-09 ENCOUNTER — NON-APPOINTMENT (OUTPATIENT)
Age: 61
End: 2021-06-09

## 2021-06-09 ENCOUNTER — APPOINTMENT (OUTPATIENT)
Dept: ORTHOPEDIC SURGERY | Facility: CLINIC | Age: 61
End: 2021-06-09
Payer: MEDICAID

## 2021-06-09 PROBLEM — F41.0 PANIC DISORDER [EPISODIC PAROXYSMAL ANXIETY]: Chronic | Status: ACTIVE | Noted: 2021-05-26

## 2021-06-09 PROBLEM — Z87.39 PERSONAL HISTORY OF OTHER DISEASES OF THE MUSCULOSKELETAL SYSTEM AND CONNECTIVE TISSUE: Chronic | Status: ACTIVE | Noted: 2021-05-26

## 2021-06-09 PROBLEM — I25.2 OLD MYOCARDIAL INFARCTION: Chronic | Status: ACTIVE | Noted: 2021-05-26

## 2021-06-09 PROBLEM — Z92.29 PERSONAL HISTORY OF OTHER DRUG THERAPY: Chronic | Status: ACTIVE | Noted: 2021-05-26

## 2021-06-09 PROCEDURE — 73610 X-RAY EXAM OF ANKLE: CPT | Mod: RT

## 2021-06-09 PROCEDURE — 29405 APPL SHORT LEG CAST: CPT | Mod: 58,RT

## 2021-06-09 PROCEDURE — 99024 POSTOP FOLLOW-UP VISIT: CPT

## 2021-06-09 NOTE — HISTORY OF PRESENT ILLNESS
[___ Weeks Post Op] : [unfilled] weeks post op [Clean/Dry/Intact] : clean, dry and intact [Doing Well] : is doing well [No Sign of Infection] : is showing no signs of infection [Chills] : no chills [Fever] : no fever [Nausea] : no nausea [Vomiting] : no vomiting [Erythema] : not erythematous [Discharge] : absent of discharge [Swelling] : not swollen [Dehiscence] : not dehisced [de-identified] : s/p right ankle arthroplasty on 6/1/2021 [de-identified] : 60 year year old female present in the office 1 weeks post op s/p right ankle arthroplasty on 6/1/2021. The patient's pain is noted to be a 4/10. She is taking her blood thinning medication.\par She is not currently taking any pain medication. No other complaints at this time.	  [de-identified] : General: Alert and oriented x3. In no acute distress. Pleasant in nature with a normal affect. No apparent respiratory distress.\par \par The wound is intact. no evidence of any diastases or dehiscence. No surrounding erythema noted. No fluctuance. The area is warm and well perfused. The patient is able to wiggle their toes. Neurovascularly intact.  [de-identified] : 3V of the right ankle were ordered, obtained, and reviewed by me today, 06/09/2021 , which revealed: hardware intact, good alignment. [de-identified] : 60 year year old female present in the office 1 weeks post op s/p right ankle arthroplasty on 6/1/2021. XR films were reviewed with the patient. [de-identified] : The wound was cleaned and a new dressing was applied. The patient was fitted for the cast in the office today. She should be non weight bearing until further notice. If the patient notices any loosening of the cast, they should call the office as soon as possible.  \par \par I would like to see the patient back in the office in 7-10 days for suture removal.\par \par I have addressed all the patient's concerns surrounding the pathology of their condition. The patient understood and verbally agreed to the treatment plan. All of their questions were answered and they were satisfied with the visit. The patient should call the office if they have any questions or experience worsening symptoms.

## 2021-06-09 NOTE — ADDENDUM
[FreeTextEntry1] : I, Quincy Heller, acted solely as a scribe for Dr. Mohamud Knight on this date 06/09/2021  .\par  \par All medical record entries made by the Scribe were at my, Dr. Mohamud Knight, direction and personally dictated by me on 06/09/2021 . I have reviewed the chart and agree that the record accurately reflects my personal performance of the history, physical exam, assessment and plan. I have also personally directed, reviewed, and agreed with the chart.

## 2021-06-13 NOTE — ED BEHAVIORAL HEALTH ASSESSMENT NOTE - ORIENTED TO SITUATION
Care Management Follow Up Note    Length of Stay (days) 10     Patient plan of care discussed at Interdisciplinary Rounds: yes  CM Patient/Caregiver Stated Goals: Discharge to TCU     Expected Discharge Date: 12/16/20(pend)     Concerns to be Addressed / Barriers to Discharge:  CABG recovery, NPO failed Video swallow study, TCU placement    Anticipated Discharge Disposition: Skilled Nursing Facility  Anticipated Discharge Services:      Selected Continued Care - Admitted Since 12/2/2020    No services have been selected for the patient.        Anticipated Discharge DME:      Plan:  Pt is from home with spouse; ind at baseline. s/p CABG 12/7; therapy REC TCU; pt agrees, TCU list provided. CM to f/u for preferences. Transport TBD. CM to     Denae Hidalgo RN     Yes

## 2021-06-14 DIAGNOSIS — K57.90 DIVERTICULOSIS OF INTESTINE, PART UNSPECIFIED, WITHOUT PERFORATION OR ABSCESS WITHOUT BLEEDING: ICD-10-CM

## 2021-06-14 DIAGNOSIS — I25.10 ATHEROSCLEROTIC HEART DISEASE OF NATIVE CORONARY ARTERY WITHOUT ANGINA PECTORIS: ICD-10-CM

## 2021-06-14 DIAGNOSIS — F32.9 MAJOR DEPRESSIVE DISORDER, SINGLE EPISODE, UNSPECIFIED: ICD-10-CM

## 2021-06-14 DIAGNOSIS — K21.9 GASTRO-ESOPHAGEAL REFLUX DISEASE WITHOUT ESOPHAGITIS: ICD-10-CM

## 2021-06-14 DIAGNOSIS — Z80.2 FAMILY HISTORY OF MALIGNANT NEOPLASM OF OTHER RESPIRATORY AND INTRATHORACIC ORGANS: ICD-10-CM

## 2021-06-14 DIAGNOSIS — F41.9 ANXIETY DISORDER, UNSPECIFIED: ICD-10-CM

## 2021-06-14 DIAGNOSIS — Z82.49 FAMILY HISTORY OF ISCHEMIC HEART DISEASE AND OTHER DISEASES OF THE CIRCULATORY SYSTEM: ICD-10-CM

## 2021-06-14 DIAGNOSIS — D62 ACUTE POSTHEMORRHAGIC ANEMIA: ICD-10-CM

## 2021-06-14 DIAGNOSIS — M19.171 POST-TRAUMATIC OSTEOARTHRITIS, RIGHT ANKLE AND FOOT: ICD-10-CM

## 2021-06-14 DIAGNOSIS — Z96.643 PRESENCE OF ARTIFICIAL HIP JOINT, BILATERAL: ICD-10-CM

## 2021-06-14 DIAGNOSIS — Z83.6 FAMILY HISTORY OF OTHER DISEASES OF THE RESPIRATORY SYSTEM: ICD-10-CM

## 2021-06-14 DIAGNOSIS — E66.9 OBESITY, UNSPECIFIED: ICD-10-CM

## 2021-06-14 DIAGNOSIS — I25.2 OLD MYOCARDIAL INFARCTION: ICD-10-CM

## 2021-06-18 ENCOUNTER — APPOINTMENT (OUTPATIENT)
Dept: ORTHOPEDIC SURGERY | Facility: CLINIC | Age: 61
End: 2021-06-18
Payer: MEDICAID

## 2021-06-18 PROCEDURE — 99024 POSTOP FOLLOW-UP VISIT: CPT

## 2021-06-18 PROCEDURE — 97760 ORTHOTIC MGMT&TRAING 1ST ENC: CPT

## 2021-06-18 NOTE — HISTORY OF PRESENT ILLNESS
[___ Weeks Post Op] : [unfilled] weeks post op [Clean/Dry/Intact] : clean, dry and intact [Doing Well] : is doing well [No Sign of Infection] : is showing no signs of infection [Healed] : healed [Sutures Removed] : sutures were removed [Chills] : no chills [Fever] : no fever [Nausea] : no nausea [Vomiting] : no vomiting [Erythema] : not erythematous [Discharge] : absent of discharge [Swelling] : not swollen [Dehiscence] : not dehisced [de-identified] : s/p right ankle arthroplasty on 6/1/2021 [de-identified] : 60 year year old female present in the office 2 weeks post op s/p right ankle arthroplasty on 6/1/2021. The patient's pain is noted to be a 4/10. She is taking her blood thinning medication.\par She is not currently taking any pain medication. No other complaints at this time.	  [de-identified] : General: Alert and oriented x3. In no acute distress. Pleasant in nature with a normal affect. No apparent respiratory distress.\par \par The wound is intact. no evidence of any diastases or dehiscence. No surrounding erythema noted. No fluctuance. The area is warm and well perfused. The patient is able to wiggle their toes. Neurovascularly intact.  [de-identified] : No imaging today. [de-identified] : 60 year year old female present in the office 2 weeks post op s/p right ankle arthroplasty on 6/1/2021.  [de-identified] : Cast was removed. Sutures removed. The wound was cleaned and a new dressing was applied. I recommended that the patient utilize a CAM boot. The patient was fitted for the CAM boot in the office today. The patient was educated about the boot wear pattern and utilization, as well as the timeframe to come out of the boot. She was also given full instructions for using the boot. \par I would like to see the patient back in the office in \par \par I have addressed all the patient's concerns surrounding the pathology of their condition. The patient understood and verbally agreed to the treatment plan. All of their questions were answered and they were satisfied with the visit. The patient should call the office if they have any questions or experience worsening symptoms.

## 2021-07-08 ENCOUNTER — APPOINTMENT (OUTPATIENT)
Dept: ORTHOPEDIC SURGERY | Facility: CLINIC | Age: 61
End: 2021-07-08

## 2021-07-16 ENCOUNTER — APPOINTMENT (OUTPATIENT)
Dept: ORTHOPEDIC SURGERY | Facility: CLINIC | Age: 61
End: 2021-07-16
Payer: MEDICAID

## 2021-07-16 DIAGNOSIS — Z96.661 PRESENCE OF RIGHT ARTIFICIAL ANKLE JOINT: ICD-10-CM

## 2021-07-16 DIAGNOSIS — M19.171 POST-TRAUMATIC OSTEOARTHRITIS, RIGHT ANKLE AND FOOT: ICD-10-CM

## 2021-07-16 PROCEDURE — 99024 POSTOP FOLLOW-UP VISIT: CPT

## 2021-07-16 PROCEDURE — 73610 X-RAY EXAM OF ANKLE: CPT | Mod: RT

## 2021-07-16 NOTE — HISTORY OF PRESENT ILLNESS
[___ Weeks Post Op] : [unfilled] weeks post op [Clean/Dry/Intact] : clean, dry and intact [Healed] : healed [Doing Well] : is doing well [No Sign of Infection] : is showing no signs of infection [Sutures Removed] : sutures were removed [Chills] : no chills [Fever] : no fever [Nausea] : no nausea [Vomiting] : no vomiting [Erythema] : not erythematous [Discharge] : absent of discharge [Swelling] : not swollen [Dehiscence] : not dehisced [de-identified] : s/p right ankle arthroplasty on 6/1/2021 [de-identified] : 60 year year old female present in the office 2 weeks post op s/p right ankle arthroplasty on 6/1/2021. The patient's pain is noted to be a 4/10. She is taking her blood thinning medication.\par She is not currently taking any pain medication. No other complaints at this time.	  [de-identified] : General: Alert and oriented x3. In no acute distress. Pleasant in nature with a normal affect. No apparent respiratory distress.\par \par The wound is intact. no evidence of any diastases or dehiscence. No surrounding erythema noted. No fluctuance. The area is warm and well perfused. The patient is able to wiggle their toes. Neurovascularly intact. \par \par Ankle ROM limited, stiffness.\par \par Two nylon sutures removed heel.  Wounds healed.    [de-identified] : Xrays of the right ankle, 7/16/21: ankle replacement stable and intact.  Good joint alignment.  [de-identified] : 60 year year old female present in the office 2 weeks post op s/p right ankle arthroplasty on 6/1/2021.  [de-identified] : The patient will start out patient PT and work on ROM and strength of the ankle.  She will transition to FWB without assistance as tolerated by the patient.  She will continue with Aspirin for DVT prophylaxis.  She will continue with RICE therapy.  FU in 6 weeks.  All questions answered.

## 2021-07-21 ENCOUNTER — APPOINTMENT (OUTPATIENT)
Dept: FAMILY MEDICINE | Facility: CLINIC | Age: 61
End: 2021-07-21
Payer: MEDICAID

## 2021-07-21 VITALS
HEIGHT: 66 IN | HEART RATE: 74 BPM | WEIGHT: 199 LBS | DIASTOLIC BLOOD PRESSURE: 74 MMHG | BODY MASS INDEX: 31.98 KG/M2 | SYSTOLIC BLOOD PRESSURE: 128 MMHG | OXYGEN SATURATION: 98 %

## 2021-07-21 DIAGNOSIS — K21.9 GASTRO-ESOPHAGEAL REFLUX DISEASE W/OUT ESOPHAGITIS: ICD-10-CM

## 2021-07-21 PROCEDURE — 99214 OFFICE O/P EST MOD 30 MIN: CPT

## 2021-07-21 NOTE — ADDENDUM
[FreeTextEntry1] : I, Teresa Serra, verify that that I acted solely as a scribe for Dr. Courtney Elam on this date, 07/21/2021.

## 2021-07-21 NOTE — END OF VISIT
[FreeTextEntry2] : This note was written by DAMIR JO on 07/21/2021, acting solely as a scribe for Dr. Courtney Elam MD. \par \par All medical record entries made by the scribe, DAMIR JO, were at my, Dr. Kaila Hay MD, direction and personally dictated by me on 07/21/2021. I have personally reviewed the chart and agree that the record accurately reflects my personal performance and care.

## 2021-07-21 NOTE — REVIEW OF SYSTEMS
[Abdominal Pain] : abdominal pain [Negative] : Heme/Lymph [FreeTextEntry7] : c/o abdominal pain/cramping

## 2021-07-21 NOTE — PLAN
[FreeTextEntry1] : # START for Asthma - Breo Ellipta 100-25 MCG/INH\par - Side effects discussed with patient; benefits outweigh the risks and patient agreed to begin new medication albeit potential adverse reaction(s) \par \par # START for Anemia - Ferrous Gluconate 324 MG 1x day\par - Side effects discussed with patient; benefits outweigh the risks and patient agreed to begin new medication albeit potential adverse reaction(s) \par \par # START for GERD - Pantoprazole 40 MG 1x day\par - Side effects discussed with patient; benefits outweigh the risks and patient agreed to begin new medication albeit potential adverse reaction(s)\par \par # Encouraged patient to continue with diet modification for weight loss and GERD\par - reduce intake of caffeine, increase water intake\par - No soda, menthol, pickles, vinegar, carbonated things, red sauce, spicy foods, red onions, acidic foods, alcohol\par - AVOID the following foods: heavy dairy products (ice cream/whipped creams/whole milk), raw/steamed/fried vegetables, fatty/fried/red meats, butter/mayonnaise, fermented/alcoholic beverages/wine/fruit juices\par \par # Follow up in 3 months

## 2021-07-21 NOTE — ASSESSMENT
[FreeTextEntry1] : ASSESSMENT: Ms. CHERYL MIR is a 60 year old female presenting to the clinic today regarding a follow up.\par \par # PE/vitals obtained - normal\par \par # Reviewed patient's previous blood work\par - Hemoglobin @ 9.1  \par - diagnosis: anemia\par \par # Reviewed patient's diet\par - some high acidic fruits and high fiber diet

## 2021-07-21 NOTE — HISTORY OF PRESENT ILLNESS
[FreeTextEntry1] : pt here for follow up [de-identified] : CHERYL MIR is a 60 year old female presenting to the clinic today for a follow up. Mood is okay, appears well. R ankle is in air cast an ambulatory using cane, as she recently underwent ankle repair. Still in pain, but feels she is able to walk better. Currently undergoing PT, and is doing well. Additionally, patient is taking: Gabapentin, Breo Ellipta 100 MG, Paroxetine 30 MG; Pantoprazole; Oxycodone 5 MG; OTC Laxative; and Meloxicam. Believes she was prescribed the Breo Ellipta while in rehab for asthma. Taking OTC Laxative to aid with constipation induced by narcotics. \par \par Notes that she experiencing stomach cramping, and she was experiencing them while in rehab, making it difficult for her to eat without discomfort. Describes normal foods she consumes as: caffeine, berries, salads with fish and Italian dressing, spinach, mint gum, Cheerios cereal.

## 2021-08-19 NOTE — PROGRESS NOTE ADULT - PROBLEM/PLAN-1
Patient called for xray results of R knee  I called Radiology and they are sending over to be read now  DISPLAY PLAN FREE TEXT

## 2021-08-25 NOTE — ADDENDUM
[FreeTextEntry1] : I, Mariah Mcneal, acted solely as a scribe for Dr. Mohamud Knight on this date 08/25/2021.\par \par All medical record entries made by the Scribe were at my, Dr. Mohamud Knight, direction and personally dictated by me on 08/25/2021 . I have reviewed the chart and agree that the record accurately reflects my personal performance of the history, physical exam, assessment and plan. I have also personally directed, reviewed, and agreed with the chart.	\par

## 2021-08-25 NOTE — HISTORY OF PRESENT ILLNESS
[___ Months Post Op] : [unfilled] months post op [Clean/Dry/Intact] : clean, dry and intact [Healed] : healed [Doing Well] : is doing well [No Sign of Infection] : is showing no signs of infection [Sutures Removed] : sutures were removed [Chills] : no chills [Fever] : no fever [Nausea] : no nausea [Vomiting] : no vomiting [Erythema] : not erythematous [Discharge] : absent of discharge [Swelling] : not swollen [Dehiscence] : not dehisced [de-identified] : s/p right ankle arthroplasty on 6/1/2021 [de-identified] : General: Alert and oriented x3. In no acute distress. Pleasant in nature with a normal affect. No apparent respiratory distress.\par \par The wound is intact. no evidence of any diastases or dehiscence. No surrounding erythema noted. No fluctuance. The area is warm and well perfused. The patient is able to wiggle their toes. Neurovascularly intact. \par \par Ankle ROM improved since her last visit. Still limited. [de-identified] : 61 year old female present in the office approximately 3 months post op s/p right ankle arthroplasty on 6/1/2021. The patient's pain is noted to be a 3/10, describing the timing of her pain as intermittent. She is currently taking Meloxicam for her back pain. The patient has been attending physical therapy for this issue.  No other complaints at this time. The patient presents to the clinic in flip-lops without any assistance braces. 	  [de-identified] : No new imaging today. [de-identified] : 61 year old female present in the office 3 months post op s/p right ankle arthroplasty on 6/1/2021.  [de-identified] : 61 year old female present in the office approximately 3 months post op s/p right ankle arthroplasty on 6/1/2021.\par \par I recommend the patient to continue to undergo a course of physical therapy for the right ankle  2-3 times a week for a total of 6-8 weeks. The prescription the physical therapy was renewed today.		\par \par I have addressed all the patient's concerns surrounding the pathology of their condition. The patient understood and verbally agreed to the treatment plan. All of their questions were answered and they were satisfied with the visit. The patient should call the office if they have any questions or experience worsening symptoms.		\par I would to see the patient in 2-3 months for re-evaluation of her right ankle.

## 2021-08-29 NOTE — DISCHARGE NOTE NURSING/CASE MANAGEMENT/SOCIAL WORK - NSDCPELOVENOXREACT_GEN_ALL_CORE
29-Aug-2021 Enoxaparin/Lovenox increases your risk for bleeding. Notify your doctor if you experience any of the following side effects: unusual bleeding or bruising, coughing up or vomiting blood, red or black stool, blood in your urine, itching or hives, chest tightness, chest pain, shortness of breath, trouble breathing, swelling in your face or hands, swelling in your mouth or throat, fever, large flat blue or purplish patches on the skin, numbness or weakness in your arm or leg on one side, pain in your lower leg, sudden or severe headache with vision, speech or walking problems. When Enoxaparin/Lovenox is taken with other medicines, they can affect how it works. Taking other medications such as aspirin, blood thinners, and nonsteroidal anti-inflammatories increases your risk of bleeding. It is very important to tell your health care provider about all of the other medicines, including over-the-counter medications, herbs, and vitamins you are taking. DO NOT start, stop, or change the dosage of any medicine, including over-the-counter medicines, vitamins, and herbal products without your doctor’s approval. Any products containing aspirin or are nonsteroidal anti-inflammatories lessen the blood’s ability to form clots and adds to the effect of Enoxaparin/Lovenox. Never take aspirin or medicines that contain aspirin without speaking to your doctor.

## 2021-09-15 NOTE — ED ADULT NURSE NOTE - PRIMARY CARE PROVIDER
Quadrants Reporting?: 0 flory Quadrant Reporting?: no Consent (Near Eyelid Margin)/Introductory Paragraph: The rationale for Mohs was explained to the patient and consent was obtained. The risks, benefits and alternatives to therapy were discussed in detail. Specifically, the risks of ectropion or eyelid deformity, infection, scarring, bleeding, prolonged wound healing, incomplete removal, allergy to anesthesia, nerve injury and recurrence were addressed. Prior to the procedure, the treatment site was clearly identified and confirmed by the patient. All components of Universal Protocol/PAUSE Rule completed. Interpolation Flap Text: A decision was made to reconstruct the defect utilizing an interpolation axial flap and a staged reconstruction.  A telfa template was made of the defect.  This telfa template was then used to outline the interpolation flap.  The donor area for the pedicle flap was then injected with anesthesia.  The flap was excised through the skin and subcutaneous tissue down to the layer of the underlying musculature.  The interpolation flap was carefully excised within this deep plane to maintain its blood supply.  The edges of the donor site were undermined.   The donor site was closed in a primary fashion.  The pedicle was then rotated into position and sutured.  Once the tube was sutured into place, adequate blood supply was confirmed with blanching and refill.  The pedicle was then wrapped with xeroform gauze and dressed appropriately with a telfa and gauze bandage to ensure continued blood supply and protect the attached pedicle. Hemostasis: Electrocautery Alternatives Discussed Intro (Do Not Add Period): I discussed alternative treatments to Mohs surgery and specifically discussed the risks and benefits of Plastic Surgeon Procedure Text (A): After obtaining clear surgical margins the patient was sent to plastics for surgical repair.  The patient understands they will receive post-surgical care and follow-up from the referring physician's office. Closure 2 Information: This tab is for additional flaps and grafts, including complex repair and grafts and complex repair and flaps. You can also specify a different location for the additional defect, if the location is the same you do not need to select a new one. We will insert the automated text for the repair you select below just as we do for solitary flaps and grafts. Please note that at this time if you select a location with a different insurance zone you will need to override the ICD10 and CPT if appropriate. Stage 11: Additional Anesthesia Type: 1% lidocaine with epinephrine Anesthesia Volume In Cc: 6 Special Stains Stage 12 - Results: Base On Clearance Noted Above Mart-1 - Positive Histology Text: MART-1 staining demonstrates areas of higher density and clustering of melanocytes with Pagetoid spread upwards within the epidermis. The surgical margins are positive for tumor cells. Show Additional Anesthesia Variables In The Stage Tabs: Yes Referred To Otolaryngology For Closure Text (Leave Blank If You Do Not Want): After obtaining clear surgical margins the patient was sent to otolaryngology for surgical repair.  The patient understands they will receive post-surgical care and follow-up from the referring physician's office. Banner Transposition Flap Text: The defect edges were debeveled with a #15 scalpel blade.  Given the location of the defect and the proximity to free margins a Banner transposition flap was deemed most appropriate.  Using a sterile surgical marker, an appropriate flap drawn around the defect. The area thus outlined was incised deep to adipose tissue with a #15 scalpel blade.  The skin margins were undermined to an appropriate distance in all directions utilizing iris scissors. Epidermal Autograft Text: The defect edges were debeveled with a #15 scalpel blade.  Given the location of the defect, shape of the defect and the proximity to free margins an epidermal autograft was deemed most appropriate.  Using a sterile surgical marker, the primary defect shape was transferred to the donor site. The epidermal graft was then harvested.  The skin graft was then placed in the primary defect and oriented appropriately. Melolabial Transposition Flap Text: The defect edges were debeveled with a #15 scalpel blade.  Given the location of the defect and the proximity to free margins a melolabial flap was deemed most appropriate.  Using a sterile surgical marker, an appropriate melolabial transposition flap was drawn incorporating the defect.    The area thus outlined was incised deep to adipose tissue with a #15 scalpel blade.  The skin margins were undermined to an appropriate distance in all directions utilizing iris scissors. Mid-Level Procedure Text (E): After obtaining clear surgical margins the patient was sent to a mid-level provider for surgical repair.  The patient understands they will receive post-surgical care and follow-up from the mid-level provider. ia Id #: [id number] M-Plasty Intermediate Repair Preamble Text (Leave Blank If You Do Not Want): Undermining was performed with blunt dissection. Partial Purse String (Simple) Text: Given the location of the defect and the characteristics of the surrounding skin a simple purse string closure was deemed most appropriate.  Undermining was performed circumfirentially around the surgical defect.  A purse string suture was then placed and tightened. Wound tension only allowed a partial closure of the circular defect. Oculoplastic Surgeon Procedure Text (F): After obtaining clear surgical margins the patient was sent to oculoplastics for surgical repair.  The patient understands they will receive post-surgical care and follow-up from the referring physician's office. Double O-Z Flap Text: The defect edges were debeveled with a #15 scalpel blade.  Given the location of the defect, shape of the defect and the proximity to free margins a Double O-Z flap was deemed most appropriate.  Using a sterile surgical marker, an appropriate transposition flap was drawn incorporating the defect and placing the expected incisions within the relaxed skin tension lines where possible. The area thus outlined was incised deep to adipose tissue with a #15 scalpel blade.  The skin margins were undermined to an appropriate distance in all directions utilizing iris scissors. Where Do You Want The Question To Include Opioid Counseling Located?: Case Summary Tab V-Y Plasty Text: The defect edges were debeveled with a #15 scalpel blade.  Given the location of the defect, shape of the defect and the proximity to free margins an V-Y advancement flap was deemed most appropriate.  Using a sterile surgical marker, an appropriate advancement flap was drawn incorporating the defect and placing the expected incisions within the relaxed skin tension lines where possible.    The area thus outlined was incised deep to adipose tissue with a #15 scalpel blade.  The skin margins were undermined to an appropriate distance in all directions utilizing iris scissors. Suturegard Body: The suture ends were repeatedly re-tightened and re-clamped to achieve the desired tissue expansion. Referring Physician (Optional): Dr Vega Burow's Advancement Flap Text: The defect edges were debeveled with a #15 scalpel blade.  Given the location of the defect and the proximity to free margins a Burow's advancement flap was deemed most appropriate.  Using a sterile surgical marker, the appropriate advancement flap was drawn incorporating the defect and placing the expected incisions within the relaxed skin tension lines where possible.    The area thus outlined was incised deep to adipose tissue with a #15 scalpel blade.  The skin margins were undermined to an appropriate distance in all directions utilizing iris scissors. Alar Island Pedicle Flap Text: The defect edges were debeveled with a #15 scalpel blade.  Given the location of the defect, shape of the defect and the proximity to the alar rim an island pedicle advancement flap was deemed most appropriate.  Using a sterile surgical marker, an appropriate advancement flap was drawn incorporating the defect, outlining the appropriate donor tissue and placing the expected incisions within the nasal ala running parallel to the alar rim. The area thus outlined was incised with a #15 scalpel blade.  The skin margins were undermined minimally to an appropriate distance in all directions around the primary defect and laterally outward around the island pedicle utilizing iris scissors.  There was minimal undermining beneath the pedicle flap. Stage 1: Number Of Blocks?: 1 Asc Procedure Text (F): After obtaining clear surgical margins the patient was sent to an ASC for surgical repair.  The patient understands they will receive post-surgical care and follow-up from the ASC physician. Consent (Spinal Accessory)/Introductory Paragraph: The rationale for Mohs was explained to the patient and consent was obtained. The risks, benefits and alternatives to therapy were discussed in detail. Specifically, the risks of damage to the spinal accessory nerve, infection, scarring, bleeding, prolonged wound healing, incomplete removal, allergy to anesthesia, and recurrence were addressed. Prior to the procedure, the treatment site was clearly identified and confirmed by the patient. All components of Universal Protocol/PAUSE Rule completed. Bcc Histology Text: There were numerous aggregates of basaloid cells. Ear Wedge Repair Text: A wedge excision was completed by carrying down an excision through the full thickness of the ear and cartilage with an inward facing Burow's triangle. The wound was then closed in a layered fashion. Star Wedge Flap Text: The defect edges were debeveled with a #15 scalpel blade.  Given the location of the defect, shape of the defect and the proximity to free margins a star wedge flap was deemed most appropriate.  Using a sterile surgical marker, an appropriate rotation flap was drawn incorporating the defect and placing the expected incisions within the relaxed skin tension lines where possible. The area thus outlined was incised deep to adipose tissue with a #15 scalpel blade.  The skin margins were undermined to an appropriate distance in all directions utilizing iris scissors. Mart-1 - Negative Histology Text: MART-1 staining demonstrates a normal density and pattern of melanocytes along the dermal-epidermal junction. The surgical margins are negative for tumor cells. Postop Diagnosis: same Provider Procedure Text (B): After obtaining clear surgical margins the defect was repaired by another provider. Island Pedicle Flap With Canthal Suspension Text: The defect edges were debeveled with a #15 scalpel blade.  Given the location of the defect, shape of the defect and the proximity to free margins an island pedicle advancement flap was deemed most appropriate.  Using a sterile surgical marker, an appropriate advancement flap was drawn incorporating the defect, outlining the appropriate donor tissue and placing the expected incisions within the relaxed skin tension lines where possible. The area thus outlined was incised deep to adipose tissue with a #15 scalpel blade.  The skin margins were undermined to an appropriate distance in all directions around the primary defect and laterally outward around the island pedicle utilizing iris scissors.  There was minimal undermining beneath the pedicle flap. A suspension suture was placed in the canthal tendon to prevent tension and prevent ectropion. Advancement Flap (Double) Text: The defect edges were debeveled with a #15 scalpel blade.  Given the location of the defect and the proximity to free margins a double advancement flap was deemed most appropriate.  Using a sterile surgical marker, the appropriate advancement flaps were drawn incorporating the defect and placing the expected incisions within the relaxed skin tension lines where possible.    The area thus outlined was incised deep to adipose tissue with a #15 scalpel blade.  The skin margins were undermined to an appropriate distance in all directions utilizing iris scissors. O-Z Flap Text: The defect edges were debeveled with a #15 scalpel blade.  Given the location of the defect, shape of the defect and the proximity to free margins an O-Z flap was deemed most appropriate.  Using a sterile surgical marker, an appropriate transposition flap was drawn incorporating the defect and placing the expected incisions within the relaxed skin tension lines where possible. The area thus outlined was incised deep to adipose tissue with a #15 scalpel blade.  The skin margins were undermined to an appropriate distance in all directions utilizing iris scissors. Ear Star Wedge Flap Text: The defect edges were debeveled with a #15 blade scalpel.  Given the location of the defect and the proximity to free margins (helical rim) an ear star wedge flap was deemed most appropriate.  Using a sterile surgical marker, the appropriate flap was drawn incorporating the defect and placing the expected incisions between the helical rim and antihelix where possible.  The area thus outlined was incised through and through with a #15 scalpel blade. Wound Check: 7 days Mohs Case Number:  Medical Necessity Statement: Based on my medical judgement, Mohs surgery is the most appropriate treatment for this cancer compared to other treatments. Undermining Type: Entire Wound Double O-Z Plasty Text: The defect edges were debeveled with a #15 scalpel blade.  Given the location of the defect, shape of the defect and the proximity to free margins a Double O-Z plasty (double transposition flap) was deemed most appropriate.  Using a sterile surgical marker, the appropriate transposition flaps were drawn incorporating the defect and placing the expected incisions within the relaxed skin tension lines where possible. The area thus outlined was incised deep to adipose tissue with a #15 scalpel blade.  The skin margins were undermined to an appropriate distance in all directions utilizing iris scissors.  Hemostasis was achieved with electrocautery.  The flaps were then transposed into place, one clockwise and the other counterclockwise, and anchored with interrupted buried subcutaneous sutures. Peng Advancement Flap Text: The defect edges were debeveled with a #15 scalpel blade.  Given the location of the defect, shape of the defect and the proximity to free margins a Peng advancement flap was deemed most appropriate.  Using a sterile surgical marker, an appropriate advancement flap was drawn incorporating the defect and placing the expected incisions within the relaxed skin tension lines where possible. The area thus outlined was incised deep to adipose tissue with a #15 scalpel blade.  The skin margins were undermined to an appropriate distance in all directions utilizing iris scissors. Suturegard Intro: Intraoperative tissue expansion was performed, utilizing the SUTUREGARD device, in order to reduce wound tension. Cheek-To-Nose Interpolation Flap Text: A decision was made to reconstruct the defect utilizing an interpolation axial flap and a staged reconstruction.  A telfa template was made of the defect.  This telfa template was then used to outline the Cheek-To-Nose Interpolation flap.  The donor area for the pedicle flap was then injected with anesthesia.  The flap was excised through the skin and subcutaneous tissue down to the layer of the underlying musculature.  The interpolation flap was carefully excised within this deep plane to maintain its blood supply.  The edges of the donor site were undermined.   The donor site was closed in a primary fashion.  The pedicle was then rotated into position and sutured.  Once the tube was sutured into place, adequate blood supply was confirmed with blanching and refill.  The pedicle was then wrapped with xeroform gauze and dressed appropriately with a telfa and gauze bandage to ensure continued blood supply and protect the attached pedicle. Cheiloplasty (Complex) Text: A decision was made to reconstruct the defect with a  cheiloplasty.  The defect was undermined extensively.  Additional obicularis oris muscle was excised with a 15 blade scalpel.  The defect was converted into a full thickness wedge to facilite a better cosmetic result.  Small vessels were then tied off with 5-0 monocyrl. The obicularis oris, superficial fascia, adipose and dermis were then reapproximated.  After the deeper layers were approximated the epidermis was reapproximated with particular care given to realign the vermilion border. Staged Advancement Flap Text: The defect edges were debeveled with a #15 scalpel blade.  Given the location of the defect, shape of the defect and the proximity to free margins a staged advancement flap was deemed most appropriate.  Using a sterile surgical marker, an appropriate advancement flap was drawn incorporating the defect and placing the expected incisions within the relaxed skin tension lines where possible. The area thus outlined was incised deep to adipose tissue with a #15 scalpel blade.  The skin margins were undermined to an appropriate distance in all directions utilizing iris scissors. Hemigard Retention Suture: 2-0 Nylon Location Indication Override (Is Already Calculated Based On Selected Body Location): Area L Is There Documentation In The Chart Showing Discussion Of Changes With Another Physician?: Please Select the Appropriate Response Orbicularis Oris Muscle Flap Text: The defect edges were debeveled with a #15 scalpel blade.  Given that the defect affected the competency of the oral sphincter an obicularis oris muscle flap was deemed most appropriate to restore this competency and normal muscle function.  Using a sterile surgical marker, an appropriate flap was drawn incorporating the defect. The area thus outlined was incised with a #15 scalpel blade. Purse String (Intermediate) Text: Given the location of the defect and the characteristics of the surrounding skin a purse string intermediate closure was deemed most appropriate.  Undermining was performed circumfirentially around the surgical defect.  A purse string suture was then placed and tightened. Wound Care (No Sutures): Aquaphor Tumor Debulked?: curette Complex Repair Preamble Text (Leave Blank If You Do Not Want): Extensive wide undermining was performed. Composite Graft Text: The defect edges were debeveled with a #15 scalpel blade.  Given the location of the defect, shape of the defect, the proximity to free margins and the fact the defect was full thickness a composite graft was deemed most appropriate.  The defect was outline and then transferred to the donor site.  A full thickness graft was then excised from the donor site. The graft was then placed in the primary defect, oriented appropriately and then sutured into place.  The secondary defect was then repaired using a primary closure. Cheek Interpolation Flap Text: A decision was made to reconstruct the defect utilizing an interpolation axial flap and a staged reconstruction.  A telfa template was made of the defect.  This telfa template was then used to outline the Cheek Interpolation flap.  The donor area for the pedicle flap was then injected with anesthesia.  The flap was excised through the skin and subcutaneous tissue down to the layer of the underlying musculature.  The interpolation flap was carefully excised within this deep plane to maintain its blood supply.  The edges of the donor site were undermined.   The donor site was closed in a primary fashion.  The pedicle was then rotated into position and sutured.  Once the tube was sutured into place, adequate blood supply was confirmed with blanching and refill.  The pedicle was then wrapped with xeroform gauze and dressed appropriately with a telfa and gauze bandage to ensure continued blood supply and protect the attached pedicle. Primary Defect Length In Cm (Final Defect Size - Required For Flaps/Grafts): 1.6 Mucosal Advancement Flap Text: Given the location of the defect, shape of the defect and the proximity to free margins a mucosal advancement flap was deemed most appropriate. Incisions were made with a 15 blade scalpel in the appropriate fashion along the cutaneous vermilion border and the mucosal lip. The remaining actinically damaged mucosal tissue was excised.  The mucosal advancement flap was then elevated to the gingival sulcus with care taken to preserve the neurovascular structures and advanced into the primary defect. Care was taken to ensure that precise realignment of the vermilion border was achieved. O-L Flap Text: The defect edges were debeveled with a #15 scalpel blade.  Given the location of the defect, shape of the defect and the proximity to free margins an O-L flap was deemed most appropriate.  Using a sterile surgical marker, an appropriate advancement flap was drawn incorporating the defect and placing the expected incisions within the relaxed skin tension lines where possible.    The area thus outlined was incised deep to adipose tissue with a #15 scalpel blade.  The skin margins were undermined to an appropriate distance in all directions utilizing iris scissors. O-Z Plasty Text: The defect edges were debeveled with a #15 scalpel blade.  Given the location of the defect, shape of the defect and the proximity to free margins an O-Z plasty (double transposition flap) was deemed most appropriate.  Using a sterile surgical marker, the appropriate transposition flaps were drawn incorporating the defect and placing the expected incisions within the relaxed skin tension lines where possible.    The area thus outlined was incised deep to adipose tissue with a #15 scalpel blade.  The skin margins were undermined to an appropriate distance in all directions utilizing iris scissors.  Hemostasis was achieved with electrocautery.  The flaps were then transposed into place, one clockwise and the other counterclockwise, and anchored with interrupted buried subcutaneous sutures. Consent (Marginal Mandibular)/Introductory Paragraph: The rationale for Mohs was explained to the patient and consent was obtained. The risks, benefits and alternatives to therapy were discussed in detail. Specifically, the risks of damage to the marginal mandibular branch of the facial nerve, infection, scarring, bleeding, prolonged wound healing, incomplete removal, allergy to anesthesia, and recurrence were addressed. Prior to the procedure, the treatment site was clearly identified and confirmed by the patient. All components of Universal Protocol/PAUSE Rule completed. Detail Level: Detailed Distance Of Undermining In Cm (Required): 2 Inflammation Suggestive Of Cancer Camouflage Histology Text: There was a dense lymphocytic infiltrate which prevented adequate histologic evaluation of adjacent structures. Mohs Rapid Report Verbiage: The area of clinically evident tumor was marked with skin marking ink and appropriately hatched.  The initial incision was made following the Mohs approach through the skin.  The specimen was taken to the lab, divided into the necessary number of pieces, chromacoded and processed according to the Mohs protocol.  This was repeated in successive stages until a tumor free defect was achieved. Cartilage Graft Text: The defect edges were debeveled with a #15 scalpel blade.  Given the location of the defect, shape of the defect, the fact the defect involved a full thickness cartilage defect a cartilage graft was deemed most appropriate.  An appropriate donor site was identified, cleansed, and anesthetized. The cartilage graft was then harvested and transferred to the recipient site, oriented appropriately and then sutured into place.  The secondary defect was then repaired using a primary closure. Nasalis-Muscle-Based Myocutaneous Island Pedicle Flap Text: Using a #15 blade, an incision was made around the donor flap to the level of the nasalis muscle. Wide lateral undermining was then performed in both the subcutaneous plane above the nasalis muscle, and in a submuscular plane just above periosteum. This allowed the formation of a free nasalis muscle axial pedicle (based on the angular artery) which was still attached to the actual cutaneous flap, increasing its mobility and vascular viability. Hemostasis was obtained with pinpoint electrocoagulation. The flap was mobilized into position and the pivotal anchor points positioned and stabilized with buried interrupted sutures. Subcutaneous and dermal tissues were closed in a multilayered fashion with sutures. Tissue redundancies were excised, and the epidermal edges were apposed without significant tension and sutured with sutures. Cheiloplasty (Less Than 50%) Text: A decision was made to reconstruct the defect with a  cheiloplasty.  The defect was undermined extensively.  Additional obicularis oris muscle was excised with a 15 blade scalpel.  The defect was converted into a full thickness wedge, of less than 50% of the vertical height of the lip, to facilite a better cosmetic result.  Small vessels were then tied off with 5-0 monocyrl. The obicularis oris, superficial fascia, adipose and dermis were then reapproximated.  After the deeper layers were approximated the epidermis was reapproximated with particular care given to realign the vermilion border. Spiral Flap Text: The defect edges were debeveled with a #15 scalpel blade.  Given the location of the defect, shape of the defect and the proximity to free margins a spiral flap was deemed most appropriate.  Using a sterile surgical marker, an appropriate rotation flap was drawn incorporating the defect and placing the expected incisions within the relaxed skin tension lines where possible. The area thus outlined was incised deep to adipose tissue with a #15 scalpel blade.  The skin margins were undermined to an appropriate distance in all directions utilizing iris scissors. Complex Repair And Graft Additional Text (Will Appearing After The Standard Complex Repair Text): The complex repair was not sufficient to completely close the primary defect. The remaining additional defect was repaired with the graft mentioned below. Bilateral Helical Rim Advancement Flap Text: The defect edges were debeveled with a #15 blade scalpel.  Given the location of the defect and the proximity to free margins (helical rim) a bilateral helical rim advancement flap was deemed most appropriate.  Using a sterile surgical marker, the appropriate advancement flaps were drawn incorporating the defect and placing the expected incisions between the helical rim and antihelix where possible.  The area thus outlined was incised through and through with a #15 scalpel blade.  With a skin hook and iris scissors, the flaps were gently and sharply undermined and freed up. Mohs Histo Method Verbiage: Each section was then chromacoded and processed in the Mohs lab using the Mohs protocol and submitted for frozen section. Island Pedicle Flap Text: The defect edges were debeveled with a #15 scalpel blade.  Given the location of the defect, shape of the defect and the proximity to free margins an island pedicle advancement flap was deemed most appropriate.  Using a sterile surgical marker, an appropriate advancement flap was drawn incorporating the defect, outlining the appropriate donor tissue and placing the expected incisions within the relaxed skin tension lines where possible.    The area thus outlined was incised deep to adipose tissue with a #15 scalpel blade.  The skin margins were undermined to an appropriate distance in all directions around the primary defect and laterally outward around the island pedicle utilizing iris scissors.  There was minimal undermining beneath the pedicle flap. Advancement Flap (Single) Text: The defect edges were debeveled with a #15 scalpel blade.  Given the location of the defect and the proximity to free margins a single advancement flap was deemed most appropriate.  Using a sterile surgical marker, an appropriate advancement flap was drawn incorporating the defect and placing the expected incisions within the relaxed skin tension lines where possible.    The area thus outlined was incised deep to adipose tissue with a #15 scalpel blade.  The skin margins were undermined to an appropriate distance in all directions utilizing iris scissors. Purse String (Simple) Text: Given the location of the defect and the characteristics of the surrounding skin a purse string closure was deemed most appropriate.  Undermining was performed circumfirentially around the surgical defect.  A purse string suture was then placed and tightened. Area L Indication Text: Tumors in this location are included in Area L (trunk and extremities).  Mohs surgery is indicated for larger tumors, or tumors with aggressive histologic features, in these anatomic locations. Estimated Blood Loss (Cc): minimal Repair Type: Complex Repair No Repair - Repaired With Adjacent Surgical Defect Text (Leave Blank If You Do Not Want): After obtaining clear surgical margins the defect was repaired concurrently with another surgical defect which was in close approximation. Closure 3 Information: This tab is for additional flaps and grafts above and beyond our usual structured repairs.  Please note if you enter information here it will not currently bill and you will need to add the billing information manually. No Residual Tumor Seen Histology Text: There were no malignant cells seen in the sections examined. Consent (Temporal Branch)/Introductory Paragraph: The rationale for Mohs was explained to the patient and consent was obtained. The risks, benefits and alternatives to therapy were discussed in detail. Specifically, the risks of damage to the temporal branch of the facial nerve, infection, scarring, bleeding, prolonged wound healing, incomplete removal, allergy to anesthesia, and recurrence were addressed. Prior to the procedure, the treatment site was clearly identified and confirmed by the patient. All components of Universal Protocol/PAUSE Rule completed. Modified Advancement Flap Text: The defect edges were debeveled with a #15 scalpel blade.  Given the location of the defect, shape of the defect and the proximity to free margins a modified advancement flap was deemed most appropriate.  Using a sterile surgical marker, an appropriate advancement flap was drawn incorporating the defect and placing the expected incisions within the relaxed skin tension lines where possible.    The area thus outlined was incised deep to adipose tissue with a #15 scalpel blade.  The skin margins were undermined to an appropriate distance in all directions utilizing iris scissors. Nostril Rim Text: The closure involved the nostril rim. Zygomaticofacial Flap Text: Given the location of the defect, shape of the defect and the proximity to free margins a zygomaticofacial flap was deemed most appropriate for repair.  Using a sterile surgical marker, the appropriate flap was drawn incorporating the defect and placing the expected incisions within the relaxed skin tension lines where possible. The area thus outlined was incised deep to adipose tissue with a #15 scalpel blade with preservation of a vascular pedicle.  The skin margins were undermined to an appropriate distance in all directions utilizing iris scissors.  The flap was then placed into the defect and anchored with interrupted buried subcutaneous sutures. Body Location Override (Optional - Billing Will Still Be Based On Selected Body Map Location If Applicable): Left thigh Consent (Scalp)/Introductory Paragraph: The rationale for Mohs was explained to the patient and consent was obtained. The risks, benefits and alternatives to therapy were discussed in detail. Specifically, the risks of changes in hair growth pattern secondary to repair, infection, scarring, bleeding, prolonged wound healing, incomplete removal, allergy to anesthesia, nerve injury and recurrence were addressed. Prior to the procedure, the treatment site was clearly identified and confirmed by the patient. All components of Universal Protocol/PAUSE Rule completed. Graft Donor Site Bandage (Optional-Leave Blank If You Don't Want In Note): Steri-strips and a pressure bandage were applied to the donor site. Xenograft Text: The defect edges were debeveled with a #15 scalpel blade.  Given the location of the defect, shape of the defect and the proximity to free margins a xenograft was deemed most appropriate.  The graft was then trimmed to fit the size of the defect.  The graft was then placed in the primary defect and oriented appropriately. Area M Indication Text: Tumors in this location are included in Area M (cheek, forehead, scalp, neck, jawline and pretibial skin).  Mohs surgery is indicated for tumors in these anatomic locations. Secondary Intention Text (Leave Blank If You Do Not Want): The defect will heal with secondary intention. Complex Repair And Flap Additional Text (Will Appearing After The Standard Complex Repair Text): The complex repair was not sufficient to completely close the primary defect. The remaining additional defect was repaired with the flap mentioned below. Helical Rim Advancement Flap Text: The defect edges were debeveled with a #15 blade scalpel.  Given the location of the defect and the proximity to free margins (helical rim) a double helical rim advancement flap was deemed most appropriate.  Using a sterile surgical marker, the appropriate advancement flaps were drawn incorporating the defect and placing the expected incisions between the helical rim and antihelix where possible.  The area thus outlined was incised through and through with a #15 scalpel blade.  With a skin hook and iris scissors, the flaps were gently and sharply undermined and freed up. Burow's Graft Text: The defect edges were debeveled with a #15 scalpel blade.  Given the location of the defect, shape of the defect, the proximity to free margins and the presence of a standing cone deformity a Burow's skin graft was deemed most appropriate. The standing cone was removed and this tissue was then trimmed to the shape of the primary defect. The adipose tissue was also removed until only dermis and epidermis were left.  The skin margins of the secondary defect were undermined to an appropriate distance in all directions utilizing iris scissors.  The secondary defect was closed with interrupted buried subcutaneous sutures.  The skin edges were then re-apposed with running  sutures.  The skin graft was then placed in the primary defect and oriented appropriately. Nasal Turnover Hinge Flap Text: The defect edges were debeveled with a #15 scalpel blade.  Given the size, depth, location of the defect and the defect being full thickness a nasal turnover hinge flap was deemed most appropriate.  Using a sterile surgical marker, an appropriate hinge flap was drawn incorporating the defect. The area thus outlined was incised with a #15 scalpel blade. The flap was designed to recreate the nasal mucosal lining and the alar rim. The skin margins were undermined to an appropriate distance in all directions utilizing iris scissors. O-T Advancement Flap Text: The defect edges were debeveled with a #15 scalpel blade.  Given the location of the defect, shape of the defect and the proximity to free margins an O-T advancement flap was deemed most appropriate.  Using a sterile surgical marker, an appropriate advancement flap was drawn incorporating the defect and placing the expected incisions within the relaxed skin tension lines where possible.    The area thus outlined was incised deep to adipose tissue with a #15 scalpel blade.  The skin margins were undermined to an appropriate distance in all directions utilizing iris scissors. O-T Plasty Text: The defect edges were debeveled with a #15 scalpel blade.  Given the location of the defect, shape of the defect and the proximity to free margins an O-T plasty was deemed most appropriate.  Using a sterile surgical marker, an appropriate O-T plasty was drawn incorporating the defect and placing the expected incisions within the relaxed skin tension lines where possible.    The area thus outlined was incised deep to adipose tissue with a #15 scalpel blade.  The skin margins were undermined to an appropriate distance in all directions utilizing iris scissors. Surgeon/Pathologist Verbiage (Will Incorporate Name Of Surgeon From Intro If Not Blank): operated in two distinct and integrated capacities as the surgeon and pathologist. Dorsal Nasal Flap Text: The defect edges were debeveled with a #15 scalpel blade.  Given the location of the defect and the proximity to free margins a dorsal nasal flap was deemed most appropriate.  Using a sterile surgical marker, an appropriate dorsal nasal flap was drawn around the defect.    The area thus outlined was incised deep to adipose tissue with a #15 scalpel blade.  The skin margins were undermined to an appropriate distance in all directions utilizing iris scissors. Consent (Lip)/Introductory Paragraph: The rationale for Mohs was explained to the patient and consent was obtained. The risks, benefits and alternatives to therapy were discussed in detail. Specifically, the risks of lip deformity, changes in the oral aperture, infection, scarring, bleeding, prolonged wound healing, incomplete removal, allergy to anesthesia, nerve injury and recurrence were addressed. Prior to the procedure, the treatment site was clearly identified and confirmed by the patient. All components of Universal Protocol/PAUSE Rule completed. Debridement Text: The wound edges were debrided prior to proceeding with the closure to facilitate wound healing. Dressing: telfa dressing Deep Sutures: 3-0 Vicryl Same Histology In Subsequent Stages Text: The pattern and morphology of the tumor is as described in the first stage. Consent 3/Introductory Paragraph: I gave the patient a chance to ask questions they had about the procedure.  Following this I explained the Mohs procedure and consent was obtained. The risks, benefits and alternatives to therapy were discussed in detail. Specifically, the risks of infection, scarring, bleeding, prolonged wound healing, incomplete removal, allergy to anesthesia, nerve injury and recurrence were addressed. Prior to the procedure, the treatment site was clearly identified and confirmed by the patient. All components of Universal Protocol/PAUSE Rule completed. Mohs Method Verbiage: An incision at a 45 degree angle following the standard Mohs approach was done and the specimen was harvested as a microscopic controlled layer. Anesthesia Volume In Cc: 3 Rotation Flap Text: The defect edges were debeveled with a #15 scalpel blade.  Given the location of the defect, shape of the defect and the proximity to free margins a rotation flap was deemed most appropriate.  Using a sterile surgical marker, an appropriate rotation flap was drawn incorporating the defect and placing the expected incisions within the relaxed skin tension lines where possible.    The area thus outlined was incised deep to adipose tissue with a #15 scalpel blade.  The skin margins were undermined to an appropriate distance in all directions utilizing iris scissors. Paramedian Forehead Flap Text: A decision was made to reconstruct the defect utilizing an interpolation axial flap and a staged reconstruction.  A telfa template was made of the defect.  This telfa template was then used to outline the paramedian forehead pedicle flap.  The donor area for the pedicle flap was then injected with anesthesia.  The flap was excised through the skin and subcutaneous tissue down to the layer of the underlying musculature.  The pedicle flap was carefully excised within this deep plane to maintain its blood supply.  The edges of the donor site were undermined.   The donor site was closed in a primary fashion.  The pedicle was then rotated into position and sutured.  Once the tube was sutured into place, adequate blood supply was confirmed with blanching and refill.  The pedicle was then wrapped with xeroform gauze and dressed appropriately with a telfa and gauze bandage to ensure continued blood supply and protect the attached pedicle. Epidermal Closure: running horizontal mattress Subsequent Stages Histo Method Verbiage: Using a similar technique to that described above, a thin layer of tissue was removed from all areas where tumor was visible on the previous stage.  The tissue was again oriented, mapped, dyed, and processed as above. Mauc Instructions: By selecting yes to the question below the MAUC number will be added into the note.  This will be calculated automatically based on the diagnosis chosen, the size entered, the body zone selected (H,M,L) and the specific indications you chose. You will also have the option to override the Mohs AUC if you disagree with the automatically calculated number and this option is found in the Case Summary tab. Epidermal Sutures: 5-0 Prolene Retention Suture Bite Size: 3 mm Trilobed Flap Text: The defect edges were debeveled with a #15 scalpel blade.  Given the location of the defect and the proximity to free margins a trilobed flap was deemed most appropriate.  Using a sterile surgical marker, an appropriate trilobed flap drawn around the defect.    The area thus outlined was incised deep to adipose tissue with a #15 scalpel blade.  The skin margins were undermined to an appropriate distance in all directions utilizing iris scissors. Surgeon: Dr. Mathur Area H Indication Text: Tumors in this location are included in Area H (eyelids, eyebrows, nose, lips, chin, ear, pre-auricular, post-auricular, temple, genitalia, hands, feet, ankles and areola).  Tissue conservation is critical in these anatomic locations. Graft Cartilage Fenestration Text: The cartilage was fenestrated with a 2mm punch biopsy to help facilitate graft survival and healing. Tarsorrhaphy Text: A tarsorrhaphy was performed using Frost sutures. Bi-Rhombic Flap Text: The defect edges were debeveled with a #15 scalpel blade.  Given the location of the defect and the proximity to free margins a bi-rhombic flap was deemed most appropriate.  Using a sterile surgical marker, an appropriate rhombic flap was drawn incorporating the defect. The area thus outlined was incised deep to adipose tissue with a #15 scalpel blade.  The skin margins were undermined to an appropriate distance in all directions utilizing iris scissors. Retention Suture Text: Retention sutures were placed to support the closure and prevent dehiscence. Z Plasty Text: The lesion was extirpated to the level of the fat with a #15 scalpel blade.  Given the location of the defect, shape of the defect and the proximity to free margins a Z-plasty was deemed most appropriate for repair.  Using a sterile surgical marker, the appropriate transposition arms of the Z-plasty were drawn incorporating the defect and placing the expected incisions within the relaxed skin tension lines where possible.    The area thus outlined was incised deep to adipose tissue with a #15 scalpel blade.  The skin margins were undermined to an appropriate distance in all directions utilizing iris scissors.  The opposing transposition arms were then transposed into place in opposite direction and anchored with interrupted buried subcutaneous sutures. Vermilion Border Text: The closure involved the vermilion border. Donor Site Anesthesia Type: same as repair anesthesia Posterior Auricular Interpolation Flap Text: A decision was made to reconstruct the defect utilizing an interpolation axial flap and a staged reconstruction.  A telfa template was made of the defect.  This telfa template was then used to outline the posterior auricular interpolation flap.  The donor area for the pedicle flap was then injected with anesthesia.  The flap was excised through the skin and subcutaneous tissue down to the layer of the underlying musculature.  The pedicle flap was carefully excised within this deep plane to maintain its blood supply.  The edges of the donor site were undermined.   The donor site was closed in a primary fashion.  The pedicle was then rotated into position and sutured.  Once the tube was sutured into place, adequate blood supply was confirmed with blanching and refill.  The pedicle was then wrapped with xeroform gauze and dressed appropriately with a telfa and gauze bandage to ensure continued blood supply and protect the attached pedicle. A-T Advancement Flap Text: The defect edges were debeveled with a #15 scalpel blade.  Given the location of the defect, shape of the defect and the proximity to free margins an A-T advancement flap was deemed most appropriate.  Using a sterile surgical marker, an appropriate advancement flap was drawn incorporating the defect and placing the expected incisions within the relaxed skin tension lines where possible.    The area thus outlined was incised deep to adipose tissue with a #15 scalpel blade.  The skin margins were undermined to an appropriate distance in all directions utilizing iris scissors. Keystone Flap Text: The defect edges were debeveled with a #15 scalpel blade.  Given the location of the defect, shape of the defect a keystone flap was deemed most appropriate.  Using a sterile surgical marker, an appropriate keystone flap was drawn incorporating the defect, outlining the appropriate donor tissue and placing the expected incisions within the relaxed skin tension lines where possible. The area thus outlined was incised deep to adipose tissue with a #15 scalpel blade.  The skin margins were undermined to an appropriate distance in all directions around the primary defect and laterally outward around the flap utilizing iris scissors. Mercedes Flap Text: The defect edges were debeveled with a #15 scalpel blade.  Given the location of the defect, shape of the defect and the proximity to free margins a Mercedes flap was deemed most appropriate.  Using a sterile surgical marker, an appropriate advancement flap was drawn incorporating the defect and placing the expected incisions within the relaxed skin tension lines where possible. The area thus outlined was incised deep to adipose tissue with a #15 scalpel blade.  The skin margins were undermined to an appropriate distance in all directions utilizing iris scissors. X Size Of Lesion In Cm (Optional): 0.8 Information: Selecting Yes will display possible errors in your note based on the variables you have selected. This validation is only offered as a suggestion for you. PLEASE NOTE THAT THE VALIDATION TEXT WILL BE REMOVED WHEN YOU FINALIZE YOUR NOTE. IF YOU WANT TO FAX A PRELIMINARY NOTE YOU WILL NEED TO TOGGLE THIS TO 'NO' IF YOU DO NOT WANT IT IN YOUR FAXED NOTE. Pain Refusal Text: I offered to prescribe pain medication but the patient refused to take this medication. Eye Protection Verbiage: Before proceeding with the stage, a plastic scleral shield was inserted. The globe was anesthetized with a few drops of 1% lidocaine with 1:100,000 epinephrine. Then, an appropriate sized scleral shield was chosen and coated with lacrilube ointment. The shield was gently inserted and left in place for the duration of each stage. After the stage was completed, the shield was gently removed. Consent (Nose)/Introductory Paragraph: The rationale for Mohs was explained to the patient and consent was obtained. The risks, benefits and alternatives to therapy were discussed in detail. Specifically, the risks of nasal deformity, changes in the flow of air through the nose, infection, scarring, bleeding, prolonged wound healing, incomplete removal, allergy to anesthesia, nerve injury and recurrence were addressed. Prior to the procedure, the treatment site was clearly identified and confirmed by the patient. All components of Universal Protocol/PAUSE Rule completed. Brow Lift Text: A midfrontal incision was made medially to the defect to allow access to the tissues just superior to the left eyebrow. Following careful dissection inferiorly in a supraperiosteal plane to the level of the left eyebrow, several 3-0 monocryl sutures were used to resuspend the eyebrow orbicularis oculi muscular unit to the superior frontal bone periosteum. This resulted in an appropriate reapproximation of static eyebrow symmetry and correction of the left brow ptosis. Mustarde Flap Text: The defect edges were debeveled with a #15 scalpel blade.  Given the size, depth and location of the defect and the proximity to free margins a Mustarde flap was deemed most appropriate.  Using a sterile surgical marker, an appropriate flap was drawn incorporating the defect. The area thus outlined was incised with a #15 scalpel blade.  The skin margins were undermined to an appropriate distance in all directions utilizing iris scissors. Tissue Cultured Epidermal Autograft Text: The defect edges were debeveled with a #15 scalpel blade.  Given the location of the defect, shape of the defect and the proximity to free margins a tissue cultured epidermal autograft was deemed most appropriate.  The graft was then trimmed to fit the size of the defect.  The graft was then placed in the primary defect and oriented appropriately. Hatchet Flap Text: The defect edges were debeveled with a #15 scalpel blade.  Given the location of the defect, shape of the defect and the proximity to free margins a hatchet flap was deemed most appropriate.  Using a sterile surgical marker, an appropriate hatchet flap was drawn incorporating the defect and placing the expected incisions within the relaxed skin tension lines where possible.    The area thus outlined was incised deep to adipose tissue with a #15 scalpel blade.  The skin margins were undermined to an appropriate distance in all directions utilizing iris scissors. Split-Thickness Skin Graft Text: The defect edges were debeveled with a #15 scalpel blade.  Given the location of the defect, shape of the defect and the proximity to free margins a split thickness skin graft was deemed most appropriate.  Using a sterile surgical marker, the primary defect shape was transferred to the donor site. The split thickness graft was then harvested.  The skin graft was then placed in the primary defect and oriented appropriately. Advancement-Rotation Flap Text: The defect edges were debeveled with a #15 scalpel blade.  Given the location of the defect, shape of the defect and the proximity to free margins an advancement-rotation flap was deemed most appropriate.  Using a sterile surgical marker, an appropriate flap was drawn incorporating the defect and placing the expected incisions within the relaxed skin tension lines where possible. The area thus outlined was incised deep to adipose tissue with a #15 scalpel blade.  The skin margins were undermined to an appropriate distance in all directions utilizing iris scissors. Initial Size Of Lesion: 0.7 W Plasty Text: The lesion was extirpated to the level of the fat with a #15 scalpel blade.  Given the location of the defect, shape of the defect and the proximity to free margins a W-plasty was deemed most appropriate for repair.  Using a sterile surgical marker, the appropriate transposition arms of the W-plasty were drawn incorporating the defect and placing the expected incisions within the relaxed skin tension lines where possible.    The area thus outlined was incised deep to adipose tissue with a #15 scalpel blade.  The skin margins were undermined to an appropriate distance in all directions utilizing iris scissors.  The opposing transposition arms were then transposed into place in opposite direction and anchored with interrupted buried subcutaneous sutures. Bilobed Transposition Flap Text: The defect edges were debeveled with a #15 scalpel blade.  Given the location of the defect and the proximity to free margins a bilobed transposition flap was deemed most appropriate.  Using a sterile surgical marker, an appropriate bilobe flap drawn around the defect.    The area thus outlined was incised deep to adipose tissue with a #15 scalpel blade.  The skin margins were undermined to an appropriate distance in all directions utilizing iris scissors. Crescentic Advancement Flap Text: The defect edges were debeveled with a #15 scalpel blade.  Given the location of the defect and the proximity to free margins a crescentic advancement flap was deemed most appropriate.  Using a sterile surgical marker, the appropriate advancement flap was drawn incorporating the defect and placing the expected incisions within the relaxed skin tension lines where possible.    The area thus outlined was incised deep to adipose tissue with a #15 scalpel blade.  The skin margins were undermined to an appropriate distance in all directions utilizing iris scissors. Island Pedicle Flap-Requiring Vessel Identification Text: The defect edges were debeveled with a #15 scalpel blade.  Given the location of the defect, shape of the defect and the proximity to free margins an island pedicle advancement flap was deemed most appropriate.  Using a sterile surgical marker, an appropriate advancement flap was drawn, based on the axial vessel mentioned above, incorporating the defect, outlining the appropriate donor tissue and placing the expected incisions within the relaxed skin tension lines where possible.    The area thus outlined was incised deep to adipose tissue with a #15 scalpel blade.  The skin margins were undermined to an appropriate distance in all directions around the primary defect and laterally outward around the island pedicle utilizing iris scissors.  There was minimal undermining beneath the pedicle flap. Consent 2/Introductory Paragraph: Mohs surgery was explained to the patient and consent was obtained. The risks, benefits and alternatives to therapy were discussed in detail. Specifically, the risks of infection, scarring, bleeding, prolonged wound healing, incomplete removal, allergy to anesthesia, nerve injury and recurrence were addressed. Prior to the procedure, the treatment site was clearly identified and confirmed by the patient. All components of Universal Protocol/PAUSE Rule completed. Home Suture Removal Text: Patient was provided instructions on removing sutures and will remove their sutures at home.  If they have any questions or difficulties they will call the office. Helical Rim Text: The closure involved the helical rim. Hemigard Postcare Instructions: The HEMIGARD strips are to remain completely dry for at least 5-7 days. Mastoid Interpolation Flap Text: A decision was made to reconstruct the defect utilizing an interpolation axial flap and a staged reconstruction.  A telfa template was made of the defect.  This telfa template was then used to outline the mastoid interpolation flap.  The donor area for the pedicle flap was then injected with anesthesia.  The flap was excised through the skin and subcutaneous tissue down to the layer of the underlying musculature.  The pedicle flap was carefully excised within this deep plane to maintain its blood supply.  The edges of the donor site were undermined.   The donor site was closed in a primary fashion.  The pedicle was then rotated into position and sutured.  Once the tube was sutured into place, adequate blood supply was confirmed with blanching and refill.  The pedicle was then wrapped with xeroform gauze and dressed appropriately with a telfa and gauze bandage to ensure continued blood supply and protect the attached pedicle. Muscle Hinge Flap Text: The defect edges were debeveled with a #15 scalpel blade.  Given the size, depth and location of the defect and the proximity to free margins a muscle hinge flap was deemed most appropriate.  Using a sterile surgical marker, an appropriate hinge flap was drawn incorporating the defect. The area thus outlined was incised with a #15 scalpel blade.  The skin margins were undermined to an appropriate distance in all directions utilizing iris scissors. Ftsg Text: The defect edges were debeveled with a #15 scalpel blade.  Given the location of the defect, shape of the defect and the proximity to free margins a full thickness skin graft was deemed most appropriate.  Using a sterile surgical marker, the primary defect shape was transferred to the donor site. The area thus outlined was incised deep to adipose tissue with a #15 scalpel blade.  The harvested graft was then trimmed of adipose tissue until only dermis and epidermis was left.  The skin margins of the secondary defect were undermined to an appropriate distance in all directions utilizing iris scissors.  The secondary defect was closed with interrupted buried subcutaneous sutures.  The skin edges were then re-apposed with running  sutures.  The skin graft was then placed in the primary defect and oriented appropriately. Non-Graft Cartilage Fenestration Text: The cartilage was fenestrated with a 2mm punch biopsy to help facilitate healing. Localized Dermabrasion With Wire Brush Text: The patient was draped in routine manner.  Localized dermabrasion using 3 x 17 mm wire brush was performed in routine manner to papillary dermis. This spot dermabrasion is being performed to complete skin cancer reconstruction. It also will eliminate the other sun damaged precancerous cells that are known to be part of the regional effect of a lifetime's worth of sun exposure. This localized dermabrasion is therapeutic and should not be considered cosmetic in any regard. Rhomboid Transposition Flap Text: The defect edges were debeveled with a #15 scalpel blade.  Given the location of the defect and the proximity to free margins a rhomboid transposition flap was deemed most appropriate.  Using a sterile surgical marker, an appropriate rhomboid flap was drawn incorporating the defect.    The area thus outlined was incised deep to adipose tissue with a #15 scalpel blade.  The skin margins were undermined to an appropriate distance in all directions utilizing iris scissors. Skin Substitute Text: The defect edges were debeveled with a #15 scalpel blade.  Given the location of the defect, shape of the defect and the proximity to free margins a skin substitute graft was deemed most appropriate.  The graft material was trimmed to fit the size of the defect. The graft was then placed in the primary defect and oriented appropriately. Hemigard Intro: Due to skin fragility and wound tension, it was decided to use HEMIGARD adhesive retention suture devices to permit a linear closure. The skin was cleaned and dried for a 6cm distance away from the wound. Excessive hair, if present, was removed to allow for adhesion. Repair Anesthesia Method: local infiltration Melolabial Interpolation Flap Text: A decision was made to reconstruct the defect utilizing an interpolation axial flap and a staged reconstruction.  A telfa template was made of the defect.  This telfa template was then used to outline the melolabial interpolation flap.  The donor area for the pedicle flap was then injected with anesthesia.  The flap was excised through the skin and subcutaneous tissue down to the layer of the underlying musculature.  The pedicle flap was carefully excised within this deep plane to maintain its blood supply.  The edges of the donor site were undermined.   The donor site was closed in a primary fashion.  The pedicle was then rotated into position and sutured.  Once the tube was sutured into place, adequate blood supply was confirmed with blanching and refill.  The pedicle was then wrapped with xeroform gauze and dressed appropriately with a telfa and gauze bandage to ensure continued blood supply and protect the attached pedicle. Post-Care Instructions: I reviewed with the patient in detail post-care instructions. Patient is not to engage in any aerobic activity for the next 2 days. Should the patient develop any fevers, chills, bleeding, severe pain patient will contact the office immediately. V-Y Flap Text: The defect edges were debeveled with a #15 scalpel blade.  Given the location of the defect, shape of the defect and the proximity to free margins a V-Y flap was deemed most appropriate.  Using a sterile surgical marker, an appropriate advancement flap was drawn incorporating the defect and placing the expected incisions within the relaxed skin tension lines where possible.    The area thus outlined was incised deep to adipose tissue with a #15 scalpel blade.  The skin margins were undermined to an appropriate distance in all directions utilizing iris scissors. H Plasty Text: Given the location of the defect, shape of the defect and the proximity to free margins a H-plasty was deemed most appropriate for repair.  Using a sterile surgical marker, the appropriate advancement arms of the H-plasty were drawn incorporating the defect and placing the expected incisions within the relaxed skin tension lines where possible. The area thus outlined was incised deep to adipose tissue with a #15 scalpel blade. The skin margins were undermined to an appropriate distance in all directions utilizing iris scissors.  The opposing advancement arms were then advanced into place in opposite direction and anchored with interrupted buried subcutaneous sutures. Staging Info: By selecting yes to the question above you will include information on AJCC 8 tumor staging in your Mohs note. Information on tumor staging will be automatically added for SCCs on the head and neck. AJCC 8 includes tumor size, tumor depth, perineural involvement and bone invasion. Mohs : Dr. Ashlee Mathur Manual Repair Warning Statement: We plan on removing the manually selected variable below in favor of our much easier automatic structured text blocks found in the previous tab. We decided to do this to help make the flow better and give you the full power of structured data. Manual selection is never going to be ideal in our platform and I would encourage you to avoid using manual selection from this point on, especially since I will be sunsetting this feature. It is important that you do one of two things with the customized text below. First, you can save all of the text in a word file so you can have it for future reference. Second, transfer the text to the appropriate area in the Library tab. Lastly, if there is a flap or graft type which we do not have you need to let us know right away so I can add it in before the variable is hidden. No need to panic, we plan to give you roughly 6 months to make the change. Bcc Infiltrative Histology Text: There were numerous aggregates of basaloid cells demonstrating an infiltrative pattern. Consent 1/Introductory Paragraph: The rationale for Mohs was explained to the patient and consent was obtained. The risks, benefits and alternatives to therapy were discussed in detail. Specifically, the risks of infection, scarring, bleeding, prolonged wound healing, incomplete removal, allergy to anesthesia, nerve injury and recurrence were addressed. Prior to the procedure, the treatment site was clearly identified and confirmed by the patient. All components of Universal Protocol/PAUSE Rule completed. Unna Boot Text: An Unna boot was placed to help immobilize the limb and facilitate more rapid healing. Consent (Ear)/Introductory Paragraph: The rationale for Mohs was explained to the patient and consent was obtained. The risks, benefits and alternatives to therapy were discussed in detail. Specifically, the risks of ear deformity, infection, scarring, bleeding, prolonged wound healing, incomplete removal, allergy to anesthesia, nerve injury and recurrence were addressed. Prior to the procedure, the treatment site was clearly identified and confirmed by the patient. All components of Universal Protocol/PAUSE Rule completed. Dermal Autograft Text: The defect edges were debeveled with a #15 scalpel blade.  Given the location of the defect, shape of the defect and the proximity to free margins a dermal autograft was deemed most appropriate.  Using a sterile surgical marker, the primary defect shape was transferred to the donor site. The area thus outlined was incised deep to adipose tissue with a #15 scalpel blade.  The harvested graft was then trimmed of adipose and epidermal tissue until only dermis was left.  The skin graft was then placed in the primary defect and oriented appropriately. Rhombic Flap Text: The defect edges were debeveled with a #15 scalpel blade.  Given the location of the defect and the proximity to free margins a rhombic flap was deemed most appropriate.  Using a sterile surgical marker, an appropriate rhombic flap was drawn incorporating the defect.    The area thus outlined was incised deep to adipose tissue with a #15 scalpel blade.  The skin margins were undermined to an appropriate distance in all directions utilizing iris scissors. Length To Time In Minutes Device Was In Place: 10 Transposition Flap Text: The defect edges were debeveled with a #15 scalpel blade.  Given the location of the defect and the proximity to free margins a transposition flap was deemed most appropriate.  Using a sterile surgical marker, an appropriate transposition flap was drawn incorporating the defect.    The area thus outlined was incised deep to adipose tissue with a #15 scalpel blade.  The skin margins were undermined to an appropriate distance in all directions utilizing iris scissors. Full Thickness Lip Wedge Repair (Flap) Text: Given the location of the defect and the proximity to free margins a full thickness wedge repair was deemed most appropriate.  Using a sterile surgical marker, the appropriate repair was drawn incorporating the defect and placing the expected incisions perpendicular to the vermilion border.  The vermilion border was also meticulously outlined to ensure appropriate reapproximation during the repair.  The area thus outlined was incised through and through with a #15 scalpel blade.  The muscularis and dermis were reaproximated with deep sutures following hemostasis. Care was taken to realign the vermilion border before proceeding with the superficial closure.  Once the vermilion was realigned the superfical and mucosal closure was finished. Chonodrocutaneous Helical Advancement Flap Text: The defect edges were debeveled with a #15 scalpel blade.  Given the location of the defect and the proximity to free margins a chondrocutaneous helical advancement flap was deemed most appropriate.  Using a sterile surgical marker, the appropriate advancement flap was drawn incorporating the defect and placing the expected incisions within the relaxed skin tension lines where possible.    The area thus outlined was incised deep to adipose tissue with a #15 scalpel blade.  The skin margins were undermined to an appropriate distance in all directions utilizing iris scissors. Consent Type: Consent 1 (Standard) Double Island Pedicle Flap Text: The defect edges were debeveled with a #15 scalpel blade.  Given the location of the defect, shape of the defect and the proximity to free margins a double island pedicle advancement flap was deemed most appropriate.  Using a sterile surgical marker, an appropriate advancement flap was drawn incorporating the defect, outlining the appropriate donor tissue and placing the expected incisions within the relaxed skin tension lines where possible.    The area thus outlined was incised deep to adipose tissue with a #15 scalpel blade.  The skin margins were undermined to an appropriate distance in all directions around the primary defect and laterally outward around the island pedicle utilizing iris scissors.  There was minimal undermining beneath the pedicle flap. Partial Purse String (Intermediate) Text: Given the location of the defect and the characteristics of the surrounding skin an intermediate purse string closure was deemed most appropriate.  Undermining was performed circumfirentially around the surgical defect.  A purse string suture was then placed and tightened. Wound tension only allowed a partial closure of the circular defect. Simple / Intermediate / Complex Repair - Final Wound Length In Cm: 3.5 Tumor Depth: Less than 6mm from granular layer and no invasion beyond the subcutaneous fat Bilobed Flap Text: The defect edges were debeveled with a #15 scalpel blade.  Given the location of the defect and the proximity to free margins a bilobe flap was deemed most appropriate.  Using a sterile surgical marker, an appropriate bilobe flap drawn around the defect.    The area thus outlined was incised deep to adipose tissue with a #15 scalpel blade.  The skin margins were undermined to an appropriate distance in all directions utilizing iris scissors.

## 2021-09-23 PROBLEM — S82.899A ANKLE FRACTURE: Status: ACTIVE | Noted: 2020-05-22

## 2021-09-23 NOTE — HISTORY OF PRESENT ILLNESS
[FreeTextEntry1] : 6/1/2021: 61 year old female present in the office approximately 3.5 months post op s/p right ankle arthroplasty on 6/1/2021. The patient's pain is noted to be a 5/10, describing the timing of her pain as intermittent. She localizes the pain over the hardware, with R ankle pain worse than left. The patient has been attending physical therapy for this issue, with improvement noted. The patient presents to the clinic in [facility] without any assistance braces. Current symptoms include no chills, no fever, no nausea and no vomiting. No other complaints. \par \par

## 2021-09-23 NOTE — REASON FOR VISIT
[Follow-Up Visit] : a follow-up visit for [Spouse] : spouse [FreeTextEntry2] : bilateral ankle and foot pain s/p right ankle arthroplasty on 6/1/2021

## 2021-09-23 NOTE — PHYSICAL EXAM
[de-identified] : General: Alert and oriented x3. In no acute distress. Pleasant in nature with a normal affect. No apparent respiratory distress.\par \par Left Foot and Ankle Exam\par Skin: Clean, dry, intact\par Inspection: No obvious malalignment, no masses, no swelling, no effusion\par Pulses: 2+ DP/PT pulses\par ROM: FOOT Full  ROM of digits, ANKLE 10 degrees of dorsiflexion, 40 degrees of plantarflexion, 10 degrees of subtalar motion.\par Painful ROM: None\par Tenderness: No tenderness over the medial malleolus, No tenderness over the lateral malleolus, no CFL/ATFL/PTFL pain, no deltoid ligament pain. No heel pain. No Achilles tenderness. No 5th metatarsal pain. No pain to the LisFranc joint. No ttp over the posterior tibial tendon.\par Stability: Negative anterior/posterior drawer.\par Strength: 5/5 ADD/ABD/TA/GS/EHL/FHL/EDL\par Neuro: Sensation in tact to light touch throughout\par Additional tests: Negative Mortons test, negative tarsal tunnel tinels, negative single heel rise.	\par \par Right Foot and Ankle Exam\par Skin: Clean, dry, intact\par Inspection: No obvious malalignment, no masses, no swelling, no effusion\par Pulses: 2+ DP/PT pulses\par ROM: FOOT Full  ROM of digits, ANKLE 10 degrees of dorsiflexion, 40 degrees of plantarflexion, 10 degrees of subtalar motion.\par Painful ROM: None\par Tenderness: No tenderness over the medial malleolus, No tenderness over the lateral malleolus, no CFL/ATFL/PTFL pain, no deltoid ligament pain. No heel pain. No Achilles tenderness. No 5th metatarsal pain. No pain to the LisFranc joint. No ttp over the posterior tibial tendon.\par Stability: Negative anterior/posterior drawer.\par Strength: 5/5 ADD/ABD/TA/GS/EHL/FHL/EDL\par Neuro: Sensation in tact to light touch throughout\par Additional tests: Negative Mortons test, negative tarsal tunnel tinels, negative single heel rise.			\par  [de-identified] : XRs of the bilateral foot and ankles were ordered, obtained, and reviewed by me today, 09/23/2021, revealed: No evidence of acute fractures. Prior R foot 5th metatarsal fracture that appears healed. L ankle fracture is healed. Hardware is intact. \par \par

## 2021-09-23 NOTE — DISCUSSION/SUMMARY
[de-identified] : Today I had a lengthy discussion with the patient regarding their bilateral foot pain and right ankle arthroplasty on 6/1/2021. I have addressed all the patient's concerns surrounding the pathology of their condition. XR imaging was completed in office today and results were reviewed with the patient. Further, we discussed both operative and nonoperative treatment options in the office. At this time, I am recommending that the patient undergo conservative management for symptomatic relief until she reaches the 6 month post operative jessica. \par \par I recommend the patient continue to undergo a course of physical therapy and HEP for the right ankle. Further, I also advised the patient to utilize gel cup inserts in the heels of their shoes, as well as an OTC dorsal hybrid night splint to facilitate stretching in the evening.		\par 		\par The patient may utilize the ASO brace as tolerated. \par \par The patient understood and verbally agreed to the treatment plan. All of their questions were answered and they were satisfied with the visit. The patient should call the office if they have any questions or experience worsening symptoms. I would like to see the patient back in the office 3 months  to reassess their condition. 				\par

## 2021-09-23 NOTE — ADDENDUM
[FreeTextEntry1] : I, Mariah Mcneal, acted solely as a scribe for Dr. Mohamud Knight on this date 09/23/2021.\par \par All medical record entries made by the Scribe were at my, Dr. Mohamud Knight, direction and personally dictated by me on 09/23/2021 . I have reviewed the chart and agree that the record accurately reflects my personal performance of the history, physical exam, assessment and plan. I have also personally directed, reviewed, and agreed with the chart.	\par

## 2021-09-30 NOTE — ED PROVIDER NOTE - OBJECTIVE STATEMENT
61 year old female presenting with complaints' of fatigue, chills and weakness since Saturday. Pt states she was at a wedding on Friday and started to feel sick Saturday with cough and 61 year old female presenting with complaints' of fatigue, chills and weakness since Saturday. Pt states she was at a wedding on Friday and started to feel sick Saturday with cough, chills and fatigue. Pt with a history of anxiety, HTN, back pain, GERD and right ankle arthroplasty (6/21). NKDA Pt reports increasing fatigue, non productive cough, rhinorrhea, chills at home and headaches for the past 3 days. Pt went to urgent care today with negative rapid covid test but sent to ED for further evaluation because of hypoxia. Pt received covid vaccine 4/2021. Pt seen on 3 liters NC o2 sat 95% was 89% on room air.

## 2021-09-30 NOTE — H&P ADULT - PROBLEM SELECTOR PLAN 2
Chronic anemia, etiology ? close f/u on labs and transfuse prbc as needed. iron studies, ferritin to follow. stool occult blood negative, pt. will benefit from hematology eval as an out-pt. Chronic anemia, etiology ? close f/u on labs and transfuse prbc as needed. iron studies, ferritin to follow. stool occult blood negative, pt. will benefit from hematology eval as an out-pt. will add multivitamin.

## 2021-09-30 NOTE — ED ADULT TRIAGE NOTE - CHIEF COMPLAINT QUOTE
pt c/o worsening lethargy, generalized weakness "I keep dropping things out of my hands", dry mouth and sob that started on Saturday, pt went to [place] by car Friday-Sunday

## 2021-09-30 NOTE — H&P ADULT - NSHPPHYSICALEXAM_GEN_ALL_CORE
Vital Signs Last 24 Hrs  T(C): 36.7 (30 Sep 2021 19:48), Max: 36.7 (30 Sep 2021 13:54)  T(F): 98.1 (30 Sep 2021 19:48), Max: 98.1 (30 Sep 2021 19:48)  HR: 74 (30 Sep 2021 19:48) (74 - 78)  BP: 124/69 (30 Sep 2021 19:48) (117/80 - 124/69)  BP(mean): --  RR: 20 (30 Sep 2021 19:48) (18 - 20)  SpO2: 97% (30 Sep 2021 19:48) (89% - 97%)    General: pt. lying in bed not in distress.   HEENT: AT, NC. PERRL. intact EOM. no throat exudate or erythema.   Neck: supple. no JVD.   Chest: scattered rhonchi  bilaterally, rt. more than left.  Heart: S1,S2. RRR. no heart murmur. no edema.   Abdomen: soft. non-tender. non-distended. + BS.   rectal : deferred by pt. was done by er physician and occult blood is negative.  Ext: no calf tenderness. ROM of all ext. intact. distal pulses 2 +.   Neuro: AAO x3. no focal weakness. normal speech, cns ii to xii intact, m /r/s intact.   Skin: no obvious rash noted, no diaphoresis, warm and dry.   psych : mood ok, co-operative, pleasant, no si/hi.

## 2021-09-30 NOTE — ED ADULT NURSE NOTE - CHIEF COMPLAINT QUOTE
pt c/o worsening lethargy, generalized weakness "I keep dropping things out of my hands", dry mouth and sob that started on Saturday, pt went to [place] by car Friday-Sunday

## 2021-09-30 NOTE — ED PROVIDER NOTE - NSTIMEPROVIDERCAREINITIATE_GEN_ER
30-Sep-2021 14:41 Split-Thickness Skin Graft Text: The defect edges were debeveled with a #15 scalpel blade.  Given the location of the defect, shape of the defect and the proximity to free margins a split thickness skin graft was deemed most appropriate.  Using a sterile surgical marker, the primary defect shape was transferred to the donor site. The split thickness graft was then harvested.  The skin graft was then placed in the primary defect and oriented appropriately.

## 2021-09-30 NOTE — ED ADULT NURSE REASSESSMENT NOTE - NS ED NURSE REASSESS COMMENT FT1
Assumed care of patient from previous RN. A&Ox4, RR even and unlabored on 2L NC.  PT C/O of chronic back pain at this time, repositioned for comfort.  Swab obtained. Pt pending CT scan.  Updated on plan of care for admission, all questions answered.  In NAD, will continue to monitor.

## 2021-09-30 NOTE — ED PROVIDER NOTE - ATTENDING CONTRIBUTION TO CARE
I, José Valladares, performed a face to face bedside interview with this patient regarding history of present illness, review of symptoms and relevant past medical, social and family history.  I completed an independent physical examination. I have communicated the patient’s plan of care and disposition with the student.  61 year old female with PMH htn, iron def anemia, spinal stenosis presents with cough, SOB and generalized weakness. Denies chets pain, LE edema, abd pain  Gen: NAD, well appearing  CV: RRR  Pul: CTA b/l  Abd: Soft, non-distended, non-tender  Neuro: no focal deficits  Pt with unilateral pna, suspect bacterial over covid, will admit for hypoxia to 88% on RA

## 2021-09-30 NOTE — ED PROVIDER NOTE - NSICDXPASTMEDICALHX_GEN_ALL_CORE_FT
PAST MEDICAL HISTORY:  Anxiety and depression     Asthma Has not been admitted for have any asthma exacerbation for 10 years    COVID-19 vaccine series completed Pf[employer]--2nd vaccine 4/14/2021    Diverticulosis     Female incontinence     H/O sinusitis     H/O spinal stenosis     History of heart attack Hospitalized 2/2020 after left ankle fracture, surgery, states developed pneumonia, ??mild kidney failure, was told had mild heart attack, recovered , no intervention    Hypertension Have not taken medications for about 2 years    Lumbar herniated disc     Osteoarthritis     Panic attacks     Seasonal allergies

## 2021-09-30 NOTE — H&P ADULT - PROBLEM SELECTOR PLAN 3
o2 sat was 89 % RA, on 2 L o2 sat 96/97 %. continue o2 support, duoneb. ct angio ordered by ER to be followed.

## 2021-09-30 NOTE — H&P ADULT - ASSESSMENT
60 y/o female with h/o asthma, chronic back pain, anxiety / depression, anemia ( per pt. Hb usually runs around 9 ) came to the ER for generalized malaise, fatigue, felt hot the other day, + cough, no hemoptysis reported, attended a wedding resntly and since then has not been feeling well. no cp, no abd. pain, no n/v/d. no blood per rectum or black stools, no hematemesis. no dizziness or syncope reported. As per pt.  she stopped menstrual cycle several years ago. [facility] , rapid covid was negative but sent to the ER for further evaluation. covid-19 negative in the ER as well, no influenza. Hb 7.1 on initial sample , i repeated it and is 8.6.   pt. stated that she does not take trazodone anymore and uses neurontin 100 mg po tid.

## 2021-09-30 NOTE — H&P ADULT - PROBLEM SELECTOR PLAN 1
Rt. side pneumonia, will treat as CAP, keep on rocephin and zithromax, h/o asthma, no sig. wheeze , will keep on duoneb. Rt. side pneumonia, will treat as CAP, keep on rocephin and zithromax, h/o asthma, no sig. wheeze , will keep on duoneb. ct angio chest to follow ordered by ER physician, consolidation on rt ?

## 2021-09-30 NOTE — ED PROVIDER NOTE - NSICDXPASTSURGICALHX_GEN_ALL_CORE_FT
PAST SURGICAL HISTORY:  History of ankle surgery right ankle 2/2020 x2 with hardware , harware removed 1/12/2021  - left ankle fusion 10/2020    History of cardiac cath 2/2020--normal coronary arteries    S/P cholecystectomy 2010    S/P hip replacement left---2015  right--2017    S/P tubal ligation 1995

## 2021-09-30 NOTE — H&P ADULT - HISTORY OF PRESENT ILLNESS
62 y/o female with h/o asthma, chronic back pain, anxiety / depression, anemia ( per pt. Hb usually runs around 9 ) came to the ER for generalized malaise, fatigue, felt hot the other day, + cough, no hemoptysis reported, attended a wedding resntly and since then has not been feeling well. no cp, no abd. pain, no n/v/d. no blood per rectum or black stools, no hematemesis. no dizziness or syncope reported. As per pt.  she stopped menstrual cycle several years ago. [facility] , rapid covid was negative but sent to the ER for further evaluation. covid-19 negative in the ER as well, no influenza. Hb 7.1 on initial sample , i repeated it and is 8.6.  60 y/o female with h/o asthma, chronic back pain, anxiety / depression, anemia ( per pt. Hb usually runs around 9 ) came to the ER for generalized malaise, fatigue, felt hot the other day, + cough, no hemoptysis reported, attended a wedding resntly and since then has not been feeling well. no swallow difficulty, no cp, no abd. pain, no n/v/d. no blood per rectum or black stools, no hematemesis. no dizziness or syncope reported. As per pt.  she stopped menstrual cycle several years ago. [facility]. [facility] , rapid covid was negative but sent to the ER for further evaluation. covid-19 negative in the ER as well, no influenza. Hb 7.1 on initial sample , i repeated it and is 8.6.   pt. stated that she does not take trazodone anymore and uses neurontin 100 mg po tid.     pt's cxr from today :  IMPRESSION:  Moderate alveolar/interstitial infiltrates in the right lung with no lobar consolidation   60 y/o female with h/o asthma, chronic back pain, anxiety / depression, anemia ( per pt. Hb usually runs around 9 ) came to the ER for generalized malaise, fatigue, felt hot the other day, + cough, no hemoptysis reported, attended a wedding resntly and since then has not been feeling well. no swallow difficulty, no cp, no abd. pain, no n/v/d. no blood per rectum or black stools, no hematemesis. no dizziness or syncope reported. As per pt.  she stopped menstrual cycle several years ago. [facility]. [facility] , rapid covid was negative but sent to the ER for further evaluation. covid-19 negative in the ER as well, no influenza. Hb 7.1 on initial sample , i repeated it and is 8.6.   pt. stated that she uses neurontin 100 mg po tid.     pt's cxr from today :  IMPRESSION:  Moderate alveolar/interstitial infiltrates in the right lung with no lobar consolidation

## 2021-10-01 NOTE — CHART NOTE - NSCHARTNOTEFT_GEN_A_CORE
pt's ct angio chest :   Extensive multifocal pneumonia in the right lung.  2. Healing fractures of the lateral right 9th and 10th ribs.  3. Partially peripherally calcified 1.1 cm distal splenic artery aneurysm.  4. Pneumobilia. Correlate for history of ERCP with sphincterotomy or similar  biliary intervention. In the absence of such history the presence of gas in the  bile ducts may signify cholangitis or delmy-enteric fistula.  5. Mildly prominent mediastinal and right hilar lymph nodes are probably  reactive to the pneumonia.  6. Small right pleural effusion. Trace left pleural effusion.    - surgery consult called. will request ID consult as well.

## 2021-10-01 NOTE — PROGRESS NOTE ADULT - PROBLEM SELECTOR PLAN 5
-  celecoxib, mehdi,  - c/w tylenol, hydromorphine based on pain scale    DVT ppx - lovenox  CODE - full,  updated at bed side   Dispo - will stay over weekend

## 2021-10-01 NOTE — PHYSICAL THERAPY INITIAL EVALUATION ADULT - MANUAL MUSCLE TESTING RESULTS, REHAB EVAL
Head,  normocephalic,  atraumatic,  Face,  Face within normal limits,  Ears,  External ears within normal limits,  Nose/Nasopharynx,  External nose  normal appearance,  nares patent,  no nasal discharge,  Mouth and Throat,  Oral cavity appearance normal,  Breath odor normal,  Lips,  Appearance normal Bilateral LE grossly 3+/5, unable to assess Left DF/PF.

## 2021-10-01 NOTE — CONSULT NOTE ADULT - SUBJECTIVE AND OBJECTIVE BOX
ACUTE CARE SURGERY CONSULT    ACS consulted for incidental finding of pneumobilia on CT. HPI as below, admitted for PNA. Patient denies any abdominal pain. Upon questioning, patient reports having had gallbladder removed about ten years ago. Immediately prior to that patient reports having undergone endoscopic procedure for stone removal, likely ERCP with sphincterotomy. Also had endoscopy more recently about 18 months ago. No history of inflammatory bowel disease.    HPI:  62 y/o female with h/o asthma, chronic back pain, anxiety / depression, anemia ( per pt. Hb usually runs around 9 ) came to the ER for generalized malaise, fatigue, felt hot the other day, + cough, no hemoptysis reported, attended a wedding resntly and since then has not been feeling well. no swallow difficulty, no cp, no abd. pain, no n/v/d. no blood per rectum or black stools, no hematemesis. no dizziness or syncope reported. As per pt.  she stopped menstrual cycle several years ago. [facility] , [facility] covid was negative but sent to the ER for further evaluation. covid-19 negative in the ER as well, no influenza. Hb 7.1 on initial sample , i repeated it and is 8.6.   pt. stated that she uses neurontin 100 mg po tid.     pt's cxr from today :  IMPRESSION:  Moderate alveolar/interstitial infiltrates in the right lung with no lobar consolidation   (30 Sep 2021 21:25)      PAST MEDICAL HISTORY:  Osteoarthritis    Asthma    Seasonal allergies    Anxiety and depression    Hypertension    Asthma    Diverticulosis    H/O sinusitis    Lumbar herniated disc    Female incontinence    H/O spinal stenosis    COVID-19 vaccine series completed    Panic attacks    History of heart attack        PAST SURGICAL HISTORY:  S/P cholecystectomy    S/P tubal ligation    S/P hip replacement    History of ankle surgery    History of cardiac cath        ALLERGIES:  No Known Drug Allergies  Seasonal allergies (Rhinorrhea)      FAMILY HISTORY: Noncontributory    SOCIAL HISTORY: Denies tobacco, EtOH, illicit substance use.     HOME MEDICATIONS:    MEDICATIONS  (STANDING):  albuterol/ipratropium for Nebulization 3 milliLiter(s) Nebulizer every 6 hours  gabapentin 100 milliGRAM(s) Oral three times a day  heparin   Injectable 5000 Unit(s) SubCutaneous every 12 hours  influenza   Vaccine 0.5 milliLiter(s) IntraMuscular once  lactobacillus acidophilus 1 Tablet(s) Oral two times a day with meals  lamoTRIgine 150 milliGRAM(s) Oral daily  pantoprazole    Tablet 40 milliGRAM(s) Oral before breakfast  PARoxetine 50 milliGRAM(s) Oral daily  piperacillin/tazobactam IVPB.. 3.375 Gram(s) IV Intermittent every 8 hours  senna 2 Tablet(s) Oral at bedtime  sodium chloride 0.9%. 1000 milliLiter(s) (125 mL/Hr) IV Continuous <Continuous>  traZODone 150 milliGRAM(s) Oral at bedtime    MEDICATIONS  (PRN):  acetaminophen   Tablet .. 650 milliGRAM(s) Oral every 6 hours PRN Temp greater or equal to 38C (100.4F), Mild Pain (1 - 3), Moderate Pain (4 - 6)  ALBUTerol    0.083% 2.5 milliGRAM(s) Nebulizer every 2 hours PRN Shortness of Breath and/or Wheezing  ALPRAZolam 0.5 milliGRAM(s) Oral three times a day PRN anxiety  HYDROmorphone   Tablet 1 milliGRAM(s) Oral every 4 hours PRN Severe Pain (7 - 10)  polyethylene glycol 3350 17 Gram(s) Oral daily PRN Constipation      VITALS & I/Os:  Vital Signs Last 24 Hrs  T(C): 36.8 (01 Oct 2021 06:43), Max: 36.9 (01 Oct 2021 00:18)  T(F): 98.2 (01 Oct 2021 06:43), Max: 98.4 (01 Oct 2021 00:18)  HR: 71 (01 Oct 2021 06:43) (68 - 78)  BP: 122/73 (01 Oct 2021 06:43) (117/80 - 124/69)  BP(mean): --  RR: 18 (01 Oct 2021 06:43) (18 - 20)  SpO2: 98% (01 Oct 2021 06:43) (89% - 98%)  CAPILLARY BLOOD GLUCOSE          I&O's Summary      GENERAL: Alert, laying in bed in no acute distress.  MENTAL STATUS: AAOx3. Appropriate affect.  RESPIRATORY: Unlabored, no conversational dyspnea. Nasal cannula in place.   CARDIOVASCULAR: RRR.   GASTROINTESTINAL: Abdomen soft, NT, ND, -R/-G.    NEUROLOGIC: Cranial nerves II-XII grossly intact. No focal neurological deficits. Moves all extremities spontaneously. Sensation intact bilaterally.  INTEGUMENTARY: No overt rashes or lesions, petechia or purpura. Good turgor. No edema.  MUSCULOSKELETAL: No cyanosis or clubbing. No gross deformities.       LABS:                        8.4    6.29  )-----------( 202      ( 01 Oct 2021 03:37 )             26.9     10    138  |  101  |  15.4  ----------------------------<  98  4.3   |  29.0  |  0.95    Ca    9.1      01 Oct 2021 03:37    TPro  7.1  /  Alb  3.9  /  TBili  <0.2<L>  /  DBili  x   /  AST  47<H>  /  ALT  34<H>  /  AlkPhos  113      Lactate: acetaminophen   Tablet .. 650 milliGRAM(s) Oral every 6 hours PRN  ALBUTerol    0.083% 2.5 milliGRAM(s) Nebulizer every 2 hours PRN  albuterol/ipratropium for Nebulization 3 milliLiter(s) Nebulizer every 6 hours  ALPRAZolam 0.5 milliGRAM(s) Oral three times a day PRN  gabapentin 100 milliGRAM(s) Oral three times a day  heparin   Injectable 5000 Unit(s) SubCutaneous every 12 hours  HYDROmorphone   Tablet 1 milliGRAM(s) Oral every 4 hours PRN  influenza   Vaccine 0.5 milliLiter(s) IntraMuscular once  lactobacillus acidophilus 1 Tablet(s) Oral two times a day with meals  lamoTRIgine 150 milliGRAM(s) Oral daily  pantoprazole    Tablet 40 milliGRAM(s) Oral before breakfast  PARoxetine 50 milliGRAM(s) Oral daily  piperacillin/tazobactam IVPB.. 3.375 Gram(s) IV Intermittent every 8 hours  polyethylene glycol 3350 17 Gram(s) Oral daily PRN  senna 2 Tablet(s) Oral at bedtime  sodium chloride 0.9%. 1000 milliLiter(s) IV Continuous <Continuous>  traZODone 150 milliGRAM(s) Oral at bedtime     PT/INR - ( 30 Sep 2021 17:05 )   PT: 12.7 sec;   INR: 1.10 ratio         PTT - ( 30 Sep 2021 17:05 )  PTT:32.4 sec    CARDIAC MARKERS ( 30 Sep 2021 17:05 )  x     / 0.02 ng/mL / x     / x     / x          10-01 @ 03:38  0.60    Urinalysis Basic - ( 30 Sep 2021 16:46 )    Color: Yellow / Appearance: Clear / S.015 / pH: x  Gluc: x / Ketone: Negative  / Bili: Negative / Urobili: Negative mg/dL   Blood: x / Protein: 15 mg/dL / Nitrite: Negative   Leuk Esterase: Negative / RBC: 0-2 /HPF / WBC 0-2   Sq Epi: x / Non Sq Epi: Occasional / Bacteria: Occasional        IMAGING:  CTA Lizzy/P  IMPRESSION:  1. Extensive multifocal pneumonia in the right lung.  2. Healing fractures of the lateral right 9th and 10th ribs.  3. Partially peripherally calcified 1.1 cm distal splenic artery aneurysm.  4. Pneumobilia. Correlate for history of ERCP with sphincterotomy or similar biliary intervention. In the absence of such history the presence of gas in the  bile ducts may signify cholangitis or delmy-enteric fistula.  5. Mildly prominent mediastinal and right hilar lymph nodes are probably  reactive to the pneumonia.  6. Small right pleural effusion. Trace left pleural effusion.        ******PRELIMINARY REPORT******  ******PRELIMINARY REPORT******      BOB LACEY M.D.;[facility] RADIOLOGIST  
[facility]  INFECTIOUS DISEASES  at [place]  =======================================================  Taj Castillo MD  Diplomates American Board of Internal Medicine and Infectious Diseases  Tel  [phone]  Fax [phone]  =======================================================    [id number]  CHERYL ADEOLA   HPI: This 60 y/o female with h/o asthma, chronic back pain, anxiety / depression, anemia ( per pt. Hb usually runs around 9 ) came to the ER for generalized malaise, fatigue, felt hot the other day, + cough, no hemoptysis reported, attended a [place] recently ( [employer] [place] [place] and since then has not been feeling well. no swallow difficulty, no cp, no abd. pain, no n/v/d. no blood per rectum or black stools, no hematemesis. no dizziness or syncope reported. As per pt.  she stopped menstrual cycle several years ago. [facility] , [facility]id was negative but sent to the ER for further evaluation. covid-19 negative in the ER as well, no influenza.  Xray of the chest showed:  Moderate alveolar/interstitial infiltrates in the right lung with no lobar consolidation.     (30 Sep 2021 21:25)  patient was admitted on 21 for workup., denies smoking.   no clear sick contacts at the wedding.           I have personally reviewed the labs and data; pertinent labs and data are listed in this note; please see below.   =======================================================  Past Medical & Surgical Hx:  =====================  PAST MEDICAL & SURGICAL HISTORY:  Osteoarthritis  Seasonal allergies  Anxiety and depression  Hypertension  Have not taken medications for about 2 years  Asthma  Has not been admitted for have any asthma exacerbation for 10 years  Diverticulosis  H/O sinusitis  Lumbar herniated disc  Female incontinence  H/O spinal stenosis  COVID-19 vaccine series completed  [employer]--2nd vaccine 2021  Panic attacks  History of heart attack  Hospitalized 2020 after left ankle fracture, surgery, states developed pneumonia, ??mild kidney failure, was told had mild heart attack, recovered , no intervention  S/P cholecystectomy   S/P tubal ligation   S/P hip replacement left---  right--  History of ankle surgery  right ankle 2/2020 x2 with hardware , hardware removed 2021  - left ankle fusion 10/2020  History of cardiac cath  2020--normal coronary arteries      Problem List:  ==========  HEALTH ISSUES - PROBLEM Dx:  Pneumonia  Anemia  Hypoxemia  H/O: depression      Social Hx:  =======  no toxic habits currently    FAMILY HISTORY:  Family history of CVA (Father)  , age 88    Family history of COPD (chronic obstructive pulmonary disease) (Father)  and mother,  age 87    FH: heart disease (Father, Mother)  Brother,  , age 65    FH: lung cancer  sister, living, age 65    no significant family history of immunosuppressive disorders in mother or father   =======================================================    REVIEW OF SYSTEMS:  CONSTITUTIONAL:  No Fever or chills  HEENT:  No diplopia or blurred vision.  No earache, sore throat or runny nose.  CARDIOVASCULAR:  No pressure, squeezing, strangling, tightness, heaviness or aching about the chest, neck, axilla or epigastrium.  RESPIRATORY:  No cough, shortness of breath  GASTROINTESTINAL:  No nausea, vomiting or diarrhea.  GENITOURINARY:  No dysuria, frequency or urgency. No Blood in urine  MUSCULOSKELETAL:  no joint aches, no muscle pain  SKIN:  No change in skin, hair or nails.  NEUROLOGIC:  No Headaches, seizures or weakness.  PSYCHIATRIC:  No disorder of thought or mood.  ENDOCRINE:  No heat or cold intolerance  HEMATOLOGICAL:  No easy bruising or bleeding.    =======================================================  Allergies  No Known Drug Allergies  Seasonal allergies (Rhinorrhea)      Antibiotics:  piperacillin/tazobactam IVPB.. 3.375 Gram(s) IV Intermittent every 8 hours    Other medications:  albuterol/ipratropium for Nebulization 3 milliLiter(s) Nebulizer every 6 hours  gabapentin 100 milliGRAM(s) Oral three times a day  heparin   Injectable 5000 Unit(s) SubCutaneous every 12 hours  influenza   Vaccine 0.5 milliLiter(s) IntraMuscular once  lactobacillus acidophilus 1 Tablet(s) Oral two times a day with meals  lamoTRIgine 150 milliGRAM(s) Oral daily  pantoprazole    Tablet 40 milliGRAM(s) Oral before breakfast  PARoxetine 50 milliGRAM(s) Oral daily  senna 2 Tablet(s) Oral at bedtime  sodium chloride 0.9%. 1000 milliLiter(s) IV Continuous <Continuous>    traZODone 150 milliGRAM(s) Oral at bedtime  azithromycin  IVPB   255 mL/Hr IV Intermittent (21 @ 18:58)  cefTRIAXone   IVPB   100 mL/Hr IV Intermittent (21 @ 18:58)  piperacillin/tazobactam IVPB..   25 mL/Hr IV Intermittent (10-01-21 @ 06:32)    ======================================================  Physical Exam:  ============  T(F): 99 (01 Oct 2021 11:26), Max: 99 (01 Oct 2021 11:26)  HR: 77 (01 Oct 2021 11:26)  BP: 122/76 (01 Oct 2021 11:26)  RR: 18 (01 Oct 2021 11:26)  SpO2: 98% (01 Oct 2021 11:26) (89% - 98%)  temp max in last 48H T(F): , Max: 99 (10-01-21 @ 11:26)Height (cm): 167.6 (21 @ 13:54)  Weight (kg): 81.6 (21 @ 13:54)  BMI (kg/m2): 29 (21 @ 13:54)  BSA (m2): 1.91 (21 @ 13:54)    General:  No acute distress.  Eye: Pupils are equal, round and reactive to light, Extraocular movements are intact, Normal conjunctiva.  HENT: Normocephalic, Oral mucosa is moist, No pharyngeal erythema, No sinus tenderness.  Neck: Supple, No lymphadenopathy.  Respiratory: Lungs with Coarse breath sounds bilaterally  Cardiovascular: Normal rate, Regular rhythm,   Gastrointestinal: Soft, Non-tender, Non-distended, Normal bowel sounds.  Genitourinary: No costovertebral angle tenderness.  Lymphatics: No lymphadenopathy neck,   Musculoskeletal: Normal range of motion, Normal strength.  Integumentary: No rash.  Neurologic: Alert, Oriented, No focal deficits, Cranial Nerves II-XII are grossly intact.  Psychiatric: Appropriate mood & affect.    =======================================================  Labs:                        8.4    6.29  )-----------(       ( 01 Oct 2021 03:37 )             26.9     10-01    138  |  101  |  15.4  ----------------------------<  98  4.3   |  29.0  |  0.95    Ca    9.1      01 Oct 2021 03:37    TPro  7.1  /  Alb  3.9  /  TBili  <0.2<L>  /  DBili  x   /  AST  47<H>  /  ALT  34<H>  /  AlkPhos  113        Creatinine, Serum: 0.95 mg/dL (10-01-21 @ 03:37)  Creatinine, Serum: 0.87 mg/dL (21 @ 17:05)    C-Reactive Protein, Serum: 68 mg/L (21 @ 17:05)    Procalcitonin, Serum: 0.07 ng/mL (21 @ 17:05)      Alkaline Phosphatase, Serum: 113 U/L (21 @ 17:05)  Alanine Aminotransferase (ALT/SGPT): 34 U/L (21 @ 17:05)  Aspartate Aminotransferase (AST/SGOT): 47 U/L (21 @ 17:05)  Bilirubin Total, Serum: <0.2 mg/dL (21 @ 17:05)       < from: CT Angio Chest PE Protocol w/ IV Cont (21 @ 23:52) >     EXAM:  CT ANGIO CHEST PULM ART [facility]                          PROCEDURE DATE:  2021          INTERPRETATION:  FINAL REPORT:    CLINICAL INFORMATION: Shortness of breath    COMPARISON: CT abdomen/pelvis 2020 and CT chest 2020    CONTRAST/COMPLICATIONS:  IV Contrast: Omnipaque 350  75 cc administered   25 cc discarded  Oral Contrast: NONE  Complications: None reported at time of study completion    PROCEDURE:  CT Angiography of the Chest.  Sagittal and coronal reformats were performed as well as 3D (MIP) reconstructions.    FINDINGS:    LUNGS AND AIRWAYS: Patent central airways.  Extensive groundglass opacities in the right upper, middle, and lower lobes. 5 mm left lower lobe nodule, not significantly changed.  PLEURA: Small bilateral pleural effusions, larger on the right.  MEDIASTINUM AND ERICKSON: No pathologically enlarged mediastinal or hilar lymph nodes. Shotty right hilar lymph nodes which may be reactive.  VESSELS: No pulmonary embolism. Thoracic aorta normal in caliber.  HEART: Heart size is normal. No pericardial effusion.  CHEST WALL AND LOWER NECK: No enlarged axillary lymph nodes.  VISUALIZED UPPER ABDOMEN: Partially calcified 1.1 cm splenic artery aneurysm, unchanged. Cholecystectomy. Pneumobilia in the lefthepatic lobe and common bile duct, new since 2020.  BONES: Old right seventh, ninth, and 10th rib fractures.    IMPRESSION:  No pulmonary embolism.    Extensive right lung opacities, likely multifocal pneumonia.    Small bilateral pleural effusions, larger on the right.    Pneumobilia, new since 2020; clinical correlation is recommended including for interval biliary intervention.    A preliminary report was provided by [facility].    --- End of Report ---            ASIA MELTON MD; Attending Radiologist  This document has been electronically signed. Oct  1 2021  8:27AM    < end of copied text >

## 2021-10-01 NOTE — CONSULT NOTE ADULT - ASSESSMENT
This 62 y/o female with h/o asthma, chronic back pain, anxiety / depression, anemia ( per pt. Hb usually runs around 9 ) came to the ER for generalized malaise, fatigue, felt hot the other day, + cough, no hemoptysis reported, attended a [place] recently ( [place] [place] [place] and since then has not been feeling well. no swallow difficulty, no cp, no abd. pain, no n/v/d. no blood per rectum or black stools, no hematemesis. no dizziness or syncope reported. As per pt.  she stopped menstrual cycle several years ago. [facility] , [facility] covid was negative but sent to the ER for further evaluation. covid-19 negative in the ER as well, no influenza.  Xray of the chest showed:  Moderate alveolar/interstitial infiltrates in the right lung with no lobar consolidation.     (30 Sep 2021 21:25)  patient was admitted on 9/30/21 for workup., denies smoking.   no clear sick contacts at the wedding.       Impression:  multifocal PNA  fever  Shortness of breath  oxygen dependent      Plan:  - was on Ceftriaxone and Zithromax  - change to Zosyn for now  - add back Zithromax PO x 4 days    - check sputum   check legionella    fevers are low grade, will watch  - follow up all outstanding cultures  - trend temperature and WBC curve  - repeat cultures from blood and all sources if febrile.    remains on oxygen  desats of 93 percent on room air  will trend.     
60 y/o female with h/o asthma, chronic back pain, anxiety / depression, anemia presents for generalized malaise, fatigue, , and cough admitted to medicine service for pneumonia. ACS consulted for incidental read of small pneumobilia in this patient with benign abdomen. As noted in radiology read, this finding may correlate with ERCP and sphincterotomy procedure in past, which when questioned patient readily reports she had done. Pneumobilia likely related to prior instrumentation. However, given PNA and potential to seed infection in liver, which may show as gas in biliary system, can consider evaluation for bacteremia. No surgical intervention necessary.      #Pneumobilia  - History of prior instrumentation in biliary system, likely ERCP with sphincterotomy. Clinically benign abdomen.   - No surgical intervention indicated at this time. Surgery signing off.   - Consider blood cultures to assess bacteremia and potential seeding of liver from PNA.      Plan discussed with Dr. Villa who agrees.   Consulted at 2AM for this incidental finding in patient with benign abdomen and correlated history of prior instrumentation. Seen at 2:45. Late note entry.

## 2021-10-01 NOTE — PROGRESS NOTE ADULT - PROBLEM SELECTOR PLAN 1
- sever multifocal R low and mid lob PNA  - ID consult noted  - c/w Zosyn, Azithromycin  - will f/u on blood cx, pending urine ag for strep pna, legionella, sputum cx

## 2021-10-03 NOTE — PROGRESS NOTE ADULT - SUBJECTIVE AND OBJECTIVE BOX
CHERYL MIR    [id number]    61y      Female    CC: pna    INTERVAL HPI/OVERNIGHT EVENTS: pt seen and examined. on 2L NC o/n, RA this am     REVIEW OF SYSTEMS:    CONSTITUTIONAL: No fever, weight loss  RESPIRATORY: No wheezing, hemoptysis  CARDIOVASCULAR: No chest pain, palpitations  GASTROINTESTINAL: No abdominal or epigastric pain. No nausea, vomiting  NEUROLOGICAL: No headaches    Vital Signs Last 24 Hrs  T(C): 36.7 (03 Oct 2021 11:15), Max: 36.8 (02 Oct 2021 17:16)  T(F): 98 (03 Oct 2021 11:15), Max: 98.3 (02 Oct 2021 21:55)  HR: 77 (03 Oct 2021 11:15) (65 - 82)  BP: 138/92 (03 Oct 2021 11:15) (109/62 - 138/92)  BP(mean): --  RR: 14 (03 Oct 2021 11:15) (14 - 18)  SpO2: 93% (03 Oct 2021 11:15) (93% - 98%)    PHYSICAL EXAM:    GENERAL: NAD  HEENT: PERRL, +EOMI  NECK: soft, supple  CHEST/LUNG: good air entry, respirations unlabored on RA   HEART: S1S2+, Regular rate and rhythm  ABDOMEN: Soft, Nontender, Nondistended; Bowel sounds present  SKIN: warm, dry  NEURO: AAOX3, no focal deficits, no motor or sensory loss  PSYCH: normal affect    LABS:                        10.1   5.55  )-----------( 296      ( 03 Oct 2021 08:40 )             31.8     10-03    142  |  104  |  12.6  ----------------------------<  106<H>  4.2   |  27.0  |  0.77    Ca    9.1      03 Oct 2021 08:40  Mg     2.2     10-03              MEDICATIONS  (STANDING):  albuterol/ipratropium for Nebulization 3 milliLiter(s) Nebulizer every 6 hours  amoxicillin  875 milliGRAM(s)/clavulanate 1 Tablet(s) Oral two times a day  azithromycin   Tablet 250 milliGRAM(s) Oral daily  gabapentin 100 milliGRAM(s) Oral three times a day  heparin   Injectable 5000 Unit(s) SubCutaneous every 12 hours  influenza   Vaccine 0.5 milliLiter(s) IntraMuscular once  lactobacillus acidophilus 1 Tablet(s) Oral two times a day with meals  lamoTRIgine 150 milliGRAM(s) Oral daily  pantoprazole    Tablet 40 milliGRAM(s) Oral before breakfast  PARoxetine 50 milliGRAM(s) Oral daily  senna 2 Tablet(s) Oral at bedtime  sodium chloride 0.9%. 1000 milliLiter(s) (125 mL/Hr) IV Continuous <Continuous>  traZODone 150 milliGRAM(s) Oral at bedtime    MEDICATIONS  (PRN):  acetaminophen   Tablet .. 650 milliGRAM(s) Oral every 6 hours PRN Temp greater or equal to 38C (100.4F), Mild Pain (1 - 3), Moderate Pain (4 - 6)  ALBUTerol    0.083% 2.5 milliGRAM(s) Nebulizer every 2 hours PRN Shortness of Breath and/or Wheezing  ALPRAZolam 0.5 milliGRAM(s) Oral three times a day PRN anxiety  HYDROmorphone   Tablet 2 milliGRAM(s) Oral every 4 hours PRN Severe Pain (7 - 10)  polyethylene glycol 3350 17 Gram(s) Oral daily PRN Constipation      RADIOLOGY & ADDITIONAL TESTS:  
CHERYL MIR    [id number]    61y      Female    CC: pna     INTERVAL HPI/OVERNIGHT EVENTS: pt seen and examined. seen on 2L NC     REVIEW OF SYSTEMS:    CONSTITUTIONAL: No fever, weight loss, or fatigue  RESPIRATORY: No cough, wheezing, hemoptysis; No shortness of breath  CARDIOVASCULAR: No chest pain, palpitations  GASTROINTESTINAL: No abdominal or epigastric pain. No nausea, vomiting  NEUROLOGICAL: No headaches    Vital Signs Last 24 Hrs  T(C): 36.8 (02 Oct 2021 10:40), Max: 36.8 (01 Oct 2021 19:50)  T(F): 98.2 (02 Oct 2021 10:40), Max: 98.2 (01 Oct 2021 19:50)  HR: 68 (02 Oct 2021 14:29) (66 - 77)  BP: 134/92 (02 Oct 2021 10:40) (108/68 - 134/92)  BP(mean): --  RR: 18 (02 Oct 2021 10:40) (16 - 18)  SpO2: 100% (02 Oct 2021 10:40) (94% - 100%)    PHYSICAL EXAM:    GENERAL: NAD  HEENT: PERRL, +EOMI  NECK: soft, supple  CHEST/LUNG: decreased sounds R; respirations unlabored on 2LNC   HEART: S1S2+, Regular rate and rhythm  ABDOMEN: Soft, Nontender, Nondistended; Bowel sounds present  SKIN: warm, dry  NEURO: AAOX3, no focal deficits      LABS:                        8.5    6.13  )-----------( 245      ( 02 Oct 2021 07:01 )             26.8     10-02    146<H>  |  105  |  15.5  ----------------------------<  113<H>  4.1   |  31.0<H>  |  0.95    Ca    8.8      02 Oct 2021 07:01    TPro  7.1  /  Alb  3.9  /  TBili  <0.2<L>  /  DBili  x   /  AST  47<H>  /  ALT  34<H>  /  AlkPhos  113  09-30    PT/INR - ( 30 Sep 2021 17:05 )   PT: 12.7 sec;   INR: 1.10 ratio         PTT - ( 30 Sep 2021 17:05 )  PTT:32.4 sec  Urinalysis Basic - ( 30 Sep 2021 16:46 )    Color: Yellow / Appearance: Clear / S.015 / pH: x  Gluc: x / Ketone: Negative  / Bili: Negative / Urobili: Negative mg/dL   Blood: x / Protein: 15 mg/dL / Nitrite: Negative   Leuk Esterase: Negative / RBC: 0-2 /HPF / WBC 0-2   Sq Epi: x / Non Sq Epi: Occasional / Bacteria: Occasional          MEDICATIONS  (STANDING):  albuterol/ipratropium for Nebulization 3 milliLiter(s) Nebulizer every 6 hours  amoxicillin  875 milliGRAM(s)/clavulanate 1 Tablet(s) Oral two times a day  azithromycin   Tablet 250 milliGRAM(s) Oral daily  gabapentin 100 milliGRAM(s) Oral three times a day  heparin   Injectable 5000 Unit(s) SubCutaneous every 12 hours  influenza   Vaccine 0.5 milliLiter(s) IntraMuscular once  lactobacillus acidophilus 1 Tablet(s) Oral two times a day with meals  lamoTRIgine 150 milliGRAM(s) Oral daily  pantoprazole    Tablet 40 milliGRAM(s) Oral before breakfast  PARoxetine 50 milliGRAM(s) Oral daily  senna 2 Tablet(s) Oral at bedtime  sodium chloride 0.9%. 1000 milliLiter(s) (125 mL/Hr) IV Continuous <Continuous>  traZODone 150 milliGRAM(s) Oral at bedtime    MEDICATIONS  (PRN):  acetaminophen   Tablet .. 650 milliGRAM(s) Oral every 6 hours PRN Temp greater or equal to 38C (100.4F), Mild Pain (1 - 3), Moderate Pain (4 - 6)  ALBUTerol    0.083% 2.5 milliGRAM(s) Nebulizer every 2 hours PRN Shortness of Breath and/or Wheezing  ALPRAZolam 0.5 milliGRAM(s) Oral three times a day PRN anxiety  HYDROmorphone   Tablet 1 milliGRAM(s) Oral every 4 hours PRN Severe Pain (7 - 10)  polyethylene glycol 3350 17 Gram(s) Oral daily PRN Constipation      RADIOLOGY & ADDITIONAL TESTS:    < from: CT Angio Chest PE Protocol w/ IV Cont (21 @ 23:52) >  IMPRESSION:  No pulmonary embolism.    Extensive right lung opacities, likely multifocal pneumonia.    Small bilateral pleural effusions, larger on the right.    Pneumobilia, new since 2020; clinical correlation is recommended including for interval biliary intervention.    A preliminary report was provided by [facility].    < end of copied text >  
[facility]  INFECTIOUS DISEASES  at [place]  =======================================================  Taj Castillo MD  Diplomates American Board of Internal Medicine and Infectious Diseases  Tel  [phone]  Fax [phone]  =======================================================    [id number]  CHERYL MIR   follow up: atypical PNA    feeling better.   breathing better  on room air this afternoon        I have personally reviewed the labs and data; pertinent labs and data are listed in this note; please see below.   =======================================================  Past Medical & Surgical Hx:  =====================  PAST MEDICAL & SURGICAL HISTORY:  Osteoarthritis  Seasonal allergies  Anxiety and depression  Hypertension  Have not taken medications for about 2 years  Asthma  Has not been admitted for have any asthma exacerbation for 10 years  Diverticulosis  H/O sinusitis  Lumbar herniated disc  Female incontinence  H/O spinal stenosis  COVID-19 vaccine series completed  [employer]--2nd vaccine 2021  Panic attacks  History of heart attack  Hospitalized 2020 after left ankle fracture, surgery, states developed pneumonia, ??mild kidney failure, was told had mild heart attack, recovered , no intervention  S/P cholecystectomy   S/P tubal ligation   S/P hip replacement left---  right--2017  History of ankle surgery  right ankle 2/2020 x2 with hardware , hardware removed 2021  - left ankle fusion 10/2020  History of cardiac cath  2020--normal coronary arteries      Problem List:  ==========  HEALTH ISSUES - PROBLEM Dx:  Pneumonia  Anemia  Hypoxemia  H/O: depression      Social Hx:  =======  no toxic habits currently    FAMILY HISTORY:  Family history of CVA (Father)  , age 88    Family history of COPD (chronic obstructive pulmonary disease) (Father)  and mother,  age 87    FH: heart disease (Father, Mother)  Brother,  , age 65    FH: lung cancer  sister, living, age 65    no significant family history of immunosuppressive disorders in mother or father   =======================================================    REVIEW OF SYSTEMS:  CONSTITUTIONAL:  No Fever or chills  HEENT:  No diplopia or blurred vision.  No earache, sore throat or runny nose.  CARDIOVASCULAR:  No pressure, squeezing, strangling, tightness, heaviness or aching about the chest, neck, axilla or epigastrium.  RESPIRATORY:  No cough, shortness of breath  GASTROINTESTINAL:  No nausea, vomiting or diarrhea.  GENITOURINARY:  No dysuria, frequency or urgency. No Blood in urine  MUSCULOSKELETAL:  no joint aches, no muscle pain  SKIN:  No change in skin, hair or nails.  NEUROLOGIC:  No Headaches, seizures or weakness.  PSYCHIATRIC:  No disorder of thought or mood.  ENDOCRINE:  No heat or cold intolerance  HEMATOLOGICAL:  No easy bruising or bleeding.    =======================================================  Allergies  No Known Drug Allergies  Seasonal allergies (Rhinorrhea)         ======================================================  Physical Exam:  ============     General:  No acute distress.  Eye: Pupils are equal, round and reactive to light, Extraocular movements are intact, Normal conjunctiva.  HENT: Normocephalic, Oral mucosa is moist, No pharyngeal erythema, No sinus tenderness.  Neck: Supple, No lymphadenopathy.  Respiratory:  CLEAR breath sounds now.   Cardiovascular: Normal rate, Regular rhythm,   Gastrointestinal: Soft, Non-tender, Non-distended, Normal bowel sounds.  Genitourinary: No costovertebral angle tenderness.  Lymphatics: No lymphadenopathy neck,   Musculoskeletal: Normal range of motion, Normal strength.  Integumentary: No rash.  Neurologic: Alert, Oriented, No focal deficits, Cranial Nerves II-XII are grossly intact.  Psychiatric: Appropriate mood & affect.    =======================================================  Vitals:  ============  T(F): 98.2 (02 Oct 2021 10:40), Max: 98.2 (01 Oct 2021 19:50)  HR: 66 (02 Oct 2021 10:40)  BP: 134/92 (02 Oct 2021 10:40)  RR: 18 (02 Oct 2021 10:40)  SpO2: 100% (02 Oct 2021 10:40) (94% - 100%)  temp max in last 48H T(F): , Max: 99 (10-01-21 @ 11:26)    =======================================================  Current Antibiotics:  azithromycin   Tablet 250 milliGRAM(s) Oral daily  piperacillin/tazobactam IVPB.. 3.375 Gram(s) IV Intermittent every 8 hours    Other medications:  albuterol/ipratropium for Nebulization 3 milliLiter(s) Nebulizer every 6 hours  gabapentin 100 milliGRAM(s) Oral three times a day  heparin   Injectable 5000 Unit(s) SubCutaneous every 12 hours  influenza   Vaccine 0.5 milliLiter(s) IntraMuscular once  lactobacillus acidophilus 1 Tablet(s) Oral two times a day with meals  lamoTRIgine 150 milliGRAM(s) Oral daily  pantoprazole    Tablet 40 milliGRAM(s) Oral before breakfast  PARoxetine 50 milliGRAM(s) Oral daily  senna 2 Tablet(s) Oral at bedtime  sodium chloride 0.9%. 1000 milliLiter(s) IV Continuous <Continuous>  traZODone 150 milliGRAM(s) Oral at bedtime      =======================================================  Labs:                        8.5    6.13  )-----------( 245      ( 02 Oct 2021 07:01 )             26.8     10-02    146<H>  |  105  |  15.5  ----------------------------<  113<H>  4.1   |  31.0<H>  |  0.95    Ca    8.8      02 Oct 2021 07:01    TPro  7.1  /  Alb  3.9  /  TBili  <0.2<L>  /  DBili  x   /  AST  47<H>  /  ALT  34<H>  /  AlkPhos  113          C-Reactive Protein, Serum: 68 mg/L (21 @ 17:05)      Procalcitonin, Serum: 0.07 ng/mL (21 @ 17:05)           < from: CT Angio Chest PE Protocol w/ IV Cont (21 @ 23:52) >     EXAM:  CT ANGIO CHEST PULM [facility]                          PROCEDURE DATE:  2021          INTERPRETATION:  FINAL REPORT:    CLINICAL INFORMATION: Shortness of breath    COMPARISON: CT abdomen/pelvis 2020 and CT chest 2020    CONTRAST/COMPLICATIONS:  IV Contrast: Omnipaque 350  75 cc administered   25 cc discarded  Oral Contrast: NONE  Complications: None reported at time of study completion    PROCEDURE:  CT Angiography of the Chest.  Sagittal and coronal reformats were performed as well as 3D (MIP) reconstructions.    Legionella Antigen, Urine: Negative (10-02-21 @ 01:51)  FINDINGS:    LUNGS AND AIRWAYS: Patent central airways.  Extensive groundglass opacities in the right upper, middle, and lower lobes. 5 mm left lower lobe nodule, not significantly changed.  PLEURA: Small bilateral pleural effusions, larger on the right.  MEDIASTINUM AND ERICKSON: No pathologically enlarged mediastinal or hilar lymph nodes. Shotty right hilar lymph nodes which may be reactive.  VESSELS: No pulmonary embolism. Thoracic aorta normal in caliber.  HEART: Heart size is normal. No pericardial effusion.  CHEST WALL AND LOWER NECK: No enlarged axillary lymph nodes.  VISUALIZED UPPER ABDOMEN: Partially calcified 1.1 cm splenic artery aneurysm, unchanged. Cholecystectomy. Pneumobilia in the lefthepatic lobe and common bile duct, new since 2020.  BONES: Old right seventh, ninth, and 10th rib fractures.    IMPRESSION:  No pulmonary embolism.    Extensive right lung opacities, likely multifocal pneumonia.    Small bilateral pleural effusions, larger on the right.    Pneumobilia, new since 2020; clinical correlation is recommended including for interval biliary intervention.    A preliminary report was provided by [facility].    --- End of Report ---            ASIA MELTON MD; Attending Radiologist  This document has been electronically signed. Oct  1 2021  8:27AM    < end of copied text >  
[facility] Division of Hospital Medicine    Chief Complaint:  PNA    SUBJECTIVE: reports feeling somewhat better, endorses R side pleuritic CP and cough    OVERNIGHT EVENTS: none reported     Patient denies  abd pain, N/V, fever, chills, dysuria or any other complaints. All remainder ROS negative.     MEDICATIONS  (STANDING):  albuterol/ipratropium for Nebulization 3 milliLiter(s) Nebulizer every 6 hours  azithromycin   Tablet 250 milliGRAM(s) Oral daily  gabapentin 100 milliGRAM(s) Oral three times a day  heparin   Injectable 5000 Unit(s) SubCutaneous every 12 hours  influenza   Vaccine 0.5 milliLiter(s) IntraMuscular once  lactobacillus acidophilus 1 Tablet(s) Oral two times a day with meals  lamoTRIgine 150 milliGRAM(s) Oral daily  pantoprazole    Tablet 40 milliGRAM(s) Oral before breakfast  PARoxetine 50 milliGRAM(s) Oral daily  piperacillin/tazobactam IVPB.. 3.375 Gram(s) IV Intermittent every 8 hours  senna 2 Tablet(s) Oral at bedtime  sodium chloride 0.9%. 1000 milliLiter(s) (125 mL/Hr) IV Continuous <Continuous>  traZODone 150 milliGRAM(s) Oral at bedtime    MEDICATIONS  (PRN):  acetaminophen   Tablet .. 650 milliGRAM(s) Oral every 6 hours PRN Temp greater or equal to 38C (100.4F), Mild Pain (1 - 3), Moderate Pain (4 - 6)  ALBUTerol    0.083% 2.5 milliGRAM(s) Nebulizer every 2 hours PRN Shortness of Breath and/or Wheezing  ALPRAZolam 0.5 milliGRAM(s) Oral three times a day PRN anxiety  HYDROmorphone   Tablet 1 milliGRAM(s) Oral every 4 hours PRN Severe Pain (7 - 10)  polyethylene glycol 3350 17 Gram(s) Oral daily PRN Constipation        I&O's Summary      PHYSICAL EXAM:  Vital Signs Last 24 Hrs  T(C): 37.2 (01 Oct 2021 11:26), Max: 37.2 (01 Oct 2021 11:26)  T(F): 99 (01 Oct 2021 11:26), Max: 99 (01 Oct 2021 11:26)  HR: 77 (01 Oct 2021 11:26) (68 - 78)  BP: 122/76 (01 Oct 2021 11:26) (117/80 - 131/67)  BP(mean): --  RR: 18 (01 Oct 2021 11:26) (18 - 20)  SpO2: 98% (01 Oct 2021 11:26) (89% - 98%)        CONSTITUTIONAL: NAD, well-developed, well-groomed  ENMT: Moist oral mucosa, no pharyngeal injection or exudates; normal dentition; No JVD  RESPIRATORY: Normal respiratory effort;  cripitation on R low and mid zone  CARDIOVASCULAR: Regular rate and rhythm, normal S1 and S2, no murmur/rub/gallop; No lower extremity edema; Peripheral pulses are 2+ bilaterally  ABDOMEN: Nontender to palpation, normoactive bowel sounds, no rebound/guarding; No hepatosplenomegaly  MUSCLOSKELETAL:  no clubbing or cyanosis of digits; no joint swelling or tenderness to palpation  PSYCH: A+O to person, place, and time; affect appropriate  NEUROLOGY: CN 2-12 are intact and symmetric; no gross sensory deficits; was observed moving all 4 ext against gravity cooperating with exam.   SKIN: No rashes; no palpable lesions    LABS:                        8.4    6.29  )-----------(       ( 01 Oct 2021 03:37 )             26.9     10-    138  |  101  |  15.4  ----------------------------<  98  4.3   |  29.0  |  0.95    Ca    9.1      01 Oct 2021 03:37    TPro  7.1  /  Alb  3.9  /  TBili  <0.2<L>  /  DBili  x   /  AST  47<H>  /  ALT  34<H>  /  AlkPhos  113  09-30    PT/INR - ( 30 Sep 2021 17:05 )   PT: 12.7 sec;   INR: 1.10 ratio         PTT - ( 30 Sep 2021 17:05 )  PTT:32.4 sec  CARDIAC MARKERS ( 30 Sep 2021 17:05 )  x     / 0.02 ng/mL / x     / x     / x          Urinalysis Basic - ( 30 Sep 2021 16:46 )    Color: Yellow / Appearance: Clear / S.015 / pH: x  Gluc: x / Ketone: Negative  / Bili: Negative / Urobili: Negative mg/dL   Blood: x / Protein: 15 mg/dL / Nitrite: Negative   Leuk Esterase: Negative / RBC: 0-2 /HPF / WBC 0-2   Sq Epi: x / Non Sq Epi: Occasional / Bacteria: Occasional        CAPILLARY BLOOD GLUCOSE            RADIOLOGY & ADDITIONAL TESTS:  Results Reviewed:   Imaging Personally Reviewed:  Electrocardiogram Personally Reviewed:

## 2021-10-03 NOTE — PROGRESS NOTE ADULT - ASSESSMENT
60y/oF PMH anxiety/depression, HTN, non-obstructive CAD, OA with prior b/l THR , elective R TAR in 6/2021 presenting with fatigue, productive cough, subjective fevers, admitted with R-sided multifocal PNA     R-sided multifocal PNA   -ID recs appreciated   -s/p zosyn, changed to augmentin (cont through 10/6)  -cont zithromax   -f/u sputum   -legionella neg  -wean off supplemental O2 as tolerated     Iron deficiency anemia   -no s/sx active bleeding   -cont to monitor     Depression  -cont trazadone, paroxetine, lamotrigine     Anxiety   -cont alprazolam prn     Chronic pain   -cont home meds     dvt ppx: lovenox sq     dispo: pending clinical improvement 
This 60 y/o female with h/o asthma, chronic back pain, anxiety / depression, anemia ( per pt. Hb usually runs around 9 ) came to the ER for generalized malaise, fatigue, felt hot the other day, + cough, no hemoptysis reported, attended a [place] recently ( [employer] [place] and since then has not been feeling well. no swallow difficulty, no cp, no abd. pain, no n/v/d. no blood per rectum or black stools, no hematemesis. no dizziness or syncope reported. As per pt.  she stopped menstrual cycle several years ago. [facility] , rapid covid was negative but sent to the ER for further evaluation. covid-19 negative in the ER as well, no influenza.  Xray of the chest showed:  Moderate alveolar/interstitial infiltrates in the right lung with no lobar consolidation.     (30 Sep 2021 21:25)  patient was admitted on 9/30/21 for workup., denies smoking.   no clear sick contacts at the wedding.       Impression:  multifocal PNA  fever  Shortness of breath  oxygen dependent      Plan:  - was on Ceftriaxone and Zithromax    - better on zosyn and Zithromax PO  will change zosyn to Augmentin 875mg PO BID x 6 days (thru 10/6/21), and continue Zithromax PO x 3 more days (thru 10/5/21)    - check sputum   check legionella    fevers are low grade, resolved      If she continue to improve in the next 1 -2 day, can consider discharge to community.       No further specific infectious disease recommendations. Will be available as needed.  
60y/oF PMH anxiety/depression, HTN, non-obstructive CAD, OA with prior b/l THR , elective R TAR in 6/2021 presenting with fatigue, productive cough, subjective fevers, admitted with R-sided multifocal PNA     R-sided multifocal PNA   -ID recs appreciated   -s/p zosyn, changed to augmentin (cont through 10/6)  -cont zithromax   -f/u sputum   -legionella neg  -wean off supplemental O2 as tolerated, on RA this am, monitor  -IS    Iron deficiency anemia   -no s/sx active bleeding   -cont to monitor     Depression  -cont trazadone, paroxetine, lamotrigine     Anxiety   -cont alprazolam prn     Chronic pain   -cont home meds     dvt ppx: lovenox sq     dispo: pending clinical improvement, likely next 24 hrs if remains off supplemental O2
59yo/F with PMH anxiety/depression, HTN, non-obstructive CAD, OA with prior b/l THR,  elective RT TAR in 06/2021 who presented with fatigue, productive cough, subjective fevers to ED and was found to have hypoxemia on abmulation with R side multifocal PNA in ED. She was admitted for IV Abx.

## 2021-10-04 NOTE — DISCHARGE NOTE PROVIDER - CARE PROVIDERS DIRECT ADDRESSES
[email or web address]

## 2021-10-04 NOTE — DISCHARGE NOTE PROVIDER - NSDCCPCAREPLAN_GEN_ALL_CORE_FT
PRINCIPAL DISCHARGE DIAGNOSIS  Diagnosis: Community acquired pneumonia  Assessment and Plan of Treatment:

## 2021-10-04 NOTE — DISCHARGE NOTE PROVIDER - CARE PROVIDER_API CALL
Navneet Jean)  [facility]  [address], [address]  [place] [address]  Phone: [phone]  Fax: [phone]  Follow Up Time:     Bernardo Briceno (DO)  Critical Care Medicine; Internal Medicine; Pulmonary Disease  [address], [address]  [place] [address]  Phone: [phone]  Fax: [phone]  Follow Up Time:

## 2021-10-04 NOTE — DISCHARGE NOTE NURSING/CASE MANAGEMENT/SOCIAL WORK - PATIENT PORTAL LINK FT
You can access the FollowMyHealth Patient Portal offered by [facility] by registering at the following website: [email or web address]. By joining [facility]’s FollowMyHealth portal, you will also be able to view your health information using other applications (apps) compatible with our system.

## 2021-10-04 NOTE — DISCHARGE NOTE PROVIDER - HOSPITAL COURSE
60y/oF PMH anxiety/depression, HTN, non-obstructive CAD, OA with prior b/l THR , elective R TAR in 6/2021 presenting with fatigue, productive cough, subjective fevers, admitted with R-sided multifocal PNA. Pt titrated off supplemental O2. ID recs appreciated, changed to Augmentin (to cont through 10/6). pt stable for dc home.     Vital Signs Last 24 Hrs  T(C): 36.8 (04 Oct 2021 10:12), Max: 36.8 (04 Oct 2021 10:12)  T(F): 98.3 (04 Oct 2021 10:12), Max: 98.3 (04 Oct 2021 10:12)  HR: 77 (04 Oct 2021 14:53) (70 - 80)  BP: 90/67 (04 Oct 2021 10:12) (90/67 - 133/82)  BP(mean): --  RR: 18 (04 Oct 2021 10:12) (18 - 18)  SpO2: 94% (04 Oct 2021 14:53) (94% - 99%)    GENERAL: NAD  HEENT: PERRL, +EOMI  NECK: soft, supple  CHEST/LUNG: good air entry, respirations unlabored on RA   HEART: S1S2+, Regular rate and rhythm  ABDOMEN: Soft, Nontender, Nondistended; Bowel sounds present  SKIN: warm, dry  NEURO: AAOX3, no focal deficits, no motor or sensory loss  PSYCH: normal affect 60y/oF PMH anxiety/depression, HTN, non-obstructive CAD, OA with prior b/l THR , elective R TAR in 6/2021 presenting with fatigue, productive cough, subjective fevers, admitted with R-sided multifocal PNA. Pt titrated off supplemental O2. ID recs appreciated, changed to Augmentin (to cont through 10/6). pt stable for dc home.     Vital Signs Last 24 Hrs  T(C): 36.8 (04 Oct 2021 10:12), Max: 36.8 (04 Oct 2021 10:12)  T(F): 98.3 (04 Oct 2021 10:12), Max: 98.3 (04 Oct 2021 10:12)  HR: 77 (04 Oct 2021 14:53) (70 - 80)  BP: 90/67 (04 Oct 2021 10:12) (90/67 - 133/82)  BP(mean): --  RR: 18 (04 Oct 2021 10:12) (18 - 18)  SpO2: 94% (04 Oct 2021 14:53) (94% - 99%)    GENERAL: NAD  HEENT: PERRL, +EOMI  NECK: soft, supple  CHEST/LUNG: good air entry, respirations unlabored on RA   HEART: S1S2+, Regular rate and rhythm  ABDOMEN: Soft, Nontender, Nondistended; Bowel sounds present  SKIN: warm, dry  NEURO: AAOX3, no focal deficits, no motor or sensory loss  PSYCH: normal affect    33minutes spent on discharge

## 2021-10-04 NOTE — DISCHARGE NOTE PROVIDER - NSDCFUSCHEDAPPT_GEN_ALL_CORE_FT
CHERYL MIR T ; 10/20/2021 ; [facility]  CHERYL MIR T ; 12/09/2021 ; NPP [facility]

## 2021-10-06 PROBLEM — Z09 HOSPITAL DISCHARGE FOLLOW-UP: Status: ACTIVE | Noted: 2019-09-23
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
## 2021-10-06 PROBLEM — S22.39XA RIB FRACTURE: Status: ACTIVE | Noted: 2021-01-01

## 2021-10-07 PROBLEM — F32.A DEPRESSION, UNSPECIFIED DEPRESSION TYPE: Status: ACTIVE | Noted: 2021-04-02

## 2021-10-07 PROBLEM — I10 HYPERTENSION, UNCONTROLLED: Status: ACTIVE | Noted: 2019-01-22

## 2021-10-07 NOTE — HEALTH RISK ASSESSMENT
[0] : 2) Feeling down, depressed, or hopeless: Not at all (0) [PHQ-9 Negative - No further assessment needed] : PHQ-9 Negative - No further assessment needed [PHQ-2 Negative - No further assessment needed] : PHQ-2 Negative - No further assessment needed [I have developed a follow-up plan documented below in the note.] : I have developed a follow-up plan documented below in the note.

## 2021-10-07 NOTE — PLAN
[FreeTextEntry1] : s/p Hospital discharge for Pneumonia - doing well\par S/p rib fracture\par # depression stable on current meds.\par # anemia in the hospital taking Iron- will check in 6 weeks

## 2021-10-07 NOTE — HISTORY OF PRESENT ILLNESS
[Post-hospitalization from ___ Hospital] : Post-hospitalization from [unfilled] Hospital [FreeTextEntry2] : \par Hospital Course: \par Discharge Date	04-Oct-2021 \par Admission Date	30-Sep-2021 19:23 \par Reason for Admission	Pneumonia / anemia \par Hospital Course	 \par 60y/oF PMH anxiety/depression, HTN, non-obstructive CAD, OA with prior b/l THR \par , elective R TAR in 6/2021 presenting with fatigue, productive cough, \par subjective fevers, admitted with R-sided multifocal PNA. Pt titrated off \par supplemental O2. ID recs appreciated, changed to Augmentin (to cont through \par 10/6). pt stable for dc home. \par \par Vital Signs Last 24 Hrs \par T(C): 36.8 (04 Oct 2021 10:12), Max: 36.8 (04 Oct 2021 10:12) \par T(F): 98.3 (04 Oct 2021 10:12), Max: 98.3 (04 Oct 2021 10:12) \par HR: 77 (04 Oct 2021 14:53) (70 - 80) \par BP: 90/67 (04 Oct 2021 10:12) (90/67 - 133/82) \par BP(mean): -- \par RR: 18 (04 Oct 2021 10:12) (18 - 18) \par SpO2: 94% (04 Oct 2021 14:53) (94% - 99%) \par \par GENERAL: NAD \par HEENT: PERRL, +EOMI \par NECK: soft, supple \par CHEST/LUNG: good air entry, respirations unlabored on RA \par HEART: S1S2+, Regular rate and rhythm \par ABDOMEN: Soft, Nontender, Nondistended; Bowel sounds present \par SKIN: warm, dry \par NEURO: AAOX3, no focal deficits, no motor or sensory loss \par PSYCH: normal affect \par \par 33minutes spent on discharge \par \par Med Reconciliation: \par Medication Reconciliation Status	Admission Reconciliation is Completed \par Discharge Reconciliation is Completed \par Discharge Medications	amoxicillin-clavulanate 875 mg-125 mg oral tablet: 1 \par tab(s) orally 2 times a day \par Breo Ellipta: \par gabapentin 300 mg oral capsule: 1 cap(s) orally 3 times a day \par lamoTRIgine 150 mg oral tablet: 1  orally once a day \par Melatonin: 20 milligram(s) orally once a day (at bedtime) \par metaxalone 800 mg oral tablet: orally 2 times a day \par MiraLax oral powder for reconstitution: 17 gram(s) orally once a day  as needed \par for constipation \par pantoprazole 40 mg oral delayed release tablet: 1 tab(s) orally once a day \par PARoxetine mesylate: 60 milligram(s) orally once a day \par ProAir HFA 90 mcg/inh inhalation aerosol: 2 puff(s) inhaled every 6 hours, As \par Needed \par senna oral tablet: 2 tab(s) orally once a day (at bedtime) \par traZODone 150 mg oral tablet: 1 tab(s) orally once a day (at bedtime) \par \par \par \par \par today-\par s/p pneumonia feeking better\par s/p rib fracture 6 weeks ago\par off xanax slowly,on oxycodone\par \par asthma is bad on inhaler on breo and albuterol\par depression and anxiety is better\par Wellbutrin, Paxil ,lamotrigine and trazodone\par feeling better lost 10 lbs [FreeTextEntry1] : Patient in office for follow up. [de-identified] : Hospital Course: \par Discharge Date	04-Oct-2021 \par Admission Date	30-Sep-2021 19:23 \par Reason for Admission	Pneumonia / anemia \par Hospital Course	 \par 60y/oF PMH anxiety/depression, HTN, non-obstructive CAD, OA with prior b/l THR \par , elective R TAR in 6/2021 presenting with fatigue, productive cough, \par subjective fevers, admitted with R-sided multifocal PNA. Pt titrated off \par supplemental O2. ID recs appreciated, changed to Augmentin (to cont through \par 10/6). pt stable for dc home. \par \par Vital Signs Last 24 Hrs \par T(C): 36.8 (04 Oct 2021 10:12), Max: 36.8 (04 Oct 2021 10:12) \par T(F): 98.3 (04 Oct 2021 10:12), Max: 98.3 (04 Oct 2021 10:12) \par HR: 77 (04 Oct 2021 14:53) (70 - 80) \par BP: 90/67 (04 Oct 2021 10:12) (90/67 - 133/82) \par BP(mean): -- \par RR: 18 (04 Oct 2021 10:12) (18 - 18) \par SpO2: 94% (04 Oct 2021 14:53) (94% - 99%) \par \par GENERAL: NAD \par HEENT: PERRL, +EOMI \par NECK: soft, supple \par CHEST/LUNG: good air entry, respirations unlabored on RA \par HEART: S1S2+, Regular rate and rhythm \par ABDOMEN: Soft, Nontender, Nondistended; Bowel sounds present \par SKIN: warm, dry \par NEURO: AAOX3, no focal deficits, no motor or sensory loss \par PSYCH: normal affect \par \par 33minutes spent on discharge \par \par Med Reconciliation: \par Medication Reconciliation Status	Admission Reconciliation is Completed \par Discharge Reconciliation is Completed \par Discharge Medications	amoxicillin-clavulanate 875 mg-125 mg oral tablet: 1 \par tab(s) orally 2 times a day \par Breo Ellipta: \par gabapentin 300 mg oral capsule: 1 cap(s) orally 3 times a day \par lamoTRIgine 150 mg oral tablet: 1  orally once a day \par Melatonin: 20 milligram(s) orally once a day (at bedtime) \par metaxalone 800 mg oral tablet: orally 2 times a day \par MiraLax oral powder for reconstitution: 17 gram(s) orally once a day  as needed \par for constipation \par pantoprazole 40 mg oral delayed release tablet: 1 tab(s) orally once a day \par PARoxetine mesylate: 60 milligram(s) orally once a day \par ProAir HFA 90 mcg/inh inhalation aerosol: 2 puff(s) inhaled every 6 hours, As \par Needed \par senna oral tablet: 2 tab(s) orally once a day (at bedtime) \par traZODone 150 mg oral tablet: 1 tab(s) orally once a day (at bedtime) \par \par , \par , \par \par Care Plan/Procedures: \par Discharge Diagnoses, Assessment and Plan of Treatment	PRINCIPAL DISCHARGE \par DIAGNOSIS \par Diagnosis: Community acquired pneumonia \par Assessment and Plan of Treatment: \par Goal(s)	To get better and follow your care plan as instructed. \par \par today 10/07/2021- feels better \par had another fall 6 weeks ago in shower broke 2 right lower ribs. now she is better and just got discharged from pneumonia last antibiotics is tmw.\par feeling good. trying to get off xanax, lost weight as well. looks happy overall.\par ankle is healing,she may need another ankle surgery\par depression is stable

## 2021-10-07 NOTE — HISTORY OF PRESENT ILLNESS
[Post-hospitalization from ___ Hospital] : Post-hospitalization from [unfilled] Hospital [FreeTextEntry2] : \par Hospital Course: \par Discharge Date	04-Oct-2021 \par Admission Date	30-Sep-2021 19:23 \par Reason for Admission	Pneumonia / anemia \par Hospital Course	 \par 60y/oF PMH anxiety/depression, HTN, non-obstructive CAD, OA with prior b/l THR \par , elective R TAR in 6/2021 presenting with fatigue, productive cough, \par subjective fevers, admitted with R-sided multifocal PNA. Pt titrated off \par supplemental O2. ID recs appreciated, changed to Augmentin (to cont through \par 10/6). pt stable for dc home. \par \par Vital Signs Last 24 Hrs \par T(C): 36.8 (04 Oct 2021 10:12), Max: 36.8 (04 Oct 2021 10:12) \par T(F): 98.3 (04 Oct 2021 10:12), Max: 98.3 (04 Oct 2021 10:12) \par HR: 77 (04 Oct 2021 14:53) (70 - 80) \par BP: 90/67 (04 Oct 2021 10:12) (90/67 - 133/82) \par BP(mean): -- \par RR: 18 (04 Oct 2021 10:12) (18 - 18) \par SpO2: 94% (04 Oct 2021 14:53) (94% - 99%) \par \par GENERAL: NAD \par HEENT: PERRL, +EOMI \par NECK: soft, supple \par CHEST/LUNG: good air entry, respirations unlabored on RA \par HEART: S1S2+, Regular rate and rhythm \par ABDOMEN: Soft, Nontender, Nondistended; Bowel sounds present \par SKIN: warm, dry \par NEURO: AAOX3, no focal deficits, no motor or sensory loss \par PSYCH: normal affect \par \par 33minutes spent on discharge \par \par Med Reconciliation: \par Medication Reconciliation Status	Admission Reconciliation is Completed \par Discharge Reconciliation is Completed \par Discharge Medications	amoxicillin-clavulanate 875 mg-125 mg oral tablet: 1 \par tab(s) orally 2 times a day \par Breo Ellipta: \par gabapentin 300 mg oral capsule: 1 cap(s) orally 3 times a day \par lamoTRIgine 150 mg oral tablet: 1  orally once a day \par Melatonin: 20 milligram(s) orally once a day (at bedtime) \par metaxalone 800 mg oral tablet: orally 2 times a day \par MiraLax oral powder for reconstitution: 17 gram(s) orally once a day  as needed \par for constipation \par pantoprazole 40 mg oral delayed release tablet: 1 tab(s) orally once a day \par PARoxetine mesylate: 60 milligram(s) orally once a day \par ProAir HFA 90 mcg/inh inhalation aerosol: 2 puff(s) inhaled every 6 hours, As \par Needed \par senna oral tablet: 2 tab(s) orally once a day (at bedtime) \par traZODone 150 mg oral tablet: 1 tab(s) orally once a day (at bedtime) \par \par \par \par \par today-\par s/p pneumonia feeking better\par s/p rib fracture 6 weeks ago\par off xanax slowly,on oxycodone\par \par asthma is bad on inhaler on breo and albuterol\par depression and anxiety is better\par Wellbutrin, Paxil ,lamotrigine and trazodone\par feeling better lost 10 lbs [FreeTextEntry1] : Patient in office for follow up. [de-identified] : Hospital Course: \par Discharge Date	04-Oct-2021 \par Admission Date	30-Sep-2021 19:23 \par Reason for Admission	Pneumonia / anemia \par Hospital Course	 \par 60y/oF PMH anxiety/depression, HTN, non-obstructive CAD, OA with prior b/l THR \par , elective R TAR in 6/2021 presenting with fatigue, productive cough, \par subjective fevers, admitted with R-sided multifocal PNA. Pt titrated off \par supplemental O2. ID recs appreciated, changed to Augmentin (to cont through \par 10/6). pt stable for dc home. \par \par Vital Signs Last 24 Hrs \par T(C): 36.8 (04 Oct 2021 10:12), Max: 36.8 (04 Oct 2021 10:12) \par T(F): 98.3 (04 Oct 2021 10:12), Max: 98.3 (04 Oct 2021 10:12) \par HR: 77 (04 Oct 2021 14:53) (70 - 80) \par BP: 90/67 (04 Oct 2021 10:12) (90/67 - 133/82) \par BP(mean): -- \par RR: 18 (04 Oct 2021 10:12) (18 - 18) \par SpO2: 94% (04 Oct 2021 14:53) (94% - 99%) \par \par GENERAL: NAD \par HEENT: PERRL, +EOMI \par NECK: soft, supple \par CHEST/LUNG: good air entry, respirations unlabored on RA \par HEART: S1S2+, Regular rate and rhythm \par ABDOMEN: Soft, Nontender, Nondistended; Bowel sounds present \par SKIN: warm, dry \par NEURO: AAOX3, no focal deficits, no motor or sensory loss \par PSYCH: normal affect \par \par 33minutes spent on discharge \par \par Med Reconciliation: \par Medication Reconciliation Status	Admission Reconciliation is Completed \par Discharge Reconciliation is Completed \par Discharge Medications	amoxicillin-clavulanate 875 mg-125 mg oral tablet: 1 \par tab(s) orally 2 times a day \par Breo Ellipta: \par gabapentin 300 mg oral capsule: 1 cap(s) orally 3 times a day \par lamoTRIgine 150 mg oral tablet: 1  orally once a day \par Melatonin: 20 milligram(s) orally once a day (at bedtime) \par metaxalone 800 mg oral tablet: orally 2 times a day \par MiraLax oral powder for reconstitution: 17 gram(s) orally once a day  as needed \par for constipation \par pantoprazole 40 mg oral delayed release tablet: 1 tab(s) orally once a day \par PARoxetine mesylate: 60 milligram(s) orally once a day \par ProAir HFA 90 mcg/inh inhalation aerosol: 2 puff(s) inhaled every 6 hours, As \par Needed \par senna oral tablet: 2 tab(s) orally once a day (at bedtime) \par traZODone 150 mg oral tablet: 1 tab(s) orally once a day (at bedtime) \par \par , \par , \par \par Care Plan/Procedures: \par Discharge Diagnoses, Assessment and Plan of Treatment	PRINCIPAL DISCHARGE \par DIAGNOSIS \par Diagnosis: Community acquired pneumonia \par Assessment and Plan of Treatment: \par Goal(s)	To get better and follow your care plan as instructed. \par \par today 10/07/2021- feels better \par had another fall 6 weeks ago in shower broke 2 right lower ribs. now she is better and just got discharged from pneumonia last antibiotics is tmw.\par feeling good. trying to get off xanax, lost weight as well. looks happy overall.\par ankle is healing,she may need another ankle surgery\par depression is stable

## 2021-11-02 NOTE — REASON FOR VISIT
[Initial] : an initial visit [Asthma] : asthma [Sleep Apnea] : sleep apnea [Pneumonia] : pneumonia [Shortness of Breath] : shortness of breath [TextBox_44] : CAP

## 2021-11-02 NOTE — HISTORY OF PRESENT ILLNESS
[Never] : never [1  - Very slight] : 1, very slight [Awakes Unrefreshed] : awakes unrefreshed [Awakes with Dry Mouth] : awakes with dry mouth [Daytime Somnolence] : daytime somnolence [Fatigue] : fatigue [Snoring] : snoring [TextBox_4] : 61-year-old female with a history of depression/anxiety hypertension and nonobstructive coronary artery disease, bilateral total hip replacement admitted to [facility] on 9/30 and discharged on 10/4/2021 for a multifocal right-sided pneumonia.  Patient was eventually discharged on Augmentin and completed on 10/6/2021.\par \par Pt seen for f/u with mild residual SOB. Hx seasonal allergies and asthma. Treated w ADITI until recently Aditya added 6 months ago. Using ADITI rarely.  No history of new exposures [Awakes with Headache] : does not awaken with headache [TextBox_77] : 10pm [TextBox_79] : 7am [TextBox_81] : 30 [TextBox_89] : 4 [TextBox_93] : Witnessed apneas during recent hospitalization [ESS] : 10

## 2021-11-09 PROBLEM — M10.9 GOUT: Status: ACTIVE | Noted: 2021-01-01

## 2021-11-10 NOTE — PLAN
[FreeTextEntry1] : gout- prednisone \par HTN- stable\par gabapentin renewals\par \par follow up for physical\par \par \par s/p Hospital discharge for Pneumonia - doing well\par S/p rib fracture\par # depression stable on current meds.\par # anemia in the hospital taking Iron- will check in 6 weeks

## 2021-11-10 NOTE — HISTORY OF PRESENT ILLNESS
[FreeTextEntry1] : Patient in office for follow up. [de-identified] : Hospital Course: \par Discharge Date	04-Oct-2021 \par Admission Date	30-Sep-2021 19:23 \par Reason for Admission	Pneumonia / anemia \par Hospital Course	 \par 60y/oF PMH anxiety/depression, HTN, non-obstructive CAD, OA with prior b/l THR \par , elective R TAR in 6/2021 presenting with fatigue, productive cough, \par subjective fevers, admitted with R-sided multifocal PNA. Pt titrated off \par supplemental O2. ID recs appreciated, changed to Augmentin (to cont through \par 10/6). pt stable for dc home. \par \par Vital Signs Last 24 Hrs \par T(C): 36.8 (04 Oct 2021 10:12), Max: 36.8 (04 Oct 2021 10:12) \par T(F): 98.3 (04 Oct 2021 10:12), Max: 98.3 (04 Oct 2021 10:12) \par HR: 77 (04 Oct 2021 14:53) (70 - 80) \par BP: 90/67 (04 Oct 2021 10:12) (90/67 - 133/82) \par BP(mean): -- \par RR: 18 (04 Oct 2021 10:12) (18 - 18) \par SpO2: 94% (04 Oct 2021 14:53) (94% - 99%) \par \par GENERAL: NAD \par HEENT: PERRL, +EOMI \par NECK: soft, supple \par CHEST/LUNG: good air entry, respirations unlabored on RA \par HEART: S1S2+, Regular rate and rhythm \par ABDOMEN: Soft, Nontender, Nondistended; Bowel sounds present \par SKIN: warm, dry \par NEURO: AAOX3, no focal deficits, no motor or sensory loss \par PSYCH: normal affect \par \par 33minutes spent on discharge \par \par Med Reconciliation: \par Medication Reconciliation Status	Admission Reconciliation is Completed \par Discharge Reconciliation is Completed \par Discharge Medications	amoxicillin-clavulanate 875 mg-125 mg oral tablet: 1 \par tab(s) orally 2 times a day \par Breo Ellipta: \par gabapentin 300 mg oral capsule: 1 cap(s) orally 3 times a day \par lamoTRIgine 150 mg oral tablet: 1  orally once a day \par Melatonin: 20 milligram(s) orally once a day (at bedtime) \par metaxalone 800 mg oral tablet: orally 2 times a day \par MiraLax oral powder for reconstitution: 17 gram(s) orally once a day  as needed \par for constipation \par pantoprazole 40 mg oral delayed release tablet: 1 tab(s) orally once a day \par PARoxetine mesylate: 60 milligram(s) orally once a day \par ProAir HFA 90 mcg/inh inhalation aerosol: 2 puff(s) inhaled every 6 hours, As \par Needed \par senna oral tablet: 2 tab(s) orally once a day (at bedtime) \par traZODone 150 mg oral tablet: 1 tab(s) orally once a day (at bedtime) \par \par , \par , \par \par Care Plan/Procedures: \par Discharge Diagnoses, Assessment and Plan of Treatment	PRINCIPAL DISCHARGE \par DIAGNOSIS \par Diagnosis: Community acquired pneumonia \par Assessment and Plan of Treatment: \par Goal(s)	To get better and follow your care plan as instructed. \par \par today 10/07/2021- feels better \par had another fall 6 weeks ago in shower broke 2 right lower ribs. now she is better and just got discharged from pneumonia last antibiotics is tmw.\par feeling good. trying to get off xanax, lost weight as well. looks happy overall.\par ankle is healing,she may need another ankle surgery\par depression is stable\par \par 11/2021- here for follow up c/o pain in hr great toes states it seems like gout and she has been etaing lot of sea food. she had similar episode in the past

## 2021-11-10 NOTE — PHYSICAL EXAM
[No Acute Distress] : no acute distress [Well Nourished] : well nourished [Well Developed] : well developed [Well-Appearing] : well-appearing [Normal Sclera/Conjunctiva] : normal sclera/conjunctiva [PERRL] : pupils equal round and reactive to light [EOMI] : extraocular movements intact [Normal Outer Ear/Nose] : the outer ears and nose were normal in appearance [Normal Oropharynx] : the oropharynx was normal [No JVD] : no jugular venous distention [No Lymphadenopathy] : no lymphadenopathy [Supple] : supple [Thyroid Normal, No Nodules] : the thyroid was normal and there were no nodules present [No Respiratory Distress] : no respiratory distress  [No Accessory Muscle Use] : no accessory muscle use [Clear to Auscultation] : lungs were clear to auscultation bilaterally [Normal Rate] : normal rate  [Regular Rhythm] : with a regular rhythm [Normal S1, S2] : normal S1 and S2 [No Murmur] : no murmur heard [No Carotid Bruits] : no carotid bruits [No Abdominal Bruit] : a ~M bruit was not heard ~T in the abdomen [No Varicosities] : no varicosities [Pedal Pulses Present] : the pedal pulses are present [No Edema] : there was no peripheral edema [No Palpable Aorta] : no palpable aorta [No Extremity Clubbing/Cyanosis] : no extremity clubbing/cyanosis [Soft] : abdomen soft [Non Tender] : non-tender [Non-distended] : non-distended [No Masses] : no abdominal mass palpated [No HSM] : no HSM [Normal Bowel Sounds] : normal bowel sounds [Normal Posterior Cervical Nodes] : no posterior cervical lymphadenopathy [Normal Anterior Cervical Nodes] : no anterior cervical lymphadenopathy [No CVA Tenderness] : no CVA  tenderness [No Spinal Tenderness] : no spinal tenderness [No Joint Swelling] : no joint swelling [Grossly Normal Strength/Tone] : grossly normal strength/tone [No Rash] : no rash [Coordination Grossly Intact] : coordination grossly intact [No Focal Deficits] : no focal deficits [Normal Gait] : normal gait [Deep Tendon Reflexes (DTR)] : deep tendon reflexes were 2+ and symmetric [Normal Affect] : the affect was normal [Normal Insight/Judgement] : insight and judgment were intact [de-identified] : mildely painful toes

## 2021-11-10 NOTE — HEALTH RISK ASSESSMENT
[PHQ-2 Negative - No further assessment needed] : PHQ-2 Negative - No further assessment needed [I have developed a follow-up plan documented below in the note.] : I have developed a follow-up plan documented below in the note.

## 2021-11-19 PROBLEM — G47.33 OSA (OBSTRUCTIVE SLEEP APNEA): Status: ACTIVE | Noted: 2021-01-01

## 2021-11-19 PROBLEM — J18.9 PNEUMONIA INVOLVING RIGHT LUNG: Status: ACTIVE | Noted: 2021-01-01

## 2021-11-19 NOTE — HISTORY OF PRESENT ILLNESS
[Never] : never [1  - Very slight] : 1, very slight [Awakes Unrefreshed] : awakes unrefreshed [Awakes with Dry Mouth] : awakes with dry mouth [Daytime Somnolence] : daytime somnolence [Fatigue] : fatigue [Snoring] : snoring [Lab] : lab [Obstructive Sleep Apnea] : obstructive sleep apnea [TextBox_4] : 61-year-old female with a history of depression/anxiety hypertension and nonobstructive coronary artery disease, bilateral total hip replacement admitted to [facility] on 9/30 and discharged on 10/4/2021 for a multifocal right-sided pneumonia.  Patient was eventually discharged on Augmentin and completed on 10/6/2021.\par \par Shortness of breath has resolved.  No complaints of cough wheeze or sputum.  Pending repeat CAT scan [Awakes with Headache] : does not awaken with headache [TextBox_77] : 10pm [TextBox_79] : 7am [TextBox_81] : 30 [TextBox_89] : 4 [TextBox_93] : Witnessed apneas during recent hospitalization [TextBox_100] : 11/7/21 [TextBox_108] : 11.2 [TextBox_112] : 27.6 [TextBox_120] : supine index 25.7 [ESS] : 10

## 2021-11-19 NOTE — DISCUSSION/SUMMARY
[Obstructive Sleep Apnea] : obstructive sleep apnea [Alcohol Avoidance] : alcohol avoidance [Sedative Avoidance] : sedative avoidance [Weight Loss Program] : weight loss program [CPAP] : CPAP [Patient] : discussed with the patient [de-identified] : Moderate in supine [de-identified] : AUtopap 4-16 with Eson NM [de-identified] : CPAP initiation [FreeTextEntry1] : 61-year-old female seen today status post recent hospitalization at [facility] for a multi lobar community-acquired right-sided pneumonia.  Patient has clinically improved.  Course complicated by mild persistent asthma responding well to current treatment with Breo.\par \par

## 2021-12-13 PROBLEM — M19.071 ARTHRITIS OF ANKLE, RIGHT: Status: ACTIVE | Noted: 2021-04-02

## 2021-12-13 PROBLEM — M79.671 FOOT PAIN, BILATERAL: Status: ACTIVE | Noted: 2021-01-01

## 2021-12-13 PROBLEM — S82.841A CLOSED BIMALLEOLAR FRACTURE OF RIGHT ANKLE, INITIAL ENCOUNTER: Status: ACTIVE | Noted: 2020-02-14

## 2021-12-13 PROBLEM — S82.851D: Status: ACTIVE | Noted: 2020-03-04

## 2021-12-13 PROBLEM — M87.073: Status: ACTIVE | Noted: 2020-11-13

## 2021-12-13 PROBLEM — S82.891A ANKLE FRACTURE, RIGHT, CLOSED, INITIAL ENCOUNTER: Status: ACTIVE | Noted: 2020-02-14

## 2022-01-01 ENCOUNTER — APPOINTMENT (OUTPATIENT)
Dept: ORTHOPEDIC SURGERY | Facility: CLINIC | Age: 62
End: 2022-01-01

## 2022-01-01 ENCOUNTER — INPATIENT (INPATIENT)
Facility: HOSPITAL | Age: 62
LOS: 1 days | Discharge: HOME | DRG: 862 | End: 2022-02-10
Attending: ORTHOPAEDIC SURGERY | Admitting: ORTHOPAEDIC SURGERY
Payer: MEDICAID

## 2022-01-01 ENCOUNTER — RESULT REVIEW (OUTPATIENT)
Age: 62
End: 2022-01-01

## 2022-01-01 ENCOUNTER — APPOINTMENT (OUTPATIENT)
Dept: ORTHOPEDIC SURGERY | Facility: CLINIC | Age: 62
End: 2022-01-01
Payer: MEDICAID

## 2022-01-01 ENCOUNTER — TRANSCRIPTION ENCOUNTER (OUTPATIENT)
Age: 62
End: 2022-01-01

## 2022-01-01 ENCOUNTER — NON-APPOINTMENT (OUTPATIENT)
Age: 62
End: 2022-01-01

## 2022-01-01 ENCOUNTER — APPOINTMENT (OUTPATIENT)
Dept: FAMILY MEDICINE | Facility: CLINIC | Age: 62
End: 2022-01-01

## 2022-01-01 ENCOUNTER — OUTPATIENT (OUTPATIENT)
Dept: OUTPATIENT SERVICES | Facility: HOSPITAL | Age: 62
LOS: 1 days | End: 2022-01-01
Payer: MEDICAID

## 2022-01-01 ENCOUNTER — LABORATORY RESULT (OUTPATIENT)
Age: 62
End: 2022-01-01

## 2022-01-01 ENCOUNTER — APPOINTMENT (OUTPATIENT)
Dept: FAMILY MEDICINE | Facility: CLINIC | Age: 62
End: 2022-01-01
Payer: MEDICAID

## 2022-01-01 ENCOUNTER — APPOINTMENT (OUTPATIENT)
Dept: MAMMOGRAPHY | Facility: CLINIC | Age: 62
End: 2022-01-01
Payer: MEDICAID

## 2022-01-01 ENCOUNTER — APPOINTMENT (OUTPATIENT)
Dept: RADIOLOGY | Facility: CLINIC | Age: 62
End: 2022-01-01
Payer: MEDICAID

## 2022-01-01 ENCOUNTER — APPOINTMENT (OUTPATIENT)
Dept: OBGYN | Facility: CLINIC | Age: 62
End: 2022-01-01
Payer: MEDICAID

## 2022-01-01 ENCOUNTER — APPOINTMENT (OUTPATIENT)
Dept: ORTHOPEDIC SURGERY | Facility: HOSPITAL | Age: 62
End: 2022-01-01

## 2022-01-01 ENCOUNTER — APPOINTMENT (OUTPATIENT)
Dept: CT IMAGING | Facility: CLINIC | Age: 62
End: 2022-01-01
Payer: MEDICAID

## 2022-01-01 VITALS
SYSTOLIC BLOOD PRESSURE: 125 MMHG | HEART RATE: 72 BPM | HEIGHT: 66 IN | RESPIRATION RATE: 16 BRPM | OXYGEN SATURATION: 96 % | WEIGHT: 175.93 LBS | TEMPERATURE: 98 F | DIASTOLIC BLOOD PRESSURE: 86 MMHG

## 2022-01-01 VITALS
TEMPERATURE: 98 F | SYSTOLIC BLOOD PRESSURE: 113 MMHG | HEIGHT: 66 IN | HEART RATE: 82 BPM | RESPIRATION RATE: 16 BRPM | DIASTOLIC BLOOD PRESSURE: 69 MMHG | WEIGHT: 175.93 LBS

## 2022-01-01 VITALS
HEIGHT: 66 IN | WEIGHT: 181.31 LBS | DIASTOLIC BLOOD PRESSURE: 82 MMHG | SYSTOLIC BLOOD PRESSURE: 120 MMHG | BODY MASS INDEX: 29.14 KG/M2

## 2022-01-01 VITALS
HEIGHT: 66 IN | DIASTOLIC BLOOD PRESSURE: 72 MMHG | WEIGHT: 174 LBS | BODY MASS INDEX: 27.97 KG/M2 | HEART RATE: 80 BPM | OXYGEN SATURATION: 95 % | SYSTOLIC BLOOD PRESSURE: 116 MMHG

## 2022-01-01 VITALS
HEART RATE: 78 BPM | HEIGHT: 66 IN | WEIGHT: 174 LBS | TEMPERATURE: 96.5 F | BODY MASS INDEX: 27.97 KG/M2 | OXYGEN SATURATION: 97 % | DIASTOLIC BLOOD PRESSURE: 78 MMHG | SYSTOLIC BLOOD PRESSURE: 112 MMHG

## 2022-01-01 VITALS
OXYGEN SATURATION: 95 % | HEART RATE: 82 BPM | DIASTOLIC BLOOD PRESSURE: 70 MMHG | RESPIRATION RATE: 16 BRPM | SYSTOLIC BLOOD PRESSURE: 113 MMHG | TEMPERATURE: 98 F

## 2022-01-01 VITALS
HEART RATE: 67 BPM | BODY MASS INDEX: 33.11 KG/M2 | HEIGHT: 66 IN | SYSTOLIC BLOOD PRESSURE: 144 MMHG | OXYGEN SATURATION: 98 % | DIASTOLIC BLOOD PRESSURE: 90 MMHG | WEIGHT: 206 LBS

## 2022-01-01 DIAGNOSIS — Z12.39 ENCOUNTER FOR OTHER SCREENING FOR MALIGNANT NEOPLASM OF BREAST: ICD-10-CM

## 2022-01-01 DIAGNOSIS — Z01.818 ENCOUNTER FOR OTHER PREPROCEDURAL EXAMINATION: ICD-10-CM

## 2022-01-01 DIAGNOSIS — F32.A DEPRESSION, UNSPECIFIED: ICD-10-CM

## 2022-01-01 DIAGNOSIS — T84.84XA PAIN DUE TO INTERNAL ORTHOPEDIC PROSTHETIC DEVICES, IMPLANTS AND GRAFTS, INITIAL ENCOUNTER: ICD-10-CM

## 2022-01-01 DIAGNOSIS — M25.519 CERVICALGIA: ICD-10-CM

## 2022-01-01 DIAGNOSIS — Z98.51 TUBAL LIGATION STATUS: Chronic | ICD-10-CM

## 2022-01-01 DIAGNOSIS — Z98.1 ARTHRODESIS STATUS: ICD-10-CM

## 2022-01-01 DIAGNOSIS — G47.33 OBSTRUCTIVE SLEEP APNEA (ADULT) (PEDIATRIC): ICD-10-CM

## 2022-01-01 DIAGNOSIS — D64.9 ANEMIA, UNSPECIFIED: ICD-10-CM

## 2022-01-01 DIAGNOSIS — Z00.00 ENCOUNTER FOR GENERAL ADULT MEDICAL EXAMINATION W/OUT ABNORMAL FINDINGS: ICD-10-CM

## 2022-01-01 DIAGNOSIS — Z90.49 ACQUIRED ABSENCE OF OTHER SPECIFIED PARTS OF DIGESTIVE TRACT: Chronic | ICD-10-CM

## 2022-01-01 DIAGNOSIS — Z96.669 PRESENCE OF UNSPECIFIED ARTIFICIAL ANKLE JOINT: Chronic | ICD-10-CM

## 2022-01-01 DIAGNOSIS — Z98.890 OTHER SPECIFIED POSTPROCEDURAL STATES: Chronic | ICD-10-CM

## 2022-01-01 DIAGNOSIS — Z20.822 CONTACT WITH AND (SUSPECTED) EXPOSURE TO COVID-19: ICD-10-CM

## 2022-01-01 DIAGNOSIS — M25.512 PAIN IN LEFT SHOULDER: ICD-10-CM

## 2022-01-01 DIAGNOSIS — K59.00 CONSTIPATION, UNSPECIFIED: ICD-10-CM

## 2022-01-01 DIAGNOSIS — Z82.49 FAMILY HISTORY OF ISCHEMIC HEART DISEASE AND OTHER DISEASES OF THE CIRCULATORY SYSTEM: ICD-10-CM

## 2022-01-01 DIAGNOSIS — M72.2 PLANTAR FASCIAL FIBROMATOSIS: ICD-10-CM

## 2022-01-01 DIAGNOSIS — E78.5 HYPERLIPIDEMIA, UNSPECIFIED: ICD-10-CM

## 2022-01-01 DIAGNOSIS — Z01.419 ENCOUNTER FOR GYNECOLOGICAL EXAMINATION (GENERAL) (ROUTINE) W/OUT ABNORMAL FINDINGS: ICD-10-CM

## 2022-01-01 DIAGNOSIS — F41.0 PANIC DISORDER [EPISODIC PAROXYSMAL ANXIETY]: ICD-10-CM

## 2022-01-01 DIAGNOSIS — S82.892A OTHER FRACTURE OF LEFT LOWER LEG, INITIAL ENCOUNTER FOR CLOSED FRACTURE: ICD-10-CM

## 2022-01-01 DIAGNOSIS — M25.572 PAIN IN LEFT ANKLE AND JOINTS OF LEFT FOOT: ICD-10-CM

## 2022-01-01 DIAGNOSIS — S82.892D OTHER FRACTURE OF LEFT LOWER LEG, SUBSEQUENT ENCOUNTER FOR CLOSED FRACTURE WITH ROUTINE HEALING: ICD-10-CM

## 2022-01-01 DIAGNOSIS — Z96.60 PRESENCE OF UNSPECIFIED ORTHOPEDIC JOINT IMPLANT: Chronic | ICD-10-CM
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
## 2022-01-01 DIAGNOSIS — J45.909 UNSPECIFIED ASTHMA, UNCOMPLICATED: ICD-10-CM

## 2022-01-01 DIAGNOSIS — I10 ESSENTIAL (PRIMARY) HYPERTENSION: ICD-10-CM

## 2022-01-01 DIAGNOSIS — M10.9 GOUT, UNSPECIFIED: ICD-10-CM

## 2022-01-01 DIAGNOSIS — Z00.8 ENCOUNTER FOR OTHER GENERAL EXAMINATION: ICD-10-CM

## 2022-01-01 DIAGNOSIS — I25.2 OLD MYOCARDIAL INFARCTION: ICD-10-CM

## 2022-01-01 DIAGNOSIS — K57.90 DIVERTICULOSIS OF INTESTINE, PART UNSPECIFIED, WITHOUT PERFORATION OR ABSCESS WITHOUT BLEEDING: ICD-10-CM

## 2022-01-01 DIAGNOSIS — E66.9 OBESITY, UNSPECIFIED: ICD-10-CM

## 2022-01-01 DIAGNOSIS — G47.00 INSOMNIA, UNSPECIFIED: ICD-10-CM

## 2022-01-01 DIAGNOSIS — N39.46 MIXED INCONTINENCE: ICD-10-CM

## 2022-01-01 DIAGNOSIS — M54.2 CERVICALGIA: ICD-10-CM

## 2022-01-01 DIAGNOSIS — M19.072 PRIMARY OSTEOARTHRITIS, LEFT ANKLE AND FOOT: ICD-10-CM

## 2022-01-01 DIAGNOSIS — M96.0 PSEUDARTHROSIS AFTER FUSION OR ARTHRODESIS: ICD-10-CM

## 2022-01-01 DIAGNOSIS — Z01.419 ENCOUNTER FOR GYNECOLOGICAL EXAMINATION (GENERAL) (ROUTINE) WITHOUT ABNORMAL FINDINGS: ICD-10-CM

## 2022-01-01 DIAGNOSIS — Z96.643 PRESENCE OF ARTIFICIAL HIP JOINT, BILATERAL: ICD-10-CM
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
## 2022-01-01 DIAGNOSIS — M48.07 SPINAL STENOSIS, LUMBOSACRAL REGION: ICD-10-CM

## 2022-01-01 LAB
24R-OH-CALCIDIOL SERPL-MCNC: 58.3 PG/ML
25(OH)D3 SERPL-MCNC: 41 NG/ML
25(OH)D3 SERPL-MCNC: 44 NG/ML
ALBUMIN SERPL ELPH-MCNC: 4.5 G/DL
ALBUMIN SERPL ELPH-MCNC: 4.6 G/DL
ALP BLD-CCNC: 141 U/L
ALP BLD-CCNC: 86 U/L
ALT SERPL-CCNC: 16 U/L
ALT SERPL-CCNC: 16 U/L
ANION GAP SERPL CALC-SCNC: 10 MMOL/L
ANION GAP SERPL CALC-SCNC: 12 MMOL/L
ANION GAP SERPL CALC-SCNC: 2 MMOL/L — LOW (ref 5–17)
ANION GAP SERPL CALC-SCNC: 3 MMOL/L — LOW (ref 5–17)
ANION GAP SERPL CALC-SCNC: 5 MMOL/L — SIGNIFICANT CHANGE UP (ref 5–17)
ANION GAP SERPL CALC-SCNC: 6 MMOL/L — SIGNIFICANT CHANGE UP (ref 5–17)
APPEARANCE: ABNORMAL
APTT BLD: 33.7 SEC — SIGNIFICANT CHANGE UP (ref 27.5–35.5)
AST SERPL-CCNC: 14 U/L
AST SERPL-CCNC: 21 U/L
BASOPHILS # BLD AUTO: 0.02 K/UL — SIGNIFICANT CHANGE UP (ref 0–0.2)
BASOPHILS # BLD AUTO: 0.03 K/UL
BASOPHILS # BLD AUTO: 0.04 K/UL
BASOPHILS NFR BLD AUTO: 0.3 % — SIGNIFICANT CHANGE UP (ref 0–2)
BASOPHILS NFR BLD AUTO: 0.4 %
BASOPHILS NFR BLD AUTO: 0.7 %
BILIRUB SERPL-MCNC: <0.2 MG/DL
BILIRUB SERPL-MCNC: <0.2 MG/DL
BILIRUBIN URINE: NEGATIVE
BLOOD URINE: NEGATIVE
BUN SERPL-MCNC: 11 MG/DL
BUN SERPL-MCNC: 11 MG/DL — SIGNIFICANT CHANGE UP (ref 7–23)
BUN SERPL-MCNC: 13 MG/DL
BUN SERPL-MCNC: 13 MG/DL — SIGNIFICANT CHANGE UP (ref 7–23)
BUN SERPL-MCNC: 13 MG/DL — SIGNIFICANT CHANGE UP (ref 7–23)
BUN SERPL-MCNC: 17 MG/DL — SIGNIFICANT CHANGE UP (ref 7–23)
CALCIUM SERPL-MCNC: 8.2 MG/DL — LOW (ref 8.5–10.1)
CALCIUM SERPL-MCNC: 8.2 MG/DL — LOW (ref 8.5–10.1)
CALCIUM SERPL-MCNC: 8.3 MG/DL — LOW (ref 8.5–10.1)
CALCIUM SERPL-MCNC: 9 MG/DL — SIGNIFICANT CHANGE UP (ref 8.5–10.1)
CALCIUM SERPL-MCNC: 9.6 MG/DL
CALCIUM SERPL-MCNC: 9.6 MG/DL
CHLORIDE SERPL-SCNC: 103 MMOL/L
CHLORIDE SERPL-SCNC: 106 MMOL/L — SIGNIFICANT CHANGE UP (ref 96–108)
CHLORIDE SERPL-SCNC: 108 MMOL/L — SIGNIFICANT CHANGE UP (ref 96–108)
CHLORIDE SERPL-SCNC: 109 MMOL/L
CHOLEST SERPL-MCNC: 288 MG/DL
CO2 SERPL-SCNC: 24 MMOL/L
CO2 SERPL-SCNC: 28 MMOL/L — SIGNIFICANT CHANGE UP (ref 22–31)
CO2 SERPL-SCNC: 29 MMOL/L
CO2 SERPL-SCNC: 29 MMOL/L — SIGNIFICANT CHANGE UP (ref 22–31)
CO2 SERPL-SCNC: 31 MMOL/L — SIGNIFICANT CHANGE UP (ref 22–31)
CO2 SERPL-SCNC: 32 MMOL/L — HIGH (ref 22–31)
COLOR: YELLOW
CREAT SERPL-MCNC: 0.78 MG/DL — SIGNIFICANT CHANGE UP (ref 0.5–1.3)
CREAT SERPL-MCNC: 0.81 MG/DL — SIGNIFICANT CHANGE UP (ref 0.5–1.3)
CREAT SERPL-MCNC: 0.86 MG/DL
CREAT SERPL-MCNC: 0.89 MG/DL — SIGNIFICANT CHANGE UP (ref 0.5–1.3)
CREAT SERPL-MCNC: 0.94 MG/DL
CREAT SERPL-MCNC: 1.17 MG/DL — SIGNIFICANT CHANGE UP (ref 0.5–1.3)
CRP SERPL-MCNC: 8 MG/L
CYTOLOGY CVX/VAG DOC THIN PREP: ABNORMAL
EGFR: 78 ML/MIN/1.73M2
EOSINOPHIL # BLD AUTO: 0.08 K/UL
EOSINOPHIL # BLD AUTO: 0.1 K/UL — SIGNIFICANT CHANGE UP (ref 0–0.5)
EOSINOPHIL # BLD AUTO: 0.18 K/UL
EOSINOPHIL NFR BLD AUTO: 1.2 %
EOSINOPHIL NFR BLD AUTO: 1.4 % — SIGNIFICANT CHANGE UP (ref 0–6)
EOSINOPHIL NFR BLD AUTO: 3 %
ERYTHROCYTE [SEDIMENTATION RATE] IN BLOOD BY WESTERGREN METHOD: 30 MM/HR
ESTIMATED AVERAGE GLUCOSE: 117 MG/DL
FERRITIN SERPL-MCNC: 18 NG/ML
FOLATE SERPL-MCNC: 13.6 NG/ML
GLUCOSE QUALITATIVE U: NEGATIVE
GLUCOSE SERPL-MCNC: 107 MG/DL
GLUCOSE SERPL-MCNC: 107 MG/DL — HIGH (ref 70–99)
GLUCOSE SERPL-MCNC: 111 MG/DL — HIGH (ref 70–99)
GLUCOSE SERPL-MCNC: 112 MG/DL — HIGH (ref 70–99)
GLUCOSE SERPL-MCNC: 115 MG/DL
GLUCOSE SERPL-MCNC: 200 MG/DL — HIGH (ref 70–99)
HBA1C MFR BLD HPLC: 5.7 %
HCT VFR BLD CALC: 22.2 % — LOW (ref 34.5–45)
HCT VFR BLD CALC: 22.9 % — LOW (ref 34.5–45)
HCT VFR BLD CALC: 25.7 % — LOW (ref 34.5–45)
HCT VFR BLD CALC: 29.8 % — LOW (ref 34.5–45)
HCT VFR BLD CALC: 36.4 % — SIGNIFICANT CHANGE UP (ref 34.5–45)
HCT VFR BLD CALC: 37.4 %
HCT VFR BLD CALC: 38.3 %
HDLC SERPL-MCNC: 79 MG/DL
HGB BLD-MCNC: 11.4 G/DL
HGB BLD-MCNC: 11.8 G/DL — SIGNIFICANT CHANGE UP (ref 11.5–15.5)
HGB BLD-MCNC: 12.2 G/DL
HGB BLD-MCNC: 7.1 G/DL — LOW (ref 11.5–15.5)
HGB BLD-MCNC: 7.3 G/DL — LOW (ref 11.5–15.5)
HGB BLD-MCNC: 8.4 G/DL — LOW (ref 11.5–15.5)
HGB BLD-MCNC: 9.4 G/DL — LOW (ref 11.5–15.5)
HPV HIGH+LOW RISK DNA PNL CVX: NOT DETECTED
IMM GRANULOCYTES NFR BLD AUTO: 0.3 %
IMM GRANULOCYTES NFR BLD AUTO: 0.3 %
IMM GRANULOCYTES NFR BLD AUTO: 0.4 % — SIGNIFICANT CHANGE UP (ref 0–1.5)
INR BLD: 0.95 RATIO — SIGNIFICANT CHANGE UP (ref 0.88–1.16)
IRON SATN MFR SERPL: 17 %
IRON SERPL-MCNC: 50 UG/DL
KETONES URINE: NEGATIVE
LDLC SERPL CALC-MCNC: 200 MG/DL
LEUKOCYTE ESTERASE URINE: NEGATIVE
LYMPHOCYTES # BLD AUTO: 1.42 K/UL
LYMPHOCYTES # BLD AUTO: 1.68 K/UL
LYMPHOCYTES # BLD AUTO: 2.08 K/UL — SIGNIFICANT CHANGE UP (ref 1–3.3)
LYMPHOCYTES # BLD AUTO: 28.7 % — SIGNIFICANT CHANGE UP (ref 13–44)
LYMPHOCYTES NFR BLD AUTO: 23.7 %
LYMPHOCYTES NFR BLD AUTO: 25.2 %
MAN DIFF?: NORMAL
MAN DIFF?: NORMAL
MCHC RBC-ENTMCNC: 30 PG
MCHC RBC-ENTMCNC: 30.2 PG
MCHC RBC-ENTMCNC: 30.2 PG — SIGNIFICANT CHANGE UP (ref 27–34)
MCHC RBC-ENTMCNC: 30.5 GM/DL
MCHC RBC-ENTMCNC: 30.6 PG — SIGNIFICANT CHANGE UP (ref 27–34)
MCHC RBC-ENTMCNC: 30.7 PG — SIGNIFICANT CHANGE UP (ref 27–34)
MCHC RBC-ENTMCNC: 30.8 PG — SIGNIFICANT CHANGE UP (ref 27–34)
MCHC RBC-ENTMCNC: 31.5 GM/DL — LOW (ref 32–36)
MCHC RBC-ENTMCNC: 31.9 GM/DL
MCHC RBC-ENTMCNC: 32 GM/DL — SIGNIFICANT CHANGE UP (ref 32–36)
MCHC RBC-ENTMCNC: 32.4 GM/DL — SIGNIFICANT CHANGE UP (ref 32–36)
MCHC RBC-ENTMCNC: 32.7 GM/DL — SIGNIFICANT CHANGE UP (ref 32–36)
MCV RBC AUTO: 94.1 FL
MCV RBC AUTO: 94.1 FL — SIGNIFICANT CHANGE UP (ref 80–100)
MCV RBC AUTO: 94.3 FL — SIGNIFICANT CHANGE UP (ref 80–100)
MCV RBC AUTO: 95.8 FL — SIGNIFICANT CHANGE UP (ref 80–100)
MCV RBC AUTO: 96.1 FL — SIGNIFICANT CHANGE UP (ref 80–100)
MCV RBC AUTO: 98.9 FL
MONOCYTES # BLD AUTO: 0.35 K/UL
MONOCYTES # BLD AUTO: 0.38 K/UL
MONOCYTES # BLD AUTO: 0.38 K/UL — SIGNIFICANT CHANGE UP (ref 0–0.9)
MONOCYTES NFR BLD AUTO: 5.2 % — SIGNIFICANT CHANGE UP (ref 2–14)
MONOCYTES NFR BLD AUTO: 5.7 %
MONOCYTES NFR BLD AUTO: 5.8 %
NEUTROPHILS # BLD AUTO: 3.98 K/UL
NEUTROPHILS # BLD AUTO: 4.48 K/UL
NEUTROPHILS # BLD AUTO: 4.63 K/UL — SIGNIFICANT CHANGE UP (ref 1.8–7.4)
NEUTROPHILS NFR BLD AUTO: 64 % — SIGNIFICANT CHANGE UP (ref 43–77)
NEUTROPHILS NFR BLD AUTO: 66.5 %
NEUTROPHILS NFR BLD AUTO: 67.2 %
NITRITE URINE: NEGATIVE
NONHDLC SERPL-MCNC: 209 MG/DL
PH URINE: 6
PLATELET # BLD AUTO: 160 K/UL — SIGNIFICANT CHANGE UP (ref 150–400)
PLATELET # BLD AUTO: 180 K/UL — SIGNIFICANT CHANGE UP (ref 150–400)
PLATELET # BLD AUTO: 210 K/UL — SIGNIFICANT CHANGE UP (ref 150–400)
PLATELET # BLD AUTO: 244 K/UL — SIGNIFICANT CHANGE UP (ref 150–400)
PLATELET # BLD AUTO: 286 K/UL
PLATELET # BLD AUTO: 286 K/UL
POTASSIUM SERPL-MCNC: 3.5 MMOL/L — SIGNIFICANT CHANGE UP (ref 3.5–5.3)
POTASSIUM SERPL-MCNC: 3.7 MMOL/L — SIGNIFICANT CHANGE UP (ref 3.5–5.3)
POTASSIUM SERPL-MCNC: 4.3 MMOL/L — SIGNIFICANT CHANGE UP (ref 3.5–5.3)
POTASSIUM SERPL-MCNC: 4.6 MMOL/L — SIGNIFICANT CHANGE UP (ref 3.5–5.3)
POTASSIUM SERPL-SCNC: 3.5 MMOL/L — SIGNIFICANT CHANGE UP (ref 3.5–5.3)
POTASSIUM SERPL-SCNC: 3.7 MMOL/L — SIGNIFICANT CHANGE UP (ref 3.5–5.3)
POTASSIUM SERPL-SCNC: 4.3 MMOL/L — SIGNIFICANT CHANGE UP (ref 3.5–5.3)
POTASSIUM SERPL-SCNC: 4.6 MMOL/L
POTASSIUM SERPL-SCNC: 4.6 MMOL/L
POTASSIUM SERPL-SCNC: 4.6 MMOL/L — SIGNIFICANT CHANGE UP (ref 3.5–5.3)
PROT SERPL-MCNC: 6.6 G/DL
PROT SERPL-MCNC: 6.7 G/DL
PROTEIN URINE: ABNORMAL
PROTHROM AB SERPL-ACNC: 11.1 SEC — SIGNIFICANT CHANGE UP (ref 10.6–13.6)
RBC # BLD: 2.31 M/UL — LOW (ref 3.8–5.2)
RBC # BLD: 2.73 M/UL — LOW (ref 3.8–5.2)
RBC # BLD: 3.11 M/UL — LOW (ref 3.8–5.2)
RBC # BLD: 3.78 M/UL
RBC # BLD: 3.86 M/UL — SIGNIFICANT CHANGE UP (ref 3.8–5.2)
RBC # BLD: 4.07 M/UL
RBC # FLD: 13.4 % — SIGNIFICANT CHANGE UP (ref 10.3–14.5)
RBC # FLD: 13.4 % — SIGNIFICANT CHANGE UP (ref 10.3–14.5)
RBC # FLD: 13.5 %
RBC # FLD: 13.5 % — SIGNIFICANT CHANGE UP (ref 10.3–14.5)
RBC # FLD: 13.9 % — SIGNIFICANT CHANGE UP (ref 10.3–14.5)
RBC # FLD: 14.5 %
SARS-COV-2 N GENE NPH QL NAA+PROBE: NOT DETECTED
SARS-COV-2 RNA SPEC QL NAA+PROBE: SIGNIFICANT CHANGE UP
SODIUM SERPL-SCNC: 140 MMOL/L — SIGNIFICANT CHANGE UP (ref 135–145)
SODIUM SERPL-SCNC: 141 MMOL/L — SIGNIFICANT CHANGE UP (ref 135–145)
SODIUM SERPL-SCNC: 142 MMOL/L
SODIUM SERPL-SCNC: 142 MMOL/L — SIGNIFICANT CHANGE UP (ref 135–145)
SODIUM SERPL-SCNC: 143 MMOL/L — SIGNIFICANT CHANGE UP (ref 135–145)
SODIUM SERPL-SCNC: 146 MMOL/L
SPECIFIC GRAVITY URINE: 1.03
TIBC SERPL-MCNC: 303 UG/DL
TRIGL SERPL-MCNC: 47 MG/DL
TSH SERPL-ACNC: 1.5 UIU/ML
UIBC SERPL-MCNC: 253 UG/DL
UROBILINOGEN URINE: NORMAL
VIT B12 SERPL-MCNC: >2000 PG/ML
WBC # BLD: 10.66 K/UL — HIGH (ref 3.8–10.5)
WBC # BLD: 6.56 K/UL — SIGNIFICANT CHANGE UP (ref 3.8–10.5)
WBC # BLD: 7.24 K/UL — SIGNIFICANT CHANGE UP (ref 3.8–10.5)
WBC # BLD: 8.42 K/UL — SIGNIFICANT CHANGE UP (ref 3.8–10.5)
WBC # FLD AUTO: 10.66 K/UL — HIGH (ref 3.8–10.5)
WBC # FLD AUTO: 5.99 K/UL
WBC # FLD AUTO: 6.56 K/UL — SIGNIFICANT CHANGE UP (ref 3.8–10.5)
WBC # FLD AUTO: 6.67 K/UL
WBC # FLD AUTO: 7.24 K/UL — SIGNIFICANT CHANGE UP (ref 3.8–10.5)
WBC # FLD AUTO: 8.42 K/UL — SIGNIFICANT CHANGE UP (ref 3.8–10.5)

## 2022-01-01 PROCEDURE — 80048 BASIC METABOLIC PNL TOTAL CA: CPT

## 2022-01-01 PROCEDURE — 99213 OFFICE O/P EST LOW 20 MIN: CPT

## 2022-01-01 PROCEDURE — 99213 OFFICE O/P EST LOW 20 MIN: CPT | Mod: 24

## 2022-01-01 PROCEDURE — 93000 ELECTROCARDIOGRAM COMPLETE: CPT | Mod: 59

## 2022-01-01 PROCEDURE — 77063 BREAST TOMOSYNTHESIS BI: CPT

## 2022-01-01 PROCEDURE — 77063 BREAST TOMOSYNTHESIS BI: CPT | Mod: 26

## 2022-01-01 PROCEDURE — 73610 X-RAY EXAM OF ANKLE: CPT | Mod: 26,LT

## 2022-01-01 PROCEDURE — 99214 OFFICE O/P EST MOD 30 MIN: CPT

## 2022-01-01 PROCEDURE — 97162 PT EVAL MOD COMPLEX 30 MIN: CPT | Mod: GP

## 2022-01-01 PROCEDURE — 76376 3D RENDER W/INTRP POSTPROCES: CPT

## 2022-01-01 PROCEDURE — 94640 AIRWAY INHALATION TREATMENT: CPT

## 2022-01-01 PROCEDURE — 73610 X-RAY EXAM OF ANKLE: CPT | Mod: LT

## 2022-01-01 PROCEDURE — 36415 COLL VENOUS BLD VENIPUNCTURE: CPT

## 2022-01-01 PROCEDURE — 99024 POSTOP FOLLOW-UP VISIT: CPT

## 2022-01-01 PROCEDURE — 85025 COMPLETE CBC W/AUTO DIFF WBC: CPT

## 2022-01-01 PROCEDURE — 73590 X-RAY EXAM OF LOWER LEG: CPT | Mod: LT

## 2022-01-01 PROCEDURE — 36430 TRANSFUSION BLD/BLD COMPNT: CPT

## 2022-01-01 PROCEDURE — 99231 SBSQ HOSP IP/OBS SF/LOW 25: CPT

## 2022-01-01 PROCEDURE — 73700 CT LOWER EXTREMITY W/O DYE: CPT

## 2022-01-01 PROCEDURE — 85027 COMPLETE CBC AUTOMATED: CPT

## 2022-01-01 PROCEDURE — 97116 GAIT TRAINING THERAPY: CPT | Mod: GP

## 2022-01-01 PROCEDURE — 76376 3D RENDER W/INTRP POSTPROCES: CPT | Mod: 26

## 2022-01-01 PROCEDURE — G0444 DEPRESSION SCREEN ANNUAL: CPT | Mod: 59

## 2022-01-01 PROCEDURE — 85610 PROTHROMBIN TIME: CPT

## 2022-01-01 PROCEDURE — 77067 SCR MAMMO BI INCL CAD: CPT

## 2022-01-01 PROCEDURE — 29405 APPL SHORT LEG CAST: CPT | Mod: LT,58

## 2022-01-01 PROCEDURE — 73590 X-RAY EXAM OF LOWER LEG: CPT | Mod: 26,LT

## 2022-01-01 PROCEDURE — 73700 CT LOWER EXTREMITY W/O DYE: CPT | Mod: 26,LT

## 2022-01-01 PROCEDURE — 93010 ELECTROCARDIOGRAM REPORT: CPT

## 2022-01-01 PROCEDURE — 99386 PREV VISIT NEW AGE 40-64: CPT

## 2022-01-01 PROCEDURE — P9040: CPT

## 2022-01-01 PROCEDURE — 97760 ORTHOTIC MGMT&TRAING 1ST ENC: CPT

## 2022-01-01 PROCEDURE — 77067 SCR MAMMO BI INCL CAD: CPT | Mod: 26

## 2022-01-01 PROCEDURE — 86923 COMPATIBILITY TEST ELECTRIC: CPT

## 2022-01-01 PROCEDURE — 93005 ELECTROCARDIOGRAM TRACING: CPT

## 2022-01-01 PROCEDURE — 77085 DXA BONE DENSITY AXL VRT FX: CPT

## 2022-01-01 PROCEDURE — G0463: CPT | Mod: 25

## 2022-01-01 PROCEDURE — 99213 OFFICE O/P EST LOW 20 MIN: CPT | Mod: 25

## 2022-01-01 PROCEDURE — 97530 THERAPEUTIC ACTIVITIES: CPT | Mod: GP

## 2022-01-01 PROCEDURE — 77085 DXA BONE DENSITY AXL VRT FX: CPT | Mod: 26

## 2022-01-01 PROCEDURE — 85730 THROMBOPLASTIN TIME PARTIAL: CPT

## 2022-01-01 PROCEDURE — 99396 PREV VISIT EST AGE 40-64: CPT | Mod: 25

## 2022-01-01 RX ORDER — OXYCODONE HYDROCHLORIDE 5 MG/1
5 TABLET ORAL ONCE
Refills: 0 | Status: DISCONTINUED | OUTPATIENT
Start: 2022-01-01 | End: 2022-01-01

## 2022-01-01 RX ORDER — FLUTICASONE FUROATE AND VILANTEROL TRIFENATATE 100; 25 UG/1; UG/1
100-25 POWDER RESPIRATORY (INHALATION)
Qty: 1 | Refills: 5 | Status: DISCONTINUED | COMMUNITY
Start: 2021-07-21 | End: 2022-01-01

## 2022-01-01 RX ORDER — LANOLIN ALCOHOL/MO/W.PET/CERES
20 CREAM (GRAM) TOPICAL
Qty: 0 | Refills: 0 | DISCHARGE

## 2022-01-01 RX ORDER — SODIUM CHLORIDE 9 MG/ML
1000 INJECTION, SOLUTION INTRAVENOUS
Refills: 0 | Status: DISCONTINUED | OUTPATIENT
Start: 2022-01-01 | End: 2022-01-01

## 2022-01-01 RX ORDER — PANTOPRAZOLE SODIUM 20 MG/1
1 TABLET, DELAYED RELEASE ORAL
Qty: 30 | Refills: 0
Start: 2022-01-01 | End: 2022-01-01

## 2022-01-01 RX ORDER — HYDROMORPHONE HYDROCHLORIDE 2 MG/ML
2 INJECTION INTRAMUSCULAR; INTRAVENOUS; SUBCUTANEOUS
Refills: 0 | Status: DISCONTINUED | OUTPATIENT
Start: 2022-01-01 | End: 2022-01-01

## 2022-01-01 RX ORDER — FLUTICASONE PROPIONATE 50 MCG
1 SPRAY, SUSPENSION NASAL
Qty: 0 | Refills: 0 | DISCHARGE

## 2022-01-01 RX ORDER — AMOXICILLIN AND CLAVULANATE POTASSIUM 875; 125 MG/1; MG/1
875-125 TABLET, COATED ORAL
Qty: 5 | Refills: 0 | Status: DISCONTINUED | COMMUNITY
Start: 2021-01-01

## 2022-01-01 RX ORDER — CEFAZOLIN SODIUM 1 G
2000 VIAL (EA) INJECTION EVERY 8 HOURS
Refills: 0 | Status: DISCONTINUED | OUTPATIENT
Start: 2022-01-01 | End: 2022-01-01

## 2022-01-01 RX ORDER — FLUTICASONE PROPIONATE 50 UG/1
50 SPRAY, METERED NASAL
Qty: 16 | Refills: 2 | Status: ACTIVE | COMMUNITY
Start: 2020-07-14 | End: 1900-01-01

## 2022-01-01 RX ORDER — KETOROLAC TROMETHAMINE 30 MG/ML
30 SYRINGE (ML) INJECTION ONCE
Refills: 0 | Status: DISCONTINUED | OUTPATIENT
Start: 2022-01-01 | End: 2022-01-01

## 2022-01-01 RX ORDER — ALBUTEROL SULFATE 90 UG/1
108 (90 BASE) INHALANT RESPIRATORY (INHALATION)
Qty: 8 | Refills: 0 | Status: ACTIVE | COMMUNITY
Start: 2021-01-01

## 2022-01-01 RX ORDER — LANOLIN ALCOHOL/MO/W.PET/CERES
10 CREAM (GRAM) TOPICAL AT BEDTIME
Refills: 0 | Status: DISCONTINUED | OUTPATIENT
Start: 2022-01-01 | End: 2022-01-01

## 2022-01-01 RX ORDER — TRAZODONE HYDROCHLORIDE 100 MG/1
100 TABLET ORAL DAILY
Qty: 15 | Refills: 0 | Status: DISCONTINUED | COMMUNITY
Start: 2019-09-11 | End: 2022-01-01

## 2022-01-01 RX ORDER — DOXEPIN HYDROCHLORIDE 100 MG/1
100 CAPSULE ORAL
Qty: 30 | Refills: 0 | Status: DISCONTINUED | COMMUNITY
Start: 2022-01-01

## 2022-01-01 RX ORDER — HYDROMORPHONE HYDROCHLORIDE 2 MG/ML
0.5 INJECTION INTRAMUSCULAR; INTRAVENOUS; SUBCUTANEOUS ONCE
Refills: 0 | Status: DISCONTINUED | OUTPATIENT
Start: 2022-01-01 | End: 2022-01-01

## 2022-01-01 RX ORDER — OXYCODONE HYDROCHLORIDE 5 MG/1
1 TABLET ORAL
Qty: 20 | Refills: 0
Start: 2022-01-01 | End: 2022-01-01

## 2022-01-01 RX ORDER — HYDROMORPHONE HYDROCHLORIDE 2 MG/ML
0.5 INJECTION INTRAMUSCULAR; INTRAVENOUS; SUBCUTANEOUS
Refills: 0 | Status: DISCONTINUED | OUTPATIENT
Start: 2022-01-01 | End: 2022-01-01

## 2022-01-01 RX ORDER — FLUTICASONE FUROATE AND VILANTEROL TRIFENATATE 100; 25 UG/1; UG/1
0 POWDER RESPIRATORY (INHALATION)
Qty: 0 | Refills: 0 | DISCHARGE

## 2022-01-01 RX ORDER — DOCUSATE SODIUM 100 MG
1 CAPSULE ORAL
Qty: 60 | Refills: 0
Start: 2022-01-01 | End: 2022-01-01

## 2022-01-01 RX ORDER — FLUTICASONE PROPIONATE 50 MCG
1 SPRAY, SUSPENSION NASAL DAILY
Refills: 0 | Status: DISCONTINUED | OUTPATIENT
Start: 2022-01-01 | End: 2022-01-01

## 2022-01-01 RX ORDER — OXYCODONE HYDROCHLORIDE 5 MG/1
10 TABLET ORAL ONCE
Refills: 0 | Status: DISCONTINUED | OUTPATIENT
Start: 2022-01-01 | End: 2022-01-01

## 2022-01-01 RX ORDER — INFLUENZA VIRUS VACCINE 15; 15; 15; 15 UG/.5ML; UG/.5ML; UG/.5ML; UG/.5ML
0.5 SUSPENSION INTRAMUSCULAR ONCE
Refills: 0 | Status: COMPLETED | OUTPATIENT
Start: 2022-01-01 | End: 2022-01-01

## 2022-01-01 RX ORDER — FENTANYL CITRATE 50 UG/ML
50 INJECTION INTRAVENOUS
Refills: 0 | Status: DISCONTINUED | OUTPATIENT
Start: 2022-01-01 | End: 2022-01-01

## 2022-01-01 RX ORDER — OXYCODONE HYDROCHLORIDE 5 MG/1
1 TABLET ORAL
Qty: 0 | Refills: 0 | DISCHARGE

## 2022-01-01 RX ORDER — ACETAMINOPHEN 500 MG
650 TABLET ORAL EVERY 6 HOURS
Refills: 0 | Status: DISCONTINUED | OUTPATIENT
Start: 2022-01-01 | End: 2022-01-01

## 2022-01-01 RX ORDER — LAMOTRIGINE 25 MG/1
150 TABLET, ORALLY DISINTEGRATING ORAL DAILY
Refills: 0 | Status: DISCONTINUED | OUTPATIENT
Start: 2022-01-01 | End: 2022-01-01

## 2022-01-01 RX ORDER — SODIUM CHLORIDE 9 MG/ML
500 INJECTION, SOLUTION INTRAVENOUS ONCE
Refills: 0 | Status: COMPLETED | OUTPATIENT
Start: 2022-01-01 | End: 2022-01-01

## 2022-01-01 RX ORDER — ALBUTEROL 90 UG/1
2 AEROSOL, METERED ORAL EVERY 6 HOURS
Refills: 0 | Status: DISCONTINUED | OUTPATIENT
Start: 2022-01-01 | End: 2022-01-01

## 2022-01-01 RX ORDER — HYDROMORPHONE HYDROCHLORIDE 2 MG/ML
0.5 INJECTION INTRAMUSCULAR; INTRAVENOUS; SUBCUTANEOUS EVERY 4 HOURS
Refills: 0 | Status: DISCONTINUED | OUTPATIENT
Start: 2022-01-01 | End: 2022-01-01

## 2022-01-01 RX ORDER — MOMETASONE FUROATE AND FORMOTEROL FUMARATE DIHYDRATE 100; 5 UG/1; UG/1
100-5 AEROSOL RESPIRATORY (INHALATION) TWICE DAILY
Qty: 1 | Refills: 3 | Status: ACTIVE | COMMUNITY
Start: 2022-01-01 | End: 1900-01-01

## 2022-01-01 RX ORDER — BUPROPION HYDROCHLORIDE 150 MG/1
150 TABLET, EXTENDED RELEASE ORAL DAILY
Refills: 0 | Status: DISCONTINUED | OUTPATIENT
Start: 2022-01-01 | End: 2022-01-01

## 2022-01-01 RX ORDER — SODIUM CHLORIDE 9 MG/ML
1000 INJECTION, SOLUTION INTRAVENOUS ONCE
Refills: 0 | Status: COMPLETED | OUTPATIENT
Start: 2022-01-01 | End: 2022-01-01

## 2022-01-01 RX ORDER — TRAMADOL HYDROCHLORIDE 50 MG/1
50 TABLET ORAL EVERY 4 HOURS
Refills: 0 | Status: DISCONTINUED | OUTPATIENT
Start: 2022-01-01 | End: 2022-01-01

## 2022-01-01 RX ORDER — GABAPENTIN 300 MG/1
300 CAPSULE ORAL
Qty: 270 | Refills: 0 | Status: ACTIVE | COMMUNITY
Start: 2018-10-16 | End: 1900-01-01

## 2022-01-01 RX ORDER — CEFAZOLIN SODIUM 1 G
2000 VIAL (EA) INJECTION EVERY 8 HOURS
Refills: 0 | Status: COMPLETED | OUTPATIENT
Start: 2022-01-01 | End: 2022-01-01

## 2022-01-01 RX ORDER — OXYCODONE HYDROCHLORIDE 5 MG/1
10 TABLET ORAL EVERY 4 HOURS
Refills: 0 | Status: DISCONTINUED | OUTPATIENT
Start: 2022-01-01 | End: 2022-01-01

## 2022-01-01 RX ORDER — PREDNISONE 50 MG/1
50 TABLET ORAL
Qty: 5 | Refills: 0 | Status: ACTIVE | COMMUNITY
Start: 2022-01-01 | End: 1900-01-01

## 2022-01-01 RX ORDER — PREDNISONE 20 MG/1
20 TABLET ORAL
Qty: 5 | Refills: 0 | Status: DISCONTINUED | COMMUNITY
Start: 2021-01-01 | End: 2022-01-01

## 2022-01-01 RX ORDER — SENNA PLUS 8.6 MG/1
2 TABLET ORAL AT BEDTIME
Refills: 0 | Status: DISCONTINUED | OUTPATIENT
Start: 2022-01-01 | End: 2022-01-01

## 2022-01-01 RX ORDER — POLYETHYLENE GLYCOL 3350 17 G/17G
17 POWDER, FOR SOLUTION ORAL
Qty: 90 | Refills: 4 | Status: ACTIVE | COMMUNITY
Start: 2022-01-01 | End: 1900-01-01

## 2022-01-01 RX ORDER — ONDANSETRON 8 MG/1
4 TABLET, FILM COATED ORAL ONCE
Refills: 0 | Status: DISCONTINUED | OUTPATIENT
Start: 2022-01-01 | End: 2022-01-01

## 2022-01-01 RX ORDER — ENOXAPARIN SODIUM 100 MG/ML
40 INJECTION SUBCUTANEOUS DAILY
Refills: 0 | Status: DISCONTINUED | OUTPATIENT
Start: 2022-01-01 | End: 2022-01-01

## 2022-01-01 RX ORDER — STANDARDIZED SENNA CONCENTRATE 8.6 MG/1
8.6 TABLET ORAL
Qty: 90 | Refills: 3 | Status: ACTIVE | COMMUNITY
Start: 2022-01-01 | End: 1900-01-01

## 2022-01-01 RX ORDER — QUETIAPINE FUMARATE 200 MG/1
1 TABLET, FILM COATED ORAL
Qty: 0 | Refills: 0 | DISCHARGE

## 2022-01-01 RX ORDER — HYDROMORPHONE HYDROCHLORIDE 2 MG/ML
4 INJECTION INTRAMUSCULAR; INTRAVENOUS; SUBCUTANEOUS
Refills: 0 | Status: DISCONTINUED | OUTPATIENT
Start: 2022-01-01 | End: 2022-01-01

## 2022-01-01 RX ORDER — MEPERIDINE HYDROCHLORIDE 50 MG/ML
12.5 INJECTION INTRAMUSCULAR; INTRAVENOUS; SUBCUTANEOUS
Refills: 0 | Status: DISCONTINUED | OUTPATIENT
Start: 2022-01-01 | End: 2022-01-01

## 2022-01-01 RX ORDER — ENOXAPARIN SODIUM 100 MG/ML
40 INJECTION SUBCUTANEOUS
Qty: 0 | Refills: 0 | DISCHARGE
Start: 2022-01-01

## 2022-01-01 RX ORDER — GABAPENTIN 400 MG/1
300 CAPSULE ORAL THREE TIMES A DAY
Refills: 0 | Status: DISCONTINUED | OUTPATIENT
Start: 2022-01-01 | End: 2022-01-01

## 2022-01-01 RX ORDER — BUPROPION HYDROCHLORIDE 150 MG/1
1 TABLET, EXTENDED RELEASE ORAL
Qty: 0 | Refills: 0 | DISCHARGE

## 2022-01-01 RX ORDER — QUETIAPINE FUMARATE 200 MG/1
100 TABLET, FILM COATED ORAL AT BEDTIME
Refills: 0 | Status: DISCONTINUED | OUTPATIENT
Start: 2022-01-01 | End: 2022-01-01

## 2022-01-01 RX ORDER — POLYETHYLENE GLYCOL 3350 17 G/17G
17 POWDER, FOR SOLUTION ORAL AT BEDTIME
Refills: 0 | Status: DISCONTINUED | OUTPATIENT
Start: 2022-01-01 | End: 2022-01-01

## 2022-01-01 RX ORDER — LAMOTRIGINE 25 MG/1
1 TABLET, ORALLY DISINTEGRATING ORAL
Qty: 0 | Refills: 0 | DISCHARGE

## 2022-01-01 RX ORDER — ONDANSETRON 8 MG/1
1 TABLET, FILM COATED ORAL
Qty: 10 | Refills: 0
Start: 2022-01-01

## 2022-01-01 RX ORDER — PANTOPRAZOLE 40 MG/1
40 TABLET, DELAYED RELEASE ORAL
Qty: 30 | Refills: 2 | Status: ACTIVE | COMMUNITY
Start: 2021-07-21 | End: 1900-01-01

## 2022-01-01 RX ORDER — ONDANSETRON 8 MG/1
4 TABLET, FILM COATED ORAL EVERY 6 HOURS
Refills: 0 | Status: DISCONTINUED | OUTPATIENT
Start: 2022-01-01 | End: 2022-01-01

## 2022-01-01 RX ORDER — LANOLIN ALCOHOL/MO/W.PET/CERES
20 CREAM (GRAM) TOPICAL AT BEDTIME
Refills: 0 | Status: DISCONTINUED | OUTPATIENT
Start: 2022-01-01 | End: 2022-01-01

## 2022-01-01 RX ORDER — OXYCODONE HYDROCHLORIDE 5 MG/1
5 TABLET ORAL EVERY 4 HOURS
Refills: 0 | Status: DISCONTINUED | OUTPATIENT
Start: 2022-01-01 | End: 2022-01-01

## 2022-01-01 RX ADMIN — TRAMADOL HYDROCHLORIDE 50 MILLIGRAM(S): 50 TABLET ORAL at 18:20

## 2022-01-01 RX ADMIN — SODIUM CHLORIDE 2000 MILLILITER(S): 9 INJECTION, SOLUTION INTRAVENOUS at 13:54

## 2022-01-01 RX ADMIN — HYDROMORPHONE HYDROCHLORIDE 4 MILLIGRAM(S): 2 INJECTION INTRAMUSCULAR; INTRAVENOUS; SUBCUTANEOUS at 13:58

## 2022-01-01 RX ADMIN — GABAPENTIN 300 MILLIGRAM(S): 400 CAPSULE ORAL at 15:00

## 2022-01-01 RX ADMIN — Medication 650 MILLIGRAM(S): at 23:47

## 2022-01-01 RX ADMIN — TRAMADOL HYDROCHLORIDE 50 MILLIGRAM(S): 50 TABLET ORAL at 15:11

## 2022-01-01 RX ADMIN — Medication 650 MILLIGRAM(S): at 11:17

## 2022-01-01 RX ADMIN — TRAMADOL HYDROCHLORIDE 50 MILLIGRAM(S): 50 TABLET ORAL at 21:42

## 2022-01-01 RX ADMIN — ENOXAPARIN SODIUM 40 MILLIGRAM(S): 100 INJECTION SUBCUTANEOUS at 21:45

## 2022-01-01 RX ADMIN — HYDROMORPHONE HYDROCHLORIDE 4 MILLIGRAM(S): 2 INJECTION INTRAMUSCULAR; INTRAVENOUS; SUBCUTANEOUS at 21:00

## 2022-01-01 RX ADMIN — Medication 30 MILLIGRAM(S): at 10:48

## 2022-01-01 RX ADMIN — Medication 650 MILLIGRAM(S): at 06:15

## 2022-01-01 RX ADMIN — HYDROMORPHONE HYDROCHLORIDE 4 MILLIGRAM(S): 2 INJECTION INTRAMUSCULAR; INTRAVENOUS; SUBCUTANEOUS at 13:30

## 2022-01-01 RX ADMIN — HYDROMORPHONE HYDROCHLORIDE 4 MILLIGRAM(S): 2 INJECTION INTRAMUSCULAR; INTRAVENOUS; SUBCUTANEOUS at 04:36

## 2022-01-01 RX ADMIN — Medication 100 MILLIGRAM(S): at 03:29

## 2022-01-01 RX ADMIN — OXYCODONE HYDROCHLORIDE 10 MILLIGRAM(S): 5 TABLET ORAL at 18:41

## 2022-01-01 RX ADMIN — SODIUM CHLORIDE 75 MILLILITER(S): 9 INJECTION, SOLUTION INTRAVENOUS at 13:40

## 2022-01-01 RX ADMIN — HYDROMORPHONE HYDROCHLORIDE 0.5 MILLIGRAM(S): 2 INJECTION INTRAMUSCULAR; INTRAVENOUS; SUBCUTANEOUS at 14:45

## 2022-01-01 RX ADMIN — LAMOTRIGINE 150 MILLIGRAM(S): 25 TABLET, ORALLY DISINTEGRATING ORAL at 08:57

## 2022-01-01 RX ADMIN — Medication 1 TABLET(S): at 08:56

## 2022-01-01 RX ADMIN — HYDROMORPHONE HYDROCHLORIDE 0.5 MILLIGRAM(S): 2 INJECTION INTRAMUSCULAR; INTRAVENOUS; SUBCUTANEOUS at 16:29

## 2022-01-01 RX ADMIN — BUPROPION HYDROCHLORIDE 150 MILLIGRAM(S): 150 TABLET, EXTENDED RELEASE ORAL at 09:06

## 2022-01-01 RX ADMIN — Medication 650 MILLIGRAM(S): at 17:16

## 2022-01-01 RX ADMIN — HYDROMORPHONE HYDROCHLORIDE 0.5 MILLIGRAM(S): 2 INJECTION INTRAMUSCULAR; INTRAVENOUS; SUBCUTANEOUS at 06:37

## 2022-01-01 RX ADMIN — GABAPENTIN 300 MILLIGRAM(S): 400 CAPSULE ORAL at 21:43

## 2022-01-01 RX ADMIN — Medication 650 MILLIGRAM(S): at 17:03

## 2022-01-01 RX ADMIN — Medication 10 MILLIGRAM(S): at 21:42

## 2022-01-01 RX ADMIN — Medication 100 MILLIGRAM(S): at 19:55

## 2022-01-01 RX ADMIN — Medication 10 MILLIGRAM(S): at 21:45

## 2022-01-01 RX ADMIN — HYDROMORPHONE HYDROCHLORIDE 4 MILLIGRAM(S): 2 INJECTION INTRAMUSCULAR; INTRAVENOUS; SUBCUTANEOUS at 09:35

## 2022-01-01 RX ADMIN — Medication 1 TABLET(S): at 09:05

## 2022-01-01 RX ADMIN — HYDROMORPHONE HYDROCHLORIDE 0.5 MILLIGRAM(S): 2 INJECTION INTRAMUSCULAR; INTRAVENOUS; SUBCUTANEOUS at 19:55

## 2022-01-01 RX ADMIN — HYDROMORPHONE HYDROCHLORIDE 0.5 MILLIGRAM(S): 2 INJECTION INTRAMUSCULAR; INTRAVENOUS; SUBCUTANEOUS at 23:47

## 2022-01-01 RX ADMIN — Medication 30 MILLIGRAM(S): at 09:04

## 2022-01-01 RX ADMIN — HYDROMORPHONE HYDROCHLORIDE 4 MILLIGRAM(S): 2 INJECTION INTRAMUSCULAR; INTRAVENOUS; SUBCUTANEOUS at 02:01

## 2022-01-01 RX ADMIN — Medication 650 MILLIGRAM(S): at 11:23

## 2022-01-01 RX ADMIN — HYDROMORPHONE HYDROCHLORIDE 0.5 MILLIGRAM(S): 2 INJECTION INTRAMUSCULAR; INTRAVENOUS; SUBCUTANEOUS at 16:50

## 2022-01-01 RX ADMIN — HYDROMORPHONE HYDROCHLORIDE 4 MILLIGRAM(S): 2 INJECTION INTRAMUSCULAR; INTRAVENOUS; SUBCUTANEOUS at 20:04

## 2022-01-01 RX ADMIN — LAMOTRIGINE 150 MILLIGRAM(S): 25 TABLET, ORALLY DISINTEGRATING ORAL at 09:03

## 2022-01-01 RX ADMIN — OXYCODONE HYDROCHLORIDE 10 MILLIGRAM(S): 5 TABLET ORAL at 14:22

## 2022-01-01 RX ADMIN — SODIUM CHLORIDE 500 MILLILITER(S): 9 INJECTION, SOLUTION INTRAVENOUS at 05:35

## 2022-01-01 RX ADMIN — FENTANYL CITRATE 50 MICROGRAM(S): 50 INJECTION INTRAVENOUS at 14:00

## 2022-01-01 RX ADMIN — Medication 650 MILLIGRAM(S): at 11:53

## 2022-01-01 RX ADMIN — HYDROMORPHONE HYDROCHLORIDE 4 MILLIGRAM(S): 2 INJECTION INTRAMUSCULAR; INTRAVENOUS; SUBCUTANEOUS at 02:31

## 2022-01-01 RX ADMIN — OXYCODONE HYDROCHLORIDE 10 MILLIGRAM(S): 5 TABLET ORAL at 14:45

## 2022-01-01 RX ADMIN — GABAPENTIN 300 MILLIGRAM(S): 400 CAPSULE ORAL at 21:38

## 2022-01-01 RX ADMIN — Medication 30 MILLIGRAM(S): at 15:01

## 2022-01-01 RX ADMIN — HYDROMORPHONE HYDROCHLORIDE 4 MILLIGRAM(S): 2 INJECTION INTRAMUSCULAR; INTRAVENOUS; SUBCUTANEOUS at 05:06

## 2022-01-01 RX ADMIN — HYDROMORPHONE HYDROCHLORIDE 0.5 MILLIGRAM(S): 2 INJECTION INTRAMUSCULAR; INTRAVENOUS; SUBCUTANEOUS at 20:25

## 2022-01-01 RX ADMIN — HYDROMORPHONE HYDROCHLORIDE 0.5 MILLIGRAM(S): 2 INJECTION INTRAMUSCULAR; INTRAVENOUS; SUBCUTANEOUS at 23:06

## 2022-01-01 RX ADMIN — Medication 650 MILLIGRAM(S): at 05:01

## 2022-01-01 RX ADMIN — TRAMADOL HYDROCHLORIDE 50 MILLIGRAM(S): 50 TABLET ORAL at 22:15

## 2022-01-01 RX ADMIN — OXYCODONE HYDROCHLORIDE 10 MILLIGRAM(S): 5 TABLET ORAL at 22:27

## 2022-01-01 RX ADMIN — GABAPENTIN 300 MILLIGRAM(S): 400 CAPSULE ORAL at 14:10

## 2022-01-01 RX ADMIN — FENTANYL CITRATE 50 MICROGRAM(S): 50 INJECTION INTRAVENOUS at 13:40

## 2022-01-01 RX ADMIN — HYDROMORPHONE HYDROCHLORIDE 4 MILLIGRAM(S): 2 INJECTION INTRAMUSCULAR; INTRAVENOUS; SUBCUTANEOUS at 09:02

## 2022-01-01 RX ADMIN — Medication 650 MILLIGRAM(S): at 11:03

## 2022-01-01 RX ADMIN — Medication 1 SPRAY(S): at 14:11

## 2022-01-01 RX ADMIN — HYDROMORPHONE HYDROCHLORIDE 4 MILLIGRAM(S): 2 INJECTION INTRAMUSCULAR; INTRAVENOUS; SUBCUTANEOUS at 01:01

## 2022-01-01 RX ADMIN — GABAPENTIN 300 MILLIGRAM(S): 400 CAPSULE ORAL at 05:01

## 2022-01-01 RX ADMIN — HYDROMORPHONE HYDROCHLORIDE 0.5 MILLIGRAM(S): 2 INJECTION INTRAMUSCULAR; INTRAVENOUS; SUBCUTANEOUS at 11:03

## 2022-01-01 RX ADMIN — QUETIAPINE FUMARATE 100 MILLIGRAM(S): 200 TABLET, FILM COATED ORAL at 22:20

## 2022-01-01 RX ADMIN — ENOXAPARIN SODIUM 40 MILLIGRAM(S): 100 INJECTION SUBCUTANEOUS at 21:43

## 2022-01-01 RX ADMIN — Medication 650 MILLIGRAM(S): at 05:55

## 2022-01-01 RX ADMIN — Medication 650 MILLIGRAM(S): at 01:01

## 2022-01-01 RX ADMIN — TRAMADOL HYDROCHLORIDE 50 MILLIGRAM(S): 50 TABLET ORAL at 14:39

## 2022-01-01 RX ADMIN — QUETIAPINE FUMARATE 100 MILLIGRAM(S): 200 TABLET, FILM COATED ORAL at 21:42

## 2022-01-01 RX ADMIN — TRAMADOL HYDROCHLORIDE 50 MILLIGRAM(S): 50 TABLET ORAL at 17:27

## 2022-01-01 RX ADMIN — HYDROMORPHONE HYDROCHLORIDE 4 MILLIGRAM(S): 2 INJECTION INTRAMUSCULAR; INTRAVENOUS; SUBCUTANEOUS at 05:55

## 2022-01-01 RX ADMIN — Medication 650 MILLIGRAM(S): at 23:07

## 2022-01-01 RX ADMIN — BUPROPION HYDROCHLORIDE 150 MILLIGRAM(S): 150 TABLET, EXTENDED RELEASE ORAL at 11:02

## 2022-01-01 RX ADMIN — HYDROMORPHONE HYDROCHLORIDE 4 MILLIGRAM(S): 2 INJECTION INTRAMUSCULAR; INTRAVENOUS; SUBCUTANEOUS at 12:56

## 2022-01-01 RX ADMIN — HYDROMORPHONE HYDROCHLORIDE 4 MILLIGRAM(S): 2 INJECTION INTRAMUSCULAR; INTRAVENOUS; SUBCUTANEOUS at 05:01

## 2022-01-01 RX ADMIN — HYDROMORPHONE HYDROCHLORIDE 0.5 MILLIGRAM(S): 2 INJECTION INTRAMUSCULAR; INTRAVENOUS; SUBCUTANEOUS at 15:22

## 2022-01-01 RX ADMIN — HYDROMORPHONE HYDROCHLORIDE 4 MILLIGRAM(S): 2 INJECTION INTRAMUSCULAR; INTRAVENOUS; SUBCUTANEOUS at 01:31

## 2022-01-01 RX ADMIN — HYDROMORPHONE HYDROCHLORIDE 4 MILLIGRAM(S): 2 INJECTION INTRAMUSCULAR; INTRAVENOUS; SUBCUTANEOUS at 08:58

## 2022-01-01 RX ADMIN — GABAPENTIN 300 MILLIGRAM(S): 400 CAPSULE ORAL at 06:15

## 2022-01-01 RX ADMIN — Medication 60 MILLIGRAM(S): at 08:58

## 2022-01-01 RX ADMIN — Medication 30 MILLIGRAM(S): at 11:12

## 2022-01-01 RX ADMIN — OXYCODONE HYDROCHLORIDE 10 MILLIGRAM(S): 5 TABLET ORAL at 23:05

## 2022-01-04 PROBLEM — M54.2 NECK AND SHOULDER PAIN: Status: ACTIVE | Noted: 2022-01-01

## 2022-01-04 PROBLEM — I10 BENIGN HYPERTENSION: Status: ACTIVE | Noted: 2019-01-15

## 2022-01-04 PROBLEM — Z12.39 SCREENING FOR BREAST CANCER: Status: ACTIVE | Noted: 2022-01-01

## 2022-01-04 NOTE — HEALTH RISK ASSESSMENT
[Good] : ~his/her~  mood as  good [Never] : Never [No] : No [No falls in past year] : Patient reported no falls in the past year [PHQ-2 Positive] : PHQ-2 Positive [PHQ-9 Positive] : PHQ-9 Positive [I have developed a follow-up plan documented below in the note.] : I have developed a follow-up plan documented below in the note. [[id number]] : 4

## 2022-01-04 NOTE — END OF VISIT
[FreeTextEntry3] : This note was written by Yue Malik on 01/04/2022, acting as a scribe for Dr. Papa MD.\par \par All medical record entries were at my, Dr. Courtney Elam MD, direction and personally dictated by me in 01/04/2022. I have personally reviewed the chart and agree that the record accurately reflects my personal performance of the history, physical exam, assessment, and plan.\par

## 2022-01-04 NOTE — HISTORY OF PRESENT ILLNESS
[FreeTextEntry1] : CPE/ back pain [de-identified] : CHERYL MIR is a 61 year old female patient presenting to the clinic today for CPE. Patient wants to get tested for Covid, denies Covid symptoms. She is feeling tired and fatigue. Also, having neck and back pain. Reports her back pain is getting worse. Just finished physical therapy for her lower back. Has used heating pads in the past for her lower back, mild relief. Will be having left ankle surgery on February 8 th, her right ankle still hurts but better than before. \par \par Patient states she has been eating a lot since Thanksgiving, unsure of recent weight loss. Needs a referral for mammogram. Has a concern if she needs to have a pap smear done. She has a counselor and psychiatrist to treat her depression. Has received Flu Vaccine at her local pharmacy. Denied tetanus vaccine.

## 2022-01-04 NOTE — ASSESSMENT
[FreeTextEntry1] : CHERYL MIR is a 61 year old female patient presenting to the clinic today for CPE. \par \par # PE/Vitals \par - stable \par \par # PHQ-2 Behavioral Health Screenin\par # Reviewed Patient Medical History \par # Reviewed Patient Medication List \par \par # Reviewed Last Lab Results\par - hemoglobin levels low \par \par # EKG done in the office and reviewed \par - normal

## 2022-01-04 NOTE — PLAN
"Chief Complaint   Patient presents with     Prenatal Care     13 weeks 5 days   U/S order placed        Initial /64 (BP Location: Left arm, Cuff Size: Adult Regular)   Pulse 70   Wt 58.9 kg (129 lb 14.4 oz)   LMP 2021   Breastfeeding No   BMI 19.46 kg/m   Estimated body mass index is 19.46 kg/m  as calculated from the following:    Height as of 20: 1.74 m (5' 8.5\").    Weight as of this encounter: 58.9 kg (129 lb 14.4 oz).  BP completed using cuff size: regular    Questioned patient about current smoking habits.  Pt. quit smoking some time ago.    13w5d      The following HM Due: NONE      +Feeling well   +20 week U/S ordered       Jill Cortes CMA on 11/15/2021 at 9:46 AM           " [FreeTextEntry1] : # blood work today\par # EKG today \par # advised to make an appointment with GYN for a routine pap smear \par # continue to take iron supplements \par # advised to use heating compress for back and neck pain\par # referral to physical therapy\par # f/u in three months or sooner if needed

## 2022-01-28 NOTE — H&P PST ADULT - PROBLEM SELECTOR PLAN 1
Pre op and chlorhexidine instructions given and explained.  Avoid NSAIDs and OTC supplements.   Patient verbalized understanding  medical consult requested by surgeon 2/1/22  covid 19 swab scheduled, advised to quarantine after test

## 2022-01-28 NOTE — H&P PST ADULT - PSYCHIATRIC
Affect and characteristics of appearance, verbalizations, behaviors are appropriate details… The resident's documentation has been prepared under my direction and personally reviewed by me in its entirety. I confirm that the note above accurately reflects all work, treatment, procedures, and medical decision making performed by me.  Kelvin Boucher MD

## 2022-01-28 NOTE — H&P PST ADULT - NSICDXPASTMEDICALHX_GEN_ALL_CORE_FT
PAST MEDICAL HISTORY:  Anxiety and depression     Asthma Has not been admitted for have any asthma exacerbation for 10 years    COVID-19 vaccine series completed Pfizer--2nd vaccine 4/14/2021    Diverticulosis     Female incontinence     H/O sinusitis     H/O spinal stenosis     History of heart attack Hospitalized 2/2020 after left ankle fracture, surgery, states developed pneumonia, ??mild kidney failure, was told had mild heart attack, recovered , no intervention    Hypertension Have not taken medications for about 2 years    Left ankle pain     Lumbar herniated disc     Osteoarthritis     Panic attacks     Seasonal allergies

## 2022-01-28 NOTE — H&P PST ADULT - PAIN RATING AT REST
[FreeTextEntry1] : Mr. 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 He is tolerating it well still with redistribution lymphocytosis. No new symptoms, we will continue to monitor closely while on ibrutinib. His Hgb now in a normal range. He feels more energetic. Reviewed COVID precautions as he is back at work. \par \par Plan:\par 1)CLL: continue ibrutinib as he is tolerating well. He continues to have redistribution lymphocytosis. 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 Reminded patient to f/u with internist for regular follow-up.\par 4)RTO in ~8 weeks\par \par PMD:\par Melissa Evans 8

## 2022-01-28 NOTE — H&P PST ADULT - NSICDXPASTSURGICALHX_GEN_ALL_CORE_FT
PAST SURGICAL HISTORY:  H/O total ankle replacement right 5/2021    History of ankle surgery right ankle 2/2020 x2 with hardware , harware removed 1/12/2021  - left ankle fusion 10/2020    History of cardiac cath 2/2020--normal coronary arteries    S/P cholecystectomy 2010    S/P hip replacement left---2015  right--2017    S/P tubal ligation 1995

## 2022-01-28 NOTE — H&P PST ADULT - HEALTH CARE MAINTENANCE
no recent travels outside of the country or states within the last 14 days, no fever, URI, SOB or recent change /loss in smell or taste  flu vaccine up to date

## 2022-01-31 PROBLEM — Z01.419 ENCOUNTER FOR GYNECOLOGICAL EXAMINATION: Status: ACTIVE | Noted: 2022-01-01

## 2022-01-31 NOTE — HISTORY OF PRESENT ILLNESS
[N] : Patient is not sexually active [Menarche Age: ____] : age at menarche was [unfilled] [Menopause Age: ____] : age at menopause was [unfilled] [FreeTextEntry1] : 61 year old female,  presents for GYN evaluation. Patient states she hasn’t had an annual examination in several years. Pt. had mammogram performed in 2022, BIRAD 1. Has no other complaints today.  [postmenopausal] : postmenopausal [PGHxTotal] : 5 [[facility]] : 4 [PGHxPremature] : 0 [PGHxAbortions] : 0 [[facility]] : 4 [PGHxABInduced] : 0 [PGHxABSpont] : 1 [PGHxEctopic] : 0 [PGHxMultBirths] : 0

## 2022-01-31 NOTE — REASON FOR VISIT
[Follow-Up Visit] : a follow-up visit for [Spouse] : spouse [FreeTextEntry2] : bilateral ankle pain, left worse than right. s/p right ankle arthroplasty on 6/1/2021

## 2022-01-31 NOTE — PHYSICAL EXAM
[Chaperone Present] : A chaperone was present in the examining room during all aspects of the physical examination [FreeTextEntry1] : Kyra [Examination Of The Breasts] : a normal appearance [No Masses] : no breast masses were palpable [Vulvar Atrophy] : vulvar atrophy [Labia Majora] : normal [Labia Minora] : normal [Atrophy] : atrophy [Normal] : normal [Uterine Adnexae] : normal

## 2022-01-31 NOTE — ADDENDUM
[FreeTextEntry1] : I, Mariah Mcneal, acted solely as a scribe for Dr. Mohamud Knight on this date 12/13/2021.\par \par All medical record entries made by the Scribe were at my, Dr. Mohamud Knight, direction and personally dictated by me on 12/13/2021 . I have reviewed the chart and agree that the record accurately reflects my personal performance of the history, physical exam, assessment and plan. I have also personally directed, reviewed, and agreed with the chart.	\par

## 2022-01-31 NOTE — COUNSELING
[Nutrition/ Exercise/ Weight Management] : nutrition, exercise, weight management [Body Image] : body image [Vitamins/Supplements] : vitamins/supplements [Breast Self Exam] : breast self exam [Vaccines] : vaccines

## 2022-01-31 NOTE — PHYSICAL EXAM
[de-identified] : General: Alert and oriented x3. In no acute distress. Pleasant in nature with a normal affect. No apparent respiratory distress.\par \par Left Foot and Ankle Exam\par Skin: Clean, dry, intact\par Inspection: No obvious malalignment, no masses, no swelling, no effusion\par Pulses: 2+ DP/PT pulses\par ROM: FOOT Full  ROM of digits, ANKLE 10 degrees of dorsiflexion, 40 degrees of plantarflexion, 10 degrees of subtalar motion.\par Painful ROM: None\par Tenderness: + tenderness over the medial malleolus, No tenderness over the lateral malleolus, no CFL/ATFL/PTFL pain, no deltoid ligament pain. No heel pain. No Achilles tenderness. No 5th metatarsal pain. No pain to the LisFranc joint. No ttp over the posterior tibial tendon.\par Stability: Negative anterior/posterior drawer.\par Strength: 5/5 ADD/ABD/TA/GS/EHL/FHL/EDL\par Neuro: Sensation in tact to light touch throughout\par Additional tests: Negative Mortons test, negative tarsal tunnel tinels, negative single heel rise.	\par \par Right Foot and Ankle Exam\par Skin: Clean, dry, intact\par Inspection: No obvious malalignment, no masses, no swelling, no effusion\par Pulses: 2+ DP/PT pulses\par ROM: FOOT Full  ROM of digits, ANKLE 10 degrees of dorsiflexion, 40 degrees of plantarflexion, 10 degrees of subtalar motion.\par Painful ROM: None\par Tenderness: No tenderness over the medial malleolus, No tenderness over the lateral malleolus, no CFL/ATFL/PTFL pain, no deltoid ligament pain. No heel pain. No Achilles tenderness. No 5th metatarsal pain. No pain to the LisFranc joint. No ttp over the posterior tibial tendon.\par Stability: Negative anterior/posterior drawer.\par Strength: 5/5 ADD/ABD/TA/GS/EHL/FHL/EDL\par Neuro: Sensation in tact to light touch throughout\par Additional tests: Negative Mortons test, negative tarsal tunnel tinels, negative single heel rise.			 [de-identified] : XRs of the bilateral ankles were ordered, obtained, and reviewed by me today, 12/13/2021, revealed: No acute fractures. Hardware is intact bilaterally.

## 2022-01-31 NOTE — PLAN
[FreeTextEntry1] : Encounter for GYN examination \par \par - PAP obtained \par - Mammogram from Jan 2022 reviewed, BIRAD 1. \par - DEXA prescription provided. \par \par F/U in 1 year or PRN

## 2022-01-31 NOTE — DISCUSSION/SUMMARY
[de-identified] : Today I had a lengthy discussion with the patient regarding their bilateral ankle pain, left worse than right. I have addressed all the patient's concerns surrounding the pathology of their condition. XR imaging was completed in office today and results were reviewed with the patient. We discussed both operative and non-operative treatment options in great detail, with respect to possible Lidocaine injections, versus possible bracing interventions versus possible hardware removal with ankle fusion surgery. \par \par A lengthy discussion was had about the surgery, left ankle hardware removal. All risks, benefits and alternatives to the recommended surgical procedure were discussed which include but are not limited to bleeding, infection, nerve damage, vascular damage, failure of the wound to heal, the need for further surgery, loss of limb, DVT, PE, loss of life as well as the risks associated with general anesthesia. The patient verbalized understanding and provided informed consent to move forward with surgery.	\par \par In the interim, I recommended that the patient utilize Voltaren gel topically. If the Voltaren gel could not be obtained, Icy Hot, Biofreeze, or Bengay can be utilized instead. The patient understood and verbally agreed to the treatment plan. All of their questions were answered and they were satisfied with the visit. The patient should call the office if they have any questions or experience worsening symptoms.\par \par Hardware removal, revision Left hindfoot arthrodesis with TTC nail with proximal tibia bone marrow aspirate.

## 2022-01-31 NOTE — HISTORY OF PRESENT ILLNESS
[FreeTextEntry1] : 12/13/2021: The patient is a 61 year old female presenting for a follow-up evaluation of bilateral ankle pain, with left worse than right. She is approximately 6.5 months post op s/p right ankle arthroplasty on 6/1/2021.The patient's pain is noted to be worsened since her last evaluation, describing the timing of her pain as constant localized to the medial aspect of her left ankle and to the posterior plantar surface of her foot. She reports compensatory right ankle pain. She denies any new acute trauma or injury to the ankle. The patient does report that she discontinued with physical therapy due to insurance denial. She continues to utilize NSAIDs and icing in order to control for swelling. The patient presents to the clinic in [facility] and is walking with an antalgic gait. CHERYL denies any numbness or tingling sensations to the ankle and foot. No other complaints. \par \par

## 2022-02-02 PROBLEM — D64.9 ANEMIA: Status: ACTIVE | Noted: 2021-07-21

## 2022-02-02 PROBLEM — J45.909 ASTHMA: Status: ACTIVE | Noted: 2021-07-21

## 2022-02-02 PROBLEM — S82.892A ANKLE FRACTURE, LEFT: Status: ACTIVE | Noted: 2020-09-18

## 2022-02-02 PROBLEM — K59.00 CONSTIPATION: Status: ACTIVE | Noted: 2022-01-01

## 2022-02-02 PROBLEM — Z01.818 PREOPERATIVE CLEARANCE: Status: ACTIVE | Noted: 2022-01-01

## 2022-02-02 PROBLEM — E78.5 DYSLIPIDEMIA: Status: ACTIVE | Noted: 2022-01-01

## 2022-02-02 PROBLEM — M25.572 PAIN IN LEFT ANKLE AND JOINTS OF LEFT FOOT: Chronic | Status: ACTIVE | Noted: 2022-01-01

## 2022-02-02 NOTE — HISTORY OF PRESENT ILLNESS
[No Pertinent Cardiac History] : no history of aortic stenosis, atrial fibrillation, coronary artery disease, recent myocardial infarction, or implantable device/pacemaker [Asthma] : asthma [Sleep Apnea] : sleep apnea [No Adverse Anesthesia Reaction] : no adverse anesthesia reaction in self or family member [COPD] : no COPD [Smoker] : not a smoker [Chronic Anticoagulation] : no chronic anticoagulation [Chronic Kidney Disease] : no chronic kidney disease [Diabetes] : no diabetes [FreeTextEntry1] : Left Ankle Surgery [FreeTextEntry2] : 2/8/2022 [FreeTextEntry3] : Dr. Knight [FreeTextEntry4] : CHERYL MIR is a 61 year old female patient presenting to the clinic today for medical clearance for left ankle surgery. Patient is having left ankle surgery performed by Dr. Knight at [facility]. Patient reports right ankle feeling good after having surgery on it last year. Patient plans to lose more weight after recovering from surgery. She has started a spinach diet to help her lose weight. Patient  believes her cholesterol is elevated because she is eating a lot of chocolate and sweets since October. \par \par Additionally, patient's [facility] sent her a letter advising her to change her Breo Ellipta to Advair. Also, patient has been constipated for the past five days.\par \par Patient has a medical history of asthma, benign hypertension, generalized anxiety disorder, depression, obesity, obstructive sleep apnea, and anemia.

## 2022-02-02 NOTE — END OF VISIT
[FreeTextEntry3] : This note was written by Annabelle Garcia on 02/02/2022, acting as a scribe for MD Yuly.\par \par All medic record entries were at my, Dr.Meenu Papa MD, direction and personally dictated by me in 02/02/2022. I have personally reviewed the chart and agree that the record accurately reflects my personal performance of the history, physical exam, assessment, and plan.

## 2022-02-02 NOTE — ASSESSMENT
[Patient Optimized for Surgery] : Patient optimized for surgery [No Further Testing Recommended] : no further testing recommended [As per surgery] : as per surgery [FreeTextEntry4] : Patient has elevated cholesterol level, 288 mg/dL.\par \par Advised patient to bring her inhaler with her on day of surgery. Patient is to continue with current medication regimen with the following adjustments prior to the operation: No green tea/multivitamins/NSAIDs/Fish Oil.

## 2022-02-02 NOTE — PLAN
[FreeTextEntry1] : # Start Rx Dulera 100-5 MCG/ACT\par # Start Rx Polyethylene Glycol 3350 17 GM\par # Start Rx Senokot 8.6 MG\par # advised to bring inhaler with her on day of surgery\par # advised to increase water intake

## 2022-02-02 NOTE — REVIEW OF SYSTEMS
[Insomnia] : insomnia [Anxiety] : anxiety [Depression] : depression [Negative] : Heme/Lymph [Constipation] : constipation [Joint Pain] : joint pain

## 2022-02-08 NOTE — ASU DISCHARGE PLAN (ADULT/PEDIATRIC) - CARE PROVIDER_API CALL
Mohamud Knight (DO)  Orthopaedic Surgery  [address] [place] [address]  Phone: [phone]  Fax: [phone]  Follow Up Time:

## 2022-02-08 NOTE — BRIEF OPERATIVE NOTE - NSICDXBRIEFPREOP_GEN_ALL_CORE_FT
PRE-OP DIAGNOSIS:  Status post ankle arthrodesis 08-Feb-2022 13:13:09 Lt ankle joint arthrodesis nonunion Teofilo Aleman  Nonunion of subtalar arthrodesis 08-Feb-2022 13:13:21 Lt STJ arthrodesis nonunion Teofilo Aleman  Painful orthopaedic hardware 08-Feb-2022 13:14:07  Teofilo Aleman

## 2022-02-08 NOTE — BRIEF OPERATIVE NOTE - NSICDXBRIEFPROCEDURE_GEN_ALL_CORE_FT
PROCEDURES:  Complex removal of hardware from extremity 08-Feb-2022 13:03:19 Removal of deeply implanted Lt lower extremity hardware Teofilo Aleman  Revision of left subtalar arthrodesis 08-Feb-2022 13:06:14  Teofilo Aleman  Arthrodesis, ankle, left 08-Feb-2022 13:06:27  Teofilo Aleman  Aspiration and injection, bone marrow 08-Feb-2022 13:07:47 Harvesting of bone marrow Lt tibia, and injection in the fusion site. Teofilo Aleman  Osteotomy, fibula 08-Feb-2022 13:11:16 Left distal fibular osteotomy and use of fibular autograft. Teofilo Aleman  Intramedullary nailing of left tibia 08-Feb-2022 13:16:48  Teofilo Aleman

## 2022-02-08 NOTE — PROVIDER CONTACT NOTE (OTHER) - ASSESSMENT
patient medicated as per MAR patient medicated as per MAR. Patient states still complaining of pain. patient states at home she already takes oxycodone 10mg, and she has taken dilaudid 2 or 4 during her previous stays in the hospital status post surgeries. patient also states she will go home on oxycodone and she "switches off and on" between oxycodone and dilaudid.

## 2022-02-08 NOTE — ASU PATIENT PROFILE, ADULT - FALL HARM RISK - UNIVERSAL INTERVENTIONS
Bed in lowest position, wheels locked, appropriate side rails in place/Call bell, personal items and telephone in reach/Instruct patient to call for assistance before getting out of bed or chair/Non-slip footwear when patient is out of bed/[place] to call system/Physically safe environment - no spills, clutter or unnecessary equipment/Purposeful Proactive Rounding/Room/bathroom lighting operational, light cord in reach

## 2022-02-08 NOTE — PROGRESS NOTE ADULT - SUBJECTIVE AND OBJECTIVE BOX
Orthopedics Post-op Check    POD 0 Revision Hindfoot Fusion  Pt seen and examined at the bedside. Pain is moderately controlled at this time.     Vital Signs Last 24 Hrs  T(C): 36.2 (02-08-22 @ 13:03), Max: 36.6 (02-08-22 @ 06:14)  T(F): 97.2 (02-08-22 @ 13:03), Max: 97.8 (02-08-22 @ 06:14)  HR: 109 (02-08-22 @ 13:18) (72 - 116)  BP: 96/56 (02-08-22 @ 13:18) (96/56 - 125/86)  BP(mean): --  RR: 12 (02-08-22 @ 13:18) (12 - 16)  SpO2: 100% (02-08-22 @ 13:18) (96% - 100%)                        9.4    10.66 )-----------( 210      ( 08 Feb 2022 13:56 )             29.8         Exam:  GEN: NAD, awake and alert, cooperative.   LLE  Trilam splint in place  Moving all toes  Sensation intact to toes which can be assessed with splint  Toes warm and well perfused  Calf soft/nontender, compartments soft and compressible.       A/P:   61yFemale POD 0 Revision Hindfoot Fusion    -Pain control prn  -DVT/PE ppx per AC recs  -NWB LLE in trilam splint  -PT/OOB as tolerated   -Med Mgmt, continue home meds.  - FU am Labs  - Will reassess in am, plan for 23 hour observation and discharge to home tomorrow

## 2022-02-08 NOTE — BRIEF OPERATIVE NOTE - OPERATION/FINDINGS
Left ankle pain, failed prior ankle and STJ arthrodesis. For details, please check the post-operative notes.

## 2022-02-08 NOTE — ASU DISCHARGE PLAN (ADULT/PEDIATRIC) - C. MAKE IMPORTANT PERSONAL OR BUSINESS DECISIONS
Statement Selected Right hip replacement, left total knee replacement, right foot hardware/yes(specify)

## 2022-02-08 NOTE — BRIEF OPERATIVE NOTE - NSICDXBRIEFPOSTOP_GEN_ALL_CORE_FT
POST-OP DIAGNOSIS:  Painful orthopaedic hardware 08-Feb-2022 13:14:38  Teofilo Aleman  Nonunion of subtalar arthrodesis 08-Feb-2022 13:14:59 Lt STJ arthrodesis nonunion Teofilo Aleman  Status post ankle arthrodesis 08-Feb-2022 13:15:11 Lt ankle joint arthrodesis nonunion Teofilo Aleman

## 2022-02-08 NOTE — ASU DISCHARGE PLAN (ADULT/PEDIATRIC) - NS MD DC FALL RISK RISK
For information on Fall & Injury Prevention, visit: [email or web address] OR  [email or web address] OR  https://www.cdc.gov/steadi/patient.html

## 2022-02-08 NOTE — CONSULT NOTE ADULT - ASSESSMENT
Patient is a 60y old  Female who presents with a chief complaint of Right  Ankle pain s/p Right ankle fracture 2/20 now had Right ankle Arthroplasty (01 Jun 2021 09:22). Consulted by     for VTE prophylaxis, risk stratification, and anticoagulation management.    Plan  :Lovenox 40mg sq daily until full weight bearing   :daily cbc/bmp  :LE Venodynes  : increase mobility as tolerated  :Thanks for consult will f/u   Patient is a 61y old  Female who presents with a chief complaint of left foot pain now s/p revision of the left hindfoot fusion and left tibia IMN.  Consulted by Dr. Knight  for VTE prophylaxis, risk stratification, and anticoagulation management. Patient is high risk for VTE due to surgery, immobility, and BMI     Plan  :Lovenox 40mg sq daily until full weight bearing   :daily cbc/bmp  :LE Venodynes  : increase mobility as tolerated  :Thanks for consult will f/u  Dispo:rehab

## 2022-02-08 NOTE — CONSULT NOTE ADULT - SUBJECTIVE AND OBJECTIVE BOX
HPI:      Patient is a 60y old  Female who presents with a chief complaint of Right  Ankle pain s/p Right ankle fracture  now had Right ankle Arthroplasty (2021 09:22). Consulted by Dr. Knight  for VTE prophylaxis, risk stratification, and anticoagulation management.    PAST MEDICAL & SURGICAL HISTORY:  Osteoarthritis    Seasonal allergies    Anxiety and depression    Hypertension  Have not taken medications for about 2 years    Asthma  Has not been admitted for have any asthma exacerbation for 10 years    Diverticulosis    H/O sinusitis    Lumbar herniated disc    Female incontinence    H/O spinal stenosis    COVID-19 vaccine series completed  Pfizer--2nd vaccine 2021    Panic attacks    History of heart attack  Hospitalized 2020 after left ankle fracture, surgery, states developed pneumonia, ??mild kidney failure, was told had mild heart attack, recovered , no intervention    S/P cholecystectomy      S/P tubal ligation      S/P hip replacement  left---  right--    History of ankle surgery  right ankle 2/2020 x2 with hardware , harware removed 2021  - left ankle fusion 10/2020    History of cardiac cath  2020--normal coronary arteries    Interval History  2022: Patient seen on PACU , sleep through the conversation, denies any h/o VTE,Stroke, cancer, or bleeding, Discussed the necessity of Lovenox while NWB and the risks and benefits with patient. Verbalized understanding and is in agreement with treatment plan.        CrCl:82.8  BMI:34.2  EBL:20ml    Caprini VTE Risk Score:CAPRINI SCORE  AGE RELATED RISK FACTORS                                                       MOBILITY RELATED FACTORS  [x ] Age 41-60 years                                            (1 Point)                  [x ] Bed rest /restricted mobility                             (1 Point)  [ ] Age: 61-74 years                                           (2 Points)                [ ] Plaster cast                                                   (2 Points)  [ ] Age= 75 years                                              (3 Points)                 [ ] Bed bound for more than 72 hours                   (2 Points)    DISEASE RELATED RISK FACTORS                                               GENDER SPECIFIC FACTORS  [ ] Edema in the lower extremities                       (1 Point)           [ ] Pregnancy                                                            (1 Point)  [ ] Varicose veins                                               (1 Point)                  [ ] Post-partum < 6 weeks                                      (1 Point)             [x ] BMI > 25 Kg/m2                                            (1 Point)                  [ ] Hormonal therapy or oral contraception       (1 Point)                 [ ] Sepsis (in the previous month)                        (1 Point)             [ ] History of pregnancy complications                (1Point)  [ ] Pneumonia or serious lung disease                                             [ ] Unexplained or recurrent  (=/>3), premature                                 (In the previous month)                               (1 Point)                birth with toxemia or growth-restricted infant (1 Point)  [ ] Abnormal pulmonary function test            (1 Point)                                   SURGERY RELATED RISK FACTORS  [ ] Acute myocardial infarction                       (1 Point)                  [ ]  Section                                         (1 Point)  [ ] Congestive heart failure (in the previous month) (1 Point)   [ ] Minor surgery   lasting <45 minutes       (1 Point)   [ ] Inflammatory bowel disease                             (1 Point)          [ ] Arthroscopic surgery                                  (2 Points)  [ ] Central venous access                                    (2 Points)            [ ] General surgery lasting >45 minutes      (2 Points)       [ ] Stroke (in the previous month)                  (5 Points)            [x ] Elective major lower extremity arthroplasty (5 Points)                                   [  ] Malignancy (present or past include skin melanoma                                          but exclude  basal skin cell)    (2 points)                                      TRAUMA RELATED RISK FACTORS                HEMATOLOGY RELATED FACTORS                                  [ ] Fracture of the hip, pelvis, or leg                       (5 Points)  [ ] Prior episodes of VTE                                     (3 Points)          [ ] Acute spinal cord injury (in the previous month)  (5 Points)  [ ] Positive family history for VTE                         (3 Points)       [ ] Paralysis (less than 1 month)                          (5 Points)  [ ] Prothrombin 47174 A                                      (3 Points)         [ ] Multiple Trauma (within 1month)                 (5Points)                                                                                                                                                                [ ] Factor V Leiden                                          (3 Points)                                OTHER RISK FACTORS                          [ ] Lupus anticoagulants                                     (3 Points)                       [ ] BMI > 40                          (1 Point)                                                         [ ] Anticardiolipin antibodies                                (3 Points)                   [ ] Smoking                              (1Point)                                                [ ] High homocysteine in the blood                      (3 Points)                [  ] Diabetes requiring insulin (1point)                         [ ] Other congenital or acquired thrombophilia       (3 Points)          [  ] Chemotherapy                   (1 Point)  [ ] Heparin induced thrombocytopenia                  (3 Points)             [  ] Blood Transfusion                (1 point)                                                                                                             Total Score [    8      ]                                                                                                                                                                                                                                     IMPROVE Bleeding Risk Score::2.5      Time In: 9:19  Time Out: 12:47    Falls Risk:   High ( x )  Mod (  )  Low (  )      FAMILY HISTORY:  Family history of CVA (Father)  , age 88    Family history of COPD (chronic obstructive pulmonary disease) (Father)  and mother,  age 87    FH: heart disease (Father, Mother)  Brother,  , age 65    FH: lung cancer  sister, living, age 65      Denies any personal or familial history of clotting or bleeding disorders.    Allergies    No Known Drug Allergies  Seasonal allergies (Rhinorrhea)    Intolerances        REVIEW OF SYSTEMS    (  )Fever	     (  )Constipation	(  )SOB				(  )Headache	(  )Dysuria  (  )Chills	     (  )Melena	(  )Dyspnea present on exertion	                    (  )Dizziness                    (  )Polyuria  (  )Nausea	     (  )Hematochezia	(  )Cough			                    (  )Syncope   	(  )Hematuria  (  )Vomiting    (  )Chest Pain	(  )Wheezing			(  )Weakness  (  )Diarrhea     (  )Palpitations	(  )Anorexia			( x )joint pain    All  other review of systems negative: Yes    Vital Signs Last 24 Hrs  T(C): 36.3 (2021 07:14), Max: 36.3 (2021 07:14)  T(F): 97.4 (2021 07:14), Max: 97.4 (2021 07:14)  HR: 92 (2021 14:45) (91 - 130)  BP: 118/81 (2021 14:45) (103/75 - 138/93)  BP(mean): --  RR: 17 (2021 14:30) (13 - 24)  SpO2: 97% (2021 14:45) (97% - 100%)    PHYSICAL EXAM:    Constitutional: Appears Well    Neurological: A& O x 3    Skin: Warm    Respiratory and Thorax: normal effort; Breath sounds: normal; No rales/wheezing/rhonchi  	  Cardiovascular: S1, S2, regular, NMBR	    Gastrointestinal: BS + x 4Q, nontender	    Genitourinary:  Bladder nondistended, nontender    Musculoskeletal:   General Right:   + muscle/joint tenderness,   normal tone, no joint swelling,   ROM: limited	    General Left:   no muscle/joint tenderness,   normal tone, no joint swelling,   ROM: full    Ankle:  Rt: Drsing CDI;  Cap refill good;  Sensation intact                       Lower extrems:   Right: no calf tenderness              negative melba's sign               + pedal pulses    Left:   no calf tenderness              negative melba's sign               + pedal pulses                          12.3   14.39 )-----------( 332      ( 2021 13:18 )             38.0       06-01    139  |  108  |  13  ----------------------------<  159<H>  4.4   |  26  |  1.11    Ca    9.1      2021 13:19        				    MEDICATIONS  (STANDING):  lactated ringers. 1000 milliLiter(s) IV Continuous <Continuous>          DVT Prophylaxis:  LMWH                   ( x )  Heparin SQ           (  )  Coumadin             (  )  Xarelto                  (  )  Eliquis                   (  )  Venodynes           (  x)  Ambulation          ( x )  UFH                       (  )  Contraindicated  (  )  EC ASPIRIN       (  )               HPI:      Patient is a 61y old  Female who presents with a chief complaint of left foot pain now s/p revision of the left hindfoot fusion and left tibia IMN.  Consulted by Dr. Knight  for VTE prophylaxis, risk stratification, and anticoagulation management.    PAST MEDICAL & SURGICAL HISTORY:  Osteoarthritis    Seasonal allergies    Anxiety and depression    Hypertension  Have not taken medications for about 2 years    Asthma  Has not been admitted for have any asthma exacerbation for 10 years    Diverticulosis    H/O sinusitis    Lumbar herniated disc    Female incontinence    H/O spinal stenosis    COVID-19 vaccine series completed  [employer]--2nd vaccine 2021    Panic attacks    History of heart attack  Hospitalized 2020 after left ankle fracture, surgery, states developed pneumonia, ??mild kidney failure, was told had mild heart attack, recovered , no intervention    S/P cholecystectomy      S/P tubal ligation      S/P hip replacement  left---  right--    History of ankle surgery  right ankle 2/2020 x2 with hardware , harware removed 2021  - left ankle fusion 10/2020    History of cardiac cath  2020--normal coronary arteries    Interval History  2022: Patient seen on PACU , discussed the anticoagulation with lovenox,  denies any h/o VTE,Stroke, cancer, or bleeding, patient stated she is familiar with the lovenox which she had with last ankle surgery. Discussed the necessity of Lovenox while NWB and the risks and benefits with patient. Verbalized understanding and is in agreement with treatment plan.        CrCl:64.8  BMI:28.4  EBL:100ml    Caprini VTE Risk Score:CAPRINI SCORE  AGE RELATED RISK FACTORS                                                       MOBILITY RELATED FACTORS  [x ] Age 41-60 years                                            (1 Point)                  [x ] Bed rest /restricted mobility                             (1 Point)  [ ] Age: 61-74 years                                           (2 Points)                [ ] Plaster cast                                                   (2 Points)  [ ] Age= 75 years                                              (3 Points)                 [ ] Bed bound for more than 72 hours                   (2 Points)    DISEASE RELATED RISK FACTORS                                               GENDER SPECIFIC FACTORS  [ ] Edema in the lower extremities                       (1 Point)           [ ] Pregnancy                                                            (1 Point)  [ ] Varicose veins                                               (1 Point)                  [ ] Post-partum < 6 weeks                                      (1 Point)             [x ] BMI > 25 Kg/m2                                            (1 Point)                  [ ] Hormonal therapy or oral contraception       (1 Point)                 [ ] Sepsis (in the previous month)                        (1 Point)             [ ] History of pregnancy complications                (1Point)  [ ] Pneumonia or serious lung disease                                             [ ] Unexplained or recurrent  (=/>3), premature                                 (In the previous month)                               (1 Point)                birth with toxemia or growth-restricted infant (1 Point)  [ ] Abnormal pulmonary function test            (1 Point)                                   SURGERY RELATED RISK FACTORS  [ ] Acute myocardial infarction                       (1 Point)                  [ ]  Section                                         (1 Point)  [ ] Congestive heart failure (in the previous month) (1 Point)   [ ] Minor surgery   lasting <45 minutes       (1 Point)   [ ] Inflammatory bowel disease                             (1 Point)          [ ] Arthroscopic surgery                                  (2 Points)  [ ] Central venous access                                    (2 Points)            [ ] General surgery lasting >45 minutes      (2 Points)       [ ] Stroke (in the previous month)                  (5 Points)            [x ] Elective major lower extremity arthroplasty (5 Points)                                   [  ] Malignancy (present or past include skin melanoma                                          but exclude  basal skin cell)    (2 points)                                      TRAUMA RELATED RISK FACTORS                HEMATOLOGY RELATED FACTORS                                  [ ] Fracture of the hip, pelvis, or leg                       (5 Points)  [ ] Prior episodes of VTE                                     (3 Points)          [ ] Acute spinal cord injury (in the previous month)  (5 Points)  [ ] Positive family history for VTE                         (3 Points)       [ ] Paralysis (less than 1 month)                          (5 Points)  [ ] Prothrombin 83982 A                                      (3 Points)         [ ] Multiple Trauma (within 1month)                 (5Points)                                                                                                                                                                [ ] Factor V Leiden                                          (3 Points)                                OTHER RISK FACTORS                          [ ] Lupus anticoagulants                                     (3 Points)                       [ ] BMI > 40                          (1 Point)                                                         [ ] Anticardiolipin antibodies                                (3 Points)                   [ ] Smoking                              (1Point)                                                [ ] High homocysteine in the blood                      (3 Points)                [  ] Diabetes requiring insulin (1point)                         [ ] Other congenital or acquired thrombophilia       (3 Points)          [  ] Chemotherapy                   (1 Point)  [ ] Heparin induced thrombocytopenia                  (3 Points)             [  ] Blood Transfusion                (1 point)                                                                                                             Total Score [    8      ]                                                                                                                                                                                                                                     IMPROVE Bleeding Risk Score::2.5      Time In: 12:48  Time Out: 13:06    Falls Risk:   High ( x )  Mod (  )  Low (  )      FAMILY HISTORY:  Family history of CVA (Father)  , age 88    Family history of COPD (chronic obstructive pulmonary disease) (Father)  and mother,  age 87    FH: heart disease (Father, Mother)  Brother,  , age 65    FH: lung cancer  sister, living, age 65      Denies any personal or familial history of clotting or bleeding disorders.    Allergies    No Known Drug Allergies  Seasonal allergies (Rhinorrhea)    Intolerances        REVIEW OF SYSTEMS    (  )Fever	     (  )Constipation	(  )SOB				(  )Headache	(  )Dysuria  (  )Chills	     (  )Melena	(  )Dyspnea present on exertion	                    (  )Dizziness                    (  )Polyuria  (  )Nausea	     (  )Hematochezia	(  )Cough			                    (  )Syncope   	(  )Hematuria  (  )Vomiting    (  )Chest Pain	(  )Wheezing			(  )Weakness  (  )Diarrhea     (  )Palpitations	(  )Anorexia			( x )joint pain    All  other review of systems negative: Yes    Vital Signs Last 24 Hrs  T(C): 36.2 (2022 13:03), Max: 36.6 (2022 06:14)  T(F): 97.2 (2022 13:03), Max: 97.8 (2022 06:14)  HR: 95 (2022 15:15) (72 - 116)  BP: 94/51 (2022 15:15) (75/51 - 125/86)  BP(mean): --  RR: 13 (2022 15:15) (12 - 19)  SpO2: 95% (2022 15:15) (95% - 100%)  PHYSICAL EXAM:    Constitutional: Appears Well    Neurological: A& O x 3    Skin: Warm    Respiratory and Thorax: normal effort; Breath sounds: normal; No rales/wheezing/rhonchi  	  Cardiovascular: S1, S2, regular, NMBR	    Gastrointestinal: BS + x 4Q, nontender	    Genitourinary:  Bladder nondistended, nontender    Musculoskeletal:   General Right:   + muscle/joint tenderness,   normal tone, no joint swelling,   ROM: limited	    General Left:   no muscle/joint tenderness,   normal tone, no joint swelling,   ROM: full    Foot:  Lt: Dressing CDI;  Cap refill good;  Sensation intact                       Lower extrems:   Right: no calf tenderness              negative melba's sign               + pedal pulses    Left:   N/A                                        9.4    10.66 )-----------( 210      ( 2022 13:56 )             29.8       02-08    143  |  108  |  17  ----------------------------<  200<H>  4.6   |  29  |  1.17    Ca    8.2<L>      2022 13:56            MEDICATIONS  (STANDING):  ceFAZolin   IVPB 2000 milliGRAM(s) IV Intermittent every 8 hours  enoxaparin Injectable 40 milliGRAM(s) SubCutaneous daily  lactated ringers. 1000 milliLiter(s) (75 mL/Hr) IV Continuous <Continuous>        DVT Prophylaxis:  LMWH                   ( x )  Heparin SQ           (  )  Coumadin             (  )  Xarelto                  (  )  Eliquis                   (  )  Venodynes           (  x)  Ambulation          ( x )  UFH                       (  )  Contraindicated  (  )  EC ASPIRIN       (  )

## 2022-02-08 NOTE — ASU DISCHARGE PLAN (ADULT/PEDIATRIC) - ASU DC SPECIAL INSTRUCTIONSFT
Please see Dr. Knight's discharge instruction packet for full information/instructions.     Post-Operative Instructions    PRESCRIPTIONS: your post-operative medications have sent to the pharmacy you indicated at your pre-operative visit.  If you have any difficulty obtaining your post-operative medications or have any questions, please call the office at [phone].      PAIN MANAGEMENT: You should expect to have discomfort for the first week or so after surgery. Pain medication should be taken to help alleviate the pain so that you are comfortable and can participate in physical therapy.  Take the medication as directed.  You may decrease the amount of pain medication, as tolerated, when pain improves.  You must exercise caution when operating a motor vehicle. You have been prescribed one or more of the following as indicated on your Med Rec form thta the Nurse will go over with you:  [X] Oxycodone 5mg 1-2 tablets by mouth every 4 to 6 hours as needed for pain  Oxycodone is a short-acting pain medication routinely prescribed after surgery.  It is the pain medication found in “Percocet,” which is a combination of oxycodone and Tylenol.  If you were prescribed oxycodone, you may take Tylenol in addition to this medication, if needed for pain control.    NAUSEA: Nausea is a common side effect of anesthesia and pain medications.  You may take the below medication if you are experiencing nausea after surgery.  If you continue to experience nausea or vomiting more than 24 hours after surgery, please call the office.  [ X] Ondansetron 4mg by mouth every 4 hours as needed for nausea    CONSTIPATION: common side effect of anesthesia and pain medications.  You should take Colace three times daily, as long as you are taking narcotic pain medications after surgery, such as oxycodone, oxycontin, vicodin, or norco.  [X] Colace 100mg by mouth three times daily    DVT PROPHYLAXIS (PREVENTION OF BLOOD CLOTS): Please take anticoagulation as prescribed by the anticoagulation team. This is important to take as instructed to prevent blood clots. Prescription has been sent to your pharmacy.     ACTIVITY: You should be up and moving as much and as often as possible! Do NOT walk or put bodyweight on your splint or surgical side. Use Crutches or walker. You must keep your bandage/splint dry. Do NOT get it wet. IF you shower it MUST be covered tightly with a garbage bag. Otherwise sponge bath is advised. You do NOT want wet bandages on your skin as they can cause skin breakdown under the splint.    BANDAGE: Your bandage will be changed in the office. Do NOT remove it yourself. IF it gets damaged or wet (soaked) call. Follow up about 7-10 days in office or as otherwise advised by Dr. Knight if different.

## 2022-02-09 NOTE — PHYSICAL THERAPY INITIAL EVALUATION ADULT - ADDITIONAL COMMENTS
Pt states she has had a R TAA 6 months ago and can not hop on that ankle yet. Pt states she has had 6 surgeries total between the 2 ankles. Pt states a WC is hard to get around in her home.

## 2022-02-09 NOTE — PROGRESS NOTE ADULT - SUBJECTIVE AND OBJECTIVE BOX
HPI: Patient is a 61y old  Female who presents with a chief complaint of left foot pain now s/p revision of the left hindfoot fusion and left tibia IMN.  Consulted by Dr. Knight  for VTE prophylaxis, risk stratification, and anticoagulation management.    Pt now s/p Complex removal of hardware from extremity, Removal of deeply implanted Lt lower extremity hardware, Revision of left subtalar arthrodesis, Arthrodesis, ankle, left, Aspiration and injection, bone marrow,  Harvesting of bone marrow Lt tibia, and injection in the fusion site,   Osteotomy, fibula , Left distal fibular osteotomy and use of fibular autograft. and Intramedullary nailing of left tibia on 2022 by Dr. Knight    PAST MEDICAL & SURGICAL HISTORY:  Osteoarthritis    Seasonal allergies    Anxiety and depression    Hypertension  Have not taken medications for about 2 years    Asthma  Has not been admitted for have any asthma exacerbation for 10 years    Diverticulosis    H/O sinusitis    Lumbar herniated disc    Female incontinence    H/O spinal stenosis    COVID-19 vaccine series completed  [employer]--2nd vaccine 2021    Panic attacks    History of heart attack  Hospitalized 2020 after left ankle fracture, surgery, states developed pneumonia, ??mild kidney failure, was told had mild heart attack, recovered , no intervention    S/P cholecystectomy      S/P tubal ligation      S/P hip replacement  left---  right--    History of ankle surgery  right ankle 2/2020 x2 with hardware , harware removed 2021  - left ankle fusion 10/2020    History of cardiac cath  2020--normal coronary arteries    Interval History  2022: Patient seen on PACU , discussed the anticoagulation with Lovenox  denies any h/o VTE,Stroke, cancer, or bleeding, patient stated she is familiar with the Lovenox which she had with last ankle surgery. Discussed the necessity of Lovenox while NWB and the risks and benefits with patient. Verbalized understanding and is in agreement with treatment plan.  2022 Pt seen at bedside on [facility].  Discussed with pt the use of Lovenox for her anticoagulation prophylaxis. She states she will be nwb for 6 weeks and she wants to go to rehab.         CrCl:64.8  BMI:28.4  EBL:100ml    Caprini VTE Risk Score:  AGE RELATED RISK FACTORS                                                       MOBILITY RELATED FACTORS  [x ] Age 41-60 years                                            (1 Point)                  [x ] Bed rest /restricted mobility                             (1 Point)  [ ] Age: 61-74 years                                           (2 Points)                [ ] Plaster cast                                                   (2 Points)  [ ] Age= 75 years                                              (3 Points)                 [ ] Bed bound for more than 72 hours                   (2 Points)    DISEASE RELATED RISK FACTORS                                               GENDER SPECIFIC FACTORS  [ ] Edema in the lower extremities                       (1 Point)           [ ] Pregnancy                                                            (1 Point)  [ ] Varicose veins                                               (1 Point)                  [ ] Post-partum < 6 weeks                                      (1 Point)             [x ] BMI > 25 Kg/m2                                            (1 Point)                  [ ] Hormonal therapy or oral contraception       (1 Point)                 [ ] Sepsis (in the previous month)                        (1 Point)             [ ] History of pregnancy complications                (1Point)  [ ] Pneumonia or serious lung disease                                             [ ] Unexplained or recurrent  (=/>3), premature                                 (In the previous month)                               (1 Point)                birth with toxemia or growth-restricted infant (1 Point)  [ ] Abnormal pulmonary function test            (1 Point)                                   SURGERY RELATED RISK FACTORS  [ ] Acute myocardial infarction                       (1 Point)                  [ ]  Section                                         (1 Point)  [ ] Congestive heart failure (in the previous month) (1 Point)   [ ] Minor surgery   lasting <45 minutes       (1 Point)   [ ] Inflammatory bowel disease                             (1 Point)          [ ] Arthroscopic surgery                                  (2 Points)  [ ] Central venous access                                    (2 Points)            [ ] General surgery lasting >45 minutes      (2 Points)       [ ] Stroke (in the previous month)                  (5 Points)            [x ] Elective major lower extremity arthroplasty (5 Points)                                   [  ] Malignancy (present or past include skin melanoma                                          but exclude  basal skin cell)    (2 points)                                      TRAUMA RELATED RISK FACTORS                HEMATOLOGY RELATED FACTORS                                  [ ] Fracture of the hip, pelvis, or leg                       (5 Points)  [ ] Prior episodes of VTE                                     (3 Points)          [ ] Acute spinal cord injury (in the previous month)  (5 Points)  [ ] Positive family history for VTE                         (3 Points)       [ ] Paralysis (less than 1 month)                          (5 Points)  [ ] Prothrombin 98944 A                                      (3 Points)         [ ] Multiple Trauma (within 1month)                 (5Points)                                                                                                                                                                [ ] Factor V Leiden                                          (3 Points)                                OTHER RISK FACTORS                          [ ] Lupus anticoagulants                                     (3 Points)                       [ ] BMI > 40                          (1 Point)                                                         [ ] Anticardiolipin antibodies                                (3 Points)                   [ ] Smoking                              (1Point)                                                [ ] High homocysteine in the blood                      (3 Points)                [  ] Diabetes requiring insulin (1point)                         [ ] Other congenital or acquired thrombophilia       (3 Points)          [  ] Chemotherapy                   (1 Point)  [ ] Heparin induced thrombocytopenia                  (3 Points)             [  ] Blood Transfusion                (1 point)                                                                                                             Total Score [    8      ]                                                                                                                                                                                                                                     IMPROVE Bleeding Risk Score::2.5      Time In: 12:48  Time Out: 13:06    Falls Risk:   High ( x )  Mod (  )  Low (  )      FAMILY HISTORY:  Family history of CVA (Father)  , age 88    Family history of COPD (chronic obstructive pulmonary disease) (Father)  and mother,  age 87    FH: heart disease (Father, Mother)  Brother,  , age 65    FH: lung cancer  sister, living, age 65      Denies any personal or familial history of clotting or bleeding disorders.    Allergies    No Known Drug Allergies  Seasonal allergies (Rhinorrhea)    Intolerances        REVIEW OF SYSTEMS    (  )Fever	     (  )Constipation	(  )SOB				(  )Headache	(  )Dysuria  (  )Chills	     (  )Melena	(  )Dyspnea present on exertion	                    (  )Dizziness                    (  )Polyuria  (  )Nausea	     (  )Hematochezia	(  )Cough			                    (  )Syncope   	(  )Hematuria  (  )Vomiting    (  )Chest Pain	(  )Wheezing			(  )Weakness  (  )Diarrhea     (  )Palpitations	(  )Anorexia			( x )joint pain    All  other review of systems negative: Yes    Vital Signs Last 24 Hrs  T(C): 36.2 (2022 13:03), Max: 36.6 (2022 06:14)  T(F): 97.2 (2022 13:03), Max: 97.8 (2022 06:14)  HR: 95 (2022 15:15) (72 - 116)  BP: 94/51 (2022 15:15) (75/51 - 125/86)  BP(mean): --  RR: 13 (2022 15:15) (12 - 19)  SpO2: 95% (2022 15:15) (95% - 100%)  PHYSICAL EXAM:    Constitutional: Appears Well    Neurological: A& O x 3    Skin: Warm    Respiratory and Thorax: normal effort; Breath sounds: normal; No rales/wheezing/rhonchi  	  Cardiovascular: S1, S2, regular, NMBR	    Gastrointestinal: BS + x 4Q, nontender	    Genitourinary:  Bladder nondistended, nontender    Musculoskeletal:   General Right:   + muscle/joint tenderness,   normal tone, no joint swelling,   ROM: limited	    General Left:   no muscle/joint tenderness,   normal tone, no joint swelling,   ROM: full    Foot:  Lt: Dressing CDI;  Cap refill good;  Sensation intact                       Lower extrems:   Right: no calf tenderness              negative melba's sign               + pedal pulses    Left:   N/A                                                7.1    6.56  )-----------( 160      ( 2022 08:29 )             22.2       02-09    142  |  108  |  13  ----------------------------<  111<H>  3.7   |  31  |  0.78    Ca    8.2<L>      2022 08:29                      9.4    10.66 )-----------( 210      ( 2022 13:56 )             29.8       02-08    143  |  108  |  17  ----------------------------<  200<H>  4.6   |  29  |  1.17    Ca    8.2<L>      2022 13:56      MEDICATIONS  (STANDING):  acetaminophen     Tablet .. 650 milliGRAM(s) Oral every 6 hours  buPROPion XL (24-Hour) . 150 milliGRAM(s) Oral daily  enoxaparin Injectable 40 milliGRAM(s) SubCutaneous daily  fluticasone propionate (50 MICROgram(s)/actuation) Nasal Spray - Peds 1 Spray(s) Alternating Nostrils daily  gabapentin 300 milliGRAM(s) Oral three times a day  lactated ringers. 1000 milliLiter(s) IV Continuous <Continuous>  lamoTRIgine  Oral Tab/Cap - Peds 150 milliGRAM(s) Oral daily  melatonin 10 milliGRAM(s) Oral at bedtime  multivitamin 1 Tablet(s) Oral daily  PARoxetine 30 milliGRAM(s) Oral daily  QUEtiapine 100 milliGRAM(s) Oral at bedtime          DVT Prophylaxis:  LMWH                   ( x )  Heparin SQ           (  )  Coumadin             (  )  Xarelto                  (  )  Eliquis                   (  )  Venodynes           (  x)  Ambulation          ( x )  UFH                       (  )  Contraindicated  (  )  EC ASPIRIN       (  )

## 2022-02-09 NOTE — PHYSICAL THERAPY INITIAL EVALUATION ADULT - GENERAL OBSERVATIONS, REHAB EVAL
Pt seen on 2N, anxious for session and c/o pain L LE that increased from 8/10 to 10/10 when in dependent position for transfers, pt crying. L ankle trilam splint ace wrapped c/d/i, pt able to gently wiggle toes but it increased pt to the point pt's crying.

## 2022-02-09 NOTE — PHYSICAL THERAPY INITIAL EVALUATION ADULT - MODALITIES TREATMENT COMMENTS
Pt OOB in chair, B LE elevated on chair w/ 2 pillows. Pt still c/o pain, DBE's performed and ice L knee+ CBIR, RN Mayra in room after session consoling pt. +alarm

## 2022-02-09 NOTE — PHYSICAL THERAPY INITIAL EVALUATION ADULT - PERTINENT HX OF CURRENT PROBLEM, REHAB EVAL
Pt is a 62 y/o F, presents with a chief complaint of left foot pain now s/p revision of the left hindfoot fusion and left tibia IMN.

## 2022-02-09 NOTE — PROGRESS NOTE ADULT - SUBJECTIVE AND OBJECTIVE BOX
Orthopedics     POD 1 Revision Hindfoot Fusion  Pt seen and examined at the bedside. Pain is moderately controlled at this time. Change to pain control overnight, doing better at this time.     Vital Signs Last 24 Hrs  T(C): 36.8 (09 Feb 2022 00:06), Max: 37.3 (08 Feb 2022 16:51)  T(F): 98.2 (09 Feb 2022 00:06), Max: 99.1 (08 Feb 2022 16:51)  HR: 78 (09 Feb 2022 00:06) (72 - 116)  BP: 95/51 (09 Feb 2022 00:06) (75/51 - 125/86)  BP(mean): --  RR: 17 (09 Feb 2022 00:06) (12 - 20)  SpO2: 97% (09 Feb 2022 00:06) (95% - 100%)    Exam:  GEN: NAD, awake and alert, cooperative.   LLE  Trilam splint in place  Moving all toes  Sensation intact to toes which can be assessed with splint  Toes warm and well perfused  Calf soft/nontender, compartments soft and compressible.       A/P:   61yFemale POD 1 Revision Hindfoot Fusion    -Pain control prn  -DVT/PE ppx per AC recs  -NWB LLE in trilam splint  -PT/OOB as tolerated   -Med Mgmt, continue home meds.  - FU am Labs  - FU PT eval this am  -Plan for discharge to home this morning

## 2022-02-09 NOTE — PROVIDER CONTACT NOTE (OTHER) - ASSESSMENT
patient alert and oriented, no signs and symptoms distress. patient complaining of pain  BP 93/55  HR 82

## 2022-02-09 NOTE — PATIENT PROFILE ADULT - NSPROIMPLANTSMEDDEV_GEN_A_NUR
Enoxaparin/Lovenox is used to treat and prevent blood clots. Use a different body area each time you give yourself a shot. Keep track of where you give each shot to make sure you rotate body areas. Use a new needle and syringe each time you inject your medicine. Never skip a dose of Enoxaparin/Lovenox. You must use this medicine on a fixed schedule.  NEVER TAKE A DOUBLE DOSE. Call your doctor or pharmacist if you miss a dose. Take Enoxaparin/Lovenox at the same time each morning and/or evening. None

## 2022-02-09 NOTE — PHYSICAL THERAPY INITIAL EVALUATION ADULT - WEIGHT-BEARING RESTRICTIONS: SIT/STAND, REHAB EVAL
pt c/o pain in the dependent position increased to the point of crying, pt medicated w/ all she can have at this time as per COREY Nunez./nonweight-bearing

## 2022-02-09 NOTE — PROGRESS NOTE ADULT - SUBJECTIVE AND OBJECTIVE BOX
Orthopedics    POD 1 s/p Left ankle fusion  Feeling well. Pain not well controlled. SHe was ordered toradol No n/v. SHe is a little lightheaded and will get 1U PRBCs. She signed consent.    Vital Signs Last 24 Hrs  T(C): 37.1 (02-09-22 @ 09:24), Max: 37.3 (02-08-22 @ 16:51)  T(F): 98.8 (02-09-22 @ 09:24), Max: 99.1 (02-08-22 @ 16:51)  HR: 80 (02-09-22 @ 09:24) (78 - 116)  BP: 96/49 (02-09-22 @ 09:24) (75/51 - 119/58)  BP(mean): --  RR: 16 (02-09-22 @ 09:24) (12 - 20)  SpO2: 97% (02-09-22 @ 09:24) (95% - 100%)                        7.3    x     )-----------( x        ( 09 Feb 2022 10:27 )             22.9     09 Feb 2022 08:29    142    |  108    |  13     ----------------------------<  111    3.7     |  31     |  0.78     Ca    8.2        09 Feb 2022 08:29          Exam:  NAD AAOx3  Dressing dry and intact  Able to wiggle digits  SILT to digits  +EHL FHL TA GS  Splint intact

## 2022-02-09 NOTE — PATIENT PROFILE ADULT - FALL HARM RISK - RISK INTERVENTIONS
Assistance OOB with selected safe patient handling equipment/Assistance with ambulation/Communicate Fall Risk and Risk Factors to all staff, patient, and family/Discuss with provider need for PT consult/Monitor gait and stability/Provide patient with walking aids - walker, cane, crutches/Reinforce activity limits and safety measures with patient and family/Sit up slowly, dangle for a short time, stand at bedside before walking/Use of alarms - bed, chair and/or voice tab/Visual Cue: Yellow wristband/Bed in lowest position, wheels locked, appropriate side rails in place/Call bell, personal items and telephone in reach/Instruct patient to call for assistance before getting out of bed or chair/Non-slip footwear when patient is out of bed/[place] to call system/Physically safe environment - no spills, clutter or unnecessary equipment/Purposeful Proactive Rounding/Room/bathroom lighting operational, light cord in reach

## 2022-02-10 NOTE — PROVIDER CONTACT NOTE (OTHER) - SITUATION
Pt blood pressure 93/49, . Pt baseline blood pressures are low. Pt complaining of pain and wants 4mg PO Dilaudid
patient status post left ankle removal of hardware, hindfoot fusion revision complaining of pain
patient SBP <100 overnight. patient complaining of pain

## 2022-02-10 NOTE — DISCHARGE NOTE PROVIDER - NSDCMRMEDTOKEN_GEN_ALL_CORE_FT
Breo Ellipta:   Colace 100 mg oral capsule: 1 cap(s) orally 3 times a day, As Needed MDD:3  Ducodyl 5 mg oral delayed release tablet: 1 tab(s) orally once a day, As Needed  enoxaparin: 40 milligram(s) subcutaneous once a day  until pt is weight bearing on left le  Flonase 50 mcg/inh nasal spray: 1 spray(s) nasal once a day  gabapentin 300 mg oral capsule: 1 cap(s) orally 3 times a day  lamoTRIgine 150 mg oral tablet: 1  orally once a day  Melatonin: 20 milligram(s) orally once a day (at bedtime)  metaxalone 800 mg oral tablet: orally 2 times a day  Multiple Vitamins oral tablet: 1 tab(s) orally once a day  oxyCODONE 5 mg oral tablet: 1 tab(s) orally every 6 hours MDD:4  pantoprazole 40 mg oral delayed release tablet: 1 tab(s) orally once a day   PARoxetine mesylate: 60 milligram(s) orally once a day  ProAir HFA 90 mcg/inh inhalation aerosol: 2 puff(s) inhaled every 6 hours, As Needed   Protonix 40 mg oral delayed release tablet: 1 tab(s) orally once a day MDD:1  SEROquel 100 mg oral tablet: 1 tab(s) orally once a day (at bedtime)  Vitamin B-100 oral tablet: 1 tab(s) orally once a day  Wellbutrin  mg/24 hours oral tablet, extended release: 1 tab(s) orally every 24 hours  Zofran 4 mg oral tablet: 1 tab(s) orally every 6 hours MDD:4

## 2022-02-10 NOTE — DISCHARGE NOTE PROVIDER - CARE PROVIDER_API CALL
Mohamud Knight (DO)  Orthopaedic Surgery  [address] [place] [address]  Phone: [phone]  Fax: [phone]  Follow Up Time:

## 2022-02-10 NOTE — DISCHARGE NOTE PROVIDER - NSDCFUADDINST_GEN_ALL_CORE_FT
ORIF DC Instructions:    1. Pain Rx sent to pharmacy electronically  2. Non-Weight Bearing  Left Lower Extremity, with assistive device/rolling walker  3. Continue DVT/PE Prophylaxis as recommended by the Anticoagulation Team. See Med Rec.   4. Out of Bed Daily, try to keep moving.  5. Follow up with Orthopedic Surgeon, Dr. Knight, in 14 Days. Call Office For Appointment. Repeat x-rays in office.  6. MD will need to Remove staples/sutures in office POD14.  7. Elevate and ICE the extremity as much as possible  8. Keep bandage/Splint Clean and dry. Do not get it wet. Do not walk or put any body weight on splint because it will break.

## 2022-02-10 NOTE — DISCHARGE NOTE PROVIDER - NSDCCPTREATMENT_GEN_ALL_CORE_FT
PRINCIPAL PROCEDURE  Procedure: Revision of left subtalar arthrodesis  Findings and Treatment:       SECONDARY PROCEDURE  Procedure: Complex removal of hardware from extremity  Findings and Treatment: Removal of deeply implanted Lt lower extremity hardware

## 2022-02-10 NOTE — PROGRESS NOTE ADULT - ASSESSMENT
A/P:  POD 1 s/p Left ankle fusion  -Admit for analgesia, PT (poor ambulation so far) and Rehab placement  -Analgesia, Toradol, Dilaudid PO moderate, and IV for severe breakthrough  -NWB  -Acute post op blood loss anemia in the setting of chronic anemia: 1U PRBCs ordered   -DVT PE ppx  -OOB PT Rolling walker or crutches  -Elevation to extremity  -Keep Splint dry  -Above as discussed/directed by Dr Knight
Patient is a 61y old  Female who presents with a chief complaint of left foot pain now s/p revision of the left hindfoot fusion and left tibia IMN.  Consulted by Dr. Knight  for VTE prophylaxis, risk stratification, and anticoagulation management. Patient is high risk for VTE due to surgery, immobility, and BMI     Plan  :Lovenox 40mg sq daily until full weight bearing   :daily cbc/bmp noted decrease in h/h to 7.1/22.2  :LE Venodynes  : increase mobility as per ortho  : will f/u  Dispo:rehab  
Patient is a 61y old  Female who presents with a chief complaint of left foot pain now s/p revision of the left hindfoot fusion and left tibia IMN.  Consulted by Dr. Knight  for VTE prophylaxis, risk stratification, and anticoagulation management. Patient is high risk for VTE due to surgery, immobility, and BMI     2/10: Hgb 7.1 yesterday now improved s/p one unit transfusion, 8.4.     Plan  ::Lovenox 40mg sq daily until full weight bearing   ::RLE Venodyne  ::NWB LLE  ::Amb per PT    Dispo: Rehab

## 2022-02-10 NOTE — PROGRESS NOTE ADULT - SUBJECTIVE AND OBJECTIVE BOX
Orthopedics     POD 2 Revision Hindfoot Fusion  Pt seen and examined at the bedside. Patient resting comfortably. Pain is moderately controlled at this time, patient still endorsing significant pain.     Vital Signs Last 24 Hrs  T(C): 36.8 (09 Feb 2022 00:06), Max: 37.3 (08 Feb 2022 16:51)  T(F): 98.2 (09 Feb 2022 00:06), Max: 99.1 (08 Feb 2022 16:51)  HR: 78 (09 Feb 2022 00:06) (72 - 116)  BP: 95/51 (09 Feb 2022 00:06) (75/51 - 125/86)  BP(mean): --  RR: 17 (09 Feb 2022 00:06) (12 - 20)  SpO2: 97% (09 Feb 2022 00:06) (95% - 100%)    Exam:  GEN: NAD, awake and alert, cooperative.   LLE  Trilam splint in place  Moving all toes  Sensation intact to toes which can be assessed with splint  Toes warm and well perfused  Calf soft/nontender, compartments soft and compressible.       A/P:   61yFemale POD 2 Revision Hindfoot Fusion    -Pain control prn  -DVT/PE ppx per AC recs, lovenox  -NWB LLE in trilam splint  -PT/OOB as tolerated   -Med Mgmt, continue home meds.  - FU am Labs  - Patient requesting BRIAN at this time  - DC Planning  - Patient orthopaedicly stable Orthopedics     POD 2 Revision Hindfoot Fusion  Pt seen and examined at the bedside. Patient resting comfortably. Pain is moderately controlled at this time, patient still endorsing significant pain.     LABS:                        7.3    x     )-----------( x        ( 09 Feb 2022 10:27 )             22.9     02-09    142  |  108  |  13  ----------------------------<  111<H>  3.7   |  31  |  0.78    Ca    8.2<L>      09 Feb 2022 08:29        VITAL SIGNS:  T(C): 37.7 (02-10-22 @ 00:39), Max: 37.7 (02-10-22 @ 00:39)  HR: 94 (02-10-22 @ 04:34) (80 - 100)  BP: 124/73 (02-10-22 @ 04:34) (93/49 - 124/73)  RR: 18 (02-10-22 @ 04:34) (16 - 18)  SpO2: 94% (02-10-22 @ 04:34) (94% - 98%)    Exam:  GEN: NAD, awake and alert, cooperative.   LLE  Trilam splint in place  Moving all toes  Sensation intact to toes which can be assessed with splint  Toes warm and well perfused  Calf soft/nontender, compartments soft and compressible.       A/P:   61yFemale POD 2 Revision Hindfoot Fusion    -Pain control prn  -DVT/PE ppx per AC recs, lovenox  -NWB LLE in trilam splint  -PT/OOB as tolerated   -Med Mgmt, continue home meds.  - FU am Labs  - Patient requesting BRIAN at this time  - DC Planning  - Patient orthopaedicly stable

## 2022-02-10 NOTE — DISCHARGE NOTE NURSING/CASE MANAGEMENT/SOCIAL WORK - NSDCPEFALRISK_GEN_ALL_CORE
For information on Fall & Injury Prevention, visit: [email or web address] OR  [email or web address] OR  https://www.cdc.gov/steadi/patient.html

## 2022-02-10 NOTE — PROGRESS NOTE ADULT - SUBJECTIVE AND OBJECTIVE BOX
HPI: Patient is a 61y old  Female who presents with a chief complaint of left foot pain now s/p revision of the left hindfoot fusion and left tibia IMN.  Consulted by Dr. Knight  for VTE prophylaxis, risk stratification, and anticoagulation management.    Pt now s/p Complex removal of hardware from extremity, Removal of deeply implanted Lt lower extremity hardware, Revision of left subtalar arthrodesis, Arthrodesis, ankle, left, Aspiration and injection, bone marrow,  Harvesting of bone marrow Lt tibia, and injection in the fusion site,   Osteotomy, fibula , Left distal fibular osteotomy and use of fibular autograft. and Intramedullary nailing of left tibia on 2022 by Dr. Knight    PAST MEDICAL & SURGICAL HISTORY:  Osteoarthritis    Seasonal allergies    Anxiety and depression    Hypertension  Have not taken medications for about 2 years    Asthma  Has not been admitted for have any asthma exacerbation for 10 years    Diverticulosis    H/O sinusitis    Lumbar herniated disc    Female incontinence    H/O spinal stenosis    COVID-19 vaccine series completed  [employer]--2nd vaccine 2021    Panic attacks    History of heart attack  Hospitalized 2020 after left ankle fracture, surgery, states developed pneumonia, ??mild kidney failure, was told had mild heart attack, recovered , no intervention    S/P cholecystectomy      S/P tubal ligation      S/P hip replacement  left---  right--    History of ankle surgery  right ankle 2/2020 x2 with hardware , harware removed 2021  - left ankle fusion 10/2020    History of cardiac cath  2020--normal coronary arteries    Interval History  2022: Patient seen on PACU , discussed the anticoagulation with Lovenox  denies any h/o VTE,Stroke, cancer, or bleeding, patient stated she is familiar with the Lovenox which she had with last ankle surgery. Discussed the necessity of Lovenox while NWB and the risks and benefits with patient. Verbalized understanding and is in agreement with treatment plan.  2022 Pt seen at bedside on [facility].  Discussed with pt the use of Lovenox for her anticoagulation prophylaxis. She states she will be nwb for 6 weeks and she wants to go to rehab.   2/10/22: Patient seen at bedside washing up. She has used Lovenox for past surgeries and denies any concerns continuing it. For likely rehab in [place] pending auth.        CrCl:64.8  BMI:28.4  EBL:100ml    Caprini VTE Risk Score:  AGE RELATED RISK FACTORS                                                       MOBILITY RELATED FACTORS  [x ] Age 41-60 years                                            (1 Point)                  [x ] Bed rest /restricted mobility                             (1 Point)  [ ] Age: 61-74 years                                           (2 Points)                [ ] Plaster cast                                                   (2 Points)  [ ] Age= 75 years                                              (3 Points)                 [ ] Bed bound for more than 72 hours                   (2 Points)    DISEASE RELATED RISK FACTORS                                               GENDER SPECIFIC FACTORS  [ ] Edema in the lower extremities                       (1 Point)           [ ] Pregnancy                                                            (1 Point)  [ ] Varicose veins                                               (1 Point)                  [ ] Post-partum < 6 weeks                                      (1 Point)             [x ] BMI > 25 Kg/m2                                            (1 Point)                  [ ] Hormonal therapy or oral contraception       (1 Point)                 [ ] Sepsis (in the previous month)                        (1 Point)             [ ] History of pregnancy complications                (1Point)  [ ] Pneumonia or serious lung disease                                             [ ] Unexplained or recurrent  (=/>3), premature                                 (In the previous month)                               (1 Point)                birth with toxemia or growth-restricted infant (1 Point)  [ ] Abnormal pulmonary function test            (1 Point)                                   SURGERY RELATED RISK FACTORS  [ ] Acute myocardial infarction                       (1 Point)                  [ ]  Section                                         (1 Point)  [ ] Congestive heart failure (in the previous month) (1 Point)   [ ] Minor surgery   lasting <45 minutes       (1 Point)   [ ] Inflammatory bowel disease                             (1 Point)          [ ] Arthroscopic surgery                                  (2 Points)  [ ] Central venous access                                    (2 Points)            [ ] General surgery lasting >45 minutes      (2 Points)       [ ] Stroke (in the previous month)                  (5 Points)            [x ] Elective major lower extremity arthroplasty (5 Points)                                   [  ] Malignancy (present or past include skin melanoma                                          but exclude  basal skin cell)    (2 points)                                      TRAUMA RELATED RISK FACTORS                HEMATOLOGY RELATED FACTORS                                  [ ] Fracture of the hip, pelvis, or leg                       (5 Points)  [ ] Prior episodes of VTE                                     (3 Points)          [ ] Acute spinal cord injury (in the previous month)  (5 Points)  [ ] Positive family history for VTE                         (3 Points)       [ ] Paralysis (less than 1 month)                          (5 Points)  [ ] Prothrombin 33737 A                                      (3 Points)         [ ] Multiple Trauma (within 1month)                 (5Points)                                                                                                                                                                [ ] Factor V Leiden                                          (3 Points)                                OTHER RISK FACTORS                          [ ] Lupus anticoagulants                                     (3 Points)                       [ ] BMI > 40                          (1 Point)                                                         [ ] Anticardiolipin antibodies                                (3 Points)                   [ ] Smoking                              (1Point)                                                [ ] High homocysteine in the blood                      (3 Points)                [  ] Diabetes requiring insulin (1point)                         [ ] Other congenital or acquired thrombophilia       (3 Points)          [  ] Chemotherapy                   (1 Point)  [ ] Heparin induced thrombocytopenia                  (3 Points)             [  ] Blood Transfusion                (1 point)                                                                                                             Total Score [    8      ]                                                                                                                                                                                                                                     IMPROVE Bleeding Risk Score::2.5      Time In: 12:48  Time Out: 13:06    Falls Risk:   High ( x )  Mod (  )  Low (  )      FAMILY HISTORY:  Family history of CVA (Father)  , age 88    Family history of COPD (chronic obstructive pulmonary disease) (Father)  and mother,  age 87    FH: heart disease (Father, Mother)  Brother,  , age 65    FH: lung cancer  sister, living, age 65      Denies any personal or familial history of clotting or bleeding disorders.    Allergies    No Known Drug Allergies  Seasonal allergies (Rhinorrhea)    Intolerances        REVIEW OF SYSTEMS    (  )Fever	     (  )Constipation	(  )SOB				(  )Headache	(  )Dysuria  (  )Chills	     (  )Melena	(  )Dyspnea present on exertion	                    (  )Dizziness                    (  )Polyuria  (  )Nausea	     (  )Hematochezia	(  )Cough			                    (  )Syncope   	(  )Hematuria  (  )Vomiting    (  )Chest Pain	(  )Wheezing			(  )Weakness  (  )Diarrhea     (  )Palpitations	(  )Anorexia			( x )joint pain    All  other review of systems negative: Yes    Vital Signs Last 24 Hrs  T(C): 36.8 (02-10-22 @ 09:25), Max: 37.7 (02-10-22 @ 00:39)  T(F): 98.3 (02-10-22 @ 09:25), Max: 99.8 (02-10-22 @ 00:39)  HR: 82 (02-10-22 @ 09:25) (81 - 100)  BP: 113/70 (02-10-22 @ 09:25) (93/49 - 124/73)  BP(mean): --  RR: 16 (02-10-22 @ 09:25) (16 - 18)  SpO2: 95% (02-10-22 @ 09:25) (94% - 98%)    PHYSICAL EXAM:    Constitutional: Appears Well    Neurological: A& O x 3    Skin: Warm    Respiratory and Thorax: normal effort; Breath sounds: normal; No rales/wheezing/rhonchi  	  Cardiovascular: S1, S2, regular, NMBR	    Gastrointestinal: BS + x 4Q, nontender	    Genitourinary:  Bladder nondistended, nontender    Musculoskeletal:   General Right:   + muscle/joint tenderness,   normal tone, no joint swelling,   ROM: limited	    General Left:   no muscle/joint tenderness,   normal tone, no joint swelling,   ROM: full    Foot:  Lt: Dressing CDI;  Cap refill good;  Sensation intact                       Lower extrems:   Right: no calf tenderness              negative melba's sign               + pedal pulses    Left:   N/A                                   8.4    8.42  )-----------( 180      ( 10 Feb 2022 07:42 )             25.7       02-10    140  |  106  |  11  ----------------------------<  112<H>  4.3   |  32<H>  |  0.81    Ca    8.3<L>      10 Feb 2022 07:42                                             7.1    6.56  )-----------( 160      ( 2022 08:29 )             22.2       02-09    142  |  108  |  13  ----------------------------<  111<H>  3.7   |  31  |  0.78    Ca    8.2<L>      2022 08:29                      9.4    10.66 )-----------( 210      ( 2022 13:56 )             29.8       02-08    143  |  108  |  17  ----------------------------<  200<H>  4.6   |  29  |  1.17    Ca    8.2<L>      2022 13:56      MEDICATIONS  (STANDING):  acetaminophen     Tablet .. 650 milliGRAM(s) Oral every 6 hours  buPROPion XL (24-Hour) . 150 milliGRAM(s) Oral daily  enoxaparin Injectable 40 milliGRAM(s) SubCutaneous daily  fluticasone propionate (50 MICROgram(s)/actuation) Nasal Spray - Peds 1 Spray(s) Alternating Nostrils daily  gabapentin 300 milliGRAM(s) Oral three times a day  lactated ringers. 1000 milliLiter(s) IV Continuous <Continuous>  lamoTRIgine  Oral Tab/Cap - Peds 150 milliGRAM(s) Oral daily  melatonin 10 milliGRAM(s) Oral at bedtime  multivitamin 1 Tablet(s) Oral daily  PARoxetine 30 milliGRAM(s) Oral daily  QUEtiapine 100 milliGRAM(s) Oral at bedtime          DVT Prophylaxis:  LMWH                   ( x )  Heparin SQ           (  )  Coumadin             (  )  Xarelto                  (  )  Eliquis                   (  )  Venodynes           (  x)  Ambulation          ( x )  UFH                       (  )  Contraindicated  (  )  EC ASPIRIN       (  )

## 2022-02-10 NOTE — DISCHARGE NOTE PROVIDER - NSDCCPCAREPLAN_GEN_ALL_CORE_FT
PRINCIPAL DISCHARGE DIAGNOSIS  Diagnosis: Osteoarthritis of left hindfoot  Assessment and Plan of Treatment:

## 2022-02-10 NOTE — DISCHARGE NOTE PROVIDER - HOSPITAL COURSE
The patient is a 61 year old Female status post Left ankle removal of hardware, Revision hindfoot fusion. The Patient was medically Optimized for the Previously mentioned surgical procedure. The patient was taken to the operating room on date mentioned above. Prophylactic antibiotics were started before the procedure and continued postoperatively.  There were no complications during the procedure and patient tolerated the procedure well.  The patient was transferred to recovery room in stable condition and subsequently to surgical floor.  Patient was placed on Lovenox for anticoagulation, per AC team consult.  All home medications were continued.  The patient received physical therapy daily and daily labs were followed.  The dressing and Splint was kept clean, dry, intact. The patient received 1U PRBC postoperatively with appropriate response in hemoglobin. The rest of the hospital stay was unremarkable.

## 2022-02-10 NOTE — PROVIDER CONTACT NOTE (OTHER) - ACTION/TREATMENT ORDERED:
MD comfortable with blood pressure and verbalized with was ok to administer the 4mg PO of Dilaudid as the patient is a chronic pain patient
Dilaudid 0.5mg x1 dose, discontinued PO oxycodone. Dilaudid PO prn ordered
stop continuous fluids, give LR 500ml bolus x1. recheck BP, give 0.5mg dilaudid IVP for breakthrough pain.

## 2022-02-10 NOTE — PROVIDER CONTACT NOTE (OTHER) - REASON
patient complaining of pain despite medications given
patient SBP <100
Low blood pressure, pain medication administration

## 2022-02-24 NOTE — HISTORY OF PRESENT ILLNESS
[___ Weeks Post Op] : [unfilled] weeks post op [Healed] : healed [Neuro Intact] : an unremarkable neurological exam [Vascular Intact] : ~T peripheral vascular exam normal [Negative Sheng's] : maneuvers demonstrated a negative Sheng's sign [Doing Well] : is doing well [Excellent Pain Control] : has excellent pain control [No Sign of Infection] : is showing no signs of infection [Sutures Removed] : sutures were removed [Steri-Strips Removed & Replaced] : steri-strips removed and replaced [Chills] : no chills [Fever] : no fever [Nausea] : no nausea [Vomiting] : no vomiting [Erythema] : not erythematous [Discharge] : absent of discharge [Dehiscence] : not dehisced [de-identified] : s/p left ankle removal of hardware, revision hindfoot fusion\par DOS: 2/08/2022 [de-identified] : 61 year old  female presenting with her spouse in the office 2 weeks post op from left ankle removal of hardware, revision hindfoot fusion. The patient's pain is noted to be localized over the surgical area. She is currently on Lovenox for DVT Prophylaxis. She presents nonweightbearing, wearing a protective splint, and is ambulating with a wheelchair. No other complaints at this time.		 [de-identified] : General: Alert and oriented x3. In no acute distress. Pleasant in nature with a normal affect. No apparent respiratory distress.\par The wound is intact. no evidence of any diastases or dehiscence. No surrounding erythema noted. No fluctuance. The area is warm and well perfused. The patient is able to wiggle their toes. Neurovascularly intact.		\par \par The incision site was clean, dry, and intact. Sutures were clean, dry, and intact. Sutures were removed in the office today.  [de-identified] : 3V of the left ankle were ordered, obtained, and reviewed by me today, 02/24/2022, revealed: Markus placement intact. No abnormalities intact.  [de-identified] : 61 year old  female presenting in the office 2 weeks post op from left ankle removal of hardware, revision hindfoot fusion [de-identified] : 61 year old  female presenting in the office 2 weeks post op from left ankle removal of hardware, revision hindfoot fusion.\par \par XR imaging was completed in office today and results were reviewed with the patient. \par \par I recommend that the patient be fitted for a cast. The patient was fitted for the cast in the office today. She should be non weight bearing until further notice. If the patient notices any loosening of the cast, they should call the office as soon as possible. She should continue to utilize the Lovenox for DVT Prophylaxis. 	\par \par I have addressed all the patient's concerns surrounding the pathology of their condition. The patient understood and verbally agreed to the treatment plan. All of their questions were answered and they were satisfied with the visit. The patient should call the office if they have any questions or experience worsening symptoms.\par \par Follow up in 10-14 days for re-evaluation.

## 2022-02-24 NOTE — ADDENDUM
[FreeTextEntry1] : I, Mariah Fredis, acted solely as a scribe for Slade Salamanca PA-C on this date 02/24/2022.\par \par All medical record entries made by the Scribe were at my, Slade Salamanca PA-C, direction and personally dictated by me on 02/24/2022  . I have reviewed the chart and agree that the record accurately reflects my personal performance of the history, physical exam, assessment and plan. I have also personally directed, reviewed, and agreed with the chart.	\par

## 2022-02-24 NOTE — PROCEDURE
[de-identified] : A well-padded short leg fiberglass cast was applied to the left ankle. The cast was positioned appropriately to ensure neutral hindfoot alignment. Cast instructions explained to the patient. All questions answered. The patient has expressed acceptance and understanding\par

## 2022-03-10 NOTE — HISTORY OF PRESENT ILLNESS
[___ Weeks Post Op] : [unfilled] weeks post op [Chills] : no chills [Fever] : no fever [Nausea] : no nausea [Vomiting] : no vomiting [Healed] : healed [Erythema] : not erythematous [Discharge] : absent of discharge [Dehiscence] : not dehisced [Neuro Intact] : an unremarkable neurological exam [Vascular Intact] : ~T peripheral vascular exam normal [Negative Sheng's] : maneuvers demonstrated a negative Sheng's sign [Doing Well] : is doing well [Excellent Pain Control] : has excellent pain control [No Sign of Infection] : is showing no signs of infection [Sutures Removed] : sutures were removed [Steri-Strips Removed & Replaced] : steri-strips removed and replaced [de-identified] : s/p left ankle removal of hardware, revision hindfoot fusion\par DOS: 2/08/2022 [de-identified] : 61 year old  female presenting with her spouse in the office 2 weeks post op from left ankle removal of hardware, revision hindfoot fusion. The patient's pain is noted to be localized over the surgical area. She is currently on Lovenox for DVT Prophylaxis. She presents nonweightbearing, wearing a protective splint, and is ambulating with a wheelchair. No other complaints at this time.		 [de-identified] : General: Alert and oriented x3. In no acute distress. Pleasant in nature with a normal affect. No apparent respiratory distress.\par The wound is intact. no evidence of any diastases or dehiscence. No surrounding erythema noted. No fluctuance. The area is warm and well perfused. The patient is able to wiggle their toes. Neurovascularly intact.		\par \par The incision site was clean, dry, and intact. Sutures were clean, dry, and intact. Sutures were removed in the office today.  [de-identified] : 3V of the left ankle were ordered, obtained, and reviewed by me today, 02/24/2022, revealed: Markus placement intact. No abnormalities intact.  [de-identified] : 61 year old  female presenting in the office 2 weeks post op from left ankle removal of hardware, revision hindfoot fusion [de-identified] : 61 year old  female presenting in the office 2 weeks post op from left ankle removal of hardware, revision hindfoot fusion.\par \par XR imaging was completed in office today and results were reviewed with the patient. \par \par I recommend that the patient be fitted for a cast. The patient was fitted for the cast in the office today. She should be non weight bearing until further notice. If the patient notices any loosening of the cast, they should call the office as soon as possible. She should continue to utilize the Lovenox for DVT Prophylaxis. 	\par \par I have addressed all the patient's concerns surrounding the pathology of their condition. The patient understood and verbally agreed to the treatment plan. All of their questions were answered and they were satisfied with the visit. The patient should call the office if they have any questions or experience worsening symptoms.\par \par Follow up in 10-14 days for re-evaluation. 	 [FreeTextEntry1] : Procedure: s/p left ankle removal of hardware, revision hindfoot fusion\par DOS: 2/08/2022\par \par Patient presents about 1 month s/p left ankle ÁLVARO and revision hindfoot fusion. Patient reports her pain is well managed. Denies any fevers or chills. Presents today in a S[facility] in a wheelchair. Has been compliant with ASA for DVT prophylaxis.  Patient in a rehabilitation center.  No other concerns.

## 2022-03-10 NOTE — DISCUSSION/SUMMARY
[de-identified] : The patient was placed in a short leg cast.  Cast was applied which include nonweightbearing and not getting the cast wet.  The paperwork was completed for the rehabilitation center stating the rules for nonweightbearing.  We will see the patient back here in 2 weeks for cast removal and new x-rays.  The patient will continue with DVT prophylaxis.  All questions answered.

## 2022-03-10 NOTE — PHYSICAL EXAM
[de-identified] : Physical Exam: \par General: Alert and oriented x3. In no acute distress. Pleasant in nature with a normal affect. No apparent respiratory distress.\par The wound is intact. no evidence of any diastases or dehiscence. No surrounding erythema noted. No fluctuance. The area is warm and well perfused. The patient is able to wiggle their toes. Neurovascularly intact.		\par \par The incision site was clean, dry, and intact. The surgical incision site(s) was healed, not erythematous, absent of discharge and not dehisced. Additional findings included an unremarkable neurological exam, peripheral vascular exam normal and maneuvers demonstrated a negative Sheng's sign.  [de-identified] : Xray 4 views of the tib/fib and ankle were obtained and reviewed today, 03/10/2022, demonstrated hardware in appropriate alignment

## 2022-03-23 NOTE — HISTORY OF PRESENT ILLNESS
[FreeTextEntry1] : Procedure: s/p left ankle removal of hardware, revision hindfoot fusion\par DOS: 2/08/2022\par \par Patient presents with her spouse about 6 weeks s/p left ankle ÁVLARO and revision hindfoot fusion. Patient reports her pain is well managed. Denies any fevers or chills. Presents today in a [facility] in a wheelchair. Has been compliant with ASA for DVT prophylaxis.  Patient in a rehabilitation center. She does report paresthesia to her right foot.  No other concerns.

## 2022-03-23 NOTE — REASON FOR VISIT
[Post Operative Visit] : a post operative visit for [Spouse] : spouse [FreeTextEntry2] : 6 weeks s/p left ankle ÁLVARO and revision hindfoot fusion

## 2022-03-23 NOTE — ADDENDUM
[FreeTextEntry1] : I, Mariah Mcneal, acted solely as a scribe for Dr. Mohamud Knight on this date 03/23/2022.\par \par All medical record entries made by the Scribe were at my, Dr. Mohamud Knight, direction and personally dictated by me on 03/23/2022 . I have reviewed the chart and agree that the record accurately reflects my personal performance of the history, physical exam, assessment and plan. I have also personally directed, reviewed, and agreed with the chart.	\par

## 2022-03-23 NOTE — DISCUSSION/SUMMARY
[de-identified] : Patient is a 61 year old female presenting in the office today 6 weeks s/p left ankle ÁLVARO and revision hindfoot fusion.\par \par I recommended that the patient utilize a CAM boot. The patient was fitted for the CAM boot in the office today. The patient was educated about the boot wear pattern and utilization, as well as the timeframe to come out of the boot. She was also given full instructions for using the boot.  I advised her to continue nonweightbearing on the left ankle for the next two weeks. She may WBAT on her right ankle. She may utilize hygiene for both of her bilateral ankles. The patient will continue with DVT prophylaxis while in the CAM boot. I recommend that the patient utilize ice and elevate their left ankle above the level of the heart.		\par \par I have addressed all the patient's concerns surrounding the pathology of their condition. The patient understood and verbally agreed to the treatment plan. All of their questions were answered and they were satisfied with the visit. The patient should call the office if they have any questions or experience worsening symptoms.\par \par Follow up in 2 weeks for re-evaluation. 	\par

## 2022-03-23 NOTE — PHYSICAL EXAM
[de-identified] : Physical Exam: \par General: Alert and oriented x3. In no acute distress. Pleasant in nature with a normal affect. No apparent respiratory distress.\par The wound is intact. no evidence of any diastases or dehiscence. No surrounding erythema noted. No fluctuance. The area is warm and well perfused. The patient is able to wiggle their toes. Neurovascularly intact.		\par \par The incision site was clean, dry, and intact. The surgical incision site(s) was healed, not erythematous, absent of discharge and not dehisced. Additional findings included an unremarkable neurological exam, peripheral vascular exam normal and maneuvers demonstrated a negative Sheng's sign.  [de-identified] : Xray of the left tib/fib and ankle were obtained and reviewed today, 03/23/2022, demonstrated hardware in appropriate alignment

## 2022-04-06 PROBLEM — S82.892A ANKLE FRACTURE, LEFT: Status: ACTIVE | Noted: 2020-09-21

## 2022-04-06 NOTE — REASON FOR VISIT
[Post Operative Visit] : a post operative visit for [Spouse] : spouse [FreeTextEntry2] : 2 months s/p left ankle ÁLVARO and revision hindfoot fusion

## 2022-04-06 NOTE — DISCUSSION/SUMMARY
[de-identified] : The patient is a 61 year old female presenting in the office today 2 months s/p left ankle ÁLVARO and revision hindfoot fusion.\par \par I recommended that the patient continue to utilize a CAM boot. The patient was educated about the boot wear pattern and utilization, as well as the timeframe to come out of the boot. She was also given full instructions for using the boot. She may perform gentle exercises of her ankle. She may utilize hygiene for both of her bilateral ankles. The patient will continue with DVT prophylaxis while in the CAM boot. I recommend that the patient utilize ice, NSAIDs, and heat PRN. They can also elevate their left ankle above the level of the heart.		\par \par If the patient notices any worsening of pain, swelling, increased erythema, fevers, chills, or night sweats, I advised her to travel to the ED for further evaluation. I have addressed all the patient's concerns surrounding the pathology of their condition. The patient understood and verbally agreed to the treatment plan. All of their questions were answered and they were satisfied with the visit. The patient should call the office if they have any questions or experience worsening symptoms.\par \par Follow up in 2 months for re-evaluation. \par \par Consider CT scan for bone evaluation	\par Transition out of cam boot.

## 2022-04-06 NOTE — HISTORY OF PRESENT ILLNESS
[FreeTextEntry1] : Procedure: s/p left ankle removal of hardware, revision hindfoot fusion\par DOS: 2/08/2022\par \par Patient presents with her spouse about 2 months s/p left ankle ÁLVARO and revision hindfoot fusion. She presents today due to a new onset of severe pain that occurred yesterday, worsened from her usual pain scale 2 days prior. She states that she turned her foot and noticed an onset of a new, acute pain to the plantar aspect of her heel. Denies any acute trauma or new injuries.  Denies any fevers or chills. Presents today in a CAM boot in a wheelchair. Has been compliant with ASA for DVT prophylaxis.  No other complaints.

## 2022-04-06 NOTE — PHYSICAL EXAM
[de-identified] : Physical Exam: \par General: Alert and oriented x3. In no acute distress. Pleasant in nature with a normal affect. No apparent respiratory distress.\par The wound is intact. no evidence of any diastases or dehiscence. No surrounding erythema noted. No fluctuance. The area is warm and well perfused. The patient is able to wiggle their toes. Neurovascularly intact.		\par \par The incision site was clean, dry, and intact. The surgical incision site(s) was healed, not erythematous, absent of discharge and not dehisced. Additional findings included an unremarkable neurological exam, peripheral vascular exam normal and maneuvers demonstrated a negative Sheng's sign. \par \par The patient has pain over the left heel.  [de-identified] : Xray of the left tib/fib and ankle were obtained and reviewed today, 04/06/2022, demonstrated hardware in appropriate alignment

## 2022-04-06 NOTE — ADDENDUM
[FreeTextEntry1] : I, Mariah Mcneal, acted solely as a scribe for Dr. Mohamud Knight on this date 04/06/2022.\par \par All medical record entries made by the Scribe were at my, Dr. Mohamud Knight, direction and personally dictated by me on 04/06/2022 . I have reviewed the chart and agree that the record accurately reflects my personal performance of the history, physical exam, assessment and plan. I have also personally directed, reviewed, and agreed with the chart.	\par

## 2022-06-06 PROBLEM — T84.84XA PAINFUL ORTHOPAEDIC HARDWARE: Status: ACTIVE | Noted: 2020-06-22

## 2022-06-06 PROBLEM — Z98.1 S/P ANKLE FUSION: Status: ACTIVE | Noted: 2022-01-01

## 2022-06-06 PROBLEM — S82.892D ANKLE FRACTURE, LEFT, CLOSED, WITH ROUTINE HEALING, SUBSEQUENT ENCOUNTER: Status: ACTIVE | Noted: 2020-10-21

## 2022-06-06 NOTE — DISCUSSION/SUMMARY
[de-identified] : Today I had a lengthy discussion with the patient regarding their left ankle, 4 months s/p left ankle ÁLVARO and revision hindfoot fusion. I have addressed all the patient's concerns surrounding the pathology of their condition. XR imaging was completed in office today and results were reviewed with the patient. At this time I would like to obtain advanced imaging of the patient's left ankle. A CT scan was ordered so I can find out more about the etiology of the patient's condition. The patient should follow up with the office after obtaining the CT scan. A blood panel was also ordered for the patient in the office today. I recommended that the patient begin to transition out of the CAM boot to an ASO brace. The ASO brace was given today. The patient understood and verbally agreed to the treatment plan. All of their questions were answered and they were satisfied with the visit. The patient should call the office if they have any questions or experience worsening symptoms. \par \par ESR CRP Bloodwork ordered.

## 2022-06-06 NOTE — REASON FOR VISIT
[Follow-Up Visit] : a follow-up visit for [Spouse] : spouse [FreeTextEntry2] : 4 months s/p left ankle ÁLVARO and revision hindfoot fusion

## 2022-06-06 NOTE — HISTORY OF PRESENT ILLNESS
[FreeTextEntry1] : Procedure: s/p left ankle removal of hardware, revision hindfoot fusion\par DOS: 2/08/2022\par \par Patient presents with her spouse about 4 months s/p left ankle ÁLVARO and revision hindfoot fusion. The patient reports continual, severe pain over the anterior tibia for the past several weeks. The patient cannot attribute their pain to any injury, fall, or trauma. The patient presents wearing a CAM boot and is ambulation with a cane. She has not transitioned out of the CAM boot, though the patient states that she walked around her house without the CAM boot, with pain and swelling. Pain is stated to be at worse in the morning. She denies any knee pain. Her spouse does report erythema over the anterior ankle acute in nature, with the onset of 1.5 weeks prior. The patient denies any calf pain, fevers, chills, nausea, SOB, or night sweats. Denies any recent illness. No other complaints.

## 2022-06-06 NOTE — PHYSICAL EXAM
[de-identified] : General: Alert and oriented x3. In no acute distress. Pleasant in nature with a normal affect. No apparent respiratory distress.\par \par Left Ankle Exam\par Skin: Clean, dry, intact\par Inspection: No obvious malalignment, + swelling with erythema, no effusion; no lymphadenopathy\par Pulses: 2+ DP/PT pulses\par ROM: 10 degrees of dorsiflexion, 40 degrees of plantarflexion, 10 degrees of subtalar motion\par Tenderness: Tenderness over the Sinus Tarsi. No tenderness over the lateral malleolus, no CFL/ATFL/PTFL pain. No medial malleolus pain, no deltoid ligament pain. No proximal fibular pain. No heel pain.\par Stability: Negative anterior/posterior drawer.\par Strength: 5/5 TA/GS/EHL\par Neuro: In tact to light touch throughout\par Additional tests: Negative Mortons test, Negative syndesmosis squeeze test.					 [de-identified] : Xray of the left tib/fib and left ankle were obtained and reviewed today, 06/06/2022, demonstrated hardware in appropriate alignment. There is bridging callus formation noted.

## 2022-06-06 NOTE — PATIENT PROFILE ADULT - NSASFALLNEEDSASSISTWITH_GEN_A_NUR
Subjective


Progress Note Date: 06/06/22


Principal diagnosis: 





A  68 yr old female with a history of severe and chronic low back pain s/p fall 

"a few months ago" secondary to lumbar degenerative disc diseases and lumbar 

spondylosis with facet arthropathy presents today for evaluation s/p LESI L4-L5 

#1. She experienced 90% pain relief x 2 weeks, then she fell again in her 

apartment, at the grocery store and outside her vehicle which provoked the pain.

Pain level is 6/10 in intensity, dull and achy in the lower aspects of the 

lumbar spine with radiation of pain down the back of her legs BL. Pain is 

provoked by walking or standing for periods of 20 min or more. Pain is 

alleviated with home based PT which she completed this winter 2022, heat which 

was contraindicated due to possible stiffness, medications (Tylenol OTC), 

reclinging with LEs elevated and laying supine.





Interventional pain procedures completed include LESI L4-L5 #1


Patient is currently on Tylenol OTC


Patient denies any side effects of the medication(s), denies excessive 

drowsiness or sleepiness, denies suicidal ideation and reports that the current 

pain medication is  helping to control the  pain and improve activities of daily

living.


Patient denies any motor or sensory deficits. Patient denies any fever or night 

sweats, denies any change in the bowel movements or urination.


 


Physical Examination:


  -Constitutional: Cooperative. Not in acute distress .


  -HEENT:  Neck is supple. No lymphadenopathy. No thyromegaly. Normal  thyroid  

size.


                       Eyes:  No ptosis , no icterus,  no photophobia.  


                       ENT: No auditory deficits. Normal oropharynx. No Thrush. 




 - Respiratory:  Chest clear to auscultations bilaterally. No wheezing. No 

rhonchi.  


 - Cardiovascular:  Regular rate and rhythm.  S1 /  S2  ,   no  S3 ,  no  S4.


 - Gastrointestinal: Abdomen soft  no tenderness. Bowel sounds positive in all 

four quadrants. No organomegaly.


 - Genitourinary:  Deferred.                                                    

                                                                                

                                                                                

                                                                                

                                                                            


 - Neurologic:  Cranial nerve II to  XII  intact. No focal neurological 

deficits.


 - Psychatric: Alert & oriented x 3. Matching mood & appropriate affect. 

Judgment and insight intact.  


 - Lymphatic:  No Lymphadenopathy.


 - Musculoskeletal:     


Cervical spine: 


      Muscle bulk/ tone/ strength in the bilateral upper extremities normal


      Vertebral body tenderness to palpation over 


      Facet loading test positive





Thoracic spine   


     Muscle bulk / tone/ strength in the bilateral paraspinal muscles normal


     Vertebral body tender to palpation over


     Facet loading test positive


                                                                                

                                                                                

                                                                                

                                                                                

                                                                                

    


Lumbar spine: 


     Motor bulk/ tone/ strength  lower extremities , thigh and legs : 5/5 


     Deep tendon reflexes :   Normal  Knee Jerk. Normal Ankle Jerk  .


     Vertebral body tenderness to palpation over L4, L5


     Lumbar Facet Loading Test  positive 


     Straight Leg Raise: positive at  30  degrees right side/ left side 


     Gaenslen's Test positive  





Sacral spine :


     Severe tenderness over the Sacroiliac joint:  right side / left side 


     Range of motion: Flexion of the lumbar spine <60 degrees


     Range of motion: Extension of the lumbar spine <20 degrees


     Gaenslen's Test positive 


     Dhiraj's Test positive 


     Talha test: positive  right side /  left side


     Thigh Thrust Test


     Sacral Thrust Test





Assessment and plan:


       Chronic low back pain secondary to lumbar degenerative disc disease , 

lumbar spondylosis with facet arthropathy without myelopathy


       Recommendation of CAREY L4-L5 #2.  May need a series of injections, up to 

3 within a six-month period, for optimal pain relief. Risks, benefits of 

procedure discussed and pt verbalized understanding. Denies anticoagulant use or

medical history of diabetes. 


       All patient questions answered 


       MAPS reviewed and it was appropriate.





I have spent 31 minutes on patient care today. Dr Carpenter was available by 

phone for the evaluation of this patient. The time was used to review the 

medical records including relevant urine studies and Prescription history 

(MAPs), review of the available imaging, evaluation and examination of the 

patient, coordination of care with the medical staff and if applicable referring

physicians, as well as creation of the medical record


                                                  





PQRS Measure Charge Sheet


Mode of Arrival: Ambulatory


PQRS Narrative: 


                                        





Smoking Status                   Former smoker


Blood Pressure                   114/61


Pain Intensity [Bilateral        6


Lower Back]                      


Scale Used                       Numeric (1 - 10)


Hx Alcohol Use (MH)              No








Home Medications: 


Ambulatory Orders





Acetaminophen Tab [Tylenol] 1,000 mg PO Q8HR PRN 30 Days  tab 06/15/20 


Atorvastatin [Lipitor] 10 mg PO DAILY 30 Days  tab 06/15/20 


Levothyroxine Sodium [Synthroid] 50 mcg PO 0600 30 Days  tab 06/15/20 


amLODIPine [Norvasc] 10 mg PO DAILY 30 Days  tab 06/15/20 


Benztropine Mesylate [Cogentin] 0.5 mg PO DAILY 01/31/22 


Propranolol HCl 80 mg PO DAILY 01/31/22 


Ziprasidone [Geodon] 80 mg PO DAILY 01/31/22 


hydrOXYzine pamoate [hydrOXYzine PAMOATE] 25 mg PO DAILY 01/31/22 


lamoTRIgine [LaMICtal ODT] 200 mg PO DAILY 04/21/22 standing/walking/toileting

## 2022-06-06 NOTE — ADDENDUM
[FreeTextEntry1] : I, Mariah Mcneal, acted solely as a scribe for Dr. Mohamud Knight on this date 06/06/2022.\par \par All medical record entries made by the Scribe were at my, Dr. Mohamud Knight, direction and personally dictated by me on 06/06/2022 . I have reviewed the chart and agree that the record accurately reflects my personal performance of the history, physical exam, assessment and plan. I have also personally directed, reviewed, and agreed with the chart.	\par \par

## 2022-07-24 NOTE — ED PROVIDER NOTE - CROS ED CONS ALL NEG
Please complete your antibiotics in their entirety even if you begin to feel better. Please continue to use medication such as Flonase or your choice of over-the-counter decongestants such as Benadryl, Allegra, Claritin. Please increase rest and hydration. Please follow-up with your primary care provider for reevaluation within the next 48 hours. Should you develop any new or worsening symptoms please return to the ER for further evaluation.
negative...

## 2022-08-02 PROBLEM — M25.512 LEFT SHOULDER PAIN: Status: ACTIVE | Noted: 2022-01-01

## 2022-08-02 PROBLEM — M72.2 PLANTAR FASCIITIS: Status: ACTIVE | Noted: 2022-01-01

## 2022-08-03 NOTE — END OF VISIT
[FreeTextEntry3] : This note was written by Annabelle Garcia on 08/02/2022, acting as a scribe for MD Yuly.\par \par All medic record entries were at my, Dr.Meenu Papa MD, direction and personally dictated by me in 08/02/2022. I have personally reviewed the chart and agree that the record accurately reflects my personal performance of the history, physical exam, assessment, and plan.

## 2022-08-03 NOTE — PHYSICAL EXAM
[No Acute Distress] : no acute distress [Well Nourished] : well nourished [Well Developed] : well developed [Well-Appearing] : well-appearing [Normal Sclera/Conjunctiva] : normal sclera/conjunctiva [PERRL] : pupils equal round and reactive to light [EOMI] : extraocular movements intact [Normal Outer Ear/Nose] : the outer ears and nose were normal in appearance [Normal Oropharynx] : the oropharynx was normal [No JVD] : no jugular venous distention [No Lymphadenopathy] : no lymphadenopathy [Supple] : supple [Thyroid Normal, No Nodules] : the thyroid was normal and there were no nodules present [No Respiratory Distress] : no respiratory distress  [No Accessory Muscle Use] : no accessory muscle use [Clear to Auscultation] : lungs were clear to auscultation bilaterally [Normal Rate] : normal rate  [Regular Rhythm] : with a regular rhythm [Normal S1, S2] : normal S1 and S2 [No Murmur] : no murmur heard [No Carotid Bruits] : no carotid bruits [No Abdominal Bruit] : a ~M bruit was not heard ~T in the abdomen [No Varicosities] : no varicosities [Pedal Pulses Present] : the pedal pulses are present [No Edema] : there was no peripheral edema [No Palpable Aorta] : no palpable aorta [No Extremity Clubbing/Cyanosis] : no extremity clubbing/cyanosis [Soft] : abdomen soft [Non Tender] : non-tender [Non-distended] : non-distended [No Masses] : no abdominal mass palpated [No HSM] : no HSM [Normal Bowel Sounds] : normal bowel sounds [Normal Posterior Cervical Nodes] : no posterior cervical lymphadenopathy [Normal Anterior Cervical Nodes] : no anterior cervical lymphadenopathy [No CVA Tenderness] : no CVA  tenderness [No Spinal Tenderness] : no spinal tenderness [No Joint Swelling] : no joint swelling [Grossly Normal Strength/Tone] : grossly normal strength/tone [No Rash] : no rash [Coordination Grossly Intact] : coordination grossly intact [No Focal Deficits] : no focal deficits [Normal Gait] : normal gait [Deep Tendon Reflexes (DTR)] : deep tendon reflexes were 2+ and symmetric [Normal Affect] : the affect was normal [Normal Insight/Judgement] : insight and judgment were intact [de-identified] : limited range of Movt at left shoulder

## 2022-08-03 NOTE — HISTORY OF PRESENT ILLNESS
[FreeTextEntry1] : Follow-up planter fascitis [de-identified] : CHERYL MIR is a 62 year old female patient presenting to the clinic today for follow-up. Since her last surgery she has trouble walking. She has plantar fasciitis on both feet. She has not done physical therapy. Patient sometimes rolls a water bottle under her feet to  improve pain but with no improvement. Also, patient has lower back pain and threw out her left shoulder. She does not have full range of motion of her left shoulder, cannot lift arm straight up. Reports her back pain is too severe for her to sleep on her back.

## 2022-08-03 NOTE — PLAN
[FreeTextEntry1] : # Start Rx Prednisone 50 MG\par # referral to podiatry and orthopedic\par # advised to make appointment with orthopedic \par # advised to wear shoes when at home\par # advised to wear closed shoes \par # advised to wear shoes with at least one inch high heel\par # f/u PRN

## 2022-08-03 NOTE — REVIEW OF SYSTEMS
[Insomnia] : insomnia [Anxiety] : anxiety [Depression] : depression [Negative] : Heme/Lymph [Back Pain] : back pain [FreeTextEntry9] : Left shoulder pain; Plantar fasciitis of both feet

## 2022-08-03 NOTE — ASSESSMENT
[FreeTextEntry1] : CHERYL MIR is a 62 year old female patient presenting to the clinic today for follow-up.\par \par #PE/Vitals\par - elevated blood pressure 144/90 \par \par #Plantar Fasciitis\par - pt has pain in both feet\par - having trouble walking due to severe pain\par \par #Left Shoulder Pain\par - pt threw out shoulder and has limited mobility\par - unable to move her left arm above shoulder\par \par #Lower Back Pain\par - unable to sleep on back due to severe pain\par

## 2022-09-06 NOTE — DISCHARGE NOTE NURSING/CASE MANAGEMENT/SOCIAL WORK - DATE OF FIRST COVID-19 BOOSTER
28-Oct-2021 Griseofulvin Counseling:  I discussed with the patient the risks of griseofulvin including but not limited to photosensitivity, cytopenia, liver damage, nausea/vomiting and severe allergy.  The patient understands that this medication is best absorbed when taken with a fatty meal (e.g., ice cream or french fries).

## 2022-10-06 NOTE — ADDENDUM
[FreeTextEntry1] : I, Lawson Van, acted solely as a scribe for Dr. Mohamud Knight on this date 04/10/2020. \par \par All medical record entries made by the Scribe were at my, Dr. Mohamud Knight, direction and personally dictated by me on 04/10/2020 . I have reviewed the chart and agree that the record accurately reflects my personal performance of the history, physical exam, assessment and plan. I have also personally directed, reviewed, and agreed with the chart. [Alert] : alert [No Acute Distress] : no acute distress [Playful] : playful [Normocephalic] : normocephalic [Conjunctivae with no discharge] : conjunctivae with no discharge [PERRL] : PERRL [EOMI Bilateral] : EOMI bilateral [Auricles Well Formed] : auricles well formed [Clear Tympanic membranes with present light reflex and bony landmarks] : clear tympanic membranes with present light reflex and bony landmarks [No Discharge] : no discharge [Nares Patent] : nares patent [Pink Nasal Mucosa] : pink nasal mucosa [Palate Intact] : palate intact [Uvula Midline] : uvula midline [Nonerythematous Oropharynx] : nonerythematous oropharynx [No Caries] : no caries [Trachea Midline] : trachea midline [Supple, full passive range of motion] : supple, full passive range of motion [No Palpable Masses] : no palpable masses [Symmetric Chest Rise] : symmetric chest rise [Clear to Auscultation Bilaterally] : clear to auscultation bilaterally [Normoactive Precordium] : normoactive precordium [Regular Rate and Rhythm] : regular rate and rhythm [Normal S1, S2 present] : normal S1, S2 present [No Murmurs] : no murmurs [+2 Femoral Pulses] : +2 femoral pulses [Soft] : soft [NonTender] : non tender [Non Distended] : non distended [Normoactive Bowel Sounds] : normoactive bowel sounds [No Hepatomegaly] : no hepatomegaly [No Splenomegaly] : no splenomegaly [Juancarlos 1] : Juancarlos 1 [Central Urethral Opening] : central urethral opening [Testicles Descended Bilaterally] : testicles descended bilaterally [Patent] : patent [Normally Placed] : normally placed [No Abnormal Lymph Nodes Palpated] : no abnormal lymph nodes palpated [Symmetric Buttocks Creases] : symmetric buttocks creases [Symmetric Hip Rotation] : symmetric hip rotation [No Gait Asymmetry] : no gait asymmetry [No pain or deformities with palpation of bone, muscles, joints] : no pain or deformities with palpation of bone, muscles, joints [Normal Muscle Tone] : normal muscle tone [No Spinal Dimple] : no spinal dimple [NoTuft of Hair] : no tuft of hair [Straight] : straight [+2 Patella DTR] : +2 patella DTR [Cranial Nerves Grossly Intact] : cranial nerves grossly intact [No Rash or Lesions] : no rash or lesions

## 2022-11-02 NOTE — REASON FOR VISIT
[Initial Visit] : an initial visit for [FreeTextEntry2] : left knee injured 9/4/2020 right ankle injured 2/13/2020 Repair Anesthesia Method: local infiltration

## 2022-11-09 NOTE — H&P PST ADULT - SKIN/BREAST
[de-identified] : 35-year-old male right knee pain for 3 years. 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 Pain is located at the inferior pole of his patella he has pain with standing especially stairs squats and lunges he had extensive care over the last 3 years including periods of rest physical therapy exercises anti-inflammatory medications for greater than 6 weeks without relief in significant pain now and is unable to exercise at the gym he cannot do any exercises because of pain. He denies any swelling locking catching instability\par \par \par 
negative

## 2022-11-16 NOTE — DISCHARGE NOTE ADULT - VISION (WITH CORRECTIVE LENSES IF THE PATIENT USUALLY WEARS THEM):
[facility]  [address]  [address]  Phone: [phone]  Fax: [phone]    ALVERTO Madrigal CNP        November 16, 2022     Patient: Dread Hassan   YOB: 1980   Date of Visit: 11/16/2022       To Whom It May Concern: It is my medical opinion that Dread Hassan may return to work on 11/17/2022. If you have any questions or concerns, please don't hesitate to call.     Sincerely,        ALVERTO Madrigal CNP Normal vision: sees adequately in most situations; can see medication labels, newsprint

## 2022-11-17 NOTE — PATIENT PROFILE ADULT - TRANSPORTATION
[facility] Care Transitions Follow Up Call      Patient: Romina Malhotra  Patient : 1965   MRN: [id number]  Reason for Admission: diabetic ketoacidosis, acute pancreatitis, abd pain, COPD and chronic hypoxic resp failure - stable -> home no services  Discharge Date: 10/26/22 RARS: Readmission Risk Score: 13.7    Needs to be reviewed by the provider   Additional needs identified to be addressed with provider: No         Method of communication with provider: none. Care Transition Nurse contacted the spouse and patient by telephone to follow up after recent admission. Spoke with wife with patient in background. Overall reports he is doing well. Still with some abd pain but no n/v/d. Wife states abd pain has been baseline since last year when he had extended hospital stay. Breathing baseline. -130. Noted he missed appt with PCP 2 days ago. Wife had wrong date in calendar. Noted there are labs on hold to be drawn - needs to reschedule appointment. She is agreeable to connecting call to PCP office to reschedule. Call connected to PCP office Judd Cid to reschedule missed appointment as he continues with abd pain and will need the labwork that was pended from the no show appt. Rescheduled for Monday,  at 1045am. He will fast for labwork. Denies needs. Has CTN contact number for future needs. Addressed changes since last contact:  none  Discussed follow-up appointments. If no appointment was previously scheduled, appointment scheduling offered: Yes. Follow Up  Future Appointments   Date Time Provider Soumya Renteria   12/15/2022  3:00 PM [facility] PULMONARY FUNCTION TESTING [facility] PFT Diego ORTIZ   12/15/2022  3:30 PM ANDREW Jose PULALEX [facility]     [facility] up appointment(s): STAR    Care Transition Nurse reviewed medical action plan with  spouse with patient in background  and discussed any barriers to care and/or understanding of plan of care after discharge.  Discussed appropriate site of care based on symptoms and resources available to patient including: PCP  Specialist. The  patient and caregiver  agrees to contact the PCP office for questions related to their healthcare. Patients top risk factors for readmission: medical condition-see above  Interventions to address risk factors: Education of patient/family/caregiver/guardian to support self-management-f/up as scheduled     Care Transitions Subsequent and Final Call    Subsequent and Final Calls  Do you have any ongoing symptoms?: Yes  Onset of Patient-reported symptoms: Other  Patient-reported symptoms: Abdominal Pain  Interventions for patient-reported symptoms: Notified PCP/Physician  Have your medications changed?: No  Do you have any questions related to your medications?: No  Do you currently have any active services?: No  Do you have any needs or concerns that I can assist you with?: No  Identified Barriers: None  Care Transitions Interventions    Registered Dietician: Completed    Other Interventions:           Care Transition Nurse provided contact information for future needs. No further follow-up call indicated based on severity of symptoms and risk factors.     Darlene Acuna RN  Care Transition Nurse  [phone] mobile no

## 2023-02-10 NOTE — ASU PATIENT PROFILE, ADULT - MEDICATION ADMINISTRATION INFO, PROFILE
Meche Carr (:  1956) is a 61 y.o. male,Established patient, here for evaluation of the following chief complaint(s):    Cough      SUBJECTIVE/OBJECTIVE:  Pt presents with c/o as stated below - is scheduled for surgery soon and has been ill for the past 2 weeks - was exposed to ill wife - denies any recent travel     URI   This is a new problem. The current episode started 1 to 4 weeks ago. The problem has been unchanged. There has been no fever. Associated symptoms include congestion (nasal and chest), coughing and rhinorrhea. Pertinent negatives include no abdominal pain, chest pain, diarrhea, dysuria, ear pain, headaches, joint pain, joint swelling, nausea, neck pain, plugged ear sensation, rash, sinus pain, sneezing, sore throat, swollen glands, vomiting or wheezing. He has tried nothing for the symptoms. Allergies   Allergen Reactions    Seasonal         Current Outpatient Medications   Medication Sig Dispense Refill    amoxicillin (AMOXIL) 875 MG tablet Take 1 tablet by mouth 2 times daily for 10 days 20 tablet 0    diclofenac (VOLTAREN) 75 MG EC tablet Take 1 tablet by mouth in the morning and 1 tablet in the evening.  180 tablet 0    loratadine (CLARITIN) 10 MG tablet Take 1 tablet by mouth daily 90 tablet 0    losartan (COZAAR) 50 MG tablet Take 1 tablet by mouth daily 90 tablet 0    ibuprofen (ADVIL;MOTRIN) 800 MG tablet Take 1 tablet by mouth daily as needed for Pain 30 tablet 1    sildenafil (VIAGRA) 100 MG tablet Take 1 tablet by mouth daily as needed for Erectile Dysfunction (Patient not taking: Reported on 10/24/2022) 30 tablet 2    atorvastatin (LIPITOR) 20 MG tablet TAKE ONE (1) TABLET BY MOUTH EACH NIGHT AT BEDTIME 90 tablet 1    azelastine (ASTELIN) 0.1 % nasal spray 1 spray by Nasal route 2 times daily Use in each nostril as directed 1 each 1    DULoxetine (CYMBALTA) 30 MG extended release capsule Take 2 capsules by mouth 2 times daily 360 capsule 1    ipratropium-albuterol (DUONEB) 0.5-2.5 (3) MG/3ML SOLN nebulizer solution Inhale 3 mLs into the lungs every 4 hours as needed for Shortness of Breath 360 mL 1    omeprazole (PRILOSEC) 20 MG delayed release capsule Take 1 capsule by mouth 2 times daily 180 capsule 1    Handicap Placard MISC by Does not apply route 1 each 0     Current Facility-Administered Medications   Medication Dose Route Frequency Provider Last Rate Last Admin    methylPREDNISolone acetate (DEPO-MEDROL) injection 20 mg  20 mg Intra-artICUlar Once Tayla Berger MD           Review of Systems   HENT:  Positive for congestion (nasal and chest), rhinorrhea and sinus pressure. Negative for ear pain, sinus pain, sneezing and sore throat. Respiratory:  Positive for cough. Negative for wheezing. Cardiovascular:  Negative for chest pain. Gastrointestinal:  Negative for abdominal pain, diarrhea, nausea and vomiting. Genitourinary:  Negative for dysuria. Musculoskeletal:  Negative for joint pain and neck pain. Skin:  Negative for rash. Neurological:  Negative for headaches. Pulse 72   Temp 98.1 °F (36.7 °C) (Infrared)   Ht 5' 8\" (1.727 m)   Wt 202 lb (91.6 kg)   SpO2 100%   BMI 30.71 kg/m²      Physical Exam  Vitals reviewed. Constitutional:       General: He is not in acute distress. HENT:      Nose: Congestion and rhinorrhea present. Comments: Maxillary sinus tenderness   Cardiovascular:      Rate and Rhythm: Normal rate and regular rhythm. Pulses: Normal pulses. Heart sounds: Normal heart sounds. Pulmonary:      Effort: Pulmonary effort is normal.      Breath sounds: Normal breath sounds. Musculoskeletal:      Cervical back: Neck supple. Lymphadenopathy:      Cervical: No cervical adenopathy. Skin:     General: Skin is warm and dry. Capillary Refill: Capillary refill takes less than 2 seconds. Findings: No rash. Neurological:      Mental Status: He is alert. ASSESSMENT/PLAN:  1.  Acute non-recurrent maxillary sinusitis  You were prescribed antibiotics, take them as directed. Do not stop taking them just because you feel better. You need to take the full course of antibiotics. Be careful when taking over-the-counter cold or flu medicines and Tylenol at the same time. Many of these medicines have acetaminophen, which is Tylenol. Read the labels to make sure that you are not taking more than the recommended dose. Too much acetaminophen (Tylenol) can be harmful. Breathe warm, moist air from a steamy shower, a hot bath, or a sink filled with hot water. Avoid cold, dry air. Using a humidifier in your home may help. Follow the directions for cleaning the machine. Use saline (saltwater) nasal washes. This can help keep your nasal passages open and wash out mucus and bacteria. You can buy saline nose drops at a grocery store or drugstore. Or you can make your own at home by adding 1 teaspoon of salt and 1 teaspoon of baking soda to 2 cups of distilled water. If you make your own, fill a bulb syringe with the solution, insert the tip into your nostril, and squeeze gently. Leeta Bushy your nose. Put a hot, wet towel or a warm gel pack on your face 3 or 4 times a day for 5 to 10 minutes each time. - amoxicillin (AMOXIL) 875 MG tablet; Take 1 tablet by mouth 2 times daily for 10 days  Dispense: 20 tablet; Refill: 0    Return if symptoms worsen or fail to improve. An electronic signature was used to authenticate this note.     --THAD Velez - CNP no concerns

## 2023-02-15 NOTE — PHYSICAL THERAPY INITIAL EVALUATION ADULT - DIAGNOSIS, PT EVAL
-Patient has quit smoking intermittently multiple times over the last 2 years.  She is now down to 5 cigarettes a day and is currently following up with quit smoking  .  She finds that the  for the phone has been helpful and inspiring her to continue her journey with smoking cessation.  We discussed a few additional things she can do to continue her efforts and quit smoking.  Total amount of time on smoking cessation counseling was 5 minutes.   Impaired functional mobility secondary to left LE weakness

## 2023-03-02 NOTE — PHYSICAL THERAPY INITIAL EVALUATION ADULT - DIAGNOSIS, PT EVAL
I called and touched base with patient after biopsy. Patient had no complaints or complications after procedure.   s/p Right total ankle arthroplasty

## 2023-03-22 NOTE — ASU PREOP CHECKLIST - HAIR REMOVAL
hair removal not indicated Purse String (Simple) Text: Given the location of the defect and the characteristics of the surrounding skin a purse string simple closure was deemed most appropriate.  Undermining was performed circumferentially around the surgical defect.  A purse string suture was then placed and tightened.

## 2023-04-13 NOTE — PATIENT PROFILE ADULT - TRANSPORTATION
TRANSITIONAL CARE MANAGEMENT - HOSPITAL DISCHARGE FOLLOW-UP    Contacted Ms. Rubio regarding follow-up for Angina (SOB and Fatigue) after hospital discharge. She was discharged from the hospital on 04/12/23. Review of the After Visit Summary from the recent hospitalization indicates that the patient needs to f/u within 1 week.    She feels that she is doing well at home.   Her diet concern is none. Overall, the patient is not eating well. Pt states that pt is having \"major diarrhea\"  Ambulation: slight shortness of breath but otherwise doing well  Fever: is not present  Pain: none  Activities of Daily Living (global): Self-care   Patient states that she does have sufficient family support. She feels that she is able to ask for assistance when needed.     Additional patient/family concerns: None .    Discharge medications were verified with the patient. She is fully compliant with the medication regimen prescribed at the time of discharge. She reports that she is not experiencing any medication side effects.      Advance care planning documents on file - yes    Patient has an appointment on 05/01/23 with Lisa Catherine MD. Ms. Rubio was reminded about the importance of keeping this appointment. Pt attempting to potentially schedule with [facility] clinic or IM provider [facility]      no

## 2023-04-27 NOTE — PATIENT PROFILE ADULT - NSPRESCRALCFREQ_GEN_A_NUR
Worsening allergies.  Patient is currently on loratadine.  Recommend she stop that and start Xyzal.  We will give albuterol as allergies seem to exacerbate asthma    Monthly or less

## 2023-05-11 NOTE — HISTORY OF PRESENT ILLNESS
[___ Days Post Op] : post op day #[unfilled] [Clean/Dry/Intact] : clean, dry and intact [Healed] : healed [Neuro Intact] : an unremarkable neurological exam [Negative Sheng's] : maneuvers demonstrated a negative Sheng's sign [Vascular Intact] : ~T peripheral vascular exam normal [Doing Well] : is doing well [Excellent Pain Control] : has excellent pain control [No Sign of Infection] : is showing no signs of infection [Chills] : no chills [Fever] : no fever [Nausea] : no nausea [Vomiting] : no vomiting [Erythema] : not erythematous [Swelling] : not swollen [Discharge] : absent of discharge [Dehiscence] : not dehisced [de-identified] : s/p Removal of external fixator, ORIF of right trimalleolar fracture with posterior malleolus fixation. \par DOS 2/25/2020 [de-identified] : 59 year old female present in the office 8 days post op from Removal of external fixator, ORIF of right trimalleolar fracture with posterior malleolus fixation. The patient presents ambulating in a wheelchair. The patient's pain is noted to be a 9/10. She is currently taking Dilaudid. She is taking aspirin and Lovenox. No other complaints at this time.  [de-identified] : X-rays of the right ankle obtained from outside facility and reviewed by me today 03/04/2020. Revealed: Hardware in good position.  [de-identified] : General: Alert and oriented x3. In no acute distress. Pleasant in nature with a normal affect. No apparent respiratory distress.\par The wound is intact. No evidence of any diastases or dehiscence. No surrounding erythema noted. No fluctuance. The area is warm and well perfused. The patient is able to wiggle their toes. Neurovascularly intact.  [de-identified] : Patient is a 59 year old female present in the office today 8 days s/p Removal of external fixator, ORIF of right trimalleolar fracture with posterior malleolus fixation.  There is a concern regarding aquinas in the future. I recommend that the patient be fitted for a cast. The patient was fitted for the cast in the office today. She should be non weight bearing until further notice. If the patient notices any loosening of the cast, they should call the office as soon as possible. I advised the patient to continue taking aspirin for blood thinning purposes. I would like to see the patient back in the office in 10 days to reassess their condition. I have addressed all the patient's concerns surrounding the pathology of their condition. The patient understood and verbally agreed to the treatment plan. All of their questions were answered and they were satisfied with the visit. The patient should call the office if they have any questions or experience worsening symptoms.  normal...

## 2023-10-13 NOTE — ED ADULT NURSE REASSESSMENT NOTE - GENERAL PATIENT STATE
improvement verbalized
improvement verbalized/resting/sleeping/smiling/interactive/cooperative
[facility]

## 2024-01-01 NOTE — H&P PST ADULT - HISTORY OF PRESENT ILLNESS
62 y/o female presents to PST for scheduled left ankle removal and revision hindfoot on 2/8/22. Patient with hx of fracturing her left ankle in September 2020 after a fall, s/p surgery fusion, hardware now with c/o of severe pain to area requiring ambulation with a cane.  no

## 2024-04-01 NOTE — BEHAVIORAL HEALTH ASSESSMENT NOTE - PAST PSYCHOTROPIC MEDICATION
Follow up of metabolic syndrome, overweight.   Has been well.   Lost gained weight all back without any significant side effect .  2 regular meals with well balanced macro.     Glucose  70 - 99 mg/dL 88  86 105 High   95     2 mo ago 1 yr ago 2 yr ago 3 yr ago 6 yr ago 13 yr ago    TSH  0.350 - 5.000 mcUnits/mL 0.954 1.341 CM 1.046 CM 1.502 CM 0.997 0.73 R       Cholesterol  <=199 mg/dL 176 194  High   CM   Comment:  Desirable         <200  Borderline High   200 to 239  High              >=240   Triglycerides  <=149 mg/dL 51 47 CM 66 CM 84 CM   Comment:  Normal            <150  Borderline High   150 to 199  High              200 to 499  Very High         >=500   HDL  >=50 mg/dL 72 67 CM 56 CM 49 Low  CM   Comment:  Low              <40  Borderline Low   40 to 49  Near Optimal     50 to 59  Optimal          >=60   LDL  <=129 mg/dL 94 118  High   CM   Comment:  Optimal           <100  Near Optimal      100 to 129  Borderline High   130 to 159  High              160 to 189  Very High         >=190   Non-HDL Cholesterol  mg/dL 104 127   CM   Comment:  Therapeutic Target:  CHD and risk equivalents  <130  Multiple risk factors     <160  0 to 1 risk factor        <190   Cholesterol/ HDL Ratio  <=4.4 2.4 2.9 3.8 3.9       ALLERGIES:   Allergen Reactions    Seasonal Other (See Comments)     Itchy, watery eyes      Current Outpatient Medications   Medication Sig Dispense Refill    semaglutide-Weight Management (Wegovy) 2.4 MG/0.75ML injection Inject 2.4 mg into the skin every 7 days for 12 doses. Indications: OBESITY 9 mL 0    escitalopram (LEXAPRO) 5 MG tablet Take 1 tablet by mouth daily. 90 tablet 0    semaglutide-Weight Management (WEGOVY) 1.7 MG/0.75ML injection Inject 1.7 mg into the skin every 7 days for 4 doses. Indications: OBESITY 3 mL 0     No current facility-administered medications for this visit.        I have personally reviewed and updated the following EPIC sections:  Current medications     REVIEW OF SYSTEMS:  GENERAL:   Denies weakness, fatigue, fever, malaise.  HEENT:  Denies headache, lightheadedness, vertigo, double vision, blurred vision  NECK:  Denies lump, swollen glands, pain, stiffness.  RESPIRATORY:  Denies cough, dyspnea, wheezing  CARDIOVASCULAR:  Denies chest pain, palpitation, edema  GI: Denies trouble swallowing, heartburn, nausea, vomiting, change in bowel habits, constipation, diarrhea, abdominal pain, hematemesis, hematochezia, melena.  ENDOCRINE:  Denies heat or cold intolerance, excessive thirst, excessive sweating, polyuria, skin or hair change.    PHYSICAL EXAM:  GENERAL:  In no acute distress,  Looking her  age, in good  hygiene.  VITALS:  Visit Vitals  /60   Pulse 90   Wt 71.8 kg (158 lb 4.8 oz)   LMP 07/02/2023 (Exact Date)   SpO2 99%   BMI 27.39 kg/m²      NECK:  Supple, no noticeable or palpable swelling,  no thyromegaly.  LUNGS:  Clear to auscultation, no use of accessory muscles, no crackles or wheezes.  CARDIOVASCULAR:   RRR, S1  S2 normal without murmur  PLAN:  Follow up of metabolic syndrome, overweight.   Has been well.   Lost gained weight all back without any significant side effect .  2 regular meals with good macro balance.   I believe she needs to focus on weight maintenance only from now on  at BMI 26~27.   Advised her to stay on current structured diet and to work on regular exercise and she agreed.   Continue med and see her in 6 months.            xanax

## 2024-04-01 NOTE — PATIENT PROFILE ADULT - NSPROPASSIVESMOKEEXPOSURE_GEN_A_NUR
Photo Preface (Leave Blank If You Do Not Want): Photographs were obtained today Detail Level: Zone Unknown

## 2024-04-05 NOTE — ED STATDOCS - NS ED SCRIBE STATEMENT
Behavioral Health IP Nursing Progress Note    Suicidal Ideation: Patient denies     Current C-SSRS score: Negative Screen - White (04/05/24 0800)      Protective Factors / Reason for Living: Ability to cope with frustration, Ability to cope with stress, Compliance with medications    Interventions:   15 minute safety checks     Other Interventions Implemented:  Visual inspection of patient's environment completed. Items removed: none needing removal    Subjective: Patient denies current suicidal thoughts, ideations, and plan. Patient denies current homicidal thoughts, ideations, and plan. Patient denies both current auditory and visual hallucinations. Patient agrees to notify staff of any safety concerns during the shift. Will continue to monitor.     Objective:   Mental Health: Patient behavior observed to be cooperative.     Medical:   Pain     Assessment / Actions:   PRN Medications given?   No    Plan:   Treatment Plan reviewed.         Attending

## 2024-05-29 NOTE — H&P PST ADULT - NSANTHBMIRD_ENT_A_CORE
Is the patient currently in the state of [place]? YES    Visit mode:VIDEO    If the visit is dropped, the patient can be reconnected by: VIDEO VISIT: Text to cell phone:   Telephone Information:   Mobile [phone]       Will anyone else be joining the visit? NO  (If patient encounters technical issues they should call [phone]    How would you like to obtain your AVS? MyChart    Are changes needed to the allergy or medication list? Pt stated no changes to allergies and Pt stated no med changes    Reason for visit: Follow Up    Rose ASHLEY     No

## 2024-06-07 NOTE — ED PROVIDER NOTE - NS ED ROS FT
ROS: no CP/SOB. no cough. no fever. no n/v/d/c. no abd pain. no rash. no bleeding. no urinary complaints. +collapse. no vision changes. no HA. no neck pain +back pain. no extremity swelling/deformity. No change in mental status.
never

## 2024-10-14 NOTE — ED PROVIDER NOTE - NS ED ATTENDING STATEMENT MOD
Attending Only

## 2025-03-11 NOTE — H&P PST ADULT - GASTROINTESTINAL
At [facility], we strive to deliver an exceptional experience to you, every time we see you. If you receive a survey, please let us know what we are doing well and/or what we could improve upon, as we do value your feedback.  If you have MyChart, you can expect to receive results automatically within 24 hours of their completion.  Your provider will send a note interpreting your results as well.   If you do not have MyChart, you should receive your results in about a week by mail.    Your care team:                            Family Medicine Internal Medicine   MD Jase Blackman, MD Carolyn Ellis, MD Stiven Gibbons, MD Arina Alva, PARonaC    Pawel Ansari, MD Pediatrics   Vicenta Ma, MD Debbie Adam, MD Leslie Thurston, APRN CNP Nereida Edmonds APRN CNP   MD Renee Guillen, MD Jayla Aguilar, CNP     Sebastián Castellon, CNP Same-Day Provider (No follow-up visits)   ZITA Montenegro, DNP Sushila Roman, ZITA Trinidad, FNP, BC ADELINA HenriquezC     Clinic hours: Monday - Thursday 7 am-6 pm; Fridays 7 am-5 pm.   Urgent care: Monday - Friday 10 am- 8 pm; Saturday and Sunday 9 am-5 pm.    Clinic: [phone]       [facility]: Monday - Thursday 8 am - 7 pm; Friday 8 am - 6 pm  [facility]: [phone]     details… detailed exam

## 2025-05-15 NOTE — H&P PST ADULT - NSICDXPROBLEM_GEN_ALL_CORE_FT
Pharmacy faxed in a request for prior authorization on:    Medication: ezetimibe  Dosage: 10 mg  Quantity requested:  90  Pharmacy for prescription has been selected.  Add Key or PA number if applicable [id number]  Prior authorization request has been initiated and sent for completion.    Patient's insurance no longer covers [facility] providers. If documentation is needed, patient needs to establish with a covered provider.    
PROBLEM DIAGNOSES  Problem: Need for prophylactic measure  Assessment and Plan: